# Patient Record
Sex: FEMALE | Race: WHITE | NOT HISPANIC OR LATINO | Employment: OTHER | URBAN - METROPOLITAN AREA
[De-identification: names, ages, dates, MRNs, and addresses within clinical notes are randomized per-mention and may not be internally consistent; named-entity substitution may affect disease eponyms.]

---

## 2017-02-06 ENCOUNTER — TELEPHONE (OUTPATIENT)
Dept: MEDICAL GROUP | Facility: MEDICAL CENTER | Age: 82
End: 2017-02-06

## 2017-02-06 ENCOUNTER — OFFICE VISIT (OUTPATIENT)
Dept: MEDICAL GROUP | Facility: MEDICAL CENTER | Age: 82
End: 2017-02-06
Payer: MEDICARE

## 2017-02-06 VITALS
HEART RATE: 55 BPM | WEIGHT: 120 LBS | HEIGHT: 60 IN | TEMPERATURE: 97.9 F | BODY MASS INDEX: 23.56 KG/M2 | OXYGEN SATURATION: 96 % | SYSTOLIC BLOOD PRESSURE: 136 MMHG | DIASTOLIC BLOOD PRESSURE: 74 MMHG

## 2017-02-06 DIAGNOSIS — R52 PAIN: ICD-10-CM

## 2017-02-06 DIAGNOSIS — I48.0 PAROXYSMAL ATRIAL FIBRILLATION (HCC): Chronic | ICD-10-CM

## 2017-02-06 DIAGNOSIS — M54.5 LOW BACK PAIN, UNSPECIFIED BACK PAIN LATERALITY, UNSPECIFIED CHRONICITY, WITH SCIATICA PRESENCE UNSPECIFIED: ICD-10-CM

## 2017-02-06 DIAGNOSIS — I10 ESSENTIAL HYPERTENSION: Chronic | ICD-10-CM

## 2017-02-06 DIAGNOSIS — E78.5 DYSLIPIDEMIA: Chronic | ICD-10-CM

## 2017-02-06 DIAGNOSIS — Z79.891 CHRONIC USE OF OPIATE DRUGS THERAPEUTIC PURPOSES: ICD-10-CM

## 2017-02-06 DIAGNOSIS — M81.8 OSTEOPOROSIS, IDIOPATHIC: Chronic | ICD-10-CM

## 2017-02-06 DIAGNOSIS — H83.3X9 HEARING LOSS D/T NOISE, UNSPECIFIED LATERALITY: ICD-10-CM

## 2017-02-06 DIAGNOSIS — Z87.81 HISTORY OF COMPRESSION FRACTURE OF SPINE: ICD-10-CM

## 2017-02-06 DIAGNOSIS — F11.20 OPIATE DEPENDENCE, CONTINUOUS (HCC): ICD-10-CM

## 2017-02-06 PROCEDURE — 4040F PNEUMOC VAC/ADMIN/RCVD: CPT | Performed by: INTERNAL MEDICINE

## 2017-02-06 PROCEDURE — G8420 CALC BMI NORM PARAMETERS: HCPCS | Performed by: INTERNAL MEDICINE

## 2017-02-06 PROCEDURE — G8482 FLU IMMUNIZE ORDER/ADMIN: HCPCS | Performed by: INTERNAL MEDICINE

## 2017-02-06 PROCEDURE — 99214 OFFICE O/P EST MOD 30 MIN: CPT | Performed by: INTERNAL MEDICINE

## 2017-02-06 PROCEDURE — 1036F TOBACCO NON-USER: CPT | Performed by: INTERNAL MEDICINE

## 2017-02-06 PROCEDURE — G8432 DEP SCR NOT DOC, RNG: HCPCS | Performed by: INTERNAL MEDICINE

## 2017-02-06 PROCEDURE — 1101F PT FALLS ASSESS-DOCD LE1/YR: CPT | Performed by: INTERNAL MEDICINE

## 2017-02-06 RX ORDER — OXYCODONE HCL 10 MG/1
10 TABLET, FILM COATED, EXTENDED RELEASE ORAL EVERY 12 HOURS
Qty: 60 TAB | Refills: 0 | Status: SHIPPED | OUTPATIENT
Start: 2017-02-06 | End: 2017-05-09 | Stop reason: SDUPTHER

## 2017-02-06 RX ORDER — OXYCODONE HCL 10 MG/1
10 TABLET, FILM COATED, EXTENDED RELEASE ORAL EVERY 12 HOURS
Qty: 60 TAB | Refills: 0 | Status: SHIPPED | OUTPATIENT
Start: 2017-02-06 | End: 2017-02-06 | Stop reason: SDUPTHER

## 2017-02-06 NOTE — MR AVS SNAPSHOT
[facility]   2017 10:00 AM   Office Visit   MRN: [id number]    Department:  [facility]   Dept Phone:  [phone]    Description:  Female : 1931   Provider:  Denis Krueger M.D.           Allergies as of 2017     Allergen Noted Reactions    Codeine 10/20/2007   Vomiting      You were diagnosed with     Pain   [059893]       Chronic use of opiate drugs therapeutic purposes   [4606420]       Opiate dependence, continuous (CMS-[facility])   [561802]       Compression fracture of thoracic vertebra, with delayed healing, subsequent encounter   [449473]       Low back pain, unspecified back pain laterality, unspecified chronicity, with sciatica presence unspecified   [4376703]       Hearing loss d/t noise, unspecified laterality   [9142376]         Vital Signs     Blood Pressure Pulse Temperature Height Weight Body Mass Index    136/74 mmHg 55 36.6 °C (97.9 °F) 1.524 m (5') 54.432 kg (120 lb) 23.44 kg/m2    Oxygen Saturation Smoking Status                96% Former Smoker          Basic Information     Date Of Birth Sex Race Ethnicity Preferred Language    1931 Female White Non- English      Your appointments     2017 10:00 AM   Established Patient with Denis Krueger M.D.   [facility])    [address]  [facility] [id number] [phone]           You will be receiving a confirmation call a few days before your appointment from our automated call confirmation system.            Mar 07, 2017  4:20 PM   Established Patient Pul with FANTASMA Palomo   [facility] (--)    [address]  [address]  [facility] [address] [phone]            May 09, 2017  9:00 AM   Established Patient with Denis Krueger M.D.   [facility])    [address]  [facility] [id number] [phone]           You will be receiving a confirmation call a few days before  your appointment from our automated call confirmation system.              Problem List              ICD-10-CM Priority Class Noted - Resolved    Hypertension (Chronic) I10   5/20/2010 - Present    Dyslipidemia (Chronic) E78.5   5/20/2010 - Present    Preventative health care (Chronic) Z00.00   5/20/2010 - Present    Low back pain (Chronic) M54.5   5/22/2010 - Present    Cervical pain (Chronic) M54.2   5/22/2010 - Present    Osteoporosis, idiopathic (Chronic) M81.8   5/22/2010 - Present    Compression fracture of thoracic vertebra (CMS-HCC) (Chronic) S22.000A   5/26/2010 - Present    Paroxysmal atrial fibrillation (HCC) (Chronic) I48.0   6/10/2010 - Present    Right hip pain M25.551   3/28/2011 - Present    COPD (chronic obstructive pulmonary disease) (CMS-HCC) (Chronic) J44.9   7/20/2011 - Present    Sleep apnea (Chronic) G47.30   8/10/2011 - Present    History of Helicobacter pylori infection Z86.19   11/1/2011 - Present    History of shingles Z86.19   6/7/2012 - Present    Tension headache (Chronic) G44.209   6/11/2013 - Present    Macular degeneration H35.30   7/8/2013 - Present    MEDICAL HOME    Unknown - Present    Opiate dependence, continuous  F11.20   4/23/2015 - Present    Sensorineural hearing loss H90.5   12/16/2015 - Present    Mild cognitive impairment (Chronic) G31.84   1/11/2016 - Present    TB lung, 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 Review all of your home medications and newly ordered  medications with your provider and/or pharmacist. Follow medication instructions as directed by your provider and/or pharmacist. Please keep your medication list with you and share with your provider. Update the information when medications are discontinued, doses are changed, or new medications (including over-the-counter products) are added; and carry medication information at all times in the event of emergency situations     Allergies:  CODEINE - Vomiting               Medications  Valid as of: February 06, 2017 -  9:35 AM    Generic Name Brand Name Tablet Size Instructions for use    Albuterol Sulfate (Nebu Soln) PROVENTIL 2.5mg/3ml 2.5 mg by Nebulization route every four hours as needed for Shortness of Breath.        Albuterol Sulfate (Nebu Soln) PROVENTIL 2.5mg/3ml 3 mL by Nebulization route every four hours as needed for Shortness of Breath.        Butalbital-APAP-Caffeine (Tab) FIORICET -40 MG Take 1 Tab by mouth as needed for Headache (once a day).        Calcium Carbonate-Vitamin D (Tab) OSCAL-250 250-125 MG-UNIT Take 1 Tab by mouth every day.        Cholecalciferol   Take 1 Cap by mouth 2 Times a Day.        Cyanocobalamin   Take  by mouth.        Dabigatran Etexilate Mesylate (Cap) PRADAXA 75 MG Take 1 Cap by mouth 2 Times a Day.        Fluticasone-Salmeterol (Aerosol) ADVAIR -21 MCG/ACT Inhale 2 Puffs by mouth 2 times a day. Use spacer and Rinse mouth after use        Irbesartan-Hydrochlorothiazide (Tab) AVALIDE 150-12.5 MG Take 0.5 Tabs by mouth every day.        Levalbuterol Tartrate (Aerosol) XOPENEX HFA 45 MCG/ACT Inhale 1-2 Puffs by mouth every four hours as needed for Shortness of Breath.        Lidocaine (Patch) LIDODERM 5 % Apply one patch to affected area on during the day for 12 hours, off at night        Metoprolol Tartrate (Tab) LOPRESSOR 25 MG Take 1 Tab by mouth 2 times a day.        Omega-3 Fatty Acids (Cap) Omega-3 300 MG Take  by mouth.        Omeprazole (CAPSULE DELAYED  RELEASE) PRILOSEC 20 MG Take 20 mg by mouth every day.        OxyCODONE HCl (Tablet Extended Release 12 hour Abuse-Deterrent) OXYCONTIN 10 MG Take 1 Tab by mouth every 12 hours. Ok to fill oxycodone 28-30 days after oxycodone written on 2/6/17 is filled        Tiotropium Bromide Monohydrate (Cap) SPIRIVA 18 MCG Inhale 1 Cap by mouth every day.        .                 Medicines prescribed today were sent to:     [facility] [address] [place] [address], [place] - [address]    [address] [place] [address]    Phone: [phone] Fax: [phone]    Open 24 Hours?: No      Medication refill instructions:       If your prescription bottle indicates you have medication refills left, it is not necessary to call your provider’s office. Please contact your pharmacy and they will refill your medication.    If your prescription bottle indicates you do not have any refills left, you may request refills at any time through one of the following ways: The online [employer] system (except Urgent Care), by calling your provider’s office, or by asking your pharmacy to contact your provider’s office with a refill request. Medication refills are processed only during regular business hours and may not be available until the next business day. Your provider may request additional information or to have a follow-up visit with you prior to refilling your medication.   *Please Note: Medication refills are assigned a new Rx number when refilled electronically. Your pharmacy may indicate that no refills were authorized even though a new prescription for the same medication is available at the pharmacy. Please request the medicine by name with the pharmacy before contacting your provider for a refill.        Referral     A referral request has been sent to our patient care coordination department. Please allow 3-5 business days for us to process this request and contact you either by phone or mail. If you do not hear from us by the 5th  business day, please call us at [phone].        Other Notes About Your Plan     Daughter Yashira Arteaga [phone];son Dileep   Dexa,reclast,UDT                     MyChart Access Code: Activation code not generated  Current [employer] Status: Active

## 2017-02-06 NOTE — TELEPHONE ENCOUNTER
Patient would like a call back when labs are put into the computer. Please advise pt's son/Dileep/[phone]. Thanks.

## 2017-02-06 NOTE — PROGRESS NOTES
Subjective:      Venus Church is a 85 y.o. female who presents with pain medication refill        HPI     Here follow up back pain uses walker for ambulation, no falls, on chronic oxycodone therapy 10 mg twice a day, pain remains stable and controlled but chronic and constant, limits her activities at times, no falls.  Has walker and cane at home.  Son is with patient for appointment today.  Son visits her every day, setsout her medications for her, no trouble remembering to take her medications.  Blood pressure has been stable and followed by cardiology on metoprolol plus Avalide, has had atrial fibrillation, on pradaxa no falls, no bruising or bleeding, no melena or hematochezia, no NSAIDs or aspirin.  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  who presents for follow up/evaluation of chronic pain and medication management.    Location of pain  Low back pain on oxycodone 10 mg twice a day, usage and frequency of medications has been stable, no early refills, no lost medications, denies drowsiness, sedation, confusion, depression, falls, respiratory suppression, constipation with medication, does have some short-term memory loss on occasion      Location  1( as above) 2( as above) 3 ( as above)   Onset, When did your symptoms start? years     Quality: constant or intermittent  constant      Descriptors: aching, burning, throbbing,shooting,sharp,stabbing,squeezing,pressure,soreness  dull ache      Changed since last visit?  no change      Present level(1-10), average in last week  up to 7      Best level in the last week  3-4      Worst level in the last week  up to 7      Worsens with:  movement      Improves with:  rest      Associated symptoms      Is the amount of pain relief  you are now obtaining from your current pain relievers enough to make a real difference in your life? Are you satisfied with your current level of pain control?  stable on current dose of OxyContin            Since last assessment level of function(ADL) has    Physical Functioning: stab;e Mood no change     Family Relationships: Stable [place], [place]   Sleep pattern no change    Social Relationships: Stable   Overall functioning: Stable     Potential aberrant behavior Unkempt/impaired:  No  Changes route of administration:  No  Abusing alcohol or illicit drugs:  No     Involved in accident:  No  Use medication in response to situational stress:  No  Purposeful over sedation:  No       Requesting early renewals:  No  Insists on certain med by name:  No  Negative mood change:  No     Appears intoxicated:  No  Multiple prescribers:  No  Reports lost or stolen meds:  No     Hoarding Medication:  No  Arrested by police:  No  Contact with street culture:  No                Comments:           ROS  Any side effects from current medications?          Other therapies tried Date Outcome/Comments/Notes   Nausea: none   Physical Therapy   has tried physical therapy    Vomiting none         Constipation: none         Itching: none         Mental status changes: none Tens Unit   has tried TENS and physical therapy    Sweating: none       has seen pain management here and at [facility]    Fatigue:none        Drowsiness:none Previous Imaging/work up   has had previous imaging    Overall severity of side effects:mild           Aware reviewed yes           Current Outpatient Prescriptions   Medication Sig Dispense Refill   • albuterol (PROVENTIL) 2.5mg/3ml Nebu Soln solution for nebulization 3 mL by Nebulization route every four hours as needed for Shortness of Breath. 150 mL 11   • oxyCODONE CR (OXYCONTIN) 10 MG Tablet Extended Release 12 hour Abuse-Deterrent Take 1 Tab by mouth every 12 hours. Ok to  fill oxycodone 56-60 days after the oxycodone written 10/31/16 60 Tab 0   • Butalbital-Acetaminophen (BUTALBITAL-APAP PO) Take  by mouth.     • Acetaminophen (TYLENOL) 167 MG/5ML Liquid Take  by mouth.     • omeprazole (PRILOSEC) 20 MG delayed-release capsule Take 20 mg by mouth every day.     • predniSONE (DELTASONE) 5 MG Tab Take 7 tabs daily one, then decrease by one tab each day for 6 days and as directed 28 Tab 3   • irbesartan-hydrochlorothiazide (AVALIDE) 150-12.5 MG per tablet Take 0.5 Tabs by mouth every day. 90 Tab 3   • metoprolol (LOPRESSOR) 25 MG Tab Take 1 Tab by mouth 2 times a day. 180 Tab 2   • fluticasone-salmeterol (ADVAIR HFA) 115-21 MCG/ACT inhaler Inhale 2 Puffs by mouth 2 times a day. Use spacer and Rinse mouth after use 1 Inhaler 5   • Omega-3 300 MG Cap Take  by mouth.     • albuterol (PROVENTIL) 2.5mg/3ml Nebu Soln solution for nebulization 2.5 mg by Nebulization route every four hours as needed for Shortness of Breath.     • levalbuterol (XOPENEX HFA) 45 MCG/ACT inhaler Inhale 1-2 Puffs by mouth every four hours as needed for Shortness of Breath.     • Ascorbic Acid (VITAMIN C PO) Take  by mouth.     • Cyanocobalamin (VITAMIN B-12 PO) Take  by mouth.     • lidocaine (LIDODERM) 5 % Patch Apply one patch to affected area on during the day for 12 hours, off at night 30 Patch 6   • tiotropium (SPIRIVA HANDIHALER) 18 MCG Cap Inhale 1 Cap by mouth every day. 30 Cap 11   • dabigatran (PRADAXA) 75 MG Cap capsule Take 1 Cap by mouth 2 Times a Day. 60 Cap 11   • acetaminophen/caffeine/butalbital 325-40-50 mg (FIORICET) -40 MG TABS Take 1 Tab by mouth as needed for Headache (once a day). 30 Tab 0   • calcium-vitamin D (OSCAL-250) 250-125 MG-UNIT TABS Take 1 Tab by mouth every day.     • VITAMIN D, CHOLECALCIFEROL, PO Take 1 Cap by mouth 2 Times a Day.       No current facility-administered medications for this visit.     Patient Active Problem List    Diagnosis Date Noted   • TB lung, latent  05/04/2016   • Mild cognitive impairment 01/11/2016   • Sensorineural hearing loss 12/16/2015   • Opiate dependence, continuous  04/23/2015   • MEDICAL HOME    • Macular degeneration 07/08/2013   • Tension headache 06/11/2013   • History of shingles 06/07/2012   • History of Helicobacter pylori infection 11/01/2011   • Sleep apnea 08/10/2011   • COPD (chronic obstructive pulmonary disease) (CMS-HCC) 07/20/2011   • Right hip pain 03/28/2011   • Paroxysmal atrial fibrillation (HCC) 06/10/2010   • Compression fracture of thoracic vertebra (CMS-HCC) 05/26/2010   • Low back pain 05/22/2010   • Cervical pain 05/22/2010   • Osteoporosis, idiopathic 05/22/2010   • Hypertension 05/20/2010   • Dyslipidemia 05/20/2010   • Preventative health care 05/20/2010         Compression fracture  5/10 MRI thoracic spine subacute T12 compression fracture  5/27/10 kyphoplasty T11  6/10 MRI lumbar spine T12 compression fracture mild to moderate central canal narrowing, interval T11 kyphoplasty, L2-L3 moderate foraminal stenosis, L3-L4 severe left foraminal stenosis, moderate right foraminal stenosis, L4-L5 moderate central canal stenosis  7/10 dexa LS +1.0,hip -1.6  7/28/10 T12 vertebral plasty  8/11 [facility] evaluation  pain management, recommend continue oxycontin 10 bid  10/11 [facility]  T11-L1 injection  2/13  pain note, T10-T11 epidural injection  6/13 dexa LS +1.8, forearm -2.7  9/23/13 reclast injection  9/4/14 reclast IV infusion    COPD  7/11 CT spiral thorax extensive emphysematous change of lung, small left pleural effusion left lung base with atelectasis unchanged from prior CT  5/10 CT spriral thorax emphysematous change and dependent atelectasis left lung base  7/11 PFT FEV/FVC 59%, FEV1 1.1 L (75% predicted), significant post bronchodilator response noted (FEV1 improved 16%). Mixed restrictive and obstructive pattern,COPD with GOLD stage II with sig bronchodilator response  7/11 trial of  symbicort 80/4.5 mcg bid discussed with daughter plus albuterol neb prn, [facility] home education ordered  5/12 off symbicort   5/22/14 cxr negative  6/23/14 on symbicort  7/20/15 change to advair 250/50 mcg from symbicort (not covered on insurance)  9/3/15 cxr negative  10/4/15 add spiriva and change symbicort to advair 250/50 mcg bid  10/19/15 CT high resolution thorax, no evidence of interstitial lung disease, minimal scattered opacification could indicate minimal areas of fibrosis periphery of each lung, similar to prior exam, emphysema most prominent upper lobes bilateral  10/19/15 PFT FEV1 1.1 (61% predicted),FVC 1.9,FEV/FVC 58%, no bronchodilator response,DLCO 34%; on advair 250/50 mcg bid and spiriva  11/2/15 change from advair discus to advair 250 inhaler and continue spiriva   12/11/15 not taking advair, will start advair and cont spiriva  12/11/15 cxr negative  3/14/16 [facility] note complaining doxycycline and taper steroids, continue nocturnal oxygen, continue rotating antibiotics for bronchiectasis, follow-up laboratory testing ordered  3/14/16  (normal), quantiferon gold positive, allergen panel negative, IgE 357 (less than 214), IgA 116 (),, IgM 27 (),IgG 947 ([phone])  4/13/16 cxr mild cardiomegaly  5/4/16 [facility] note continue advair 115/21 bid with spacer given doxycycline and prednisone, continue oxygen 3 L at night, AFB samples ×3, follow-up 3 months  7/25/16 pulmonary note on prednisone taper one week out of the month and doxycycline one per day for one week per month, on advair bid and spiriva and oxygen 3 liters at night  11/8/16 pulmonary note continue advair 115/21 ucg bid,spiriva, xopenex as needed, oxygen 3 L at night       Hypertension  1/05 carotid u/s negative  1/07 echo LV EF 60% ,mild LVH  1/09 renal ultrasound 6 mm right cortical cyst  9/09 MRI brain with and without moderate nonspecific microvascular ischemic change  9/09 MRA next mild plaque right internal carotid  5/24/10  echo normal LV size and function, EF 60%, mild to moderate AR, RVSP 35-40 consistent with mild pulmonary hypertension  5/10 persantine thallium no ischemia, EF 72%  9/10 change avalide 150/12.5 to avapro 150 qday, had sodium 125 in ER  3/11 on avapro 300 mg  7/8/11 echo normal LV function, EF 55%  7/8/11 Persantine thallium possible small area mild ischemia in the lateral wall, no evidence of infarction  7/11   3/12 increase metoprolol to 50 bid, cont avapro 300 mg, start norvasc 2.5 mg  5/1/12 increase norvasc to 5 mg, change avapro to avalide 300/12.5 mg, cont metoprolol 50 bid  10/2/12 on avalide 300/12.5 mg and metoprolol 50 bid and norvasc 5 mg  10/12 Persantine thallium diaphragmatic uptake possible attenuation, fixed inferolateral defect which may be secondary to attenuation, EF 76%, no wall motion abnormalities, no evidence of significant ischemia.  1/13 echo normal LV function, grade 2 diastolic dysfunction, EF 70% moderate aortic insufficiency  1/13   1/23/13 cxr cardiomegaly without pulmonary edema  6/11/13 on avalide 300/12.5 mg,norvasc 5 mg, metoprolol 50 bid  4/7/14 cardiology note atrial fibrillation, start pradaxa 75 mg bid, stop asa, stop norvasc, decreased avalide 300/12.5 mg to 1/2 per day, increase metoprolol to 50 mg 1 1/2 bid  10/20/14 metoprolol 50 mg decreased to 25 mg bid by [facility] doctor due to low heart rate, continues on avalide 300/12.5 mg   12/29/14  cardiology note continue pradaxa, metoprolol 25 mg 1/2 bid, avalide 300/12.5 mg  7/25/16 avalide 150/12.5 mg decrease to 1/2 tab per day and continue metoprolol 25 mg bid  8/24/16 cardiology note sinus rhythm on exam,[id number] score=2 continue anticoagulation, low-dose metoprolol,avalide 150/12.5 mg 1/2 per day, follow-up one year    Low back pain  6/06 epidural L4-L5   12/08 x-ray LS moderate to severe DJD  6/10 MRI lumbar spine T12 compression fracture with mild-to-moderate central spinal canal narrowing,  interval T11 kyphoplasty, L2-L3 moderate frontal stenosis, L3-L4 severe left foraminal stenosis, moderate right foraminal stenosis, L4-L5 moderate central spinal canal  7/10 interventional rad consult epidural T11 to L1 region  7/28/10 T12 vertebroplasty  7/30/10 norco taper, and lyrica 50 mg bid  8/5/10 reaction to lyrica, stop lyrica  8/19/10 lumbar steroid epidural   9/10  note rec decompression if pain persist  10/10 bilateral lumbar facet joint injections L3-S1   12/10 xray LS old T11 and T12 fracture status post kyphoplasty  1/11 [facility]  3/11 trial of spinal stimulator  8/11 [facility] note evaluation  pain management cont oxycontin 10 bid  11/22/11 [facility] note dr. hodge; trial of baclofen, T11 plus T12 left-sided nerve root block pending  7/12  pain note;Left-sided T11-T12 transforaminal epidural   4/13 daughter called [facility] suggest taper pain med, she has sched to taper the oxycontin  4/15/13 resumed oxycontin 10 bid  6/11/13 off oxycontin  7/24/13 increase oxycontin from qday to 10 mg bid   2/28/14 on celebrex 200 mg as needed per  and oxycontin 10 mg bid  4/25/14 trial of cymbalta 30 mg, continue oxycontin 10 mg twice a day, recommend not use tramadol (side effects since on oxycontin already) or celebrex (on pradaxa)  5/29/14  pain management [facility]; recommend T11-T12 left-sided transforaminal nerve block  7/2/14 [facility] left T11-T12 epidural injection  7/21/14  [facility] note; increase oxycontin to 10 mg tid x 3 weeks and then taper down  10/27/14 [facility]; samples zorvolex  2/26/15 xray lumbar spine mild compression fracture of L4 of indeterminate age but new compared to 3/25/13 vertebral augmentation and T11 and T12 with severe compression fracture of T12 and focal kyphosis at this level  4/15/16 outpatient physical therapy phone call indicating patient  unable to make appointments, recommending home health physical therapy  9/20/16  pain note recommended left L3-L5 MBBB    Macular degeneration  7/1/13 dr.mills mcnair note, start PreserVision AREDS II vitamin followup 6 months    Mild cognitive impairment  1/11/16 MMSE 24/30    Opioid dependence  2/26/15 narcotic contract  6/12/15 opiate risk score 0  10/4/15   7/25/16   9/20/16  pain note recommended left L3-L5 MBBB  10/31/16     Osteoporosis  3/08 dexa LS +0.3,hip -1.4  5/10 MRI thoracic spine subacute T12 compression fracture  5/27/10 T12 kyphoplasty  6/10 on actonel  7/10 dexa LS +1.0,hip -1.6  8/10 vit d 56  3/11 reclast referral made  6/12 vit d 42 off actonel  6/21/13 dexa LS +1.8, forearm -2.7, I rec reclast, she would like to think it over  9/23/13 reclast injection  3/4/14 vit d 26  9/4/14 reclast IV infusion  2/26/15 xray rib series left; mild deformity of the lateral 10th rib on the left may be related to a fracture  2/26/15 xray lumbar spine mild compression fracture of L4 of indeterminate age but new compared to /25/13, vertebral augmentation and T11 and T12 with severe compression fracture of T12 and focal kyphosis at this level  12/7/15 reclast IV infusion    Paroxysmal atrial fibrillation  4/10 hospitalization on multaq, also on verapamil and metoprolol for rate control per cardiology  1/11 [facility] note cont multaq  7/11 EKG atrial fibrillation hospitalization  7/8/11 echo normal LV function, EF 55%  7/8/11 Persantine thallium possible small area mild ischemia in the lateral wall, no evidence of infarction  7/11 [facility] during hospitalization changed to amiodarone 200 mg plus pradaxa restarted  7/11   9/11 [facility] note stop amiodarone, cont pradaxa and metoprolol 25 bid  3/12 cont pradaxa and increase metoprolol to 50 bid due to higher bp  5/12 pradaxa decreased from 150 bid to 75 bid per [facility] due to nosebleeds  6/12 EKG NSR  1/13 echo normal LV function, grade 2 diastolic  dysfunction, EF 70% moderate aortic insufficiency  6/11/13 on pradaxa and metoprolol 50 bid for rate control, NSR today  9/13 cardiology note stop pradaxa and start asa 81 mg  4/7/14 cardiology note atrial fibrillation, start pradaxa 75 mg bid, stop asa, stop norvasc, decreased avalide 300/12.5 mg to 1/2 per day, increase metoprolol to 50 mg 1 1/2 bid  4/14/14 cardiology note, NSR on exam, continue pradaxa 75 mg bid, metoprolol 75 mg bid, avalide 300/12.5 mg 1/2 tab per day  10/20/14 metoprolol 50 mg decreased to 25 mg bid by [place] doctor due to low heart rate, continues on pradaxa and avalide 300/12.5 mg   12/29/14  cardiology note continue pradaxa, metoprolol 25 mg 1/2 bid, avalide 300/12.5 mg  6/12/15 NSR on exam  8/24/16 cardiology note sinus rhythm on exam,[id number] score=2 continue anticoagulation, low-dose metoprolol follow-up one year    Preventative health  5/05 colon per [facility] polyp no path report, f/u rec 5 yrs (12/12/12 [facility] note)  4/09 stool occult blood negative  12/09 mammogram  10/1/11 pneumovax  10/2/12 zoster vaccine  6/11/13 declines mammogram, tdap  6/13 dexa LS +1.8, forearm -2.7  3/4/14 vit d 26  10/12/15 prevnar    Right hip pain  3/11 MRI right hip DJD and tear of the anterior/superior acetabular labrum  4/11  ortho note not believe hip is primary problem, referred to  or [facility]    Sensorineural hearing loss  12/7/15  audiology evaluation mild sensorineural hearing loss    Sleep apnea  8/11 [facility] note sleep study apnea-hypopnea index 86, low sat 74%, start CPAP 8-18 cm   2013 off cpap not comfortable    Tension headache  6/11/13 on fioricet bid  2/28/14 taper fioricet to once a day  4/25/14 now on fioricet prn              ROS       Objective:          Physical Exam   Constitutional: No distress.   HENT:   Head: Normocephalic and atraumatic.   Mouth/Throat: Oropharynx is clear and moist.   Eyes: Conjunctivae are normal. Right eye exhibits no discharge. Left  eye exhibits no discharge. No scleral icterus.   Neck: No JVD present. No thyromegaly present.   Cardiovascular: Normal rate and regular rhythm.    Pulmonary/Chest: Effort normal and breath sounds normal. No respiratory distress. She has no wheezes.   Abdominal: She exhibits no distension.   Musculoskeletal: She exhibits no edema.   Neurological: She is alert.   Skin: Skin is warm. She is not diaphoretic. No erythema.   Psychiatric: She has a normal mood and affect. Her behavior is normal.   Nursing note and vitals reviewed.    Decreased auditory acuity          Assessment/Plan:       Assessment  #1 chronic low back pain history of T12 compression fracture 2010 status post vertebroplasty, has seen pain management locally Dr. Fajardo and at [facility], has tried multiple medications, physical therapy, acupuncture, anti-inflammatories, gabapentin, Celebrex, Cymbalta, pregabalin, has had injections facet as well as epidural, trial spinal cord stimulator, hydrocodone, oxycodone without benefit, currently remains controlled on OxyContin 10 mg twice a day per pain management recommendations.  Has been stable dosing for number of years without side effects.  Did see  from pain management last year however he has left the practice with our facility and is on his own with another group, did not follow-up with that referral    #2 hypertension stable and controlled on Avalide and metoprolol without lightheadedness or dizziness    #3 osteoporosis on reclast last infusion December 2015, last bone density over 3 years ago 6/21/13 dexa LS +1.8, forearm -2.7    #4 chronic opioid dependence, has signed a narcotic contract, opiate risk was zero, has seen pain management,  today consistent other than slight memory loss at times, no other manifestations of side effects related to chronic opiate therapy, continues no alcohol, no falls, no depression or mood changes    #5 paroxysmal atrial fibrillation for a by cardiology, on  pradaxa and low-dose metoprolol, rate controlled, sinus rhythm on examination today [id number]     #6 labs ordered    #7 previous compression fracture T12 as discussed above, has had kyphoplasty, has had physical therapy, continuous pain medication      Plan  #1 refill OxyContin 10 mg twice daily quantity 60 per month, 3 total refills provided to get each month    #2  today consistent    #3 long-term risk of narcotics discussed and emphasized today and previously with patient and son    #4 Opioid side effects include: Drowsiness, sedation, confusion, memory loss, depression, impaired balance and coordination, increased risk of falls, respiratory suppression, nausea, vomiting, constipation, itching and rash, difficulty urinating.  Long-term use may contribute to tolerance, dependency, habituation requiring more medication to achieve the same benefits, and long-term use may decrease efficacy of the medication.  If medications are discontinued abruptly, symptoms of withdrawals may include nausea, abdominal pain, irregular heart rhythms, diaphoresis, all narcotics need to be tapered under the supervision of a provider.  Overdose and death may occur if patient does not take the opioid prescriptions as prescribed, other medications may potentiate side effects of respiratory suppression, overdose, death, including benzodiazepines, depression or anxiety medications, alcohol, illicit drugs.  Patient will notify myself or prescribing physician of narcotics, of any changes in medication regimen, any additional medications, any change in dosing or frequency of opiate therapy, and patient will not receive opiate prescriptions from anyone other than myself having signed a narcotic contract previously with myself.  Patient has been warned not to operate heavy machinery, and to not handle firearms, operate motor vehicle under the influence of opiate medications, these may affect his ability or her ability to function and result  in life-threatening accidents, and as such may have legal repercussions.  Discussed management options and reviewed symptomatic care, can consider acupuncture, massage therapy, TENS unit, home exercise program, physical therapy, reviewed pain medication dosing, risks and alternatives, medication adjustments, consider taper, reviewed , imaging options further discussed and therapeutic options    #5 check blood pressure and record, follow-up cardiology    #6 bone density pending result consider reclast    #7 continued anticoagulation understanding risk of bleeding should she fall, fall precautions emphasized, recommend use walker, has had physical therapy    #8 continue no alcohol, no tobacco    #9 pain management referral     #10 follow-up 3 months

## 2017-02-06 NOTE — TELEPHONE ENCOUNTER
Please notify son Dileep, that the bone density test has been ordered, imaging should contact him to set up that test or he can call [phone] to schedule

## 2017-02-06 NOTE — TELEPHONE ENCOUNTER
Please notify son, Dileep, that the fasting labs have been placed in the computer system    I forgot to mention that the patient is also due for bone density test, I would like to order that test to check her bone strength of her back and hips, she has been on once yearly Reclast infusions in the past, but she is due to recheck her bone density

## 2017-02-06 NOTE — TELEPHONE ENCOUNTER
Dileep notified about labs.    Would like order for DEXA put in and he will schedule as soon as they call him.

## 2017-02-10 ENCOUNTER — TELEPHONE (OUTPATIENT)
Dept: MEDICAL GROUP | Facility: MEDICAL CENTER | Age: 82
End: 2017-02-10

## 2017-02-10 NOTE — TELEPHONE ENCOUNTER
1. Caller Name: Venus Church                        Call Back Number: [phone] (home)       2. Message: Pt needs Rx for new nebulizer. Please advise.     3. Patient approves office to leave a detailed voicemail/MyChart message: no

## 2017-03-01 ENCOUNTER — TELEPHONE (OUTPATIENT)
Dept: MEDICAL GROUP | Facility: MEDICAL CENTER | Age: 82
End: 2017-03-01

## 2017-03-01 NOTE — TELEPHONE ENCOUNTER
need par please  (1) oxycodone 10 mg ER one po bid  (2) #60 per 30 days  (3) diagnosis: low back pain  (4) ICD 10 code: M54.5  (5) comments: Chronic low back pain status post T12 compression fracture, status post vertebroplasty, has seen pain management locally and at [facility], anti-inflammatories not effective such as ibuprofen, Aleve, also on oral anticoagulation such that unable to take NSAIDs further, has tried hydrocodone, pregabalin, gabapentin, tramadol, and has had spinal stimulator trial.  Pain management has recommended continuation of OxyContin 10 mg twice a day, has been maintained successfully on this dosing regimen since April 2013 without side effects

## 2017-03-06 ENCOUNTER — TELEPHONE (OUTPATIENT)
Dept: MEDICAL GROUP | Facility: MEDICAL CENTER | Age: 82
End: 2017-03-06

## 2017-03-06 NOTE — TELEPHONE ENCOUNTER
1. Caller Name: Venus Church                        Call Back Number: [phone] (home)       2. Message: Pt called and left message, wants to know if it is safe for her to drive on the medication you gave her. Did not stipulate which med. Called her back, no answer, left message for her to call back.     3. Patient approves office to leave a detailed voicemail/MyChart message: no

## 2017-03-06 NOTE — TELEPHONE ENCOUNTER
Please notify patient that because of the pain medication which can cause drowsiness or decreased reflexes and reaction time, it is better that she not drive on the medication

## 2017-03-20 ENCOUNTER — TELEPHONE (OUTPATIENT)
Dept: CARDIOLOGY | Facility: MEDICAL CENTER | Age: 82
End: 2017-03-20

## 2017-03-20 NOTE — TELEPHONE ENCOUNTER
----- Message from Dannie Tinsley sent at 3/20/2017  4:20 PM PDT -----  Regarding: Pt daughter requesting clearnce for upcoming epidural  JAMIE/Camila,    Pt's daughter Ivana is requesting clearance for mother to hold Pradaxa prior to upcoming Epidural inj 3/22. She can be reached at [phone] for call back.

## 2017-03-20 NOTE — TELEPHONE ENCOUNTER
has told pt. To hold Pradaxa 75mg for 36 hrs. Prior to epidural scheduled 3-22-17.   In 2013, Dr. Smyth authorized pt. To hold Pradaxa 75mg for 72 hrs. priot to spinal epidiral.   To Dr. Smyth to approve holding Pradaxa for 36 hrs.

## 2017-03-22 ENCOUNTER — HOSPITAL ENCOUNTER (OUTPATIENT)
Dept: PAIN MANAGEMENT | Facility: REHABILITATION | Age: 82
End: 2017-03-22
Attending: PHYSICAL MEDICINE & REHABILITATION
Payer: MEDICARE

## 2017-03-22 ENCOUNTER — HOSPITAL ENCOUNTER (OUTPATIENT)
Dept: RADIOLOGY | Facility: REHABILITATION | Age: 82
End: 2017-03-22
Attending: PHYSICAL MEDICINE & REHABILITATION
Payer: MEDICARE

## 2017-03-22 VITALS
HEART RATE: 72 BPM | BODY MASS INDEX: 21.87 KG/M2 | WEIGHT: 118.83 LBS | TEMPERATURE: 98 F | SYSTOLIC BLOOD PRESSURE: 191 MMHG | DIASTOLIC BLOOD PRESSURE: 92 MMHG | OXYGEN SATURATION: 96 % | HEIGHT: 62 IN | RESPIRATION RATE: 18 BRPM

## 2017-03-22 PROCEDURE — 700117 HCHG RX CONTRAST REV CODE 255

## 2017-03-22 PROCEDURE — 64494 INJ PARAVERT F JNT L/S 2 LEV: CPT

## 2017-03-22 PROCEDURE — 64493 INJ PARAVERT F JNT L/S 1 LEV: CPT

## 2017-03-22 PROCEDURE — 700101 HCHG RX REV CODE 250

## 2017-03-22 RX ORDER — ROPIVACAINE HYDROCHLORIDE 2 MG/ML
INJECTION, SOLUTION EPIDURAL; INFILTRATION; PERINEURAL
Status: COMPLETED
Start: 2017-03-22 | End: 2017-03-22

## 2017-03-22 RX ORDER — BUPIVACAINE HYDROCHLORIDE 5 MG/ML
INJECTION, SOLUTION EPIDURAL; INTRACAUDAL
Status: COMPLETED
Start: 2017-03-22 | End: 2017-03-22

## 2017-03-22 RX ADMIN — IOHEXOL 3 ML: 240 INJECTION, SOLUTION INTRATHECAL; INTRAVASCULAR; INTRAVENOUS; ORAL at 16:15

## 2017-03-22 RX ADMIN — BUPIVACAINE HYDROCHLORIDE 5 ML: 5 INJECTION, SOLUTION EPIDURAL; INTRACAUDAL; PERINEURAL at 16:15

## 2017-03-22 ASSESSMENT — PAIN SCALES - GENERAL
PAINLEVEL_OUTOF10: 6
PAINLEVEL_OUTOF10: 7
PAINLEVEL_OUTOF10: 7

## 2017-03-22 NOTE — PROGRESS NOTES
Current meds. See medication reconciliation form. Reviewed with pt. Pt has been off Pradaxa for 2 days.Pt denies taking ASA,other blood thinners or anti-inflammatories.  made aware pre-procedure. Pt has a ride post-procedure (son, Dileep is ). Printed and verbal discharge instructions given to pt who verbalized understanding.

## 2017-04-12 DIAGNOSIS — J44.9 CHRONIC OBSTRUCTIVE PULMONARY DISEASE, UNSPECIFIED COPD TYPE (HCC): ICD-10-CM

## 2017-04-13 RX ORDER — FLUTICASONE PROPIONATE AND SALMETEROL XINAFOATE 115; 21 UG/1; UG/1
AEROSOL, METERED RESPIRATORY (INHALATION)
Qty: 1 INHALER | Refills: 5 | Status: SHIPPED | OUTPATIENT
Start: 2017-04-13 | End: 2017-12-27 | Stop reason: SDUPTHER

## 2017-04-13 NOTE — TELEPHONE ENCOUNTER
Have we ever prescribed this med? {:[id number]}.  If yes, what date? ***    Last OV: ***    Next OV: ***    DX: ***    Medications: ***

## 2017-04-13 NOTE — TELEPHONE ENCOUNTER
Have we ever prescribed this med? Yes.  If yes, what date? 05/04/16    Last OV: 11/08/16-[facility]     Next OV: Due 03/2017, no pending appts    DX: COPD     Medications:   Requested Prescriptions     Pending Prescriptions Disp Refills   • ADVAIR -21 MCG/ACT inhaler [Pharmacy Med Name: ADVAIR /21   AER]  0     Sig: INHALE TWO PUFFS BY MOUTH TWICE DAILY.  USE  WITH  SPACER  AND  RINSE  MOUTH  AFTER  EACH  USE

## 2017-04-14 ENCOUNTER — TELEPHONE (OUTPATIENT)
Dept: MEDICAL GROUP | Facility: MEDICAL CENTER | Age: 82
End: 2017-04-14

## 2017-04-14 NOTE — TELEPHONE ENCOUNTER
We did not receive those records from Dr Urrutia' office, could you please try to obtain those records for us?

## 2017-04-14 NOTE — TELEPHONE ENCOUNTER
1. Caller Name: Pt's daughterYashira                      Call Back Number: [phone]    2. Message: They would like to know if you have records for the pt's procedure that was done on her back a month ago by Dr. Urrutia. She wants to know your opinion on doing the same procedure with a different medication since the first one did not help very well and she is still in pain. She wants to know what you think.     3. Patient approves office to leave a detailed voicemail/MyChart message: yes

## 2017-04-15 NOTE — TELEPHONE ENCOUNTER
Reviewed records from pain management, appears she had a test injection MBB #1 lumbar spine, if it provided some benefit with regards to pain relief initially, then I would consider ablation, she can follow up with pain management, discussed with daughter Yashira over the phone

## 2017-04-26 ENCOUNTER — TELEPHONE (OUTPATIENT)
Dept: MEDICAL GROUP | Facility: MEDICAL CENTER | Age: 82
End: 2017-04-26

## 2017-04-26 NOTE — TELEPHONE ENCOUNTER
Please notify patient or son, Dileep, that she should not take aspirin while on Pradaxa, since Pradaxa is a strong blood thinner and aspirin is also a blood thinner

## 2017-04-26 NOTE — TELEPHONE ENCOUNTER
1. Caller Name: Pt's son, Nelson                      Call Back Number: [phone]    2. Message: Is it okay for pt to take aspirin while on the Pradaxa?     3. Patient approves office to leave a detailed voicemail/MyChart message: yes

## 2017-04-27 ENCOUNTER — TELEPHONE (OUTPATIENT)
Dept: MEDICAL GROUP | Facility: MEDICAL CENTER | Age: 82
End: 2017-04-27

## 2017-04-28 NOTE — TELEPHONE ENCOUNTER
1. Caller Name: Yashira                      Call Back Number: [phone] (home)       2. Message: Pt is on oxycontin. Is becoming frustrated and dtr wonders if the oxycontin is having some effect on her mind and making her forgetful. Would like to have a conversation with you about what she should do.     3. Patient approves office to leave a detailed voicemail/MyChart message: no

## 2017-04-28 NOTE — TELEPHONE ENCOUNTER
Spoke to patient's son and daughter, will decrease OxyContin to 10 mg daily, discussed an appointment scheduled for 2 weeks, they agree

## 2017-05-09 ENCOUNTER — OFFICE VISIT (OUTPATIENT)
Dept: MEDICAL GROUP | Facility: MEDICAL CENTER | Age: 82
End: 2017-05-09
Payer: MEDICARE

## 2017-05-09 VITALS
BODY MASS INDEX: 21.71 KG/M2 | WEIGHT: 118 LBS | SYSTOLIC BLOOD PRESSURE: 132 MMHG | HEART RATE: 56 BPM | OXYGEN SATURATION: 93 % | HEIGHT: 62 IN | TEMPERATURE: 98.6 F | DIASTOLIC BLOOD PRESSURE: 70 MMHG

## 2017-05-09 DIAGNOSIS — Z79.891 CHRONIC USE OF OPIATE DRUGS THERAPEUTIC PURPOSES: ICD-10-CM

## 2017-05-09 DIAGNOSIS — M54.5 LOW BACK PAIN, UNSPECIFIED BACK PAIN LATERALITY, UNSPECIFIED CHRONICITY, WITH SCIATICA PRESENCE UNSPECIFIED: Chronic | ICD-10-CM

## 2017-05-09 DIAGNOSIS — Z79.891 CHRONIC USE OF OPIATE FOR THERAPEUTIC PURPOSE: Chronic | ICD-10-CM

## 2017-05-09 DIAGNOSIS — G89.29 OTHER CHRONIC PAIN: ICD-10-CM

## 2017-05-09 DIAGNOSIS — I10 ESSENTIAL HYPERTENSION: Chronic | ICD-10-CM

## 2017-05-09 DIAGNOSIS — F11.20 OPIATE DEPENDENCE, CONTINUOUS (HCC): ICD-10-CM

## 2017-05-09 DIAGNOSIS — R52 PAIN: ICD-10-CM

## 2017-05-09 PROCEDURE — 99214 OFFICE O/P EST MOD 30 MIN: CPT | Performed by: INTERNAL MEDICINE

## 2017-05-09 PROCEDURE — G8420 CALC BMI NORM PARAMETERS: HCPCS | Performed by: INTERNAL MEDICINE

## 2017-05-09 PROCEDURE — 1036F TOBACCO NON-USER: CPT | Performed by: INTERNAL MEDICINE

## 2017-05-09 PROCEDURE — G8432 DEP SCR NOT DOC, RNG: HCPCS | Performed by: INTERNAL MEDICINE

## 2017-05-09 PROCEDURE — 1101F PT FALLS ASSESS-DOCD LE1/YR: CPT | Performed by: INTERNAL MEDICINE

## 2017-05-09 PROCEDURE — 4040F PNEUMOC VAC/ADMIN/RCVD: CPT | Performed by: INTERNAL MEDICINE

## 2017-05-09 RX ORDER — OXYCODONE HCL 10 MG/1
10 TABLET, FILM COATED, EXTENDED RELEASE ORAL EVERY 12 HOURS
Qty: 60 TAB | Refills: 0 | Status: SHIPPED | OUTPATIENT
Start: 2017-05-09 | End: 2017-05-09 | Stop reason: SDUPTHER

## 2017-05-09 RX ORDER — OXYCODONE HCL 10 MG/1
10 TABLET, FILM COATED, EXTENDED RELEASE ORAL EVERY 12 HOURS
Qty: 60 TAB | Refills: 0 | Status: SHIPPED | OUTPATIENT
Start: 2017-05-09 | End: 2017-08-07 | Stop reason: SDUPTHER

## 2017-05-09 ASSESSMENT — PATIENT HEALTH QUESTIONNAIRE - PHQ9: CLINICAL INTERPRETATION OF PHQ2 SCORE: 0

## 2017-05-09 ASSESSMENT — LIFESTYLE VARIABLES: HISTORY_ALCOHOL_USE: 0

## 2017-05-09 ASSESSMENT — ENCOUNTER SYMPTOMS: DEPRESSION: 0

## 2017-05-09 NOTE — PROGRESS NOTES
Subjective:      Venus Church is a 86 y.o. female who presents with back pain          HPI      Chronic low back pain T12 compression fracture status post kyphoplasty and T12 vertebroplasty, has seen pain management here and at [place], has seen neurosurgery, has had trial spinal cord stimulator, has had injections without benefit.  Remains on chronic pain medication, has tried physical therapy, has tried pregabalin, anti-inflammatories, Celebrex, Cymbalta. Still with low back pain limiting activity. Lives by self and son sees her daily helps set out her medication for her and ensures that she takes the medication daily. No walker in the house for ambulation and falls, uses walker outside the house. Pain is constant and can be severe with movement and activity. Has upcoming appointment with  pain management and patient states that she does not want to decrease medication right now, she would like to try her injection first.  No depression, no falls, respiratory suppression, no constipation  Some short term memory loss, patient does describe being more forgetful, denies depression, more frustrated over chronic pain. Good social support with friends and family, son visits her every day, checks on her medications, patient does have attendant at home once a week to help her bathe.  No difficulty with toileting.  No incontinence.  No difficulty with transfers.  No difficulty with dressing.  No difficulty eating, does have some meals prepared.  Son and daughter assist with finances.  Paroxysmal atrial fibrillation followed by cardiology, remains on Lopressor and pradaxa, no NSAIDs, no bruising or bleeding, no melena or hematochezia  Hypertension stable on Avalide and Lopressor  No tobacco, no alcohol          Current Outpatient Prescriptions   Medication Sig Dispense Refill   • ADVAIR -21 MCG/ACT inhaler INHALE TWO PUFFS BY MOUTH TWICE DAILY.  USE  WITH  SPACER  AND  RINSE  MOUTH  AFTER  EACH  USE 1 Inhaler  5   • metoprolol (LOPRESSOR) 25 MG Tab Take 1 Tab by mouth 2 times a day. 180 Tab 3   • dabigatran (PRADAXA) 75 MG Cap capsule Take 1 Cap by mouth 2 Times a Day. 60 Cap 11   • oxyCODONE CR (OXYCONTIN) 10 MG Tablet Extended Release 12 hour Abuse-Deterrent Take 1 Tab by mouth every 12 hours. Ok to fill oxycodone 28-30 days after oxycodone written on 2/6/17 is filled 60 Tab 0   • albuterol (PROVENTIL) 2.5mg/3ml Nebu Soln solution for nebulization 3 mL by Nebulization route every four hours as needed for Shortness of Breath. 150 mL 11   • irbesartan-hydrochlorothiazide (AVALIDE) 150-12.5 MG per tablet Take 0.5 Tabs by mouth every day. 90 Tab 3   • Omega-3 300 MG Cap Take  by mouth.     • albuterol (PROVENTIL) 2.5mg/3ml Nebu Soln solution for nebulization 2.5 mg by Nebulization route every four hours as needed for Shortness of Breath.     • levalbuterol (XOPENEX HFA) 45 MCG/ACT inhaler Inhale 1-2 Puffs by mouth every four hours as needed for Shortness of Breath.     • Cyanocobalamin (VITAMIN B-12 PO) Take  by mouth.     • lidocaine (LIDODERM) 5 % Patch Apply one patch to affected area on during the day for 12 hours, off at night 30 Patch 6   • tiotropium (SPIRIVA HANDIHALER) 18 MCG Cap Inhale 1 Cap by mouth every day. 30 Cap 11   • acetaminophen/caffeine/butalbital 325-40-50 mg (FIORICET) -40 MG TABS Take 1 Tab by mouth as needed for Headache (once a day). 30 Tab 0   • calcium-vitamin D (OSCAL-250) 250-125 MG-UNIT TABS Take 1 Tab by mouth every day.     • VITAMIN D, CHOLECALCIFEROL, PO Take 1 Cap by mouth 2 Times a Day.       No current facility-administered medications for this visit.     Patient Active Problem List    Diagnosis Date Noted   • TB lung, latent 05/04/2016   • Mild cognitive impairment 01/11/2016   • Sensorineural hearing loss 12/16/2015   • Opiate dependence, continuous  04/23/2015   • MEDICAL HOME    • Macular degeneration 07/08/2013   • Tension headache 06/11/2013   • History of shingles  06/07/2012   • History of Helicobacter pylori infection 11/01/2011   • Sleep apnea 08/10/2011   • COPD (chronic obstructive pulmonary disease) (CMS-[facility]) 07/20/2011   • Right hip pain 03/28/2011   • Paroxysmal atrial fibrillation ([facility]) 06/10/2010   • History of compression fracture of spine 05/26/2010   • Low back pain 05/22/2010   • Cervical pain 05/22/2010   • Osteoporosis, idiopathic 05/22/2010   • Hypertension 05/20/2010   • Dyslipidemia 05/20/2010   • Preventative health care 05/20/2010         Compression fracture  5/10 MRI thoracic spine subacute T12 compression fracture  5/27/10 kyphoplasty T11  6/10 MRI lumbar spine T12 compression fracture mild to moderate central canal narrowing, interval T11 kyphoplasty, L2-L3 moderate foraminal stenosis, L3-L4 severe left foraminal stenosis, moderate right foraminal stenosis, L4-L5 moderate central canal stenosis  7/10 dexa LS +1.0,hip -1.6  7/28/10 T12 vertebral plasty  8/11 [facility] evaluation  pain management, recommend continue oxycontin 10 bid  10/11 [facility]  T11-L1 injection  2/13  pain note, T10-T11 epidural injection  6/13 dexa LS +1.8, forearm -2.7  9/23/13 reclast injection  9/4/14 reclast IV infusion    COPD  7/11 CT spiral thorax extensive emphysematous change of lung, small left pleural effusion left lung base with atelectasis unchanged from prior CT  5/10 CT spriral thorax emphysematous change and dependent atelectasis left lung base  7/11 PFT FEV/FVC 59%, FEV1 1.1 L (75% predicted), significant post bronchodilator response noted (FEV1 improved 16%). Mixed restrictive and obstructive pattern,COPD with GOLD stage II with sig bronchodilator response  7/11 trial of symbicort 80/4.5 mcg bid discussed with daughter plus albuterol neb prn, [facility] home education ordered  5/12 off symbicort   5/22/14 cxr negative  6/23/14 on symbicort  7/20/15 change to advair 250/50 mcg from symbicort (not covered on insurance)  9/3/15 cxr  negative  10/4/15 add spiriva and change symbicort to advair 250/50 mcg bid  10/19/15 CT high resolution thorax, no evidence of interstitial lung disease, minimal scattered opacification could indicate minimal areas of fibrosis periphery of each lung, similar to prior exam, emphysema most prominent upper lobes bilateral  10/19/15 PFT FEV1 1.1 (61% predicted),FVC 1.9,FEV/FVC 58%, no bronchodilator response,DLCO 34%; on advair 250/50 mcg bid and spiriva  11/2/15 change from advair discus to advair 250 inhaler and continue spiriva   12/11/15 not taking advair, will start advair and cont spiriva  12/11/15 cxr negative  3/14/16 [facility] note complaining doxycycline and taper steroids, continue nocturnal oxygen, continue rotating antibiotics for bronchiectasis, follow-up laboratory testing ordered  3/14/16  (normal), quantiferon gold positive, allergen panel negative, IgE 357 (less than 214), IgA 116 (),, IgM 27 (),IgG 947 ([id number])  4/13/16 cxr mild cardiomegaly  5/4/16 [facility] note continue advair 115/21 bid with spacer given doxycycline and prednisone, continue oxygen 3 L at night, AFB samples ×3, follow-up 3 months  7/25/16 pulmonary note on prednisone taper one week out of the month and doxycycline one per day for one week per month, on advair bid and spiriva and oxygen 3 liters at night  11/8/16 pulmonary note continue advair 115/21 ucg bid,spiriva, xopenex as needed, oxygen 3 L at night     Hypertension  1/05 carotid u/s negative  1/07 echo LV EF 60% ,mild LVH  1/09 renal ultrasound 6 mm right cortical cyst  9/09 MRI brain with and without moderate nonspecific microvascular ischemic change  9/09 MRA next mild plaque right internal carotid  5/24/10 echo normal LV size and function, EF 60%, mild to moderate AR, RVSP 35-40 consistent with mild pulmonary hypertension  5/10 persantine thallium no ischemia, EF 72%  9/10 change avalide 150/12.5 to avapro 150 qday, had sodium 125 in ER  3/11 on avapro 300  mg  7/8/11 echo normal LV function, EF 55%  7/8/11 Persantine thallium possible small area mild ischemia in the lateral wall, no evidence of infarction  7/11   3/12 increase metoprolol to 50 bid, cont avapro 300 mg, start norvasc 2.5 mg  5/1/12 increase norvasc to 5 mg, change avapro to avalide 300/12.5 mg, cont metoprolol 50 bid  10/2/12 on avalide 300/12.5 mg and metoprolol 50 bid and norvasc 5 mg  10/12 Persantine thallium diaphragmatic uptake possible attenuation, fixed inferolateral defect which may be secondary to attenuation, EF 76%, no wall motion abnormalities, no evidence of significant ischemia.  1/13 echo normal LV function, grade 2 diastolic dysfunction, EF 70% moderate aortic insufficiency  1/13   1/23/13 cxr cardiomegaly without pulmonary edema  6/11/13 on avalide 300/12.5 mg,norvasc 5 mg, metoprolol 50 bid  4/7/14 cardiology note atrial fibrillation, start pradaxa 75 mg bid, stop asa, stop norvasc, decreased avalide 300/12.5 mg to 1/2 per day, increase metoprolol to 50 mg 1 1/2 bid  10/20/14 metoprolol 50 mg decreased to 25 mg bid by [place] doctor due to low heart rate, continues on avalide 300/12.5 mg   12/29/14  cardiology note continue pradaxa, metoprolol 25 mg 1/2 bid, avalide 300/12.5 mg  7/25/16 avalide 150/12.5 mg decrease to 1/2 tab per day and continue metoprolol 25 mg bid  8/24/16 cardiology note sinus rhythm on exam,[id number] score=2 continue anticoagulation, low-dose metoprolol,avalide 150/12.5 mg 1/2 per day, follow-up one year    Low back pain  6/06 epidural L4-L5   12/08 x-ray LS moderate to severe DJD  6/10 MRI lumbar spine T12 compression fracture with mild-to-moderate central spinal canal narrowing, interval T11 kyphoplasty, L2-L3 moderate frontal stenosis, L3-L4 severe left foraminal stenosis, moderate right foraminal stenosis, L4-L5 moderate central spinal canal  7/10 interventional rad consult epidural T11 to L1 region  7/28/10 T12  vertebroplasty  7/30/10 norco taper, and lyrica 50 mg bid  8/5/10 reaction to lyrica, stop lyrica  8/19/10 lumbar steroid epidural   9/10  note rec decompression if pain persist  10/10 bilateral lumbar facet joint injections L3-S1   12/10 xray LS old T11 and T12 fracture status post kyphoplasty  1/11 [facility] note  3/11 trial of spinal stimulator  8/11 [facility] note evaluation  pain management cont oxycontin 10 bid  11/22/11 [facility] note dr. hodge; trial of baclofen, T11 plus T12 left-sided nerve root block pending  7/12  pain note;Left-sided T11-T12 transforaminal epidural   4/13 daughter called [place] suggest taper pain med, she has sched to taper the oxycontin  4/15/13 resumed oxycontin 10 bid  6/11/13 off oxycontin  7/24/13 increase oxycontin from qday to 10 mg bid   2/28/14 on celebrex 200 mg as needed per  and oxycontin 10 mg bid  4/25/14 trial of cymbalta 30 mg, continue oxycontin 10 mg twice a day, recommend not use tramadol (side effects since on oxycontin already) or celebrex (on pradaxa)  5/29/14  pain management [facility]; recommend T11-T12 left-sided transforaminal nerve block  7/2/14 [facility] left T11-T12 epidural injection  7/21/14  [place] note; increase oxycontin to 10 mg tid x 3 weeks and then taper down  10/27/14 [facility] note; samples zorvolex  2/26/15 xray lumbar spine mild compression fracture of L4 of indeterminate age but new compared to 3/25/13 vertebral augmentation and T11 and T12 with severe compression fracture of T12 and focal kyphosis at this level  4/15/16 outpatient physical therapy phone call indicating patient unable to make appointments, recommending home health physical therapy  9/20/16  pain note recommended left L3-L5 MBBB  3/22/17  pain procedure note left L3-L5 MBBB #1    Macular degeneration  7/1/13  ophchastity note,  start PreserVision AREDS II vitamin followup 6 months    Mild cognitive impairment  1/11/16 MMSE 24/30    Opioid dependence  2/26/15 narcotic contract  6/12/15 opiate risk score 0  10/4/15   7/25/16   9/20/16  pain note recommended left L3-L5 MBBB  10/31/16   2/6/17     Osteoporosis  3/08 dexa LS +0.3,hip -1.4  5/10 MRI thoracic spine subacute T12 compression fracture  5/27/10 T12 kyphoplasty  6/10 on actonel  7/10 dexa LS +1.0,hip -1.6  8/10 vit d 56  3/11 reclast referral made  6/12 vit d 42 off actonel  6/21/13 dexa LS +1.8, forearm -2.7, I rec reclast, she would like to think it over  9/23/13 reclast injection  3/4/14 vit d 26  9/4/14 reclast IV infusion  2/26/15 xray rib series left; mild deformity of the lateral 10th rib on the left may be related to a fracture  2/26/15 xray lumbar spine mild compression fracture of L4 of indeterminate age but new compared to /25/13, vertebral augmentation and T11 and T12 with severe compression fracture of T12 and focal kyphosis at this level  12/7/15 reclast IV infusion    Paroxysmal atrial fibrillation  4/10 hospitalization on multaq, also on verapamil and metoprolol for rate control per cardiology  1/11 [facility] note cont multaq  7/11 EKG atrial fibrillation hospitalization  7/8/11 echo normal LV function, EF 55%  7/8/11 Persantine thallium possible small area mild ischemia in the lateral wall, no evidence of infarction  7/11 [facility] during hospitalization changed to amiodarone 200 mg plus pradaxa restarted  7/11   9/11 [facility] note stop amiodarone, cont pradaxa and metoprolol 25 bid  3/12 cont pradaxa and increase metoprolol to 50 bid due to higher bp  5/12 pradaxa decreased from 150 bid to 75 bid per [facility] due to nosebleeds  6/12 EKG NSR  1/13 echo normal LV function, grade 2 diastolic dysfunction, EF 70% moderate aortic insufficiency  6/11/13 on pradaxa and metoprolol 50 bid for rate control, NSR today  9/13 cardiology note stop pradaxa and start asa 81  mg  4/7/14 cardiology note atrial fibrillation, start pradaxa 75 mg bid, stop asa, stop norvasc, decreased avalide 300/12.5 mg to 1/2 per day, increase metoprolol to 50 mg 1 1/2 bid  4/14/14 cardiology note, NSR on exam, continue pradaxa 75 mg bid, metoprolol 75 mg bid, avalide 300/12.5 mg 1/2 tab per day  10/20/14 metoprolol 50 mg decreased to 25 mg bid by [facility] doctor due to low heart rate, continues on pradaxa and avalide 300/12.5 mg   12/29/14  cardiology note continue pradaxa, metoprolol 25 mg 1/2 bid, avalide 300/12.5 mg  6/12/15 NSR on exam  8/24/16 cardiology note sinus rhythm on exam,[id number] score=2 continue anticoagulation, low-dose metoprolol follow-up one year    Preventative health  5/05 colon per [facility] polyp no path report, f/u rec 5 yrs (12/12/12 [facility] note)  4/09 stool occult blood negative  12/09 mammogram  10/1/11 pneumovax  10/2/12 zoster vaccine  6/11/13 declines mammogram, tdap  6/13 dexa LS +1.8, forearm -2.7  3/4/14 vit d 26  10/12/15 prevnar    Right hip pain  3/11 MRI right hip DJD and tear of the anterior/superior acetabular labrum  4/11  ortho note not believe hip is primary problem, referred to  or [facility]    Sensorineural hearing loss  12/7/15  audiology evaluation mild sensorineural hearing loss    Sleep apnea  8/11 [facility] note sleep study apnea-hypopnea index 86, low sat 74%, start CPAP  8-18 cm   2013 off cpap not comfortable  3/14/16  (normal), quantiferon gold positive, allergen panel negative, IgE 357 (less than 214), IgA 116 (),, IgM 27 (),IgG 947 ([phone])  4/13/16 cxr mild cardiomegaly  5/4/16 [facility] note continue oxygen 3 L at night  11/8/16 pulmonary note continue oxygen 3 L at night    tb latent  3/14/16  (normal), quantiferon gold positive, allergen panel negative, IgE 357 (less than 214), IgA 116 (),, IgM 27 (),IgG 947 ([phone])  4/13/16 cxr mild cardiomegaly  5/4/16 [facility] note continue advair 115/21 bid  with spacer given doxycycline and prednisone, continue oxygen 3 L at night, AFB samples ×3, follow-up 3 months  7/25/16 pulmonary note on prednisone taper one week out of the month and doxycycline one per day for one week per month, on advair bid and spiriva and oxygen 3 liters at night    Tension headache  6/11/13 on fioricet bid  2/28/14 taper fioricet to once a day  4/25/14 now on fioricet prn      Depression Screening    Little interest or pleasure in doing things?  0 - not at all  Feeling down, depressed , or hopeless? 0 - not at all  Patient Health Questionnaire Score: 0   If depressive symptoms identified deferred to follow up visit unless specifically addressed in assessment and plan.    Screening for Cognitive Impairment    Three Minute Recall (banana, sunrise, fence)  2/3    Draw clock face with all 12 numbers set to the hand to show 10 minures past 11 o'clock  0    If cognitive concerns identified deferred to follow up visit unless specifically addressed in assessment and plan.    Fall Risk Assessment    Has the patient had two or more falls in the last year or any fall with injury in the last year?  No  If Fall Risk identified deferred to follow up visit unless specifically addressed in assessment and plan.    Safety Assessment    Throw rugs on floor.  Yes  Handrails on all stairs.  No  Good lighting in all hallways.  Yes  Difficulty hearing.  Yes  Patient counseled about all safety risks that were identified.    Functional Assessment ADLs    Are there any barriers preventing you from cooking for yourself or meeting nutritional needs?  Yes.    Are there any barriers preventing you from driving safely or obtaining transportation?  No.    Are there any barriers preventing you from using a telephone or calling for help?  No.    Are there any barriers preventing you from shopping?  No.    Are there any barriers preventing you from taking care of your own finances?  No.    Are there any barriers preventing  "you from managing your medications?  No.    Are currently engaing any exercise or physical activity?  Yes.       Health Maintenance Summary                IMM DTaP/Tdap/Td Vaccine Overdue 4/22/1950     PFT SCREENING-FEV1 AND FEV/FVC RATIO / SPIROMETRY SHOULD BE PERFORMED ANNUALLY Overdue 10/21/2016      Done 10/21/2015 PFT DICTATED RESULTS     Patient has more history with this topic...    Annual Wellness Visit Overdue 4/26/2017      Done 4/25/2016 Visit Dx: Medicare annual wellness visit, subsequent     Patient has more history with this topic...    BONE DENSITY Next Due 6/21/2018      Done 6/21/2013 DS-BONE DENSITY STUDY (DEXA)     Patient has more history with this topic...          Patient Care Team:  Denis Krueger M.D. as PCP - General  Nathan Fajardo M.D. as Consulting Physician (Anesthesia)  Esdras Thornton M.D. as Consulting Physician (Pulmonary Medicine)  Aashish as Respiratory        ROS       Objective:     /70 mmHg  Pulse 56  Temp(Src) 37 °C (98.6 °F)  Ht 1.575 m (5' 2\")  Wt 53.524 kg (118 lb)  BMI 21.58 kg/m2  SpO2 93%     Physical Exam   Constitutional: She appears well-developed and well-nourished. No distress.   HENT:   Head: Normocephalic and atraumatic.   Eyes: Conjunctivae are normal. Right eye exhibits no discharge. Left eye exhibits no discharge.   Neck: No thyromegaly present.   Cardiovascular: Normal rate and regular rhythm.    Pulmonary/Chest: No respiratory distress. She has no wheezes.   Abdominal: She exhibits no distension.   Musculoskeletal: She exhibits no edema.   Neurological: She is alert.   Skin: Skin is warm. She is not diaphoretic.   Psychiatric: She has a normal mood and affect. Her behavior is normal.   Nursing note and vitals reviewed.    Normal affect, insight, judgment, appropriate responses to questions and commands     Skin no petechia, no purpura, no ecchymosis or bruising     Assessment/Plan:     Assessment  #!  Chronic low back pain, followed by pain " management, has seen Dr. Fajardo, [facility], currently seeing , has had physical therapy, has had injections, epidurals, spinal stimulator no significant benefit, patient has good social support with son and family and friends.  On OxyContin 10 mg twice daily, patient states that she does have short-term difficulty with memory, daughter has indicated that she has noticed some short-term memory deficits as well, does ADLs  without assist, son visits her daily, has home health aide once weekly.  Back pain does limit her activities and causes fatigue.    #2 chronic therapeutic opiate use oxycodone 10 mg twice a day unable to taper because of pain, has seen pain management on her current dosing regimen with no changes, tinnitus drowsiness, falls, depression, respiratory suppression, does have some memory loss which may be attributable to chronic opiate therapy however she is still able to perform ADLs and has good social support from family    #3 gait imbalance, uses walker for ambulation outside the house, no falls    #4 paroxysmal atrial fibrillation, sinus rhythm today remains on anticoagulation with pradaxa followed by cardiology, no falls, no bruising or bleeding    #5 hypertension stable on Avalide      Plan  #!  Long discussion with patient with regards to chronic opiate therapy on oxycodone 10 mg twice a day, we have discussed tapering medication before, we have tried to taper off oxycodone however she has always returned to twice a day dosing secondary to recurrent chronic low back pain which may be debilitating at times, she is seeing pain management currently  for injections, she has seen Dr. Fajardo locally and seen by [facility] pain management, they have all continued her on her oxycodone twice daily dosing.  I offered her the option to decrease to a less potent narcotics such as hydrocodone, explaining this may improve her memory, we discussed this for at least 15-20  minutes, but she declines.  Understanding that the narcotics may affect her memory, she will continue oxycodone 10 mg twice a day dosing    #2 long-term risks of narcotic therapy memory loss, depression, falls, respiratory suppression, constipation again explained and reinforced, she understands risks, she has tried NSAIDs, physical therapy, injections, spinal stimulator with no benefit    #3  reviewed    #4 opioid contract signed and explained    #5 opiate risk score 0    #6 refill oxycodone 10 mg twice a day quantity 60, 3 month total supply, follow-up pain management, habituation, dependence, long-term risks again discussed and emphasized    #7 utilize walker at all times, fall precautions    #8 follow-up cardiology no change in cardiac medications    #9 follow-up myself 3 months refill pain medication, call sooner for side effects

## 2017-05-09 NOTE — MR AVS SNAPSHOT
"        Venus Church   2017 9:00 AM   Office Visit   MRN: [id number]    Department:  [facility]   Dept Phone:  [phone]    Description:  Female : 1931   Provider:  Denis Krueger M.D.           Allergies as of 2017     Allergen Noted Reactions    Codeine 10/20/2007   Vomiting      You were diagnosed with     Other chronic pain   [338.29.ICD-9-CM]       Pain   [877144]       Chronic use of opiate drugs therapeutic purposes   [4718663]       Opiate dependence, continuous (CMS-HCC)   [685111]       Essential hypertension   [4758695]       Low back pain, unspecified back pain laterality, unspecified chronicity, with sciatica presence unspecified   [6023930]       Chronic use of opiate for therapeutic purpose   [2071451]         Vital Signs     Blood Pressure Pulse Temperature Height Weight Body Mass Index    132/70 mmHg 56 37 °C (98.6 °F) 1.575 m (5' 2\") 53.524 kg (118 lb) 21.58 kg/m2    Oxygen Saturation Smoking Status                93% Former Smoker          Basic Information     Date Of Birth Sex Race Ethnicity Preferred Language    1931 Female White Non- English      Your appointments     Aug 07, 2017 10:00 AM   Established Patient with Denis Krueger M.D.   [facility]    [address] [facility] [address]  [facility] [address] [phone]           You will be receiving a confirmation call a few days before your appointment from our automated call confirmation system.              Problem List              ICD-10-CM Priority Class Noted - Resolved    Hypertension (Chronic) I10   2010 - Present    Dyslipidemia (Chronic) E78.5   2010 - Present    Preventative health care (Chronic) Z00.00   2010 - Present    Low back pain (Chronic) M54.5   2010 - Present    Cervical pain (Chronic) M54.2   2010 - Present    Osteoporosis, idiopathic (Chronic) M81.8   2010 - Present    History of compression fracture of spine Z87.81  "  5/26/2010 - Present    Paroxysmal atrial fibrillation (HCC) (Chronic) I48.0   6/10/2010 - Present    Right hip pain M25.551   3/28/2011 - Present    COPD (chronic obstructive pulmonary disease) (CMS-HCC) (Chronic) J44.9   7/20/2011 - Present    Sleep apnea (Chronic) G47.30   8/10/2011 - Present    History of Helicobacter pylori infection Z86.19   11/1/2011 - Present    History of shingles Z86.19   6/7/2012 - Present    Tension headache (Chronic) G44.209   6/11/2013 - Present    Macular degeneration H35.30   7/8/2013 - Present    MEDICAL HOME    Unknown - Present    Chronic use of opiate for therapeutic purpose (Chronic) Z79.891   4/23/2015 - Present    Sensorineural hearing loss H90.5   12/16/2015 - Present    Mild cognitive impairment (Chronic) G31.84   1/11/2016 - Present    TB lung, 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 Review all of your home medications and newly ordered medications with your provider and/or pharmacist. Follow medication instructions as directed by your provider and/or pharmacist. Please keep your medication list with you and share with your provider. Update the information when medications are discontinued, doses are changed, or new medications (including over-the-counter products) are added; and carry medication information at all times in the event of emergency situations     Allergies:  CODEINE - Vomiting               Medications  Valid as of: May 09, 2017 -  9:35 AM    Generic  Name Brand Name Tablet Size Instructions for use    Albuterol Sulfate (Nebu Soln) PROVENTIL 2.5mg/3ml 2.5 mg by Nebulization route every four hours as needed for Shortness of Breath.        Albuterol Sulfate (Nebu Soln) PROVENTIL 2.5mg/3ml 3 mL by Nebulization route every four hours as needed for Shortness of Breath.        Calcium Carbonate-Vitamin D (Tab) OSCAL-250 250-125 MG-UNIT Take 1 Tab by mouth every day.        Cholecalciferol   Take 1 Cap by mouth 2 Times a Day.        Cyanocobalamin   Take  by mouth.        Dabigatran Etexilate Mesylate (Cap) PRADAXA 75 MG Take 1 Cap by mouth 2 Times a Day.        Fluticasone-Salmeterol (Aerosol) ADVAIR -21 MCG/ACT INHALE TWO PUFFS BY MOUTH TWICE DAILY.  USE  WITH  SPACER  AND  RINSE  MOUTH  AFTER  EACH  USE        Irbesartan-Hydrochlorothiazide (Tab) AVALIDE 150-12.5 MG Take 0.5 Tabs by mouth every day.        Levalbuterol Tartrate (Aerosol) XOPENEX HFA 45 MCG/ACT Inhale 1-2 Puffs by mouth every four hours as needed for Shortness of Breath.        Lidocaine (Patch) LIDODERM 5 % Apply one patch to affected area on during the day for 12 hours, off at night        Metoprolol Tartrate (Tab) LOPRESSOR 25 MG Take 1 Tab by mouth 2 times a day.        Omega-3 Fatty Acids (Cap) Omega-3 300 MG Take  by mouth.        OxyCODONE HCl (Tablet Extended Release 12 hour Abuse-Deterrent) OXYCONTIN 10 MG Take 1 Tab by mouth every 12 hours. Ok to fill oxycodone 56-60 days after the oxycodone written on 5/9/17 is filled        Tiotropium Bromide Monohydrate (Cap) SPIRIVA 18 MCG Inhale 1 Cap by mouth every day.        .                 Medicines prescribed today were sent to:     [facility] - [address]    [address] [facility] [place] [address]    Phone: [phone] Fax: [phone]    Open 24 Hours?: No      Medication refill instructions:       If your prescription bottle indicates you have medication refills left, it is not necessary to call your  provider’s office. Please contact your pharmacy and they will refill your medication.    If your prescription bottle indicates you do not have any refills left, you may request refills at any time through one of the following ways: The online [employer] system (except Urgent Care), by calling your provider’s office, or by asking your pharmacy to contact your provider’s office with a refill request. Medication refills are processed only during regular business hours and may not be available until the next business day. Your provider may request additional information or to have a follow-up visit with you prior to refilling your medication.   *Please Note: Medication refills are assigned a new Rx number when refilled electronically. Your pharmacy may indicate that no refills were authorized even though a new prescription for the same medication is available at the pharmacy. Please request the medicine by name with the pharmacy before contacting your provider for a refill.        Other Notes About Your Plan     Daughter Yashira [phone];son Dileep   2/6/17 dexa ordered need reclast?  UDT next visit  2/6/17 labs ordered  4/27/17 decrease oxycontin to 10 mg qday from bid                     MyChart Access Code: Activation code not generated  Current [employer] Status: Active

## 2017-05-18 ENCOUNTER — HOSPITAL ENCOUNTER (OUTPATIENT)
Dept: PAIN MANAGEMENT | Facility: REHABILITATION | Age: 82
End: 2017-05-18
Attending: PHYSICAL MEDICINE & REHABILITATION
Payer: MEDICARE

## 2017-05-26 ENCOUNTER — TELEPHONE (OUTPATIENT)
Dept: MEDICAL GROUP | Facility: MEDICAL CENTER | Age: 82
End: 2017-05-26

## 2017-06-08 ENCOUNTER — OFFICE VISIT (OUTPATIENT)
Dept: PULMONOLOGY | Facility: HOSPICE | Age: 82
End: 2017-06-08
Payer: MEDICARE

## 2017-06-08 VITALS
WEIGHT: 117 LBS | HEIGHT: 62 IN | OXYGEN SATURATION: 92 % | RESPIRATION RATE: 16 BRPM | HEART RATE: 74 BPM | TEMPERATURE: 99.1 F | DIASTOLIC BLOOD PRESSURE: 80 MMHG | SYSTOLIC BLOOD PRESSURE: 148 MMHG | BODY MASS INDEX: 21.53 KG/M2

## 2017-06-08 DIAGNOSIS — G47.33 OBSTRUCTIVE SLEEP APNEA SYNDROME: Chronic | ICD-10-CM

## 2017-06-08 DIAGNOSIS — J42 CHRONIC BRONCHITIS, UNSPECIFIED CHRONIC BRONCHITIS TYPE (HCC): Chronic | ICD-10-CM

## 2017-06-08 DIAGNOSIS — Z22.7 TB LUNG, LATENT: ICD-10-CM

## 2017-06-08 PROCEDURE — G8432 DEP SCR NOT DOC, RNG: HCPCS | Performed by: NURSE PRACTITIONER

## 2017-06-08 PROCEDURE — 1036F TOBACCO NON-USER: CPT | Performed by: NURSE PRACTITIONER

## 2017-06-08 PROCEDURE — 4040F PNEUMOC VAC/ADMIN/RCVD: CPT | Performed by: NURSE PRACTITIONER

## 2017-06-08 PROCEDURE — 1101F PT FALLS ASSESS-DOCD LE1/YR: CPT | Performed by: NURSE PRACTITIONER

## 2017-06-08 PROCEDURE — G8420 CALC BMI NORM PARAMETERS: HCPCS | Performed by: NURSE PRACTITIONER

## 2017-06-08 PROCEDURE — 99213 OFFICE O/P EST LOW 20 MIN: CPT | Performed by: NURSE PRACTITIONER

## 2017-06-08 RX ORDER — METHYLPREDNISOLONE 4 MG/1
TABLET ORAL
Qty: 21 TAB | Refills: 0 | Status: SHIPPED | OUTPATIENT
Start: 2017-06-08 | End: 2017-06-20

## 2017-06-08 RX ORDER — AZITHROMYCIN 250 MG/1
TABLET, FILM COATED ORAL
Qty: 6 TAB | Refills: 0 | Status: SHIPPED | OUTPATIENT
Start: 2017-06-08 | End: 2017-06-20

## 2017-06-08 NOTE — MR AVS SNAPSHOT
"        Venus Church   2017 9:20 AM   Office Visit   MRN: [id number]    Department[facility]  [facility]   Dept Phone:  [phone]    Description:  Female : 1931   Provider:  FANTASMA Palomo           Reason for Visit     Apnea 4Mth Follow UP      Allergies as of 2017     Allergen Noted Reactions    Codeine 10/20/2007   Vomiting      You were diagnosed with     Chronic bronchitis, unspecified chronic bronchitis type (CMS-[facility])   [5002570]       Obstructive sleep apnea syndrome   [059369]       TB lung, latent   [849927]         Vital Signs     Blood Pressure Pulse Temperature Respirations Height Weight    148/80 mmHg 74 37.3 °C (99.1 °F) 16 1.575 m (5' 2.01\") 53.071 kg (117 lb)    Body Mass Index Oxygen Saturation Smoking Status             21.39 kg/m2 92% Former Smoker         Basic Information     Date Of Birth Sex Race Ethnicity Preferred Language    1931 Female White Non- English      Your appointments     Aug 07, 2017 10:00 AM   Established Patient with Denis Krueger M.D.   [facility])    [address]  [place] [id number] [phone]           You will be receiving a confirmation call a few days before your appointment from our automated call confirmation system.            Oct 04, 2017  9:00 AM   Established Patient Pul with FANTASMA Palomo   [facility] (--)    [address]  [address]  [place] [id number] [phone]              Problem List              ICD-10-CM Priority Class Noted - Resolved    Hypertension (Chronic) I10   2010 - Present    Dyslipidemia (Chronic) E78.5   2010 - Present    Preventative health care (Chronic) Z00.00   2010 - Present    Low back pain (Chronic) M54.5   2010 - Present    Cervical pain (Chronic) M54.2   2010 - Present    Osteoporosis, idiopathic (Chronic) M81.8   2010 - Present    History of compression fracture of spine Z87.81  "  5/26/2010 - Present    Paroxysmal atrial fibrillation (HCC) (Chronic) I48.0   6/10/2010 - Present    Right hip pain M25.551   3/28/2011 - Present    COPD (chronic obstructive pulmonary disease) (CMS-HCC) (Chronic) J44.9   7/20/2011 - Present    Sleep apnea (Chronic) G47.30   8/10/2011 - Present    History of Helicobacter pylori infection Z86.19   11/1/2011 - Present    History of shingles Z86.19   6/7/2012 - Present    Tension headache (Chronic) G44.209   6/11/2013 - Present    Macular degeneration H35.30   7/8/2013 - Present    MEDICAL HOME    Unknown - Present    Chronic use of opiate for therapeutic purpose (Chronic) Z79.891   4/23/2015 - Present    Sensorineural hearing loss H90.5   12/16/2015 - Present    Mild cognitive impairment (Chronic) G31.84   1/11/2016 - Present    TB lung, 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 Review all of your home medications and newly ordered medications with your provider and/or pharmacist. Follow medication instructions as directed by your provider and/or pharmacist. Please keep your medication list with you and share with your provider. Update the information when medications are discontinued, doses are changed, or new medications (including over-the-counter products) are added; and carry medication information at all times in the event of emergency situations     Allergies:  CODEINE - Vomiting               Medications  Valid as of: June 08, 2017 -  9:52 AM    Generic  Name Brand Name Tablet Size Instructions for use    Albuterol Sulfate (Nebu Soln) PROVENTIL 2.5mg/3ml 2.5 mg by Nebulization route every four hours as needed for Shortness of Breath.        Albuterol Sulfate (Nebu Soln) PROVENTIL 2.5mg/3ml 3 mL by Nebulization route every four hours as needed for Shortness of Breath.        Calcium Carbonate-Vitamin D (Tab) OSCAL-250 250-125 MG-UNIT Take 1 Tab by mouth every day.        Cholecalciferol   Take 1 Cap by mouth 2 Times a Day.        Cyanocobalamin   Take  by mouth.        Dabigatran Etexilate Mesylate (Cap) PRADAXA 75 MG Take 1 Cap by mouth 2 Times a Day.        Fluticasone-Salmeterol (Aerosol) ADVAIR -21 MCG/ACT INHALE TWO PUFFS BY MOUTH TWICE DAILY.  USE  WITH  SPACER  AND  RINSE  MOUTH  AFTER  EACH  USE        Irbesartan-Hydrochlorothiazide (Tab) AVALIDE 150-12.5 MG Take 0.5 Tabs by mouth every day.        Levalbuterol Tartrate (Aerosol) XOPENEX HFA 45 MCG/ACT Inhale 1-2 Puffs by mouth every four hours as needed for Shortness of Breath.        Lidocaine (Patch) LIDODERM 5 % Apply one patch to affected area on during the day for 12 hours, off at night        Metoprolol Tartrate (Tab) LOPRESSOR 25 MG Take 1 Tab by mouth 2 times a day.        Omega-3 Fatty Acids (Cap) Omega-3 300 MG Take  by mouth.        OxyCODONE HCl (Tablet Extended Release 12 hour Abuse-Deterrent) OXYCONTIN 10 MG Take 1 Tab by mouth every 12 hours. Ok to fill oxycodone 56-60 days after the oxycodone written on 5/9/17 is filled        Tiotropium Bromide Monohydrate (Cap) SPIRIVA 18 MCG Inhale 1 Cap by mouth every day.        .                 Medicines prescribed today were sent to:     [facility] - [address]    [address] [place] [address]    Phone: [phone] Fax: [phone]    Open 24 Hours?: No      Medication refill instructions:       If your prescription bottle indicates you have medication refills left, it is not necessary to call your  provider’s office. Please contact your pharmacy and they will refill your medication.    If your prescription bottle indicates you do not have any refills left, you may request refills at any time through one of the following ways: The online [employer] system (except Urgent Care), by calling your provider’s office, or by asking your pharmacy to contact your provider’s office with a refill request. Medication refills are processed only during regular business hours and may not be available until the next business day. Your provider may request additional information or to have a follow-up visit with you prior to refilling your medication.   *Please Note: Medication refills are assigned a new Rx number when refilled electronically. Your pharmacy may indicate that no refills were authorized even though a new prescription for the same medication is available at the pharmacy. Please request the medicine by name with the pharmacy before contacting your provider for a refill.        Instructions    1. Continue Advair 115/21 µg 2 inhalations twice daily with spacer. Rinse mouth thoroughly after use.  2. Continue Spiriva daily.  3. Continue Xopenex and albuterol nebulized treatments as needed.  4. Z-Damian. Take as directed. Take one patient has discolored phlegm, cough, or fevers.  5. Take Medrol Dosepak as directed. Take if the patient is more short of breath with respiratory infection.         Other Notes About Your Plan     Daughter Yashira [phone];son Dileep   2/6/17 dexa ordered need reclast?  UDT next visit  2/6/17 labs ordered  5/9/17 oxycontin refill                     [employer] Access Code: Activation code not generated  Current [employer] Status: Active

## 2017-06-08 NOTE — PATIENT INSTRUCTIONS
1. Continue Advair 115/21 µg 2 inhalations twice daily with spacer. Rinse mouth thoroughly after use.  2. Continue Spiriva daily.  3. Continue Xopenex and albuterol nebulized treatments as needed.  4. Z-Damian. Take as directed. Take one patient has discolored phlegm, cough, or fevers.  5. Take Medrol Dosepak as directed. Take if the patient is more short of breath with respiratory infection.

## 2017-06-08 NOTE — PROGRESS NOTES
Chief Complaint   Patient presents with   • Apnea     4Mth Follow UP         HPI:  This is a 86 y.o. female with a history of chronic obstructive pulmonary disease and obstructive sleep apnea. Pulmonary function tests indicate FEV1 1.1 L, 61% predicted, FEV1/FVC 58%, FEF 25-75% 31% predicted, and DLCO 34% predicted. The patient is compliant with Advair 115/21 µg 2 inhalations twice daily with spacer and thorough rinsing of the mouth, Spiriva daily, Xopenex and albuterol nebulized treatments as needed. 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 Chest x-ray shows no focal consolidation or pleural effusion. There is mild cardiomegaly. No indication of TB. Patient reports no fevers, chills, sweats, or weight loss.    Polysomnogram indicates AHI 86 and minimum saturation 74%. The patient has been intolerant to CPAP. She is currently using oxygen at 3 L/m. the patient reports using oxygen for part of the night. She states it interferes with her sleep.       Past Medical History   Diagnosis Date   • Hypercholesteremia    • Other accident      C-Spine FX  2006   • Osteoporosis    • DDD (degenerative disc disease)    • Arthritis      general   • Hypertension    • Heart valve insufficiency      minimal, watched by cardio   • Vertebral fracture    • Anesthesia      n/v   • Dental disorder      partials   • Pneumonia 6/4   • CATARACT 2007,08   • Pain      back pain   • Arrhythmia 7/10       A. Fib.   • Aortic regurgitation 4/25/2012   • Atypical chest pain 4/25/2012   • Chronic anticoagulation 4/25/2012   • Constipation 4/25/2012   • Dyspnea 4/25/2012   • Generalized osteoarthritis 4/25/2012   • Headache 4/25/2012   • Hyperlipidemia 4/25/2012   • Osteoarthritis of knee 4/25/2012   • Valvular heart disease 4/25/2012   • Vertigo 4/25/2012   • MEDICAL HOME    • COPD (chronic obstructive pulmonary disease) (CMS-[facility])    • TB lung, latent 5/4/2016       Past Surgical  "History   Procedure Laterality Date   • Cataract phaco with iol  1/5/2009     Performed by ROSE MARIE MANN at SURGERY SAME DAY [facility] ORS   • Other orthopedic surgery  may, 2010       T11 kypho.   • Carpal tunnel release  @30 yrs ago     rt hand   • Recovery  7/28/2010     Performed by SURGERY, IR-RECOVERY at SURGERY SAME DAY [facility] ORS       Social History   Substance Use Topics   • Smoking status: Former Smoker -- 25 years     Quit date: 04/14/1984   • Smokeless tobacco: Never Used      Comment: smoked 25 yrs, quit 1984   • Alcohol Use: None      Comment: none       ROS:   Constitutional: Denies fevers, chills, sweats, fatigue, and weight loss.  Eyes: Denies glasses.  Ears/nose/mouth/throat: Denies injury.  Cardiovascular: Denies chest pain, tightness.  Respiratory: See history of present illness.  GI: Denies heartburn, difficulty swallowing, nausea, and vomiting.  Neurological: Denies frequent headaches, dizziness, weakness.    Vitals:  Filed Vitals:    06/08/17 0913   Height: 1.575 m (5' 2.01\")   Weight: 53.071 kg (117 lb)   Weight % change since last entry.: 0 %   BP: 148/80   Pulse: 74   BMI (Calculated): 21.39   Resp: 16   Temp: 37.3 °C (99.1 °F)   O2 sat % room air: 92 %       Allergies:  Codeine    Medications:  Current Outpatient Prescriptions   Medication Sig Dispense Refill   • azithromycin (ZITHROMAX) 250 MG Tab Take 2 tablets on day 1, then take 1 tablet a day for 4 days. 6 Tab 0   • MethylPREDNISolone (MEDROL DOSEPAK) 4 MG Tablet Therapy Pack Take as directed. 21 Tab 0   • ADVAIR -21 MCG/ACT inhaler INHALE TWO PUFFS BY MOUTH TWICE DAILY.  USE  WITH  SPACER  AND  RINSE  MOUTH  AFTER  EACH  USE 1 Inhaler 5   • albuterol (PROVENTIL) 2.5mg/3ml Nebu Soln solution for nebulization 3 mL by Nebulization route every four hours as needed for Shortness of Breath. 150 mL 11   • albuterol (PROVENTIL) 2.5mg/3ml Nebu Soln solution for nebulization 2.5 mg by Nebulization route every four hours as needed " for Shortness of Breath.     • tiotropium (SPIRIVA HANDIHALER) 18 MCG Cap Inhale 1 Cap by mouth every day. 30 Cap 11   • oxyCODONE CR (OXYCONTIN) 10 MG Tablet Extended Release 12 hour Abuse-Deterrent Take 1 Tab by mouth every 12 hours. Ok to fill oxycodone 56-60 days after the oxycodone written on 5/9/17 is filled 60 Tab 0   • metoprolol (LOPRESSOR) 25 MG Tab Take 1 Tab by mouth 2 times a day. 180 Tab 3   • dabigatran (PRADAXA) 75 MG Cap capsule Take 1 Cap by mouth 2 Times a Day. 60 Cap 11   • irbesartan-hydrochlorothiazide (AVALIDE) 150-12.5 MG per tablet Take 0.5 Tabs by mouth every day. 90 Tab 3   • Omega-3 300 MG Cap Take  by mouth.     • levalbuterol (XOPENEX HFA) 45 MCG/ACT inhaler Inhale 1-2 Puffs by mouth every four hours as needed for Shortness of Breath.     • Cyanocobalamin (VITAMIN B-12 PO) Take  by mouth.     • lidocaine (LIDODERM) 5 % Patch Apply one patch to affected area on during the day for 12 hours, off at night 30 Patch 6   • calcium-vitamin D (OSCAL-250) 250-125 MG-UNIT TABS Take 1 Tab by mouth every day.     • VITAMIN D, CHOLECALCIFEROL, PO Take 1 Cap by mouth 2 Times a Day.       No current facility-administered medications for this visit.       PHYSICAL EXAM:  Appearance: Well-developed, well-nourished, no acute distress.  Eyes. PERRL.  Hearing: Grossly intact.  Oropharynx: Tongue normal, posterior pharynx without erythema or exudate.  Respiratory effort: No intercostal retractions or use of accessory muscles.  Lung auscultation: No crackles, wheezing.  Heart auscultation: No murmur, gallop, or rub. Regular rate and rhythm.  Extremities: No cyanosis or edema.  Gait and Station: Normal  Orientation: Oriented to time, place, and person.    Assessment:  1. Chronic bronchitis, unspecified chronic bronchitis type (CMS-HCC)  azithromycin (ZITHROMAX) 250 MG Tab    MethylPREDNISolone (MEDROL DOSEPAK) 4 MG Tablet Therapy Pack   2. Obstructive sleep apnea syndrome     3. TB lung, latent            Plan:  1. Continue Advair 115/21 µg 2 inhalations twice daily with spacer. Rinse mouth thoroughly after use.  2. Continue Spiriva daily.  3. Continue Xopenex and albuterol nebulized treatments as needed.  4. Z-Damian. Take as directed. Take one patient has discolored phlegm, cough, or fevers.  5. Take Medrol Dosepak as directed. Take if the patient is more short of breath with respiratory infection.    Return in about 4 months (around 10/8/2017) for With MARILYN Russell.

## 2017-06-20 ENCOUNTER — OFFICE VISIT (OUTPATIENT)
Dept: MEDICAL GROUP | Facility: MEDICAL CENTER | Age: 82
End: 2017-06-20
Payer: MEDICARE

## 2017-06-20 VITALS
WEIGHT: 117 LBS | HEART RATE: 65 BPM | HEIGHT: 62 IN | DIASTOLIC BLOOD PRESSURE: 64 MMHG | OXYGEN SATURATION: 92 % | BODY MASS INDEX: 21.53 KG/M2 | TEMPERATURE: 99 F | SYSTOLIC BLOOD PRESSURE: 116 MMHG

## 2017-06-20 DIAGNOSIS — M54.5 LOW BACK PAIN, UNSPECIFIED BACK PAIN LATERALITY, UNSPECIFIED CHRONICITY, WITH SCIATICA PRESENCE UNSPECIFIED: Chronic | ICD-10-CM

## 2017-06-20 DIAGNOSIS — I10 ESSENTIAL HYPERTENSION: Chronic | ICD-10-CM

## 2017-06-20 DIAGNOSIS — J42 CHRONIC BRONCHITIS, UNSPECIFIED CHRONIC BRONCHITIS TYPE (HCC): Chronic | ICD-10-CM

## 2017-06-20 DIAGNOSIS — Z79.891 CHRONIC USE OF OPIATE FOR THERAPEUTIC PURPOSE: Chronic | ICD-10-CM

## 2017-06-20 DIAGNOSIS — G31.84 MILD COGNITIVE IMPAIRMENT: Chronic | ICD-10-CM

## 2017-06-20 DIAGNOSIS — I48.0 PAROXYSMAL ATRIAL FIBRILLATION (HCC): Chronic | ICD-10-CM

## 2017-06-20 DIAGNOSIS — R26.9 GAIT DISORDER: ICD-10-CM

## 2017-06-20 DIAGNOSIS — M54.2 NECK PAIN: ICD-10-CM

## 2017-06-20 PROCEDURE — 99214 OFFICE O/P EST MOD 30 MIN: CPT | Performed by: INTERNAL MEDICINE

## 2017-06-20 NOTE — PROGRESS NOTES
Subjective:      Venus Church is a 86 y.o. female who presents with back pain          HPI   Here for follow up back pain sees  pain management has had injections before and tried physical therapy , here with daughter.  She is visiting from [place].  Patient lives by herself but son visits her daily to ensure that she takes her medication.  Also has help company during the day for a few days as well.  Patient limited because of chronic back pain, has seen multiple pain specialists in town currently sees  , has even been to [place] [facility].  Currently maintained on oxycodone 10 mg twice a day.  Pain is stable but limits her activities and ability to go out.  Back pain lower back, no radiation, difficulty with bending, twisting, any prolonged standing or sitting will aggravate low back pain which can be moderate to severe at times depending on her activity level.  Has been to physical therapy in the past.  No incontinence of bowel or bladder.  No sensory changes lower extremities.  No alcohol.  Does have some memory issues with chronic narcotics, and difficulty with cleaning and bathing at times.  Patient has bars for getting up from toilet, bars and shower, shower chair.  No falls.  Walker for ambulation outside the house.  Does not use a cane in the house.  Followed by cardiology atrial fibrillation paroxysmal in nature, on anticoagulation pradaxa thus unable to take anti-inflammatories, rate controlled on Lopressor, no chest pain, palpitations, lightheadedness, syncope, COPD on Spiriva and no current cough or shortness of breath at rest.  Does ADLs although son does lay out her medications for her, and does need help with cleaning possibly bathing, does not drive.  No tobacco, no alcohol            Current Outpatient Prescriptions   Medication Sig Dispense Refill   • azithromycin (ZITHROMAX) 250 MG Tab Take 2 tablets on day 1, then take 1 tablet a day for 4 days. 6 Tab 0   •  MethylPREDNISolone (MEDROL DOSEPAK) 4 MG Tablet Therapy Pack Take as directed. 21 Tab 0   • oxyCODONE CR (OXYCONTIN) 10 MG Tablet Extended Release 12 hour Abuse-Deterrent Take 1 Tab by mouth every 12 hours. Ok to fill oxycodone 56-60 days after the oxycodone written on 5/9/17 is filled 60 Tab 0   • ADVAIR -21 MCG/ACT inhaler INHALE TWO PUFFS BY MOUTH TWICE DAILY.  USE  WITH  SPACER  AND  RINSE  MOUTH  AFTER  EACH  USE 1 Inhaler 5   • metoprolol (LOPRESSOR) 25 MG Tab Take 1 Tab by mouth 2 times a day. 180 Tab 3   • dabigatran (PRADAXA) 75 MG Cap capsule Take 1 Cap by mouth 2 Times a Day. 60 Cap 11   • albuterol (PROVENTIL) 2.5mg/3ml Nebu Soln solution for nebulization 3 mL by Nebulization route every four hours as needed for Shortness of Breath. 150 mL 11   • irbesartan-hydrochlorothiazide (AVALIDE) 150-12.5 MG per tablet Take 0.5 Tabs by mouth every day. 90 Tab 3   • Omega-3 300 MG Cap Take  by mouth.     • albuterol (PROVENTIL) 2.5mg/3ml Nebu Soln solution for nebulization 2.5 mg by Nebulization route every four hours as needed for Shortness of Breath.     • levalbuterol (XOPENEX HFA) 45 MCG/ACT inhaler Inhale 1-2 Puffs by mouth every four hours as needed for Shortness of Breath.     • Cyanocobalamin (VITAMIN B-12 PO) Take  by mouth.     • lidocaine (LIDODERM) 5 % Patch Apply one patch to affected area on during the day for 12 hours, off at night 30 Patch 6   • tiotropium (SPIRIVA HANDIHALER) 18 MCG Cap Inhale 1 Cap by mouth every day. 30 Cap 11   • calcium-vitamin D (OSCAL-250) 250-125 MG-UNIT TABS Take 1 Tab by mouth every day.     • VITAMIN D, CHOLECALCIFEROL, PO Take 1 Cap by mouth 2 Times a Day.       No current facility-administered medications for this visit.     Patient Active Problem List    Diagnosis Date Noted   • TB lung, latent 05/04/2016   • Mild cognitive impairment 01/11/2016   • Sensorineural hearing loss 12/16/2015   • Chronic use of opiate for therapeutic purpose 04/23/2015   • MEDICAL  HOME    • Macular degeneration 07/08/2013   • Tension headache 06/11/2013   • History of shingles 06/07/2012   • History of Helicobacter pylori infection 11/01/2011   • Sleep apnea 08/10/2011   • COPD (chronic obstructive pulmonary disease) (CMS-[facility]) 07/20/2011   • Right hip pain 03/28/2011   • Paroxysmal atrial fibrillation ([facility]) 06/10/2010   • History of compression fracture of spine 05/26/2010   • Low back pain 05/22/2010   • Cervical pain 05/22/2010   • Osteoporosis, idiopathic 05/22/2010   • Hypertension 05/20/2010   • Dyslipidemia 05/20/2010   • Preventative health care 05/20/2010       Cervical pain  4/08 MRI cervical spine C3-C4 moderate left-sided foraminal stenosis, C4-C5 moderate foraminal stenosis right, C5-C6 moderate right-sided foraminal stenosis, C6-C7 moderate bilateral foraminal stenosis  5/08 CT cervical spine C3-C4 uncovertebral joint arthropathy and significant left-sided neural foraminal narrowing, C4-C5 uncovertebral joint arthropathy right significant neuroforaminal narrowing, C5-C6 uncovertebral joint arthropathy right moderate neural foraminal narrowing, C6-C7 uncovertebral joint arthropathy moderate to severe right neural foraminal narrowing  7/08 C3-C5 cervical facet injections   1/09 medial branch nerve block diagnostic   2/09 C4-C5 cervical epidural under fluoroscopy   4/13 Daughter called [facility] suggest taper pain med, she has sched to taper the oxycontin  4/15/13 resumed oxycontin 10 bid  6/11/13 off oxcontin  7/24/13 increase oxycontin from qday to 10 mg bid     Chronic opiate  2/26/15 narcotic contract  6/12/15 opiate risk score 0  10/4/15   7/25/16   9/20/16  pain note recommended left L3-L5 MBBB  10/31/16   2/6/17   5/9/17 narcotic contract  5/9/17 opiate risk score 0  5/9/17   5/9/17 depression screening score 0    COPD  7/11 CT spiral thorax extensive emphysematous change of lung, small left pleural effusion left lung base  with atelectasis unchanged from prior CT  5/10 CT spriral thorax emphysematous change and dependent atelectasis left lung base  7/11 PFT FEV/FVC 59%, FEV1 1.1 L (75% predicted), significant post bronchodilator response noted (FEV1 improved 16%). Mixed restrictive and obstructive pattern,COPD with GOLD stage II with sig bronchodilator response  7/11 trial of symbicort 80/4.5 mcg bid discussed with daughter plus albuterol neb prn, [facility] home education ordered  5/12 off symbicort   5/22/14 cxr negative  6/23/14 on symbicort  7/20/15 change to advair 250/50 mcg from symbicort (not covered on insurance)  9/3/15 cxr negative  10/4/15 add spiriva and change symbicort to advair 250/50 mcg bid  10/19/15 CT high resolution thorax, no evidence of interstitial lung disease, minimal scattered opacification could indicate minimal areas of fibrosis periphery of each lung, similar to prior exam, emphysema most prominent upper lobes bilateral  10/19/15 PFT FEV1 1.1 (61% predicted),FVC 1.9,FEV/FVC 58%, no bronchodilator response,DLCO 34%; on advair 250/50 mcg bid and spiriva  11/2/15 change from advair discus to advair 250 inhaler and continue spiriva   12/11/15 not taking advair, will start advair and cont spiriva  12/11/15 cxr negative  3/14/16 [facility] note complaining doxycycline and taper steroids, continue nocturnal oxygen, continue rotating antibiotics for bronchiectasis, follow-up laboratory testing ordered  3/14/16  (normal), quantiferon gold positive, allergen panel negative, IgE 357 (less than 214), IgA 116 (),, IgM 27 (),IgG 947 ([phone])  4/13/16 cxr mild cardiomegaly  5/4/16 [facility] note continue advair 115/21 bid with spacer given doxycycline and prednisone, continue oxygen 3 L at night, AFB samples ×3, follow-up 3 months  7/25/16 pulmonary note on prednisone taper one week out of the month and doxycycline one per day for one week per month, on advair bid and spiriva and oxygen 3 liters at night  11/8/16  pulmonary note continue advair 115/21 ucg bid,spiriva, xopenex as needed, oxygen 3 L at night    Dyslipidemia   5/10 chol 163,trig 68,hdl 69,ldl 80  6/10 chol 163,trig 60,hdl 69,ldl 80  7/11 chol 118,trig 45,hdl 65,ldl 44 on crestor 10 mg  10/11 chol 165,trig 47,hdl 78,ldl 74 on crestor 10 mg done at [facility]  2/12 off crestor  6/12 chol 211,trig 104,hdl 64,ldl 126 off crestor  3/4/14 chol 222,trig 124,hdl 52,ldl 145 off meds    History of compression fracture  5/10 MRI thoracic spine subacute T12 compression fracture  5/27/10 kyphoplasty T11  6/10 MRI lumbar spine T12 compression fracture mild to moderate central canal narrowing, interval T11 kyphoplasty, L2-L3 moderate foraminal stenosis, L3-L4 severe left foraminal stenosis, moderate right foraminal stenosis, L4-L5 moderate central canal stenosis  7/10 dexa LS +1.0,hip -1.6  7/28/10 T12 vertebral plasty  8/11 [facility] evaluation  pain management, recommend continue oxycontin 10 bid  10/11 [facility] pain  T11-L1 injection  2/13  pain note, T10-T11 epidural injection  6/13 dexa LS +1.8, forearm -2.7  9/23/13 reclast injection  9/4/14 reclast IV infusion    History H. pylori  9/11 serum IgG positive s/p prevpack  9/19/12 cbc,cmp,lipase neg; u/s abd gallbladder sludge without biliary dilatation or stone  12/12/12 [facility] eval rec EGD/colon pt did not follow up     History shingles    Hypertension  1/05 carotid u/s negative  1/07 echo LV EF 60% ,mild LVH  1/09 renal ultrasound 6 mm right cortical cyst  9/09 MRI brain with and without moderate nonspecific microvascular ischemic change  9/09 MRA next mild plaque right internal carotid  5/24/10 echo normal LV size and function, EF 60%, mild to moderate AR, RVSP 35-40 consistent with mild pulmonary hypertension  5/10 persantine thallium no ischemia, EF 72%  9/10 change avalide 150/12.5 to avapro 150 qday, had sodium 125 in ER  3/11 on avapro 300 mg  7/8/11 echo normal LV function, EF  55%  7/8/11 Persantine thallium possible small area mild ischemia in the lateral wall, no evidence of infarction  7/11   3/12 increase metoprolol to 50 bid, cont avapro 300 mg, start norvasc 2.5 mg  5/1/12 increase norvasc to 5 mg, change avapro to avalide 300/12.5 mg, cont metoprolol 50 bid  10/2/12 on avalide 300/12.5 mg and metoprolol 50 bid and norvasc 5 mg  10/12 Persantine thallium diaphragmatic uptake possible attenuation, fixed inferolateral defect which may be secondary to attenuation, EF 76%, no wall motion abnormalities, no evidence of significant ischemia.  1/13 echo normal LV function, grade 2 diastolic dysfunction, EF 70% moderate aortic insufficiency  1/13   1/23/13 cxr cardiomegaly without pulmonary edema  6/11/13 on avalide 300/12.5 mg,norvasc 5 mg, metoprolol 50 bid  4/7/14 cardiology note atrial fibrillation, start pradaxa 75 mg bid, stop asa, stop norvasc, decreased avalide 300/12.5 mg to 1/2 per day, increase metoprolol to 50 mg 1 1/2 bid  10/20/14 metoprolol 50 mg decreased to 25 mg bid by [place] doctor due to low heart rate, continues on avalide 300/12.5 mg   12/29/14  cardiology note continue pradaxa, metoprolol 25 mg 1/2 bid, avalide 300/12.5 mg  7/25/16 avalide 150/12.5 mg decrease to 1/2 tab per day and continue metoprolol 25 mg bid  8/24/16 cardiology note sinus rhythm on exam,[id number] score=2 continue anticoagulation, low-dose metoprolol,avalide 150/12.5 mg 1/2 per day, follow-up one year    Low back pain  6/06 epidural L4-L5   12/08 x-ray LS moderate to severe DJD  6/10 MRI lumbar spine T12 compression fracture with mild-to-moderate central spinal canal narrowing, interval T11 kyphoplasty, L2-L3 moderate frontal stenosis, L3-L4 severe left foraminal stenosis, moderate right foraminal stenosis, L4-L5 moderate central spinal canal  7/10 interventional rad consult epidural T11 to L1 region  7/28/10 T12 vertebroplasty  7/30/10 norco taper, and lyrica 50 mg  bid  8/5/10 reaction to lyrica, stop lyrica  8/19/10 lumbar steroid epidural   9/10  note rec decompression if pain persist  10/10 bilateral lumbar facet joint injections L3-S1   12/10 xray LS old T11 and T12 fracture status post kyphoplasty  1/11 [facility]  3/11 trial of spinal stimulator  8/11 [facility] note evaluation  pain management cont oxycontin 10 bid  11/22/11 [facility] note dr. hodge; trial of baclofen, T11 plus T12 left-sided nerve root block pending  7/12  pain note;Left-sided T11-T12 transforaminal epidural   4/13 daughter called [facility] suggest taper pain med, she has sched to taper the oxycontin  4/15/13 resumed oxycontin 10 bid  6/11/13 off oxycontin  7/24/13 increase oxycontin from qday to 10 mg bid   2/28/14 on celebrex 200 mg as needed per  and oxycontin 10 mg bid  4/25/14 trial of cymbalta 30 mg, continue oxycontin 10 mg twice a day, recommend not use tramadol (side effects since on oxycontin already) or celebrex (on pradaxa)  5/29/14  pain management [facility]; recommend T11-T12 left-sided transforaminal nerve block  7/2/14 [facility] left T11-T12 epidural injection  7/21/14  [facility] note; increase oxycontin to 10 mg tid x 3 weeks and then taper down  10/27/14 [facility]; samples zorvolex  2/26/15 xray lumbar spine mild compression fracture of L4 of indeterminate age but new compared to 3/25/13 vertebral augmentation and T11 and T12 with severe compression fracture of T12 and focal kyphosis at this level  4/15/16 outpatient physical therapy phone call indicating patient unable to make appointments, recommending home health physical therapy  9/20/16  pain note recommended left L3-L5 MBBB  3/22/17  pain procedure note left L3-L5 MBBB #1    Macular degeneration  7/1/13 dr.mills mcnair note, start PreserVision AREDS II vitamin followup 6  months    Mild cognitive impairment  1/11/16 MMSE 24/30    Osteoporosis  3/08 dexa LS +0.3,hip -1.4  5/10 MRI thoracic spine subacute T12 compression fracture  5/27/10 T12 kyphoplasty  6/10 on actonel  7/10 dexa LS +1.0,hip -1.6  8/10 vit d 56  3/11 reclast referral made  6/12 vit d 42 off actonel  6/21/13 dexa LS +1.8, forearm -2.7, I rec reclast, she would like to think it over  9/23/13 reclast injection  3/4/14 vit d 26  9/4/14 reclast IV infusion  2/26/15 xray rib series left; mild deformity of the lateral 10th rib on the left may be related to a fracture  2/26/15 xray lumbar spine mild compression fracture of L4 of indeterminate age but new compared to /25/13, vertebral augmentation and T11 and T12 with severe compression fracture of T12 and focal kyphosis at this level  12/7/15 reclast IV infusion    Paroxysmal atrial fibrillation  4/10 hospitalization on multaq, also on verapamil and metoprolol for rate control per cardiology  1/11 [facility] note cont multaq  7/11 EKG atrial fibrillation hospitalization  7/8/11 echo normal LV function, EF 55%  7/8/11 Persantine thallium possible small area mild ischemia in the lateral wall, no evidence of infarction  7/11 [facility] during hospitalization changed to amiodarone 200 mg plus pradaxa restarted  7/11   9/11 [facility] note stop amiodarone, cont pradaxa and metoprolol 25 bid  3/12 cont pradaxa and increase metoprolol to 50 bid due to higher bp  5/12 pradaxa decreased from 150 bid to 75 bid per [facility] due to nosebleeds  6/12 EKG NSR  1/13 echo normal LV function, grade 2 diastolic dysfunction, EF 70% moderate aortic insufficiency  6/11/13 on pradaxa and metoprolol 50 bid for rate control, NSR today  9/13 cardiology note stop pradaxa and start asa 81 mg  4/7/14 cardiology note atrial fibrillation, start pradaxa 75 mg bid, stop asa, stop norvasc, decreased avalide 300/12.5 mg to 1/2 per day, increase metoprolol to 50 mg 1 1/2 bid  4/14/14 cardiology note, NSR on exam, continue  pradaxa 75 mg bid, metoprolol 75 mg bid, avalide 300/12.5 mg 1/2 tab per day  10/20/14 metoprolol 50 mg decreased to 25 mg bid by [place] doctor due to low heart rate, continues on pradaxa and avalide 300/12.5 mg   12/29/14  cardiology note continue pradaxa, metoprolol 25 mg 1/2 bid, avalide 300/12.5 mg  6/12/15 NSR on exam  8/24/16 cardiology note sinus rhythm on exam,[id number] score=2 continue anticoagulation, low-dose metoprolol follow-up one year    Preventative health  5/05 colon per [facility] polyp no path report, f/u rec 5 yrs (12/12/12 [facility] note)  4/09 stool occult blood negative  12/09 mammogram  10/1/11 pneumovax  10/2/12 zoster vaccine  6/11/13 declines mammogram, tdap  6/13 dexa LS +1.8, forearm -2.7  3/4/14 vit d 26  10/12/15 prevnar    Right hip pain  3/11 MRI right hip DJD and tear of the anterior/superior acetabular labrum  4/11  ortho note not believe hip is primary problem, referred to  or [facility]    Sensorineural hearing loss  12/7/15  audiology evaluation mild sensorineural hearing loss    Sleep apnea  8/11 [facility] note sleep study apnea-hypopnea index 86, low sat 74%, start CPAP  8-18 cm    2013 off cpap not comfortable  3/14/16  (normal), quantiferon gold positive, allergen panel negative, IgE 357 (less than 214), IgA 116 (),, IgM 27 (),IgG 947 ([phone])  4/13/16 cxr mild cardiomegaly  5/4/16 [facility] note continue oxygen 3 L at night  11/8/16 pulmonary note continue oxygen 3 L at night    tb latent  3/14/16  (normal), quantiferon gold positive, allergen panel negative, IgE 357 (less than 214), IgA 116 (),, IgM 27 (),IgG 947 ([phone])  4/13/16 cxr mild cardiomegaly  5/4/16 [facility] note continue advair 115/21 bid with spacer given doxycycline and prednisone, continue oxygen 3 L at night, AFB samples ×3, follow-up 3 months  7/25/16 pulmonary note on prednisone taper one week out of the month and doxycycline one per day for one week per month,  on advair bid and spiriva and oxygen 3 liters at night    Tension headache  6/11/13 on fioricet bid  2/28/14 taper fioricet to once a day  4/25/14 now on fioricet prn      ROS       Objective:          Physical Exam   Constitutional: No distress.   HENT:   Head: Normocephalic and atraumatic.   Eyes: Conjunctivae are normal. Right eye exhibits no discharge. Left eye exhibits no discharge. No scleral icterus.   Neck: Neck supple. No JVD present. No thyromegaly present.   Cardiovascular: Normal rate and regular rhythm.    No murmur heard.  Pulmonary/Chest: Effort normal and breath sounds normal. No respiratory distress. She has no wheezes.   Abdominal: She exhibits no distension.   Musculoskeletal: She exhibits no edema.   Neurological: She is alert.   Skin: Skin is warm. She is not diaphoretic.   Psychiatric: She has a normal mood and affect.   Nursing note and vitals reviewed.    Limited flexion and extension back, kyphosis      Pleasant, normal affect, insight, judgment     Assessment/Plan:     Assessment  #!  Chronic low back pain  with previous T12 compression fracture, interval vertebroplasty, arthritis, spinal stenosis by previous MRI, has seen pain management , [facility], , has tried physical therapy, has seen neurosurgery, not surgical candidate, has tried spinal stimulator, unable to take anti-inflammatories, has tried gabapentin previously and topical patches with no benefit, remains on oxycodone 10 mg twice a day pain remains reasonably controlled but still has exacerbations to limit her activity.  Also seems to be developing some occasional memory loss related to narcotics but has not fallen.    #2 gait imbalance, walker for ambulation, gait imbalance related to back pain    #3 paroxysmal atrial fibrillation, sinus rhythm on exam today, metoprolol for rate control, anticoagulation with pradaxa followed by cardiology, no NSAIDs or aspirin    #4 chronic opiate therapy,  therapeutic in nature, utilizing oxycodone 10 mg twice a day does not need refill today    #5 COPD stable on inhalers no cough no evidence of bronchitis or pneumonia currently    #6 hypertension stable on metoprolol    #7 mild cognitive impairment possibly contributing factor of narcotics, son and daughter are very attentive and care for her        Plan  #1 follow-up pain management  for second injection, possible candidate for nerve ablation    #2 fall precautions, use walker or cane at all times    #3 physical therapy referral [facility] PT also consider aquatic therapy    #4 acupuncture referral    #5 continue oxycodone, refill not needed today    #6 continue other medications no changes, check blood pressure and record    #7 complex case management referral, daughter like potential names of home health aides that may be of benefit down the line, goal for the time being to keep patient independent for now, but long-term may require further assistance with supervised setting, patient is not ready for that at this time    #8 continue no alcohol, no tobacco    #9 follow-up 2 months for refill pain medication    #10 total time spent with patient and daughter greater than 50% spent on patient counseling, education with daughter and patient with regard to diagnosis of low back pain, chronic back pain medication, treatment plans and alternatives, potential side effects of medication, fall precautions

## 2017-06-20 NOTE — MR AVS SNAPSHOT
"        Venus Church   2017 11:20 AM   Office Visit   MRN: [id number]    Department:  [facility]   Dept Phone:  [phone]    Description:  Female : 1931   Provider:  Denis Krueger M.D.           Allergies as of 2017     Allergen Noted Reactions    Codeine 10/20/2007   Vomiting      You were diagnosed with     Low back pain, unspecified back pain laterality, unspecified chronicity, with sciatica presence unspecified   [9904734]       Gait disorder   [180060]       Neck pain   [335705]         Vital Signs     Blood Pressure Pulse Temperature Height Weight Body Mass Index    116/64 mmHg 65 37.2 °C (99 °F) 1.575 m (5' 2\") 53.071 kg (117 lb) 21.39 kg/m2    Oxygen Saturation Smoking Status                92% Former Smoker          Basic Information     Date Of Birth Sex Race Ethnicity Preferred Language    1931 Female White Non- English      Your appointments     Aug 07, 2017 10:00 AM   Established Patient with Denis Krueger M.D.   [facility])    [address]  [facility] [id number] [phone]           You will be receiving a confirmation call a few days before your appointment from our automated call confirmation system.            Oct 04, 2017  9:00 AM   Established Patient Pul with FANTASMA Palomo   [facility] (--)    [address]  [address]  [place] [address] [phone]              Problem List              ICD-10-CM Priority Class Noted - Resolved    Hypertension (Chronic) I10   2010 - Present    Dyslipidemia (Chronic) E78.5   2010 - Present    Preventative health care (Chronic) Z00.00   2010 - Present    Low back pain (Chronic) M54.5   2010 - Present    Cervical pain (Chronic) M54.2   2010 - Present    Osteoporosis, idiopathic (Chronic) M81.8   2010 - Present    History of compression fracture of spine Z87.81   2010 - Present    Paroxysmal atrial " fibrillation (HCC) (Chronic) I48.0   6/10/2010 - Present    Right hip pain M25.551   3/28/2011 - Present    COPD (chronic obstructive pulmonary disease) (CMS-HCC) (Chronic) J44.9   7/20/2011 - Present    Sleep apnea (Chronic) G47.30   8/10/2011 - Present    History of Helicobacter pylori infection Z86.19   11/1/2011 - Present    History of shingles Z86.19   6/7/2012 - Present    Tension headache (Chronic) G44.209   6/11/2013 - Present    Macular degeneration H35.30   7/8/2013 - Present    MEDICAL HOME    Unknown - Present    Chronic use of opiate for therapeutic purpose (Chronic) Z79.891   4/23/2015 - Present    Sensorineural hearing loss H90.5   12/16/2015 - Present    Mild cognitive impairment (Chronic) G31.84   1/11/2016 - Present    TB lung, 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 Review all of your home medications and newly ordered medications with your provider and/or pharmacist. Follow medication instructions as directed by your provider and/or pharmacist. Please keep your medication list with you and share with your provider. Update the information when medications are discontinued, doses are changed, or new medications (including over-the-counter products) are added; and carry medication information at all times in the event of emergency situations     Allergies:  CODEINE - Vomiting               Medications  Valid as of: June 20, 2017 - 12:06 PM    Generic Name Brand Name Tablet Size Instructions for  use    Albuterol Sulfate (Nebu Soln) PROVENTIL 2.5mg/3ml 2.5 mg by Nebulization route every four hours as needed for Shortness of Breath.        Albuterol Sulfate (Nebu Soln) PROVENTIL 2.5mg/3ml 3 mL by Nebulization route every four hours as needed for Shortness of Breath.        Calcium Carbonate-Vitamin D (Tab) OSCAL-250 250-125 MG-UNIT Take 1 Tab by mouth every day.        Cholecalciferol   Take 1 Cap by mouth 2 Times a Day.        Cyanocobalamin   Take  by mouth.        Dabigatran Etexilate Mesylate (Cap) PRADAXA 75 MG Take 1 Cap by mouth 2 Times a Day.        Fluticasone-Salmeterol (Aerosol) ADVAIR -21 MCG/ACT INHALE TWO PUFFS BY MOUTH TWICE DAILY.  USE  WITH  SPACER  AND  RINSE  MOUTH  AFTER  EACH  USE        Irbesartan-Hydrochlorothiazide (Tab) AVALIDE 150-12.5 MG Take 0.5 Tabs by mouth every day.        Levalbuterol Tartrate (Aerosol) XOPENEX HFA 45 MCG/ACT Inhale 1-2 Puffs by mouth every four hours as needed for Shortness of Breath.        Lidocaine (Patch) LIDODERM 5 % Apply one patch to affected area on during the day for 12 hours, off at night        Metoprolol Tartrate (Tab) LOPRESSOR 25 MG Take 1 Tab by mouth 2 times a day.        Omega-3 Fatty Acids (Cap) Omega-3 300 MG Take  by mouth.        OxyCODONE HCl (Tablet Extended Release 12 hour Abuse-Deterrent) OXYCONTIN 10 MG Take 1 Tab by mouth every 12 hours. Ok to fill oxycodone 56-60 days after the oxycodone written on 5/9/17 is filled        Tiotropium Bromide Monohydrate (Cap) SPIRIVA 18 MCG Inhale 1 Cap by mouth every day.        .                 Medicines prescribed today were sent to:     [facility] - [place] - [address]    [address] [place] [address]    Phone: [phone] Fax: [phone]    Open 24 Hours?: No      Medication refill instructions:       If your prescription bottle indicates you have medication refills left, it is not necessary to call your provider’s office. Please contact your pharmacy  and they will refill your medication.    If your prescription bottle indicates you do not have any refills left, you may request refills at any time through one of the following ways: The online [employer] system (except Urgent Care), by calling your provider’s office, or by asking your pharmacy to contact your provider’s office with a refill request. Medication refills are processed only during regular business hours and may not be available until the next business day. Your provider may request additional information or to have a follow-up visit with you prior to refilling your medication.   *Please Note: Medication refills are assigned a new Rx number when refilled electronically. Your pharmacy may indicate that no refills were authorized even though a new prescription for the same medication is available at the pharmacy. Please request the medicine by name with the pharmacy before contacting your provider for a refill.        Other Notes About Your Plan     Daughter Yashira [phone];son Dileep   2/6/17 dexa ordered need reclast?  UDT next visit  2/6/17 labs ordered  5/9/17 oxycontin refill                               [employer] Access Code: Activation code not generated  Current [employer] Status: Active

## 2017-06-23 ENCOUNTER — TELEPHONE (OUTPATIENT)
Dept: MEDICAL GROUP | Facility: MEDICAL CENTER | Age: 82
End: 2017-06-23

## 2017-06-23 NOTE — TELEPHONE ENCOUNTER
1. Caller Name: Yashira                      Call Back Number: [phone]     2. Message: Yashira called and wants to discuss her mothers status with Dr Krueger. States pt is in a constant state of agitation.     3. Patient approves office to leave a detailed voicemail/MyChart message: no

## 2017-06-24 NOTE — TELEPHONE ENCOUNTER
----- Message from Reina Cardenas sent at 6/23/2017  3:35 PM PDT -----  Regarding: Question  Good Afternoon--    Received your referral on this pt. Just wanting to clarify, are you recommending Home Health Care for PT/OT or personal care services in the home for her?     Thanks,   Reina Cardenas, LSW, MSW

## 2017-06-24 NOTE — TELEPHONE ENCOUNTER
Please call patient's daughter Yashira [phone]  She is interested in:  (1) some potential home aide resources for her mother a few days a week, they are willing to pay out of pocket since this is not a covered benefit  (2) some daytime resources for her mother who has mild memory loss, possibly the name, address, and phone number of the [facility], or other places where she could go during the day for activities  (3) do you know of any geriatric programs at [facility] for patients with mild cognitive deficits, or  programs at [facility]?    Thank you

## 2017-06-26 ENCOUNTER — PATIENT OUTREACH (OUTPATIENT)
Dept: HEALTH INFORMATION MANAGEMENT | Facility: OTHER | Age: 82
End: 2017-06-26

## 2017-06-26 NOTE — PROGRESS NOTES
"New referral from pt's provider: [facility] pt referred to [facility] by PCP for assistance in the home/transportation. Pt has limited mobility. PCP requested daughter Yashira be contacted due to mild cognitive deficits.     Outreach call to pt's daughter Yashira to discuss above referral. Per Yashira, pt lives by herself. Her son lives nearby and comes over to help with medication arrangement and things around the home. Yashira lives in the [place] area, but visits when she is able and is on break and not teaching.     Per Yashira, pt has RTC Access, home-delivered meals, paid cleaning services, and SPAP already set up for pt.     She is mostly concerned with: 1) Having someone in the house to \"check on things\" such as making sure pt takes her medications, bathes, and is able to meal prep on the days her son is not able to come by. 2) Pt receiving daily stimulation such as adult  or day programs. 3) Any programs in the community to help individuals energy or power bills as they are trying to save pt money anywhere they can.     LSW explained to Yashira that there may be some programs that pt can qualify for with above needs, however discussed that most programs are income-based. Per Yashira, pt's estimate of gross monthly income is around $1,440 and her assets are less than $2,000 total. With reported income/assets, explained that pt qualifies for the Home and Community-Based Waiver through [employer]. Yashira would like LSW to make referral to [employer] for HCBW. LSW also informed Yashira that pt meets the income guidelines with reported income for the Energy Assistance program. Yashira requested LSW send copy of application to her to complete. LSW explained that there is a senior activity list for each month as well as a few Adult Daycares available. Should pt qualify for the HCBW-this waiver may be able to pay for adult  services; however LSW offered to send Yashira information on these  services in the mean time while [employer] processes HCBW referral. "     Provided Yashira with LSW's contact information and encouraged her to call with any questions/concerns. Informed Yashira that this LSW would add pt to caseload and follow-up with them in around a month regarding above referrals.     Plan:   · Pt to access resources in the community  · LSW to make referral to HCBW program through [employer]  · LSW to mail Energy Assistance Application to pt's daughter  · Pt's daughter to complete Energy Assistance Application  · LSW to mail Senior Activity list, and Adult  list

## 2017-06-27 ENCOUNTER — PATIENT OUTREACH (OUTPATIENT)
Dept: HEALTH INFORMATION MANAGEMENT | Facility: OTHER | Age: 82
End: 2017-06-27

## 2017-06-27 ENCOUNTER — TELEPHONE (OUTPATIENT)
Dept: HEALTH INFORMATION MANAGEMENT | Facility: OTHER | Age: 82
End: 2017-06-27

## 2017-06-27 NOTE — PROGRESS NOTES
LSW made referral to Lorri at [employer] for the HCBW on this date.     Plan: [employer] to contact pt/pt's daughter to arrange an assessment and begin the process of eligibility for the HCBW.

## 2017-06-28 ENCOUNTER — HOSPITAL ENCOUNTER (OUTPATIENT)
Dept: PAIN MANAGEMENT | Facility: REHABILITATION | Age: 82
End: 2017-06-28
Attending: PHYSICAL MEDICINE & REHABILITATION
Payer: MEDICARE

## 2017-06-28 ENCOUNTER — HOSPITAL ENCOUNTER (OUTPATIENT)
Dept: RADIOLOGY | Facility: REHABILITATION | Age: 82
End: 2017-06-28
Attending: PHYSICAL MEDICINE & REHABILITATION
Payer: MEDICARE

## 2017-06-28 VITALS
HEART RATE: 56 BPM | DIASTOLIC BLOOD PRESSURE: 87 MMHG | BODY MASS INDEX: 21.31 KG/M2 | TEMPERATURE: 96.2 F | OXYGEN SATURATION: 96 % | RESPIRATION RATE: 18 BRPM | SYSTOLIC BLOOD PRESSURE: 153 MMHG | HEIGHT: 61 IN | WEIGHT: 112.88 LBS

## 2017-06-28 DIAGNOSIS — Z79.891 CHRONIC USE OF OPIATE FOR THERAPEUTIC PURPOSE: Chronic | ICD-10-CM

## 2017-06-28 DIAGNOSIS — M54.5 LOW BACK PAIN, UNSPECIFIED BACK PAIN LATERALITY, UNSPECIFIED CHRONICITY, WITH SCIATICA PRESENCE UNSPECIFIED: Chronic | ICD-10-CM

## 2017-06-28 PROCEDURE — 64495 INJ PARAVERT F JNT L/S 3 LEV: CPT

## 2017-06-28 PROCEDURE — 700111 HCHG RX REV CODE 636 W/ 250 OVERRIDE (IP)

## 2017-06-28 PROCEDURE — 64493 INJ PARAVERT F JNT L/S 1 LEV: CPT

## 2017-06-28 PROCEDURE — 64494 INJ PARAVERT F JNT L/S 2 LEV: CPT

## 2017-06-28 PROCEDURE — 700117 HCHG RX CONTRAST REV CODE 255

## 2017-06-28 RX ORDER — LIDOCAINE HYDROCHLORIDE 10 MG/ML
INJECTION, SOLUTION EPIDURAL; INFILTRATION; INTRACAUDAL; PERINEURAL
Status: COMPLETED
Start: 2017-06-28 | End: 2017-06-28

## 2017-06-28 RX ORDER — DULOXETIN HYDROCHLORIDE 30 MG/1
30 CAPSULE, DELAYED RELEASE ORAL DAILY
Qty: 30 CAP | Refills: 5 | Status: SHIPPED | OUTPATIENT
Start: 2017-06-28 | End: 2018-03-07

## 2017-06-28 RX ORDER — ROPIVACAINE HYDROCHLORIDE 2 MG/ML
INJECTION, SOLUTION EPIDURAL; INFILTRATION; PERINEURAL
Status: COMPLETED
Start: 2017-06-28 | End: 2017-06-28

## 2017-06-28 RX ORDER — BUPIVACAINE HYDROCHLORIDE 5 MG/ML
INJECTION, SOLUTION EPIDURAL; INTRACAUDAL
Status: COMPLETED
Start: 2017-06-28 | End: 2017-06-28

## 2017-06-28 RX ADMIN — ROPIVACAINE HYDROCHLORIDE 5 ML/HR: 2 INJECTION, SOLUTION EPIDURAL; INFILTRATION at 15:02

## 2017-06-28 RX ADMIN — IOHEXOL 6 ML: 240 INJECTION, SOLUTION INTRATHECAL; INTRAVASCULAR; INTRAVENOUS; ORAL at 14:45

## 2017-06-28 ASSESSMENT — PAIN SCALES - GENERAL
PAINLEVEL_OUTOF10: 6
PAINLEVEL_OUTOF10: 8

## 2017-06-28 NOTE — PROGRESS NOTES
Taking fluids well. Home care printed  discharged instruction reviewed with patient and verbalized understanding

## 2017-06-28 NOTE — PROGRESS NOTES
Medication reconciliation reviewed with patient denied taking  any anti- inflammatories medication.Patient stated that she's been  off  Pradaxa  for  3 days  Discharge instruction given to patient and verbalized understanding. Patient  has ride home ( Ivana/ daughter /  ). Dr. Urrutia notified.

## 2017-07-14 ENCOUNTER — OFFICE VISIT (OUTPATIENT)
Dept: MEDICAL GROUP | Facility: MEDICAL CENTER | Age: 82
End: 2017-07-14
Payer: MEDICARE

## 2017-07-14 VITALS
WEIGHT: 118 LBS | BODY MASS INDEX: 22.28 KG/M2 | TEMPERATURE: 98.3 F | HEART RATE: 56 BPM | DIASTOLIC BLOOD PRESSURE: 74 MMHG | SYSTOLIC BLOOD PRESSURE: 132 MMHG | OXYGEN SATURATION: 99 % | HEIGHT: 61 IN

## 2017-07-14 DIAGNOSIS — M54.5 LOW BACK PAIN, UNSPECIFIED BACK PAIN LATERALITY, UNSPECIFIED CHRONICITY, WITH SCIATICA PRESENCE UNSPECIFIED: Chronic | ICD-10-CM

## 2017-07-14 DIAGNOSIS — G31.84 MILD COGNITIVE IMPAIRMENT: Chronic | ICD-10-CM

## 2017-07-14 DIAGNOSIS — Z87.81 HISTORY OF COMPRESSION FRACTURE OF SPINE: ICD-10-CM

## 2017-07-14 DIAGNOSIS — Z11.1 PPD SCREENING TEST: ICD-10-CM

## 2017-07-14 DIAGNOSIS — Z79.891 CHRONIC USE OF OPIATE FOR THERAPEUTIC PURPOSE: Chronic | ICD-10-CM

## 2017-07-14 DIAGNOSIS — Z22.7 TB LUNG, LATENT: ICD-10-CM

## 2017-07-14 PROCEDURE — 86580 TB INTRADERMAL TEST: CPT | Performed by: INTERNAL MEDICINE

## 2017-07-14 PROCEDURE — 99214 OFFICE O/P EST MOD 30 MIN: CPT | Performed by: INTERNAL MEDICINE

## 2017-07-14 NOTE — PROGRESS NOTES
Subjective:      Venus Church is a 86 y.o. female who presents with pain           HPI  Patient is with son today at visit   Back pain s/p back injection per  pain management second injection remains on oxycodone 10 mg bid, has had decrease short term memory at times per family.  Patient is followed by pain management here, has seen pain management in [place] as well.  Has maintained on OxyContin 10 mg twice daily which maintains her back pain at a reasonable level however she does have more forgetfulness.  Still functions independently, lives by herself although the son visits her daily to help with medications.  No hallucinations or delusions.  No change in functional behavior  Does have decreased short-term memory according to son, more forgetful, does need reminder for medications  Denies anxiety and depression  Also needs paperwork completed for [facility] and needs PPD two-step  Also needs a letter indicating that she cannot fly, scheduled to fly out of the area today on [facility] airlines but because of flareup of back pain she states she is unable to fly  Patient back pain had improved after recent injection, is scheduled for possible nerve ablation, however today she states she cannot follow because back pain limits her ability to sit for long period of time, or walk long distances.  No shortness of breath, no coug, no fevers, no chills, no night sweats, no weight loss  No tobacco, no alcohol        Current Outpatient Prescriptions   Medication Sig Dispense Refill   • duloxetine (CYMBALTA) 30 MG Cap DR Particles Take 1 Cap by mouth every day. 30 Cap 5   • oxyCODONE CR (OXYCONTIN) 10 MG Tablet Extended Release 12 hour Abuse-Deterrent Take 1 Tab by mouth every 12 hours. Ok to fill oxycodone 56-60 days after the oxycodone written on 5/9/17 is filled 60 Tab 0   • ADVAIR -21 MCG/ACT inhaler INHALE TWO PUFFS BY MOUTH TWICE DAILY.  USE  WITH  SPACER  AND  RINSE  MOUTH  AFTER  EACH  USE 1  Inhaler 5   • metoprolol (LOPRESSOR) 25 MG Tab Take 1 Tab by mouth 2 times a day. 180 Tab 3   • dabigatran (PRADAXA) 75 MG Cap capsule Take 1 Cap by mouth 2 Times a Day. 60 Cap 11   • albuterol (PROVENTIL) 2.5mg/3ml Nebu Soln solution for nebulization 3 mL by Nebulization route every four hours as needed for Shortness of Breath. 150 mL 11   • irbesartan-hydrochlorothiazide (AVALIDE) 150-12.5 MG per tablet Take 0.5 Tabs by mouth every day. 90 Tab 3   • Omega-3 300 MG Cap Take  by mouth.     • albuterol (PROVENTIL) 2.5mg/3ml Nebu Soln solution for nebulization 2.5 mg by Nebulization route every four hours as needed for Shortness of Breath.     • levalbuterol (XOPENEX HFA) 45 MCG/ACT inhaler Inhale 1-2 Puffs by mouth every four hours as needed for Shortness of Breath.     • Cyanocobalamin (VITAMIN B-12 PO) Take  by mouth.     • lidocaine (LIDODERM) 5 % Patch Apply one patch to affected area on during the day for 12 hours, off at night 30 Patch 6   • tiotropium (SPIRIVA HANDIHALER) 18 MCG Cap Inhale 1 Cap by mouth every day. 30 Cap 11   • calcium-vitamin D (OSCAL-250) 250-125 MG-UNIT TABS Take 1 Tab by mouth every day.     • VITAMIN D, CHOLECALCIFEROL, PO Take 1 Cap by mouth 2 Times a Day.       No current facility-administered medications for this visit.     Patient Active Problem List    Diagnosis Date Noted   • TB lung, latent 05/04/2016   • Mild cognitive impairment 01/11/2016   • Sensorineural hearing loss 12/16/2015   • Chronic use of opiate for therapeutic purpose 04/23/2015   • MEDICAL HOME    • Macular degeneration 07/08/2013   • Tension headache 06/11/2013   • History of shingles 06/07/2012   • History of Helicobacter pylori infection 11/01/2011   • Sleep apnea 08/10/2011   • COPD (chronic obstructive pulmonary disease) (CMS-HCC) 07/20/2011   • Right hip pain 03/28/2011   • Paroxysmal atrial fibrillation (HCC) 06/10/2010   • History of compression fracture of spine 05/26/2010   • Low back pain 05/22/2010   •  Cervical pain 05/22/2010   • Osteoporosis, idiopathic 05/22/2010   • Hypertension 05/20/2010   • Dyslipidemia 05/20/2010   • Preventative health care 05/20/2010     Cervical pain  4/08 MRI cervical spine C3-C4 moderate left-sided foraminal stenosis, C4-C5 moderate foraminal stenosis right, C5-C6 moderate right-sided foraminal stenosis, C6-C7 moderate bilateral foraminal stenosis  5/08 CT cervical spine C3-C4 uncovertebral joint arthropathy and significant left-sided neural foraminal narrowing, C4-C5 uncovertebral joint arthropathy right significant neuroforaminal narrowing, C5-C6 uncovertebral joint arthropathy right moderate neural foraminal narrowing, C6-C7 uncovertebral joint arthropathy moderate to severe right neural foraminal narrowing  7/08 C3-C5 cervical facet injections   1/09 medial branch nerve block diagnostic   2/09 C4-C5 cervical epidural under fluoroscopy   4/13 Daughter called [place] suggest taper pain med, she has sched to taper the oxycontin  4/15/13 resumed oxycontin 10 bid  6/11/13 off oxcontin  7/24/13 increase oxycontin from qday to 10 mg bid     Chronic opiate  2/26/15 narcotic contract  6/12/15 opiate risk score 0  10/4/15   7/25/16   9/20/16  pain note recommended left L3-L5 MBBB  10/31/16   2/6/17   5/9/17 narcotic contract  5/9/17 opiate risk score 0  5/9/17   5/9/17 depression screening score 0  6/28/17 trial cymbalta 30 mg    COPD  7/11 CT spiral thorax extensive emphysematous change of lung, small left pleural effusion left lung base with atelectasis unchanged from prior CT  5/10 CT spriral thorax emphysematous change and dependent atelectasis left lung base  7/11 PFT FEV/FVC 59%, FEV1 1.1 L (75% predicted), significant post bronchodilator response noted (FEV1 improved 16%). Mixed restrictive and obstructive pattern,COPD with GOLD stage II with sig bronchodilator response  7/11 trial of symbicort 80/4.5 mcg bid discussed with  daughter plus albuterol neb prn, [facility] home education ordered  5/12 off symbicort   5/22/14 cxr negative  6/23/14 on symbicort  7/20/15 change to advair 250/50 mcg from symbicort (not covered on insurance)  9/3/15 cxr negative  10/4/15 add spiriva and change symbicort to advair 250/50 mcg bid  10/19/15 CT high resolution thorax, no evidence of interstitial lung disease, minimal scattered opacification could indicate minimal areas of fibrosis periphery of each lung, similar to prior exam, emphysema most prominent upper lobes bilateral  10/19/15 PFT FEV1 1.1 (61% predicted),FVC 1.9,FEV/FVC 58%, no bronchodilator response,DLCO 34%; on advair 250/50 mcg bid and spiriva  11/2/15 change from advair discus to advair 250 inhaler and continue spiriva   12/11/15 not taking advair, will start advair and cont spiriva  12/11/15 cxr negative  3/14/16 [facility] note complaining doxycycline and taper steroids, continue nocturnal oxygen, continue rotating antibiotics for bronchiectasis, follow-up laboratory testing ordered  3/14/16  (normal), quantiferon gold positive, allergen panel negative, IgE 357 (less than 214), IgA 116 (),, IgM 27 (),IgG 947 ([id number])  4/13/16 cxr mild cardiomegaly  5/4/16 [facility] note continue advair 115/21 bid with spacer given doxycycline and prednisone, continue oxygen 3 L at night, AFB samples ×3, follow-up 3 months  7/25/16 pulmonary note on prednisone taper one week out of the month and doxycycline one per day for one week per month, on advair bid and spiriva and oxygen 3 liters at night  11/8/16 pulmonary note continue advair 115/21 ucg bid,spiriva, xopenex as needed, oxygen 3 L at night    Dyslipidemia   5/10 chol 163,trig 68,hdl 69,ldl 80  6/10 chol 163,trig 60,hdl 69,ldl 80  7/11 chol 118,trig 45,hdl 65,ldl 44 on crestor 10 mg  10/11 chol 165,trig 47,hdl 78,ldl 74 on crestor 10 mg done at [place]  2/12 off crestor  6/12 chol 211,trig 104,hdl 64,ldl 126 off crestor  3/4/14 chol 222,trig  124,hdl 52,ldl 145 off meds    History of compression fracture  5/10 MRI thoracic spine subacute T12 compression fracture  5/27/10 kyphoplasty T11  6/10 MRI lumbar spine T12 compression fracture mild to moderate central canal narrowing, interval T11 kyphoplasty, L2-L3 moderate foraminal stenosis, L3-L4 severe left foraminal stenosis, moderate right foraminal stenosis, L4-L5 moderate central canal stenosis  7/10 dexa LS +1.0,hip -1.6  7/28/10 T12 vertebral plasty  8/11 [place] pain evaluation  pain management, recommend continue oxycontin 10 bid  10/11 [facility]  T11-L1 injection  2/13  pain note, T10-T11 epidural injection  6/13 dexa LS +1.8, forearm -2.7  9/23/13 reclast injection  9/4/14 reclast IV infusion    History H. pylori  9/11 serum IgG positive s/p prevpack  9/19/12 cbc,cmp,lipase neg; u/s abd gallbladder sludge without biliary dilatation or stone  12/12/12 [facility] eval rec EGD/colon pt did not follow up     History shingles    Hypertension  1/05 carotid u/s negative  1/07 echo LV EF 60% ,mild LVH  1/09 renal ultrasound 6 mm right cortical cyst  9/09 MRI brain with and without moderate nonspecific microvascular ischemic change  9/09 MRA next mild plaque right internal carotid  5/24/10 echo normal LV size and function, EF 60%, mild to moderate AR, RVSP 35-40 consistent with mild pulmonary hypertension  5/10 persantine thallium no ischemia, EF 72%  9/10 change avalide 150/12.5 to avapro 150 qday, had sodium 125 in ER  3/11 on avapro 300 mg  7/8/11 echo normal LV function, EF 55%  7/8/11 Persantine thallium possible small area mild ischemia in the lateral wall, no evidence of infarction  7/11   3/12 increase metoprolol to 50 bid, cont avapro 300 mg, start norvasc 2.5 mg  5/1/12 increase norvasc to 5 mg, change avapro to avalide 300/12.5 mg, cont metoprolol 50 bid  10/2/12 on avalide 300/12.5 mg and metoprolol 50 bid and norvasc 5 mg  10/12 Persantine thallium diaphragmatic  uptake possible attenuation, fixed inferolateral defect which may be secondary to attenuation, EF 76%, no wall motion abnormalities, no evidence of significant ischemia.  1/13 echo normal LV function, grade 2 diastolic dysfunction, EF 70% moderate aortic insufficiency  1/13   1/23/13 cxr cardiomegaly without pulmonary edema  6/11/13 on avalide 300/12.5 mg,norvasc 5 mg, metoprolol 50 bid  4/7/14 cardiology note atrial fibrillation, start pradaxa 75 mg bid, stop asa, stop norvasc, decreased avalide 300/12.5 mg to 1/2 per day, increase metoprolol to 50 mg 1 1/2 bid  10/20/14 metoprolol 50 mg decreased to 25 mg bid by [place] doctor due to low heart rate, continues on avalide 300/12.5 mg   12/29/14  cardiology note continue pradaxa, metoprolol 25 mg 1/2 bid, avalide 300/12.5 mg  7/25/16 avalide 150/12.5 mg decrease to 1/2 tab per day and continue metoprolol 25 mg bid  8/24/16 cardiology note sinus rhythm on exam,[id number] score=2 continue anticoagulation, low-dose metoprolol,avalide 150/12.5 mg 1/2 per day, follow-up one year    Low back pain  6/06 epidural L4-L5   12/08 x-ray LS moderate to severe DJD  6/10 MRI lumbar spine T12 compression fracture with mild-to-moderate central spinal canal narrowing, interval T11 kyphoplasty, L2-L3 moderate frontal stenosis, L3-L4 severe left foraminal stenosis, moderate right foraminal stenosis, L4-L5 moderate central spinal canal  7/10 interventional rad consult epidural T11 to L1 region  7/28/10 T12 vertebroplasty  7/30/10 norco taper, and lyrica 50 mg bid  8/5/10 reaction to lyrica, stop lyrica  8/19/10 lumbar steroid epidural   9/10  note rec decompression if pain persist  10/10 bilateral lumbar facet joint injections L3-S1   12/10 xray LS old T11 and T12 fracture status post kyphoplasty  [facility]  3/11 trial of spinal stimulator  8/11 [facility] pain note evaluation  pain management cont  oxycontin 10 bid  11/22/11 [facility] note dr. hodge; trial of baclofen, T11 plus T12 left-sided nerve root block pending  7/12  pain note;Left-sided T11-T12 transforaminal epidural   4/13 daughter called [facility] pain suggest taper pain med, she has sched to taper the oxycontin  4/15/13 resumed oxycontin 10 bid  6/11/13 off oxycontin  7/24/13 increase oxycontin from qday to 10 mg bid   2/28/14 on celebrex 200 mg as needed per  and oxycontin 10 mg bid  4/25/14 trial of cymbalta 30 mg, continue oxycontin 10 mg twice a day, recommend not use tramadol (side effects since on oxycontin already) or celebrex (on pradaxa)  5/29/14  pain management [facility]; recommend T11-T12 left-sided transforaminal nerve block  7/2/14 [facility] left T11-T12 epidural injection  7/21/14  [place] pain note; increase oxycontin to 10 mg tid x 3 weeks and then taper down  10/27/14 [place] [facility] and spine note; samples zorvolex  2/26/15 xray lumbar spine mild compression fracture of L4 of indeterminate age but new compared to 3/25/13 vertebral augmentation and T11 and T12 with severe compression fracture of T12 and focal kyphosis at this level  4/15/16 outpatient physical therapy phone call indicating patient unable to make appointments, recommending home health physical therapy  9/20/16  pain note recommended left L3-L5 MBBB  3/22/17  pain procedure note left L3-L5 MBBB #1  6/28/17 trial cymbalta 30 mg  6/29/17 [facility] note    Macular degeneration  7/1/13 dr.mills mcnair note, start PreserVision AREDS II vitamin followup 6 months    Mild cognitive impairment  1/11/16 MMSE 24/30    Osteoporosis  3/08 dexa LS +0.3,hip -1.4  5/10 MRI thoracic spine subacute T12 compression fracture  5/27/10 T12 kyphoplasty  6/10 on actonel  7/10 dexa LS +1.0,hip -1.6  8/10 vit d 56  3/11 reclast referral made  6/12 vit d 42 off actonel  6/21/13 dexa LS +1.8, forearm -2.7, I rec  reclast, she would like to think it over  9/23/13 reclast injection  3/4/14 vit d 26  9/4/14 reclast IV infusion  2/26/15 xray rib series left; mild deformity of the lateral 10th rib on the left may be related to a fracture  2/26/15 xray lumbar spine mild compression fracture of L4 of indeterminate age but new compared to /25/13, vertebral augmentation and T11 and T12 with severe compression fracture of T12 and focal kyphosis at this level  12/7/15 reclast IV infusion    Paroxysmal atrial fibrillation  4/10 hospitalization on multaq, also on verapamil and metoprolol for rate control per cardiology  1/11 [facility] note cont multaq  7/11 EKG atrial fibrillation hospitalization  7/8/11 echo normal LV function, EF 55%  7/8/11 Persantine thallium possible small area mild ischemia in the lateral wall, no evidence of infarction  7/11 [facility] during hospitalization changed to amiodarone 200 mg plus pradaxa restarted  7/11   9/11 [facility] note stop amiodarone, cont pradaxa and metoprolol 25 bid  3/12 cont pradaxa and increase metoprolol to 50 bid due to higher bp  5/12 pradaxa decreased from 150 bid to 75 bid per [facility] due to nosebleeds  6/12 EKG NSR  1/13 echo normal LV function, grade 2 diastolic dysfunction, EF 70% moderate aortic insufficiency  6/11/13 on pradaxa and metoprolol 50 bid for rate control, NSR today  9/13 cardiology note stop pradaxa and start asa 81 mg  4/7/14 cardiology note atrial fibrillation, start pradaxa 75 mg bid, stop asa, stop norvasc, decreased avalide 300/12.5 mg to 1/2 per day, increase metoprolol to 50 mg 1 1/2 bid  4/14/14 cardiology note, NSR on exam, continue pradaxa 75 mg bid, metoprolol 75 mg bid, avalide 300/12.5 mg 1/2 tab per day  10/20/14 metoprolol 50 mg decreased to 25 mg bid by [place] doctor due to low heart rate, continues on pradaxa and avalide 300/12.5 mg   12/29/14  cardiology note continue pradaxa, metoprolol 25 mg 1/2 bid, avalide 300/12.5 mg  6/12/15 NSR on exam  8/24/16  cardiology note sinus rhythm on exam,[id number] score=2 continue anticoagulation, low-dose metoprolol follow-up one year    Preventative health  5/05 colon per [facility] polyp no path report, f/u rec 5 yrs (12/12/12 [facility] note)  4/09 stool occult blood negative  12/09 mammogram  10/1/11 pneumovax  10/2/12 zoster vaccine  6/11/13 declines mammogram, tdap  6/13 dexa LS +1.8, forearm -2.7  3/4/14 vit d 26  10/12/15 prevnar    Right hip pain  3/11 MRI right hip DJD and tear of the anterior/superior acetabular labrum  4/11  ortho note not believe hip is primary problem, referred to  or reji    Sensorineural hearing loss  12/7/15  audiology evaluation mild sensorineural hearing loss    Sleep apnea  8/11 [facility] note sleep study apnea-hypopnea index 86, low sat 74%, start CPAP  8-18 cm    2013 off cpap not comfortable  3/14/16  (normal), quantiferon gold positive, allergen panel negative, IgE 357 (less than 214), IgA 116 (),, IgM 27 (),IgG 947 ([id number])  4/13/16 cxr mild cardiomegaly  5/4/16 [facility] note continue oxygen 3 L at night  11/8/16 pulmonary note continue oxygen 3 L at night    tb latent  3/14/16 positive quantiferon gold test  3/14/16  (normal), quantiferon gold positive, allergen panel negative, IgE 357 (less than 214), IgA 116 (),, IgM 27 (),IgG 947 ([phone])  4/13/16 cxr mild cardiomegaly  5/4/16 [facility] note continue advair 115/21 bid with spacer given doxycycline and prednisone, continue oxygen 3 L at night, AFB samples ×3, follow-up 3 months  7/25/16 pulmonary note on prednisone taper one week out of the month and doxycycline one per day for one week per month, on advair bid and spiriva and oxygen 3 liters at night    Tension headache  6/11/13 on fioricet bid  2/28/14 taper fioricet to once a day  4/25/14 now on fioricet prn            ROS       Objective:       Physical Exam   Constitutional: She appears well-developed and well-nourished. No distress.    HENT:   Head: Normocephalic and atraumatic.   Eyes: Right eye exhibits no discharge. Left eye exhibits no discharge. No scleral icterus.   Neck: No JVD present. No thyromegaly present.   Cardiovascular:   No murmur heard.  S1 and S2 bradycardic   Pulmonary/Chest: Effort normal and breath sounds normal. No respiratory distress.   Abdominal: She exhibits no distension.   Neurological: She is alert.   Skin: Skin is warm.   Psychiatric: She has a normal mood and affect. Her behavior is normal.   Nursing note and vitals reviewed.    MMSE 23/30  No hallucinations or delusions          Assessment/Plan:     Assessment  #!  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 Fajardo, [facility]    #3 Chronic opiate oxycodone 10 mg twice daily    #4 Hypertension blood pressure has been stable, followed by cardiology 8/24/16 cardiology note sinus rhythm on exam,[id number] score=2 continue anticoagulation, low-dose metoprolol,avalide 150/12.5 mg 1/2 per day,      #5 Paroxysmal atrial fibrillation followed by cardiology, no palpitations  On pradaxa and lopressor 8/24/16 cardiology note sinus rhythm on exam,[id number] score=2 continue anticoagulation, low-dose metoprolol follow-up one year no history of TIA or stroke    #6 history of positive QuantiFERON gold had been seen by pulmonary, no active symptoms of cough, hemoptysis, fevers, chills       Plan  #1 MMSE 23/30 today slight decrease from last year, given pain medication may be contributing to chronic memory loss and issues    #2 offered Aricept, patient and son will consider, understands medication may decrease rate of memory loss but not improve memory    #3 PPD placed, return on Monday for reading, if  positive the chest x-ray, no current symptoms of cough, hemoptysis, fevers, chills    #4 will need to complete forms for [facility]     #5 patient unable to travel because of back pain, letter to airlines provided today    #6 follow-up pain management    #7 recommend consider taper oxycodone, patient will consider depending on response to injections from pain management    #8 follow-up as scheduled one month medication review

## 2017-07-14 NOTE — MR AVS SNAPSHOT
"        Venus Church   2017 8:20 AM   Office Visit   MRN: [id number]    Department:  [facility]   Dept Phone:  [phone]    Description:  Female : 1931   Provider:  Denis Krueger M.D.           Reason for Visit     Follow-Up           Allergies as of 2017     Allergen Noted Reactions    Codeine 10/20/2007   Vomiting      You were diagnosed with     TB lung, latent   [010789]       Mild cognitive impairment   [787808]       History of compression fracture of spine   [831146]       Chronic use of opiate for therapeutic purpose   [2034106]       Low back pain, unspecified back pain laterality, unspecified chronicity, with sciatica presence unspecified   [6033197]         Vital Signs     Blood Pressure Pulse Temperature Height Weight Body Mass Index    132/74 mmHg 56 36.8 °C (98.3 °F) 1.549 m (5' 1\") 53.524 kg (118 lb) 22.31 kg/m2    Oxygen Saturation Smoking Status                99% Former Smoker          Basic Information     Date Of Birth Sex Race Ethnicity Preferred Language    1931 Female White Non- English      Your appointments     Aug 07, 2017 10:00 AM   Established Patient with Denis Krueger M.D.   [facility]    [address]  [place] [address] [phone]           You will be receiving a confirmation call a few days before your appointment from our automated call confirmation system.            Oct 04, 2017  9:00 AM   Established Patient Pul with FANTASMA Palomo   [facility] (--)    [address]  [address]  [place] [id number] [phone]              Problem List              ICD-10-CM Priority Class Noted - Resolved    Hypertension (Chronic) I10   2010 - Present    Dyslipidemia (Chronic) E78.5   2010 - Present    Preventative health care (Chronic) Z00.00   2010 - Present    Low back pain (Chronic) M54.5   2010 - Present    Cervical pain (Chronic) M54.2 "   5/22/2010 - Present    Osteoporosis, idiopathic (Chronic) M81.8   5/22/2010 - Present    History of compression fracture of spine Z87.81   5/26/2010 - Present    Paroxysmal atrial fibrillation (HCC) (Chronic) I48.0   6/10/2010 - Present    Right hip pain M25.551   3/28/2011 - Present    COPD (chronic obstructive pulmonary disease) (CMS-HCC) (Chronic) J44.9   7/20/2011 - Present    Sleep apnea (Chronic) G47.30   8/10/2011 - Present    History of Helicobacter pylori infection Z86.19   11/1/2011 - Present    History of shingles Z86.19   6/7/2012 - Present    Tension headache (Chronic) G44.209   6/11/2013 - Present    Macular degeneration H35.30   7/8/2013 - Present    MEDICAL HOME    Unknown - Present    Chronic use of opiate for therapeutic purpose (Chronic) Z79.891   4/23/2015 - Present    Sensorineural hearing loss H90.5   12/16/2015 - Present    Mild cognitive impairment (Chronic) G31.84   1/11/2016 - Present    TB lung, 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   5/4/2016 - Present      Health Maintenance        Date Due Completion Dates    IMM DTaP/Tdap/Td Vaccine (1 - Tdap) 4/22/1950 ---    IMM INFLUENZA (1) 9/1/2017 10/31/2016, 10/12/2015, 11/8/2012    BONE DENSITY 6/21/2018 6/21/2013, 7/26/2010, 3/6/2008            Current Immunizations     13-VALENT PCV PREVNAR 10/12/2015  1:41 PM    Influenza TIV (IM) 11/8/2012    Influenza Vaccine Adult HD 10/31/2016 11:12 AM, 10/12/2015  1:40 PM    Pneumococcal polysaccharide vaccine (PPSV-23) 11/1/2011    SHINGLES VACCINE 10/2/2012      Below and/or attached are the medications your provider expects you to take. Review all of your home medications and newly ordered medications with your provider and/or pharmacist. Follow medication instructions as directed by your provider and/or pharmacist. Please keep your medication list with you and share with your provider. Update the information when medications are discontinued, doses are changed, or new medications (including over-the-counter products) are  added; and carry medication information at all times in the event of emergency situations     Allergies:  CODEINE - Vomiting               Medications  Valid as of: July 14, 2017 - 10:29 AM    Generic Name Brand Name Tablet Size Instructions for use    Albuterol Sulfate (Nebu Soln) PROVENTIL 2.5mg/3ml 2.5 mg by Nebulization route every four hours as needed for Shortness of Breath.        Albuterol Sulfate (Nebu Soln) PROVENTIL 2.5mg/3ml 3 mL by Nebulization route every four hours as needed for Shortness of Breath.        Calcium Carbonate-Vitamin D (Tab) OSCAL-250 250-125 MG-UNIT Take 1 Tab by mouth every day.        Cholecalciferol   Take 1 Cap by mouth 2 Times a Day.        Cyanocobalamin   Take  by mouth.        Dabigatran Etexilate Mesylate (Cap) PRADAXA 75 MG Take 1 Cap by mouth 2 Times a Day.        DULoxetine HCl (Cap DR Particles) CYMBALTA 30 MG Take 1 Cap by mouth every day.        Fluticasone-Salmeterol (Aerosol) ADVAIR -21 MCG/ACT INHALE TWO PUFFS BY MOUTH TWICE DAILY.  USE  WITH  SPACER  AND  RINSE  MOUTH  AFTER  EACH  USE        Irbesartan-Hydrochlorothiazide (Tab) AVALIDE 150-12.5 MG Take 0.5 Tabs by mouth every day.        Levalbuterol Tartrate (Aerosol) XOPENEX HFA 45 MCG/ACT Inhale 1-2 Puffs by mouth every four hours as needed for Shortness of Breath.        Lidocaine (Patch) LIDODERM 5 % Apply one patch to affected area on during the day for 12 hours, off at night        Metoprolol Tartrate (Tab) LOPRESSOR 25 MG Take 1 Tab by mouth 2 times a day.        Omega-3 Fatty Acids (Cap) Omega-3 300 MG Take  by mouth.        OxyCODONE HCl (Tablet Extended Release 12 hour Abuse-Deterrent) OXYCONTIN 10 MG Take 1 Tab by mouth every 12 hours. Ok to fill oxycodone 56-60 days after the oxycodone written on 5/9/17 is filled        Tiotropium Bromide Monohydrate (Cap) SPIRIVA 18 MCG Inhale 1 Cap by mouth every day.        .                 Medicines prescribed today were sent to:     [facility] -  [place], [place] - [address]    [address] [place] [address]    Phone: [phone] Fax: [phone]    Open 24 Hours?: No      Medication refill instructions:       If your prescription bottle indicates you have medication refills left, it is not necessary to call your provider’s office. Please contact your pharmacy and they will refill your medication.    If your prescription bottle indicates you do not have any refills left, you may request refills at any time through one of the following ways: The online Y[employer] system (except Urgent Care), by calling your provider’s office, or by asking your pharmacy to contact your provider’s office with a refill request. Medication refills are processed only during regular business hours and may not be available until the next business day. Your provider may request additional information or to have a follow-up visit with you prior to refilling your medication.   *Please Note: Medication refills are assigned a new Rx number when refilled electronically. Your pharmacy may indicate that no refills were authorized even though a new prescription for the same medication is available at the pharmacy. Please request the medicine by name with the pharmacy before contacting your provider for a refill.        Other Notes About Your Plan     Daughter Yashira [phone];son Dileep   2/6/17 dexa ordered need reclast?  UDT next visit  2/6/17 labs ordered  5/9/17 oxycontin refill  6/20/17 referral custom PT, acupuncture, consider aquatic therapy, complex case coordination referral  6/28/17 trial cymbalta 30 mg  7/14/17 MMSE 23/30 offered aricept  7/14/17 ppd placed if positive need cxr                             MyChart Access Code: Activation code not generated  Current Pixstahart Status: Active

## 2017-07-14 NOTE — Clinical Note
July 14, 2017            Dear Sir or Madam at [employer] Customer Service,      This notification is in regards to Ms. Venus Church (4/22/1931). I did examine Ms. Church today on July 14th, 2017 and have told her not to travel today due to her underlying back pain.  She had purchased a ticket reservation and was scheduled to travel today.  Unfortunately because of severe back pain, I have advised her to not travel until her symptoms improve.    We are kindly asking [employer] to credit her account since she is unable to travel based upon her medical condition.  I apologize for any inconvenience this may cause the airline.      Please feel free to contact my office for any questions or concerns you may have regarding this recommendation.          Sincerely,        Denis Krueger M.D.    Electronically Signed

## 2017-07-25 ENCOUNTER — NON-PROVIDER VISIT (OUTPATIENT)
Dept: MEDICAL GROUP | Facility: MEDICAL CENTER | Age: 82
End: 2017-07-25
Payer: MEDICARE

## 2017-07-25 ENCOUNTER — TELEPHONE (OUTPATIENT)
Dept: PULMONOLOGY | Facility: HOSPICE | Age: 82
End: 2017-07-25

## 2017-07-25 DIAGNOSIS — G47.33 OBSTRUCTIVE SLEEP APNEA: ICD-10-CM

## 2017-07-25 DIAGNOSIS — Z11.1 SCREENING-PULMONARY TB: ICD-10-CM

## 2017-07-25 PROCEDURE — 96372 THER/PROPH/DIAG INJ SC/IM: CPT | Performed by: INTERNAL MEDICINE

## 2017-07-25 NOTE — PROGRESS NOTES
Venus Church is a 86 y.o. female here for a non-provider visit for PPD placement -- Step 2 of 2    Reason for PPD:  group home requirement    1. TB evaluation questionnaire completed by patient? Yes      -  If any answers marked yes did you contact a provider prior to placing? No  2.  Patient notified to return to clinic for reading on: 7/27/17  3.  PPD Placement documentation completed on TB evaluation questionnaire? Yes  4.  Location of TB evaluation questionnaire filed:

## 2017-07-25 NOTE — MR AVS SNAPSHOT
Venus Church   2017 9:30 AM   Non-Provider Visit   MRN: [id number]    Department:  [facility]   Dept Phone:  [phone]    Description:  Female : 1931   Provider:  SOUTH FORD MA           Reason for Visit     Injections           Allergies as of 2017     Allergen Noted Reactions    Codeine 10/20/2007   Vomiting      Vital Signs     Smoking Status                   Former Smoker           Basic Information     Date Of Birth Sex Race Ethnicity Preferred Language    1931 Female White Non- English      Your appointments     Aug 07, 2017 10:00 AM   Established Patient with Denis Krueger M.D.   [facility] ([facility])    [address]  [place] [address] [phone]           You will be receiving a confirmation call a few days before your appointment from our automated call confirmation system.            Oct 04, 2017  9:00 AM   Established Patient Pul with FANTASMA Palomo   [facility] (--)    [address]  [address]  [place] [id number] [phone]              Problem List              ICD-10-CM Priority Class Noted - Resolved    Hypertension (Chronic) I10   2010 - Present    Dyslipidemia (Chronic) E78.5   2010 - Present    Preventative health care (Chronic) Z00.00   2010 - Present    Low back pain (Chronic) M54.5   2010 - Present    Cervical pain (Chronic) M54.2   2010 - Present    Osteoporosis, idiopathic (Chronic) M81.8   2010 - Present    History of compression fracture of spine Z87.81   2010 - Present    Paroxysmal atrial fibrillation (HCC) (Chronic) I48.0   6/10/2010 - Present    Right hip pain M25.551   3/28/2011 - Present    COPD (chronic obstructive pulmonary disease) (CMS-HCC) (Chronic) J44.9   2011 - Present    Sleep apnea (Chronic) G47.30   8/10/2011 - Present    History of Helicobacter pylori infection Z86.19   2011 - Present    History of shingles Z86.19   6/7/2012 - Present    Tension headache (Chronic) G44.209   6/11/2013 - Present    Macular degeneration H35.30   7/8/2013 - Present    MEDICAL HOME    Unknown - Present    Chronic use of opiate for therapeutic purpose (Chronic) Z79.891   4/23/2015 - Present    Sensorineural hearing loss H90.5   12/16/2015 - Present    Mild cognitive impairment (Chronic) G31.84   1/11/2016 - Present    TB lung, 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   5/4/2016 - Present      Health Maintenance        Date Due Completion Dates    IMM DTaP/Tdap/Td Vaccine (1 - Tdap) 4/22/1950 ---    IMM INFLUENZA (1) 9/1/2017 10/31/2016, 10/12/2015, 11/8/2012    BONE DENSITY 6/21/2018 6/21/2013, 7/26/2010, 3/6/2008            Current Immunizations     13-VALENT PCV PREVNAR 10/12/2015  1:41 PM    Influenza TIV (IM) 11/8/2012    Influenza Vaccine Adult HD 10/31/2016 11:12 AM, 10/12/2015  1:40 PM    Pneumococcal polysaccharide vaccine (PPSV-23) 11/1/2011    SHINGLES VACCINE 10/2/2012    Tuberculin Skin Test 7/14/2017  9:00 AM      Below and/or attached are the medications your provider expects you to take. Review all of your home medications and newly ordered medications with your provider and/or pharmacist. Follow medication instructions as directed by your provider and/or pharmacist. Please keep your medication list with you and share with your provider. Update the information when medications are discontinued, doses are changed, or new medications (including over-the-counter products) are added; and carry medication information at all times in the event of emergency situations     Allergies:  CODEINE - Vomiting               Medications  Valid as of: July 25, 2017 - 10:22 AM    Generic Name Brand Name Tablet Size Instructions for use    Albuterol Sulfate (Nebu Soln) PROVENTIL 2.5mg/3ml 2.5 mg by Nebulization route every four hours as needed for Shortness of Breath.        Albuterol Sulfate (Nebu Soln) PROVENTIL 2.5mg/3ml 3 mL by Nebulization route  every four hours as needed for Shortness of Breath.        Calcium Carbonate-Vitamin D (Tab) OSCAL-250 250-125 MG-UNIT Take 1 Tab by mouth every day.        Cholecalciferol   Take 1 Cap by mouth 2 Times a Day.        Cyanocobalamin   Take  by mouth.        Dabigatran Etexilate Mesylate (Cap) PRADAXA 75 MG Take 1 Cap by mouth 2 Times a Day.        DULoxetine HCl (Cap DR Particles) CYMBALTA 30 MG Take 1 Cap by mouth every day.        Fluticasone-Salmeterol (Aerosol) ADVAIR -21 MCG/ACT INHALE TWO PUFFS BY MOUTH TWICE DAILY.  USE  WITH  SPACER  AND  RINSE  MOUTH  AFTER  EACH  USE        Irbesartan-Hydrochlorothiazide (Tab) AVALIDE 150-12.5 MG Take 0.5 Tabs by mouth every day.        Levalbuterol Tartrate (Aerosol) XOPENEX HFA 45 MCG/ACT Inhale 1-2 Puffs by mouth every four hours as needed for Shortness of Breath.        Lidocaine (Patch) LIDODERM 5 % Apply one patch to affected area on during the day for 12 hours, off at night        Metoprolol Tartrate (Tab) LOPRESSOR 25 MG Take 1 Tab by mouth 2 times a day.        Omega-3 Fatty Acids (Cap) Omega-3 300 MG Take  by mouth.        OxyCODONE HCl (Tablet Extended Release 12 hour Abuse-Deterrent) OXYCONTIN 10 MG Take 1 Tab by mouth every 12 hours. Ok to fill oxycodone 56-60 days after the oxycodone written on 5/9/17 is filled        Tiotropium Bromide Monohydrate (Cap) SPIRIVA 18 MCG Inhale 1 Cap by mouth every day.        .                 Medicines prescribed today were sent to:     [facility] - [address]    [address] [place] [address]    Phone: [phone] Fax: [phone]    Open 24 Hours?: No      Medication refill instructions:       If your prescription bottle indicates you have medication refills left, it is not necessary to call your provider’s office. Please contact your pharmacy and they will refill your medication.    If your prescription bottle indicates you do not have any refills left, you may request  refills at any time through one of the following ways: The online [employer] system (except Urgent Care), by calling your provider’s office, or by asking your pharmacy to contact your provider’s office with a refill request. Medication refills are processed only during regular business hours and may not be available until the next business day. Your provider may request additional information or to have a follow-up visit with you prior to refilling your medication.   *Please Note: Medication refills are assigned a new Rx number when refilled electronically. Your pharmacy may indicate that no refills were authorized even though a new prescription for the same medication is available at the pharmacy. Please request the medicine by name with the pharmacy before contacting your provider for a refill.        Other Notes About Your Plan     Daughter Yashira [phone];son Dileep   2/6/17 dexa ordered need reclast?  UDT next visit  2/6/17 labs ordered  5/9/17 oxycontin refill  6/20/17 referral custom PT, acupuncture, consider aquatic therapy, complex case coordination referral  6/28/17 trial cymbalta 30 mg  7/14/17 MMSE 23/30 offered aricept  7/14/17 ppd placed if positive need cxr                             Tapruhart Access Code: Activation code not generated  Current [employer]P80 Status: Active

## 2017-07-27 ENCOUNTER — NON-PROVIDER VISIT (OUTPATIENT)
Dept: MEDICAL GROUP | Facility: MEDICAL CENTER | Age: 82
End: 2017-07-27
Payer: MEDICARE

## 2017-07-27 LAB — TB WHEAL 3D P 5 TU DIAM: NORMAL MM

## 2017-07-27 NOTE — MR AVS SNAPSHOT
Venus Church   2017 9:15 AM   Appointment   MRN: [id number]    Department:  [facility]   Dept Phone:  [phone]    Description:  Female : 1931   Provider:  MICHELE FORD MA           Allergies as of 2017     Allergen Noted Reactions    Codeine 10/20/2007   Vomiting      Vital Signs     Smoking Status                   Former Smoker           Basic Information     Date Of Birth Sex Race Ethnicity Preferred Language    1931 Female White Non- English      Your appointments     Aug 07, 2017 10:00 AM   Established Patient with Denis Krueger M.D.   [facility] ([facility])    [address]  [place] [address] [phone]           You will be receiving a confirmation call a few days before your appointment from our automated call confirmation system.            Oct 04, 2017  9:00 AM   Established Patient Pul with FANTASMA Palomo   [facility] (--)    [address]  [address]  [place] [id number] [phone]              Problem List              ICD-10-CM Priority Class Noted - Resolved    Hypertension (Chronic) I10   2010 - Present    Dyslipidemia (Chronic) E78.5   2010 - Present    Preventative health care (Chronic) Z00.00   2010 - Present    Low back pain (Chronic) M54.5   2010 - Present    Cervical pain (Chronic) M54.2   2010 - Present    Osteoporosis, idiopathic (Chronic) M81.8   2010 - Present    History of compression fracture of spine Z87.81   2010 - Present    Paroxysmal atrial fibrillation (HCC) (Chronic) I48.0   6/10/2010 - Present    Right hip pain M25.551   3/28/2011 - Present    COPD (chronic obstructive pulmonary disease) (CMS-HCC) (Chronic) J44.9   2011 - Present    Sleep apnea (Chronic) G47.30   8/10/2011 - Present    History of Helicobacter pylori infection Z86.19   2011 - Present    History of shingles Z86.19   2012 - Present    Tension headache (Chronic) G44.209   6/11/2013 - Present    Macular degeneration H35.30   7/8/2013 - Present    MEDICAL HOME    Unknown - Present    Chronic use of opiate for therapeutic purpose (Chronic) Z79.891   4/23/2015 - Present    Sensorineural hearing loss H90.5   12/16/2015 - Present    Mild cognitive impairment (Chronic) G31.84   1/11/2016 - Present    TB lung, 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   5/4/2016 - Present      Health Maintenance        Date Due Completion Dates    IMM DTaP/Tdap/Td Vaccine (1 - Tdap) 4/22/1950 ---    IMM INFLUENZA (1) 9/1/2017 10/31/2016, 10/12/2015, 11/8/2012    BONE DENSITY 6/21/2018 6/21/2013, 7/26/2010, 3/6/2008            Current Immunizations     13-VALENT PCV PREVNAR 10/12/2015  1:41 PM    Influenza TIV (IM) 11/8/2012    Influenza Vaccine Adult HD 10/31/2016 11:12 AM, 10/12/2015  1:40 PM    Pneumococcal polysaccharide vaccine (PPSV-23) 11/1/2011    SHINGLES VACCINE 10/2/2012    Tuberculin Skin Test 7/14/2017  9:00 AM      Below and/or attached are the medications your provider expects you to take. Review all of your home medications and newly ordered medications with your provider and/or pharmacist. Follow medication instructions as directed by your provider and/or pharmacist. Please keep your medication list with you and share with your provider. Update the information when medications are discontinued, doses are changed, or new medications (including over-the-counter products) are added; and carry medication information at all times in the event of emergency situations     Allergies:  CODEINE - Vomiting               Medications  Valid as of: July 27, 2017 -  9:21 AM    Generic Name Brand Name Tablet Size Instructions for use    Albuterol Sulfate (Nebu Soln) PROVENTIL 2.5mg/3ml 2.5 mg by Nebulization route every four hours as needed for Shortness of Breath.        Albuterol Sulfate (Nebu Soln) PROVENTIL 2.5mg/3ml 3 mL by Nebulization route every four hours as needed for Shortness of Breath.          Calcium Carbonate-Vitamin D (Tab) OSCAL-250 250-125 MG-UNIT Take 1 Tab by mouth every day.        Cholecalciferol   Take 1 Cap by mouth 2 Times a Day.        Cyanocobalamin   Take  by mouth.        Dabigatran Etexilate Mesylate (Cap) PRADAXA 75 MG Take 1 Cap by mouth 2 Times a Day.        DULoxetine HCl (Cap DR Particles) CYMBALTA 30 MG Take 1 Cap by mouth every day.        Fluticasone-Salmeterol (Aerosol) ADVAIR -21 MCG/ACT INHALE TWO PUFFS BY MOUTH TWICE DAILY.  USE  WITH  SPACER  AND  RINSE  MOUTH  AFTER  EACH  USE        Irbesartan-Hydrochlorothiazide (Tab) AVALIDE 150-12.5 MG Take 0.5 Tabs by mouth every day.        Levalbuterol Tartrate (Aerosol) XOPENEX HFA 45 MCG/ACT Inhale 1-2 Puffs by mouth every four hours as needed for Shortness of Breath.        Lidocaine (Patch) LIDODERM 5 % Apply one patch to affected area on during the day for 12 hours, off at night        Metoprolol Tartrate (Tab) LOPRESSOR 25 MG Take 1 Tab by mouth 2 times a day.        Omega-3 Fatty Acids (Cap) Omega-3 300 MG Take  by mouth.        OxyCODONE HCl (Tablet Extended Release 12 hour Abuse-Deterrent) OXYCONTIN 10 MG Take 1 Tab by mouth every 12 hours. Ok to fill oxycodone 56-60 days after the oxycodone written on 5/9/17 is filled        Tiotropium Bromide Monohydrate (Cap) SPIRIVA 18 MCG Inhale 1 Cap by mouth every day.        .                 Medicines prescribed today were sent to:     [facility] [place] - [address]    [address] [place] [address]    Phone: [phone] Fax: [phone]    Open 24 Hours?: No      Medication refill instructions:       If your prescription bottle indicates you have medication refills left, it is not necessary to call your provider’s office. Please contact your pharmacy and they will refill your medication.    If your prescription bottle indicates you do not have any refills left, you may request refills at any time through one of the following ways:  The online [employer] system (except Urgent Care), by calling your provider’s office, or by asking your pharmacy to contact your provider’s office with a refill request. Medication refills are processed only during regular business hours and may not be available until the next business day. Your provider may request additional information or to have a follow-up visit with you prior to refilling your medication.   *Please Note: Medication refills are assigned a new Rx number when refilled electronically. Your pharmacy may indicate that no refills were authorized even though a new prescription for the same medication is available at the pharmacy. Please request the medicine by name with the pharmacy before contacting your provider for a refill.        Other Notes About Your Plan     Daughter Yashira Arteaga [phone];son Dileep   2/6/17 dexa ordered need reclast?  UDT next visit  2/6/17 labs ordered  5/9/17 oxycontin refill  6/20/17 referral custom PT, acupuncture, consider aquatic therapy, complex case coordination referral  6/28/17 trial cymbalta 30 mg  7/14/17 MMSE 23/30 offered aricept  7/14/17 ppd placed if positive need cxr                             [email or web address] Access Code: Activation code not generated  Current G[employer] Status: Active

## 2017-07-27 NOTE — PROGRESS NOTES
Venus Church is a 86 y.o. female here for a non-provider visit for PPD reading -- Step 2 of 2.      1.  Resulted in Epic under enter/edit results? Yes   2.  TB evaluation questionnaire scanned into chart and original given to patient?Yes      3. Was induration greater than 0 mm? No.    If Step 1 of 2, when is patient returning for second step (delete if N/A):     Routed to PCP? Yes

## 2017-07-31 ENCOUNTER — PATIENT OUTREACH (OUTPATIENT)
Dept: HEALTH INFORMATION MANAGEMENT | Facility: OTHER | Age: 82
End: 2017-07-31

## 2017-07-31 NOTE — PROGRESS NOTES
LSW outreach to pt's daughter Ivana to follow-up with referrals and resources provided to Ivana during last conversation. Unable to reach, LVM with contact information requesting TC back.     LSW outreach to [facility] to inquire about progress on the HCBW. Per representative, case has been assigned to Iris; however she has not been able to reach pt's family. Per representative, they have reached out to pt's son with no luck. LSW provided pt's daughters number as well.     Plan:   LSW to follow-up with [facility]/pt's family in a week or two re: referrals to HCBW and resources provided to them.

## 2017-08-07 ENCOUNTER — OFFICE VISIT (OUTPATIENT)
Dept: MEDICAL GROUP | Facility: MEDICAL CENTER | Age: 82
End: 2017-08-07
Payer: MEDICARE

## 2017-08-07 VITALS
RESPIRATION RATE: 16 BRPM | WEIGHT: 118.4 LBS | TEMPERATURE: 98.5 F | BODY MASS INDEX: 22.36 KG/M2 | SYSTOLIC BLOOD PRESSURE: 140 MMHG | HEART RATE: 66 BPM | DIASTOLIC BLOOD PRESSURE: 72 MMHG | HEIGHT: 61 IN | OXYGEN SATURATION: 95 %

## 2017-08-07 DIAGNOSIS — Z79.891 CHRONICALLY ON OPIATE THERAPY: ICD-10-CM

## 2017-08-07 DIAGNOSIS — R52 PAIN: ICD-10-CM

## 2017-08-07 DIAGNOSIS — Z79.891 CHRONIC USE OF OPIATE FOR THERAPEUTIC PURPOSE: Chronic | ICD-10-CM

## 2017-08-07 DIAGNOSIS — F11.20 OPIATE DEPENDENCE, CONTINUOUS (HCC): ICD-10-CM

## 2017-08-07 DIAGNOSIS — Z79.891 CHRONIC USE OF OPIATE DRUGS THERAPEUTIC PURPOSES: ICD-10-CM

## 2017-08-07 PROCEDURE — 99214 OFFICE O/P EST MOD 30 MIN: CPT | Performed by: INTERNAL MEDICINE

## 2017-08-07 RX ORDER — OXYCODONE HCL 10 MG/1
10 TABLET, FILM COATED, EXTENDED RELEASE ORAL EVERY 12 HOURS
Qty: 60 TAB | Refills: 0 | Status: SHIPPED | OUTPATIENT
Start: 2017-08-07 | End: 2017-11-03 | Stop reason: SDUPTHER

## 2017-08-07 RX ORDER — OXYCODONE HCL 10 MG/1
10 TABLET, FILM COATED, EXTENDED RELEASE ORAL EVERY 12 HOURS
Qty: 60 TAB | Refills: 0 | Status: SHIPPED | OUTPATIENT
Start: 2017-08-07 | End: 2017-08-07 | Stop reason: SDUPTHER

## 2017-08-07 NOTE — PROGRESS NOTES
Subjective:      Venus Church is a 86 y.o. female who presents with back pain        HPI   Patient here with son, still lives independently, son sees her daily, ensures that she takes her medication regularly. Has [facility] home 8 coming once a week and will start going to [facility] .  Chronic low back pain , followed by pain management. Sees  pain management and had second injection and going to physical therapy twice per week. Did not start cymbalta.  Remains on oxycodone 10 mg twice a day for pain.  Some memory loss at times.  No falls.  No constipation.  No depression, no daytime drowsiness or sedation, and respiratory suppression.  Does not drink alcohol.  Back pain is stable, chronic low back pain localized, no radiation down her legs, can be moderate at times depending on activity and worse with standing, or walking for long distances.  No falls.  No constipation  Paroxysmal atrial fibrillation on pradaxa no chest pain, palpitations, lightheadedness, no regular NSAIDs  COPD stable on inhalers, no supplemental oxygen occasional chest congestion, shortness of breath  No anxiety, no depression  Good social support with family  No tobacco, no alcohol          Current Outpatient Prescriptions   Medication Sig Dispense Refill   • tiotropium (SPIRIVA HANDIHALER) 18 MCG Cap Inhale 1 Cap by mouth every day. 30 Cap 11   • irbesartan-hydrochlorothiazide (AVALIDE) 150-12.5 MG per tablet Take 1 Tab by mouth every day. 90 Tab 3   • duloxetine (CYMBALTA) 30 MG Cap DR Particles Take 1 Cap by mouth every day. 30 Cap 5   • oxyCODONE CR (OXYCONTIN) 10 MG Tablet Extended Release 12 hour Abuse-Deterrent Take 1 Tab by mouth every 12 hours. Ok to fill oxycodone 56-60 days after the oxycodone written on 5/9/17 is filled 60 Tab 0   • ADVAIR -21 MCG/ACT inhaler INHALE TWO PUFFS BY MOUTH TWICE DAILY.  USE  WITH  SPACER  AND  RINSE  MOUTH  AFTER  EACH  USE 1 Inhaler 5   • metoprolol (LOPRESSOR) 25 MG Tab Take 1 Tab by  mouth 2 times a day. 180 Tab 3   • dabigatran (PRADAXA) 75 MG Cap capsule Take 1 Cap by mouth 2 Times a Day. 60 Cap 11   • albuterol (PROVENTIL) 2.5mg/3ml Nebu Soln solution for nebulization 3 mL by Nebulization route every four hours as needed for Shortness of Breath. 150 mL 11   • irbesartan-hydrochlorothiazide (AVALIDE) 150-12.5 MG per tablet Take 0.5 Tabs by mouth every day. 90 Tab 3   • Omega-3 300 MG Cap Take  by mouth.     • albuterol (PROVENTIL) 2.5mg/3ml Nebu Soln solution for nebulization 2.5 mg by Nebulization route every four hours as needed for Shortness of Breath.     • levalbuterol (XOPENEX HFA) 45 MCG/ACT inhaler Inhale 1-2 Puffs by mouth every four hours as needed for Shortness of Breath.     • Cyanocobalamin (VITAMIN B-12 PO) Take  by mouth.     • lidocaine (LIDODERM) 5 % Patch Apply one patch to affected area on during the day for 12 hours, off at night 30 Patch 6   • calcium-vitamin D (OSCAL-250) 250-125 MG-UNIT TABS Take 1 Tab by mouth every day.     • VITAMIN D, CHOLECALCIFEROL, PO Take 1 Cap by mouth 2 Times a Day.       No current facility-administered medications for this visit.     Patient Active Problem List    Diagnosis Date Noted   • TB lung, latent 05/04/2016   • Mild cognitive impairment 01/11/2016   • Sensorineural hearing loss 12/16/2015   • Chronic use of opiate for therapeutic purpose 04/23/2015   • MEDICAL HOME    • Macular degeneration 07/08/2013   • Tension headache 06/11/2013   • History of shingles 06/07/2012   • History of Helicobacter pylori infection 11/01/2011   • Sleep apnea 08/10/2011   • COPD (chronic obstructive pulmonary disease) (CMS-HCC) 07/20/2011   • Right hip pain 03/28/2011   • Paroxysmal atrial fibrillation (HCC) 06/10/2010   • History of compression fracture of spine 05/26/2010   • Low back pain 05/22/2010   • Cervical pain 05/22/2010   • Osteoporosis, idiopathic 05/22/2010   • Hypertension 05/20/2010   • Dyslipidemia 05/20/2010   • Preventative health care  05/20/2010       Cervical pain  4/08 MRI cervical spine C3-C4 moderate left-sided foraminal stenosis, C4-C5 moderate foraminal stenosis right, C5-C6 moderate right-sided foraminal stenosis, C6-C7 moderate bilateral foraminal stenosis  5/08 CT cervical spine C3-C4 uncovertebral joint arthropathy and significant left-sided neural foraminal narrowing, C4-C5 uncovertebral joint arthropathy right significant neuroforaminal narrowing, C5-C6 uncovertebral joint arthropathy right moderate neural foraminal narrowing, C6-C7 uncovertebral joint arthropathy moderate to severe right neural foraminal narrowing  7/08 C3-C5 cervical facet injections   1/09 medial branch nerve block diagnostic   2/09 C4-C5 cervical epidural under fluoroscopy   4/13 Daughter called [place] pain suggest taper pain med, she has sched to taper the oxycontin  4/15/13 resumed oxycontin 10 bid  6/11/13 off oxcontin  7/24/13 increase oxycontin from qday to 10 mg bid     Chronic opiate  2/26/15 narcotic contract  6/12/15 opiate risk score 0  10/4/15   7/25/16   9/20/16  pain note recommended left L3-L5 MBBB  10/31/16   2/6/17   5/9/17 narcotic contract  5/9/17 opiate risk score 0  5/9/17   5/9/17 depression screening score 0  6/28/17 trial cymbalta 30 mg    COPD  7/11 CT spiral thorax extensive emphysematous change of lung, small left pleural effusion left lung base with atelectasis unchanged from prior CT  5/10 CT spriral thorax emphysematous change and dependent atelectasis left lung base  7/11 PFT FEV/FVC 59%, FEV1 1.1 L (75% predicted), significant post bronchodilator response noted (FEV1 improved 16%). Mixed restrictive and obstructive pattern,COPD with GOLD stage II with sig bronchodilator response  7/11 trial of symbicort 80/4.5 mcg bid discussed with daughter plus albuterol neb prn, [facility] home education ordered  5/12 off symbicort   5/22/14 cxr negative  6/23/14 on symbicort  7/20/15 change to advair  250/50 mcg from symbicort (not covered on insurance)  9/3/15 cxr negative  10/4/15 add spiriva and change symbicort to advair 250/50 mcg bid  10/19/15 CT high resolution thorax, no evidence of interstitial lung disease, minimal scattered opacification could indicate minimal areas of fibrosis periphery of each lung, similar to prior exam, emphysema most prominent upper lobes bilateral  10/19/15 PFT FEV1 1.1 (61% predicted),FVC 1.9,FEV/FVC 58%, no bronchodilator response,DLCO 34%; on advair 250/50 mcg bid and spiriva  11/2/15 change from advair discus to advair 250 inhaler and continue spiriva   12/11/15 not taking advair, will start advair and cont spiriva  12/11/15 cxr negative  3/14/16 [facility] note complaining doxycycline and taper steroids, continue nocturnal oxygen, continue rotating antibiotics for bronchiectasis, follow-up laboratory testing ordered  3/14/16  (normal), quantiferon gold positive, allergen panel negative, IgE 357 (less than 214), IgA 116 (),, IgM 27 (),IgG 947 ([id number])  4/13/16 cxr mild cardiomegaly  5/4/16 [facility] note continue advair 115/21 bid with spacer given doxycycline and prednisone, continue oxygen 3 L at night, AFB samples ×3, follow-up 3 months  7/25/16 pulmonary note on prednisone taper one week out of the month and doxycycline one per day for one week per month, on advair bid and spiriva and oxygen 3 liters at night  11/8/16 pulmonary note continue advair 115/21 ucg bid,spiriva, xopenex as needed, oxygen 3 L at night    Dyslipidemia   5/10 chol 163,trig 68,hdl 69,ldl 80  6/10 chol 163,trig 60,hdl 69,ldl 80  7/11 chol 118,trig 45,hdl 65,ldl 44 on crestor 10 mg  10/11 chol 165,trig 47,hdl 78,ldl 74 on crestor 10 mg done at [place]  2/12 off crestor  6/12 chol 211,trig 104,hdl 64,ldl 126 off crestor  3/4/14 chol 222,trig 124,hdl 52,ldl 145 off meds    History of compression fracture  5/10 MRI thoracic spine subacute T12 compression fracture  5/27/10 kyphoplasty T11  6/10  MRI lumbar spine T12 compression fracture mild to moderate central canal narrowing, interval T11 kyphoplasty, L2-L3 moderate foraminal stenosis, L3-L4 severe left foraminal stenosis, moderate right foraminal stenosis, L4-L5 moderate central canal stenosis  7/10 dexa LS +1.0,hip -1.6  7/28/10 T12 vertebral plasty  8/11 [place] pain evaluation  pain management, recommend continue oxycontin 10 bid  10/11 [facility]  T11-L1 injection  2/13  pain note, T10-T11 epidural injection  6/13 dexa LS +1.8, forearm -2.7  9/23/13 reclast injection  9/4/14 reclast IV infusion    History H. pylori  9/11 serum IgG positive s/p prevpack  9/19/12 cbc,cmp,lipase neg; u/s abd gallbladder sludge without biliary dilatation or stone  12/12/12 [facility] eval rec EGD/colon pt did not follow up     History shingles    Hypertension  1/05 carotid u/s negative  1/07 echo LV EF 60% ,mild LVH  1/09 renal ultrasound 6 mm right cortical cyst  9/09 MRI brain with and without moderate nonspecific microvascular ischemic change  9/09 MRA next mild plaque right internal carotid  5/24/10 echo normal LV size and function, EF 60%, mild to moderate AR, RVSP 35-40 consistent with mild pulmonary hypertension  5/10 persantine thallium no ischemia, EF 72%  9/10 change avalide 150/12.5 to avapro 150 qday, had sodium 125 in ER  3/11 on avapro 300 mg  7/8/11 echo normal LV function, EF 55%  7/8/11 Persantine thallium possible small area mild ischemia in the lateral wall, no evidence of infarction  7/11   3/12 increase metoprolol to 50 bid, cont avapro 300 mg, start norvasc 2.5 mg  5/1/12 increase norvasc to 5 mg, change avapro to avalide 300/12.5 mg, cont metoprolol 50 bid  10/2/12 on avalide 300/12.5 mg and metoprolol 50 bid and norvasc 5 mg  10/12 Persantine thallium diaphragmatic uptake possible attenuation, fixed inferolateral defect which may be secondary to attenuation, EF 76%, no wall motion abnormalities, no evidence of  significant ischemia.  1/13 echo normal LV function, grade 2 diastolic dysfunction, EF 70% moderate aortic insufficiency  1/13   1/23/13 cxr cardiomegaly without pulmonary edema  6/11/13 on avalide 300/12.5 mg,norvasc 5 mg, metoprolol 50 bid  4/7/14 cardiology note atrial fibrillation, start pradaxa 75 mg bid, stop asa, stop norvasc, decreased avalide 300/12.5 mg to 1/2 per day, increase metoprolol to 50 mg 1 1/2 bid  10/20/14 metoprolol 50 mg decreased to 25 mg bid by [place] doctor due to low heart rate, continues on avalide 300/12.5 mg   12/29/14  cardiology note continue pradaxa, metoprolol 25 mg 1/2 bid, avalide 300/12.5 mg  7/25/16 avalide 150/12.5 mg decrease to 1/2 tab per day and continue metoprolol 25 mg bid  8/24/16 cardiology note sinus rhythm on exam,[id number] score=2 continue anticoagulation, low-dose metoprolol,avalide 150/12.5 mg 1/2 per day, follow-up one year    Low back pain  6/06 epidural L4-L5   12/08 x-ray LS moderate to severe DJD  6/10 MRI lumbar spine T12 compression fracture with mild-to-moderate central spinal canal narrowing, interval T11 kyphoplasty, L2-L3 moderate frontal stenosis, L3-L4 severe left foraminal stenosis, moderate right foraminal stenosis, L4-L5 moderate central spinal canal  7/10 interventional rad consult epidural T11 to L1 region  7/28/10 T12 vertebroplasty  7/30/10 norco taper, and lyrica 50 mg bid  8/5/10 reaction to lyrica, stop lyrica  8/19/10 lumbar steroid epidural   9/10  note rec decompression if pain persist  10/10 bilateral lumbar facet joint injections L3-S1   12/10 xray LS old T11 and T12 fracture status post kyphoplasty  1/11 [place] [facility] note  3/11 trial of spinal stimulator  8/11 [facility] note evaluation  pain management cont oxycontin 10 bid  11/22/11 [facility] note dr. hodge; trial of baclofen, T11 plus T12 left-sided nerve root block pending  7/12  pain  note;Left-sided T11-T12 transforaminal epidural   4/13 daughter called [place] [facility] suggest taper pain med, she has sched to taper the oxycontin  4/15/13 resumed oxycontin 10 bid  6/11/13 off oxycontin  7/24/13 increase oxycontin from qday to 10 mg bid   2/28/14 on celebrex 200 mg as needed per  and oxycontin 10 mg bid  4/25/14 trial of cymbalta 30 mg, continue oxycontin 10 mg twice a day, recommend not use tramadol (side effects since on oxycontin already) or celebrex (on pradaxa)  5/29/14  pain management [facility]; recommend T11-T12 left-sided transforaminal nerve block  7/2/14 [place] [facility] left T11-T12 epidural injection  7/21/14  [place] pain note; increase oxycontin to 10 mg tid x 3 weeks and then taper down  10/27/14 [facility] [facility] note; samples zorvolex  2/26/15 xray lumbar spine mild compression fracture of L4 of indeterminate age but new compared to 3/25/13 vertebral augmentation and T11 and T12 with severe compression fracture of T12 and focal kyphosis at this level  4/15/16 outpatient physical therapy phone call indicating patient unable to make appointments, recommending home health physical therapy  9/20/16  pain note recommended left L3-L5 MBBB  3/22/17  pain procedure note left L3-L5 MBBB #1  6/28/17 trial cymbalta 30 mg  6/29/17 [facility] physical therapy note    Macular degeneration  7/1/13 dr.mills mcnair note, start PreserVision AREDS II vitamin followup 6 months    Mild cognitive impairment  1/11/16 MMSE 24/30  7/14/17 MMSE 23/30 offered aricept    Osteoporosis  3/08 dexa LS +0.3,hip -1.4  5/10 MRI thoracic spine subacute T12 compression fracture  5/27/10 T12 kyphoplasty  6/10 on actonel  7/10 dexa LS +1.0,hip -1.6  8/10 vit d 56  3/11 reclast referral made  6/12 vit d 42 off actonel  6/21/13 dexa LS +1.8, forearm -2.7, I rec reclast, she would like to think it over  9/23/13 reclast injection  3/4/14 vit d 26  9/4/14 reclast IV  infusion  2/26/15 xray rib series left; mild deformity of the lateral 10th rib on the left may be related to a fracture  2/26/15 xray lumbar spine mild compression fracture of L4 of indeterminate age but new compared to /25/13, vertebral augmentation and T11 and T12 with severe compression fracture of T12 and focal kyphosis at this level  12/7/15 reclast IV infusion    Paroxysmal atrial fibrillation  4/10 hospitalization on multaq, also on verapamil and metoprolol for rate control per cardiology  1/11 [facility] note cont multaq  7/11 EKG atrial fibrillation hospitalization  7/8/11 echo normal LV function, EF 55%  7/8/11 Persantine thallium possible small area mild ischemia in the lateral wall, no evidence of infarction  7/11 [facility] during hospitalization changed to amiodarone 200 mg plus pradaxa restarted  7/11   9/11 [facility] note stop amiodarone, cont pradaxa and metoprolol 25 bid  3/12 cont pradaxa and increase metoprolol to 50 bid due to higher bp  5/12 pradaxa decreased from 150 bid to 75 bid per [facility] due to nosebleeds  6/12 EKG NSR  1/13 echo normal LV function, grade 2 diastolic dysfunction, EF 70% moderate aortic insufficiency  6/11/13 on pradaxa and metoprolol 50 bid for rate control, NSR today  9/13 cardiology note stop pradaxa and start asa 81 mg  4/7/14 cardiology note atrial fibrillation, start pradaxa 75 mg bid, stop asa, stop norvasc, decreased avalide 300/12.5 mg to 1/2 per day, increase metoprolol to 50 mg 1 1/2 bid  4/14/14 cardiology note, NSR on exam, continue pradaxa 75 mg bid, metoprolol 75 mg bid, avalide 300/12.5 mg 1/2 tab per day  10/20/14 metoprolol 50 mg decreased to 25 mg bid by [place] doctor due to low heart rate, continues on pradaxa and avalide 300/12.5 mg   12/29/14  cardiology note continue pradaxa, metoprolol 25 mg 1/2 bid, avalide 300/12.5 mg  6/12/15 NSR on exam  8/24/16 cardiology note sinus rhythm on exam,[id number] score=2 continue anticoagulation, low-dose metoprolol  follow-up one year    Preventative health  5/05 colon per [facility] polyp no path report, f/u rec 5 yrs (12/12/12 [facility] note)  4/09 stool occult blood negative  12/09 mammogram  10/1/11 pneumovax  10/2/12 zoster vaccine  6/11/13 declines mammogram, tdap  6/13 dexa LS +1.8, forearm -2.7  3/4/14 vit d 26  10/12/15 prevnar    Right hip pain  3/11 MRI right hip DJD and tear of the anterior/superior acetabular labrum  4/11  ortho note not believe hip is primary problem, referred to  or reji    Sensorineural hearing loss  12/7/15  audiology evaluation mild sensorineural hearing loss    Sleep apnea  8/11 [facility] note sleep study apnea-hypopnea index 86, low sat 74%, start CPAP  8-18 cm    2013 off cpap not comfortable  3/14/16  (normal), quantiferon gold positive, allergen panel negative, IgE 357 (less than 214), IgA 116 (),, IgM 27 (),IgG 947 ([id number])  4/13/16 cxr mild cardiomegaly  5/4/16 [facility] note continue oxygen 3 L at night  11/8/16 pulmonary note continue oxygen 3 L at night    tb latent  3/14/16 positive quantiferon gold test  3/14/16  (normal), quantiferon gold positive, allergen panel negative, IgE 357 (less than 214), IgA 116 (),, IgM 27 (),IgG 947 ([phone])  4/13/16 cxr mild cardiomegaly  5/4/16 [facility] note continue advair 115/21 bid with spacer given doxycycline and prednisone, continue oxygen 3 L at night, AFB samples ×3, follow-up 3 months  7/25/16 pulmonary note on prednisone taper one week out of the month and doxycycline one per day for one week per month, on advair bid and spiriva and oxygen 3 liters at night  7/27/17 PPD negative    Tension headache  6/11/13 on fioricet bid  2/28/14 taper fioricet to once a day  4/25/14 now on fioricet prn            ROS       Objective:          Physical Exam   Constitutional: She appears well-developed and well-nourished. No distress.   HENT:   Head: Normocephalic and atraumatic.   Eyes: Conjunctivae are normal.  Right eye exhibits no discharge. Left eye exhibits no discharge.   Neck: No JVD present. No thyromegaly present.   Cardiovascular: Normal rate and regular rhythm.    Murmur heard.  Pulmonary/Chest: Effort normal and breath sounds normal. No respiratory distress. She has no wheezes.   Abdominal: Soft. She exhibits no distension.   Umbilical hernia reducible, nontender   Musculoskeletal: She exhibits no edema.   Neurological: She is alert.   Skin: Skin is warm. She is not diaphoretic.   Psychiatric: She has a normal mood and affect. Her behavior is normal.   Nursing note and vitals reviewed.              Assessment/Plan:       Assessment  #1 chronic low back pain, on oxycodone 10 mg twice daily, followed by pain management , has had injections, going to physical therapy, no falls, pain has been stable , did not start Cymbalta    #2 chronic opiate therapy, therapeutic in nature oxycodone 10 mg twice daily some memory loss, denies depression, does ADLs without assist, no falls, no drowsiness, no confusion, no respiratory suppression, no alcohol    #3 mild cognitive impairment has declined Aricept    #4 paroxysmal atrial fibrillation sinus rhythm followed by cardiology no chest pain, palpitations    #5 hearing loss pending hearing aids          Plan  #!  reviewed and consistent    #2 UDT today    #3 continue follow-up pain management    #4 continue physical therapy for back pain, recommend stretching exercises at home, fall precautions discussed    #5 start Cymbalta may help with back pain may cause nausea, lightheadedness    #6 long-term risk of narcotics discussed habituation, dependence, memory loss, drowsiness, sedation, confusion, depression, falls, continue no alcohol    #7 follow-up 3 months    #8 refill oxycodone 10 mg twice a day 3 months total    #9 notify us if worsening symptoms back pain, memory loss, declines Aricept, recommend senior     #9 follow-up cardiology

## 2017-08-07 NOTE — MR AVS SNAPSHOT
"        Venus Church   2017 10:00 AM   Office Visit   MRN: [id number]    Department:  [facility]   Dept Phone:  [phone]    Description:  Female : 1931   Provider:  Denis Krueger M.D.           Reason for Visit     Follow-Up           Allergies as of 2017     Allergen Noted Reactions    Codeine 10/20/2007   Vomiting      You were diagnosed with     Chronically on opiate therapy   [8576436]       Pain   [454857]       Chronic use of opiate drugs therapeutic purposes   [4372283]       Opiate dependence, continuous (CMS-[facility])   [881492]         Vital Signs     Blood Pressure Pulse Temperature Respirations Height Weight    140/72 mmHg 66 36.9 °C (98.5 °F) 16 1.549 m (5' 1\") 53.706 kg (118 lb 6.4 oz)    Body Mass Index Oxygen Saturation Smoking Status             22.38 kg/m2 95% Former Smoker         Basic Information     Date Of Birth Sex Race Ethnicity Preferred Language    1931 Female White Non- English      Your appointments     Oct 04, 2017  9:00 AM   Established Patient Pul with FANTASMA Palomo   [facility]monary Medicine (--)    [address] [facility] [address]  [facility] [id number] [phone]              Problem List              ICD-10-CM Priority Class Noted - Resolved    Hypertension (Chronic) I10   2010 - Present    Dyslipidemia (Chronic) E78.5   2010 - Present    Preventative health care (Chronic) Z00.00   2010 - Present    Low back pain (Chronic) M54.5   2010 - Present    Cervical pain (Chronic) M54.2   2010 - Present    Osteoporosis, idiopathic (Chronic) M81.8   2010 - Present    History of compression fracture of spine Z87.81   2010 - Present    Paroxysmal atrial fibrillation (HCC) (Chronic) I48.0   6/10/2010 - Present    Right hip pain M25.551   3/28/2011 - Present    COPD (chronic obstructive pulmonary disease) (CMS-HCC) (Chronic) J44.9   2011 - Present    Sleep apnea (Chronic) G47.30   8/10/2011 - Present   " History of Helicobacter pylori infection Z86.19   11/1/2011 - Present    History of shingles Z86.19   6/7/2012 - Present    Tension headache (Chronic) G44.209   6/11/2013 - Present    Macular degeneration H35.30   7/8/2013 - Present    MEDICAL HOME    Unknown - Present    Chronic use of opiate for therapeutic purpose (Chronic) Z79.891   4/23/2015 - Present    Sensorineural hearing loss H90.5   12/16/2015 - Present    Mild cognitive impairment (Chronic) G31.84   1/11/2016 - Present    TB lung, 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   5/4/2016 - Present      Health Maintenance        Date Due Completion Dates    IMM DTaP/Tdap/Td Vaccine (1 - Tdap) 4/22/1950 ---    IMM INFLUENZA (1) 9/1/2017 10/31/2016, 10/12/2015, 11/8/2012    BONE DENSITY 6/21/2018 6/21/2013, 7/26/2010, 3/6/2008            Current Immunizations     13-VALENT PCV PREVNAR 10/12/2015  1:41 PM    Influenza TIV (IM) 11/8/2012    Influenza Vaccine Adult HD 10/31/2016 11:12 AM, 10/12/2015  1:40 PM    Pneumococcal polysaccharide vaccine (PPSV-23) 11/1/2011    SHINGLES VACCINE 10/2/2012    Tuberculin Skin Test 7/14/2017  9:00 AM      Below and/or attached are the medications your provider expects you to take. Review all of your home medications and newly ordered medications with your provider and/or pharmacist. Follow medication instructions as directed by your provider and/or pharmacist. Please keep your medication list with you and share with your provider. Update the information when medications are discontinued, doses are changed, or new medications (including over-the-counter products) are added; and carry medication information at all times in the event of emergency situations     Allergies:  CODEINE - Vomiting               Medications  Valid as of: August 07, 2017 - 10:37 AM    Generic Name Brand Name Tablet Size Instructions for use    Albuterol Sulfate (Nebu Soln) PROVENTIL 2.5mg/3ml 2.5 mg by Nebulization route every four hours as needed for Shortness of Breath.         Albuterol Sulfate (Nebu Soln) PROVENTIL 2.5mg/3ml 3 mL by Nebulization route every four hours as needed for Shortness of Breath.        Calcium Carbonate-Vitamin D (Tab) OSCAL-250 250-125 MG-UNIT Take 1 Tab by mouth every day.        Cholecalciferol   Take 1 Cap by mouth 2 Times a Day.        Cyanocobalamin   Take  by mouth.        Dabigatran Etexilate Mesylate (Cap) PRADAXA 75 MG Take 1 Cap by mouth 2 Times a Day.        Diclofenac Sodium (Gel) Diclofenac Sodium 1 % Apply 1 Application to skin as directed 2 times a day as needed (hand pain). Apply to affected area 2 gm bid hand joint        DULoxetine HCl (Cap DR Particles) CYMBALTA 30 MG Take 1 Cap by mouth every day.        Fluticasone-Salmeterol (Aerosol) ADVAIR -21 MCG/ACT INHALE TWO PUFFS BY MOUTH TWICE DAILY.  USE  WITH  SPACER  AND  RINSE  MOUTH  AFTER  EACH  USE        Irbesartan-Hydrochlorothiazide (Tab) AVALIDE 150-12.5 MG Take 0.5 Tabs by mouth every day.        Levalbuterol Tartrate (Aerosol) XOPENEX HFA 45 MCG/ACT Inhale 1-2 Puffs by mouth every four hours as needed for Shortness of Breath.        Lidocaine (Patch) LIDODERM 5 % Apply one patch to affected area on during the day for 12 hours, off at night        Metoprolol Tartrate (Tab) LOPRESSOR 25 MG Take 1 Tab by mouth 2 times a day.        Omega-3 Fatty Acids (Cap) Omega-3 300 MG Take  by mouth.        OxyCODONE HCl (Tablet Extended Release 12 hour Abuse-Deterrent) OXYCONTIN 10 MG Take 1 Tab by mouth every 12 hours. Ok to fill oxycodone 56-60 days after the oxycodone written on 8/7/17 is filled        Tiotropium Bromide Monohydrate (Cap) SPIRIVA 18 MCG Inhale 1 Cap by mouth every day.        .                 Medicines prescribed today were sent to:     [facility]  [place] [facility] [address]    [address] [facility] [place] [address]    Phone: [phone] Fax: [phone]    Open 24 Hours?: No      Medication refill instructions:       If your prescription bottle indicates  you have medication refills left, it is not necessary to call your provider’s office. Please contact your pharmacy and they will refill your medication.    If your prescription bottle indicates you do not have any refills left, you may request refills at any time through one of the following ways: The online [employer] system (except Urgent Care), by calling your provider’s office, or by asking your pharmacy to contact your provider’s office with a refill request. Medication refills are processed only during regular business hours and may not be available until the next business day. Your provider may request additional information or to have a follow-up visit with you prior to refilling your medication.   *Please Note: Medication refills are assigned a new Rx number when refilled electronically. Your pharmacy may indicate that no refills were authorized even though a new prescription for the same medication is available at the pharmacy. Please request the medicine by name with the pharmacy before contacting your provider for a refill.        Your To Do List     Future Labs/Procedures Complete By [facility] SCREEN  As directed 8/7/2018    Comments:    Current Meds (name, sig, last dose):   Current outpatient prescriptions:   •  tiotropium, 18 mcg, Inhalation, DAILY  •  irbesartan-hydrochlorothiazide, 1 Tab, Oral, DAILY  •  duloxetine, 30 mg, Oral, DAILY, not started yet  •  oxyCODONE CR, 10 mg, Oral, Q12HRS, 7/14/2017  •  ADVAIR HFA, INHALE TWO PUFFS BY MOUTH TWICE DAILY.  USE  WITH  SPACER  AND  RINSE  MOUTH  AFTER  EACH  USE, 7/14/2017  •  metoprolol, 25 mg, Oral, BID, 7/14/2017  •  dabigatran, 75 mg, Oral, BID, 7/14/2017  •  albuterol, 2.5 mg, Nebulization, Q4HRS PRN, 7/14/2017  •  irbesartan-hydrochlorothiazide, 0.5 Tab, Oral, DAILY, 7/14/2017  •  Omega-3, Take  by mouth., 7/14/2017  •  albuterol, 2.5 mg, Nebulization, Q4HRS PRN, 7/14/2017  •  levalbuterol, 1-2 Puff, Inhalation, Q4HRS  PRN, 7/14/2017  •  Cyanocobalamin (VITAMIN B-12 PO), Take  by mouth., 7/14/2017  •  lidocaine, Apply one patch to affected area on during the day for 12 hours, off at night, 7/14/2017  •  calcium-vitamin D, 1 Tab, Oral, DAILY, 7/14/2017  •  VITAMIN D, CHOLECALCIFEROL, PO, 1 Cap, Oral, BID, 7/14/2017            Other Notes About Your Plan     Daughter Yashira [phone];son Dileep   2/6/17 dexa ordered need reclast?  5/9/17 oxycontin refill  6/20/17 referral custom PT, acupuncture, consider aquatic therapy, complex case coordination referral  6/28/17 trial cymbalta 30 mg  7/14/17 MMSE 23/30 offered aricept                                    MyChart Access Code: Activation code not generated  Current MyChart Status: Active

## 2017-08-21 ENCOUNTER — PATIENT OUTREACH (OUTPATIENT)
Dept: HEALTH INFORMATION MANAGEMENT | Facility: OTHER | Age: 82
End: 2017-08-21

## 2017-08-21 ENCOUNTER — TELEPHONE (OUTPATIENT)
Dept: MEDICAL GROUP | Facility: MEDICAL CENTER | Age: 82
End: 2017-08-21

## 2017-08-21 DIAGNOSIS — Z79.891 CHRONIC USE OF OPIATE FOR THERAPEUTIC PURPOSE: Chronic | ICD-10-CM

## 2017-08-21 NOTE — PROGRESS NOTES
"LSW outreach to pt's daughter to follow-up with HCBW and status of home assessment.     Per pt's daughter, she had a scheduled appointment to meet with Iris from [facility] last week, but requested to reschedule appointment. Ivana reports Iris did not receive her message and showed up to the house. Pt declined services and told Iris she was fully Independent. Ivana is in the process of rescheduling with Iris to re-open the referral as pt is interested, she was just confused as Ivana was not there to assist in explaining what she is in need of through their agency.     LSW discussed them privately paying for services in the mean time. Ivana reports that she currently has a Senior  to come over for social interaction. She reports that she does not think that pt will be comfortable having someone in the home to assist with things as she is pretty resistant to new people. LSW discussed that if pt gets the HCBW, they will need to choose an agency that [facility] contracts with, but that the pt will need to feel comfortable with someone coming to the house. LSW discussed possibly easing pt into having someone come to the house, so that she can become more comfortable. Ivana agreeable to suggestion and reports that she thinks pt will be comfortable with someone coming to the house who is experienced and \"knows how to work with someone who is elderly.\"     She also reports having some paperwork almost completed for an adult  for pt to go to. LSW encouraged her to call this LSW with any questions/concerns with completing this paperwork. Ivana agreeable.    Inquired if she was able to complete the Energy Assistance Application LSW sent to her. Ivana reports having almost all of it compelted and she plans to complete it on Friday 8/25/17 and then mail. LSW encouraged her to call with any questions/concerns with application.     LSW discussed following up with them in around a month regarding status of the HCBW. Ivana agreeable and " states she will call this LSW with any questions/concerns.     Plan:  Ivana to follow-up with [facility] worker Iris re: HCBW assessment  Ivana to complete Energy Assistance Application and mail   Ivana to reach out to LSW with any questions/concerns

## 2017-08-21 NOTE — TELEPHONE ENCOUNTER
1. Caller Name: Yashira                      Call Back Number: [phone]    2. Message: Yashira (dtr)  is wondering if she can change the dosing on Venus's Cymbalta to EVERY OTHER DAY.  Says Venus is calmer and sleeping later. Thinks may be its too much. Please advise.     3. Patient approves office to leave a detailed voicemail/MyChart message: no

## 2017-08-21 NOTE — PROGRESS NOTES
LSW outreach to pt's daughter Ivana to follow-up with HCBW and other resources provided to family by this LSW.    Ivana requested LSW call her back around 3:00pm today 8/21/2017 to discuss this further. LSW agreeable. ]    Plan:  Will attempt to reach pt's daughter around 3:00pm today 8/21/2017

## 2017-09-18 ENCOUNTER — TELEPHONE (OUTPATIENT)
Dept: MEDICAL GROUP | Facility: MEDICAL CENTER | Age: 82
End: 2017-09-18

## 2017-09-18 DIAGNOSIS — M54.5 LOW BACK PAIN, UNSPECIFIED BACK PAIN LATERALITY, UNSPECIFIED CHRONICITY, WITH SCIATICA PRESENCE UNSPECIFIED: ICD-10-CM

## 2017-09-18 NOTE — TELEPHONE ENCOUNTER
Please notify patient's daughter or son, continue off Cymbalta, over the repeat MRI of her low back, she should follow-up with her pain specialist Dr. Urrutia

## 2017-09-18 NOTE — TELEPHONE ENCOUNTER
1. Caller Name: Venus Church                        Call Back Number: [phone] (home)       2. Message: Ivana called to advise that Dileep (son) has D/C'd Aylin Lawson as he feels that she is groggy and more confused when she takes it.    Also requestion an order for an MRI of her spine as they are still trying to figure out why she is so uncomfortable and they still dont know what the problem is.     3. Patient approves office to leave a detailed voicemail/MyChart message: yes

## 2017-09-20 ENCOUNTER — TELEPHONE (OUTPATIENT)
Dept: MEDICAL GROUP | Facility: MEDICAL CENTER | Age: 82
End: 2017-09-20

## 2017-09-20 DIAGNOSIS — M54.9 BACK PAIN, UNSPECIFIED BACK LOCATION, UNSPECIFIED BACK PAIN LATERALITY, UNSPECIFIED CHRONICITY: ICD-10-CM

## 2017-09-21 NOTE — TELEPHONE ENCOUNTER
Patient's daughter dominga called and left a message, [phone],  requesting referral back to [facility].    Please notify her that the referral has been placed

## 2017-09-21 NOTE — TELEPHONE ENCOUNTER
Spoke with Yashira, notified of this information. Yashira was also asking about the MRI she was requesting, notified Dileep was notified pt needs to follow up with Pain specialist in regards to this. Yashira understanding and will talk to Dileep regarding this.

## 2017-10-04 ENCOUNTER — OFFICE VISIT (OUTPATIENT)
Dept: PULMONOLOGY | Facility: HOSPICE | Age: 82
End: 2017-10-04
Payer: MEDICARE

## 2017-10-04 VITALS
OXYGEN SATURATION: 97 % | SYSTOLIC BLOOD PRESSURE: 118 MMHG | TEMPERATURE: 97.5 F | HEIGHT: 61 IN | HEART RATE: 59 BPM | BODY MASS INDEX: 22.16 KG/M2 | WEIGHT: 117.4 LBS | DIASTOLIC BLOOD PRESSURE: 72 MMHG | RESPIRATION RATE: 16 BRPM

## 2017-10-04 DIAGNOSIS — J42 CHRONIC BRONCHITIS, UNSPECIFIED CHRONIC BRONCHITIS TYPE (HCC): Chronic | ICD-10-CM

## 2017-10-04 PROCEDURE — 90662 IIV NO PRSV INCREASED AG IM: CPT | Performed by: NURSE PRACTITIONER

## 2017-10-04 PROCEDURE — G0008 ADMIN INFLUENZA VIRUS VAC: HCPCS | Performed by: NURSE PRACTITIONER

## 2017-10-04 PROCEDURE — 99213 OFFICE O/P EST LOW 20 MIN: CPT | Mod: 25 | Performed by: NURSE PRACTITIONER

## 2017-10-04 RX ORDER — LEVALBUTEROL TARTRATE 45 UG/1
1-2 AEROSOL, METERED ORAL EVERY 4 HOURS PRN
Qty: 1 INHALER | Refills: 5 | Status: SHIPPED | OUTPATIENT
Start: 2017-10-04 | End: 2017-12-09 | Stop reason: SDUPTHER

## 2017-10-04 NOTE — PATIENT INSTRUCTIONS
1. Continue Advair 115/21 µg 2 inhalations twice daily, Spiriva 18 µg daily, Xopenex HFA as needed, and albuterol nebulized treatments as needed.  2. Continue oxygen at 3 L/m nocturnally.  3. Influenza vaccine today.  4. Up-to-date on pneumococcal 23 and Prevnar 13.  5. 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 Will order sputum sample for further evaluation.

## 2017-10-06 ENCOUNTER — TELEPHONE (OUTPATIENT)
Dept: PULMONOLOGY | Facility: HOSPICE | Age: 82
End: 2017-10-06

## 2017-10-06 NOTE — TELEPHONE ENCOUNTER
MEDICATION PRIOR AUTHORIZATION NEEDED:    1. Name of Medication: Xopenex HFA 45 mcg    2. Requested By (Name of Pharmacy): [facility]     3. Is insurance on file current? yes    4. What is the name & phone number of the 3rd party payor? [facility] [phone]

## 2017-10-09 ENCOUNTER — PATIENT OUTREACH (OUTPATIENT)
Dept: HEALTH INFORMATION MANAGEMENT | Facility: OTHER | Age: 82
End: 2017-10-09

## 2017-10-09 ENCOUNTER — TELEPHONE (OUTPATIENT)
Dept: PULMONOLOGY | Facility: HOSPICE | Age: 82
End: 2017-10-09

## 2017-10-09 NOTE — TELEPHONE ENCOUNTER
Called [facility] and left a  mssg with the information that they were requesting.  They wanted to know if she could use the generic for Xopenex HFA, Levalbuterol HFA.  I stated that she can use the generic.

## 2017-10-09 NOTE — PROGRESS NOTES
One month follow-up: Outbound call to pt's daughter Yashira to follow-up with resources provided to her. Yashira answered, but requested TC back later in the afternoon as she was currently working. LSW agreeable to plan.       Plan:   LSW to attempt to reach pt's daughter at a later time/date and in the afternoon.

## 2017-10-10 NOTE — TELEPHONE ENCOUNTER
DOCUMENTATION OF PRIOR AUTH STATUS    1. Medication name and dose: Xopenex HFA 45 MCG    2. Name and Phone # of Prescription coverage company: [facility] [phone]    3. Date Prior Auth was submitted: 10/6/2017    4. What information was given to obtain insurance decision: Clinical notes    5. Prior Auth letter Approved or Denied: Approved through 10/6/2018    6. Pharmacy notified: Yes    7. Patient notified: Yes

## 2017-10-13 ENCOUNTER — TELEPHONE (OUTPATIENT)
Dept: MEDICAL GROUP | Facility: MEDICAL CENTER | Age: 82
End: 2017-10-13

## 2017-10-14 NOTE — TELEPHONE ENCOUNTER
Spoke to daughter, do not believe patient needs MRI thoracic spine, she possibly could benefit from MRI cervical spine, advised her to check with pain management about that.  She agrees

## 2017-10-14 NOTE — TELEPHONE ENCOUNTER
----- Message from Dawna Payton sent at 10/13/2017  4:15 PM PDT -----  Regarding: Additional exams requested by family  Laith, Venus's daughter has reached out to schedule the ordered MRI Lumbar Spine. However, she did advise they are needing a full spine MRI. I am reaching out on her behalf and if this is fitting with her treatment plan can you please place these additional orders? If not please have someone from your office reach out to the patient and explain why these orders are not needed. Thank you    Imaging Scheduling

## 2017-10-16 ENCOUNTER — TELEPHONE (OUTPATIENT)
Dept: MEDICAL GROUP | Facility: MEDICAL CENTER | Age: 82
End: 2017-10-16

## 2017-10-16 NOTE — TELEPHONE ENCOUNTER
1. Caller Name: Ivana (dtr)                       Call Back Number: [phone] (home) [phone] (work)      2. Message: Ivana called to say that they have a broken piece on her nebulizer. [facility] will send another one but you need to send Rx for Nebulizer Supply Kit to fax: [phone] Attn: [facility]    3. Patient approves office to leave a detailed voicemail/MyChart message: N\A

## 2017-11-03 ENCOUNTER — OFFICE VISIT (OUTPATIENT)
Dept: MEDICAL GROUP | Facility: MEDICAL CENTER | Age: 82
End: 2017-11-03
Payer: MEDICARE

## 2017-11-03 VITALS
HEIGHT: 61 IN | WEIGHT: 119 LBS | OXYGEN SATURATION: 92 % | DIASTOLIC BLOOD PRESSURE: 68 MMHG | BODY MASS INDEX: 22.47 KG/M2 | HEART RATE: 70 BPM | TEMPERATURE: 97.6 F | SYSTOLIC BLOOD PRESSURE: 130 MMHG

## 2017-11-03 DIAGNOSIS — M54.5 LOW BACK PAIN, UNSPECIFIED BACK PAIN LATERALITY, UNSPECIFIED CHRONICITY, WITH SCIATICA PRESENCE UNSPECIFIED: Chronic | ICD-10-CM

## 2017-11-03 DIAGNOSIS — F11.20 OPIATE DEPENDENCE, CONTINUOUS (HCC): ICD-10-CM

## 2017-11-03 DIAGNOSIS — Z79.891 CHRONIC USE OF OPIATE DRUGS THERAPEUTIC PURPOSES: ICD-10-CM

## 2017-11-03 DIAGNOSIS — R52 PAIN: ICD-10-CM

## 2017-11-03 DIAGNOSIS — I48.0 PAROXYSMAL ATRIAL FIBRILLATION (HCC): Chronic | ICD-10-CM

## 2017-11-03 DIAGNOSIS — Z79.891 CHRONIC USE OF OPIATE FOR THERAPEUTIC PURPOSE: Chronic | ICD-10-CM

## 2017-11-03 PROCEDURE — 99214 OFFICE O/P EST MOD 30 MIN: CPT | Performed by: INTERNAL MEDICINE

## 2017-11-03 RX ORDER — OXYCODONE HCL 10 MG/1
10 TABLET, FILM COATED, EXTENDED RELEASE ORAL EVERY 12 HOURS
Qty: 60 TAB | Refills: 0 | Status: SHIPPED | OUTPATIENT
Start: 2017-11-03 | End: 2017-11-03 | Stop reason: SDUPTHER

## 2017-11-03 RX ORDER — OXYCODONE HCL 10 MG/1
10 TABLET, FILM COATED, EXTENDED RELEASE ORAL EVERY 12 HOURS
Qty: 60 TAB | Refills: 0 | Status: SHIPPED | OUTPATIENT
Start: 2017-11-03 | End: 2018-02-06 | Stop reason: SDUPTHER

## 2017-11-03 NOTE — PROGRESS NOTES
Subjective:      Venus Church is a 86 y.o. female who presents with back pain         HPI   Arrived 908  Here for follow up back pain [facility] and sees  pain management most recent procedure in March L3-L5 left MBBB, chronic low back pain, multiple epidurals, status post T12 vertebroplasty 7 years ago, has previously seen , [facility], neurosurgery  nonsurgical candidate, has had nerve block, uses walker for ambulation when outdoors, on oxycodone 10 mg bid with some forgetfulness at times, no worsening depression or mood changes, no recent falls, no respiratory suppression, no constipation, no alcohol, lives independently but son sees her daily, does all ADLs without assist, remains on OxyContin 10 mg twice daily.  Pain is stable although still has moderate pain at times worse with position change, bending, twisting, lifting.  No radiation down her leg.  No incontinence of bowel or bladder.  No sensory changes.  COPD stable on Advair and spiriva PULMONARY  Paroxysmal atrial fibrillation followed by cardiology, on Lopressor and pradaxa, no chest pain, palpitations or lightheadedness  No tobacco, no alcohol            Current Outpatient Prescriptions   Medication Sig Dispense Refill   • levalbuterol (XOPENEX HFA) 45 MCG/ACT inhaler Inhale 1-2 Puffs by mouth every four hours as needed for Shortness of Breath. 1 Inhaler 5   • oxyCODONE CR (OXYCONTIN) 10 MG Tablet Extended Release 12 hour Abuse-Deterrent Take 1 Tab by mouth every 12 hours. Ok to fill oxycodone 56-60 days after the oxycodone written on 8/7/17 is filled 60 Tab 0   • Diclofenac Sodium 1 % Gel Apply 1 Application to skin as directed 2 times a day as needed (hand pain). Apply to affected area 2 gm bid hand joint 100 g 3   • tiotropium (SPIRIVA HANDIHALER) 18 MCG Cap Inhale 1 Cap by mouth every day. 30 Cap 11   • duloxetine (CYMBALTA) 30 MG Cap DR Particles Take 1 Cap by mouth every  day. 30 Cap 5   • ADVAIR -21 MCG/ACT inhaler INHALE TWO PUFFS BY MOUTH TWICE DAILY.  USE  WITH  SPACER  AND  RINSE  MOUTH  AFTER  EACH  USE 1 Inhaler 5   • metoprolol (LOPRESSOR) 25 MG Tab Take 1 Tab by mouth 2 times a day. 180 Tab 3   • dabigatran (PRADAXA) 75 MG Cap capsule Take 1 Cap by mouth 2 Times a Day. 60 Cap 11   • albuterol (PROVENTIL) 2.5mg/3ml Nebu Soln solution for nebulization 3 mL by Nebulization route every four hours as needed for Shortness of Breath. 150 mL 11   • irbesartan-hydrochlorothiazide (AVALIDE) 150-12.5 MG per tablet Take 0.5 Tabs by mouth every day. 90 Tab 3   • Omega-3 300 MG Cap Take  by mouth.     • albuterol (PROVENTIL) 2.5mg/3ml Nebu Soln solution for nebulization 2.5 mg by Nebulization route every four hours as needed for Shortness of Breath.     • Cyanocobalamin (VITAMIN B-12 PO) Take  by mouth.     • lidocaine (LIDODERM) 5 % Patch Apply one patch to affected area on during the day for 12 hours, off at night 30 Patch 6   • calcium-vitamin D (OSCAL-250) 250-125 MG-UNIT TABS Take 1 Tab by mouth every day.     • VITAMIN D, CHOLECALCIFEROL, PO Take 1 Cap by mouth 2 Times a Day.       No current facility-administered medications for this visit.      Patient Active Problem List    Diagnosis Date Noted   • TB lung, latent 05/04/2016   • Mild cognitive impairment 01/11/2016   • Sensorineural hearing loss 12/16/2015   • Chronic use of opiate for therapeutic purpose 04/23/2015   • MEDICAL HOME    • Macular degeneration 07/08/2013   • Tension headache 06/11/2013   • History of shingles 06/07/2012   • History of Helicobacter pylori infection 11/01/2011   • Sleep apnea 08/10/2011   • COPD (chronic obstructive pulmonary disease) (CMS-HCC) 07/20/2011   • Right hip pain 03/28/2011   • Paroxysmal atrial fibrillation (HCC) 06/10/2010   • History of compression fracture of spine 05/26/2010   • Low back pain 05/22/2010   • Cervical pain 05/22/2010   • Osteoporosis, idiopathic 05/22/2010   •  Hypertension 05/20/2010   • Dyslipidemia 05/20/2010   • Preventative health care 05/20/2010       Cervical pain  4/08 MRI cervical spine C3-C4 moderate left-sided foraminal stenosis, C4-C5 moderate foraminal stenosis right, C5-C6 moderate right-sided foraminal stenosis, C6-C7 moderate bilateral foraminal stenosis  5/08 CT cervical spine C3-C4 uncovertebral joint arthropathy and significant left-sided neural foraminal narrowing, C4-C5 uncovertebral joint arthropathy right significant neuroforaminal narrowing, C5-C6 uncovertebral joint arthropathy right moderate neural foraminal narrowing, C6-C7 uncovertebral joint arthropathy moderate to severe right neural foraminal narrowing  7/08 C3-C5 cervical facet injections   1/09 medial branch nerve block diagnostic   2/09 C4-C5 cervical epidural under fluoroscopy   4/13 Daughter called [facility] suggest taper pain med, she has sched to taper the oxycontin  4/15/13 resumed oxycontin 10 bid  6/11/13 off oxcontin  7/24/13 increase oxycontin from qday to 10 mg bid   9/14/17 [facility] discharge summary patient did not improve walking tolerance or standing tolerance, medicare g-code status 60-79% impairment, thoracolumbar flexion decreased from 50-25%, extension 10%, sidebending 10-25%     Chronic opiate  2/26/15 narcotic contract  6/12/15 opiate risk score 0  10/4/15   7/25/16   9/20/16  pain note recommended left L3-L5 MBBB  10/31/16   2/6/17   5/9/17 narcotic contract  5/9/17 opiate risk score 0  5/9/17   5/9/17 depression screening score 0  6/28/17 trial cymbalta 30 mg  8/7/17 did not start cymbalta, will start  8/7/17   8/7/17 UDT  8/21/17 change cymbalta to qod  9/18/17 off cymbalta  9/14/17 [facility] discharge summary patient did not improve walking tolerance or standing tolerance, medicare g-code status 60-79% impairment, thoracolumbar flexion decreased from 50-25%, extension 10%, sidebending 10-25%     COPD  7/11 CT  spiral thorax extensive emphysematous change of lung, small left pleural effusion left lung base with atelectasis unchanged from prior CT  5/10 CT spriral thorax emphysematous change and dependent atelectasis left lung base  7/11 PFT FEV/FVC 59%, FEV1 1.1 L (75% predicted), significant post bronchodilator response noted (FEV1 improved 16%). Mixed restrictive and obstructive pattern,COPD with GOLD stage II with sig bronchodilator response  7/11 trial of symbicort 80/4.5 mcg bid discussed with daughter plus albuterol neb prn, [facility] home education ordered  5/12 off symbicort   5/22/14 cxr negative  6/23/14 on symbicort  7/20/15 change to advair 250/50 mcg from symbicort (not covered on insurance)  9/3/15 cxr negative  10/4/15 add spiriva and change symbicort to advair 250/50 mcg bid  10/19/15 CT high resolution thorax, no evidence of interstitial lung disease, minimal scattered opacification could indicate minimal areas of fibrosis periphery of each lung, similar to prior exam, emphysema most prominent upper lobes bilateral  10/19/15 PFT FEV1 1.1 (61% predicted),FVC 1.9,FEV/FVC 58%, no bronchodilator response,DLCO 34%; on advair 250/50 mcg bid and spiriva  11/2/15 change from advair discus to advair 250 inhaler and continue spiriva   12/11/15 not taking advair, will start advair and cont spiriva  12/11/15 cxr negative  3/14/16 [facility] note complaining doxycycline and taper steroids, continue nocturnal oxygen, continue rotating antibiotics for bronchiectasis, follow-up laboratory testing ordered  3/14/16  (normal), quantiferon gold positive, allergen panel negative, IgE 357 (less than 214), IgA 116 (),, IgM 27 (),IgG 947 ([phone])  4/13/16 cxr mild cardiomegaly  5/4/16 [facility] note continue advair 115/21 bid with spacer given doxycycline and prednisone, continue oxygen 3 L at night, AFB samples ×3, follow-up 3 months  7/25/16 pulmonary note on prednisone taper one week out of the month and doxycycline one  per day for one week per month, on advair bid and spiriva and oxygen 3 liters at night  11/8/16 pulmonary note continue advair 115/21 ucg bid,spiriva, xopenex as needed, oxygen 3 L at night     Dyslipidemia   5/10 chol 163,trig 68,hdl 69,ldl 80  6/10 chol 163,trig 60,hdl 69,ldl 80  7/11 chol 118,trig 45,hdl 65,ldl 44 on crestor 10 mg  10/11 chol 165,trig 47,hdl 78,ldl 74 on crestor 10 mg done at [facility]  2/12 off crestor  6/12 chol 211,trig 104,hdl 64,ldl 126 off crestor  3/4/14 chol 222,trig 124,hdl 52,ldl 145 off meds     History of compression fracture  5/10 MRI thoracic spine subacute T12 compression fracture  5/27/10 kyphoplasty T11  6/10 MRI lumbar spine T12 compression fracture mild to moderate central canal narrowing, interval T11 kyphoplasty, L2-L3 moderate foraminal stenosis, L3-L4 severe left foraminal stenosis, moderate right foraminal stenosis, L4-L5 moderate central canal stenosis  7/10 dexa LS +1.0,hip -1.6  7/28/10 T12 vertebral plasty  8/11 [facility] evaluation  pain management, recommend continue oxycontin 10 bid  10/11 [facility]  T11-L1 injection  2/13  pain note, T10-T11 epidural injection  6/13 dexa LS +1.8, forearm -2.7  9/23/13 reclast injection  9/4/14 reclast IV infusion  9/14/17 [facility] discharge summary patient did not improve walking tolerance or standing tolerance, medicare g-code status 60-79% impairment, thoracolumbar flexion decreased from 50-25%, extension 10%, sidebending 10-25%     History H. pylori  9/11 serum IgG positive s/p prevpack  9/19/12 cbc,cmp,lipase neg; u/s abd gallbladder sludge without biliary dilatation or stone  12/12/12 [facility] eval rec EGD/colon pt did not follow up      History shingles     Hypertension  1/05 carotid u/s negative  1/07 echo LV EF 60% ,mild LVH  1/09 renal ultrasound 6 mm right cortical cyst  9/09 MRI brain with and without moderate nonspecific microvascular ischemic change  9/09 MRA next mild plaque right  internal carotid  5/24/10 echo normal LV size and function, EF 60%, mild to moderate AR, RVSP 35-40 consistent with mild pulmonary hypertension  5/10 persantine thallium no ischemia, EF 72%  9/10 change avalide 150/12.5 to avapro 150 qday, had sodium 125 in ER  3/11 on avapro 300 mg  7/8/11 echo normal LV function, EF 55%  7/8/11 Persantine thallium possible small area mild ischemia in the lateral wall, no evidence of infarction  7/11   3/12 increase metoprolol to 50 bid, cont avapro 300 mg, start norvasc 2.5 mg  5/1/12 increase norvasc to 5 mg, change avapro to avalide 300/12.5 mg, cont metoprolol 50 bid  10/2/12 on avalide 300/12.5 mg and metoprolol 50 bid and norvasc 5 mg  10/12 Persantine thallium diaphragmatic uptake possible attenuation, fixed inferolateral defect which may be secondary to attenuation, EF 76%, no wall motion abnormalities, no evidence of significant ischemia.  1/13 echo normal LV function, grade 2 diastolic dysfunction, EF 70% moderate aortic insufficiency  1/13   1/23/13 cxr cardiomegaly without pulmonary edema  6/11/13 on avalide 300/12.5 mg,norvasc 5 mg, metoprolol 50 bid  4/7/14 cardiology note atrial fibrillation, start pradaxa 75 mg bid, stop asa, stop norvasc, decreased avalide 300/12.5 mg to 1/2 per day, increase metoprolol to 50 mg 1 1/2 bid  10/20/14 metoprolol 50 mg decreased to 25 mg bid by [facility] doctor due to low heart rate, continues on avalide 300/12.5 mg   12/29/14  cardiology note continue pradaxa, metoprolol 25 mg 1/2 bid, avalide 300/12.5 mg  7/25/16 avalide 150/12.5 mg decrease to 1/2 tab per day and continue metoprolol 25 mg bid  8/24/16 cardiology note sinus rhythm on exam,[id number] score=2 continue anticoagulation, low-dose metoprolol,avalide 150/12.5 mg 1/2 per day, follow-up one year     Low back pain  6/06 epidural L4-L5   12/08 x-ray LS moderate to severe DJD  6/10 MRI lumbar spine T12 compression fracture with mild-to-moderate  central spinal canal narrowing, interval T11 kyphoplasty, L2-L3 moderate frontal stenosis, L3-L4 severe left foraminal stenosis, moderate right foraminal stenosis, L4-L5 moderate central spinal canal  7/10 interventional rad consult epidural T11 to L1 region  7/28/10 T12 vertebroplasty  7/30/10 norco taper, and lyrica 50 mg bid  8/5/10 reaction to lyrica, stop lyrica  8/19/10 lumbar steroid epidural   9/10  note rec decompression if pain persist  10/10 bilateral lumbar facet joint injections L3-S1   12/10 xray LS old T11 and T12 fracture status post kyphoplasty  1/11 [facility]  3/11 trial of spinal stimulator  8/11 [facility] note evaluation  pain management cont oxycontin 10 bid  11/22/11 [facility] note dr. hodge; trial of baclofen, T11 plus T12 left-sided nerve root block pending  7/12  pain note;Left-sided T11-T12 transforaminal epidural   4/13 daughter called [facility] suggest taper pain med, she has sched to taper the oxycontin  4/15/13 resumed oxycontin 10 bid  6/11/13 off oxycontin  7/24/13 increase oxycontin from qday to 10 mg bid   2/28/14 on celebrex 200 mg as needed per  and oxycontin 10 mg bid  4/25/14 trial of cymbalta 30 mg, continue oxycontin 10 mg twice a day, recommend not use tramadol (side effects since on oxycontin already) or celebrex (on pradaxa)  5/29/14  pain management [facility]; recommend T11-T12 left-sided transforaminal nerve block  7/2/14 [facility] left T11-T12 epidural injection  7/21/14  [facility] note; increase oxycontin to 10 mg tid x 3 weeks and then taper down  10/27/14 [facility]; samples zorvolex  2/26/15 xray lumbar spine mild compression fracture of L4 of indeterminate age but new compared to 3/25/13 vertebral augmentation and T11 and T12 with severe compression fracture of T12 and focal kyphosis at this level  4/15/16 outpatient physical therapy  phone call indicating patient unable to make appointments, recommending home health physical therapy  9/20/16  pain note recommended left L3-L5 MBBB  3/22/17  pain procedure note left L3-L5 MBBB #1  6/28/17 trial cymbalta 30 mg  6/29/17 [facility] note  8/3/17 [facility] note  9/18/17 off cymbalta  9/14/17 [facility] discharge summary patient did not improve walking tolerance or standing tolerance, medicare g-code status 60-79% impairment, thoracolumbar flexion decreased from 50-25%, extension 10%, sidebending 10-25%     Macular degeneration  7/1/13 dr.mills [facility]cnair note, start PreserVision AREDS II vitamin followup 6 months     Mild cognitive impairment  1/11/16 MMSE 24/30  7/14/17 MMSE 23/30 offered aricept     Osteoporosis  3/08 dexa LS +0.3,hip -1.4  5/10 MRI thoracic spine subacute T12 compression fracture  5/27/10 T12 kyphoplasty  6/10 on actonel  7/10 dexa LS +1.0,hip -1.6  8/10 vit d 56  3/11 reclast referral made  6/12 vit d 42 off actonel  6/21/13 dexa LS +1.8, forearm -2.7, I rec reclast, she would like to think it over  9/23/13 reclast injection  3/4/14 vit d 26  9/4/14 reclast IV infusion  2/26/15 xray rib series left; mild deformity of the lateral 10th rib on the left may be related to a fracture  2/26/15 xray lumbar spine mild compression fracture of L4 of indeterminate age but new compared to /25/13, vertebral augmentation and T11 and T12 with severe compression fracture of T12 and focal kyphosis at this level  12/7/15 reclast IV infusion     Paroxysmal atrial fibrillation  4/10 hospitalization on multaq, also on verapamil and metoprolol for rate control per cardiology  1/11 [facility] note cont multaq  7/11 EKG atrial fibrillation hospitalization  7/8/11 echo normal LV function, EF 55%  7/8/11 Persantine thallium possible small area mild ischemia in the lateral wall, no evidence of infarction  7/11 [facility] during hospitalization changed to amiodarone 200 mg plus pradaxa  restarted  7/11   9/11 [facility] note stop amiodarone, cont pradaxa and metoprolol 25 bid  3/12 cont pradaxa and increase metoprolol to 50 bid due to higher bp  5/12 pradaxa decreased from 150 bid to 75 bid per [facility] due to nosebleeds  6/12 EKG NSR  1/13 echo normal LV function, grade 2 diastolic dysfunction, EF 70% moderate aortic insufficiency  6/11/13 on pradaxa and metoprolol 50 bid for rate control, NSR today  9/13 cardiology note stop pradaxa and start asa 81 mg  4/7/14 cardiology note atrial fibrillation, start pradaxa 75 mg bid, stop asa, stop norvasc, decreased avalide 300/12.5 mg to 1/2 per day, increase metoprolol to 50 mg 1 1/2 bid  4/14/14 cardiology note, NSR on exam, continue pradaxa 75 mg bid, metoprolol 75 mg bid, avalide 300/12.5 mg 1/2 tab per day  10/20/14 metoprolol 50 mg decreased to 25 mg bid by [place] doctor due to low heart rate, continues on pradaxa and avalide 300/12.5 mg   12/29/14  cardiology note continue pradaxa, metoprolol 25 mg 1/2 bid, avalide 300/12.5 mg  6/12/15 NSR on exam  8/24/16 cardiology note sinus rhythm on exam,[id number] score=2 continue anticoagulation, low-dose metoprolol follow-up one year     Preventative health  5/05 colon per [facility] polyp no path report, f/u rec 5 yrs (12/12/12 [facility] note)  4/09 stool occult blood negative  12/09 mammogram  10/1/11 pneumovax  10/2/12 zoster vaccine  6/11/13 declines mammogram, tdap  6/13 dexa LS +1.8, forearm -2.7  3/4/14 vit d 26  10/12/15 prevnar     Right hip pain  3/11 MRI right hip DJD and tear of the anterior/superior acetabular labrum  4/11  ortho note not believe hip is primary problem, referred to  or [facility]     Sensorineural hearing loss  12/7/15  audiology evaluation mild sensorineural hearing loss     Sleep apnea  8/11 [facility] note sleep study apnea-hypopnea index 86, low sat 74%, start CPAP  8-18 cm    2013 off cpap not comfortable  3/14/16  (normal), quantiferon gold positive, allergen  panel negative, IgE 357 (less than 214), IgA 116 (),, IgM 27 (),IgG 947 ([phone])  4/13/16 cxr mild cardiomegaly  5/4/16 [facility] note continue oxygen 3 L at night  11/8/16 pulmonary note continue oxygen 3 L at night  10/4/17 pulmonary note continue oxygen 3 L at night     tb latent  3/14/16 positive quantiferon gold test  3/14/16  (normal), quantiferon gold positive, allergen panel negative, IgE 357 (less than 214), IgA 116 (),, IgM 27 (),IgG 947 ([phone])  4/13/16 cxr mild cardiomegaly  5/4/16 [facility] note continue advair 115/21 bid with spacer given doxycycline and prednisone, continue oxygen 3 L at night, AFB samples ×3, follow-up 3 months  7/25/16 pulmonary note on prednisone taper one week out of the month and doxycycline one per day for one week per month, on advair bid and spiriva and oxygen 3 liters at night  7/27/17 PPD negative  10/4/17 pulmonary note continue advair 115/21 two inhalations bid, spiriva daily, xoponex as needed, oxygen 3 L at night, history of positive quantiferon gold, will order sputum sample follow-up 4 months     Tension headache  6/11/13 on fioricet bid  2/28/14 taper fioricet to once a day  4/25/14 now on fioricet prn            ROS       Objective:         Physical Exam   HENT:   Head: Normocephalic and atraumatic.   Eyes: Conjunctivae are normal. Right eye exhibits no discharge. Left eye exhibits no discharge.   Neck: No thyromegaly present.   Cardiovascular: Normal rate and regular rhythm.    Pulmonary/Chest: Effort normal and breath sounds normal. No respiratory distress. She has no wheezes.   Abdominal: She exhibits no distension.   Neurological: She is alert.   Skin: Skin is warm.   Psychiatric: She has a normal mood and affect. Her behavior is normal.   Nursing note and vitals reviewed.    Tenderness to palpation paraspinal lumbar muscles thoracolumbar region, no spinal tenderness, pulses, limited by flexion-extension, no lower extremity edema, limited  hip flexion extension bilateral          Assessment/Plan:     assessment  #1 History of compression fracture 5/10 MRI thoracic spine subacute T12 compression fractureWith subsequent kyphoplasty performed on 5/27/10 kyphoplasty T11, since then chronic low back pain has seen pain management, neurosurgery, has had multiple injections, facet blocks, ablations, currently trying physical therapy again, walker for ambulation at times, no recent falls.  Currently going to physical therapy weekly    #2 chronic opiate therapy on oxycodone 10 mg twice a day that dose has not changed for quite some time, having been seen by pain management locally and at [place], occasional memory loss, no significant depression, no recent falls, no drowsiness or sedation, no constipation      #3 paroxysmal atrial fibrillation followed by cardiology, remains on metoprolol rate controlled, sinus on exam today, also on anticoagulation pradaxa    #4 COPD stable followed by pulmonary on Spiriva and Advair      Plan  #1  reviewed and consistent    #2 refill oxycodone 10 mg twice a day understands long-term risk of chronic opiate therapy, followed by pain management who agrees with plan of care, opiate usage can contribute to habituation, dependence, memory loss, confusion, falls, depression, respiratory suppression, constipation, son checks in on her daily and monitor his usage of medication, continues to go to physical therapy, refill provided 30 days at a time, ×3 months total    #3 continue no alcohol    #4 continue physical therapy    #5 continue walker for ambulation    #6 fall precautions    #7 repeat MRI lumbar spine ordered in September, son will take her    #8 follow-up pain management     #9 follow-up cardiology    #10 follow-up with myself 3 months     #11 continue to NSAIDs with anticoagulation

## 2017-11-17 ENCOUNTER — TELEPHONE (OUTPATIENT)
Dept: PULMONOLOGY | Facility: HOSPICE | Age: 82
End: 2017-11-17

## 2017-11-17 NOTE — TELEPHONE ENCOUNTER
PATIENTS DAUGHTER IS REQUESTING A COPY OF RX FOR O2 TO TAKE TO [facility] IN [place] FOR RENTAL WHILE SHE IS OUT OF TOWN    EMAILED TO DAUGHTER

## 2017-11-20 ENCOUNTER — OFFICE VISIT (OUTPATIENT)
Dept: MEDICAL GROUP | Facility: MEDICAL CENTER | Age: 82
End: 2017-11-20
Payer: MEDICARE

## 2017-11-20 ENCOUNTER — TELEPHONE (OUTPATIENT)
Dept: MEDICAL GROUP | Facility: MEDICAL CENTER | Age: 82
End: 2017-11-20

## 2017-11-20 VITALS
DIASTOLIC BLOOD PRESSURE: 70 MMHG | OXYGEN SATURATION: 94 % | SYSTOLIC BLOOD PRESSURE: 130 MMHG | WEIGHT: 116 LBS | TEMPERATURE: 96.8 F | HEIGHT: 61 IN | BODY MASS INDEX: 21.9 KG/M2 | HEART RATE: 66 BPM

## 2017-11-20 DIAGNOSIS — L60.0 INGROWING TOENAIL WITH INFECTION: ICD-10-CM

## 2017-11-20 DIAGNOSIS — B35.1 FUNGAL INFECTION OF TOENAIL: ICD-10-CM

## 2017-11-20 PROCEDURE — 99214 OFFICE O/P EST MOD 30 MIN: CPT | Performed by: NURSE PRACTITIONER

## 2017-11-20 RX ORDER — SULFAMETHOXAZOLE AND TRIMETHOPRIM 800; 160 MG/1; MG/1
1 TABLET ORAL 2 TIMES DAILY
Qty: 14 TAB | Refills: 0 | Status: SHIPPED | OUTPATIENT
Start: 2017-11-20 | End: 2018-01-12

## 2017-11-20 NOTE — PROGRESS NOTES
Subjective:     Venus Church is a 86 y.o. female who presents with   Chief Complaint   Patient presents with   • Nail Problem   .    HPI:     Seen in f/u for left great toenail infection.  Someone cut her toenails.  She got a pedi about 3 weeks ago.  They cut the toenail too short and accidently cut her toe.  Now it is red and painful.  Using neosporin.  Is changing to triple antibx oint.    She has fungal infection on all toenails.  All nails are thick and yellow  Left great toenail tender to touch, red and very mildly swollen.  Nail is thick and darkened.  No fever, chills and sweating.  Severe varicose veins sylvie feet and legs      Patient Active Problem List    Diagnosis Date Noted   • TB lung, latent 05/04/2016   • Mild cognitive impairment 01/11/2016   • Sensorineural hearing loss 12/16/2015   • Chronic use of opiate for therapeutic purpose 04/23/2015   • MEDICAL HOME    • Macular degeneration 07/08/2013   • Tension headache 06/11/2013   • History of shingles 06/07/2012   • History of Helicobacter pylori infection 11/01/2011   • Sleep apnea 08/10/2011   • COPD (chronic obstructive pulmonary disease) (CMS-HCC) 07/20/2011   • Right hip pain 03/28/2011   • Paroxysmal atrial fibrillation (HCC) 06/10/2010   • History of compression fracture of spine 05/26/2010   • Low back pain 05/22/2010   • Cervical pain 05/22/2010   • Osteoporosis, idiopathic 05/22/2010   • Hypertension 05/20/2010   • Dyslipidemia 05/20/2010   • Preventative health care 05/20/2010       Current medicines (including changes today)  Current Outpatient Prescriptions   Medication Sig Dispense Refill   • sulfamethoxazole-trimethoprim (BACTRIM DS) 800-160 MG tablet Take 1 Tab by mouth 2 times a day. 14 Tab 0   • oxyCODONE CR (OXYCONTIN) 10 MG Tablet Extended Release 12 hour Abuse-Deterrent Take 1 Tab by mouth every 12 hours. Ok to fill oxycodone 56-60 days after the oxycodone written on 11/3/17 is filled 60 Tab 0   • levalbuterol (XOPENEX HFA) 45  MCG/ACT inhaler Inhale 1-2 Puffs by mouth every four hours as needed for Shortness of Breath. 1 Inhaler 5   • Diclofenac Sodium 1 % Gel Apply 1 Application to skin as directed 2 times a day as needed (hand pain). Apply to affected area 2 gm bid hand joint 100 g 3   • tiotropium (SPIRIVA HANDIHALER) 18 MCG Cap Inhale 1 Cap by mouth every day. 30 Cap 11   • duloxetine (CYMBALTA) 30 MG Cap DR Particles Take 1 Cap by mouth every day. 30 Cap 5   • ADVAIR -21 MCG/ACT inhaler INHALE TWO PUFFS BY MOUTH TWICE DAILY.  USE  WITH  SPACER  AND  RINSE  MOUTH  AFTER  EACH  USE 1 Inhaler 5   • metoprolol (LOPRESSOR) 25 MG Tab Take 1 Tab by mouth 2 times a day. 180 Tab 3   • dabigatran (PRADAXA) 75 MG Cap capsule Take 1 Cap by mouth 2 Times a Day. 60 Cap 11   • albuterol (PROVENTIL) 2.5mg/3ml Nebu Soln solution for nebulization 3 mL by Nebulization route every four hours as needed for Shortness of Breath. 150 mL 11   • irbesartan-hydrochlorothiazide (AVALIDE) 150-12.5 MG per tablet Take 0.5 Tabs by mouth every day. 90 Tab 3   • Omega-3 300 MG Cap Take  by mouth.     • albuterol (PROVENTIL) 2.5mg/3ml Nebu Soln solution for nebulization 2.5 mg by Nebulization route every four hours as needed for Shortness of Breath.     • Cyanocobalamin (VITAMIN B-12 PO) Take  by mouth.     • lidocaine (LIDODERM) 5 % Patch Apply one patch to affected area on during the day for 12 hours, off at night 30 Patch 6   • calcium-vitamin D (OSCAL-250) 250-125 MG-UNIT TABS Take 1 Tab by mouth every day.     • VITAMIN D, CHOLECALCIFEROL, PO Take 1 Cap by mouth 2 Times a Day.       No current facility-administered medications for this visit.        Allergies   Allergen Reactions   • Codeine Vomiting       ROS  Constitutional: Negative. Negative for fever, chills, weight loss, malaise/fatigue and diaphoresis.   HENT: Negative. Negative for hearing loss, ear pain, nosebleeds, congestion, sore throat, neck pain, tinnitus and ear discharge.   Respiratory:  "Negative. Negative for cough, hemoptysis, sputum production, shortness of breath, wheezing and stridor.   Cardiovascular: Negative. Negative for chest pain, palpitations, orthopnea, claudication, leg swelling and PND.   Gastrointestinal: Denies nausea, vomiting, diarrhea, constipation, heartburn, melena or hematochezia.  Genitourinary: Denies dysuria, hematuria, urinary incontinence, frequency or urgency.        Objective:     Blood pressure 130/70, pulse 66, temperature 36 °C (96.8 °F), height 1.549 m (5' 1\"), weight 52.6 kg (116 lb), SpO2 94 %, not currently breastfeeding. Body mass index is 21.92 kg/m².    Physical Exam:  Vitals reviewed.  Constitutional: Oriented to person, place, and time. appears well-developed and well-nourished. No distress.   Cardiovascular: Normal rate, regular rhythm, normal heart sounds and intact distal pulses. Exam reveals no gallop and no friction rub. No murmur heard. No carotid bruits.   Pulmonary/Chest: Effort normal and breath sounds normal. No stridor. No respiratory distress. no wheezes or rales. exhibits no tenderness.   Musculoskeletal: Normal range of motion. exhibits no edema. sylvie pedal pulses 2+.  Lymphadenopathy: No cervical or supraclavicular adenopathy.   Neurological: Alert and oriented to person, place, and time. exhibits normal muscle tone.  Skin: Skin is warm and dry. No diaphoresis. Left great toenail thick and darkened in color.  Tender to palp with mild erythema.  No dg noted.  No increased warmth to touch.  sylvie feet purple.  Multiple left foot and ankle varicose veins with mod edema.  Rt lower leg with multiple varicosities.    Psychiatric: Normal mood and affect. Behavior is normal.      Assessment and Plan:     The following treatment plan was discussed:    1. Fungal infection of toenail  REFERRAL TO PODIATRY    refer podiatry for routine care   2. Ingrowing toenail with infection  sulfamethoxazole-trimethoprim (BACTRIM DS) 800-160 MG tablet    bactrim x7 days "         Followup: Return if symptoms worsen or fail to improve.

## 2017-11-21 NOTE — TELEPHONE ENCOUNTER
Pt daughter called requesting a letter stating pt had to cancel her flight due to a medical concern please(todays apt)

## 2017-11-29 ENCOUNTER — TELEPHONE (OUTPATIENT)
Dept: MEDICAL GROUP | Facility: MEDICAL CENTER | Age: 82
End: 2017-11-29

## 2017-11-29 ENCOUNTER — APPOINTMENT (OUTPATIENT)
Dept: RADIOLOGY | Facility: MEDICAL CENTER | Age: 82
End: 2017-11-29
Attending: INTERNAL MEDICINE
Payer: MEDICARE

## 2017-11-29 DIAGNOSIS — M54.5 LOW BACK PAIN, UNSPECIFIED BACK PAIN LATERALITY, UNSPECIFIED CHRONICITY, WITH SCIATICA PRESENCE UNSPECIFIED: ICD-10-CM

## 2017-11-29 PROCEDURE — 72148 MRI LUMBAR SPINE W/O DYE: CPT

## 2017-11-29 NOTE — TELEPHONE ENCOUNTER
Notified patient son with MRI results, he will discuss with patient  Also left a message for patient's daughter regarding MRI results

## 2017-12-04 ENCOUNTER — PATIENT OUTREACH (OUTPATIENT)
Dept: HEALTH INFORMATION MANAGEMENT | Facility: OTHER | Age: 82
End: 2017-12-04

## 2017-12-04 NOTE — LETTER
December 4, 2017        Ivana Asp  [address] [place] [address]        Dear Ivana:    As per our conversation, attached is list of homemaker/in-home care services that hopefully Venus will be open to utilizing for additional assistance in the home.    If you have any questions or concerns, please don't hesitate to call.        Sincerely,        FELIZ Duvall, MSW  Care Coordinator [facility]  [phone]  [email or web address]    Electronically Signed

## 2017-12-05 NOTE — PROGRESS NOTES
Outbound call to pt's daughter Yashira to follow-up with HCBW through [employer] and [employer].    Yashira reports that she was working with Iris from [employer] for the HCBW. Yashira reports that [employer] has determined that pt does not meet the level of care (LOC) for the HCBW. She also reports that at this time pt is refusing services from anyone. Yashira reports that she is concerned pt is neglecting her needs such as: eating and bathing. Inquired if she is agreeable to having any agencies come out to the home to assist with her ADL/IADL's. She reports that her mother is very apprehensive. Her brother checks on her daily, but when he is not there she is concerned that she is not taking care of herself.     LSW encouraged Yashira and her brother to have a conversation with patient regarding her benefiting from more assistance in the home through one of these agencies. LSW also provided Yashira with information on [employer] ([employer]) and their phone number. Encouraged her to call [employer] to make a report of self-neglect if she believes that pt is self-neglecting herself. Yashira verbalized understanding and thankful for information.     Inquired if she needed assistance with the [employer] application. She reports that she does not, but that she has been so busy that she has not turned in everything yet. Encouraged Yashira to call this LSW should she have any questions/concerns.     Plan:  LSW to follow-up with pt/pt's daughter Yashira in approx one month to follow-up with pt being receptive to services in the home and any further identified goals.

## 2017-12-11 RX ORDER — LEVALBUTEROL TARTRATE 45 UG/1
2 AEROSOL, METERED ORAL EVERY 6 HOURS PRN
Qty: 3 INHALER | Refills: 0 | Status: SHIPPED | OUTPATIENT
Start: 2017-12-11 | End: 2018-08-31 | Stop reason: SDUPTHER

## 2017-12-11 NOTE — TELEPHONE ENCOUNTER
Have we ever prescribed this med? Yes.  If yes, what date? 10/04/2017    Last OV: 10/04/2017 - Shama Tyson     Next OV: 02/08/2018 - Shama Tyson     DX: Chronic Bronchitis    Medications: Xopenex

## 2017-12-14 ENCOUNTER — OFFICE VISIT (OUTPATIENT)
Dept: CARDIOLOGY | Facility: MEDICAL CENTER | Age: 82
End: 2017-12-14
Payer: MEDICARE

## 2017-12-14 VITALS
WEIGHT: 118 LBS | SYSTOLIC BLOOD PRESSURE: 128 MMHG | HEIGHT: 61 IN | HEART RATE: 87 BPM | DIASTOLIC BLOOD PRESSURE: 72 MMHG | BODY MASS INDEX: 22.28 KG/M2 | OXYGEN SATURATION: 94 %

## 2017-12-14 DIAGNOSIS — Z79.01 CHRONIC ANTICOAGULATION: Chronic | ICD-10-CM

## 2017-12-14 DIAGNOSIS — M51.36 DDD (DEGENERATIVE DISC DISEASE), LUMBAR: ICD-10-CM

## 2017-12-14 DIAGNOSIS — I10 ESSENTIAL HYPERTENSION: Chronic | ICD-10-CM

## 2017-12-14 PROCEDURE — 99214 OFFICE O/P EST MOD 30 MIN: CPT | Performed by: INTERNAL MEDICINE

## 2017-12-14 ASSESSMENT — ENCOUNTER SYMPTOMS
BLOOD IN STOOL: 0
BRUISES/BLEEDS EASILY: 0
MEMORY LOSS: 1
PALPITATIONS: 0
WEAKNESS: 1
SHORTNESS OF BREATH: 0
BACK PAIN: 1
DEPRESSION: 1
LOSS OF CONSCIOUSNESS: 0
VOMITING: 0
NAUSEA: 0

## 2017-12-14 NOTE — PROGRESS NOTES
Subjective:   Venus Church is a 86 y.o. female who presents today for evaluation of PAF,Chronic anticoagulation, and hypertension. She's had no chest pain, bleeding problems or symptoms of palpitations. She is most concerned about her lungs and sputum production. She also denies chest pain or edema. Her son is not with her today. The patient complains of hearing loss and being bored. He states her memory is not too bad. The patient speaks 5 languages.  Past Medical History:   Diagnosis Date   • Anesthesia     n/v   • Aortic regurgitation 4/25/2012   • Arrhythmia 7/10      A. Fib.   • Arthritis     general   • Atypical chest pain 4/25/2012   • CATARACT 2007,08   • Chronic anticoagulation 4/25/2012   • Constipation 4/25/2012   • COPD (chronic obstructive pulmonary disease) (CMS-[facility])    • DDD (degenerative disc disease)    • Dental disorder     partials   • Dyspnea 4/25/2012   • Generalized osteoarthritis 4/25/2012   • Headache(784.0) 4/25/2012   • Heart valve insufficiency     minimal, watched by cardio   • Hypercholesteremia    • Hyperlipidemia 4/25/2012   • Hypertension    • MEDICAL HOME    • Osteoarthritis of knee 4/25/2012   • Osteoporosis    • Other accident     C-Spine FX  2006   • Pain     back pain   • Pneumonia 6/4   • TB lung, latent 5/4/2016   • Valvular heart disease 4/25/2012   • Vertebral fracture    • Vertigo 4/25/2012     Past Surgical History:   Procedure Laterality Date   • RECOVERY  7/28/2010    Performed by SURGERY, IR-RECOVERY at SURGERY SAME DAY [facility] ORS   • OTHER ORTHOPEDIC SURGERY  may, 2010      T11 kypho.   • CATARACT PHACO WITH IOL  1/5/2009    Performed by ROSE MARIE MANN at SURGERY SAME DAY [facility] ORS   • CARPAL TUNNEL RELEASE  @30 yrs ago    rt hand     Family History   Problem Relation Age of Onset   • Heart Disease Father    • Diabetes Brother      History   Smoking Status   • Former Smoker   • Years: 25.00   • Quit date: 4/14/1984   Smokeless Tobacco   • Never Used     Comment:  smoked 25 yrs, quit 1984     Allergies   Allergen Reactions   • Codeine Vomiting     Outpatient Encounter Prescriptions as of 12/14/2017   Medication Sig Dispense Refill   • levalbuterol (XOPENEX HFA) 45 MCG/ACT inhaler Inhale 2 Puffs by mouth every 6 hours as needed for Shortness of Breath. 3 Inhaler 0   • omeprazole (PRILOSEC) 20 MG delayed-release capsule Take 1 Cap by mouth every day. 90 Cap 2   • sulfamethoxazole-trimethoprim (BACTRIM DS) 800-160 MG tablet Take 1 Tab by mouth 2 times a day. 14 Tab 0   • Diclofenac Sodium 1 % Gel Apply 1 Application to skin as directed 2 times a day as needed (hand pain). Apply to affected area 2 gm bid hand joint 100 g 3   • tiotropium (SPIRIVA HANDIHALER) 18 MCG Cap Inhale 1 Cap by mouth every day. 30 Cap 11   • duloxetine (CYMBALTA) 30 MG Cap DR Particles Take 1 Cap by mouth every day. 30 Cap 5   • ADVAIR -21 MCG/ACT inhaler INHALE TWO PUFFS BY MOUTH TWICE DAILY.  USE  WITH  SPACER  AND  RINSE  MOUTH  AFTER  EACH  USE 1 Inhaler 5   • metoprolol (LOPRESSOR) 25 MG Tab Take 1 Tab by mouth 2 times a day. 180 Tab 3   • dabigatran (PRADAXA) 75 MG Cap capsule Take 1 Cap by mouth 2 Times a Day. 60 Cap 11   • albuterol (PROVENTIL) 2.5mg/3ml Nebu Soln solution for nebulization 3 mL by Nebulization route every four hours as needed for Shortness of Breath. 150 mL 11   • irbesartan-hydrochlorothiazide (AVALIDE) 150-12.5 MG per tablet Take 0.5 Tabs by mouth every day. 90 Tab 3   • Omega-3 300 MG Cap Take  by mouth.     • albuterol (PROVENTIL) 2.5mg/3ml Nebu Soln solution for nebulization 2.5 mg by Nebulization route every four hours as needed for Shortness of Breath.     • Cyanocobalamin (VITAMIN B-12 PO) Take  by mouth.     • lidocaine (LIDODERM) 5 % Patch Apply one patch to affected area on during the day for 12 hours, off at night 30 Patch 6   • calcium-vitamin D (OSCAL-250) 250-125 MG-UNIT TABS Take 1 Tab by mouth every day.     • VITAMIN D, CHOLECALCIFEROL, PO Take 1 Cap by  "mouth 2 Times a Day.     • benzonatate (TESSALON) 100 MG Cap Take 1 Cap by mouth 3 times a day as needed for Cough. 15 Cap 0   • oxyCODONE CR (OXYCONTIN) 10 MG Tablet Extended Release 12 hour Abuse-Deterrent Take 1 Tab by mouth every 12 hours. Ok to fill oxycodone 56-60 days after the oxycodone written on 11/3/17 is filled 60 Tab 0     No facility-administered encounter medications on file as of 12/14/2017.      Review of Systems   Constitutional: Positive for malaise/fatigue.   HENT: Negative for nosebleeds.    Respiratory: Negative for shortness of breath.    Cardiovascular: Negative for chest pain, palpitations and leg swelling.   Gastrointestinal: Negative for blood in stool, melena, nausea and vomiting.   Genitourinary: Negative for hematuria.   Musculoskeletal: Positive for back pain.   Neurological: Positive for weakness. Negative for loss of consciousness.   Endo/Heme/Allergies: Does not bruise/bleed easily.   Psychiatric/Behavioral: Positive for depression and memory loss.        Objective:   /72   Pulse 87   Ht 1.549 m (5' 0.98\")   Wt 53.5 kg (118 lb)   SpO2 94%   BMI 22.31 kg/m²     Physical Exam   Constitutional: She is oriented to person, place, and time. She appears well-developed and well-nourished. No distress.   HENT:   Head: Atraumatic.   Eyes: Conjunctivae and EOM are normal. Pupils are equal, round, and reactive to light.   Neck: Neck supple. No JVD present.   Cardiovascular: Normal rate and regular rhythm.    Murmur heard.   Systolic murmur is present with a grade of 1/6   Pulmonary/Chest: Effort normal and breath sounds normal. No respiratory distress. She has no wheezes. She has no rales.   Abdominal: Soft. There is no tenderness.   Musculoskeletal: She exhibits no edema.   Neurological: She is alert and oriented to person, place, and time.   Skin: Skin is warm and dry. She is not diaphoretic.       Assessment:     1. Chronic anticoagulation  COMP METABOLIC PANEL    LIPID PROFILE    " CBC WITH DIFFERENTIAL   2. DDD (degenerative disc disease), lumbar  COMP METABOLIC PANEL    LIPID PROFILE    CBC WITH DIFFERENTIAL   3. Essential hypertension  COMP METABOLIC PANEL    LIPID PROFILE    CBC WITH DIFFERENTIAL       Medical Decision Making:  Today's Assessment / Status / Plan:   She is in sinus rhythm today. The patient has a history of documented PAF and is on low-dose anticoagulation. Blood pressure is adequately controlled. Medications reviewed. She is due for laboratory testing. Otherwise return in a year.

## 2017-12-27 DIAGNOSIS — J44.9 CHRONIC OBSTRUCTIVE PULMONARY DISEASE, UNSPECIFIED COPD TYPE (HCC): ICD-10-CM

## 2017-12-28 RX ORDER — FLUTICASONE PROPIONATE AND SALMETEROL XINAFOATE 115; 21 UG/1; UG/1
AEROSOL, METERED RESPIRATORY (INHALATION)
Qty: 3 INHALER | Refills: 3 | Status: SHIPPED | OUTPATIENT
Start: 2017-12-28 | End: 2018-03-07

## 2017-12-28 NOTE — TELEPHONE ENCOUNTER
Have we ever prescribed this med? Yes.  If yes, what date? 04/1317    Last OV: 10/04/17-Bak     Next OV: 02/08/17-Bak    DX: COPD     Medications:   Requested Prescriptions     Pending Prescriptions Disp Refills   • ADVAIR -21 MCG/ACT inhaler [Pharmacy Med Name: ADVAIR /21   AER] 3 Inhaler 3     Sig: INHALE TWO PUFFS BY MOUTH TWICE DAILY. USE  WITH  SPACER  AND  RINSE  MOUTH  AFTER  EACH  USE

## 2018-01-12 ENCOUNTER — TELEPHONE (OUTPATIENT)
Dept: MEDICAL GROUP | Facility: MEDICAL CENTER | Age: 83
End: 2018-01-12

## 2018-01-13 NOTE — TELEPHONE ENCOUNTER
1. Caller Name: nelson                       Call Back Number: [phone] (home) [phone] (work)      2. Message: patient son called states christa is having neck pain above shoulders, they would like to know if they could get an other rx that would be stronger to help with pain they state they have an appointment with pain specialist on the 19th      3. Patient approves office to leave a detailed voicemail/MyChart message: yes

## 2018-01-13 NOTE — TELEPHONE ENCOUNTER
Spoke with patient's son and daughter  Patient has chronic back pain, on oxycodone 10 mg twice daily chronically.  Appointment with pain management next week  Discuss potential options, I would prefer to not increase her narcotic dose if possible, as this may contribute to more forgetfulness, difficulty with balance, constipation, confusion, increasing fall risk  She has had a prescription for Lidoderm and Voltaren gel in the past, her son sees her twice a day to arrange her medications, he does not believe she is currently on voltaren gel or Lidoderm patch although we have given prescriptions for both previously.  Both son and daughter asked about oral anti-inflammatories which would be an option however given her age and the fact that she is on pradaxa, I would like to avoid the combination of oral NSAIDs with anticoagulation as this may increase her risk for bleeding complications.  Prescription sent for topical Voltaren 1% to apply 4 g 3 times a day to affected area maximum 32 g per day, son will apply this at least twice a day to the neck region, pending follow-up with pain management next week, advised to schedule appointment with myself as well as if symptoms do not improve

## 2018-01-24 ENCOUNTER — PATIENT OUTREACH (OUTPATIENT)
Dept: HEALTH INFORMATION MANAGEMENT | Facility: OTHER | Age: 83
End: 2018-01-24

## 2018-01-24 NOTE — PROGRESS NOTES
One month follow-up scheduled on this date to discuss any further resources pt/pt's daughter Ivana needs for pt. Ivana reports that she has not spoken with pt about being receptive to services in the home such as homemaker services. She reports that she is going to visit pt this weekend and will try to have a conversation with her. Otherwise, she reports not needing any other resources. She reports that pt's main concern right now is uncontrolled pain. She reports working with a pain specialist right now and pt's PCP on this matter.     LSW discussed calling pt's daughter once more and if no other needs are identified, graduating pt from  . Ivana agreeable to plan.    Plan:  One month follow-up call scheduled with this writer for 02/23/2018 at 2:00pm.

## 2018-01-31 ENCOUNTER — HOSPITAL ENCOUNTER (OUTPATIENT)
Dept: RADIOLOGY | Facility: MEDICAL CENTER | Age: 83
End: 2018-01-31
Attending: PHYSICAL MEDICINE & REHABILITATION
Payer: MEDICARE

## 2018-01-31 DIAGNOSIS — M54.2 CERVICALGIA: ICD-10-CM

## 2018-01-31 DIAGNOSIS — M41.80 OTHER FORM OF SCOLIOSIS, UNSPECIFIED SPINAL REGION: ICD-10-CM

## 2018-01-31 PROCEDURE — 72081 X-RAY EXAM ENTIRE SPI 1 VW: CPT

## 2018-01-31 PROCEDURE — 72050 X-RAY EXAM NECK SPINE 4/5VWS: CPT

## 2018-02-05 ENCOUNTER — TELEPHONE (OUTPATIENT)
Dept: CARDIOLOGY | Facility: MEDICAL CENTER | Age: 83
End: 2018-02-05

## 2018-02-05 ENCOUNTER — PATIENT OUTREACH (OUTPATIENT)
Dept: HEALTH INFORMATION MANAGEMENT | Facility: OTHER | Age: 83
End: 2018-02-05

## 2018-02-05 NOTE — PROGRESS NOTES
1. Attempt #: 1 - DID NOT SPEAK DIRECTLY TO THE PATIENT    2. [facility] Verified: yes    3. Verify PCP: yes    4. Care Team Updated:       •   DME Company (gait device, O2, CPAP, etc.): N\A       •   Other Specialists (eye doctor, derm, GYN, cardiology, endo, etc): N\A    5.  Reviewed/Updated the following with patient:       •   Communication Preference Obtained? YES       •   Preferred Pharmacy? NO       •   Preferred Lab? NO       •   Family History (document living status of immediate family members and if + hx of cancer, diabetes, hypertension, hyperlipidemia, heart attack, stroke) NO    6. [employer]: already active    7. [employer]: no    8. Annual Wellness Visit Scheduling  Scheduling Status:Scheduled      9. Care Gap Scheduling (Attempt to Schedule EACH Overdue Care Gap!)     Health Maintenance Due   Topic Date Due   • IMM DTaP/Tdap/Td Vaccine (1 - Tdap) 04/22/1950   • PFT SCREENING-FEV1 AND FEV/FVC RATIO / SPIROMETRY SHOULD BE PERFORMED ANNUALLY  10/21/2016   • Annual Wellness Visit  04/26/2017        Scheduled patient for Annual Wellness Visit    10. Patient was advised: “This is a free wellness visit. The provider will screen for medical conditions to help you stay healthy. If you have other concerns to address you may be asked to discuss these at a separate visit or there may be an additional fee.”     11. Patient was informed to arrive 15 min prior to their scheduled appointment and bring in their medication bottles.

## 2018-02-06 ENCOUNTER — OFFICE VISIT (OUTPATIENT)
Dept: MEDICAL GROUP | Facility: MEDICAL CENTER | Age: 83
End: 2018-02-06
Payer: MEDICARE

## 2018-02-06 ENCOUNTER — TELEPHONE (OUTPATIENT)
Dept: MEDICAL GROUP | Facility: MEDICAL CENTER | Age: 83
End: 2018-02-06

## 2018-02-06 VITALS
HEART RATE: 74 BPM | HEIGHT: 61 IN | WEIGHT: 118 LBS | OXYGEN SATURATION: 96 % | SYSTOLIC BLOOD PRESSURE: 130 MMHG | BODY MASS INDEX: 22.28 KG/M2 | TEMPERATURE: 99.1 F | DIASTOLIC BLOOD PRESSURE: 74 MMHG

## 2018-02-06 DIAGNOSIS — R52 PAIN: ICD-10-CM

## 2018-02-06 DIAGNOSIS — Z79.891 CHRONIC USE OF OPIATE FOR THERAPEUTIC PURPOSE: Chronic | ICD-10-CM

## 2018-02-06 DIAGNOSIS — Z79.891 CHRONIC USE OF OPIATE DRUGS THERAPEUTIC PURPOSES: ICD-10-CM

## 2018-02-06 DIAGNOSIS — M54.5 LOW BACK PAIN, UNSPECIFIED BACK PAIN LATERALITY, UNSPECIFIED CHRONICITY, WITH SCIATICA PRESENCE UNSPECIFIED: Chronic | ICD-10-CM

## 2018-02-06 DIAGNOSIS — I48.0 PAROXYSMAL ATRIAL FIBRILLATION (HCC): ICD-10-CM

## 2018-02-06 DIAGNOSIS — F11.20 OPIATE DEPENDENCE, CONTINUOUS (HCC): ICD-10-CM

## 2018-02-06 DIAGNOSIS — M81.8 OSTEOPOROSIS, IDIOPATHIC: Chronic | ICD-10-CM

## 2018-02-06 PROCEDURE — 99214 OFFICE O/P EST MOD 30 MIN: CPT | Performed by: INTERNAL MEDICINE

## 2018-02-06 RX ORDER — OXYCODONE HCL 10 MG/1
10 TABLET, FILM COATED, EXTENDED RELEASE ORAL EVERY 12 HOURS
Qty: 60 TAB | Refills: 0 | Status: SHIPPED | OUTPATIENT
Start: 2018-02-06 | End: 2018-02-06 | Stop reason: SDUPTHER

## 2018-02-06 RX ORDER — OXYCODONE HCL 10 MG/1
10 TABLET, FILM COATED, EXTENDED RELEASE ORAL EVERY 12 HOURS
Qty: 60 TAB | Refills: 0 | Status: SHIPPED | OUTPATIENT
Start: 2018-03-08 | End: 2018-02-06 | Stop reason: SDUPTHER

## 2018-02-06 RX ORDER — OXYCODONE HCL 10 MG/1
10 TABLET, FILM COATED, EXTENDED RELEASE ORAL EVERY 12 HOURS
Qty: 60 TAB | Refills: 0 | Status: SHIPPED | OUTPATIENT
Start: 2018-04-07 | End: 2018-04-10 | Stop reason: SDUPTHER

## 2018-02-06 NOTE — PROGRESS NOTES
Subjective:      Venus Church is a 86 y.o. female who presents with Medication Refill (Controlled Substance)            HPI       Patient is here with her son, on chronic pain medication for low back and neck pain, followed by pain management  who saw her recently, has had injections previously with no benefit, pain management apparently did recommend spinal pump stimulator which patient is considering.  Patient is reluctant because there is an operative procedure involved.  She has chronic low back pain, chronic neck pain.  Currently living by herself, her son checks in on her every day, sets out and arranges her medications and patient takes medications as prescribed, does ADLs without assist, no difficulty with cooking, cleaning, bathing, dressing, toileting, transfers, no falls, does use cane for walking on occasion, especially when walking outdoors.  No stairs.  Some forgetfulness that time according to patient and son.  No depression, no delusions or hallucinations.  No incontinence of bowel or bladder.  Remains on oxycodone 10 mg twice daily, has been under stable dosing regimen for years, has seen pain management locally, also pain management and [place], no further recommendations as patient has tried physical therapy, has tried anti-inflammatories previously, gabapentin, massage therapy, Lidoderm, Cymbalta for chronic pain, with no benefit.  Has had trigger point injections, facet injections, nerve ablations without benefit.  No falls.  Narcotics tend to make her forgetful at times, but no delusions or hallucinations, no depression, good social support and family locally, also has friends and people to help look in on her during the week.  No tobacco, no alcohol  Paroxysmal atrial fibrillation followed by cardiology note chest pain, palpitations, lightheadedness or dizziness on pradaxa  Back pain neck and low back and be moderate in intensity, no radiation worse with prolonged standing or sitting,  no radiation down the legs or arms, no sensory changes extremities.  Denies depression or mood changes, no constipation currently  Osteoporosis with vertebral compression fracture previously on reclast last dose 12/15    Current Outpatient Prescriptions   Medication Sig Dispense Refill   • Diclofenac Sodium 1 % Gel apply 4 gm to affected neck area tid for pain, max total per day 32 gm 300 g 5   • ADVAIR -21 MCG/ACT inhaler INHALE TWO PUFFS BY MOUTH TWICE DAILY. USE  WITH  SPACER  AND  RINSE  MOUTH  AFTER  EACH  USE 3 Inhaler 3   • levalbuterol (XOPENEX HFA) 45 MCG/ACT inhaler Inhale 2 Puffs by mouth every 6 hours as needed for Shortness of Breath. 3 Inhaler 0   • omeprazole (PRILOSEC) 20 MG delayed-release capsule Take 1 Cap by mouth every day. 90 Cap 2   • oxyCODONE CR (OXYCONTIN) 10 MG Tablet Extended Release 12 hour Abuse-Deterrent Take 1 Tab by mouth every 12 hours. Ok to fill oxycodone 56-60 days after the oxycodone written on 11/3/17 is filled 60 Tab 0   • tiotropium (SPIRIVA HANDIHALER) 18 MCG Cap Inhale 1 Cap by mouth every day. 30 Cap 11   • duloxetine (CYMBALTA) 30 MG Cap DR Particles Take 1 Cap by mouth every day. 30 Cap 5   • metoprolol (LOPRESSOR) 25 MG Tab Take 1 Tab by mouth 2 times a day. 180 Tab 3   • dabigatran (PRADAXA) 75 MG Cap capsule Take 1 Cap by mouth 2 Times a Day. 60 Cap 11   • albuterol (PROVENTIL) 2.5mg/3ml Nebu Soln solution for nebulization 3 mL by Nebulization route every four hours as needed for Shortness of Breath. 150 mL 11   • irbesartan-hydrochlorothiazide (AVALIDE) 150-12.5 MG per tablet Take 0.5 Tabs by mouth every day. 90 Tab 3   • albuterol (PROVENTIL) 2.5mg/3ml Nebu Soln solution for nebulization 2.5 mg by Nebulization route every four hours as needed for Shortness of Breath.     • Cyanocobalamin (VITAMIN B-12 PO) Take  by mouth.     • lidocaine (LIDODERM) 5 % Patch Apply one patch to affected area on during the day for 12 hours, off at night 30 Patch 6   •  calcium-vitamin D (OSCAL-250) 250-125 MG-UNIT TABS Take 1 Tab by mouth every day.     • VITAMIN D, CHOLECALCIFEROL, PO Take 1 Cap by mouth 2 Times a Day.       No current facility-administered medications for this visit.      Patient Active Problem List   Diagnosis   • Hypertension   • Dyslipidemia   • Preventative health care   • Low back pain   • Cervical pain   • Osteoporosis, idiopathic   • History of compression fracture of spine   • PAF (paroxysmal atrial fibrillation) (CMS-HCC)   • Right hip pain   • COPD (chronic obstructive pulmonary disease) (CMS-HCC)   • Sleep apnea   • History of Helicobacter pylori infection   • Chronic anticoagulation   • History of shingles   • Tension headache   • Macular degeneration   • MEDICAL HOME   • Chronic use of opiate for therapeutic purpose   • Sensorineural hearing loss   • Mild cognitive impairment   • TB lung, latent          Cervical pain  4/08 MRI cervical spine C3-C4 moderate left-sided foraminal stenosis, C4-C5 moderate foraminal stenosis right, C5-C6 moderate right-sided foraminal stenosis, C6-C7 moderate bilateral foraminal stenosis  5/08 CT cervical spine C3-C4 uncovertebral joint arthropathy and significant left-sided neural foraminal narrowing, C4-C5 uncovertebral joint arthropathy right significant neuroforaminal narrowing, C5-C6 uncovertebral joint arthropathy right moderate neural foraminal narrowing, C6-C7 uncovertebral joint arthropathy moderate to severe right neural foraminal narrowing  7/08 C3-C5 cervical facet injections   1/09 medial branch nerve block diagnostic   2/09 C4-C5 cervical epidural under fluoroscopy   4/13 Daughter called [place] [facility] suggest taper pain med, she has sched to taper the oxycontin  4/15/13 resumed oxycontin 10 bid  6/11/13 off oxcontin  7/24/13 increase oxycontin from qday to 10 mg bid   9/14/17 custom PT discharge summary patient did not improve walking tolerance or standing tolerance, medicare  g-code status 60-79% impairment, thoracolumbar flexion decreased from 50-25%, extension 10%, sidebending 10-25%     Chronic opiate  2/26/15 narcotic contract  6/12/15 opiate risk score 0  10/4/15   7/25/16   9/20/16  pain note recommended left L3-L5 MBBB  10/31/16   2/6/17   5/9/17 narcotic contract  5/9/17 opiate risk score 0  5/9/17   5/9/17 depression screening score 0  6/28/17 trial cymbalta 30 mg  8/7/17 did not start cymbalta, will start  8/7/17   8/7/17 UDT  8/21/17 change cymbalta to qod  9/18/17 off cymbalta  9/14/17 custom PT discharge summary patient did not improve walking tolerance or standing tolerance, medicare g-code status 60-79% impairment, thoracolumbar flexion decreased from 50-25%, extension 10%, sidebending 10-25%  11/3/17      COPD  7/11 CT spiral thorax extensive emphysematous change of lung, small left pleural effusion left lung base with atelectasis unchanged from prior CT  5/10 CT spriral thorax emphysematous change and dependent atelectasis left lung base  7/11 PFT FEV/FVC 59%, FEV1 1.1 L (75% predicted), significant post bronchodilator response noted (FEV1 improved 16%). Mixed restrictive and obstructive pattern,COPD with GOLD stage II with sig bronchodilator response  7/11 trial of symbicort 80/4.5 mcg bid discussed with daughter plus albuterol neb prn, [facility] home education ordered  5/12 off symbicort   5/22/14 cxr negative  6/23/14 on symbicort  7/20/15 change to advair 250/50 mcg from symbicort (not covered on insurance)  9/3/15 cxr negative  10/4/15 add spiriva and change symbicort to advair 250/50 mcg bid  10/19/15 CT high resolution thorax, no evidence of interstitial lung disease, minimal scattered opacification could indicate minimal areas of fibrosis periphery of each lung, similar to prior exam, emphysema most prominent upper lobes bilateral  10/19/15 PFT FEV1 1.1 (61% predicted),FVC 1.9,FEV/FVC 58%, no bronchodilator response,DLCO 34%; on advair  250/50 mcg bid and spiriva  11/2/15 change from advair discus to advair 250 inhaler and continue spiriva   12/11/15 not taking advair, will start advair and cont spiriva  12/11/15 cxr negative  3/14/16 [facility] note complaining doxycycline and taper steroids, continue nocturnal oxygen, continue rotating antibiotics for bronchiectasis, follow-up laboratory testing ordered  3/14/16  (normal), quantiferon gold positive, allergen panel negative, IgE 357 (less than 214), IgA 116 (),, IgM 27 (),IgG 947 ([phone])  4/13/16 cxr mild cardiomegaly  5/4/16 [facility] note continue advair 115/21 bid with spacer given doxycycline and prednisone, continue oxygen 3 L at night, AFB samples ×3, follow-up 3 months  7/25/16 pulmonary note on prednisone taper one week out of the month and doxycycline one per day for one week per month, on advair bid and spiriva and oxygen 3 liters at night  11/8/16 pulmonary note continue advair 115/21 ucg bid,spiriva, xopenex as needed, oxygen 3 L at night  10/4/17 pulmonary note continue advair 115/21 two inhalations bid, spiriva daily, xoponex as needed, oxygen 3 L at night, history of positive quantiferon gold, will order sputum sample follow-up 4 months     Dyslipidemia   5/10 chol 163,trig 68,hdl 69,ldl 80  6/10 chol 163,trig 60,hdl 69,ldl 80  7/11 chol 118,trig 45,hdl 65,ldl 44 on crestor 10 mg  10/11 chol 165,trig 47,hdl 78,ldl 74 on crestor 10 mg done at [place]  2/12 off crestor  6/12 chol 211,trig 104,hdl 64,ldl 126 off crestor  3/4/14 chol 222,trig 124,hdl 52,ldl 145 off meds     History of compression fracture  5/10 MRI thoracic spine subacute T12 compression fracture  5/27/10 kyphoplasty T11  6/10 MRI lumbar spine T12 compression fracture mild to moderate central canal narrowing, interval T11 kyphoplasty, L2-L3 moderate foraminal stenosis, L3-L4 severe left foraminal stenosis, moderate right foraminal stenosis, L4-L5 moderate central canal stenosis  7/10 dexa LS +1.0,hip  -1.6  7/28/10 T12 vertebral plasty  8/11 [place] pain evaluation  pain management, recommend continue oxycontin 10 bid  10/11 [facility]  T11-L1 injection  2/13  pain note, T10-T11 epidural injection  6/13 dexa LS +1.8, forearm -2.7  9/23/13 reclast injection  9/4/14 reclast IV infusion  9/14/17 custom PT discharge summary patient did not improve walking tolerance or standing tolerance, medicare g-code status 60-79% impairment, thoracolumbar flexion decreased from 50-25%, extension 10%, sidebending 10-25%     History H. pylori  9/11 serum IgG positive s/p prevpack  9/19/12 cbc,cmp,lipase neg; u/s abd gallbladder sludge without biliary dilatation or stone  12/12/12 [facility] eval rec EGD/colon pt did not follow up      History shingles     Hypertension  1/05 carotid u/s negative  1/07 echo LV EF 60% ,mild LVH  1/09 renal ultrasound 6 mm right cortical cyst  9/09 MRI brain with and without moderate nonspecific microvascular ischemic change  9/09 MRA next mild plaque right internal carotid  5/24/10 echo normal LV size and function, EF 60%, mild to moderate AR, RVSP 35-40 consistent with mild pulmonary hypertension  5/10 persantine thallium no ischemia, EF 72%  9/10 change avalide 150/12.5 to avapro 150 qday, had sodium 125 in ER  3/11 on avapro 300 mg  7/8/11 echo normal LV function, EF 55%  7/8/11 Persantine thallium possible small area mild ischemia in the lateral wall, no evidence of infarction  7/11   3/12 increase metoprolol to 50 bid, cont avapro 300 mg, start norvasc 2.5 mg  5/1/12 increase norvasc to 5 mg, change avapro to avalide 300/12.5 mg, cont metoprolol 50 bid  10/2/12 on avalide 300/12.5 mg and metoprolol 50 bid and norvasc 5 mg  10/12 Persantine thallium diaphragmatic uptake possible attenuation, fixed inferolateral defect which may be secondary to attenuation, EF 76%, no wall motion abnormalities, no evidence of significant ischemia.  1/13 echo normal LV function,  grade 2 diastolic dysfunction, EF 70% moderate aortic insufficiency  1/13   1/23/13 cxr cardiomegaly without pulmonary edema  6/11/13 on avalide 300/12.5 mg,norvasc 5 mg, metoprolol 50 bid  4/7/14 cardiology note atrial fibrillation, start pradaxa 75 mg bid, stop asa, stop norvasc, decreased avalide 300/12.5 mg to 1/2 per day, increase metoprolol to 50 mg 1 1/2 bid  10/20/14 metoprolol 50 mg decreased to 25 mg bid by [place] doctor due to low heart rate, continues on avalide 300/12.5 mg   12/29/14  cardiology note continue pradaxa, metoprolol 25 mg 1/2 bid, avalide 300/12.5 mg  7/25/16 avalide 150/12.5 mg decrease to 1/2 tab per day and continue metoprolol 25 mg bid  8/24/16 cardiology note sinus rhythm on exam,[id number] score=2 continue anticoagulation, low-dose metoprolol,avalide 150/12.5 mg 1/2 per day, follow-up one year  12/14/17 cardiology note remains in sinus rhythm, follow-up one year     Low back pain  6/06 epidural L4-L5   12/08 x-ray LS moderate to severe DJD  6/10 MRI lumbar spine T12 compression fracture with mild-to-moderate central spinal canal narrowing, interval T11 kyphoplasty, L2-L3 moderate frontal stenosis, L3-L4 severe left foraminal stenosis, moderate right foraminal stenosis, L4-L5 moderate central spinal canal  7/10 interventional rad consult epidural T11 to L1 region  7/28/10 T12 vertebroplasty  7/30/10 norco taper, and lyrica 50 mg bid  8/5/10 reaction to lyrica, stop lyrica  8/19/10 lumbar steroid epidural   9/10  note rec decompression if pain persist  10/10 bilateral lumbar facet joint injections L3-S1   12/10 xray LS old T11 and T12 fracture status post kyphoplasty  1/11 [place] [facility] note  3/11 trial of spinal stimulator  8/11 [place] pain note evaluation  pain management cont oxycontin 10 bid  11/22/11 [place] pain note dr. hodge; trial of baclofen, T11 plus T12 left-sided nerve root block pending  7/12   pain note;Left-sided T11-T12 transforaminal epidural   4/13 daughter called [place] pain suggest taper pain med, she has sched to taper the oxycontin  4/15/13 resumed oxycontin 10 bid  6/11/13 off oxycontin  7/24/13 increase oxycontin from qday to 10 mg bid   2/28/14 on celebrex 200 mg as needed per  and oxycontin 10 mg bid  4/25/14 trial of cymbalta 30 mg, continue oxycontin 10 mg twice a day, recommend not use tramadol (side effects since on oxycontin already) or celebrex (on pradaxa)  5/29/14  pain management [facility]; recommend T11-T12 left-sided transforaminal nerve block  7/2/14 [place] pain left T11-T12 epidural injection  7/21/14  [place] pain note; increase oxycontin to 10 mg tid x 3 weeks and then taper down  10/27/14 [place] [facility] note; samples zorvolex  2/26/15 xray lumbar spine mild compression fracture of L4 of indeterminate age but new compared to 3/25/13 vertebral augmentation and T11 and T12 with severe compression fracture of T12 and focal kyphosis at this level  4/15/16 outpatient physical therapy phone call indicating patient unable to make appointments, recommending home health physical therapy  9/20/16  pain note recommended left L3-L5 MBBB  3/22/17  pain procedure note left L3-L5 MBBB #1  6/28/17 trial cymbalta 30 mg  6/29/17 [facility] physical therapy note  8/3/17 custom PT note  9/18/17 off cymbalta  9/14/17 custom PT discharge summary patient did not improve walking tolerance or standing tolerance, medicare g-code status 60-79% impairment, thoracolumbar flexion decreased from 50-25%, extension 10%, sidebending 10-25%  11/14/17 custom PT discharge summary no improvement  11/29/17 MRI lumbar spine no acute fracture, lumbar levoscoliosis T12-L1, kyphotic deformity he talked L1, previous compression fractures T11, T12, L1, previous vertebral augmentation procedures T11 and L1, moderate central canal stenosis T12-L3,  severe canal stenosis L3-L4, no significant change     Macular degeneration  7/1/13 dr.mills mcnair note, start PreserVision AREDS II vitamin followup 6 months     Mild cognitive impairment  1/11/16 MMSE 24/30  7/14/17 MMSE 23/30 offered aricept     Osteoporosis  3/08 dexa LS +0.3,hip -1.4  5/10 MRI thoracic spine subacute T12 compression fracture  5/27/10 T12 kyphoplasty  6/10 on actonel  7/10 dexa LS +1.0,hip -1.6  8/10 vit d 56  3/11 reclast referral made  6/12 vit d 42 off actonel  6/21/13 dexa LS +1.8, forearm -2.7, I rec reclast, she would like to think it over  9/23/13 reclast injection  3/4/14 vit d 26  9/4/14 reclast IV infusion  2/26/15 xray rib series left; mild deformity of the lateral 10th rib on the left may be related to a fracture  2/26/15 xray lumbar spine mild compression fracture of L4 of indeterminate age but new compared to /25/13, vertebral augmentation and T11 and T12 with severe compression fracture of T12 and focal kyphosis at this level  12/7/15 reclast IV infusion     Paroxysmal atrial fibrillation  4/10 hospitalization on multaq, also on verapamil and metoprolol for rate control per cardiology  1/11 [facility] note cont multaq  7/11 EKG atrial fibrillation hospitalization  7/8/11 echo normal LV function, EF 55%  7/8/11 Persantine thallium possible small area mild ischemia in the lateral wall, no evidence of infarction  7/11 [facility] during hospitalization changed to amiodarone 200 mg plus pradaxa restarted  7/11   9/11 [facility] note stop amiodarone, cont pradaxa and metoprolol 25 bid  3/12 cont pradaxa and increase metoprolol to 50 bid due to higher bp  5/12 pradaxa decreased from 150 bid to 75 bid per [facility] due to nosebleeds  6/12 EKG NSR  1/13 echo normal LV function, grade 2 diastolic dysfunction, EF 70% moderate aortic insufficiency  6/11/13 on pradaxa and metoprolol 50 bid for rate control, NSR today  9/13 cardiology note stop pradaxa and start asa 81 mg  4/7/14 cardiology note atrial  fibrillation, start pradaxa 75 mg bid, stop asa, stop norvasc, decreased avalide 300/12.5 mg to 1/2 per day, increase metoprolol to 50 mg 1 1/2 bid  4/14/14 cardiology note, NSR on exam, continue pradaxa 75 mg bid, metoprolol 75 mg bid, avalide 300/12.5 mg 1/2 tab per day  10/20/14 metoprolol 50 mg decreased to 25 mg bid by [place] doctor due to low heart rate, continues on pradaxa and avalide 300/12.5 mg   12/29/14  cardiology note continue pradaxa, metoprolol 25 mg 1/2 bid, avalide 300/12.5 mg  6/12/15 NSR on exam  8/24/16 cardiology note sinus rhythm on exam,[id number] score=2 continue anticoagulation, low-dose metoprolol follow-up one year  12/14/17 cardiology note remains in sinus rhythm, follow-up one year     Preventative health  5/05 colon per [facility] polyp no path report, f/u rec 5 yrs (12/12/12 [facility] note)  4/09 stool occult blood negative  12/09 mammogram  10/1/11 pneumovax  10/2/12 zoster vaccine  6/11/13 declines mammogram, tdap  6/13 dexa LS +1.8, forearm -2.7  3/4/14 vit d 26  10/12/15 prevnar     Right hip pain  3/11 MRI right hip DJD and tear of the anterior/superior acetabular labrum  4/11  ortho note not believe hip is primary problem, referred to  or [facility]ji     Sensorineural hearing loss  12/7/15  audiology evaluation mild sensorineural hearing loss     Sleep apnea  8/11 [facility] note sleep study apnea-hypopnea index 86, low sat 74%, start CPAP  8-18 cm    2013 off cpap not comfortable  3/14/16  (normal), quantiferon gold positive, allergen panel negative, IgE 357 (less than 214), IgA 116 (),, IgM 27 (),IgG 947 ([phone])  4/13/16 cxr mild cardiomegaly  5/4/16 [facility] note continue oxygen 3 L at night  11/8/16 pulmonary note continue oxygen 3 L at night  10/4/17 pulmonary note continue oxygen 3 L at night     tb latent  3/14/16 positive quantiferon gold test  3/14/16  (normal), quantiferon gold positive, allergen panel negative, IgE 357 (less than  "214), IgA 116 (),, IgM 27 (),IgG 947 ([phone])  4/13/16 cxr mild cardiomegaly  5/4/16 [facility] note continue advair 115/21 bid with spacer given doxycycline and prednisone, continue oxygen 3 L at night, AFB samples ×3, follow-up 3 months  7/25/16 pulmonary note on prednisone taper one week out of the month and doxycycline one per day for one week per month, on advair bid and spiriva and oxygen 3 liters at night  7/27/17 PPD negative  10/4/17 pulmonary note continue advair 115/21 two inhalations bid, spiriva daily, xoponex as needed, oxygen 3 L at night, history of positive quantiferon gold, will order sputum sample follow-up 4 months     Tension headache  6/11/13 on fioricet bid  2/28/14 taper fioricet to once a day  4/25/14 now on fioricet prn                  Health Maintenance Summary                IMM DTaP/Tdap/Td Vaccine Overdue 4/22/1950     PFT SCREENING-FEV1 AND FEV/FVC RATIO / SPIROMETRY SHOULD BE PERFORMED ANNUALLY Overdue 10/21/2016      Done 10/21/2015 PFT DICTATED RESULTS     Patient has more history with this topic...    Annual Wellness Visit Overdue 4/26/2017      Done 4/25/2016 Visit Dx: Medicare annual wellness visit, subsequent     Patient has more history with this topic...    BONE DENSITY Next Due 6/21/2018      Done 6/21/2013 DS-BONE DENSITY STUDY (DEXA)     Patient has more history with this topic...    URINE DRUG SCREEN Next Due 8/2/2018      Done 8/7/2017 [facility] PAIN MANAGEMENT SCREEN          Patient Care Team:  Denis Krueger M.D. as PCP - General  Nathan Fajardo M.D. as Consulting Physician (Anesthesia)  Esdras Thornton M.D. as Consulting Physician (Pulmonary Medicine)  Aashish as Respiratory  FELIZ Duvall, MSW as Care Coordinator (Social Work)      ROS       Objective:     /74   Pulse 74   Temp 37.3 °C (99.1 °F)   Ht 1.549 m (5' 1\")   Wt 53.5 kg (118 lb)   SpO2 96%   BMI 22.30 kg/m²      Physical Exam   Constitutional: She appears well-developed and " well-nourished. No distress.   HENT:   Head: Normocephalic and atraumatic.   Mouth/Throat: Oropharynx is clear and moist.   Eyes: Conjunctivae are normal. Right eye exhibits no discharge. Left eye exhibits no discharge.   Neck: No JVD present. No thyromegaly present.   Cardiovascular: Normal rate and regular rhythm.    Pulmonary/Chest: Effort normal and breath sounds normal. No respiratory distress. She has no wheezes.   Neurological: She is alert.   Skin: Skin is warm. She is not diaphoretic.   Psychiatric: She has a normal mood and affect. Her behavior is normal.   Nursing note and vitals reviewed.    Normal affect, insight, judgment     Limited back flexion and extension  No hallucinations, delusions, no suicidal ideation  Patient is pleasant       Assessment/Plan:        Assessment  #1 chronic low back pain followed by pain management  was recommended spell similar, patient has seen pain management locally ,  and at [place] [facility] , has tried physical therapy, anti-inflammatories, Celebrex, pregabalin, voltaren, lidoderm, gabapentin, cymbalta and has had trigger points, epidural injections, nerve ablation with no benefit, status post kyphoplasty T12 no improvement, pain has been stable, can be moderate in intensity at times, but she still can perform ADLs.  Currently remains on Cymbalta as well for some relief of pain    #2 chronic opiate therapy oxycodone 10 mg twice a day, dose has been stable and controlled with relief of mild to moderate pain, although she has chronic underlying pain, it has been stable, son checks on her daily to make sure she takes her medications as prescribed, he lays out the medications for her, denies drowsiness, sedation, or confusion, but does have some memory loss, but no depression, no constipation, no falls, no respiratory suppression, no alcohol or illicit drugs has tried multiple other options therapeutically for pain control by pain  management, still sees pain management on a regular basis  who monitors her response to the narcotics and has not recommended any change to her dosing regimen, we provide the narcotics.  Urine drug test August 7, 2017, narcotic contract and open rescored May 9, 2017    #3 paroxysmal atrial fibrillation, sinus rhythm, rate controlled, on pradaxa per cardiology    #4 osteoporosis has been on reclast previously        Plan  #1  reviewed    #2 old records from pain management    #3 oxycodone 10 mg twice daily quantity 60 per 30 days, 3 separate refills to last 3 months    #4 long-term risk of narcotics again discussed, memory loss, confusion, falls, respiratory suppression, depression, mood changes, constipation, daytime drowsiness, son checks on her daily to make sure she takes her medications as prescribed, pain management is monitoring her prescriptions for side effects, they have concurred on her current treatment plan in the past and has not changed recommended changes to her medication program    #5 I spent greater than 20 minutes with the patient and son discussing potential benefits of spinal cord stimulator offered by pain management, ideally this would decrease her pain level, and decrease her reliance on oral narcotics, providing less side effects and potentially an improvement in focus and memory off all narcotics or on a lower dose of narcotics after the spinal cord stimulator placement, she will consider, discussed lifestyle modification, nutrition, diet, exercise, social activity interaction    #6 falling precautions, declines physical therapy referral    #7 continue no tobacco, no alcohol    #8 no NSAIDs on anticoagulation, followed cardiology    #9 follow-up 3 months    #!0 dexa based on result consider reclast or prolia

## 2018-02-06 NOTE — TELEPHONE ENCOUNTER
Spoke with Yashira. Pt. Is in constant pain with her back. She wants to try to decrease Oxycodone because it causes her to be depressed, disoriented. Dr. Krueger would like prescribe combination drug muscle relaxant/anti-inflammatory instead, but this  Is contraindicated with her Pradaxa 75mg BID.     Pt. Was in [facility] at [facility]. Would Dr. Smyth order event monitor to determine pt's. A Fib burden? If burden is low, pt. May consider stopping anticoagulant so she could take NSAID.

## 2018-02-06 NOTE — TELEPHONE ENCOUNTER
----- Message from Camila Oleary L.P.N. sent at 2/5/2018  4:05 PM PST -----  Regarding: FW: Patient's daughter has question about medication      ----- Message -----  From: Larissa Clarke  Sent: 2/5/2018   3:53 PM  To: Camila Oleary L.P.N.  Subject: Patient's daughter has question about medica#    RS/Camila    Patient's daughter, Yashira, has a question about her mother's medication and wants a call back at [phone].

## 2018-02-15 ENCOUNTER — APPOINTMENT (OUTPATIENT)
Dept: RADIOLOGY | Facility: MEDICAL CENTER | Age: 83
End: 2018-02-15
Attending: INTERNAL MEDICINE
Payer: MEDICARE

## 2018-02-20 ENCOUNTER — PATIENT OUTREACH (OUTPATIENT)
Dept: HEALTH INFORMATION MANAGEMENT | Facility: OTHER | Age: 83
End: 2018-02-20

## 2018-02-21 NOTE — PROGRESS NOTES
LSW outreach to pt's daughter Ivana to discuss discharging pt from  , due to Yashira reporting that pt is no longer in need of any further resources during previous outreach call. No answer, LVM with contact information as well as LSW Bo's contact information as this LSW is transitioning to a different position.     Plan:  LSW to discharge pt from  , due to pt having all needed resources at home. Pt's daughter Ivana to reach out to CC LSW Bo with any future questions/concerns.

## 2018-02-23 ENCOUNTER — APPOINTMENT (OUTPATIENT)
Dept: RADIOLOGY | Facility: MEDICAL CENTER | Age: 83
End: 2018-02-23
Attending: INTERNAL MEDICINE
Payer: MEDICARE

## 2018-03-06 ENCOUNTER — TELEPHONE (OUTPATIENT)
Dept: PULMONOLOGY | Facility: HOSPICE | Age: 83
End: 2018-03-06

## 2018-03-06 NOTE — TELEPHONE ENCOUNTER
Patient currently has OV with KENNETH Ashraf, MARILYN 3/7/18 per marilyn Palomo last OV 10/4/17 Sputum labs were ordered and no results are in the system. I called and spoke to son Dileep he informed he's not sure but patient does go to [facility] for labs. I stated we don't have results or that she tried completing inform we will see them tomorrow for OV

## 2018-03-07 ENCOUNTER — OFFICE VISIT (OUTPATIENT)
Dept: PULMONOLOGY | Facility: HOSPICE | Age: 83
End: 2018-03-07
Payer: MEDICARE

## 2018-03-07 VITALS
WEIGHT: 118 LBS | HEART RATE: 75 BPM | OXYGEN SATURATION: 96 % | HEIGHT: 61 IN | TEMPERATURE: 97.7 F | RESPIRATION RATE: 16 BRPM | SYSTOLIC BLOOD PRESSURE: 118 MMHG | DIASTOLIC BLOOD PRESSURE: 72 MMHG | BODY MASS INDEX: 22.28 KG/M2

## 2018-03-07 DIAGNOSIS — Z22.7 TB LUNG, LATENT: ICD-10-CM

## 2018-03-07 DIAGNOSIS — J41.1 MUCOPURULENT CHRONIC BRONCHITIS (HCC): Chronic | ICD-10-CM

## 2018-03-07 DIAGNOSIS — G47.33 OSA (OBSTRUCTIVE SLEEP APNEA): ICD-10-CM

## 2018-03-07 PROCEDURE — 99214 OFFICE O/P EST MOD 30 MIN: CPT | Performed by: NURSE PRACTITIONER

## 2018-03-07 RX ORDER — AZITHROMYCIN 250 MG/1
TABLET, FILM COATED ORAL
Qty: 6 TAB | Refills: 0 | Status: SHIPPED | OUTPATIENT
Start: 2018-03-07 | End: 2018-04-08

## 2018-03-07 RX ORDER — METHYLPREDNISOLONE 4 MG/1
TABLET ORAL
Qty: 21 TAB | Refills: 0 | Status: SHIPPED | OUTPATIENT
Start: 2018-03-07 | End: 2018-04-08

## 2018-03-07 NOTE — PATIENT INSTRUCTIONS
1) Continue nocturnal oxygen at 2L  2) Start Trelegy 1 puff, once a day with mouth rinse. Stop Advair and Spiriva  3) Continue xopenex and nebulizer as needed  4) Add Mucinex to daily regimen  5) Vaccines: Up to date with Prevnar 13, Pneumovax 23, flu  6) Return in about 3 months (around 6/7/2018) for review of symptoms, if not sooner, follow up with MARILYN Garcia.   7) Medrol and Zpack on hand

## 2018-03-07 NOTE — PROGRESS NOTES
"CC:  Here for f/u sleep and pulmonary issues as listed below    HPI:   Venus presents today for follow up for COPD and MAMI.  PFTs from 2015 indicate a Fev1 of 1.10L or 61% predicted with a mild bronchodilator response, Fev1/FVC ratio of 58, DLCO 34%. She is a former smoker for 37.5 pack years, quitting in 1984. HRCT completed 10/2015 noted No CT evidence of diffuse interstitial lung disease at this time. Some minimal scattered areas of linear opacification could indicate minimal areas of fibrosis in the periphery of each lung including the lung bases. Appearance is similar to the prior   exam. 2.  Mild degree of bronchiectasis noted, most prominent in the lower lobes. 3.  Atherosclerosis of the aorta again noted. 4.  emphysema is again noted that is most prominent in the upper lobes bilaterally.  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 Last Chest x-ray from 2016 shows no focal consolidation or pleural effusion. There is mild cardiomegaly. No indication of TB. SHe is originally from [place] and son reports \"[place]one from [place] has [place]\". She has no known contacts of TB.     She is using Advair 115-21 mostly in the morning and typically forgets in the evening, Spiriva typically remembers in the morning, where use of Xopenex HFA, nebulizer 5-6 times a week. She finds shortness of breath is stable since last office visit. She has a chronic cough producing yellow sputum that is difficult to expectorate. She denies wheeze, hemoptysis, night sweats, fevers or chills, unintentional weight loss, chest pain or chest tightness, nasal congestion. She required antibiotics for respiratory infection December 2017.    PSG from 2011 indicated an AHI of 86.3 and low oxygenation of 74%.  Currently she is being treated with oxygen at 2L, but admits she does not use it. Reinforced use and reviewed pathophysiology of hypoxemia including cardiac and neurologic " breast factors including death.    Patient Active Problem List    Diagnosis Date Noted   • Chronic anticoagulation 04/25/2012     Priority: High   • TB lung, latent 05/04/2016   • Mild cognitive impairment 01/11/2016   • Sensorineural hearing loss 12/16/2015   • Chronic use of opiate for therapeutic purpose 04/23/2015   • MEDICAL HOME    • Macular degeneration 07/08/2013   • Tension headache 06/11/2013   • History of shingles 06/07/2012   • History of Helicobacter pylori infection 11/01/2011   • MAMI (obstructive sleep apnea) 08/10/2011   • COPD (chronic obstructive pulmonary disease) (CMS-HCC) 07/20/2011   • Right hip pain 03/28/2011   • PAF (paroxysmal atrial fibrillation) (CMS-HCC) 06/10/2010   • History of compression fracture of spine 05/26/2010   • Low back pain 05/22/2010   • Cervical pain 05/22/2010   • Osteoporosis, idiopathic 05/22/2010   • Hypertension 05/20/2010   • Dyslipidemia 05/20/2010   • Preventative health care 05/20/2010       Past Medical History:   Diagnosis Date   • Anesthesia     n/v   • Aortic regurgitation 4/25/2012   • Arrhythmia 7/10      A. Fib.   • Arthritis     general   • Atypical chest pain 4/25/2012   • CATARACT 2007,08   • Chronic anticoagulation 4/25/2012   • Constipation 4/25/2012   • COPD (chronic obstructive pulmonary disease) (CMS-HCC)    • DDD (degenerative disc disease)    • Dental disorder     partials   • Dyspnea 4/25/2012   • Generalized osteoarthritis 4/25/2012   • Headache(784.0) 4/25/2012   • Heart valve insufficiency     minimal, watched by cardio   • Hypercholesteremia    • Hyperlipidemia 4/25/2012   • Hypertension    • MEDICAL HOME    • Osteoarthritis of knee 4/25/2012   • Osteoporosis    • Other accident     C-Spine FX  2006   • Pain     back pain   • Pneumonia 6/4   • TB lung, latent 5/4/2016   • Valvular heart disease 4/25/2012   • Vertebral fracture    • Vertigo 4/25/2012       Past Surgical History:   Procedure Laterality Date   • RECOVERY  7/28/2010     Performed by SURGERY, IR-RECOVERY at SURGERY SAME DAY [facility] ORS   • OTHER ORTHOPEDIC SURGERY  may, 2010      T11 kypho.   • CATARACT PHACO WITH IOL  1/5/2009    Performed by ROSE MARIE MANN at SURGERY SAME DAY [facility] ORS   • CARPAL TUNNEL RELEASE  @30 yrs ago    rt hand       Family History   Problem Relation Age of Onset   • Heart Disease Father    • Diabetes Brother        Social History     Social History   • Marital status:      Spouse name: N/A   • Number of children: N/A   • Years of education: N/A     Occupational History   • Not on file.     Social History Main Topics   • Smoking status: Former Smoker     Packs/day: 1.50     Years: 25.00     Types: Cigarettes     Quit date: 4/14/1984   • Smokeless tobacco: Never Used      Comment: smoked 25 yrs, quit 1984   • Alcohol use No      Comment: none   • Drug use: No   • Sexual activity: Not on file     Other Topics Concern   • Not on file     Social History Narrative   • No narrative on file       Current Outpatient Prescriptions   Medication Sig Dispense Refill   • azithromycin (ZITHROMAX) 250 MG Tab Take 2 tablets on day 1, then take 1 tablet a day for 4 days. 6 Tab 0   • MethylPREDNISolone (MEDROL DOSEPAK) 4 MG Tablet Therapy Pack Take as directed. 21 Tab 0   • [START ON 4/7/2018] oxyCODONE CR (OXYCONTIN) 10 MG Tablet Extended Release 12 hour Abuse-Deterrent Take 1 Tab by mouth every 12 hours for 30 days. 60 Tab 0   • ADVAIR -21 MCG/ACT inhaler INHALE TWO PUFFS BY MOUTH TWICE DAILY. USE  WITH  SPACER  AND  RINSE  MOUTH  AFTER  EACH  USE 3 Inhaler 3   • tiotropium (SPIRIVA HANDIHALER) 18 MCG Cap Inhale 1 Cap by mouth every day. 30 Cap 11   • metoprolol (LOPRESSOR) 25 MG Tab Take 1 Tab by mouth 2 times a day. 180 Tab 3   • dabigatran (PRADAXA) 75 MG Cap capsule Take 1 Cap by mouth 2 Times a Day. 60 Cap 11   • albuterol (PROVENTIL) 2.5mg/3ml Nebu Soln solution for nebulization 3 mL by Nebulization route every four hours as needed for Shortness  "of Breath. 150 mL 11   • irbesartan-hydrochlorothiazide (AVALIDE) 150-12.5 MG per tablet Take 0.5 Tabs by mouth every day. 90 Tab 3   • Cyanocobalamin (VITAMIN B-12 PO) Take  by mouth.     • calcium-vitamin D (OSCAL-250) 250-125 MG-UNIT TABS Take 1 Tab by mouth every day.     • VITAMIN D, CHOLECALCIFEROL, PO Take 1 Cap by mouth 2 Times a Day.     • levalbuterol (XOPENEX HFA) 45 MCG/ACT inhaler Inhale 2 Puffs by mouth every 6 hours as needed for Shortness of Breath. 3 Inhaler 0   • omeprazole (PRILOSEC) 20 MG delayed-release capsule Take 1 Cap by mouth every day. 90 Cap 2   • albuterol (PROVENTIL) 2.5mg/3ml Nebu Soln solution for nebulization 2.5 mg by Nebulization route every four hours as needed for Shortness of Breath.     • lidocaine (LIDODERM) 5 % Patch Apply one patch to affected area on during the day for 12 hours, off at night (Patient taking differently: Apply 1 Patch to skin as directed. Apply one patch to affected area on during the day for 12 hours, off at night) 30 Patch 6     No current facility-administered medications for this visit.           Allergies: Codeine      ROS   Gen: Denies fever, chills, unintentional weight loss, fatigue, night sweats  E/N/T: Denies ear pain, nasal congestion  Resp:Denies wheezing,  hemoptysis  CV: Denies chest pain, chest tightness, palpitations, BLE edema  Sleep:Denies morning headache, insomnia, daytime somnolence, snoring, gasping for air, apnea  Neuro: Denies frequent headaches, weakness, dizziness  GI: Denies N/V, acid reflux/heartburn  See HPI.  All other systems reviewed and negative      Vital signs for this encounter:  Vitals:    03/07/18 0828   Height: 1.549 m (5' 1\")   Weight: 53.5 kg (118 lb)   Weight % change since last entry.: 0 %   BP: 118/72   Pulse: 75   BMI (Calculated): 22.3   Resp: 16   Temp: 36.5 °C (97.7 °F)   O2 sat % room air: 96 %                 Physical Exam:   Appearance: well developed, well nourished, no acute distress.  Eyes: PERRL, EOM " intact, sclere white, conjunctiva moist.  Ears: no lesions or deformities.  Hearing: grossly intact.  Nose: no lesions or deformities.  Dentition: good dentition.  Oropharynx: tongue normal, posterior pharynx without erythema or exudate.  Neck: supple, trachea midline, no masses.  Respiratory effort: no intercostal retractions or use of accessory muscles.  Lung auscultation: Bilateral diminished   Heart auscultation: no murmur, rub, or gallop.   Extremities: no cyanosis or edema.  Abdomen: soft, non-tender, no masses.  Gait and station: grossly normal   Digits and Nails: no clubbing, cyanosis, petechiae, or nodes.  Cranial nerves: grossly normal.  Motor: no focal deficits observed.  Skin: no rashes, lesions, or ulcers noted.  Orientation: oriented to time, place, and person.  Mood and affect: mood and affect appropriate, normal interaction with examiner.    Assessment   1. Mucopurulent chronic bronchitis (CMS-HCC)     2. MAMI (obstructive sleep apnea)     3. TB lung, latent         Patient was seen for 30 minutes, more than 50% of time spent in face to face review, counseling, and arranging future evaluation and follow up of medical conditions and care.     PLAN:   Patient Instructions   1) Continue nocturnal oxygen at 2L  2) Start Trelegy 1 puff, once a day with mouth rinse  3) Continue xopenex and nebulizer as needed  4) Add Mucinex to daily regimen  5) Vaccines: Up to date with Prevnar 13, Pneumovax 23, flu  6) Return in about 3 months (around 6/7/2018) for review of symptoms, if not sooner, follow up with MARILYN Garcia.   7) Medrol and Zpack on hand

## 2018-03-20 PROBLEM — J96.11 CHRONIC RESPIRATORY FAILURE WITH HYPOXIA (HCC): Status: ACTIVE | Noted: 2018-03-20

## 2018-04-04 ENCOUNTER — TELEPHONE (OUTPATIENT)
Dept: MEDICAL GROUP | Facility: MEDICAL CENTER | Age: 83
End: 2018-04-04

## 2018-04-04 NOTE — TELEPHONE ENCOUNTER
1. Caller Name: Hortencia Ac                                        Call Back Number: [phone]      Patient approves a detailed voicemail message: yes    Calling to reports Pt's Son Dileep will be bringing her to her upcoming apt. Requesting to have dementia testing done on Pt while in office as they feel she is worsening quite quickly while observing in home. Would like to have you aware of this and avoid discussing at time of apt as to not upset their Mom.

## 2018-04-05 NOTE — TELEPHONE ENCOUNTER
Noted, this is for her wellness assessment, if we have time to perform a full Mini-Mental status exam we can do so.  The patient will need to be aware of the testing however because she has to answer questions.

## 2018-04-08 DIAGNOSIS — G47.33 OSA (OBSTRUCTIVE SLEEP APNEA): Chronic | ICD-10-CM

## 2018-04-08 DIAGNOSIS — M54.5 LOW BACK PAIN, UNSPECIFIED BACK PAIN LATERALITY, UNSPECIFIED CHRONICITY, WITH SCIATICA PRESENCE UNSPECIFIED: Chronic | ICD-10-CM

## 2018-04-08 DIAGNOSIS — J96.11 CHRONIC RESPIRATORY FAILURE WITH HYPOXIA (HCC): ICD-10-CM

## 2018-04-08 DIAGNOSIS — M54.2 CERVICAL PAIN: Chronic | ICD-10-CM

## 2018-04-08 DIAGNOSIS — J41.1 MUCOPURULENT CHRONIC BRONCHITIS (HCC): Chronic | ICD-10-CM

## 2018-04-10 ENCOUNTER — OFFICE VISIT (OUTPATIENT)
Dept: MEDICAL GROUP | Facility: MEDICAL CENTER | Age: 83
End: 2018-04-10
Payer: MEDICARE

## 2018-04-10 VITALS
DIASTOLIC BLOOD PRESSURE: 60 MMHG | HEART RATE: 81 BPM | WEIGHT: 113 LBS | SYSTOLIC BLOOD PRESSURE: 110 MMHG | BODY MASS INDEX: 21.34 KG/M2 | HEIGHT: 61 IN | OXYGEN SATURATION: 94 % | TEMPERATURE: 98 F

## 2018-04-10 DIAGNOSIS — R52 PAIN: ICD-10-CM

## 2018-04-10 DIAGNOSIS — F11.20 OPIATE DEPENDENCE, CONTINUOUS (HCC): ICD-10-CM

## 2018-04-10 DIAGNOSIS — Z00.00 MEDICARE ANNUAL WELLNESS VISIT, SUBSEQUENT: Primary | ICD-10-CM

## 2018-04-10 DIAGNOSIS — I48.0 PAF (PAROXYSMAL ATRIAL FIBRILLATION) (HCC): Chronic | ICD-10-CM

## 2018-04-10 DIAGNOSIS — G31.84 MILD COGNITIVE IMPAIRMENT: Chronic | ICD-10-CM

## 2018-04-10 DIAGNOSIS — Z79.891 CHRONIC USE OF OPIATE DRUGS THERAPEUTIC PURPOSES: ICD-10-CM

## 2018-04-10 DIAGNOSIS — J41.1 MUCOPURULENT CHRONIC BRONCHITIS (HCC): Chronic | ICD-10-CM

## 2018-04-10 DIAGNOSIS — Z79.891 CHRONIC USE OF OPIATE FOR THERAPEUTIC PURPOSE: Chronic | ICD-10-CM

## 2018-04-10 DIAGNOSIS — J96.11 CHRONIC RESPIRATORY FAILURE WITH HYPOXIA (HCC): ICD-10-CM

## 2018-04-10 PROCEDURE — G0439 PPPS, SUBSEQ VISIT: HCPCS | Performed by: INTERNAL MEDICINE

## 2018-04-10 RX ORDER — OXYCODONE HCL 10 MG/1
10 TABLET, FILM COATED, EXTENDED RELEASE ORAL EVERY 12 HOURS
Qty: 60 TAB | Refills: 0 | Status: SHIPPED | OUTPATIENT
Start: 2018-05-07 | End: 2018-05-17 | Stop reason: SDUPTHER

## 2018-04-10 ASSESSMENT — PATIENT HEALTH QUESTIONNAIRE - PHQ9: CLINICAL INTERPRETATION OF PHQ2 SCORE: 0

## 2018-04-10 ASSESSMENT — PAIN SCALES - GENERAL: PAINLEVEL: 7=MODERATE-SEVERE PAIN

## 2018-04-10 ASSESSMENT — ACTIVITIES OF DAILY LIVING (ADL): BATHING_REQUIRES_ASSISTANCE: 0

## 2018-04-10 NOTE — PROGRESS NOTES
Chief Complaint   Patient presents with   • Annual Wellness Visit         HPI:  Venus is a 86 y.o. here for Medicare Annual Wellness Visit  Medications, allergies, medical history, surgical history, family history reviewed and updated  Lives at home son sees her daily and arranges medication   Taking OxyContin 10 mg twice daily, take as directed, no alcohol, denies depression, no falls, has had some memory loss, denies drowsiness, sedation, confusion, constipation      Patient Active Problem List    Diagnosis Date Noted   • Chronic anticoagulation 04/25/2012     Priority: High   • Chronic respiratory failure with hypoxia (CMS-HCC) 03/20/2018   • TB lung, latent 05/04/2016   • Mild cognitive impairment 01/11/2016   • Sensorineural hearing loss 12/16/2015   • Chronic use of opiate for therapeutic purpose 04/23/2015   • Macular degeneration 07/08/2013   • Tension headache 06/11/2013   • History of shingles 06/07/2012   • History of Helicobacter pylori infection 11/01/2011   • MAMI (obstructive sleep apnea) 08/10/2011   • COPD (chronic obstructive pulmonary disease) (CMS-HCC) 07/20/2011   • Right hip pain 03/28/2011   • PAF (paroxysmal atrial fibrillation) (CMS-HCC) 06/10/2010   • History of compression fracture of spine 05/26/2010   • Low back pain 05/22/2010   • Cervical pain 05/22/2010   • Osteoporosis, idiopathic 05/22/2010   • Hypertension 05/20/2010   • Dyslipidemia 05/20/2010   • Preventative health care 05/20/2010     Cervical pain  4/08 MRI cervical spine C3-C4 moderate left-sided foraminal stenosis, C4-C5 moderate foraminal stenosis right, C5-C6 moderate right-sided foraminal stenosis, C6-C7 moderate bilateral foraminal stenosis  5/08 CT cervical spine C3-C4 uncovertebral joint arthropathy and significant left-sided neural foraminal narrowing, C4-C5 uncovertebral joint arthropathy right significant neuroforaminal narrowing, C5-C6 uncovertebral joint arthropathy right moderate neural foraminal narrowing, C6-C7  uncovertebral joint arthropathy moderate to severe right neural foraminal narrowing  7/08 C3-C5 cervical facet injections   1/09 medial branch nerve block diagnostic   2/09 C4-C5 cervical epidural under fluoroscopy   4/13 Daughter called [place] pain suggest taper pain med, she has sched to taper the oxycontin  4/15/13 resumed oxycontin 10 bid  6/11/13 off oxcontin  7/24/13 increase oxycontin from qday to 10 mg bid   9/14/17 custom PT discharge summary patient did not improve walking tolerance or standing tolerance, medicare g-code status 60-79% impairment, thoracolumbar flexion decreased from 50-25%, extension 10%, sidebending 10-25%  1/17/18  pain note left cervical paraspinal muscle trigger point injections  1/31/18  pain note  2/2/18  pain note, reviewed cervical spine x-ray multilevel DJD, recommend consider intrathecal pain pump trial     Chronic opiate  2/26/15 narcotic contract  6/12/15 opiate risk score 0  10/4/15   7/25/16   9/20/16  pain note recommended left L3-L5 MBBB  10/31/16   2/6/17   5/9/17 narcotic contract  5/9/17 opiate risk score 0  5/9/17   5/9/17 depression screening score 0  6/28/17 trial cymbalta 30 mg  8/7/17 did not start cymbalta, will start  8/7/17   8/7/17 UDT  8/21/17 change cymbalta to qod  9/18/17 off cymbalta  9/14/17 custom PT discharge summary patient did not improve walking tolerance or standing tolerance, medicare g-code status 60-79% impairment, thoracolumbar flexion decreased from 50-25%, extension 10%, sidebending 10-25%  11/3/17   2/6/18   Has seen neurosurgery, pain management locally and at [place], has tried NSAIDs, celebrex, cymbalta, pregabalin, gabapentin, lidoderm, voltaren gel     COPD  7/11 CT spiral thorax extensive emphysematous change of lung, small left pleural effusion left lung base with atelectasis unchanged from prior CT  5/10 CT spriral thorax emphysematous change  and dependent atelectasis left lung base  7/11 PFT FEV/FVC 59%, FEV1 1.1 L (75% predicted), significant post bronchodilator response noted (FEV1 improved 16%). Mixed restrictive and obstructive pattern,COPD with GOLD stage II with sig bronchodilator response  7/11 trial of symbicort 80/4.5 mcg bid discussed with daughter plus albuterol neb prn, [facility] home education ordered  5/12 off symbicort   5/22/14 cxr negative  6/23/14 on symbicort  7/20/15 change to advair 250/50 mcg from symbicort (not covered on insurance)  9/3/15 cxr negative  10/4/15 add spiriva and change symbicort to advair 250/50 mcg bid  10/19/15 CT high resolution thorax, no evidence of interstitial lung disease, minimal scattered opacification could indicate minimal areas of fibrosis periphery of each lung, similar to prior exam, emphysema most prominent upper lobes bilateral  10/19/15 PFT FEV1 1.1 (61% predicted),FVC 1.9,FEV/FVC 58%, no bronchodilator response,DLCO 34%; on advair 250/50 mcg bid and spiriva  11/2/15 change from advair discus to advair 250 inhaler and continue spiriva   12/11/15 not taking advair, will start advair and cont spiriva  12/11/15 cxr negative  3/14/16 [facility] note complaining doxycycline and taper steroids, continue nocturnal oxygen, continue rotating antibiotics for bronchiectasis, follow-up laboratory testing ordered  3/14/16  (normal), quantiferon gold positive, allergen panel negative, IgE 357 (less than 214), IgA 116 (),, IgM 27 (),IgG 947 ([phone])  4/13/16 cxr mild cardiomegaly  5/4/16 [facility] note continue advair 115/21 bid with spacer given doxycycline and prednisone, continue oxygen 3 L at night, AFB samples ×3, follow-up 3 months  7/25/16 pulmonary note on prednisone taper one week out of the month and doxycycline one per day for one week per month, on advair bid and spiriva and oxygen 3 liters at night  11/8/16 pulmonary note continue advair 115/21 ucg bid,spiriva, xopenex as needed, oxygen 3 L at  night  10/4/17 pulmonary note continue advair 115/21 two inhalations bid, spiriva daily, xoponex as needed, oxygen 3 L at night, history of positive quantiferon gold, will order sputum sample follow-up 4 months  3/17/18 pulmonary note, continue nocturnal oxygen 2 L, start trelegy one puff daily     Dyslipidemia   5/10 chol 163,trig 68,hdl 69,ldl 80  6/10 chol 163,trig 60,hdl 69,ldl 80  7/11 chol 118,trig 45,hdl 65,ldl 44 on crestor 10 mg  10/11 chol 165,trig 47,hdl 78,ldl 74 on crestor 10 mg done at [place]  2/12 off crestor  6/12 chol 211,trig 104,hdl 64,ldl 126 off crestor  3/4/14 chol 222,trig 124,hdl 52,ldl 145 off meds     History of compression fracture  5/10 MRI thoracic spine subacute T12 compression fracture  5/27/10 kyphoplasty T11  6/10 MRI lumbar spine T12 compression fracture mild to moderate central canal narrowing, interval T11 kyphoplasty, L2-L3 moderate foraminal stenosis, L3-L4 severe left foraminal stenosis, moderate right foraminal stenosis, L4-L5 moderate central canal stenosis  7/10 dexa LS +1.0,hip -1.6  7/28/10 T12 vertebral plasty  8/11 [place] pain evaluation  pain management, recommend continue oxycontin 10 bid  10/11 [place] pain  T11-L1 injection  2/13  pain note, T10-T11 epidural injection  6/13 dexa LS +1.8, forearm -2.7  9/23/13 reclast injection  9/4/14 reclast IV infusion  9/14/17 custom PT discharge summary patient did not improve walking tolerance or standing tolerance, medicare g-code status 60-79% impairment, thoracolumbar flexion decreased from 50-25%, extension 10%, sidebending 10-25%     History H. pylori  9/11 serum IgG positive s/p prevpack  9/19/12 cbc,cmp,lipase neg; u/s abd gallbladder sludge without biliary dilatation or stone  12/12/12 [facility] eval rec EGD/colon pt did not follow up      History shingles     Hypertension  1/05 carotid u/s negative  1/07 echo LV EF 60% ,mild LVH  1/09 renal ultrasound 6 mm right cortical cyst  9/09 MRI brain  with and without moderate nonspecific microvascular ischemic change  9/09 MRA next mild plaque right internal carotid  5/24/10 echo normal LV size and function, EF 60%, mild to moderate AR, RVSP 35-40 consistent with mild pulmonary hypertension  5/10 persantine thallium no ischemia, EF 72%  9/10 change avalide 150/12.5 to avapro 150 qday, had sodium 125 in ER  3/11 on avapro 300 mg  7/8/11 echo normal LV function, EF 55%  7/8/11 Persantine thallium possible small area mild ischemia in the lateral wall, no evidence of infarction  7/11   3/12 increase metoprolol to 50 bid, cont avapro 300 mg, start norvasc 2.5 mg  5/1/12 increase norvasc to 5 mg, change avapro to avalide 300/12.5 mg, cont metoprolol 50 bid  10/2/12 on avalide 300/12.5 mg and metoprolol 50 bid and norvasc 5 mg  10/12 Persantine thallium diaphragmatic uptake possible attenuation, fixed inferolateral defect which may be secondary to attenuation, EF 76%, no wall motion abnormalities, no evidence of significant ischemia.  1/13 echo normal LV function, grade 2 diastolic dysfunction, EF 70% moderate aortic insufficiency  1/13   1/23/13 cxr cardiomegaly without pulmonary edema  6/11/13 on avalide 300/12.5 mg,norvasc 5 mg, metoprolol 50 bid  4/7/14 cardiology note atrial fibrillation, start pradaxa 75 mg bid, stop asa, stop norvasc, decreased avalide 300/12.5 mg to 1/2 per day, increase metoprolol to 50 mg 1 1/2 bid  10/20/14 metoprolol 50 mg decreased to 25 mg bid by [place] doctor due to low heart rate, continues on avalide 300/12.5 mg   12/29/14  cardiology note continue pradaxa, metoprolol 25 mg 1/2 bid, avalide 300/12.5 mg  7/25/16 avalide 150/12.5 mg decrease to 1/2 tab per day and continue metoprolol 25 mg bid  8/24/16 cardiology note sinus rhythm on exam,[id number] score=2 continue anticoagulation, low-dose metoprolol,avalide 150/12.5 mg 1/2 per day, follow-up one year  12/14/17 cardiology note remains in sinus rhythm, follow-up  one year     Low back pain  6/06 epidural L4-L5   12/08 x-ray LS moderate to severe DJD  6/10 MRI lumbar spine T12 compression fracture with mild-to-moderate central spinal canal narrowing, interval T11 kyphoplasty, L2-L3 moderate frontal stenosis, L3-L4 severe left foraminal stenosis, moderate right foraminal stenosis, L4-L5 moderate central spinal canal  7/10 interventional rad consult epidural T11 to L1 region  7/28/10 T12 vertebroplasty  7/30/10 norco taper, and lyrica 50 mg bid  8/5/10 reaction to lyrica, stop lyrica  8/19/10 lumbar steroid epidural   9/10  note rec decompression if pain persist  10/10 bilateral lumbar facet joint injections L3-S1   12/10 xray LS old T11 and T12 fracture status post kyphoplasty  1/11 [place] [facility] note  3/11 trial of spinal stimulator  8/11 [place] pain note evaluation  pain management cont oxycontin 10 bid  11/22/11 [place] pain note dr. hodge; trial of baclofen, T11 plus T12 left-sided nerve root block pending  7/12  pain note;Left-sided T11-T12 transforaminal epidural   4/13 daughter called [place] pain suggest taper pain med, she has sched to taper the oxycontin  4/15/13 resumed oxycontin 10 bid  6/11/13 off oxycontin  7/24/13 increase oxycontin from qday to 10 mg bid   2/28/14 on celebrex 200 mg as needed per  and oxycontin 10 mg bid  4/25/14 trial of cymbalta 30 mg, continue oxycontin 10 mg twice a day, recommend not use tramadol (side effects since on oxycontin already) or celebrex (on pradaxa)  5/29/14  pain management [facility]; recommend T11-T12 left-sided transforaminal nerve block  7/2/14 [facility] left T11-T12 epidural injection  7/21/14  [place] pain note; increase oxycontin to 10 mg tid x 3 weeks and then taper down  10/27/14 [facility] [facility] note; samples zorvolex  2/26/15 xray lumbar spine mild compression fracture of L4 of indeterminate age but new  compared to 3/25/13 vertebral augmentation and T11 and T12 with severe compression fracture of T12 and focal kyphosis at this level  4/15/16 outpatient physical therapy phone call indicating patient unable to make appointments, recommending home health physical therapy  9/20/16  pain note recommended left L3-L5 MBBB  3/22/17  pain procedure note left L3-L5 MBBB #1  6/28/17 trial cymbalta 30 mg  6/29/17 custom physical therapy note  8/3/17 custom PT note  9/18/17 off cymbalta  9/14/17 custom PT discharge summary patient did not improve walking tolerance or standing tolerance, medicare g-code status 60-79% impairment, thoracolumbar flexion decreased from 50-25%, extension 10%, sidebending 10-25%  11/14/17 custom PT discharge summary no improvement  11/29/17 MRI lumbar spine no acute fracture, lumbar levoscoliosis T12-L1, kyphotic deformity he talked L1, previous compression fractures T11, T12, L1, previous vertebral augmentation procedures T11 and L1, moderate central canal stenosis T12-L3, severe canal stenosis L3-L4, no significant change  1/31/18  pain note  2/2/18  pain note, reviewed cervical spine x-ray multilevel DJD, recommend consider intrathecal pain pump trial  Tried NSAIDs, celebrex, pregabalin, gabapentin, cymbalta, physical therapy,tens     Macular degeneration  7/1/13 dr.mills mcnair note, start PreserVision AREDS II vitamin followup 6 months     Mild cognitive impairment  1/11/16 MMSE 24/30  7/14/17 MMSE 23/30 offered aricept     Osteoporosis  3/08 dexa LS +0.3,hip -1.4  5/10 MRI thoracic spine subacute T12 compression fracture  5/27/10 T12 kyphoplasty  6/10 on actonel  7/10 dexa LS +1.0,hip -1.6  8/10 vit d 56  3/11 reclast referral made  6/12 vit d 42 off actonel  6/21/13 dexa LS +1.8, forearm -2.7, I rec reclast, she would like to think it over  9/23/13 reclast injection  3/4/14 vit d 26  9/4/14 reclast IV infusion  2/26/15 xray rib series left; mild deformity  of the lateral 10th rib on the left may be related to a fracture  2/26/15 xray lumbar spine mild compression fracture of L4 of indeterminate age but new compared to /25/13, vertebral augmentation and T11 and T12 with severe compression fracture of T12 and focal kyphosis at this level  12/7/15 reclast IV infusion     Paroxysmal atrial fibrillation  4/10 hospitalization on multaq, also on verapamil and metoprolol for rate control per cardiology  1/11 [facility] note cont multaq  7/11 EKG atrial fibrillation hospitalization  7/8/11 echo normal LV function, EF 55%  7/8/11 Persantine thallium possible small area mild ischemia in the lateral wall, no evidence of infarction  7/11 [facility] during hospitalization changed to amiodarone 200 mg plus pradaxa restarted  7/11   9/11 [facility] note stop amiodarone, cont pradaxa and metoprolol 25 bid  3/12 cont pradaxa and increase metoprolol to 50 bid due to higher bp  5/12 pradaxa decreased from 150 bid to 75 bid per [facility] due to nosebleeds  6/12 EKG NSR  1/13 echo normal LV function, grade 2 diastolic dysfunction, EF 70% moderate aortic insufficiency  6/11/13 on pradaxa and metoprolol 50 bid for rate control, NSR today  9/13 cardiology note stop pradaxa and start asa 81 mg  4/7/14 cardiology note atrial fibrillation, start pradaxa 75 mg bid, stop asa, stop norvasc, decreased avalide 300/12.5 mg to 1/2 per day, increase metoprolol to 50 mg 1 1/2 bid  4/14/14 cardiology note, NSR on exam, continue pradaxa 75 mg bid, metoprolol 75 mg bid, avalide 300/12.5 mg 1/2 tab per day  10/20/14 metoprolol 50 mg decreased to 25 mg bid by [place] doctor due to low heart rate, continues on pradaxa and avalide 300/12.5 mg   12/29/14  cardiology note continue pradaxa, metoprolol 25 mg 1/2 bid, avalide 300/12.5 mg  6/12/15 NSR on exam  8/24/16 cardiology note sinus rhythm on exam,[id number] score=2 continue anticoagulation, low-dose metoprolol follow-up one year  12/14/17 cardiology note remains in  sinus rhythm, follow-up one year     Preventative health  5/05 colon per [facility] polyp no path report, f/u rec 5 yrs (12/12/12 [facility] note)  4/09 stool occult blood negative  12/09 mammogram  10/1/11 pneumovax  10/2/12 zoster vaccine  6/11/13 declines mammogram, tdap  6/13 dexa LS +1.8, forearm -2.7  3/4/14 vit d 26  10/12/15 prevnar     Right hip pain  3/11 MRI right hip DJD and tear of the anterior/superior acetabular labrum  4/11  ortho note not believe hip is primary problem, referred to  or [facility]     Sensorineural hearing loss  12/7/15  audiology evaluation mild sensorineural hearing loss     Sleep apnea  8/11 [facility] note sleep study apnea-hypopnea index 86, low sat 74%, start CPAP  8-18 cm    2013 off cpap not comfortable  3/14/16  (normal), quantiferon gold positive, allergen panel negative, IgE 357 (less than 214), IgA 116 (),, IgM 27 (),IgG 947 ([phone])  4/13/16 cxr mild cardiomegaly  5/4/16 [facility] note continue oxygen 3 L at night  11/8/16 pulmonary note continue oxygen 3 L at night  10/4/17 pulmonary note continue oxygen 3 L at night  3/17/18 pulmonary note, continue nocturnal oxygen 2 L, start trelegy one puff daily     tb latent  3/14/16 positive quantiferon gold test  3/14/16  (normal), quantiferon gold positive, allergen panel negative, IgE 357 (less than 214), IgA 116 (),, IgM 27 (),IgG 947 ([phone])  4/13/16 cxr mild cardiomegaly  5/4/16 [facility] note continue advair 115/21 bid with spacer given doxycycline and prednisone, continue oxygen 3 L at night, AFB samples ×3, follow-up 3 months  7/25/16 pulmonary note on prednisone taper one week out of the month and doxycycline one per day for one week per month, on advair bid and spiriva and oxygen 3 liters at night  7/27/17 PPD negative  10/4/17 pulmonary note continue advair 115/21 two inhalations bid, spiriva daily, xoponex as needed, oxygen 3 L at night, history of positive quantiferon gold, will order  sputum sample follow-up 4 months     Tension headache  6/11/13 on fioricet bid  2/28/14 taper fioricet to once a day  4/25/14 now on fioricet prn      chronic respiratory failure                 Current Outpatient Prescriptions   Medication Sig Dispense Refill   • albuterol (PROVENTIL) 2.5mg/3ml Nebu Soln solution for nebulization 3 mL by Nebulization route every four hours as needed for Shortness of Breath. 150 Bullet 11   • Fluticasone-Umeclidin-Vilant (TRELEGY ELLIPTA) 100-62.5-25 MCG/INH AEROSOL POWDER, BREATH ACTIVATED Inhale 1 Puff by mouth every day. 1 Each 11   • oxyCODONE CR (OXYCONTIN) 10 MG Tablet Extended Release 12 hour Abuse-Deterrent Take 1 Tab by mouth every 12 hours for 30 days. 60 Tab 0   • levalbuterol (XOPENEX HFA) 45 MCG/ACT inhaler Inhale 2 Puffs by mouth every 6 hours as needed for Shortness of Breath. 3 Inhaler 0   • omeprazole (PRILOSEC) 20 MG delayed-release capsule Take 1 Cap by mouth every day. 90 Cap 2   • tiotropium (SPIRIVA HANDIHALER) 18 MCG Cap Inhale 1 Cap by mouth every day. 30 Cap 11   • metoprolol (LOPRESSOR) 25 MG Tab Take 1 Tab by mouth 2 times a day. 180 Tab 3   • dabigatran (PRADAXA) 75 MG Cap capsule Take 1 Cap by mouth 2 Times a Day. 60 Cap 11   • irbesartan-hydrochlorothiazide (AVALIDE) 150-12.5 MG per tablet Take 0.5 Tabs by mouth every day. 90 Tab 3   • albuterol (PROVENTIL) 2.5mg/3ml Nebu Soln solution for nebulization 2.5 mg by Nebulization route every four hours as needed for Shortness of Breath.     • Cyanocobalamin (VITAMIN B-12 PO) Take  by mouth.     • lidocaine (LIDODERM) 5 % Patch Apply one patch to affected area on during the day for 12 hours, off at night (Patient taking differently: Apply 1 Patch to skin as directed. Apply one patch to affected area on during the day for 12 hours, off at night) 30 Patch 6   • calcium-vitamin D (OSCAL-250) 250-125 MG-UNIT TABS Take 1 Tab by mouth every day.     • VITAMIN D, CHOLECALCIFEROL, PO Take 1 Cap by mouth 2 Times a  Day.       No current facility-administered medications for this visit.         Patient is taking medications as noted in medication list.  Current supplements as per medication list.     Allergies: Codeine    Current social contact/activities: Pt plays solitaire and spends time with family  Patient's perception of their health: accepting     Is patient current with immunizations? No, due for TDAP. Patient is interested in receiving NONE today.    She  reports that she quit smoking about 34 years ago. Her smoking use included Cigarettes. She has a 37.50 pack-year smoking history. She has never used smokeless tobacco. She reports that she does not drink alcohol or use drugs.  Counseling given: Not Answered        DPA/Advanced directive: Patient has Advanced Directive, but it is not on file. Instructed to bring in a copy to scan into their chart.    ROS:    Gait: Uses a walker for walks  Ostomy: no    Other tubes: no   Amputations: no   Chronic oxygen use no    Last eye exam 2 years ago    Wears hearing aids: yes    : Denies any urinary leakage during the last 6 months       Screening:  COPD    1.  Is patient under the care of a pulmonologist?Yes, CareTeam was updated.  2.  Has patient ever completed a PFT or spirometry? Yes, on 10/21/15.  3.  Is patient on oxygen or CPAP? No.  4.  Has patient ever had instruction on inhaler technique by health care professional? No        Depression Screening    Little interest or pleasure in doing things?  0 - not at all  Feeling down, depressed, or hopeless? 0 - not at all  Patient Health Questionnaire Score: 0    If depressive symptoms identified deferred to follow up visit unless specifically addressed in assessment and plan.    Interpretation of PHQ-9 Total Score   Score Severity   1-4 No Depression   5-9 Mild Depression   10-14 Moderate Depression   15-19 Moderately Severe Depression   20-27 Severe Depression    Screening for Cognitive Impairment    Three Minute Recall  (apple, watch, kia)  0/3 Kia, horse and apple   Draw clock face with all 12 numbers set to the hand to show 10 minutes past 11 o'clock  0 1/5  If cognitive concerns identified, deferred for follow up unless specifically addressed in assessment and plan.    Fall Risk Assessment    Has the patient had two or more falls in the last year or any fall with injury in the last year?  No  If fall risk identified, deferred for follow up unless specifically addressed in assessment and plan.    Safety Assessment    Throw rugs on floor.  Yes  Handrails on all stairs.  Yes  Good lighting in all hallways.  Yes  Difficulty hearing.  No  Patient counseled about all safety risks that were identified.    Functional Assessment ADLs    Are there any barriers preventing you from cooking for yourself or meeting nutritional needs?  No. pts family takes care of all   Are there any barriers preventing you from driving safely or obtaining transportation?  No.    Are there any barriers preventing you from using a telephone or calling for help?  No.    Are there any barriers preventing you from shopping?  No.    Are there any barriers preventing you from taking care of your own finances?  No.    Are there any barriers preventing you from managing your medications?  No.    Are there any barriers preventing you from showering, bathing or dressing yourself?  No.    Are you currently engaging any exercise or physical activity?  Yes.  Pt walks 2-4 times a week.     Health Maintenance Summary                IMM DTaP/Tdap/Td Vaccine Overdue 4/22/1950     PFT SCREENING-FEV1 AND FEV/FVC RATIO / SPIROMETRY SHOULD BE PERFORMED ANNUALLY Overdue 10/21/2016      Done 10/21/2015 PFT DICTATED RESULTS     Patient has more history with this topic...    Annual Wellness Visit Overdue 4/26/2017      Done 4/25/2016 Visit Dx: Medicare annual wellness visit, subsequent     Patient has more history with this topic...    BONE DENSITY Next Due 6/21/2018      Done  6/21/2013 DS-BONE DENSITY STUDY (DEXA)     Patient has more history with this topic...    URINE DRUG SCREEN Next Due 8/2/2018      Done 8/7/2017 [facility] PAIN MANAGEMENT SCREEN          Patient Care Team:  Denis Krueger M.D. as PCP - General  Nathan Fajardo M.D. as Consulting Physician (Anesthesia)  Esdras Thornton M.D. as Consulting Physician (Pulmonary Medicine)  Aashish as Respiratory    Social History   Substance Use Topics   • Smoking status: Former Smoker     Packs/day: 1.50     Years: 25.00     Types: Cigarettes     Quit date: 4/14/1984   • Smokeless tobacco: Never Used      Comment: smoked 25 yrs, quit 1984   • Alcohol use No      Comment: none     Family History   Problem Relation Age of Onset   • Heart Disease Father    • Diabetes Brother      She  has a past medical history of Anesthesia; Aortic regurgitation (4/25/2012); Arrhythmia (7/10); Arthritis; Atypical chest pain (4/25/2012); CATARACT (2007,08); Chronic anticoagulation (4/25/2012); Constipation (4/25/2012); COPD (chronic obstructive pulmonary disease) ([facility]); DDD (degenerative disc disease); Dental disorder; Dyspnea (4/25/2012); Generalized osteoarthritis (4/25/2012); Headache(784.0) (4/25/2012); Heart valve insufficiency; Hypercholesteremia; Hyperlipidemia (4/25/2012); Hypertension; MEDICAL HOME; Osteoarthritis of knee (4/25/2012); Osteoporosis; Other accident; Pain; Pneumonia (6/4); TB lung, 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   Past Surgical History:   Procedure Laterality Date   • RECOVERY  7/28/2010    Performed by SURGERY, IR-RECOVERY at [facility]   • OTHER ORTHOPEDIC SURGERY  may, 2010      T11 kypho.   • CATARACT PHACO WITH IOL  1/5/2009    Performed by ROSE MARIE MANN at [facility]   • CARPAL TUNNEL RELEASE  @30 yrs ago    rt hand           Exam:     Hearing diminished, dentition fair  Alert, oriented in no acute  distress.  Eye contact is good, speech goal directed, affect calm  Lungs clear  Cardiovascular S1-S2 regular  MMSE 24/30    Assessment and Plan. The following treatment and monitoring plan is recommended:    Assessment   #1 wellness assessment    #2 Cervical pain 2/2/18  pain note, reviewed cervical spine x-ray multilevel DJD, recommend consider intrathecal pain pump trial     #3 Chronic opiate therapy OxyContin 10 mg twice daily  5/9/17 narcotic contract  5/9/17 opiate risk score 0  8/7/17   8/7/17 UDT  2/6/18   Has seen neurosurgery, physical therapy, pain management locally and at [place], has tried NSAIDs, celebrex, cymbalta, pregabalin, gabapentin, lidoderm, voltaren gel     #4 COPD 3/17/18 pulmonary note, continue nocturnal oxygen 2 L, start trelegy one puff daily, stable followed by pulmonary     #5 Dyslipidemia 3/4/14 chol 222,trig 124,hdl 52,ldl 145 off meds stable     #6 Hypertension followed by cardiology, stable     #7 Low back pain followed by pain management 2/2/18  pain note, reviewed cervical spine x-ray multilevel DJD, recommend consider intrathecal pain pump trial , has Tried NSAIDs, celebrex, pregabalin, gabapentin, cymbalta, physical therapy,tens     #8 Mild cognitive impairment no depression, MMSE 24/30 done today     #9 Osteoporosis 6/21/13 dexa LS +1.8, forearm -2.7, last cauenqt93/7/15 reclast IV infusion     #10 Paroxysmal atrial fibrillation followed by cardiology sinus rhythm on exam     #11 Preventative health  5/05 colon per [facility] polyp no path report, f/u rec 5 yrs (12/12/12 [facility] note)  4/09 stool occult blood negative  12/09 mammogram  10/1/11 pneumovax  10/2/12 zoster vaccine  6/11/13 declines mammogram, tdap  6/13 dexa LS +1.8, forearm -2.7  3/4/14 vit d 26  10/12/15 prevnar      #12 Sensorineural hearing loss12/7/15  audiology evaluation mild sensorineural hearing loss     #13 Sleep apnea on 2 L nocturnal oxygen per pulmonary, stable     #14 chronic  respiratory failure on nocturnal oxygen 2 L                Services suggested:    Health Care Screening: Age-appropriate preventive services recommended by USPTF and ACIP covered by Medicare were discussed today. Services ordered if indicated and agreed upon by the patient.  Referrals offered: PT/OT/Nutrition counseling/Behavioral Health/Smoking cessation as per orders if indicated.    Discussion today about general wellness and lifestyle habits:    · Prevent falls and reduce trip hazards; Cautioned about securing or removing rugs.  · Have a working fire alarm and carbon monoxide detector;   · Engage in regular physical activity and social activities       Follow-up:     Plan  #1 health maintenance reviewed and updated    #2 has had pneumococcal 13, pneumococcal 23, shingles vaccine    #3 due for bone density which has been ordered, information provided to son    #4 fall precautions, has seen physical therapy, declines further    #5 has had hearing test declines further    #6     #7 narcotic contract signed by son with patient advising    #8 decrease OxyContin to 5 mg every afternoon, continue 10 mg a.m.    #9  refill OxyContin 10 mg twice daily to last until next month for follow-up visit    #10 risks and benefits of opiate medications discussed, handout provided discussing Opioid side effects include: Drowsiness, sedation, confusion, memory loss, depression, impaired balance and coordination, increased risk of falls, respiratory suppression, nausea, vomiting, constipation, itching and rash, difficulty urinating.  Long-term use may contribute to tolerance, dependency, habituation requiring more medication to achieve the same benefits, and long-term use may decrease efficacy of the medication.  If medications are discontinued abruptly, symptoms of withdrawals may include nausea, abdominal pain, irregular heart rhythms, diaphoresis, all narcotics need to be tapered under the supervision of a provider.  Overdose  and death may occur if patient does not take the opioid prescriptions as prescribed, other medications may potentiate side effects of respiratory suppression, overdose, death, including benzodiazepines, depression or anxiety medications, alcohol, illicit drugs.  Patient will notify myself or prescribing physician of narcotics, of any changes in medication regimen, any additional medications, any change in dosing or frequency of opiate therapy, and patient will not receive opiate prescriptions from anyone other than myself having signed a narcotic contract previously with myself.  Patient has been warned not to operate heavy machinery, and to not handle firearms, operate motor vehicle under the influence of opiate medications, these may affect his ability or her ability to function and result in life-threatening accidents, and as such may have legal repercussions.  Discussed management plan with narcotics risk, symptomatic care, reviewed activity level, exercise program, activity modification.  Reviewed imaging studies performed, further diagnostic and imaging considerations, further evaluation by specialists including pain management, neurosurgeon orthopedics if indicated  Discussed management options and reviewed symptomatic care, can consider acupuncture, massage therapy, TENS unit, home exercise program, physical therapy, reviewed pain medication dosing, risks and alternatives, medication adjustments, consider taper, reviewed , imaging options further discussed and therapeutic options  Given the above-mentioned considerations, at this time benefits of chronic opiate and pain medication therapy outweighs the risks    #11 no further mammogram, Pap smear, colonoscopy    #12 follow-up one month as scheduled    #13 fall precautions    #14 no memory medications necessary at this time we will monitor

## 2018-05-02 ENCOUNTER — TELEPHONE (OUTPATIENT)
Dept: MEDICAL GROUP | Facility: MEDICAL CENTER | Age: 83
End: 2018-05-02

## 2018-05-02 DIAGNOSIS — G31.84 MILD COGNITIVE IMPAIRMENT: Chronic | ICD-10-CM

## 2018-05-02 RX ORDER — DONEPEZIL HYDROCHLORIDE 5 MG/1
5 TABLET, FILM COATED ORAL DAILY
Qty: 30 TAB | Refills: 3 | Status: SHIPPED | OUTPATIENT
Start: 2018-05-02 | End: 2018-09-10

## 2018-05-03 NOTE — TELEPHONE ENCOUNTER
Regarding: RE: Non-Urgent Medical Question  Contact: [phone]  ----- Message from Kevon Fernandez Ass't sent at 5/2/2018  8:03 AM PDT -----       ----- Message from Venus Church to Denis Krueger M.D. sent at 5/2/2018  7:31 AM -----   Yes, please doctor! Please go ahead with the prescription.  I hope it helps.  We appreciate  all that you do, and we’ll be in touch regarding  any observations.  Thank you,  Ivana  ----- Message -----  From: Denis Krueger M.D.  Sent: 5/2/2018 12:06 AM PDT  To: Venus Church  Subject: RE: Non-Urgent Medical Question  I believe your mother has memory impairment related to Alzheimer's Disease.  Once started, the medication can be discontinued at any time, and has no irreversible side effects.  There would be no interaction with her current medications.    If you would like, I can send a prescription to her pharmacy, just let me know if your mother would like to start the medication.    Denis        ----- Message -----     From: Venus Church     Sent: 5/1/2018  6:24 AM PDT       To: Denis Krueger M.D.  Subject: RE: Non-Urgent Medical Question    Okay, thank you, doctor.      A couple of Questions:  What is her diagnosis?  Can she stop the medication at any time?  Any irreversible side  effects?  Interactions with current medication    I will be with her when she starts taking it in order to  monitor any side effects.  Thank you for your support  and care for mom.  Have a nice day! :)  Ivana      ----- Message -----  From: Denis Krueger M.D.  Sent: 4/30/2018 10:48 PM PDT  To: Venus Church  Subject: RE: Non-Urgent Medical Question  We can try a medication called Aricept which is a medication that has been shown to slow the progression of memory loss in some patients, it will not reverse memory loss, but again may decrease the rate of memory decline.  I have offered this to your mother previously.  One pill a day that may cause insomnia, nausea, mood swings as potential side  effects.      Denis          ----- Message -----     From: Venus Ranjit     Sent: 4/28/2018 10:59 AM PDT       To: Denis Krueger M.D.  Subject: RE: Non-Urgent Medical Question    Thank you, Doctor!    We are interested in slowing down the memory loss progression.  Dileep started reducing the OxyContin to 1 pill In the morning  and half a pill in the evening.  We are interested in hearing your recommendations for  memory loss treatment being a pill or other.  We are actively looking for support, however we also want to explore controlling the progression with your guidance.  Thank you,  Ivana  [phone]      ----- Message -----  From: Denis Krueger M.D.  Sent: 4/27/2018  6:13 PM PDT  To: Venus Bauerd  Subject: RE: Non-Urgent Medical Question  The tests that she had done in the office is sufficient to tell us that your mother has memory loss.  More specific tests will not be of any benefit since the diagnosis is unchanged and the treatment recommendations are unchanged.  The only consideration as we had recommended previously would be to start a medication which may slow the decline of her memory loss, but unfortunately it is a progressive disease.      However your mother is reluctant to start any medications for her memory.  The other option is to try to taper her pain medications which again she is reluctant to do.  At some point she likely would benefit from moving to an assisted living facility which has 24-hour supervision, or someone could stay with her on a permanent or full-time basis.  There are no easy options to treat memory loss with aging, especially in the late 80s or early 90s of the aging process.     Denis        ----- Message -----     From: Venus Church     Sent: 4/27/2018 11:28 AM PDT       To: Denis Krueger M.D.  Subject: Non-Urgent Medical Question    Laith Krueger,  I am concerned about mom's ability to remember and how much she forgets and how quickly it is occurring.  I am wondering if  the preliminary exam done at a doctor's office is  the only first step to  screen significant memory loss, and if there are other exams that may pinpoint more specifically why mom forgets so much and does not  retain.    My concern is that it is happening fast and she  is now forgetting that she lived in [place] and becoming very disoriented.     Thank you, Doctor.  Ivana  [phone]

## 2018-05-17 ENCOUNTER — TELEPHONE (OUTPATIENT)
Dept: MEDICAL GROUP | Facility: MEDICAL CENTER | Age: 83
End: 2018-05-17

## 2018-05-17 ENCOUNTER — OFFICE VISIT (OUTPATIENT)
Dept: MEDICAL GROUP | Facility: MEDICAL CENTER | Age: 83
End: 2018-05-17
Payer: MEDICARE

## 2018-05-17 VITALS
HEIGHT: 61 IN | SYSTOLIC BLOOD PRESSURE: 128 MMHG | DIASTOLIC BLOOD PRESSURE: 84 MMHG | HEART RATE: 62 BPM | OXYGEN SATURATION: 94 % | TEMPERATURE: 98.2 F

## 2018-05-17 DIAGNOSIS — M54.5 LOW BACK PAIN, UNSPECIFIED BACK PAIN LATERALITY, UNSPECIFIED CHRONICITY, WITH SCIATICA PRESENCE UNSPECIFIED: Chronic | ICD-10-CM

## 2018-05-17 DIAGNOSIS — Z79.891 CHRONIC USE OF OPIATE DRUGS THERAPEUTIC PURPOSES: ICD-10-CM

## 2018-05-17 DIAGNOSIS — Z79.891 CHRONIC USE OF OPIATE FOR THERAPEUTIC PURPOSE: Chronic | ICD-10-CM

## 2018-05-17 DIAGNOSIS — F11.20 OPIATE DEPENDENCE, CONTINUOUS (HCC): ICD-10-CM

## 2018-05-17 DIAGNOSIS — R52 PAIN: ICD-10-CM

## 2018-05-17 PROCEDURE — 99213 OFFICE O/P EST LOW 20 MIN: CPT | Performed by: INTERNAL MEDICINE

## 2018-05-17 RX ORDER — OXYCODONE HCL 10 MG/1
10 TABLET, FILM COATED, EXTENDED RELEASE ORAL EVERY 12 HOURS
Qty: 60 TAB | Refills: 0 | Status: SHIPPED | OUTPATIENT
Start: 2018-06-06 | End: 2018-05-17 | Stop reason: SDUPTHER

## 2018-05-17 RX ORDER — OXYCODONE HCL 10 MG/1
10 TABLET, FILM COATED, EXTENDED RELEASE ORAL EVERY 12 HOURS
Qty: 60 TAB | Refills: 0 | Status: SHIPPED | OUTPATIENT
Start: 2018-07-06 | End: 2018-07-10 | Stop reason: SDUPTHER

## 2018-05-17 NOTE — PROGRESS NOTES
Subjective:      Venus Church is a 87 y.o. female who presents with back pain           HPI    Here with son  Here follow up back pain on oxycodone CR 10 mg bid  although son states working on taper down to OxyContin 10 mg am and 5 mg in pm , patient lives by herself but son sees her daily and arrange patient is compliant with medication as son make sure that she takes as directed.  No falls, walker for ambulation outside of home.  Still independent and does ADLs without assist.  Memory loss at times, short-term, on Ariceptside effects with medications such as nausea, lightheadedness, dizziness.  Chronic low back pain related to previous compression fracture, has seen multiple pain specialists, has had injections, physical therapy, TENS unit, has seen [place] pain management, has tried NSAIDs, Celebrex, gabapentin, pregabalin, Cymbalta no benefit, pain is stable and controlled on OxyContin 10 mg twice daily now tapered down to 10 mg a.m. and 5 mg p.m., denies drowsiness or sedation, does have short-term memory loss, no falls, no depression, no constipation, respiratory suppression, no alcohol or illicit drugs.  On oxygen 2 L at night, COPD followed by pulmonary stable no cough, sputum, or shortness of breath  Paroxysmal atrial fibrillation on pradaxa and lopressor followed by cardiology, no NSAIDs, no bruising or bleeding      Current Outpatient Prescriptions   Medication Sig Dispense Refill   • donepezil (ARICEPT) 5 MG Tab Take 1 Tab by mouth every day. 30 Tab 3   • dabigatran (PRADAXA) 75 MG Cap capsule Take 1 Cap by mouth 2 Times a Day. 60 Cap 6   • oxyCODONE CR (OXYCONTIN) 10 MG Tablet Extended Release 12 hour Abuse-Deterrent Take 1 Tab by mouth every 12 hours for 30 days. 60 Tab 0   • albuterol (PROVENTIL) 2.5mg/3ml Nebu Soln solution for nebulization 3 mL by Nebulization route every four hours as needed for Shortness of Breath. 150 Bullet 11   • Fluticasone-Umeclidin-Vilant (TRELEGY ELLIPTA) 100-62.5-25 MCG/INH  AEROSOL POWDER, BREATH ACTIVATED Inhale 1 Puff by mouth every day. 1 Each 11   • levalbuterol (XOPENEX HFA) 45 MCG/ACT inhaler Inhale 2 Puffs by mouth every 6 hours as needed for Shortness of Breath. 3 Inhaler 0   • omeprazole (PRILOSEC) 20 MG delayed-release capsule Take 1 Cap by mouth every day. 90 Cap 2   • tiotropium (SPIRIVA HANDIHALER) 18 MCG Cap Inhale 1 Cap by mouth every day. 30 Cap 11   • metoprolol (LOPRESSOR) 25 MG Tab Take 1 Tab by mouth 2 times a day. 180 Tab 3   • irbesartan-hydrochlorothiazide (AVALIDE) 150-12.5 MG per tablet Take 0.5 Tabs by mouth every day. 90 Tab 3   • albuterol (PROVENTIL) 2.5mg/3ml Nebu Soln solution for nebulization 2.5 mg by Nebulization route every four hours as needed for Shortness of Breath.     • Cyanocobalamin (VITAMIN B-12 PO) Take  by mouth.     • lidocaine (LIDODERM) 5 % Patch Apply one patch to affected area on during the day for 12 hours, off at night (Patient taking differently: Apply 1 Patch to skin as directed. Apply one patch to affected area on during the day for 12 hours, off at night) 30 Patch 6   • calcium-vitamin D (OSCAL-250) 250-125 MG-UNIT TABS Take 1 Tab by mouth every day.     • VITAMIN D, CHOLECALCIFEROL, PO Take 1 Cap by mouth 2 Times a Day.       No current facility-administered medications for this visit.        Cervical pain  4/08 MRI cervical spine C3-C4 moderate left-sided foraminal stenosis, C4-C5 moderate foraminal stenosis right, C5-C6 moderate right-sided foraminal stenosis, C6-C7 moderate bilateral foraminal stenosis  5/08 CT cervical spine C3-C4 uncovertebral joint arthropathy and significant left-sided neural foraminal narrowing, C4-C5 uncovertebral joint arthropathy right significant neuroforaminal narrowing, C5-C6 uncovertebral joint arthropathy right moderate neural foraminal narrowing, C6-C7 uncovertebral joint arthropathy moderate to severe right neural foraminal narrowing  7/08 C3-C5 cervical facet injections   1/09 medial  branch nerve block diagnostic   2/09 C4-C5 cervical epidural under fluoroscopy   4/13 Daughter called [facility] suggest taper pain med, she has sched to taper the oxycontin  4/15/13 resumed oxycontin 10 bid  6/11/13 off oxcontin  7/24/13 increase oxycontin from qday to 10 mg bid   9/14/17 custom PT discharge summary patient did not improve walking tolerance or standing tolerance, medicare g-code status 60-79% impairment, thoracolumbar flexion decreased from 50-25%, extension 10%, sidebending 10-25%  1/17/18  pain note left cervical paraspinal muscle trigger point injections  1/31/18  pain note  2/2/18  pain note, reviewed cervical spine x-ray multilevel DJD, recommend consider intrathecal pain pump trial     Chronic opiate  2/26/15 narcotic contract  6/12/15 opiate risk score 0  10/4/15   7/25/16   9/20/16  pain note recommended left L3-L5 MBBB  10/31/16   2/6/17   5/9/17 narcotic contract  5/9/17 opiate risk score 0  5/9/17   5/9/17 depression screening score 0  6/28/17 trial cymbalta 30 mg  8/7/17 did not start cymbalta, will start  8/7/17   8/7/17 UDT  8/21/17 change cymbalta to qod  9/18/17 off cymbalta  9/14/17 custom PT discharge summary patient did not improve walking tolerance or standing tolerance, medicare g-code status 60-79% impairment, thoracolumbar flexion decreased from 50-25%, extension 10%, sidebending 10-25%  11/3/17   2/6/18   4/10/18   4/10/18 controlled substances contract, decrease oxycontin to 10 mg am and 5 mg pm  Has seen neurosurgery, pain management locally and at [facility], has tried NSAIDs, celebrex, cymbalta, pregabalin, gabapentin, lidoderm, voltaren gel     COPD  7/11 CT spiral thorax extensive emphysematous change of lung, small left pleural effusion left lung base with atelectasis unchanged from prior CT  5/10 CT spriral thorax emphysematous change and dependent atelectasis left lung base  7/11 PFT  FEV/FVC 59%, FEV1 1.1 L (75% predicted), significant post bronchodilator response noted (FEV1 improved 16%). Mixed restrictive and obstructive pattern,COPD with GOLD stage II with sig bronchodilator response  7/11 trial of symbicort 80/4.5 mcg bid discussed with daughter plus albuterol neb prn, [facility] home education ordered  5/12 off symbicort   5/22/14 cxr negative  6/23/14 on symbicort  7/20/15 change to advair 250/50 mcg from symbicort (not covered on insurance)  9/3/15 cxr negative  10/4/15 add spiriva and change symbicort to advair 250/50 mcg bid  10/19/15 CT high resolution thorax, no evidence of interstitial lung disease, minimal scattered opacification could indicate minimal areas of fibrosis periphery of each lung, similar to prior exam, emphysema most prominent upper lobes bilateral  10/19/15 PFT FEV1 1.1 (61% predicted),FVC 1.9,FEV/FVC 58%, no bronchodilator response,DLCO 34%; on advair 250/50 mcg bid and spiriva  11/2/15 change from advair discus to advair 250 inhaler and continue spiriva   12/11/15 not taking advair, will start advair and cont spiriva  12/11/15 cxr negative  3/14/16 [facility] note complaining doxycycline and taper steroids, continue nocturnal oxygen, continue rotating antibiotics for bronchiectasis, follow-up laboratory testing ordered  3/14/16  (normal), quantiferon gold positive, allergen panel negative, IgE 357 (less than 214), IgA 116 (),, IgM 27 (),IgG 947 ([phone])  4/13/16 cxr mild cardiomegaly  5/4/16 [facility] note continue advair 115/21 bid with spacer given doxycycline and prednisone, continue oxygen 3 L at night, AFB samples ×3, follow-up 3 months  7/25/16 pulmonary note on prednisone taper one week out of the month and doxycycline one per day for one week per month, on advair bid and spiriva and oxygen 3 liters at night  11/8/16 pulmonary note continue advair 115/21 ucg bid,spiriva, xopenex as needed, oxygen 3 L at night  10/4/17 pulmonary note continue advair 115/21  two inhalations bid, spiriva daily, xoponex as needed, oxygen 3 L at night, history of positive quantiferon gold, will order sputum sample follow-up 4 months  3/17/18 pulmonary note, continue nocturnal oxygen 2 L, start trelegy one puff daily     Dyslipidemia   5/10 chol 163,trig 68,hdl 69,ldl 80  6/10 chol 163,trig 60,hdl 69,ldl 80  7/11 chol 118,trig 45,hdl 65,ldl 44 on crestor 10 mg  10/11 chol 165,trig 47,hdl 78,ldl 74 on crestor 10 mg done at [facility]  2/12 off crestor  6/12 chol 211,trig 104,hdl 64,ldl 126 off crestor  3/4/14 chol 222,trig 124,hdl 52,ldl 145 off meds     History of compression fracture  5/10 MRI thoracic spine subacute T12 compression fracture  5/27/10 kyphoplasty T11  6/10 MRI lumbar spine T12 compression fracture mild to moderate central canal narrowing, interval T11 kyphoplasty, L2-L3 moderate foraminal stenosis, L3-L4 severe left foraminal stenosis, moderate right foraminal stenosis, L4-L5 moderate central canal stenosis  7/10 dexa LS +1.0,hip -1.6  7/28/10 T12 vertebral plasty  8/11 [facility] evaluation  pain management, recommend continue oxycontin 10 bid  10/11 [facility]  T11-L1 injection  2/13  pain note, T10-T11 epidural injection  6/13 dexa LS +1.8, forearm -2.7  9/23/13 reclast injection  9/4/14 reclast IV infusion  9/14/17 custom PT discharge summary patient did not improve walking tolerance or standing tolerance, medicare g-code status 60-79% impairment, thoracolumbar flexion decreased from 50-25%, extension 10%, sidebending 10-25%     History H. pylori  9/11 serum IgG positive s/p prevpack  9/19/12 cbc,cmp,lipase neg; u/s abd gallbladder sludge without biliary dilatation or stone  12/12/12 [facility] eval rec EGD/colon pt did not follow up      History shingles     Hypertension  1/05 carotid u/s negative  1/07 echo LV EF 60% ,mild LVH  1/09 renal ultrasound 6 mm right cortical cyst  9/09 MRI brain with and without moderate nonspecific microvascular  ischemic change  9/09 MRA next mild plaque right internal carotid  5/24/10 echo normal LV size and function, EF 60%, mild to moderate AR, RVSP 35-40 consistent with mild pulmonary hypertension  5/10 persantine thallium no ischemia, EF 72%  9/10 change avalide 150/12.5 to avapro 150 qday, had sodium 125 in ER  3/11 on avapro 300 mg  7/8/11 echo normal LV function, EF 55%  7/8/11 Persantine thallium possible small area mild ischemia in the lateral wall, no evidence of infarction  7/11   3/12 increase metoprolol to 50 bid, cont avapro 300 mg, start norvasc 2.5 mg  5/1/12 increase norvasc to 5 mg, change avapro to avalide 300/12.5 mg, cont metoprolol 50 bid  10/2/12 on avalide 300/12.5 mg and metoprolol 50 bid and norvasc 5 mg  10/12 Persantine thallium diaphragmatic uptake possible attenuation, fixed inferolateral defect which may be secondary to attenuation, EF 76%, no wall motion abnormalities, no evidence of significant ischemia.  1/13 echo normal LV function, grade 2 diastolic dysfunction, EF 70% moderate aortic insufficiency  1/13   1/23/13 cxr cardiomegaly without pulmonary edema  6/11/13 on avalide 300/12.5 mg,norvasc 5 mg, metoprolol 50 bid  4/7/14 cardiology note atrial fibrillation, start pradaxa 75 mg bid, stop asa, stop norvasc, decreased avalide 300/12.5 mg to 1/2 per day, increase metoprolol to 50 mg 1 1/2 bid  10/20/14 metoprolol 50 mg decreased to 25 mg bid by [place] doctor due to low heart rate, continues on avalide 300/12.5 mg   12/29/14  cardiology note continue pradaxa, metoprolol 25 mg 1/2 bid, avalide 300/12.5 mg  7/25/16 avalide 150/12.5 mg decrease to 1/2 tab per day and continue metoprolol 25 mg bid  8/24/16 cardiology note sinus rhythm on exam,[id number] score=2 continue anticoagulation, low-dose metoprolol,avalide 150/12.5 mg 1/2 per day, follow-up one year  12/14/17 cardiology note remains in sinus rhythm, follow-up one year     Low back pain  6/06 epidural L4-L5    12/08 x-ray LS moderate to severe DJD  6/10 MRI lumbar spine T12 compression fracture with mild-to-moderate central spinal canal narrowing, interval T11 kyphoplasty, L2-L3 moderate frontal stenosis, L3-L4 severe left foraminal stenosis, moderate right foraminal stenosis, L4-L5 moderate central spinal canal  7/10 interventional rad consult epidural T11 to L1 region  7/28/10 T12 vertebroplasty  7/30/10 norco taper, and lyrica 50 mg bid  8/5/10 reaction to lyrica, stop lyrica  8/19/10 lumbar steroid epidural   9/10  note rec decompression if pain persist  10/10 bilateral lumbar facet joint injections L3-S1   12/10 xray LS old T11 and T12 fracture status post kyphoplasty  1/11 [facility] note  3/11 trial of spinal stimulator  8/11 [facility] note evaluation  pain management cont oxycontin 10 bid  11/22/11 [facility] note dr. hodge; trial of baclofen, T11 plus T12 left-sided nerve root block pending  7/12  pain note;Left-sided T11-T12 transforaminal epidural   4/13 daughter called [facility] suggest taper pain med, she has sched to taper the oxycontin  4/15/13 resumed oxycontin 10 bid  6/11/13 off oxycontin  7/24/13 increase oxycontin from qday to 10 mg bid   2/28/14 on celebrex 200 mg as needed per  and oxycontin 10 mg bid  4/25/14 trial of cymbalta 30 mg, continue oxycontin 10 mg twice a day, recommend not use tramadol (side effects since on oxycontin already) or celebrex (on pradaxa)  5/29/14  pain management [facility]; recommend T11-T12 left-sided transforaminal nerve block  7/2/14 [facility] left T11-T12 epidural injection  7/21/14  [facility] note; increase oxycontin to 10 mg tid x 3 weeks and then taper down  10/27/14 [facility]; samples zorvolex  2/26/15 xray lumbar spine mild compression fracture of L4 of indeterminate age but new compared to 3/25/13 vertebral augmentation and T11  and T12 with severe compression fracture of T12 and focal kyphosis at this level  4/15/16 outpatient physical therapy phone call indicating patient unable to make appointments, recommending home health physical therapy  9/20/16  pain note recommended left L3-L5 MBBB  3/22/17  pain procedure note left L3-L5 MBBB #1  6/28/17 trial cymbalta 30 mg  6/29/17 [facility] physical therapy note  8/3/17 custom PT note  9/18/17 off cymbalta  9/14/17 custom PT discharge summary patient did not improve walking tolerance or standing tolerance, medicare g-code status 60-79% impairment, thoracolumbar flexion decreased from 50-25%, extension 10%, sidebending 10-25%  11/14/17 custom PT discharge summary no improvement  11/29/17 MRI lumbar spine no acute fracture, lumbar levoscoliosis T12-L1, kyphotic deformity he talked L1, previous compression fractures T11, T12, L1, previous vertebral augmentation procedures T11 and L1, moderate central canal stenosis T12-L3, severe canal stenosis L3-L4, no significant change  1/31/18  pain note  2/2/18  pain note, reviewed cervical spine x-ray multilevel DJD, recommend consider intrathecal pain pump trial    Tried NSAIDs, celebrex, pregabalin, gabapentin, cymbalta, physical therapy,tens     Macular degeneration  7/1/13 dr.mills mcnair note, start PreserVision AREDS II vitamin followup 6 months     Mild cognitive impairment  1/11/16 MMSE 24/30  7/14/17 MMSE 23/30 offered aricept  4/10/18 MMSE 24/30  5/2/18 start aricept 5 mg daily     Osteoporosis  3/08 dexa LS +0.3,hip -1.4  5/10 MRI thoracic spine subacute T12 compression fracture  5/27/10 T12 kyphoplasty  6/10 on actonel  7/10 dexa LS +1.0,hip -1.6  8/10 vit d 56  3/11 reclast referral made  6/12 vit d 42 off actonel  6/21/13 dexa LS +1.8, forearm -2.7, I rec reclast, she would like to think it over  9/23/13 reclast injection  3/4/14 vit d 26  9/4/14 reclast IV infusion  2/26/15 xray rib series left; mild  deformity of the lateral 10th rib on the left may be related to a fracture  2/26/15 xray lumbar spine mild compression fracture of L4 of indeterminate age but new compared to /25/13, vertebral augmentation and T11 and T12 with severe compression fracture of T12 and focal kyphosis at this level  12/7/15 reclast IV infusion     Paroxysmal atrial fibrillation  4/10 hospitalization on multaq, also on verapamil and metoprolol for rate control per cardiology  1/11 [facility] note cont multaq  7/11 EKG atrial fibrillation hospitalization  7/8/11 echo normal LV function, EF 55%  7/8/11 Persantine thallium possible small area mild ischemia in the lateral wall, no evidence of infarction  7/11 [facility] during hospitalization changed to amiodarone 200 mg plus pradaxa restarted  7/11   9/11 [facility] note stop amiodarone, cont pradaxa and metoprolol 25 bid  3/12 cont pradaxa and increase metoprolol to 50 bid due to higher bp  5/12 pradaxa decreased from 150 bid to 75 bid per [facility] due to nosebleeds  6/12 EKG NSR  1/13 echo normal LV function, grade 2 diastolic dysfunction, EF 70% moderate aortic insufficiency  6/11/13 on pradaxa and metoprolol 50 bid for rate control, NSR today  9/13 cardiology note stop pradaxa and start asa 81 mg  4/7/14 cardiology note atrial fibrillation, start pradaxa 75 mg bid, stop asa, stop norvasc, decreased avalide 300/12.5 mg to 1/2 per day, increase metoprolol to 50 mg 1 1/2 bid  4/14/14 cardiology note, NSR on exam, continue pradaxa 75 mg bid, metoprolol 75 mg bid, avalide 300/12.5 mg 1/2 tab per day  10/20/14 metoprolol 50 mg decreased to 25 mg bid by [place] doctor due to low heart rate, continues on pradaxa and avalide 300/12.5 mg   12/29/14  cardiology note continue pradaxa, metoprolol 25 mg 1/2 bid, avalide 300/12.5 mg  6/12/15 NSR on exam  8/24/16 cardiology note sinus rhythm on exam,[id number] score=2 continue anticoagulation, low-dose metoprolol follow-up one year  12/14/17 cardiology note  remains in sinus rhythm, follow-up one year     Preventative health  5/05 colon per [facility] polyp no path report, f/u rec 5 yrs (12/12/12 [facility] note)  4/09 stool occult blood negative  12/09 mammogram  10/1/11 pneumovax  10/2/12 zoster vaccine  6/11/13 declines mammogram, tdap  6/13 dexa LS +1.8, forearm -2.7  3/4/14 vit d 26  10/12/15 prevnar     Right hip pain  3/11 MRI right hip DJD and tear of the anterior/superior acetabular labrum  4/11  ortho note not believe hip is primary problem, referred to  or [facility]     Sensorineural hearing loss  12/7/15  audiology evaluation mild sensorineural hearing loss     Sleep apnea  8/11 [facility] note sleep study apnea-hypopnea index 86, low sat 74%, start CPAP  8-18 cm    2013 off cpap not comfortable  3/14/16  (normal), quantiferon gold positive, allergen panel negative, IgE 357 (less than 214), IgA 116 (),, IgM 27 (),IgG 947 ([phone])  4/13/16 cxr mild cardiomegaly  5/4/16 [facility] note continue oxygen 3 L at night  11/8/16 pulmonary note continue oxygen 3 L at night  10/4/17 pulmonary note continue oxygen 3 L at night  3/17/18 pulmonary note, continue nocturnal oxygen 2 L, start trelegy one puff daily     tb latent  3/14/16 positive quantiferon gold test  3/14/16  (normal), quantiferon gold positive, allergen panel negative, IgE 357 (less than 214), IgA 116 (),, IgM 27 (),IgG 947 ([phone])  4/13/16 cxr mild cardiomegaly  5/4/16 [facility] note continue advair 115/21 bid with spacer given doxycycline and prednisone, continue oxygen 3 L at night, AFB samples ×3, follow-up 3 months  7/25/16 pulmonary note on prednisone taper one week out of the month and doxycycline one per day for one week per month, on advair bid and spiriva and oxygen 3 liters at night  7/27/17 PPD negative  10/4/17 pulmonary note continue advair 115/21 two inhalations bid, spiriva daily, xoponex as needed, oxygen 3 L at night, history of positive quantiferon gold,  will order sputum sample follow-up 4 months     Tension headache  6/11/13 on fioricet bid  2/28/14 taper fioricet to once a day  4/25/14 now on fioricet prn           Health Maintenance Summary                IMM DTaP/Tdap/Td Vaccine Overdue 4/22/1950     PFT SCREENING-FEV1 AND FEV/FVC RATIO / SPIROMETRY SHOULD BE PERFORMED ANNUALLY Overdue 10/21/2016      Done 10/21/2015 PFT DICTATED RESULTS     Patient has more history with this topic...    URINE DRUG SCREEN Next Due 8/2/2018      [facility]    BONE DENSITY Next Due 6/21/2018      Done 6/21/2013 DS-BONE DENSITY STUDY (DEXA)     Patient has more history with this topic...    Annual Wellness Visit Next Due 4/11/2019      Done 4/10/2018 Visit Dx: Medicare annual wellness visit, subsequent     Patient has more history with this topic...          Patient Care Team:  Denis Krueger M.D. as PCP - General  Nathan Fajardo M.D. as Consulting Physician (Anesthesia)  Esdras Thornton M.D. as Consulting Physician (Pulmonary Medicine)  Aashish as Respiratory  SARAH De La Garza as Consulting Physician (Pulmonary Medicine)           Patient Active Problem List   Diagnosis   • Hypertension   • Dyslipidemia   • Preventative health care   • Low back pain   • Cervical pain   • Osteoporosis, idiopathic   • History of compression fracture of spine   • PAF (paroxysmal atrial fibrillation) ([facility])   • Right hip pain   • COPD (chronic obstructive pulmonary disease) ([facility])   • Sleep apnea   • History of Helicobacter pylori infection   • Chronic anticoagulation   • History of shingles   • Tension headache   • Macular degeneration   • Chronic use of opiate for therapeutic purpose   • Sensorineural hearing loss   • Mild cognitive impairment   • TB lung, latent   • Chronic respiratory failure with hypoxia ([facility])       ROS       Objective:        Physical Exam   Constitutional: She appears well-developed and well-nourished.   HENT:   Head:  Normocephalic and atraumatic.   Eyes: Conjunctivae are normal. Right eye exhibits no discharge.   Neck: Neck supple. No JVD present. No thyromegaly present.   Cardiovascular: Normal rate and regular rhythm.    Pulmonary/Chest: Effort normal and breath sounds normal. No respiratory distress. She has no wheezes.   Abdominal: She exhibits no distension.   Neurological: She is alert.   Skin: Skin is warm.   Psychiatric: She has a normal mood and affect. Her behavior is normal.   Nursing note and vitals reviewed.              Assessment/Plan:     Assessment  #1 chronic low back pain status post compression fracture, has seen pain management locally and at [place], has seen physical therapy, has seen surgery, has had injections, TENS unit, possible spinal stimulator discussed but she has declined.  Good social support with son.  Still lives independently and does all ADLs     #2 chronic pain medication OxyContin 10 mg a.m., 5 mg p.m., occasional memory loss, no drowsiness, sedation, confusion, falls, respiratory suppression, depression, no alcohol or illicit drugs    #3 mild memory loss, on Aricept 5 mg    #4 paroxysmal atrial fibrillation stable rate controlled on anticoagulation per cardiology    #5 chronic respiratory failure with hypoxemia followed by pulmonary 2 L oxygen at night stable      Plan  #1 continue OxyContin 10 mg a.m., 5 mg p.m. work on taper son will try to decrease to 10 mg once daily, monitoring for increasing pain with decreasing dose, also monitor for improvement of memory with lower dose of OxyContin, continue Aricept    #2 fall precautions    #3 follow-up 3 months    #4      #5 has one more month of prescription narcotics at pharmacy, subsequent 2 months provided    #6 follow-up 3 months    #7 call for any changes in symptoms or questions with dosing regimen, understanding long-term narcotics increases risk of habituation, dependence, falls, memory loss, confusion, depression we will continue  to work on taper, patient and son understand and agree

## 2018-05-18 NOTE — TELEPHONE ENCOUNTER
need PAR oxycontin please  (1) oxycontin 10 mg bid   (2) #60 per 30 days  (3) diagnosis: low back pain   (4) ICD 10: M54.5  (5) comments: Has tried anti-inflammatories, Celebrex, Percocet, gabapentin, pregabalin, Cymbalta, physical therapy, TENS, epidural sterile injection, trigger point steroid injection, nerve ablation therapy, spinal cord stimulator, remains stable and controlled on OxyContin 10 mg twice daily

## 2018-05-22 ENCOUNTER — OFFICE VISIT (OUTPATIENT)
Dept: MEDICAL GROUP | Facility: MEDICAL CENTER | Age: 83
End: 2018-05-22
Payer: MEDICARE

## 2018-05-22 VITALS
RESPIRATION RATE: 14 BRPM | SYSTOLIC BLOOD PRESSURE: 106 MMHG | WEIGHT: 115 LBS | DIASTOLIC BLOOD PRESSURE: 68 MMHG | TEMPERATURE: 98.2 F | OXYGEN SATURATION: 90 % | HEIGHT: 61 IN | HEART RATE: 63 BPM | BODY MASS INDEX: 21.71 KG/M2

## 2018-05-22 DIAGNOSIS — B07.0 PLANTAR WART OF RIGHT FOOT: ICD-10-CM

## 2018-05-22 DIAGNOSIS — R10.32 ABDOMINAL PAIN, LEFT LOWER QUADRANT: ICD-10-CM

## 2018-05-22 PROBLEM — M79.671 RIGHT FOOT PAIN: Status: ACTIVE | Noted: 2018-05-22

## 2018-05-22 PROCEDURE — 99213 OFFICE O/P EST LOW 20 MIN: CPT | Mod: 25 | Performed by: FAMILY MEDICINE

## 2018-05-22 PROCEDURE — 17110 DESTRUCTION B9 LES UP TO 14: CPT | Performed by: FAMILY MEDICINE

## 2018-05-22 NOTE — PROGRESS NOTES
Subjective:   Venus Church is a 87 y.o. female here today for pain    Abdominal pain, left lower quadrant  Patient had significant pain yesterday in her left lower quadrant.  Patient has history of constipation.  Pain is resolved today.  Patient has known umbilical hernia.  No history of diverticulitis.    Plantar wart of right foot  Patient has painful callus on the bottom of her right foot which she avoids to step on when walking.  She states she has to walk slowly to avoid this area.  She has not had it treated in the past.         Current medicines (including changes today)  Current Outpatient Prescriptions   Medication Sig Dispense Refill   • metoprolol (LOPRESSOR) 25 MG Tab Take 1 Tab by mouth 2 times a day. 180 Tab 3   • [START ON 7/6/2018] oxyCODONE CR (OXYCONTIN) 10 MG Tablet Extended Release 12 hour Abuse-Deterrent Take 1 Tab by mouth every 12 hours for 30 days. 60 Tab 0   • donepezil (ARICEPT) 5 MG Tab Take 1 Tab by mouth every day. 30 Tab 3   • dabigatran (PRADAXA) 75 MG Cap capsule Take 1 Cap by mouth 2 Times a Day. 60 Cap 6   • albuterol (PROVENTIL) 2.5mg/3ml Nebu Soln solution for nebulization 3 mL by Nebulization route every four hours as needed for Shortness of Breath. 150 Bullet 11   • Fluticasone-Umeclidin-Vilant (TRELEGY ELLIPTA) 100-62.5-25 MCG/INH AEROSOL POWDER, BREATH ACTIVATED Inhale 1 Puff by mouth every day. 1 Each 11   • levalbuterol (XOPENEX HFA) 45 MCG/ACT inhaler Inhale 2 Puffs by mouth every 6 hours as needed for Shortness of Breath. 3 Inhaler 0   • omeprazole (PRILOSEC) 20 MG delayed-release capsule Take 1 Cap by mouth every day. (Patient not taking: Reported on 5/17/2018) 90 Cap 2   • tiotropium (SPIRIVA HANDIHALER) 18 MCG Cap Inhale 1 Cap by mouth every day. (Patient not taking: Reported on 5/17/2018) 30 Cap 11   • irbesartan-hydrochlorothiazide (AVALIDE) 150-12.5 MG per tablet Take 0.5 Tabs by mouth every day. 90 Tab 3   • albuterol (PROVENTIL) 2.5mg/3ml Nebu Soln solution for  "nebulization 2.5 mg by Nebulization route every four hours as needed for Shortness of Breath.     • Cyanocobalamin (VITAMIN B-12 PO) Take  by mouth.     • lidocaine (LIDODERM) 5 % Patch Apply one patch to affected area on during the day for 12 hours, off at night (Patient taking differently: Apply 1 Patch to skin as directed. Apply one patch to affected area on during the day for 12 hours, off at night) 30 Patch 6   • calcium-vitamin D (OSCAL-250) 250-125 MG-UNIT TABS Take 1 Tab by mouth every day.     • VITAMIN D, CHOLECALCIFEROL, PO Take 1 Cap by mouth 2 Times a Day.       No current facility-administered medications for this visit.      She  has a past medical history of Anesthesia; Aortic regurgitation (4/25/2012); Arrhythmia (7/10); Arthritis; Atypical chest pain (4/25/2012); CATARACT (2007,08); Chronic anticoagulation (4/25/2012); Constipation (4/25/2012); COPD (chronic obstructive pulmonary disease) ([facility]); DDD (degenerative disc disease); Dental disorder; Dyspnea (4/25/2012); Generalized osteoarthritis (4/25/2012); Headache(784.0) (4/25/2012); Heart valve insufficiency; Hypercholesteremia; Hyperlipidemia (4/25/2012); Hypertension; MEDICAL HOME; Osteoarthritis of knee (4/25/2012); Osteoporosis; Other accident; Pain; Pneumonia (6/4); TB lung, 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    ROS   No fever, no diarrhea, no bloody stools       Objective:     Blood pressure 106/68, pulse 63, temperature 36.8 °C (98.2 °F), resp. rate 14, height 1.549 m (5' 1\"), weight 52.2 kg (115 lb), SpO2 90 %. Body mass index is 21.73 kg/m².   Physical Exam:  Constitutional: Alert, no distress.  Skin: Warm, dry, good turgor, no rashes in visible areas.  Eye: Equal, round and reactive, conjunctiva clear, lids normal.  Abdomen: Soft, non-tender, positive soft umbilical hernia, no hepatosplenomegaly.  Psych: Alert and oriented x3, normal affect and mood.  Right foot with plantar " circular lesion with overlying callus.    CRYOTHERAPY:  Discussed risks and benefits of cryotherapy. Patient verbally agreed. 3 applications of cryotherapy were applied to 1 lesion on right foot. Patient tolerated procedure well. Aftercare instructions given.        Assessment and Plan:   The following treatment plan was discussed    1. Abdominal pain, left lower quadrant  most likely related to constipation.  Pain is now resolved.  Advised fiber, hydration, stool softeners if needed.    2. Plantar wart of right foot  Treated with cryotherapy today.  Advised patient to follow-up in 2 weeks if needed for retreatment.      Followup: Return if symptoms worsen or fail to improve.

## 2018-05-22 NOTE — ASSESSMENT & PLAN NOTE
Patient had significant pain yesterday in her left lower quadrant.  Patient has history of constipation.  Pain is resolved today.  Patient has known umbilical hernia.  No history of diverticulitis.

## 2018-05-22 NOTE — ASSESSMENT & PLAN NOTE
Patient has painful callus on the bottom of her right foot which she avoids to step on when walking.  She states she has to walk slowly to avoid this area.  She has not had it treated in the past.

## 2018-06-12 ENCOUNTER — TELEPHONE (OUTPATIENT)
Dept: CARDIOLOGY | Facility: MEDICAL CENTER | Age: 83
End: 2018-06-12

## 2018-06-12 NOTE — TELEPHONE ENCOUNTER
Called pt to remind them of Blood work ordered by Dr. Smyth, pt's son answered, Confirmed that the blood work would be done and also wanted to confirm future appointment.

## 2018-06-13 ENCOUNTER — APPOINTMENT (OUTPATIENT)
Dept: PULMONOLOGY | Facility: HOSPICE | Age: 83
End: 2018-06-13
Payer: MEDICARE

## 2018-06-20 ENCOUNTER — OFFICE VISIT (OUTPATIENT)
Dept: MEDICAL GROUP | Facility: MEDICAL CENTER | Age: 83
End: 2018-06-20
Payer: MEDICARE

## 2018-06-20 ENCOUNTER — TELEPHONE (OUTPATIENT)
Dept: CARDIOLOGY | Facility: MEDICAL CENTER | Age: 83
End: 2018-06-20

## 2018-06-20 ENCOUNTER — HOSPITAL ENCOUNTER (OUTPATIENT)
Dept: LAB | Facility: MEDICAL CENTER | Age: 83
End: 2018-06-20
Attending: INTERNAL MEDICINE
Payer: MEDICARE

## 2018-06-20 VITALS
SYSTOLIC BLOOD PRESSURE: 110 MMHG | HEART RATE: 67 BPM | DIASTOLIC BLOOD PRESSURE: 70 MMHG | TEMPERATURE: 98.4 F | HEIGHT: 61 IN | BODY MASS INDEX: 21.9 KG/M2 | OXYGEN SATURATION: 97 % | WEIGHT: 116 LBS

## 2018-06-20 DIAGNOSIS — E78.5 DYSLIPIDEMIA: Chronic | ICD-10-CM

## 2018-06-20 DIAGNOSIS — Z79.01 CHRONIC ANTICOAGULATION: Chronic | ICD-10-CM

## 2018-06-20 DIAGNOSIS — B07.0 PLANTAR WART: ICD-10-CM

## 2018-06-20 LAB
ALBUMIN SERPL BCP-MCNC: 4.2 G/DL (ref 3.2–4.9)
ALBUMIN/GLOB SERPL: 1.4 G/DL
ALP SERPL-CCNC: 58 U/L (ref 30–99)
ALT SERPL-CCNC: 19 U/L (ref 2–50)
ANION GAP SERPL CALC-SCNC: 7 MMOL/L (ref 0–11.9)
AST SERPL-CCNC: 19 U/L (ref 12–45)
BASOPHILS # BLD AUTO: 0.7 % (ref 0–1.8)
BASOPHILS # BLD: 0.06 K/UL (ref 0–0.12)
BILIRUB SERPL-MCNC: 0.8 MG/DL (ref 0.1–1.5)
BUN SERPL-MCNC: 22 MG/DL (ref 8–22)
CALCIUM SERPL-MCNC: 9.5 MG/DL (ref 8.5–10.5)
CHLORIDE SERPL-SCNC: 100 MMOL/L (ref 96–112)
CHOLEST SERPL-MCNC: 178 MG/DL (ref 100–199)
CO2 SERPL-SCNC: 29 MMOL/L (ref 20–33)
CREAT SERPL-MCNC: 0.92 MG/DL (ref 0.5–1.4)
EOSINOPHIL # BLD AUTO: 0.32 K/UL (ref 0–0.51)
EOSINOPHIL NFR BLD: 3.9 % (ref 0–6.9)
ERYTHROCYTE [DISTWIDTH] IN BLOOD BY AUTOMATED COUNT: 50.8 FL (ref 35.9–50)
GLOBULIN SER CALC-MCNC: 3 G/DL (ref 1.9–3.5)
GLUCOSE SERPL-MCNC: 96 MG/DL (ref 65–99)
HCT VFR BLD AUTO: 44.2 % (ref 37–47)
HDLC SERPL-MCNC: 62 MG/DL
HGB BLD-MCNC: 14.3 G/DL (ref 12–16)
IMM GRANULOCYTES # BLD AUTO: 0.02 K/UL (ref 0–0.11)
IMM GRANULOCYTES NFR BLD AUTO: 0.2 % (ref 0–0.9)
LDLC SERPL CALC-MCNC: 96 MG/DL
LYMPHOCYTES # BLD AUTO: 1.86 K/UL (ref 1–4.8)
LYMPHOCYTES NFR BLD: 22.9 % (ref 22–41)
MCH RBC QN AUTO: 31.2 PG (ref 27–33)
MCHC RBC AUTO-ENTMCNC: 32.4 G/DL (ref 33.6–35)
MCV RBC AUTO: 96.5 FL (ref 81.4–97.8)
MONOCYTES # BLD AUTO: 0.51 K/UL (ref 0–0.85)
MONOCYTES NFR BLD AUTO: 6.3 % (ref 0–13.4)
NEUTROPHILS # BLD AUTO: 5.34 K/UL (ref 2–7.15)
NEUTROPHILS NFR BLD: 66 % (ref 44–72)
NRBC # BLD AUTO: 0 K/UL
NRBC BLD-RTO: 0 /100 WBC
PLATELET # BLD AUTO: 223 K/UL (ref 164–446)
PMV BLD AUTO: 11.2 FL (ref 9–12.9)
POTASSIUM SERPL-SCNC: 4 MMOL/L (ref 3.6–5.5)
PROT SERPL-MCNC: 7.2 G/DL (ref 6–8.2)
RBC # BLD AUTO: 4.58 M/UL (ref 4.2–5.4)
SODIUM SERPL-SCNC: 136 MMOL/L (ref 135–145)
TRIGL SERPL-MCNC: 102 MG/DL (ref 0–149)
WBC # BLD AUTO: 8.1 K/UL (ref 4.8–10.8)

## 2018-06-20 PROCEDURE — 17110 DESTRUCTION B9 LES UP TO 14: CPT | Performed by: NURSE PRACTITIONER

## 2018-06-20 PROCEDURE — 80053 COMPREHEN METABOLIC PANEL: CPT

## 2018-06-20 PROCEDURE — 85025 COMPLETE CBC W/AUTO DIFF WBC: CPT

## 2018-06-20 PROCEDURE — 80061 LIPID PANEL: CPT

## 2018-06-20 PROCEDURE — 99214 OFFICE O/P EST MOD 30 MIN: CPT | Mod: 25 | Performed by: NURSE PRACTITIONER

## 2018-06-20 PROCEDURE — 36415 COLL VENOUS BLD VENIPUNCTURE: CPT

## 2018-06-20 NOTE — PROGRESS NOTES
Subjective:     Venus Church is a 87 y.o. female who presents with rt foot wart vs callous.    HPI:   Seen in 2 week f/u after cryotherapy for rt foot callous vs wart.  She had cryo then yesterday saw podiatry.  He scrapped her foot.  He thought it was a callous.  Still a small amt of hard tissue left.  No sx of infection.  Otherwise feeling well.     Patient Active Problem List    Diagnosis Date Noted   • Chronic anticoagulation 04/25/2012     Priority: High   • Abdominal pain, left lower quadrant 05/22/2018   • Plantar wart of right foot 05/22/2018   • Chronic respiratory failure with hypoxia ([facility]) 03/20/2018   • TB lung, latent 05/04/2016   • Mild cognitive impairment 01/11/2016   • Sensorineural hearing loss 12/16/2015   • Chronic use of opiate for therapeutic purpose 04/23/2015   • Macular degeneration 07/08/2013   • Tension headache 06/11/2013   • History of shingles 06/07/2012   • History of Helicobacter pylori infection 11/01/2011   • Sleep apnea 08/10/2011   • COPD (chronic obstructive pulmonary disease) ([facility]) 07/20/2011   • Right hip pain 03/28/2011   • PAF (paroxysmal atrial fibrillation) ([facility]) 06/10/2010   • History of compression fracture of spine 05/26/2010   • Low back pain 05/22/2010   • Cervical pain 05/22/2010   • Osteoporosis, idiopathic 05/22/2010   • Hypertension 05/20/2010   • Dyslipidemia 05/20/2010   • Preventative health care 05/20/2010       Current medicines (including changes today)  Current Outpatient Prescriptions   Medication Sig Dispense Refill   • metoprolol (LOPRESSOR) 25 MG Tab Take 1 Tab by mouth 2 times a day. 180 Tab 3   • [START ON 7/6/2018] oxyCODONE CR (OXYCONTIN) 10 MG Tablet Extended Release 12 hour Abuse-Deterrent Take 1 Tab by mouth every 12 hours for 30 days. 60 Tab 0   • donepezil (ARICEPT) 5 MG Tab Take 1 Tab by mouth every day. 30 Tab 3   • dabigatran (PRADAXA) 75 MG Cap capsule Take 1 Cap by mouth 2 Times a Day. 60 Cap 6   • albuterol (PROVENTIL) 2.5mg/3ml Nebu Soln  solution for nebulization 3 mL by Nebulization route every four hours as needed for Shortness of Breath. 150 Bullet 11   • Fluticasone-Umeclidin-Vilant (TRELEGY ELLIPTA) 100-62.5-25 MCG/INH AEROSOL POWDER, BREATH ACTIVATED Inhale 1 Puff by mouth every day. 1 Each 11   • levalbuterol (XOPENEX HFA) 45 MCG/ACT inhaler Inhale 2 Puffs by mouth every 6 hours as needed for Shortness of Breath. 3 Inhaler 0   • omeprazole (PRILOSEC) 20 MG delayed-release capsule Take 1 Cap by mouth every day. (Patient not taking: Reported on 5/17/2018) 90 Cap 2   • tiotropium (SPIRIVA HANDIHALER) 18 MCG Cap Inhale 1 Cap by mouth every day. (Patient not taking: Reported on 5/17/2018) 30 Cap 11   • irbesartan-hydrochlorothiazide (AVALIDE) 150-12.5 MG per tablet Take 0.5 Tabs by mouth every day. 90 Tab 3   • albuterol (PROVENTIL) 2.5mg/3ml Nebu Soln solution for nebulization 2.5 mg by Nebulization route every four hours as needed for Shortness of Breath.     • Cyanocobalamin (VITAMIN B-12 PO) Take  by mouth.     • lidocaine (LIDODERM) 5 % Patch Apply one patch to affected area on during the day for 12 hours, off at night (Patient taking differently: Apply 1 Patch to skin as directed. Apply one patch to affected area on during the day for 12 hours, off at night) 30 Patch 6   • calcium-vitamin D (OSCAL-250) 250-125 MG-UNIT TABS Take 1 Tab by mouth every day.     • VITAMIN D, CHOLECALCIFEROL, PO Take 1 Cap by mouth 2 Times a Day.       No current facility-administered medications for this visit.        Allergies   Allergen Reactions   • Codeine Vomiting       ROS  Constitutional: Negative. Negative for fever, chills, weight loss, malaise/fatigue and diaphoresis.   HENT: Negative. Negative for hearing loss, ear pain, nosebleeds, congestion, sore throat, neck pain, tinnitus and ear discharge.   Respiratory: Negative. Negative for cough, hemoptysis, sputum production, shortness of breath, wheezing and stridor.   Cardiovascular: Negative. Negative for  "chest pain, palpitations, orthopnea, claudication, leg swelling and PND.          Objective:     Blood pressure 110/70, pulse 67, temperature 36.9 °C (98.4 °F), height 1.549 m (5' 1\"), weight 52.6 kg (116 lb), SpO2 97 %, not currently breastfeeding. Body mass index is 21.92 kg/m².    Physical Exam:  Vitals reviewed.  Constitutional: Oriented to person, place, and time. appears well-developed and well-nourished. No distress.   Cardiovascular: Normal rate, regular rhythm, normal heart sounds and intact distal pulses. Exam reveals no gallop and no friction rub. No murmur heard. No carotid bruits.   Pulmonary/Chest: Effort normal and breath sounds normal. No stridor. No respiratory distress. no wheezes or rales. exhibits no tenderness.   Musculoskeletal: Normal range of motion. exhibits no edema. sylvie pedal pulses 2+.  Lymphadenopathy: No cervical or supraclavicular adenopathy.   Neurological: Alert and oriented to person, place, and time. exhibits normal muscle tone.  Skin: Skin is warm and dry. No diaphoresis.  Small firm callous vs warty lesion on medial ball of foot.  Cleansed with ns and alcohol.  Cryotherapy applied.  Pt and daughter will monitor for sx of infection:  Warmth, swelling, reddness, fever, dg.    Psychiatric: Normal mood and affect. Behavior is normal.      Assessment and Plan:     The following treatment plan was discussed:    1. Plantar wart  NM DESTRUC PREMALIGNANT, FIRST LESION    cryotherapy applied         Followup: Return if symptoms worsen or fail to improve.  "

## 2018-06-20 NOTE — TELEPHONE ENCOUNTER
----- Message from Larissa Clarke sent at 2018  7:35 AM PDT -----  Regarding: Patient at [facility] now and needs lab order put into [facility]  JAMIE/Daisha Villarreal at [facility] at [phone] called. Patient is there now for her lab draw and the lab order has . She needs a new order put into [facility].

## 2018-06-21 ENCOUNTER — TELEPHONE (OUTPATIENT)
Dept: CARDIOLOGY | Facility: MEDICAL CENTER | Age: 83
End: 2018-06-21

## 2018-06-21 ENCOUNTER — OFFICE VISIT (OUTPATIENT)
Dept: CARDIOLOGY | Facility: MEDICAL CENTER | Age: 83
End: 2018-06-21
Payer: MEDICARE

## 2018-06-21 VITALS
DIASTOLIC BLOOD PRESSURE: 70 MMHG | HEART RATE: 60 BPM | SYSTOLIC BLOOD PRESSURE: 140 MMHG | BODY MASS INDEX: 22.13 KG/M2 | OXYGEN SATURATION: 94 % | HEIGHT: 61 IN | WEIGHT: 117.2 LBS

## 2018-06-21 DIAGNOSIS — I48.0 PAF (PAROXYSMAL ATRIAL FIBRILLATION) (HCC): Chronic | ICD-10-CM

## 2018-06-21 DIAGNOSIS — I10 ESSENTIAL HYPERTENSION: Chronic | ICD-10-CM

## 2018-06-21 DIAGNOSIS — Z79.01 ANTICOAGULATED: ICD-10-CM

## 2018-06-21 PROCEDURE — 99214 OFFICE O/P EST MOD 30 MIN: CPT | Performed by: INTERNAL MEDICINE

## 2018-06-21 ASSESSMENT — ENCOUNTER SYMPTOMS
WEAKNESS: 1
SHORTNESS OF BREATH: 0
BACK PAIN: 1
LOSS OF CONSCIOUSNESS: 0
BLOOD IN STOOL: 0
DEPRESSION: 1
PALPITATIONS: 0
VOMITING: 0
BRUISES/BLEEDS EASILY: 0
NAUSEA: 0
MEMORY LOSS: 1

## 2018-06-21 NOTE — PROGRESS NOTES
Subjective:   Chief complaint: PAF, anticoagulation and hypertension.  Venus Church is a 87 y.o. female who presents today for for evaluation of the above problems.  She is accompanied by her son who takes very good care of her.  She has a daughter who lives in the [place] area, who visits at times to help as well.  No chest pain, palpitations, or bleeding problems.  No edema.  Son notes a steady decline in her memory.  Past Medical History:   Diagnosis Date   • Anesthesia     n/v   • Aortic regurgitation 4/25/2012   • Arrhythmia 7/10      A. Fib.   • Arthritis     general   • Atypical chest pain 4/25/2012   • CATARACT 2007,08   • Chronic anticoagulation 4/25/2012   • Constipation 4/25/2012   • COPD (chronic obstructive pulmonary disease) ([facility])    • DDD (degenerative disc disease)    • Dental disorder     partials   • Dyspnea 4/25/2012   • Generalized osteoarthritis 4/25/2012   • Headache(784.0) 4/25/2012   • Heart valve insufficiency     minimal, watched by cardio   • Hypercholesteremia    • Hyperlipidemia 4/25/2012   • Hypertension    • MEDICAL HOME    • Osteoarthritis of knee 4/25/2012   • Osteoporosis    • Other accident     C-Spine FX  2006   • Pain     back pain   • Pneumonia 6/4   • TB lung, latent 5/4/2016   • Valvular heart disease 4/25/2012   • Vertebral fracture    • Vertigo 4/25/2012     Past Surgical History:   Procedure Laterality Date   • RECOVERY  7/28/2010    Performed by SHELLEY, IR-RECOVERY at SURGERY SAME DAY [facility] ORS   • OTHER ORTHOPEDIC SURGERY  may, 2010      T11 kypho.   • CATARACT PHACO WITH IOL  1/5/2009    [facility] ROSE MARIE MANN [facility]   • CARPAL TUNNEL RELEASE  @30 yrs ago    rt hand     Family History   Problem Relation Age of Onset   • Heart Disease Father    • Diabetes Brother      History   Smoking Status   • Former Smoker   • Packs/day: 1.50   • Years: 25.00   • Types: Cigarettes   • Quit date: 4/14/1984   Smokeless Tobacco   • Never Used     Comment:  smoked 25 yrs, quit 1984     Allergies   Allergen Reactions   • Codeine Vomiting     Outpatient Encounter Prescriptions as of 6/21/2018   Medication Sig Dispense Refill   • metoprolol (LOPRESSOR) 25 MG Tab Take 1 Tab by mouth 2 times a day. 180 Tab 3   • [START ON 7/6/2018] oxyCODONE CR (OXYCONTIN) 10 MG Tablet Extended Release 12 hour Abuse-Deterrent Take 1 Tab by mouth every 12 hours for 30 days. 60 Tab 0   • donepezil (ARICEPT) 5 MG Tab Take 1 Tab by mouth every day. 30 Tab 3   • dabigatran (PRADAXA) 75 MG Cap capsule Take 1 Cap by mouth 2 Times a Day. 60 Cap 6   • albuterol (PROVENTIL) 2.5mg/3ml Nebu Soln solution for nebulization 3 mL by Nebulization route every four hours as needed for Shortness of Breath. 150 Bullet 11   • Fluticasone-Umeclidin-Vilant (TRELEGY ELLIPTA) 100-62.5-25 MCG/INH AEROSOL POWDER, BREATH ACTIVATED Inhale 1 Puff by mouth every day. 1 Each 11   • levalbuterol (XOPENEX HFA) 45 MCG/ACT inhaler Inhale 2 Puffs by mouth every 6 hours as needed for Shortness of Breath. 3 Inhaler 0   • irbesartan-hydrochlorothiazide (AVALIDE) 150-12.5 MG per tablet Take 0.5 Tabs by mouth every day. 90 Tab 3   • lidocaine (LIDODERM) 5 % Patch Apply one patch to affected area on during the day for 12 hours, off at night (Patient taking differently: Apply 1 Patch to skin as directed. Apply one patch to affected area on during the day for 12 hours, off at night) 30 Patch 6   • omeprazole (PRILOSEC) 20 MG delayed-release capsule Take 1 Cap by mouth every day. (Patient not taking: Reported on 5/17/2018) 90 Cap 2   • tiotropium (SPIRIVA HANDIHALER) 18 MCG Cap Inhale 1 Cap by mouth every day. (Patient not taking: Reported on 5/17/2018) 30 Cap 11   • albuterol (PROVENTIL) 2.5mg/3ml Nebu Soln solution for nebulization 2.5 mg by Nebulization route every four hours as needed for Shortness of Breath.     • Cyanocobalamin (VITAMIN B-12 PO) Take  by mouth.     • calcium-vitamin D (OSCAL-250) 250-125 MG-UNIT TABS Take 1 Tab by  "mouth every day.     • VITAMIN D, CHOLECALCIFEROL, PO Take 1 Cap by mouth 2 Times a Day.       No facility-administered encounter medications on file as of 6/21/2018.      Review of Systems   Constitutional: Positive for malaise/fatigue.   HENT: Negative for nosebleeds.    Respiratory: Negative for shortness of breath.    Cardiovascular: Negative for chest pain, palpitations and leg swelling.   Gastrointestinal: Negative for blood in stool, melena, nausea and vomiting.   Genitourinary: Negative for hematuria.   Musculoskeletal: Positive for back pain.   Neurological: Positive for weakness. Negative for loss of consciousness.   Endo/Heme/Allergies: Does not bruise/bleed easily.   Psychiatric/Behavioral: Positive for depression and memory loss.        Objective:   /70   Pulse 60   Ht 1.549 m (5' 1\")   Wt 53.2 kg (117 lb 3.2 oz)   SpO2 94%   BMI 22.14 kg/m²     Physical Exam   Constitutional: She is oriented to person, place, and time. She appears well-developed and well-nourished. No distress.   HENT:   Head: Atraumatic.   Eyes: Conjunctivae and EOM are normal. Pupils are equal, round, and reactive to light.   Neck: Neck supple. No JVD present.   Cardiovascular: Normal rate and regular rhythm.    Murmur heard.   Systolic murmur is present with a grade of 1/6   Pulmonary/Chest: Effort normal and breath sounds normal. No respiratory distress. She has no wheezes. She has no rales.   Abdominal: Soft. There is no tenderness.   Musculoskeletal: She exhibits no edema.   Neurological: She is alert and oriented to person, place, and time.   Skin: Skin is warm and dry. She is not diaphoretic.       Assessment:     1. Anticoagulated     2. Essential hypertension     3. PAF (paroxysmal atrial fibrillation) ([facility])         Medical Decision Making:  Today's Assessment / Status / Plan:    PAF: Is in sinus rhythm today.    Chronic anticoagulation: No bleeding problems or medications.    Hypertension: Under good " "control.    End of life issues discussed.  Son has the \"paperwork at home\" but I do not think is entirely clear on CODE STATUS.  I have urged him to discuss that with his sister.  Return one year.      Physical Exam   Constitutional: She is oriented to person, place, and time. She appears well-developed and well-nourished. No distress.   HENT:   Head: Atraumatic.   Eyes: Conjunctivae and EOM are normal. Pupils are equal, round, and reactive to light.   Neck: Neck supple. No JVD present.   Cardiovascular: Normal rate and regular rhythm.    Murmur heard.   Systolic murmur is present with a grade of 1/6   Pulmonary/Chest: Effort normal and breath sounds normal. No respiratory distress. She has no wheezes. She has no rales.   Abdominal: Soft. There is no tenderness.   Musculoskeletal: She exhibits no edema.   Neurological: She is alert and oriented to person, place, and time.   Skin: Skin is warm and dry. She is not diaphoretic.       "

## 2018-07-01 ENCOUNTER — HOSPITAL ENCOUNTER (EMERGENCY)
Facility: MEDICAL CENTER | Age: 83
End: 2018-07-01
Attending: EMERGENCY MEDICINE
Payer: MEDICARE

## 2018-07-01 ENCOUNTER — APPOINTMENT (OUTPATIENT)
Dept: RADIOLOGY | Facility: MEDICAL CENTER | Age: 83
End: 2018-07-01
Attending: EMERGENCY MEDICINE
Payer: MEDICARE

## 2018-07-01 VITALS
WEIGHT: 115 LBS | OXYGEN SATURATION: 97 % | SYSTOLIC BLOOD PRESSURE: 173 MMHG | BODY MASS INDEX: 21.71 KG/M2 | HEIGHT: 61 IN | TEMPERATURE: 97.4 F | RESPIRATION RATE: 18 BRPM | HEART RATE: 69 BPM | DIASTOLIC BLOOD PRESSURE: 95 MMHG

## 2018-07-01 DIAGNOSIS — M54.50 MIDLINE LOW BACK PAIN WITHOUT SCIATICA, UNSPECIFIED CHRONICITY: ICD-10-CM

## 2018-07-01 PROCEDURE — 99284 EMERGENCY DEPT VISIT MOD MDM: CPT | Mod: 25

## 2018-07-01 PROCEDURE — 70450 CT HEAD/BRAIN W/O DYE: CPT

## 2018-07-01 PROCEDURE — 72100 X-RAY EXAM L-S SPINE 2/3 VWS: CPT

## 2018-07-01 PROCEDURE — 72080 X-RAY EXAM THORACOLMB 2/> VW: CPT

## 2018-07-01 RX ORDER — IBUPROFEN 400 MG/1
400 TABLET ORAL EVERY 6 HOURS PRN
Qty: 15 TAB | Refills: 0 | Status: SHIPPED | OUTPATIENT
Start: 2018-07-01 | End: 2018-07-10

## 2018-07-01 RX ORDER — BUTALBITAL, ACETAMINOPHEN AND CAFFEINE 50; 325; 40 MG/1; MG/1; MG/1
1 TABLET ORAL EVERY 4 HOURS PRN
COMMUNITY
End: 2018-07-12

## 2018-07-01 ASSESSMENT — PAIN SCALES - GENERAL: PAINLEVEL_OUTOF10: 4

## 2018-07-02 ENCOUNTER — PATIENT OUTREACH (OUTPATIENT)
Dept: HEALTH INFORMATION MANAGEMENT | Facility: OTHER | Age: 83
End: 2018-07-02

## 2018-07-02 NOTE — ED PROVIDER NOTES
ED Provider Note    CHIEF COMPLAINT  Chief Complaint   Patient presents with   • GLF   • Back Pain       HPI  Venus Church is a 87 y.o. female here for evaluation of back pain.  The patient states that she was bending forward to  something off the ground, began to stand up, and fell backwards.  She states that she landed on her low back, but did not strike her head, and has no headache or neck pain.  She was able to walk afterwards but with pain.  She has no abdominal pain, no chest pain, no shortness of breath.  She has no weakness.  She is here with her niece.    PAST MEDICAL HISTORY   has a past medical history of Anesthesia; Aortic regurgitation (4/25/2012); Arrhythmia (7/10); Arthritis; Atypical chest pain (4/25/2012); CATARACT (2007,08); Chronic anticoagulation (4/25/2012); Constipation (4/25/2012); COPD (chronic obstructive pulmonary disease) ([facility]); DDD (degenerative disc disease); Dental disorder; Dyspnea (4/25/2012); Generalized osteoarthritis (4/25/2012); Headache(784.0) (4/25/2012); Heart valve insufficiency; Hypercholesteremia; Hyperlipidemia (4/25/2012); Hypertension; MEDICAL HOME; Osteoarthritis of knee (4/25/2012); Osteoporosis; Other accident; Pain; Pneumonia (6/4); TB lung, 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    SOCIAL HISTORY  Social History     Social History Main Topics   • Smoking status: Former Smoker     Packs/day: 1.50     Years: 25.00     Types: Cigarettes     Quit date: 4/14/1984   • Smokeless tobacco: Never Used      Comment: smoked 25 yrs, quit 1984   • Alcohol use No      Comment: none   • Drug use: No   • Sexual activity: Not on file       SURGICAL HISTORY   has a past surgical history that includes cataract phaco with iol (1/5/2009); other orthopedic surgery (may, 2010); carpal tunnel release (@30 yrs ago); and recovery (7/28/2010).    CURRENT MEDICATIONS  Home Medications     Reviewed by Joby Stein R.N.  "(Registered Nurse) on 07/01/18 at 1931  Med List Status: Complete   Medication Last Dose Status   acetaminophen/caffeine/butalbital 325-40-50 mg (FIORICET) -40 MG Tab  Active   albuterol (PROVENTIL) 2.5mg/3ml Nebu Soln solution for nebulization  Active   albuterol (PROVENTIL) 2.5mg/3ml Nebu Soln solution for nebulization 6/21/2018 Active   calcium-vitamin D (OSCAL-250) 250-125 MG-UNIT TABS 7/1/2018 Active   Cyanocobalamin (VITAMIN B-12 PO) 7/1/2018 Active   dabigatran (PRADAXA) 75 MG Cap capsule 7/1/2018 Active   donepezil (ARICEPT) 5 MG Tab 7/1/2018 Active   Fluticasone-Umeclidin-Vilant (TRELEGY ELLIPTA) 100-62.5-25 MCG/INH AEROSOL POWDER, BREATH ACTIVATED 7/1/2018 Active   irbesartan-hydrochlorothiazide (AVALIDE) 150-12.5 MG per tablet 7/1/2018 Active   levalbuterol (XOPENEX HFA) 45 MCG/ACT inhaler 6/21/2018 Active   lidocaine (LIDODERM) 5 % Patch 6/21/2018 Active   metoprolol (LOPRESSOR) 25 MG Tab 7/1/2018 Active   omeprazole (PRILOSEC) 20 MG delayed-release capsule PRN Active   oxyCODONE CR (OXYCONTIN) 10 MG Tablet Extended Release 12 hour Abuse-Deterrent 6/21/2018 Active   tiotropium (SPIRIVA HANDIHALER) 18 MCG Cap Not Taking Active   VITAMIN D, CHOLECALCIFEROL, PO 3/7/2018 Active                ALLERGIES  Allergies   Allergen Reactions   • Codeine Vomiting       REVIEW OF SYSTEMS  See HPI for further details. Review of systems as above, otherwise all other systems are negative.     PHYSICAL EXAM  VITAL SIGNS: BP (!) 169/88   Pulse 70   Temp 36.2 °C (97.2 °F)   Resp 18   Ht 1.549 m (5' 1\")   Wt 52.2 kg (115 lb)   SpO2 93%   BMI 21.73 kg/m²     Constitutional: Well developed, well nourished. No acute distress.  HEENT: Normocephalic, atraumatic. MMM  Neck: Supple, Full range of motion   Chest/Pulmonary:  No respiratory distress.  Equal expansion   Back; tenderness to L2, L3, L4 midline.  Mild T5, T6 midline tenderness.  No obvious step-off or deformity.  Musculoskeletal: No deformity, no edema, " neurovascular intact.   Neuro: Clear speech, appropriate, cooperative, cranial nerves II-XII grossly intact.  Psych: Anxious    CT-HEAD W/O   Final Result      1.  No evidence of acute territorial infarct, intracranial hemorrhage or mass lesion.   2.  Moderate diffuse cerebral substance loss.   3.  Moderate microangiopathic ischemic change versus demyelination or gliosis.   4.  Findings of possible sinusitis.      DX-THORACOLUMBAR SPINE-2 VIEWS   Final Result      1.  No definite acute fracture or malalignment.   2.  Diffuse demineralization of the imaged osseous structures.   3.  Evidence of prior percutaneous vertebral augmentation at T12 and L1.   4.  Chronic compression deformity of the L1 vertebral body with near complete loss of height anteriorly.   5.  Scattered age-related degenerative changes of the imaged osseous structures.      DX-LUMBAR SPINE-2 OR 3 VIEWS   Final Result      1.  Apparent complete or near complete collapse of the L1 vertebral body anteriorly which is likely chronic.   2.  Extensive degenerative changes of the lumbar spine as described above.   3.  Evidence of prior percutaneous vertebral augmentation at T12 and L1.   4.  Atherosclerosis.              PROCEDURES     MEDICAL RECORD  I have reviewed patient's medical record and pertinent results are listed above.    COURSE & MEDICAL DECISION MAKING  I have reviewed any medical record information, laboratory studies and radiographic results as noted above.    I you have had any blood pressure issues while here in the emergency department, please see your doctor for a further evaluation or work up.    Differential diagnoses include but not limited to: strain, contusion, fracture    This patient presents with low back pain.  At this time, I have counseled the patient/family regarding their medications, pain control, and follow up.  They will continue their medications, if any, as prescribed.  They will return immediately for any worsening  symptoms and/or any other medical concerns.  They will see their doctor, or contact the doctor provided, in 1-2 days for follow up.       FINAL IMPRESSION  1. Midline low back pain without sciatica, unspecified chronicity            Electronically signed by: Narayan Rubio, 7/1/2018 9:30 PM

## 2018-07-02 NOTE — PROGRESS NOTES
Placed discharge outreach phone call to patient's son Dileep s/p pt's ER discharge 7/1/18.  Left voicemail providing my contact information and instructions to call with any questions or concerns.

## 2018-07-02 NOTE — ED NOTES
Discharge instructions provided.  Pt verbalized the understanding of discharge instructions to follow up with PCP and to return to ER if condition worsens.  Patient out of ED via wheelchair with family.

## 2018-07-02 NOTE — ED NOTES
Pt bib family following a GLF earlier today. Pt was bending over to pick something up when she fell backwards landing on her back. Pt reporting lower back and flank pain.

## 2018-07-05 ENCOUNTER — TELEPHONE (OUTPATIENT)
Dept: MEDICAL GROUP | Facility: MEDICAL CENTER | Age: 83
End: 2018-07-05

## 2018-07-06 NOTE — TELEPHONE ENCOUNTER
VOICEMAIL  1. Caller Name:  Yashira Griffith Pt's daughter                       Call Back Number:  [phone]    2. Message:  Clarification on Alzheimer  medication. Should she be given the medication in the AM or PM? With food or Without food?    3. Patient approves office to leave a detailed voicemail/MyChart message: yes

## 2018-07-06 NOTE — TELEPHONE ENCOUNTER
Please notify patient's daughter Yashira, patient can take the Alzheimer's medication once a day morning or evening, it does not matter, with or without food, it does not matter

## 2018-07-06 NOTE — TELEPHONE ENCOUNTER
VOICEMAIL  1. Caller Name:  Pt's Son Nelson     Call Back Number:  [phone] (home) [phone] (work)    2. Message: Pt fell 07/01/2018, Son is requesting an appointment. Please advise if appointment is required    3. Patient approves office to leave a detailed voicemail/MyChart message: yes and no

## 2018-07-06 NOTE — TELEPHONE ENCOUNTER
Please call patient's son Dileep, we can see her on Tuesday July 10th, at 1140  I will tentatively schedule that appointment, if they cannot make that appointment on Tuesday, July 10 at 1140 please let us know.

## 2018-07-10 ENCOUNTER — OFFICE VISIT (OUTPATIENT)
Dept: MEDICAL GROUP | Facility: MEDICAL CENTER | Age: 83
End: 2018-07-10
Payer: MEDICARE

## 2018-07-10 VITALS
TEMPERATURE: 99.5 F | OXYGEN SATURATION: 92 % | SYSTOLIC BLOOD PRESSURE: 120 MMHG | RESPIRATION RATE: 16 BRPM | DIASTOLIC BLOOD PRESSURE: 60 MMHG | WEIGHT: 115 LBS | BODY MASS INDEX: 21.71 KG/M2 | HEART RATE: 60 BPM | HEIGHT: 61 IN
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
## 2018-07-10 DIAGNOSIS — J41.1 MUCOPURULENT CHRONIC BRONCHITIS (HCC): Chronic | ICD-10-CM

## 2018-07-10 DIAGNOSIS — Z79.891 CHRONIC USE OF OPIATE DRUGS THERAPEUTIC PURPOSES: ICD-10-CM

## 2018-07-10 DIAGNOSIS — F11.20 OPIATE DEPENDENCE, CONTINUOUS (HCC): ICD-10-CM

## 2018-07-10 DIAGNOSIS — I48.0 PAF (PAROXYSMAL ATRIAL FIBRILLATION) (HCC): Chronic | ICD-10-CM

## 2018-07-10 DIAGNOSIS — M54.9 BACK PAIN, UNSPECIFIED BACK LOCATION, UNSPECIFIED BACK PAIN LATERALITY, UNSPECIFIED CHRONICITY: ICD-10-CM

## 2018-07-10 DIAGNOSIS — R52 PAIN: ICD-10-CM

## 2018-07-10 DIAGNOSIS — M54.2 CERVICAL PAIN: Chronic | ICD-10-CM

## 2018-07-10 DIAGNOSIS — Z79.891 CHRONIC USE OF OPIATE FOR THERAPEUTIC PURPOSE: Chronic | ICD-10-CM

## 2018-07-10 DIAGNOSIS — I10 ESSENTIAL HYPERTENSION: Chronic | ICD-10-CM

## 2018-07-10 DIAGNOSIS — E78.5 DYSLIPIDEMIA: Chronic | ICD-10-CM

## 2018-07-10 DIAGNOSIS — M54.5 LOW BACK PAIN, UNSPECIFIED BACK PAIN LATERALITY, UNSPECIFIED CHRONICITY, WITH SCIATICA PRESENCE UNSPECIFIED: Chronic | ICD-10-CM

## 2018-07-10 PROBLEM — B07.0 PLANTAR WART OF RIGHT FOOT: Status: RESOLVED | Noted: 2018-05-22 | Resolved: 2018-07-10

## 2018-07-10 PROBLEM — R10.32 ABDOMINAL PAIN, LEFT LOWER QUADRANT: Status: RESOLVED | Noted: 2018-05-22 | Resolved: 2018-07-10

## 2018-07-10 PROCEDURE — 99214 OFFICE O/P EST MOD 30 MIN: CPT | Performed by: INTERNAL MEDICINE

## 2018-07-10 RX ORDER — OXYCODONE HCL 10 MG/1
10 TABLET, FILM COATED, EXTENDED RELEASE ORAL EVERY 12 HOURS
Qty: 60 TAB | Refills: 0 | Status: SHIPPED | OUTPATIENT
Start: 2018-09-05 | End: 2018-09-18 | Stop reason: SDUPTHER

## 2018-07-10 RX ORDER — OXYCODONE HCL 10 MG/1
10 TABLET, FILM COATED, EXTENDED RELEASE ORAL EVERY 12 HOURS
Qty: 60 TAB | Refills: 0 | Status: SHIPPED | OUTPATIENT
Start: 2018-08-05 | End: 2018-07-10 | Stop reason: SDUPTHER

## 2018-07-10 NOTE — PROGRESS NOTES
Subjective:      Venus Church is a 87 y.o. female who presents with back pain           HPI          Patient here with daughter for follow-up emergency room, patient lives by herself but son sees her daily, to monitor and manage medications.  Patient does ADLs by herself prior to fall.  Had been outside picking up karime without walker.  Accidentally tripped and fell on her buttock, seen in emergency room, x-rays lumbar and thoracic spine no acute fracture, CT head no acute bleed.  This was on July 1.  Since then patient has had difficulty sitting because of tailbone pain, chronic low back pain has seen physical therapy, orthopedics, pain management, neurosurgery, has had physical therapy, injections, nerve ablation, no benefit.  Status post kyphoplasty many years ago.  Has been trying to taper OxyContin, initially had been tapering from 10 mg twice daily, not to 10 mg once a day for getting breakthrough pain.  No NSAIDs because of Pradaxa anticoagulation.  Previous to this had been doing all ADLs, some difficulty with transfers currently.  Especially getting in and out of bed.  Need assist with bathing.  Pain can be moderate to severe depending on  situation, worse with sitting or lying on the buttock area.  No radiation down the legs, no sensory changes feet, no incontinence of bowel or bladder.  Not using walker for ambulation.  On chronic hydrocodone for low back pain, does not cause drowsiness, sedation, occasional memory loss short-term, on Aricept tolerating with minimal nausea, no alcohol, no illicit drugs, no respiratory suppression, no constipation, no anxiety or depression.  Good social support with daughter and son.  has previously tried anti-inflammatories, Celebrex, Cymbalta, pregabalin, gabapentin, Lidoderm, Voltaren topical in addition to injections, nerve ablation, physical therapy.              Current Outpatient Prescriptions   Medication Sig Dispense Refill   • acetaminophen/caffeine/butalbital  325-40-50 mg (FIORICET) -40 MG Tab Take 1 Tab by mouth every four hours as needed for Headache.     • ibuprofen (MOTRIN) 400 MG Tab Take 1 Tab by mouth every 6 hours as needed. 15 Tab 0   • metoprolol (LOPRESSOR) 25 MG Tab Take 1 Tab by mouth 2 times a day. 180 Tab 3   • oxyCODONE CR (OXYCONTIN) 10 MG Tablet Extended Release 12 hour Abuse-Deterrent Take 1 Tab by mouth every 12 hours for 30 days. 60 Tab 0   • donepezil (ARICEPT) 5 MG Tab Take 1 Tab by mouth every day. 30 Tab 3   • dabigatran (PRADAXA) 75 MG Cap capsule Take 1 Cap by mouth 2 Times a Day. 60 Cap 6   • albuterol (PROVENTIL) 2.5mg/3ml Nebu Soln solution for nebulization 3 mL by Nebulization route every four hours as needed for Shortness of Breath. 150 Bullet 11   • Fluticasone-Umeclidin-Vilant (TRELEGY ELLIPTA) 100-62.5-25 MCG/INH AEROSOL POWDER, BREATH ACTIVATED Inhale 1 Puff by mouth every day. 1 Each 11   • levalbuterol (XOPENEX HFA) 45 MCG/ACT inhaler Inhale 2 Puffs by mouth every 6 hours as needed for Shortness of Breath. 3 Inhaler 0   • omeprazole (PRILOSEC) 20 MG delayed-release capsule Take 1 Cap by mouth every day. (Patient not taking: Reported on 5/17/2018) 90 Cap 2   • tiotropium (SPIRIVA HANDIHALER) 18 MCG Cap Inhale 1 Cap by mouth every day. (Patient not taking: Reported on 5/17/2018) 30 Cap 11   • irbesartan-hydrochlorothiazide (AVALIDE) 150-12.5 MG per tablet Take 0.5 Tabs by mouth every day. 90 Tab 3   • albuterol (PROVENTIL) 2.5mg/3ml Nebu Soln solution for nebulization 2.5 mg by Nebulization route every four hours as needed for Shortness of Breath.     • Cyanocobalamin (VITAMIN B-12 PO) Take  by mouth.     • lidocaine (LIDODERM) 5 % Patch Apply one patch to affected area on during the day for 12 hours, off at night (Patient taking differently: Apply 1 Patch to skin as directed. Apply one patch to affected area on during the day for 12 hours, off at night) 30 Patch 6   • calcium-vitamin D (OSCAL-250) 250-125 MG-UNIT TABS Take 1 Tab  by mouth every day.     • VITAMIN D, CHOLECALCIFEROL, PO Take 1 Cap by mouth 2 Times a Day.       No current facility-administered medications for this visit.      Patient Active Problem List   Diagnosis   • Hypertension   • Dyslipidemia   • Preventative health care   • Low back pain   • Cervical pain   • Osteoporosis, idiopathic   • History of compression fracture of spine   • PAF (paroxysmal atrial fibrillation) ([facility])   • Right hip pain   • COPD (chronic obstructive pulmonary disease) ([facility])   • Sleep apnea   • History of Helicobacter pylori infection   • Chronic anticoagulation   • History of shingles   • Tension headache   • Macular degeneration   • Anticoagulated   • Chronic use of opiate for therapeutic purpose   • Sensorineural hearing loss   • Mild cognitive impairment   • TB lung, latent   • Chronic respiratory failure with hypoxia ([facility])   • Abdominal pain, left lower quadrant   • Plantar wart of right foot         ROS       Objective:      Physical Exam   Constitutional: She appears well-developed and well-nourished.   HENT:   Head: Normocephalic and atraumatic.   Eyes: Conjunctivae are normal. Right eye exhibits no discharge. Left eye exhibits no discharge.   Neck: No JVD present. No thyromegaly present.   Cardiovascular: Normal rate and regular rhythm.    Pulmonary/Chest: Effort normal and breath sounds normal. No respiratory distress.   Abdominal: Soft.   Musculoskeletal: She exhibits tenderness.   Skin: Skin is warm.   Psychiatric: She has a normal mood and affect. Her behavior is normal.   Nursing note and vitals reviewed.    Tenderness to palpation over coccyx region, no tenderness over pelvic, or hip area.  Appropriate response to questions and commands, no ecchymosis or bruising noted  .      Health Maintenance Summary                IMM DTaP/Tdap/Td Vaccine Overdue 4/22/1950     PFT SCREENING-FEV1 AND FEV/FVC RATIO / SPIROMETRY SHOULD BE PERFORMED ANNUALLY Overdue 10/21/2016      Done 10/21/2015  PFT DICTATED RESULTS     Patient has more history with this topic...    BONE DENSITY Overdue 6/21/2018      Done 6/21/2013 DS-BONE DENSITY STUDY ([facility])     Patient has more history with this topic...    URINE DRUG SCREEN Next Due 8/2/2018      Done 8/7/2017 [facility] SCREEN    IMM INFLUENZA Next Due 9/1/2018      Done 10/4/2017 Imm Admin: Influenza Vaccine Adult HD     Patient has more history with this topic...    Annual Wellness Visit Next Due 4/11/2019      Done 4/10/2018 Visit Dx: Medicare annual wellness visit, subsequent     Patient has more history with this topic...          Patient Care Team:  Denis Krueger M.D. as PCP - General  Nathan Fajardo M.D. as Consulting Physician (Anesthesia)  Esdras Thornton M.D. as Consulting Physician (Pulmonary Medicine)  Aashish as Respiratory  SARAH De La Garza as Consulting Physician (Pulmonary Medicine)            Assessment/Plan:     Assessment  #1   recent fall July 1, fell on her tailbone area, seen in the emergency room, no acute changes on x-ray, has had chronic low back pain status post kyphoplasty, exacerbation of underlying chronic low back pain with regards to tailbone area pain    #2 chronic opiate therapy oxycodone 10 mg once a day having tapered down from twice daily, although with recent fall and acute exacerbation of chronic underlying pain, oxycodone 10 mg once a day has not been adequately treating her pain especially when sitting or lying down on the tailbone region    #3  Social situation, patient lives at home, but after recent fall, is not able to leave the house, does not drive, has difficulty with transportation but son lives in [facility]-check in on her daily, and daughter is visiting from out of state in [place].  Patient had been able to perform ADLs prior to fall.  But does have limited mobility now and daughter is assisting.        Plan  #! Home health referral PT, occupational therapy, nursing call daughter to  schedule, daughter requesting this    #2 [facility] Call daughter to schedule     #3  Follow-up pain management    #4 falling precautions recommend walker for ambulation    #5 resume Lidoderm patch    #6 refill oxycodone, has one refill left at the pharmacy, will provide another 2 separate refills, she is on schedule.  We will hold off on taper for now, she will resume twice a day oxycodone 10 mg dosing which was her previous dose, understanding that medication may cause drowsiness, sedation, memory loss, confusion, falls, especially at her age she would be at high risk for complications but since she has already tried and failed multiple medications, anti-inflammatories, physical therapy injections by pain management, nerve ablation, we were able to balance pain relief with potential side effects from the medication     #7  reviewed     #8  Risks and benefits of narcotics discussed with daughter, she understands risks as well as potential benefits given her previous medications tried  A good prakash effort was employed to review current and previous medical records, an evaluation, examination, and assessment has been performed including mental health risk of abuse, addiction, dependency.  A current treatment plan is available, and alternative options have been discussed with the patient.  An updated  has been reviewed prior to prescription of medications.  The potential risks and benefits of therapy have been discussed, risks to include dependency, addiction, overdose, and risk to fetus in childbearing women.  Opioid side effects include: Drowsiness, sedation, confusion, memory loss, depression, impaired balance and coordination, increased risk of falls, respiratory suppression, nausea, vomiting, constipation, itching and rash, difficulty urinating.  Long-term use may contribute to tolerance, dependency, habituation requiring more medication to achieve the same benefits, and long-term use may  decrease efficacy of the medication.  If medications are discontinued abruptly, symptoms of withdrawals may include nausea, abdominal pain, irregular heart rhythms, sweating and rapid heartbeat, all narcotics need to be tapered under the supervision of a provider.  Patient has been evaluated and treated for side effects or complications related to use of controlled substance.  Patient has been advised to avoid use of benzodiazepines or other CNS depressants on pain medications because of potential interaction leading to respiratory suppression and death.  Overdose and death may occur if patient does not take opioid prescriptions as prescribed. Patient understands there is the availability of an opioid antagonist without a prescription at the pharmacy to reverse side effects emergently and to activate EMS system should that happen.  It would also be useful to instruct family members or friends about the potential side effects of opiates, and instruct individuals on the application of an opioid antagonist.  Signed controlled substance treatment plan has been reviewed with patient previously and signed covering:  -Taking the medications as prescribed by provider  -No early refills  -Refills are to be processed only at in-person appointments  -Obtaining controlled substance medications from one designated pharmacy  -Not to obtain controlled substances from other providers for this condition and inform the provider of any new controlled substance being prescribed or taken by the patient  -Inform the provider of all controlled medications the patient is taking  -Lost or stolen prescriptions will not be replaced  -Patient will not share, sell, or trade medications  -Patient agrees to participate in the pain treatment program  -Patient will not use alcohol, marijuana without a medical marijuana license, illicit drugs, or take another person's prescriptions  -Submit to random urine drug testing to ensure compliance with  medications  -Proper use, storage, disposal of medications, in addition to keeping medications in a safe secure location out of the reach of children  Patient has been warned not to operate heavy machinery, and to not handle firearms, operate motor vehicle under the influence of opiate medications, these may affect an individual's ability to function and may result in life-threatening accidents, and as such may have legal repercussions.  Discussed the management plan with the risks of narcotic usage, reviewed activity level and lifestyle modification, as well as potential exercise programs as tolerated.  Reviewed previous imaging studies which have been performed, assessed further diagnostic and imaging considerations, as well as further evaluation by specialists including pain management, neurosurgery, orthopedics if indicated in the future.  Discussed management options and reviewed symptomatic care, can consider acupuncture, massage therapy, TENS unit, home exercise programs, physical therapy, reviewed pain medication dosing, risks and alternatives, medication adjustments, considerations of tapering medication, reviewed , and other therapeutic modalities.  Given the above-mentioned considerations, at this time, I feel the benefits of chronic opiate therapy or chronic pain medication controlled substance therapy outweighs the risks of the medication.    #9 long-term consideration of independent or assisted living, discussed with patient and daughter    #10  Follow-up next month as scheduled    #11 total time spent with patient, daughter discussing pain medication, physical therapy, falling precautions, risks and benefits of narcotics, patient counseling and family counseling education, discussing potential assisted living facilities, greater than 50% or >20 minutes of the visit

## 2018-07-11 ENCOUNTER — APPOINTMENT (OUTPATIENT)
Dept: PULMONOLOGY | Facility: HOSPICE | Age: 83
End: 2018-07-11
Payer: MEDICARE

## 2018-07-11 ENCOUNTER — HOME HEALTH ADMISSION (OUTPATIENT)
Dept: HOME HEALTH SERVICES | Facility: HOME HEALTHCARE | Age: 83
End: 2018-07-11
Payer: MEDICARE

## 2018-07-12 ENCOUNTER — TELEPHONE (OUTPATIENT)
Dept: HEALTH INFORMATION MANAGEMENT | Facility: OTHER | Age: 83
End: 2018-07-12

## 2018-07-12 ENCOUNTER — HOME CARE VISIT (OUTPATIENT)
Dept: HOME HEALTH SERVICES | Facility: HOME HEALTHCARE | Age: 83
End: 2018-07-12
Payer: MEDICARE

## 2018-07-12 PROCEDURE — 665001 SOC-HOME HEALTH

## 2018-07-12 PROCEDURE — G0493 RN CARE EA 15 MIN HH/HOSPICE: HCPCS

## 2018-07-12 ASSESSMENT — PATIENT HEALTH QUESTIONNAIRE - PHQ9
2. FEELING DOWN, DEPRESSED, IRRITABLE, OR HOPELESS: 00
1. LITTLE INTEREST OR PLEASURE IN DOING THINGS: 01

## 2018-07-12 ASSESSMENT — ACTIVITIES OF DAILY LIVING (ADL): OASIS_M1830: 03

## 2018-07-12 ASSESSMENT — ENCOUNTER SYMPTOMS
VOMITING: DENIES
NAUSEA: MEDICATION

## 2018-07-12 NOTE — TELEPHONE ENCOUNTER
Received referral from [facility]. Medications reviewed. No clinically significant interactions noted. [facility] RN notes that patient is not taking xopenex HFA due to running out of medication and is not taking omeprazole. Outbound call to patient's son Dileep to discuss. Left VM with my contact information and request for a return call.     Amparo Mills, PharmD

## 2018-07-13 ENCOUNTER — HOME CARE VISIT (OUTPATIENT)
Dept: HOME HEALTH SERVICES | Facility: HOME HEALTHCARE | Age: 83
End: 2018-07-13
Payer: MEDICARE

## 2018-07-13 VITALS
WEIGHT: 119.8 LBS | TEMPERATURE: 98.9 F | BODY MASS INDEX: 22.62 KG/M2 | OXYGEN SATURATION: 96 % | HEIGHT: 61 IN | RESPIRATION RATE: 18 BRPM | SYSTOLIC BLOOD PRESSURE: 122 MMHG | HEART RATE: 65 BPM | DIASTOLIC BLOOD PRESSURE: 60 MMHG

## 2018-07-13 SDOH — ECONOMIC STABILITY: HOUSING INSECURITY
HOME SAFETY: E PLAN DEVELOPED. PATIENT DOES NOT HAVE FLAMMABLE MATERIALS PRESENT IN THE HOME PRESENTING A FIRE HAZARD. NO EVIDENCE FOUND OF SMOKING MATERIALS PRESENT IN THE HOME.

## 2018-07-13 SDOH — ECONOMIC STABILITY: HOUSING INSECURITY
HOME SAFETY: PT HAS SEVERAL RUGS THAT CREATES A POTENTIAL RISK AS A TRIP/SLIP HAZARD.  OXYGEN SAFETY RISK ASSESSMENT PERFORMED. PATIENT DOES NOT HAVE A NO SMOKING SIGN POSTED IN THE HOME., PT WAS GIVEN A NO SMOKING SIGN AND PROVIDED EDUCATION ABOUT WHY IT IS IMPO

## 2018-07-13 SDOH — ECONOMIC STABILITY: HOUSING INSECURITY: UNSAFE COOKING RANGE AREA: 0

## 2018-07-13 SDOH — ECONOMIC STABILITY: HOUSING INSECURITY: UNSAFE APPLIANCES: 0

## 2018-07-13 SDOH — ECONOMIC STABILITY: HOUSING INSECURITY
HOME SAFETY: RTANT TO PLACE ONE. PATIENT DOES NOT HAVE A WORKING FIRE EXTINGUISHER PRESENT IN THE HOME., INSTRUCTED PATIENT/CAREGIVER TO GET ONE AS SOON AS POSSIBLE. SMOKE ALARMS ARE PRESENT AND FUNCTIONAL ON EACH LEVEL OF THE HOME. PATIENT DOES HAVE A FIRE ESCAP

## 2018-07-13 ASSESSMENT — ENCOUNTER SYMPTOMS
SHORTNESS OF BREATH: T
DIFFICULTY THINKING: 1
POOR JUDGMENT: 1

## 2018-07-13 ASSESSMENT — ACTIVITIES OF DAILY LIVING (ADL): HOME_HEALTH_OASIS: 01

## 2018-07-16 ENCOUNTER — HOME CARE VISIT (OUTPATIENT)
Dept: HOME HEALTH SERVICES | Facility: HOME HEALTHCARE | Age: 83
End: 2018-07-16
Payer: MEDICARE

## 2018-07-16 VITALS
RESPIRATION RATE: 17 BRPM | SYSTOLIC BLOOD PRESSURE: 116 MMHG | TEMPERATURE: 99.3 F | HEART RATE: 83 BPM | DIASTOLIC BLOOD PRESSURE: 65 MMHG

## 2018-07-16 PROCEDURE — G0153 HHCP-SVS OF S/L PATH,EA 15MN: HCPCS

## 2018-07-17 ENCOUNTER — HOME CARE VISIT (OUTPATIENT)
Dept: HOME HEALTH SERVICES | Facility: HOME HEALTHCARE | Age: 83
End: 2018-07-17
Payer: MEDICARE

## 2018-07-17 VITALS
HEART RATE: 68 BPM | DIASTOLIC BLOOD PRESSURE: 60 MMHG | OXYGEN SATURATION: 93 % | TEMPERATURE: 98.6 F | RESPIRATION RATE: 18 BRPM | SYSTOLIC BLOOD PRESSURE: 110 MMHG

## 2018-07-17 PROCEDURE — G0151 HHCP-SERV OF PT,EA 15 MIN: HCPCS

## 2018-07-17 SDOH — ECONOMIC STABILITY: HOUSING INSECURITY: UNSAFE COOKING RANGE AREA: 0

## 2018-07-17 SDOH — ECONOMIC STABILITY: HOUSING INSECURITY: UNSAFE APPLIANCES: 0

## 2018-07-17 ASSESSMENT — ENCOUNTER SYMPTOMS
POOR JUDGMENT: 1
DEBILITATING PAIN: 1

## 2018-07-17 ASSESSMENT — ACTIVITIES OF DAILY LIVING (ADL)
ADLS_COMMENTS: <!--EPICS-->PLEASE REFER TO OT EVALUATION.<!--EPICE-->
IADLS_COMMENTS: <!--EPICS-->PLEASE REFER TO OT EVALUATION.<BR><!--EPICE-->

## 2018-07-18 ENCOUNTER — HOME CARE VISIT (OUTPATIENT)
Dept: HOME HEALTH SERVICES | Facility: HOME HEALTHCARE | Age: 83
End: 2018-07-18
Payer: MEDICARE

## 2018-07-18 VITALS
TEMPERATURE: 99.2 F | SYSTOLIC BLOOD PRESSURE: 124 MMHG | RESPIRATION RATE: 18 BRPM | DIASTOLIC BLOOD PRESSURE: 64 MMHG | HEART RATE: 63 BPM

## 2018-07-18 PROCEDURE — G0153 HHCP-SVS OF S/L PATH,EA 15MN: HCPCS

## 2018-07-18 PROCEDURE — G0300 HHS/HOSPICE OF LPN EA 15 MIN: HCPCS

## 2018-07-18 ASSESSMENT — ENCOUNTER SYMPTOMS: DEBILITATING PAIN: 1

## 2018-07-23 ENCOUNTER — HOME CARE VISIT (OUTPATIENT)
Dept: HOME HEALTH SERVICES | Facility: HOME HEALTHCARE | Age: 83
End: 2018-07-23
Payer: MEDICARE

## 2018-07-23 VITALS
OXYGEN SATURATION: 94 % | HEART RATE: 63 BPM | DIASTOLIC BLOOD PRESSURE: 64 MMHG | TEMPERATURE: 99.2 F | RESPIRATION RATE: 18 BRPM | SYSTOLIC BLOOD PRESSURE: 122 MMHG

## 2018-07-23 VITALS
SYSTOLIC BLOOD PRESSURE: 125 MMHG | RESPIRATION RATE: 18 BRPM | HEART RATE: 85 BPM | TEMPERATURE: 98.7 F | DIASTOLIC BLOOD PRESSURE: 65 MMHG

## 2018-07-23 PROCEDURE — G0180 MD CERTIFICATION HHA PATIENT: HCPCS | Performed by: INTERNAL MEDICINE

## 2018-07-23 PROCEDURE — G0153 HHCP-SVS OF S/L PATH,EA 15MN: HCPCS

## 2018-07-23 ASSESSMENT — ENCOUNTER SYMPTOMS
DIFFICULTY THINKING: 1
VOMITING: DENIES
NAUSEA: DENIES
DEBILITATING PAIN: 1

## 2018-07-24 ENCOUNTER — HOME CARE VISIT (OUTPATIENT)
Dept: HOME HEALTH SERVICES | Facility: HOME HEALTHCARE | Age: 83
End: 2018-07-24
Payer: MEDICARE

## 2018-07-24 VITALS
RESPIRATION RATE: 18 BRPM | SYSTOLIC BLOOD PRESSURE: 122 MMHG | DIASTOLIC BLOOD PRESSURE: 58 MMHG | TEMPERATURE: 98.7 F | OXYGEN SATURATION: 97 % | HEART RATE: 82 BPM

## 2018-07-24 PROCEDURE — G0155 HHCP-SVS OF CSW,EA 15 MIN: HCPCS

## 2018-07-24 PROCEDURE — G0152 HHCP-SERV OF OT,EA 15 MIN: HCPCS

## 2018-07-24 ASSESSMENT — ACTIVITIES OF DAILY LIVING (ADL)
EATING_ASSISTANCE: 0
BATHING_ASSISTANCE: 0
MEAL_PREP_ASSISTANCE: 0
BATHING_ASSISTIVE_EQUIPMENT_USED: SHOWER BENCH
TRANSPORTATION_ASSISTANCE: 6
GROOMING_ASSISTANCE: 0
LAUNDRY_ASSISTANCE: 0
HOUSEKEEPING_ASSISTANCE: 2
TELEPHONE_ASSISTANCE: 0
ORAL_CARE_ASSISTANCE: 0
DRESSING_UB_ASSISTANCE: 0
SHOPPING_ASSISTANCE: 6
DRESSING_LB_ASSISTANCE: 0
TOILETING_ASSISTANCE: 0

## 2018-07-24 ASSESSMENT — ENCOUNTER SYMPTOMS: DIFFICULTY THINKING: 1

## 2018-07-25 ENCOUNTER — HOME CARE VISIT (OUTPATIENT)
Dept: HOME HEALTH SERVICES | Facility: HOME HEALTHCARE | Age: 83
End: 2018-07-25
Payer: MEDICARE

## 2018-07-26 ENCOUNTER — HOME CARE VISIT (OUTPATIENT)
Dept: HOME HEALTH SERVICES | Facility: HOME HEALTHCARE | Age: 83
End: 2018-07-26
Payer: MEDICARE

## 2018-07-26 VITALS
SYSTOLIC BLOOD PRESSURE: 119 MMHG | RESPIRATION RATE: 16 BRPM | TEMPERATURE: 97.6 F | OXYGEN SATURATION: 93 % | HEART RATE: 63 BPM | DIASTOLIC BLOOD PRESSURE: 50 MMHG

## 2018-07-26 PROCEDURE — G0151 HHCP-SERV OF PT,EA 15 MIN: HCPCS

## 2018-07-26 PROCEDURE — G0493 RN CARE EA 15 MIN HH/HOSPICE: HCPCS

## 2018-07-26 ASSESSMENT — ENCOUNTER SYMPTOMS: DIFFICULTY THINKING: 1

## 2018-07-30 VITALS
RESPIRATION RATE: 16 BRPM | TEMPERATURE: 98.2 F | HEART RATE: 64 BPM | SYSTOLIC BLOOD PRESSURE: 115 MMHG | OXYGEN SATURATION: 96 % | DIASTOLIC BLOOD PRESSURE: 70 MMHG

## 2018-07-30 ASSESSMENT — ENCOUNTER SYMPTOMS: POOR JUDGMENT: 1

## 2018-07-31 ENCOUNTER — HOME CARE VISIT (OUTPATIENT)
Dept: HOME HEALTH SERVICES | Facility: HOME HEALTHCARE | Age: 83
End: 2018-07-31
Payer: MEDICARE

## 2018-07-31 PROCEDURE — G0151 HHCP-SERV OF PT,EA 15 MIN: HCPCS

## 2018-08-01 VITALS
TEMPERATURE: 97.6 F | RESPIRATION RATE: 16 BRPM | DIASTOLIC BLOOD PRESSURE: 60 MMHG | SYSTOLIC BLOOD PRESSURE: 100 MMHG | OXYGEN SATURATION: 95 % | HEART RATE: 64 BPM

## 2018-08-01 SDOH — ECONOMIC STABILITY: HOUSING INSECURITY: UNSAFE APPLIANCES: 0

## 2018-08-01 SDOH — ECONOMIC STABILITY: HOUSING INSECURITY: UNSAFE COOKING RANGE AREA: 0

## 2018-08-01 ASSESSMENT — ACTIVITIES OF DAILY LIVING (ADL)
IADLS_COMMENTS: <!--EPICS-->PLEASE REFER TO OT EVALUATION.<BR><!--EPICE-->
ADLS_COMMENTS: <!--EPICS-->PLEASE REFER TO OT EVALUATION.<!--EPICE-->

## 2018-08-01 ASSESSMENT — ENCOUNTER SYMPTOMS: DEBILITATING PAIN: 1

## 2018-08-07 ENCOUNTER — HOME CARE VISIT (OUTPATIENT)
Dept: HOME HEALTH SERVICES | Facility: HOME HEALTHCARE | Age: 83
End: 2018-08-07
Payer: MEDICARE

## 2018-08-07 VITALS
RESPIRATION RATE: 18 BRPM | DIASTOLIC BLOOD PRESSURE: 70 MMHG | OXYGEN SATURATION: 94 % | HEART RATE: 72 BPM | SYSTOLIC BLOOD PRESSURE: 128 MMHG | TEMPERATURE: 97.8 F

## 2018-08-07 PROCEDURE — G0151 HHCP-SERV OF PT,EA 15 MIN: HCPCS

## 2018-08-07 ASSESSMENT — ENCOUNTER SYMPTOMS
DEBILITATING PAIN: 1
POOR JUDGMENT: 1

## 2018-08-09 ENCOUNTER — HOME CARE VISIT (OUTPATIENT)
Dept: HOME HEALTH SERVICES | Facility: HOME HEALTHCARE | Age: 83
End: 2018-08-09
Payer: MEDICARE

## 2018-08-09 VITALS
DIASTOLIC BLOOD PRESSURE: 70 MMHG | SYSTOLIC BLOOD PRESSURE: 128 MMHG | RESPIRATION RATE: 18 BRPM | HEART RATE: 69 BPM | TEMPERATURE: 97.7 F | OXYGEN SATURATION: 96 %

## 2018-08-09 PROCEDURE — G0151 HHCP-SERV OF PT,EA 15 MIN: HCPCS

## 2018-08-09 ASSESSMENT — ENCOUNTER SYMPTOMS: POOR JUDGMENT: 1

## 2018-08-14 ENCOUNTER — HOME CARE VISIT (OUTPATIENT)
Dept: HOME HEALTH SERVICES | Facility: HOME HEALTHCARE | Age: 83
End: 2018-08-14
Payer: MEDICARE

## 2018-08-14 PROCEDURE — G0151 HHCP-SERV OF PT,EA 15 MIN: HCPCS

## 2018-08-15 VITALS
OXYGEN SATURATION: 95 % | DIASTOLIC BLOOD PRESSURE: 60 MMHG | TEMPERATURE: 98.6 F | RESPIRATION RATE: 16 BRPM | SYSTOLIC BLOOD PRESSURE: 115 MMHG | HEART RATE: 61 BPM

## 2018-08-16 ENCOUNTER — HOME CARE VISIT (OUTPATIENT)
Dept: HOME HEALTH SERVICES | Facility: HOME HEALTHCARE | Age: 83
End: 2018-08-16
Payer: MEDICARE

## 2018-08-16 ENCOUNTER — TELEPHONE (OUTPATIENT)
Dept: MEDICAL GROUP | Facility: MEDICAL CENTER | Age: 83
End: 2018-08-16

## 2018-08-16 VITALS
RESPIRATION RATE: 18 BRPM | SYSTOLIC BLOOD PRESSURE: 130 MMHG | OXYGEN SATURATION: 95 % | DIASTOLIC BLOOD PRESSURE: 65 MMHG | HEART RATE: 73 BPM | TEMPERATURE: 98.1 F

## 2018-08-16 PROCEDURE — G0151 HHCP-SERV OF PT,EA 15 MIN: HCPCS

## 2018-08-16 NOTE — TELEPHONE ENCOUNTER
1. Caller Name: Ivana                                         Call Back Number: [phone] (home) [phone] (work)       2. Patient‘s symptoms (location & severity)? PT examined Venus and heard gurgles in the chest. PULM has previously set them up with Prednisone for her to take should this happen. They want to know if they can start it, remaining vitals are all fine. Please advise.     3. Is this a new symptom? yes    4. When did it start? yesterday    5. Action taken per active symptom guide: NO (Same day scheduled; UC recommended; ED recommended)    6. Patient agrees to recommended action per active symptom guide: no  If no, patient was advised again of recommendation and patient verbalized understanding.

## 2018-08-16 NOTE — TELEPHONE ENCOUNTER
Please notify patient her daughter that she can start the prednisone as given by the pulmonologist

## 2018-08-20 ENCOUNTER — APPOINTMENT (OUTPATIENT)
Dept: PULMONOLOGY | Facility: HOSPICE | Age: 83
End: 2018-08-20
Payer: MEDICARE

## 2018-08-21 ENCOUNTER — HOME CARE VISIT (OUTPATIENT)
Dept: HOME HEALTH SERVICES | Facility: HOME HEALTHCARE | Age: 83
End: 2018-08-21
Payer: MEDICARE

## 2018-08-21 PROBLEM — G47.34 NOCTURNAL HYPOXEMIA: Status: ACTIVE | Noted: 2018-03-20

## 2018-08-24 ENCOUNTER — HOME CARE VISIT (OUTPATIENT)
Dept: HOME HEALTH SERVICES | Facility: HOME HEALTHCARE | Age: 83
End: 2018-08-24
Payer: MEDICARE

## 2018-08-25 ENCOUNTER — HOME CARE VISIT (OUTPATIENT)
Dept: HOME HEALTH SERVICES | Facility: HOME HEALTHCARE | Age: 83
End: 2018-08-25
Payer: MEDICARE

## 2018-08-26 SDOH — ECONOMIC STABILITY: HOUSING INSECURITY: UNSAFE APPLIANCES: 0

## 2018-08-26 SDOH — ECONOMIC STABILITY: HOUSING INSECURITY: UNSAFE COOKING RANGE AREA: 0

## 2018-08-26 ASSESSMENT — ACTIVITIES OF DAILY LIVING (ADL)
HOME_HEALTH_OASIS: 01
OASIS_M1830: 02

## 2018-08-26 ASSESSMENT — ENCOUNTER SYMPTOMS: DEBILITATING PAIN: 1

## 2018-08-27 ENCOUNTER — TELEPHONE (OUTPATIENT)
Dept: MEDICAL GROUP | Facility: MEDICAL CENTER | Age: 83
End: 2018-08-27

## 2018-08-27 NOTE — TELEPHONE ENCOUNTER
1. Caller Name: Ivana                      Call Back Number: [phone] (home) [phone] (work)      2. Message: Venus is having 4 extractions and the family would like to know when her last Reclast administration was and also if you have any concerns about her having a dental procedure. Please advise.     3. Patient approves office to leave a detailed voicemail/MyChart message: no

## 2018-08-30 ENCOUNTER — TELEPHONE (OUTPATIENT)
Dept: CARDIOLOGY | Facility: MEDICAL CENTER | Age: 83
End: 2018-08-30

## 2018-08-30 ENCOUNTER — HOME CARE VISIT (OUTPATIENT)
Dept: HOME HEALTH SERVICES | Facility: HOME HEALTHCARE | Age: 83
End: 2018-08-30
Payer: MEDICARE

## 2018-08-30 NOTE — TELEPHONE ENCOUNTER
Dr. Wyman, [facility], in [place]. wants to schedule pt. For dental extractions (5 teeth) and faxed a request for cardiac clearance and advice on holding Pradaxa 75mg BID.  To Dr. Smyth.    Dr. Telly Wyman  [phone] phone, [phone] fax.  Clearance form at nurse's desk.

## 2018-08-31 RX ORDER — LEVALBUTEROL TARTRATE 45 UG/1
2 AEROSOL, METERED ORAL EVERY 6 HOURS PRN
Qty: 3 INHALER | Refills: 5 | Status: SHIPPED | OUTPATIENT
Start: 2018-08-31 | End: 2018-09-01

## 2018-08-31 NOTE — TELEPHONE ENCOUNTER
Xopenex    Or whatever inhaler you think is best, this is the one they found in her purse that was . Just want a recue inhale , in case they need one.       Was the patient seen in the last year in this department? Yes    Does patient have an active prescription for medications requested? No     Received Request Via: Patient

## 2018-09-01 RX ORDER — ALBUTEROL SULFATE 90 UG/1
2 AEROSOL, METERED RESPIRATORY (INHALATION) EVERY 6 HOURS PRN
Qty: 8.5 G | Refills: 6 | Status: SHIPPED | OUTPATIENT
Start: 2018-09-01 | End: 2019-02-11

## 2018-09-04 ENCOUNTER — TELEPHONE (OUTPATIENT)
Dept: PULMONOLOGY | Facility: HOSPICE | Age: 83
End: 2018-09-04

## 2018-09-04 NOTE — TELEPHONE ENCOUNTER
Ivana on the pt's file called requesting an abx and prednisone for the pt to have on hand, states that the pt ran out a couple of weeks ago.    Last seen: 3/7/18- Mima Ashraf  Next ov: 3 month return  Mucopurulent chronic bronchitis    Message sent to the pool for Mima Ashraf isn't in today.

## 2018-09-04 NOTE — TELEPHONE ENCOUNTER
Faxed clearance form to Dr. Tanner Wyman.      Message   Received: Today   Message Contents   TAMI Carter L.PHIRAM   Caller: Unspecified (5 days ago, 12:07 PM)             Low risk.   Hold Pradaxa for 48 hours prior to yanking teeth

## 2018-09-05 NOTE — TELEPHONE ENCOUNTER
I spoke w/ Dileep that pt's son and gave him Janessa Soni's last message.  He states that he will cb [phone] to make the pt's next ov.

## 2018-09-07 ENCOUNTER — TELEPHONE (OUTPATIENT)
Dept: MEDICAL GROUP | Facility: MEDICAL CENTER | Age: 83
End: 2018-09-07

## 2018-09-07 DIAGNOSIS — F11.20 OPIATE DEPENDENCE, CONTINUOUS (HCC): ICD-10-CM

## 2018-09-07 DIAGNOSIS — Z79.891 CHRONIC USE OF OPIATE DRUGS THERAPEUTIC PURPOSES: ICD-10-CM

## 2018-09-07 DIAGNOSIS — R52 PAIN: ICD-10-CM

## 2018-09-07 RX ORDER — OXYCODONE HCL 10 MG/1
10 TABLET, FILM COATED, EXTENDED RELEASE ORAL EVERY 12 HOURS
Qty: 60 TAB | Refills: 0 | OUTPATIENT
Start: 2018-09-07 | End: 2018-10-07

## 2018-09-07 NOTE — TELEPHONE ENCOUNTER
Please notify daughter that the OxyContin prescriptions were provided at her last visit for 3 months at a time and that should have been dropped off at the pharmacy.      She should have 1 refill of the OxyContin available on record at the pharmacy and she can pick that up anytime since that could have been refilled after September 5.

## 2018-09-07 NOTE — TELEPHONE ENCOUNTER
Rey Heath called and said that Venus had an appt on the 18th and that she would appreciate it if you would fill and notify her when she can .     Was the patient seen in the last year in this department? Yes Last 7/10/18    Does patient have an active prescription for medications requested? No     Received Request Via: Patient

## 2018-09-08 NOTE — TELEPHONE ENCOUNTER
Called daughter as she left a message earlier in the week on Wednesday that she wanted to discuss a few things, I left a message.

## 2018-09-10 ENCOUNTER — OFFICE VISIT (OUTPATIENT)
Dept: PULMONOLOGY | Facility: HOSPICE | Age: 83
End: 2018-09-10
Payer: MEDICARE

## 2018-09-10 VITALS
SYSTOLIC BLOOD PRESSURE: 120 MMHG | BODY MASS INDEX: 21.14 KG/M2 | DIASTOLIC BLOOD PRESSURE: 60 MMHG | HEART RATE: 80 BPM | RESPIRATION RATE: 16 BRPM | OXYGEN SATURATION: 98 % | HEIGHT: 61 IN | WEIGHT: 112 LBS | TEMPERATURE: 98.1 F

## 2018-09-10 DIAGNOSIS — Z72.0 TOBACCO USE: ICD-10-CM

## 2018-09-10 DIAGNOSIS — M79.2 NEUROPATHIC PAIN: ICD-10-CM

## 2018-09-10 DIAGNOSIS — J41.1 MUCOPURULENT CHRONIC BRONCHITIS (HCC): Chronic | ICD-10-CM

## 2018-09-10 DIAGNOSIS — G47.33 OBSTRUCTIVE SLEEP APNEA SYNDROME: ICD-10-CM

## 2018-09-10 DIAGNOSIS — Z22.7 TB LUNG, LATENT: ICD-10-CM

## 2018-09-10 PROCEDURE — 99214 OFFICE O/P EST MOD 30 MIN: CPT | Performed by: NURSE PRACTITIONER

## 2018-09-10 RX ORDER — CEFDINIR 300 MG/1
300 CAPSULE ORAL 2 TIMES DAILY
Qty: 20 CAP | Refills: 0 | Status: SHIPPED | OUTPATIENT
Start: 2018-09-10 | End: 2018-09-18

## 2018-09-10 RX ORDER — PREDNISONE 10 MG/1
TABLET ORAL
Qty: 18 TAB | Refills: 0 | Status: SHIPPED | OUTPATIENT
Start: 2018-09-10 | End: 2018-09-18

## 2018-09-10 ASSESSMENT — ENCOUNTER SYMPTOMS
HALLUCINATIONS: 0
FALLS: 0
BACK PAIN: 1
GASTROINTESTINAL NEGATIVE: 1
CHILLS: 0
NECK PAIN: 0
SPUTUM PRODUCTION: 0
NEUROLOGICAL NEGATIVE: 1
WHEEZING: 0
DIAPHORESIS: 0
BRUISES/BLEEDS EASILY: 0
HEMOPTYSIS: 0
WEAKNESS: 0
MYALGIAS: 0
CARDIOVASCULAR NEGATIVE: 1
WEIGHT LOSS: 0
MEMORY LOSS: 1
DEPRESSION: 0
EYE PAIN: 0
FEVER: 0
NERVOUS/ANXIOUS: 0
EYE DISCHARGE: 0
INSOMNIA: 0
COUGH: 0
SHORTNESS OF BREATH: 1

## 2018-09-10 ASSESSMENT — LIFESTYLE VARIABLES: SUBSTANCE_ABUSE: 0

## 2018-09-10 NOTE — PROGRESS NOTES
Chief Complaint   Patient presents with   • COPD   • Apnea         HPI: This patient is a 87 y.o. female, who presents for six-month follow-up COPD and MAMI.  Her son accompanies her today    The patient is a former smoker, almost 40 pk year hx, quit 1984.     PFTs from 2015 indicate an FEV1 of 1.10 L 61% predicted, FEV1 FVC ratio of 58, DLCO 34% predicted.  She is a history of positive QuantiFERON gold test in 2016, but a negative chest x-ray.  Patient is compliant with Trelegy Ellipta daily, rare use of Xopenex HFA.  She had a bout of bronchitis since her last visit treated with antibiotic and prednisone.  She denies any type of cough.  She denies wheezing.  Her dyspnea is baseline.  She denies fever, chills, night sweats.  Her son confirms her symptoms.  She does have memory loss.    PSG from 2011 indicates severe sleep apnea with an AHI of 86.3 and a vanessa of 74%.  She should be using 2 L of oxygen nocturnally but uses this intermittently.  I reviewed with her and her son her diagnosis of sleep apnea and the importance of treatment.  She tried CPAP but was intolerant.  She understands the importance of nocturnal oxygen.    Her biggest complaint today is chronic back pain.  She is visibly uncomfortable sitting today.  She has seen a pain specialist in the past.  She follows with Dr. Krueger routinely.    Past Medical History:   Diagnosis Date   • Anesthesia     n/v   • Aortic regurgitation 4/25/2012   • Arrhythmia 7/10      A. Fib.   • Arthritis     general   • Atypical chest pain 4/25/2012   • CATARACT 2007,08   • Chronic anticoagulation 4/25/2012   • Constipation 4/25/2012   • COPD (chronic obstructive pulmonary disease) (HCC)    • DDD (degenerative disc disease)    • Dental disorder     partials   • Dyspnea 4/25/2012   • Generalized osteoarthritis 4/25/2012   • Headache(784.0) 4/25/2012   • Heart valve insufficiency     minimal, watched by cardio   • Hypercholesteremia    • Hyperlipidemia 4/25/2012   •  Hypertension    • MEDICAL HOME    • Osteoarthritis of knee 4/25/2012   • Osteoporosis    • Other accident     C-Spine FX  2006   • Pain     back pain   • Pneumonia 6/4   • TB lung, latent 5/4/2016   • Valvular heart disease 4/25/2012   • Vertebral fracture    • Vertigo 4/25/2012       Social History   Substance Use Topics   • Smoking status: Former Smoker     Packs/day: 1.50     Years: 25.00     Types: Cigarettes     Quit date: 4/14/1984   • Smokeless tobacco: Never Used      Comment: smoked 25 yrs, quit 1984   • Alcohol use No      Comment: none       Family History   Problem Relation Age of Onset   • Heart Disease Father    • Diabetes Brother        Immunization History   Administered Date(s) Administered   • Influenza TIV (IM) 11/08/2012   • Influenza Vaccine Adult HD 10/24/2014, 10/12/2015, 10/31/2016, 10/04/2017   • Influenza Vaccine Pediatric Split 09/27/2011, 11/08/2012, 11/01/2013   • Pneumococcal Conjugate Vaccine (Prevnar/PCV-13) 10/12/2015   • Pneumococcal polysaccharide vaccine (PPSV-23) 11/01/2011, 11/08/2012   • Tuberculin Skin Test 07/14/2017   • Zoster Vaccine Live (ZVL) (Zostavax) 10/02/2012       Current medications as of today   Current Outpatient Prescriptions   Medication Sig Dispense Refill   • predniSONE (DELTASONE) 10 MG Tab Take 30mg x 3 days, then take 20mg x 3 days, then take 10mg x 3 days, with food, then discontinue. 18 Tab 0   • cefdinir (OMNICEF) 300 MG Cap Take 1 Cap by mouth 2 times a day for 10 days. 20 Cap 0   • albuterol 108 (90 Base) MCG/ACT Aero Soln inhalation aerosol Inhale 2 Puffs by mouth every 6 hours as needed for Shortness of Breath. 8.5 g 6   • non-formulary med Spray 2 L/min in nose every evening.     • acetaminophen (TYLENOL) 500 MG Tab Take 500-1,000 mg by mouth every 6 hours as needed for Mild Pain or Moderate Pain.     • oxyCODONE CR (OXYCONTIN) 10 MG Tablet Extended Release 12 hour Abuse-Deterrent Take 1 Tab by mouth every 12 hours for 30 days. 60 Tab 0   •  "metoprolol (LOPRESSOR) 25 MG Tab Take 1 Tab by mouth 2 times a day. 180 Tab 3   • dabigatran (PRADAXA) 75 MG Cap capsule Take 1 Cap by mouth 2 Times a Day. 60 Cap 6   • albuterol (PROVENTIL) 2.5mg/3ml Nebu Soln solution for nebulization 3 mL by Nebulization route every four hours as needed for Shortness of Breath. 150 Bullet 11   • Fluticasone-Umeclidin-Vilant (TRELEGY ELLIPTA) 100-62.5-25 MCG/INH AEROSOL POWDER, BREATH ACTIVATED Inhale 1 Puff by mouth every day. 1 Each 11   • irbesartan-hydrochlorothiazide (AVALIDE) 150-12.5 MG per tablet Take 0.5 Tabs by mouth every day. 90 Tab 3     No current facility-administered medications for this visit.        Allergies: Codeine and Codeine    Blood pressure 120/60, pulse 80, temperature 36.7 °C (98.1 °F), resp. rate 16, height 1.549 m (5' 1\"), weight 50.8 kg (112 lb), SpO2 98 %.      Review of Systems   Constitutional: Positive for malaise/fatigue. Negative for chills, diaphoresis, fever and weight loss.   HENT: Negative.    Eyes: Negative for pain and discharge.   Respiratory: Positive for shortness of breath. Negative for cough, hemoptysis, sputum production and wheezing.    Cardiovascular: Negative.    Gastrointestinal: Negative.    Musculoskeletal: Positive for back pain. Negative for falls, joint pain, myalgias and neck pain.   Skin: Negative.    Neurological: Negative.  Negative for weakness.   Endo/Heme/Allergies: Negative for environmental allergies. Does not bruise/bleed easily.   Psychiatric/Behavioral: Positive for memory loss. Negative for depression, hallucinations, substance abuse and suicidal ideas. The patient is not nervous/anxious and does not have insomnia.        Physical Exam   Constitutional: She is oriented to person, place, and time and well-developed, well-nourished, and in no distress.   HENT:   Head: Normocephalic and atraumatic.   Mouth/Throat: Oropharynx is clear and moist.   Poor dentition   Eyes: Pupils are equal, round, and reactive to " light.   Neck: Normal range of motion. Neck supple. No tracheal deviation present.   Cardiovascular: Normal rate, regular rhythm and normal heart sounds.    Pulmonary/Chest: Effort normal and breath sounds normal. She has no wheezes. She has no rales.   Musculoskeletal: Normal range of motion.   Neurological: She is alert and oriented to person, place, and time.   Unsteady gait   Skin: Skin is warm and dry.   Psychiatric: Mood, memory, affect and judgment normal.       Diagnoses/Plan:    1. Tobacco use-hx of  Patient quit smoking in 1984    2. Mucopurulent chronic bronchitis (HCC)  Stable, continue current bronchodilators.  She requests backup prescription for prednisone and antibiotic.  This will be sent to pharmacy    3. Obstructive sleep apnea syndrome  Reviewed with patient her diagnosis of sleep apnea.  She has been intolerant to CPAP.  She will restart oxygen nocturnally.    4. TB lung, latent  Patient has no cough.  Denies fever, chills, night sweats.  X-ray in 2016 was unremarkable.  Will obtain updated x-ray prior to next visit.  - DX-CHEST-2 VIEWS    Follow-up 3-6 months, sooner if needed    Influenza vaccine recommended in the fall      This dictation was created using voice recognition software. The accuracy of the dictation is limited to the abilities of the software. I expect there may be some errors of grammar and possibly content.

## 2018-09-18 ENCOUNTER — HOME HEALTH ADMISSION (OUTPATIENT)
Dept: HOME HEALTH SERVICES | Facility: HOME HEALTHCARE | Age: 83
End: 2018-09-18
Payer: MEDICARE

## 2018-09-18 ENCOUNTER — OFFICE VISIT (OUTPATIENT)
Dept: MEDICAL GROUP | Facility: MEDICAL CENTER | Age: 83
End: 2018-09-18
Payer: MEDICARE

## 2018-09-18 VITALS
SYSTOLIC BLOOD PRESSURE: 134 MMHG | WEIGHT: 111 LBS | BODY MASS INDEX: 20.96 KG/M2 | HEART RATE: 88 BPM | OXYGEN SATURATION: 93 % | DIASTOLIC BLOOD PRESSURE: 74 MMHG | HEIGHT: 61 IN | RESPIRATION RATE: 16 BRPM | TEMPERATURE: 98.1 F

## 2018-09-18 DIAGNOSIS — R52 PAIN: ICD-10-CM

## 2018-09-18 DIAGNOSIS — G31.84 MILD COGNITIVE IMPAIRMENT: Chronic | ICD-10-CM

## 2018-09-18 DIAGNOSIS — Z23 NEEDS FLU SHOT: ICD-10-CM

## 2018-09-18 DIAGNOSIS — Z79.891 CHRONIC USE OF OPIATE FOR THERAPEUTIC PURPOSE: Chronic | ICD-10-CM

## 2018-09-18 DIAGNOSIS — Z79.891 CHRONIC USE OF OPIATE DRUGS THERAPEUTIC PURPOSES: ICD-10-CM

## 2018-09-18 DIAGNOSIS — F11.20 OPIATE DEPENDENCE, CONTINUOUS (HCC): ICD-10-CM

## 2018-09-18 DIAGNOSIS — M54.5 LOW BACK PAIN, UNSPECIFIED BACK PAIN LATERALITY, UNSPECIFIED CHRONICITY, WITH SCIATICA PRESENCE UNSPECIFIED: Chronic | ICD-10-CM

## 2018-09-18 PROCEDURE — G0008 ADMIN INFLUENZA VIRUS VAC: HCPCS | Performed by: INTERNAL MEDICINE

## 2018-09-18 PROCEDURE — 90662 IIV NO PRSV INCREASED AG IM: CPT | Performed by: INTERNAL MEDICINE

## 2018-09-18 PROCEDURE — 99214 OFFICE O/P EST MOD 30 MIN: CPT | Mod: 25 | Performed by: INTERNAL MEDICINE

## 2018-09-18 RX ORDER — LIDOCAINE 50 MG/G
3 PATCH TOPICAL EVERY 12 HOURS
Qty: 90 PATCH | Refills: 11 | Status: SHIPPED | OUTPATIENT
Start: 2018-09-18 | End: 2019-02-11

## 2018-09-18 RX ORDER — OXYCODONE HCL 10 MG/1
10 TABLET, FILM COATED, EXTENDED RELEASE ORAL EVERY 12 HOURS
Qty: 60 TAB | Refills: 0 | Status: SHIPPED | OUTPATIENT
Start: 2018-10-05 | End: 2018-09-18 | Stop reason: SDUPTHER

## 2018-09-18 RX ORDER — OXYCODONE HCL 10 MG/1
10 TABLET, FILM COATED, EXTENDED RELEASE ORAL EVERY 12 HOURS
Qty: 60 TAB | Refills: 0 | Status: SHIPPED | OUTPATIENT
Start: 2018-12-04 | End: 2018-12-11 | Stop reason: SDUPTHER

## 2018-09-18 RX ORDER — OXYCODONE HCL 10 MG/1
10 TABLET, FILM COATED, EXTENDED RELEASE ORAL EVERY 12 HOURS
Qty: 60 TAB | Refills: 0 | Status: SHIPPED | OUTPATIENT
Start: 2018-11-04 | End: 2018-09-18 | Stop reason: SDUPTHER

## 2018-09-18 NOTE — PROGRESS NOTES
Subjective:      Venus Church is a 87 y.o. female who presents follow up back pain           HPI     Patient is here for with her son, medication, allergies, medical history, surgical history, social history reviewed.  Off Aricept, some GI upset of medication.  Memory loss, stable according to son, patient lives by herself and is independent.  Son visits her once a day to make sure she is eating right and taking her medications.  Denies anxiety or depression.  Patient has not fallen,  Is not using cane or walker for ambulation.  Patient has memory loss, could not tolerate Aricept, short-term memory decreased, long-term memory intact, no hallucinations, delusions, personality changes.  Hard of hearing.  Has cleaning help once every few weeks.  Otherwise takes care of ADLs on her own, son does provide assistance with food preparation and making sure that she takes her medications daily.  Chronic low back pain compression fracture, status post kyphoplasty 2010, has seen pain management locally, has seen pain management at [place], has tried hydrocodone, pregabalin, anti-inflammatories, has had epidural injections, nerve ablation, facet injection, has tried Zanaflex, has tried Lidoderm patch, Voltaren gel, Cymbalta, has had spinal stimulator, with persistent low back pain, although pain is stable, he can be moderate to severe worse with prolonged sitting, standing, walking, bending, twisting.  No falls.  Pain is improved with OxyContin 10 mg twice a day, pain is also improved with rest.  Heat or cold application no benefit.  Pain is localized, no radiation, can be 7-8 out of 10 with flareup.  Can be limiting as far as ADLs.  No associated sensory changes, no sensory loss.  No incontinence.  OxyContin 10 mg twice daily no drowsiness, sedation, confusion, falls, some chronic memory loss unchanged, no respiratory suppression.  COPD followed by pulmonary on inhalers.  Hypertension and paroxysmal atrial fibrillation followed  by cardiology, on Lopressor for rate control and Pradaxa no bruising or bleeding complications.  No NSAIDs.  No alcohol, no tobacco.  Patient does not drive       Current Outpatient Prescriptions   Medication Sig Dispense Refill   • predniSONE (DELTASONE) 10 MG Tab Take 30mg x 3 days, then take 20mg x 3 days, then take 10mg x 3 days, with food, then discontinue. 18 Tab 0   • cefdinir (OMNICEF) 300 MG Cap Take 1 Cap by mouth 2 times a day for 10 days. 20 Cap 0   • albuterol 108 (90 Base) MCG/ACT Aero Soln inhalation aerosol Inhale 2 Puffs by mouth every 6 hours as needed for Shortness of Breath. 8.5 g 6   • non-formulary med Spray 2 L/min in nose every evening.     • acetaminophen (TYLENOL) 500 MG Tab Take 500-1,000 mg by mouth every 6 hours as needed for Mild Pain or Moderate Pain.     • oxyCODONE CR (OXYCONTIN) 10 MG Tablet Extended Release 12 hour Abuse-Deterrent Take 1 Tab by mouth every 12 hours for 30 days. 60 Tab 0   • metoprolol (LOPRESSOR) 25 MG Tab Take 1 Tab by mouth 2 times a day. 180 Tab 3   • dabigatran (PRADAXA) 75 MG Cap capsule Take 1 Cap by mouth 2 Times a Day. 60 Cap 6   • albuterol (PROVENTIL) 2.5mg/3ml Nebu Soln solution for nebulization 3 mL by Nebulization route every four hours as needed for Shortness of Breath. 150 Bullet 11   • Fluticasone-Umeclidin-Vilant (TRELEGY ELLIPTA) 100-62.5-25 MCG/INH AEROSOL POWDER, BREATH ACTIVATED Inhale 1 Puff by mouth every day. 1 Each 11   • irbesartan-hydrochlorothiazide (AVALIDE) 150-12.5 MG per tablet Take 0.5 Tabs by mouth every day. 90 Tab 3     No current facility-administered medications for this visit.         Cervical pain  4/08 MRI cervical spine C3-C4 moderate left-sided foraminal stenosis, C4-C5 moderate foraminal stenosis right, C5-C6 moderate right-sided foraminal stenosis, C6-C7 moderate bilateral foraminal stenosis  5/08 CT cervical spine C3-C4 uncovertebral joint arthropathy and significant left-sided neural foraminal narrowing, C4-C5  uncovertebral joint arthropathy right significant neuroforaminal narrowing, C5-C6 uncovertebral joint arthropathy right moderate neural foraminal narrowing, C6-C7 uncovertebral joint arthropathy moderate to severe right neural foraminal narrowing  7/08 C3-C5 cervical facet injections   1/09 medial branch nerve block diagnostic   2/09 C4-C5 cervical epidural under fluoroscopy   4/13 Daughter called [facility] suggest taper pain med, she has sched to taper the oxycontin  4/15/13 resumed oxycontin 10 bid  6/11/13 off oxcontin  7/24/13 increase oxycontin from qday to 10 mg bid   9/14/17 custom PT discharge summary patient did not improve walking tolerance or standing tolerance, medicare g-code status 60-79% impairment, thoracolumbar flexion decreased from 50-25%, extension 10%, sidebending 10-25%  1/17/18  pain note left cervical paraspinal muscle trigger point injections  1/31/18  pain note  2/2/18  pain note, reviewed cervical spine x-ray multilevel DJD, recommend consider intrathecal pain pump trial     Chronic opiate  2/26/15 narcotic contract  6/12/15 opiate risk score 0  10/4/15   7/25/16   9/20/16  pain note recommended left L3-L5 MBBB  10/31/16   2/6/17   5/9/17 narcotic contract  5/9/17 opiate risk score 0  5/9/17   5/9/17 depression screening score 0  6/28/17 trial cymbalta 30 mg  8/7/17 did not start cymbalta, will start  8/7/17   8/7/17 UDT  8/21/17 change cymbalta to qod  9/18/17 off cymbalta  9/14/17 custom PT discharge summary patient did not improve walking tolerance or standing tolerance, medicare g-code status 60-79% impairment, thoracolumbar flexion decreased from 50-25%, extension 10%, sidebending 10-25%  11/3/17   2/6/18   4/10/18   4/10/18 controlled substances contract, decrease oxycontin to 10 mg am and 5 mg pm  5/17/18 decrease to oxycontin 10 mg am and 5 mg pm  5/17/18   7/10/18 increase oxycontin  back to 10 mg bid after fall  7/10/18    Has seen neurosurgery, pain management locally and at [place], has tried NSAIDs, celebrex, cymbalta, pregabalin, gabapentin, lidoderm, voltaren gel     COPD  7/11 CT spiral thorax extensive emphysematous change of lung, small left pleural effusion left lung base with atelectasis unchanged from prior CT  5/10 CT spriral thorax emphysematous change and dependent atelectasis left lung base  7/11 PFT FEV/FVC 59%, FEV1 1.1 L (75% predicted), significant post bronchodilator response noted (FEV1 improved 16%). Mixed restrictive and obstructive pattern,COPD with GOLD stage II with sig bronchodilator response  7/11 trial of symbicort 80/4.5 mcg bid discussed with daughter plus albuterol neb prn, [facility] home education ordered  5/12 off symbicort   5/22/14 cxr negative  6/23/14 on symbicort  7/20/15 change to advair 250/50 mcg from symbicort (not covered on insurance)  9/3/15 cxr negative  10/4/15 add spiriva and change symbicort to advair 250/50 mcg bid  10/19/15 CT high resolution thorax, no evidence of interstitial lung disease, minimal scattered opacification could indicate minimal areas of fibrosis periphery of each lung, similar to prior exam, emphysema most prominent upper lobes bilateral  10/19/15 PFT FEV1 1.1 (61% predicted),FVC 1.9,FEV/FVC 58%, no bronchodilator response,DLCO 34%; on advair 250/50 mcg bid and spiriva  11/2/15 change from advair discus to advair 250 inhaler and continue spiriva   12/11/15 not taking advair, will start advair and cont spiriva  12/11/15 cxr negative  3/14/16 [facility] note complaining doxycycline and taper steroids, continue nocturnal oxygen, continue rotating antibiotics for bronchiectasis, follow-up laboratory testing ordered  3/14/16  (normal), quantiferon gold positive, allergen panel negative, IgE 357 (less than 214), IgA 116 (),, IgM 27 (),IgG 947 ([phone])  4/13/16 cxr mild cardiomegaly  5/4/16 [facility] note continue advair  115/21 bid with spacer given doxycycline and prednisone, continue oxygen 3 L at night, AFB samples ×3, follow-up 3 months  7/25/16 pulmonary note on prednisone taper one week out of the month and doxycycline one per day for one week per month, on advair bid and spiriva and oxygen 3 liters at night  11/8/16 pulmonary note continue advair 115/21 ucg bid,spiriva, xopenex as needed, oxygen 3 L at night  10/4/17 pulmonary note continue advair 115/21 two inhalations bid, spiriva daily, xoponex as needed, oxygen 3 L at night, history of positive quantiferon gold, will order sputum sample follow-up 4 months  3/17/18 pulmonary note, continue nocturnal oxygen 2 L, start trelegy one puff daily  9/10/18 pulmonary note intolerant to CPAP, resume nocturnal oxygen, latent TB, repeat chest x-ray, stable on bronchodilators, as needed prednisone and antibiotic prescription to pharmacy follow-up 3-6 months     Dyslipidemia   5/10 chol 163,trig 68,hdl 69,ldl 80  6/10 chol 163,trig 60,hdl 69,ldl 80  7/11 chol 118,trig 45,hdl 65,ldl 44 on crestor 10 mg  10/11 chol 165,trig 47,hdl 78,ldl 74 on crestor 10 mg done at [place]  2/12 off crestor  6/12 chol 211,trig 104,hdl 64,ldl 126 off crestor  3/4/14 chol 222,trig 124,hdl 52,ldl 145 off meds     History of compression fracture  5/10 MRI thoracic spine subacute T12 compression fracture  5/27/10 kyphoplasty T11  6/10 MRI lumbar spine T12 compression fracture mild to moderate central canal narrowing, interval T11 kyphoplasty, L2-L3 moderate foraminal stenosis, L3-L4 severe left foraminal stenosis, moderate right foraminal stenosis, L4-L5 moderate central canal stenosis  7/10 dexa LS +1.0,hip -1.6  7/28/10 T12 vertebral plasty  8/11 [place] pain evaluation  pain management, recommend continue oxycontin 10 bid  10/11 [facility]  T11-L1 injection  2/13  pain note, T10-T11 epidural injection  6/13 dexa LS +1.8, forearm -2.7  9/23/13 reclast injection  9/4/14  reclast IV infusion  9/14/17 custom PT discharge summary patient did not improve walking tolerance or standing tolerance, medicare g-code status 60-79% impairment, thoracolumbar flexion decreased from 50-25%, extension 10%, sidebending 10-25%  7/1/18 x-ray lumbar spine complete or near collapse L1 vertebral body anteriorly which is likely chronic, extensive degenerative changes, prior percutaneous vertebral augmentation T12-L1     History H. pylori  9/11 serum IgG positive s/p prevpack  9/19/12 cbc,cmp,lipase neg; u/s abd gallbladder sludge without biliary dilatation or stone  12/12/12 [facility] eval rec EGD/colon pt did not follow up      History shingles     Hypertension  1/05 carotid u/s negative  1/07 echo LV EF 60% ,mild LVH  1/09 renal ultrasound 6 mm right cortical cyst  9/09 MRI brain with and without moderate nonspecific microvascular ischemic change  9/09 MRA next mild plaque right internal carotid  5/24/10 echo normal LV size and function, EF 60%, mild to moderate AR, RVSP 35-40 consistent with mild pulmonary hypertension  5/10 persantine thallium no ischemia, EF 72%  9/10 change avalide 150/12.5 to avapro 150 qday, had sodium 125 in ER  3/11 on avapro 300 mg  7/8/11 echo normal LV function, EF 55%  7/8/11 Persantine thallium possible small area mild ischemia in the lateral wall, no evidence of infarction  7/11   3/12 increase metoprolol to 50 bid, cont avapro 300 mg, start norvasc 2.5 mg  5/1/12 increase norvasc to 5 mg, change avapro to avalide 300/12.5 mg, cont metoprolol 50 bid  10/2/12 on avalide 300/12.5 mg and metoprolol 50 bid and norvasc 5 mg  10/12 Persantine thallium diaphragmatic uptake possible attenuation, fixed inferolateral defect which may be secondary to attenuation, EF 76%, no wall motion abnormalities, no evidence of significant ischemia.  1/13 echo normal LV function, grade 2 diastolic dysfunction, EF 70% moderate aortic insufficiency  1/13   1/23/13 cxr cardiomegaly without  pulmonary edema  6/11/13 on avalide 300/12.5 mg,norvasc 5 mg, metoprolol 50 bid  4/7/14 cardiology note atrial fibrillation, start pradaxa 75 mg bid, stop asa, stop norvasc, decreased avalide 300/12.5 mg to 1/2 per day, increase metoprolol to 50 mg 1 1/2 bid  10/20/14 metoprolol 50 mg decreased to 25 mg bid by [place] doctor due to low heart rate, continues on avalide 300/12.5 mg   12/29/14  cardiology note continue pradaxa, metoprolol 25 mg 1/2 bid, avalide 300/12.5 mg  7/25/16 avalide 150/12.5 mg decrease to 1/2 tab per day and continue metoprolol 25 mg bid  8/24/16 cardiology note sinus rhythm on exam,[id number] score=2 continue anticoagulation, low-dose metoprolol,avalide 150/12.5 mg 1/2 per day, follow-up one year  12/14/17 cardiology note remains in sinus rhythm, follow-up one year  6/21/18  cardiology note paroxysmal atrial fibrillation sinus rhythm on exam stable continue anticoagulation, follow-up 1 year     Low back pain  6/06 epidural L4-L5   12/08 x-ray LS moderate to severe DJD  6/10 MRI lumbar spine T12 compression fracture with mild-to-moderate central spinal canal narrowing, interval T11 kyphoplasty, L2-L3 moderate frontal stenosis, L3-L4 severe left foraminal stenosis, moderate right foraminal stenosis, L4-L5 moderate central spinal canal  7/10 interventional rad consult epidural T11 to L1 region  7/28/10 T12 vertebroplasty  7/30/10 norco taper, and lyrica 50 mg bid  8/5/10 reaction to lyrica, stop lyrica  8/19/10 lumbar steroid epidural   9/10  note rec decompression if pain persist  10/10 bilateral lumbar facet joint injections L3-S1   12/10 xray LS old T11 and T12 fracture status post kyphoplasty  1/11 [place] [facility] note  3/11 trial of spinal stimulator  8/11 [place] pain note evaluation  pain management cont oxycontin 10 bid  11/22/11 [place] pain note dr. hodge; trial of baclofen, T11 plus T12 left-sided nerve root block  pending  7/12  pain note;Left-sided T11-T12 transforaminal epidural   4/13 daughter called [place] pain suggest taper pain med, she has sched to taper the oxycontin  4/15/13 resumed oxycontin 10 bid  6/11/13 off oxycontin  7/24/13 increase oxycontin from qday to 10 mg bid   2/28/14 on celebrex 200 mg as needed per  and oxycontin 10 mg bid  4/25/14 trial of cymbalta 30 mg, continue oxycontin 10 mg twice a day, recommend not use tramadol (side effects since on oxycontin already) or celebrex (on pradaxa)  5/29/14  pain management [facility]; recommend T11-T12 left-sided transforaminal nerve block  7/2/14 [place] pain left T11-T12 epidural injection  7/21/14  [place] pain note; increase oxycontin to 10 mg tid x 3 weeks and then taper down  10/27/14 [place] [facility] note; samples zorvolex  2/26/15 xray lumbar spine mild compression fracture of L4 of indeterminate age but new compared to 3/25/13 vertebral augmentation and T11 and T12 with severe compression fracture of T12 and focal kyphosis at this level  4/15/16 outpatient physical therapy phone call indicating patient unable to make appointments, recommending home health physical therapy  9/20/16  pain note recommended left L3-L5 MBBB  3/22/17  pain procedure note left L3-L5 MBBB #1  6/28/17 trial cymbalta 30 mg  6/29/17 custom physical therapy note  8/3/17 custom PT note  9/18/17 off cymbalta  9/14/17 custom PT discharge summary patient did not improve walking tolerance or standing tolerance, medicare g-code status 60-79% impairment, thoracolumbar flexion decreased from 50-25%, extension 10%, sidebending 10-25%  11/14/17 custom PT discharge summary no improvement  11/29/17 MRI lumbar spine no acute fracture, lumbar levoscoliosis T12-L1, kyphotic deformity he talked L1, previous compression fractures T11, T12, L1, previous vertebral augmentation procedures T11 and L1, moderate central canal stenosis  T12-L3, severe canal stenosis L3-L4, no significant change  1/31/18  pain note  2/2/18  pain note, reviewed cervical spine x-ray multilevel DJD, recommend consider intrathecal pain pump trial  5/17/18 decrease to oxycontin 10 mg am and 5 mg pm  7/1/18 x-ray lumbar spine complete or near collapse L1 vertebral body anteriorly which is likely chronic, extensive degenerative changes, prior percutaneous vertebral augmentation T12-L1  7/10/18 increase oxycontin back to 10 mg bid after fall  Tried NSAIDs, celebrex, pregabalin, gabapentin, cymbalta, physical therapy,tens    Macular degeneration  7/1/13 dr.mills mcnair note, start PreserVision AREDS II vitamin followup 6 months     Mild cognitive impairment  1/11/16 MMSE 24/30  7/14/17 MMSE 23/30 offered aricept  4/10/18 MMSE 24/30  5/2/18 start aricept 5 mg daily    Nocturnal hypoxemia  3/17/18 pulmonary note, continue nocturnal oxygen 2 L, start trelegy one puff daily  9/10/18 pulmonary note intolerant to CPAP, resume nocturnal oxygen, latent TB, repeat chest x-ray, stable on bronchodilators, as needed prednisone and antibiotic prescription to pharmacy follow-up 3-6 months     Osteoporosis  3/08 dexa LS +0.3,hip -1.4  5/10 MRI thoracic spine subacute T12 compression fracture  5/27/10 T12 kyphoplasty  6/10 on actonel  7/10 dexa LS +1.0,hip -1.6  8/10 vit d 56  3/11 reclast referral made  6/12 vit d 42 off actonel  6/21/13 dexa LS +1.8, forearm -2.7, I rec reclast, she would like to think it over  9/23/13 reclast injection  3/4/14 vit d 26  9/4/14 reclast IV infusion  2/26/15 xray rib series left; mild deformity of the lateral 10th rib on the left may be related to a fracture  2/26/15 xray lumbar spine mild compression fracture of L4 of indeterminate age but new compared to /25/13, vertebral augmentation and T11 and T12 with severe compression fracture of T12 and focal kyphosis at this level  12/7/15 reclast IV infusion     Paroxysmal atrial  fibrillation  4/10 hospitalization on multaq, also on verapamil and metoprolol for rate control per cardiology  1/11 [facility] note cont multaq  7/11 EKG atrial fibrillation hospitalization  7/8/11 echo normal LV function, EF 55%  7/8/11 Persantine thallium possible small area mild ischemia in the lateral wall, no evidence of infarction  7/11 [facility] during hospitalization changed to amiodarone 200 mg plus pradaxa restarted  7/11   9/11 [facility] note stop amiodarone, cont pradaxa and metoprolol 25 bid  3/12 cont pradaxa and increase metoprolol to 50 bid due to higher bp  5/12 pradaxa decreased from 150 bid to 75 bid per [facility] due to nosebleeds  6/12 EKG NSR  1/13 echo normal LV function, grade 2 diastolic dysfunction, EF 70% moderate aortic insufficiency  6/11/13 on pradaxa and metoprolol 50 bid for rate control, NSR today  9/13 cardiology note stop pradaxa and start asa 81 mg  4/7/14 cardiology note atrial fibrillation, start pradaxa 75 mg bid, stop asa, stop norvasc, decreased avalide 300/12.5 mg to 1/2 per day, increase metoprolol to 50 mg 1 1/2 bid  4/14/14 cardiology note, NSR on exam, continue pradaxa 75 mg bid, metoprolol 75 mg bid, avalide 300/12.5 mg 1/2 tab per day  10/20/14 metoprolol 50 mg decreased to 25 mg bid by [place] doctor due to low heart rate, continues on pradaxa and avalide 300/12.5 mg   12/29/14  cardiology note continue pradaxa, metoprolol 25 mg 1/2 bid, avalide 300/12.5 mg  6/12/15 NSR on exam  8/24/16 cardiology note sinus rhythm on exam,[id number] score=2 continue anticoagulation, low-dose metoprolol follow-up one year  12/14/17 cardiology note remains in sinus rhythm, follow-up one year  6/21/18  cardiology note paroxysmal atrial fibrillation sinus rhythm on exam stable continue anticoagulation, follow-up 1 year     Preventative health  5/05 colon per [facility] polyp no path report, f/u rec 5 yrs (12/12/12 [facility] note)  4/09 stool occult blood negative  12/09 mammogram  10/1/11  pneumovax  10/2/12 zoster vaccine  6/11/13 declines mammogram, tdap  6/13 dexa LS +1.8, forearm -2.7  3/4/14 vit d 26  10/12/15 prevnar     Right hip pain  3/11 MRI right hip DJD and tear of the anterior/superior acetabular labrum  4/11  ortho note not believe hip is primary problem, referred to  or re[facility]     Sensorineural hearing loss  12/7/15  audiology evaluation mild sensorineural hearing loss     Sleep apnea  8/11 [facility] note sleep study apnea-hypopnea index 86, low sat 74%, start CPAP  8-18 cm    2013 off cpap not comfortable  3/14/16  (normal), quantiferon gold positive, allergen panel negative, IgE 357 (less than 214), IgA 116 (),, IgM 27 (),IgG 947 ([phone])  4/13/16 cxr mild cardiomegaly  5/4/16 [facility] note continue oxygen 3 L at night  11/8/16 pulmonary note continue oxygen 3 L at night  10/4/17 pulmonary note continue oxygen 3 L at night  3/17/18 pulmonary note, continue nocturnal oxygen 2 L, start trelegy one puff daily  9/10/18 pulmonary note intolerant to CPAP, resume nocturnal oxygen, latent TB, repeat chest x-ray, stable on bronchodilators, as needed prednisone and antibiotic prescription to pharmacy follow-up 3-6 months     tb latent  3/14/16 positive quantiferon gold test  3/14/16  (normal), quantiferon gold positive, allergen panel negative, IgE 357 (less than 214), IgA 116 (),, IgM 27 (),IgG 947 ([phone])  4/13/16 cxr mild cardiomegaly  5/4/16 [facility] note continue advair 115/21 bid with spacer given doxycycline and prednisone, continue oxygen 3 L at night, AFB samples ×3, follow-up 3 months  7/25/16 pulmonary note on prednisone taper one week out of the month and doxycycline one per day for one week per month, on advair bid and spiriva and oxygen 3 liters at night  7/27/17 PPD negative  10/4/17 pulmonary note continue advair 115/21 two inhalations bid, spiriva daily, xoponex as needed, oxygen 3 L at night, history of positive quantiferon  gold, will order sputum sample follow-up 4 months  9/10/18 pulmonary note intolerant to CPAP, resume nocturnal oxygen, latent TB, repeat chest x-ray, stable on bronchodilators, as needed prednisone and antibiotic prescription to pharmacy follow-up 3-6 months     Tension headache  6/11/13 on fioricet bid  2/28/14 taper fioricet to once a day  4/25/14 now on fioricet prn       Patient Active Problem List   Diagnosis   • Hypertension   • Dyslipidemia   • Preventative health care   • Low back pain   • Cervical pain   • Osteoporosis, idiopathic   • History of compression fracture of spine   • PAF (paroxysmal atrial fibrillation) ([facility])   • Right hip pain   • COPD (chronic obstructive pulmonary disease) ([facility])   • Sleep apnea   • History of Helicobacter pylori infection   • Chronic anticoagulation   • History of shingles   • Tension headache   • Macular degeneration   • Chronic use of opiate for therapeutic purpose   • Sensorineural hearing loss   • Mild cognitive impairment   • TB lung, latent   • Nocturnal hypoxemia        Health Maintenance Summary                IMM HEP B VACCINE Overdue 4/22/1950     IMM DTaP/Tdap/Td Vaccine Overdue 4/22/1950     IMM ZOSTER VACCINES Overdue 11/27/2012      Done 10/2/2012 Imm Admin: Zoster Vaccine Live (ZVL) (Zostavax)    PFT SCREENING-FEV1 AND FEV/FVC RATIO / SPIROMETRY SHOULD BE PERFORMED ANNUALLY Overdue 10/21/2016      Done 10/21/2015 PFT DICTATED RESULTS     Patient has more history with this topic...    BONE DENSITY Overdue 6/21/2018      Done 6/21/2013 DS-BONE DENSITY STUDY (DEXA)     Patient has more history with this topic...    URINE DRUG SCREEN Overdue 8/2/2018      Done 8/7/2017 [facility] PAIN MANAGEMENT SCREEN    IMM INFLUENZA Overdue 9/1/2018      Done 10/4/2017 Imm Admin: Influenza Vaccine Adult HD     Patient has more history with this topic...    Annual Wellness Visit Next Due 4/11/2019      Done 4/10/2018 Visit Dx: Medicare annual wellness visit, subsequent      Patient has more history with this topic...          Patient Care Team:  Denis Krueger M.D. as PCP - General  Natahn Fajardo M.D. as Consulting Physician (Anesthesia)  Esdras Thornton M.D. as Consulting Physician (Pulmonary Medicine)  Aashish as Respiratory  SARAH De La Garza as Consulting Physician (Pulmonary Medicine)  [facility] as Home Health Provider      ROS       Objective:          Physical Exam   Constitutional: No distress.   HENT:   Head: Normocephalic and atraumatic.   Eyes: Conjunctivae are normal. Right eye exhibits no discharge. Left eye exhibits no discharge.   Neck: Neck supple. No JVD present. No thyromegaly present.   Cardiovascular: Normal rate and regular rhythm.    Pulmonary/Chest: Effort normal and breath sounds normal. No respiratory distress. She has no wheezes.   Abdominal: Soft.   Musculoskeletal: She exhibits no edema.   Neurological: She is alert.   Skin: Skin is warm. She is not diaphoretic.   Psychiatric: She has a normal mood and affect. Her behavior is normal.   Nursing note and vitals reviewed.    Normal affect and insight, somewhat difficulty hearing          Assessment/Plan:     Assessment  #1 chronic low back pain status post T12 compression fracture, foraminal stenosis, has had kyphoplasty, has seen physical therapy, pain management, has had injections, has had spinal stimulator, has tried topical Voltaren, Lidoderm topical patch, standard NSAIDs, tens, Celebrex, Cymbalta, pregabalin, gabapentin, hydrocodone, with no benefit remains stable and controlled on OxyContin 10 mg twice daily.  Able to perform ADLs on medication     #2 chronic opiate therapy OxyContin 10 mg twice a day has been stable and controlled on the current dosing with regards to pain control, allows patient to perform ADLs independently, son checks on patient daily to monitor usage, pain is stable and controlled on current dosing regimen without drowsiness, sedation, worsening memory loss  (stable), confusion, falls, depression, respiratory suppression    #3 paroxysmal atrial fibrillation sinus rhythm stable on metoprolol and Pradaxa     #4 COPD stable on ellipta per pulmonary, no shortness of breath, cough, wheezing    #5 homebound because of chronic low back pain, because of pain medications unable to drive       Plan  #1 flu vaccine high dose today    #2 lidoderm patches refill    #3 PT at home     #4     #5 refill oxycodone 10 mg twice daily quantity 60 per 30 days, 3 months total, reviewed risks and benefits with patient and son, medication allows patient to perform ADLs without significant side effects.  Previous pain management notes, imaging studies and pertinent records reviewed.  I believe the benefit of the medication outweighs the potential risks.    #6 risk of chronic opiate use include drowsiness, sedation, memory loss, falls, habituation, dependence, constipation, respiratory suppression, depression    #7 recommend home physical therapy, chronic low back pain limited mobility, unable to drive    #8 nutrition, diet, exercise discussed    #9 follow-up with cardiology    #10 follow up 3 months

## 2018-09-19 ENCOUNTER — HOME CARE VISIT (OUTPATIENT)
Dept: HOME HEALTH SERVICES | Facility: HOME HEALTHCARE | Age: 83
End: 2018-09-19

## 2018-10-31 DIAGNOSIS — F03.90 DEMENTIA WITHOUT BEHAVIORAL DISTURBANCE, UNSPECIFIED DEMENTIA TYPE: ICD-10-CM

## 2018-10-31 DIAGNOSIS — G31.84 MILD COGNITIVE IMPAIRMENT: Chronic | ICD-10-CM

## 2018-11-02 ENCOUNTER — APPOINTMENT (OUTPATIENT)
Dept: URGENT CARE | Facility: CLINIC | Age: 83
End: 2018-11-02

## 2018-11-02 ENCOUNTER — OFFICE VISIT (OUTPATIENT)
Dept: URGENT CARE | Facility: CLINIC | Age: 83
End: 2018-11-02
Payer: MEDICARE

## 2018-11-02 VITALS
DIASTOLIC BLOOD PRESSURE: 70 MMHG | RESPIRATION RATE: 16 BRPM | OXYGEN SATURATION: 96 % | SYSTOLIC BLOOD PRESSURE: 118 MMHG | HEIGHT: 61 IN | BODY MASS INDEX: 20.96 KG/M2 | HEART RATE: 85 BPM | TEMPERATURE: 97.6 F | WEIGHT: 111 LBS

## 2018-11-02 DIAGNOSIS — M62.838 MUSCLE SPASMS OF NECK: ICD-10-CM

## 2018-11-02 PROCEDURE — 99214 OFFICE O/P EST MOD 30 MIN: CPT | Performed by: EMERGENCY MEDICINE

## 2018-11-02 RX ORDER — DIAZEPAM 2 MG/1
2 TABLET ORAL EVERY 12 HOURS PRN
Qty: 6 TAB | Refills: 0 | Status: SHIPPED | OUTPATIENT
Start: 2018-11-02 | End: 2018-11-05

## 2018-11-02 RX ORDER — DIAZEPAM 2 MG/1
2 TABLET ORAL EVERY 12 HOURS PRN
Qty: 6 TAB | Refills: 0 | Status: SHIPPED | OUTPATIENT
Start: 2018-11-02 | End: 2018-11-02 | Stop reason: SDUPTHER

## 2018-11-02 RX ORDER — DIAZEPAM 2 MG/1
2 TABLET ORAL EVERY 12 HOURS PRN
Qty: 6 TAB | Refills: 0 | Status: SHIPPED | OUTPATIENT
Start: 2018-11-02 | End: 2018-11-02

## 2018-11-02 ASSESSMENT — ENCOUNTER SYMPTOMS
NUMBNESS: 0
SYNCOPE: 0
PARESIS: 0
FOCAL WEAKNESS: 0
VOMITING: 0
VISUAL CHANGE: 0
SENSORY CHANGE: 0
NECK PAIN: 1
FEVER: 0
NAUSEA: 0
HEADACHES: 0

## 2018-11-02 ASSESSMENT — PAIN SCALES - GENERAL: PAINLEVEL: 10=SEVERE PAIN

## 2018-11-02 NOTE — PATIENT INSTRUCTIONS
Use over-the-counter acetaminophen (Tylenol) as needed for pain relief; follow the package instructions for dosing.  Muscle Cramps and Spasms  Muscle cramps and spasms are when muscles tighten by themselves. They usually get better within minutes. Muscle cramps are painful. They are usually stronger and last longer than muscle spasms. Muscle spasms may or may not be painful. They can last a few seconds or much longer.  HOME CARE  · Drink enough fluid to keep your pee (urine) clear or pale yellow.  · Massage, stretch, and relax the muscle.  · Use a warm towel, heating pad, or warm shower water on tight muscles.  · Place ice on the muscle if it is tender or in pain.  ¨ Put ice in a plastic bag.  ¨ Place a towel between your skin and the bag.  ¨ Leave the ice on for 15-20 minutes, 3-4 times a day.  · Only take medicine as told by your doctor.  GET HELP RIGHT AWAY IF:   Your cramps or spasms get worse, happen more often, or do not get better with time.  MAKE SURE YOU:  · Understand these instructions.  · Will watch your condition.  · Will get help right away if you are not doing well or get worse.  This information is not intended to replace advice given to you by your health care provider. Make sure you discuss any questions you have with your health care provider.  Document Released: 11/30/2009 Document Revised: 04/14/2014 Document Reviewed: 09/20/2016  [employer] Patient Education © 2017 [employer]

## 2018-11-02 NOTE — PROGRESS NOTES
Subjective:      Venus Church is a 87 y.o. female who presents with Neck Pain (neck pain, stiffness hx: of DDD)             Neck Pain     This is a new problem. Episode onset: 2 days.  The problem occurs daily. The problem has been unchanged. The pain is associated with nothing. Pain location: posterior.  The symptoms are aggravated by bending and twisting. The pain is same all the time. Pertinent negatives include no chest pain, fever, headaches, numbness, pain with swallowing, paresis, syncope or visual change. She has tried nothing for the symptoms.   Known chronic neck and back problems.  Awakened with increased pain 2 days ago.  No new trauma.    Review of Systems   Constitutional: Negative for fever.   Cardiovascular: Negative for chest pain and syncope.   Gastrointestinal: Negative for nausea and vomiting.   Genitourinary: Negative for dysuria and urgency.   Musculoskeletal: Positive for neck pain.   Skin: Negative for rash.   Neurological: Negative for sensory change, focal weakness, numbness and headaches.     PMH:  has a past medical history of Anesthesia; Aortic regurgitation (4/25/2012); Arrhythmia (7/10); Arthritis; Atypical chest pain (4/25/2012); CATARACT (2007,08); Chronic anticoagulation (4/25/2012); Constipation (4/25/2012); COPD (chronic obstructive pulmonary disease) ([facility]); DDD (degenerative disc disease); Dental disorder; Dyspnea (4/25/2012); Generalized osteoarthritis (4/25/2012); Headache(784.0) (4/25/2012); Heart valve insufficiency; Hypercholesteremia; Hyperlipidemia (4/25/2012); Hypertension; MEDICAL HOME; Osteoarthritis of knee (4/25/2012); Osteoporosis; Other accident; Pain; Pneumonia (6/4); TB lung, 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  MEDS:   Current Outpatient Prescriptions:   •  diazePAM (VALIUM) 2 MG Tab, Take 1 Tab by mouth every 12 hours as needed (muscle spasms) for up to 3 days., Disp: 6 Tab, Rfl: 0  •  Memantine HCl ER  (NAMENDA XR TITRATION PACK) 7 & 14 & 21 &28 MG CAPSULE SR 24 HR, Take 1 Cap by mouth every day., Disp: 28 Cap, Rfl: 0  •  [START ON 12/4/2018] oxyCODONE CR (OXYCONTIN) 10 MG Tablet Extended Release 12 hour Abuse-Deterrent, Take 1 Tab by mouth every 12 hours for 30 days., Disp: 60 Tab, Rfl: 0  •  lidocaine (LIDODERM) 5 % Patch, Apply 3 Patches to skin as directed every 12 hours. Apply 3 patches to affected area 12 hours on, 12 hours off, pain, Disp: 90 Patch, Rfl: 11  •  albuterol 108 (90 Base) MCG/ACT Aero Soln inhalation aerosol, Inhale 2 Puffs by mouth every 6 hours as needed for Shortness of Breath., Disp: 8.5 g, Rfl: 6  •  acetaminophen (TYLENOL) 500 MG Tab, Take 500-1,000 mg by mouth every 6 hours as needed for Mild Pain or Moderate Pain., Disp: , Rfl:   •  metoprolol (LOPRESSOR) 25 MG Tab, Take 1 Tab by mouth 2 times a day., Disp: 180 Tab, Rfl: 3  •  dabigatran (PRADAXA) 75 MG Cap capsule, Take 1 Cap by mouth 2 Times a Day., Disp: 60 Cap, Rfl: 6  •  albuterol (PROVENTIL) 2.5mg/3ml Nebu Soln solution for nebulization, 3 mL by Nebulization route every four hours as needed for Shortness of Breath., Disp: 150 Bullet, Rfl: 11  •  Fluticasone-Umeclidin-Vilant (TRELEGY ELLIPTA) 100-62.5-25 MCG/INH AEROSOL POWDER, BREATH ACTIVATED, Inhale 1 Puff by mouth every day., Disp: 1 Each, Rfl: 11  •  irbesartan-hydrochlorothiazide (AVALIDE) 150-12.5 MG per tablet, Take 0.5 Tabs by mouth every day., Disp: 90 Tab, Rfl: 3  ALLERGIES:   Allergies   Allergen Reactions   • Codeine Vomiting   • Codeine Vomiting     SURGHX:   Past Surgical History:   Procedure Laterality Date   • RECOVERY  7/28/2010    Performed by SURGERY, IR-RECOVERY at [facility]ERY [facility] DAY [facility]   • OTHER ORTHOPEDIC SURGERY  may, 2010      T11 kypho.   • CATARACT PHACO WITH IOL  1/5/2009    Performed by ROSE MARIE MANN at SUR[facility]ERY [facility] DAY [facility]   • CARPAL TUNNEL RELEASE  @30 yrs ago    rt hand     SOCHX:  reports that she quit smoking about 34 years ago.  "Her smoking use included Cigarettes. She has a 37.50 pack-year smoking history. She has never used smokeless tobacco. She reports that she does not drink alcohol or use drugs.  FH: family history includes Diabetes in her brother; Heart Disease in her father.       Objective:     /70 (BP Location: Right arm, Patient Position: Sitting, BP Cuff Size: Adult)   Pulse 85   Temp 36.4 °C (97.6 °F) (Temporal)   Resp 16   Ht 1.549 m (5' 1\")   Wt 50.3 kg (111 lb)   SpO2 96%   BMI 20.97 kg/m²       Physical Exam   Constitutional: Vital signs are normal. She appears well-developed and well-nourished. She is cooperative. She does not have a sickly appearance. She does not appear ill. No distress.   HENT:   Head: Normocephalic and atraumatic.   Neck: Phonation normal. Spinous process tenderness and muscular tenderness present. Decreased range of motion present. No edema and no erythema present.   Cardiovascular: Normal rate, regular rhythm and normal heart sounds.    Pulses:       Radial pulses are 2+ on the right side, and 2+ on the left side.   Pulmonary/Chest: Effort normal. She has no wheezes. She has no rhonchi. She has no rales.   Abdominal: There is no CVA tenderness.   Neurological: She is alert. She displays no tremor and normal reflexes. No sensory deficit. She exhibits normal muscle tone. Coordination normal.   Skin: Skin is warm, dry and intact.   Psychiatric: She has a normal mood and affect.               Assessment/Plan:     1. Muscle spasms of neck  Advised initial use of alternating ice and heat.  Advised adding Tylenol 1000 mg twice daily.  If not helpful in 1-2 days, may add:.  - diazePAM (VALIUM) 2 MG Tab; Take 1 Tab by mouth every 12 hours as needed (muscle spasms) for up to 3 days.  Dispense: 6 Tab; Refill: 0  Advised PCP follow-up as soon as available.    "

## 2018-11-05 ENCOUNTER — TELEPHONE (OUTPATIENT)
Dept: MEDICAL GROUP | Facility: MEDICAL CENTER | Age: 83
End: 2018-11-05

## 2018-11-05 DIAGNOSIS — M54.2 NECK PAIN: ICD-10-CM

## 2018-11-09 ENCOUNTER — TELEPHONE (OUTPATIENT)
Dept: MEDICAL GROUP | Facility: MEDICAL CENTER | Age: 83
End: 2018-11-09

## 2018-11-09 DIAGNOSIS — G31.84 MILD COGNITIVE IMPAIRMENT: Chronic | ICD-10-CM

## 2018-11-09 RX ORDER — IRBESARTAN 150 MG/1
150 TABLET ORAL DAILY
Qty: 90 TAB | Refills: 1 | Status: SHIPPED | OUTPATIENT
Start: 2018-11-09 | End: 2019-02-11

## 2018-11-09 RX ORDER — MEMANTINE HYDROCHLORIDE 7 MG/1
7 CAPSULE, EXTENDED RELEASE ORAL DAILY
Qty: 30 CAP | Refills: 2 | Status: SHIPPED | OUTPATIENT
Start: 2018-11-09 | End: 2019-02-11

## 2018-11-09 RX ORDER — HYDROCHLOROTHIAZIDE 12.5 MG/1
12.5 CAPSULE, GELATIN COATED ORAL DAILY
Qty: 90 CAP | Refills: 1 | Status: SHIPPED | OUTPATIENT
Start: 2018-11-09 | End: 2019-02-11

## 2018-11-09 NOTE — TELEPHONE ENCOUNTER
----- Message from Kevon Huff Ass't sent at 11/8/2018 12:40 PM PST -----  Regarding: FW: Prescription Question  Contact: [phone]      ----- Message -----  From: Venus Church  Sent: 11/8/2018  11:53 AM  To: Emily WhitleyFordfer Carias  Subject: RE: Prescription Question                        Thank you, Doctor!  We have been trying to purchase through [employer] and [id number] what they refer to as Tritasion pack?  (sp?)  But, neither can get it at this time.  I wonder if you know of a pharmacy that may have the packs as prescribed.  Thank you,  Ivana  [phone]  ----- Message -----  From: Denis Krueger M.D.  Sent: 10/31/2018 11:07 PM PDT  To: Venus Church  Subject: RE: Prescription Question  Yes, I would like to try medication called Namenda.  It is a medication that helps memory, to be taken once a day, the medication can cause nausea, lightheadedness, dizziness, mood changes.  It is a starter pack meaning that there is a different dose that increases every week, if she has any side effect, stop the medication and let me know.  If she reaches the end of the month with no side effects, I will send a new prescription for 90 days to her pharmacy.    Denis        ----- Message -----     From: Venus Church     Sent: 10/31/2018 12:23 PM PDT       To: Denis Krueger M.D.  Subject: Prescription Question    Doctor Evans Ozuna    I hope you are well.  On mom's last visit I believe Dileep mentioned that we took mom off of Aricept because it made her very groggy, without energy and overall sad or a little depressed.  Dileep  shared that you mentioned trying a different prescription.  I would like to go ahead and try something different, and I can start it while I am with her next week so that we can observe any side effects.    Thank you for everything you do Dr. Krueger!  Best regards,  Ivana  [phone]

## 2018-11-09 NOTE — TELEPHONE ENCOUNTER
Fax from pharmacy, irbesartan HCTZ 150/12.5 combination not available, will send electronic order for separate prescriptions.    Please notify patient's son Dileep at  that the irbesartan/HCTZ combination pill is not available and on back order, the pharmacy notified us, thus we are keeping his mother on the same medications it is just now in 2 separate tablets.  The prescription has been sent to the pharmacy.

## 2018-11-26 RX ORDER — CEFDINIR 300 MG/1
CAPSULE ORAL
Qty: 20 CAP | Refills: 0 | Status: SHIPPED | OUTPATIENT
Start: 2018-11-26 | End: 2018-12-11

## 2018-11-26 NOTE — TELEPHONE ENCOUNTER
Ivana the pt's daughter is requesting another round of the pt's ABX and Steroid, states that the last round has  and the pt is in need of this.    Last OV: 9/10/18- Maida    Next OV: 3/6/19    DX: COPD    Order for abx pending, please order prednisone as well if appropriate.

## 2018-11-27 ENCOUNTER — TELEPHONE (OUTPATIENT)
Dept: PULMONOLOGY | Facility: HOSPICE | Age: 83
End: 2018-11-27

## 2018-11-27 DIAGNOSIS — J20.9 ACUTE BRONCHITIS, UNSPECIFIED ORGANISM: ICD-10-CM

## 2018-11-27 RX ORDER — PREDNISONE 10 MG/1
TABLET ORAL
Qty: 18 TAB | Refills: 0 | Status: SHIPPED | OUTPATIENT
Start: 2018-11-27 | End: 2018-12-11

## 2018-11-27 NOTE — TELEPHONE ENCOUNTER
Pharmacy is requesting an rx for Prednisone 10 MG taper which was requested at the same time as the abx.  Please place order!    Last seen: 9/10/18- Maida GUILLEN

## 2018-12-11 ENCOUNTER — OFFICE VISIT (OUTPATIENT)
Dept: MEDICAL GROUP | Facility: MEDICAL CENTER | Age: 83
End: 2018-12-11
Payer: MEDICARE

## 2018-12-11 VITALS
TEMPERATURE: 98 F | SYSTOLIC BLOOD PRESSURE: 120 MMHG | HEIGHT: 61 IN | DIASTOLIC BLOOD PRESSURE: 68 MMHG | BODY MASS INDEX: 21.14 KG/M2 | OXYGEN SATURATION: 95 % | HEART RATE: 61 BPM | WEIGHT: 112 LBS

## 2018-12-11 DIAGNOSIS — I48.0 PAF (PAROXYSMAL ATRIAL FIBRILLATION) (HCC): Chronic | ICD-10-CM

## 2018-12-11 DIAGNOSIS — Z23 NEED FOR ZOSTER VACCINE: ICD-10-CM

## 2018-12-11 DIAGNOSIS — Z23 NEED FOR TDAP VACCINATION: ICD-10-CM

## 2018-12-11 DIAGNOSIS — F11.20 OPIATE DEPENDENCE, CONTINUOUS (HCC): ICD-10-CM

## 2018-12-11 DIAGNOSIS — Z79.891 CHRONIC USE OF OPIATE DRUGS THERAPEUTIC PURPOSES: ICD-10-CM

## 2018-12-11 DIAGNOSIS — R52 PAIN: ICD-10-CM

## 2018-12-11 PROCEDURE — 99214 OFFICE O/P EST MOD 30 MIN: CPT | Mod: 25 | Performed by: INTERNAL MEDICINE

## 2018-12-11 PROCEDURE — 90471 IMMUNIZATION ADMIN: CPT | Performed by: INTERNAL MEDICINE

## 2018-12-11 PROCEDURE — 90715 TDAP VACCINE 7 YRS/> IM: CPT | Performed by: INTERNAL MEDICINE

## 2018-12-11 RX ORDER — OXYCODONE HCL 10 MG/1
10 TABLET, FILM COATED, EXTENDED RELEASE ORAL EVERY 12 HOURS
Qty: 60 TAB | Refills: 0 | Status: SHIPPED | OUTPATIENT
Start: 2019-01-10 | End: 2018-12-11 | Stop reason: SDUPTHER

## 2018-12-11 RX ORDER — OXYCODONE HCL 10 MG/1
10 TABLET, FILM COATED, EXTENDED RELEASE ORAL EVERY 12 HOURS
Qty: 60 TAB | Refills: 0 | Status: ON HOLD | OUTPATIENT
Start: 2019-02-09 | End: 2019-02-21

## 2018-12-11 RX ORDER — OXYCODONE HCL 10 MG/1
10 TABLET, FILM COATED, EXTENDED RELEASE ORAL EVERY 12 HOURS
Qty: 60 TAB | Refills: 0 | Status: SHIPPED | OUTPATIENT
Start: 2018-12-11 | End: 2018-12-11 | Stop reason: SDUPTHER

## 2018-12-11 NOTE — PROGRESS NOTES
Subjective:      Venus Church is a 87 y.o. female who presents with back pain           HPI     Patient here with son, chronic low back pain status post T12 compression fracture and T12 vertebroplasty, has seen pain management locally as well as at [facility].  Has tried injections, physical therapy, vertebroplasty, spinal stimulator, remains on OxyContin 10 mg twice daily, unable to taper.  Has tried NSAIDs, Celebrex, gabapentin, Lyrica, Cymbalta, TENS, Lidoderm topical, Voltaren gel, physical therapy without benefit.  Has tried tapering OxyContin previously with recurrence of back pain.  Does have memory loss, but able to perform ADLs and take medications, son visits her every morning.  Has someone now coming five days per week 5 hours each day according to son patient's mood has improved with having someone with her daily.  Uses walker for ambulation.  No falls.  Lives by self and is independent  Has not started namenda yet for memory loss.  Does have some memory loss, no drowsiness, sedation, no significant confusion, no depression, no constipation, no respiratory suppression with chronic opiate therapy.  Mood has improved according to son.  Trying to be more active.  Paroxysmal atrial fibrillation followed by cardiology no chest pain, palpitations, lightheadedness, continues on pradaxa and lopressor, hypertension also on Avapro 150 mg and hydrochlorothiazide blood pressure has been stable without lightheadedness or dizziness.  COPD followed by pulmonary intolerant to CPAP on nocturnal oxygen, no shortness of breath, completing recent course of Omnicef and prednisone per pulmonary.  Dry cough no productive sputum, no hemoptysis, no fevers or chills.       Current Outpatient Prescriptions   Medication Sig Dispense Refill   • predniSONE (DELTASONE) 10 MG Tab Take 30mg x 3 days, then take 20mg x 3 days, then take 10mg x 3 days, with food, then discontinue. 18 Tab 0   • cefdinir (OMNICEF) 300 MG Cap TAKE 1 CAPSULE BY  MOUTH TWICE DAILY FOR 10 DAYS 20 Cap 0   • memantine HCl ER (NAMENDA XR) 7 MG CAPSULE SR 24 HR Take 1 Cap by mouth every day. 30 Cap 2   • irbesartan (AVAPRO) 150 MG Tab Take 1 Tab by mouth every day. 90 Tab 1   • hydrochlorothiazide (MICROZIDE) 12.5 MG capsule Take 1 Cap by mouth every day. 90 Cap 1   • dabigatran (PRADAXA) 75 MG Cap capsule Take 1 Cap by mouth 2 Times a Day. 180 Cap 1   • oxyCODONE CR (OXYCONTIN) 10 MG Tablet Extended Release 12 hour Abuse-Deterrent Take 1 Tab by mouth every 12 hours for 30 days. 60 Tab 0   • lidocaine (LIDODERM) 5 % Patch Apply 3 Patches to skin as directed every 12 hours. Apply 3 patches to affected area 12 hours on, 12 hours off, pain 90 Patch 11   • albuterol 108 (90 Base) MCG/ACT Aero Soln inhalation aerosol Inhale 2 Puffs by mouth every 6 hours as needed for Shortness of Breath. 8.5 g 6   • acetaminophen (TYLENOL) 500 MG Tab Take 500-1,000 mg by mouth every 6 hours as needed for Mild Pain or Moderate Pain.     • metoprolol (LOPRESSOR) 25 MG Tab Take 1 Tab by mouth 2 times a day. 180 Tab 3   • albuterol (PROVENTIL) 2.5mg/3ml Nebu Soln solution for nebulization 3 mL by Nebulization route every four hours as needed for Shortness of Breath. 150 Bullet 11   • Fluticasone-Umeclidin-Vilant (TRELEGY ELLIPTA) 100-62.5-25 MCG/INH AEROSOL POWDER, BREATH ACTIVATED Inhale 1 Puff by mouth every day. 1 Each 11   • irbesartan-hydrochlorothiazide (AVALIDE) 150-12.5 MG per tablet Take 0.5 Tabs by mouth every day. 90 Tab 3     No current facility-administered medications for this visit.              Cervical pain  4/08 MRI cervical spine C3-C4 moderate left-sided foraminal stenosis, C4-C5 moderate foraminal stenosis right, C5-C6 moderate right-sided foraminal stenosis, C6-C7 moderate bilateral foraminal stenosis  5/08 CT cervical spine C3-C4 uncovertebral joint arthropathy and significant left-sided neural foraminal narrowing, C4-C5 uncovertebral joint arthropathy right significant  neuroforaminal narrowing, C5-C6 uncovertebral joint arthropathy right moderate neural foraminal narrowing, C6-C7 uncovertebral joint arthropathy moderate to severe right neural foraminal narrowing  7/08 C3-C5 cervical facet injections   1/09 medial branch nerve block diagnostic   2/09 C4-C5 cervical epidural under fluoroscopy   4/13 Daughter called [facility] suggest taper pain med, she has sched to taper the oxycontin  4/15/13 resumed oxycontin 10 bid  6/11/13 off oxcontin  7/24/13 increase oxycontin from qday to 10 mg bid   9/14/17 custom PT discharge summary patient did not improve walking tolerance or standing tolerance, medicare g-code status 60-79% impairment, thoracolumbar flexion decreased from 50-25%, extension 10%, sidebending 10-25%  1/17/18  pain note left cervical paraspinal muscle trigger point injections  1/31/18  pain note  2/2/18  pain note, reviewed cervical spine x-ray multilevel DJD, recommend consider intrathecal pain pump trial     Chronic opiate  2/26/15 narcotic contract  6/12/15 opiate risk score 0  10/4/15   7/25/16   9/20/16  pain note recommended left L3-L5 MBBB  10/31/16   2/6/17   5/9/17 narcotic contract  5/9/17 opiate risk score 0  5/9/17   5/9/17 depression screening score 0  6/28/17 trial cymbalta 30 mg  8/7/17 did not start cymbalta, will start  8/7/17   8/7/17 UDT  8/21/17 change cymbalta to qod  9/18/17 off cymbalta  9/14/17 custom PT discharge summary patient did not improve walking tolerance or standing tolerance, medicare g-code status 60-79% impairment, thoracolumbar flexion decreased from 50-25%, extension 10%, sidebending 10-25%  11/3/17   2/6/18   4/10/18   4/10/18 controlled substances contract, decrease oxycontin to 10 mg am and 5 mg pm  5/17/18 decrease to oxycontin 10 mg am and 5 mg pm  5/17/18   7/10/18 increase oxycontin back to 10 mg bid after fall  9/18/18   Has  seen neurosurgery, pain management locally and at [place], has tried NSAIDs, celebrex, cymbalta, pregabalin, gabapentin, lidoderm, voltaren gel     COPD  7/11 CT spiral thorax extensive emphysematous change of lung, small left pleural effusion left lung base with atelectasis unchanged from prior CT  5/10 CT spriral thorax emphysematous change and dependent atelectasis left lung base  7/11 PFT FEV/FVC 59%, FEV1 1.1 L (75% predicted), significant post bronchodilator response noted (FEV1 improved 16%). Mixed restrictive and obstructive pattern,COPD with GOLD stage II with sig bronchodilator response  7/11 trial of symbicort 80/4.5 mcg bid discussed with daughter plus albuterol neb prn, [facility] home education ordered  5/12 off symbicort   5/22/14 cxr negative  6/23/14 on symbicort  7/20/15 change to advair 250/50 mcg from symbicort (not covered on insurance)  9/3/15 cxr negative  10/4/15 add spiriva and change symbicort to advair 250/50 mcg bid  10/19/15 CT high resolution thorax, no evidence of interstitial lung disease, minimal scattered opacification could indicate minimal areas of fibrosis periphery of each lung, similar to prior exam, emphysema most prominent upper lobes bilateral  10/19/15 PFT FEV1 1.1 (61% predicted),FVC 1.9,FEV/FVC 58%, no bronchodilator response,DLCO 34%; on advair 250/50 mcg bid and spiriva  11/2/15 change from advair discus to advair 250 inhaler and continue spiriva   12/11/15 not taking advair, will start advair and cont spiriva  12/11/15 cxr negative  3/14/16 [facility] note complaining doxycycline and taper steroids, continue nocturnal oxygen, continue rotating antibiotics for bronchiectasis, follow-up laboratory testing ordered  3/14/16  (normal), quantiferon gold positive, allergen panel negative, IgE 357 (less than 214), IgA 116 (),, IgM 27 (),IgG 947 ([phone])  4/13/16 cxr mild cardiomegaly  5/4/16 [facility] note continue advair 115/21 bid with spacer given doxycycline and  prednisone, continue oxygen 3 L at night, AFB samples ×3, follow-up 3 months  7/25/16 pulmonary note on prednisone taper one week out of the month and doxycycline one per day for one week per month, on advair bid and spiriva and oxygen 3 liters at night  11/8/16 pulmonary note continue advair 115/21 ucg bid,spiriva, xopenex as needed, oxygen 3 L at night  10/4/17 pulmonary note continue advair 115/21 two inhalations bid, spiriva daily, xoponex as needed, oxygen 3 L at night, history of positive quantiferon gold, will order sputum sample follow-up 4 months  3/17/18 pulmonary note, continue nocturnal oxygen 2 L, start trelegy one puff daily  9/10/18 pulmonary note intolerant to CPAP, resume nocturnal oxygen, latent TB, repeat chest x-ray, stable on bronchodilators, as needed prednisone and antibiotic prescription to pharmacy follow-up 3-6 months     Dyslipidemia   5/10 chol 163,trig 68,hdl 69,ldl 80  6/10 chol 163,trig 60,hdl 69,ldl 80  7/11 chol 118,trig 45,hdl 65,ldl 44 on crestor 10 mg  10/11 chol 165,trig 47,hdl 78,ldl 74 on crestor 10 mg done at [facility]  2/12 off crestor  6/12 chol 211,trig 104,hdl 64,ldl 126 off crestor  3/4/14 chol 222,trig 124,hdl 52,ldl 145 off meds     History of compression fracture  5/10 MRI thoracic spine subacute T12 compression fracture  5/27/10 kyphoplasty T11  6/10 MRI lumbar spine T12 compression fracture mild to moderate central canal narrowing, interval T11 kyphoplasty, L2-L3 moderate foraminal stenosis, L3-L4 severe left foraminal stenosis, moderate right foraminal stenosis, L4-L5 moderate central canal stenosis  7/10 dexa LS +1.0,hip -1.6  7/28/10 T12 vertebral plasty  8/11 [facility] pain evaluation  pain management, recommend continue oxycontin 10 bid  10/11 [facility]  T11-L1 injection  2/13  pain note, T10-T11 epidural injection  6/13 dexa LS +1.8, forearm -2.7  9/23/13 reclast injection  9/4/14 reclast IV infusion  9/14/17 custom PT discharge  summary patient did not improve walking tolerance or standing tolerance, medicare g-code status 60-79% impairment, thoracolumbar flexion decreased from 50-25%, extension 10%, sidebending 10-25%  7/1/18 x-ray lumbar spine complete or near collapse L1 vertebral body anteriorly which is likely chronic, extensive degenerative changes, prior percutaneous vertebral augmentation T12-L1     History H. pylori  9/11 serum IgG positive s/p prevpack  9/19/12 cbc,cmp,lipase neg; u/s abd gallbladder sludge without biliary dilatation or stone  12/12/12 [facility] eval rec EGD/colon pt did not follow up      History shingles     Hypertension  1/05 carotid u/s negative  1/07 echo LV EF 60% ,mild LVH  1/09 renal ultrasound 6 mm right cortical cyst  9/09 MRI brain with and without moderate nonspecific microvascular ischemic change  9/09 MRA next mild plaque right internal carotid  5/24/10 echo normal LV size and function, EF 60%, mild to moderate AR, RVSP 35-40 consistent with mild pulmonary hypertension  5/10 persantine thallium no ischemia, EF 72%  9/10 change avalide 150/12.5 to avapro 150 qday, had sodium 125 in ER  3/11 on avapro 300 mg  7/8/11 echo normal LV function, EF 55%  7/8/11 Persantine thallium possible small area mild ischemia in the lateral wall, no evidence of infarction  7/11   3/12 increase metoprolol to 50 bid, cont avapro 300 mg, start norvasc 2.5 mg  5/1/12 increase norvasc to 5 mg, change avapro to avalide 300/12.5 mg, cont metoprolol 50 bid  10/2/12 on avalide 300/12.5 mg and metoprolol 50 bid and norvasc 5 mg  10/12 Persantine thallium diaphragmatic uptake possible attenuation, fixed inferolateral defect which may be secondary to attenuation, EF 76%, no wall motion abnormalities, no evidence of significant ischemia.  1/13 echo normal LV function, grade 2 diastolic dysfunction, EF 70% moderate aortic insufficiency  1/13   1/23/13 cxr cardiomegaly without pulmonary edema  6/11/13 on avalide 300/12.5  mg,norvasc 5 mg, metoprolol 50 bid  4/7/14 cardiology note atrial fibrillation, start pradaxa 75 mg bid, stop asa, stop norvasc, decreased avalide 300/12.5 mg to 1/2 per day, increase metoprolol to 50 mg 1 1/2 bid  10/20/14 metoprolol 50 mg decreased to 25 mg bid by [place] doctor due to low heart rate, continues on avalide 300/12.5 mg   12/29/14  cardiology note continue pradaxa, metoprolol 25 mg 1/2 bid, avalide 300/12.5 mg  7/25/16 avalide 150/12.5 mg decrease to 1/2 tab per day and continue metoprolol 25 mg bid  8/24/16 cardiology note sinus rhythm on exam,[id number] score=2 continue anticoagulation, low-dose metoprolol,avalide 150/12.5 mg 1/2 per day, follow-up one year  12/14/17 cardiology note remains in sinus rhythm, follow-up one year  6/21/18  cardiology note paroxysmal atrial fibrillation sinus rhythm on exam stable continue anticoagulation, follow-up 1 year     Low back pain  6/06 epidural L4-L5   12/08 x-ray LS moderate to severe DJD  6/10 MRI lumbar spine T12 compression fracture with mild-to-moderate central spinal canal narrowing, interval T11 kyphoplasty, L2-L3 moderate frontal stenosis, L3-L4 severe left foraminal stenosis, moderate right foraminal stenosis, L4-L5 moderate central spinal canal  7/10 interventional rad consult epidural T11 to L1 region  7/28/10 T12 vertebroplasty  7/30/10 norco taper, and lyrica 50 mg bid  8/5/10 reaction to lyrica, stop lyrica  8/19/10 lumbar steroid epidural   9/10  note rec decompression if pain persist  10/10 bilateral lumbar facet joint injections L3-S1   12/10 xray LS old T11 and T12 fracture status post kyphoplasty  1/11 [facility] note  3/11 trial of spinal stimulator  8/11 [facility] note evaluation  pain management cont oxycontin 10 bid  11/22/11 [facility] note dr. hodge; trial of baclofen, T11 plus T12 left-sided nerve root block pending  7/12  pain note;Left-sided  T11-T12 transforaminal epidural   4/13 daughter called [facility] suggest taper pain med, she has sched to taper the oxycontin  4/15/13 resumed oxycontin 10 bid  6/11/13 off oxycontin  7/24/13 increase oxycontin from qday to 10 mg bid   2/28/14 on celebrex 200 mg as needed per  and oxycontin 10 mg bid  4/25/14 trial of cymbalta 30 mg, continue oxycontin 10 mg twice a day, recommend not use tramadol (side effects since on oxycontin already) or celebrex (on pradaxa)  5/29/14  pain management [facility]; recommend T11-T12 left-sided transforaminal nerve block  7/2/14 [facility] left T11-T12 epidural injection  7/21/14  [facility] note; increase oxycontin to 10 mg tid x 3 weeks and then taper down  10/27/14 [facility] note; samples zorvolex  2/26/15 xray lumbar spine mild compression fracture of L4 of indeterminate age but new compared to 3/25/13 vertebral augmentation and T11 and T12 with severe compression fracture of T12 and focal kyphosis at this level  4/15/16 outpatient physical therapy phone call indicating patient unable to make appointments, recommending home health physical therapy  9/20/16  pain note recommended left L3-L5 MBBB  3/22/17  pain procedure note left L3-L5 MBBB #1  6/28/17 trial cymbalta 30 mg  6/29/17 [facility] physical therapy note  8/3/17 custom PT note  9/18/17 off cymbalta  9/14/17 custom PT discharge summary patient did not improve walking tolerance or standing tolerance, medicare g-code status 60-79% impairment, thoracolumbar flexion decreased from 50-25%, extension 10%, sidebending 10-25%  11/14/17 custom PT discharge summary no improvement  11/29/17 MRI lumbar spine no acute fracture, lumbar levoscoliosis T12-L1, kyphotic deformity he talked L1, previous compression fractures T11, T12, L1, previous vertebral augmentation procedures T11 and L1, moderate central canal stenosis T12-L3, severe canal stenosis L3-L4, no  significant change  1/31/18  pain note  2/2/18  pain note, reviewed cervical spine x-ray multilevel DJD, recommend consider intrathecal pain pump trial  5/17/18 decrease to oxycontin 10 mg am and 5 mg pm  7/1/18 x-ray lumbar spine complete or near collapse L1 vertebral body anteriorly which is likely chronic, extensive degenerative changes, prior percutaneous vertebral augmentation T12-L1  7/10/18 increase oxycontin back to 10 mg bid after fall  Tried NSAIDs, celebrex, pregabalin, gabapentin, cymbalta, physical therapy,tens     Macular degeneration  7/1/13 dr.mills mcnair note, start PreserVision AREDS II vitamin followup 6 months     Mild cognitive impairment  1/11/16 MMSE 24/30  7/14/17 MMSE 23/30 offered aricept  4/10/18 MMSE 24/30 5/2/18 start aricept 5 mg daily  9/18/18 off aricept not tolerated  10/31/18 trial namenda XR started pack  11/8/18 namenda titration pack not available, will start namenda XR 7 mg daily     Nocturnal hypoxemia  3/17/18 pulmonary note, continue nocturnal oxygen 2 L, start trelegy one puff daily  9/10/18 pulmonary note intolerant to CPAP, resume nocturnal oxygen, latent TB, repeat chest x-ray, stable on bronchodilators, as needed prednisone and antibiotic prescription to pharmacy follow-up 3-6 months     Osteoporosis  3/08 dexa LS +0.3,hip -1.4  5/10 MRI thoracic spine subacute T12 compression fracture  5/27/10 T12 kyphoplasty  6/10 on actonel  7/10 dexa LS +1.0,hip -1.6  8/10 vit d 56  3/11 reclast referral made  6/12 vit d 42 off actonel  6/21/13 dexa LS +1.8, forearm -2.7, I rec reclast, she would like to think it over  9/23/13 reclast injection  3/4/14 vit d 26  9/4/14 reclast IV infusion  2/26/15 xray rib series left; mild deformity of the lateral 10th rib on the left may be related to a fracture  2/26/15 xray lumbar spine mild compression fracture of L4 of indeterminate age but new compared to /25/13, vertebral augmentation and T11 and T12 with severe  compression fracture of T12 and focal kyphosis at this level  12/7/15 reclast IV infusion     Paroxysmal atrial fibrillation  4/10 hospitalization on multaq, also on verapamil and metoprolol for rate control per cardiology  1/11 [facility] note cont multaq  7/11 EKG atrial fibrillation hospitalization  7/8/11 echo normal LV function, EF 55%  7/8/11 Persantine thallium possible small area mild ischemia in the lateral wall, no evidence of infarction  7/11 [facility] during hospitalization changed to amiodarone 200 mg plus pradaxa restarted  7/11   9/11 [facility] note stop amiodarone, cont pradaxa and metoprolol 25 bid  3/12 cont pradaxa and increase metoprolol to 50 bid due to higher bp  5/12 pradaxa decreased from 150 bid to 75 bid per [facility] due to nosebleeds  6/12 EKG NSR  1/13 echo normal LV function, grade 2 diastolic dysfunction, EF 70% moderate aortic insufficiency  6/11/13 on pradaxa and metoprolol 50 bid for rate control, NSR today  9/13 cardiology note stop pradaxa and start asa 81 mg  4/7/14 cardiology note atrial fibrillation, start pradaxa 75 mg bid, stop asa, stop norvasc, decreased avalide 300/12.5 mg to 1/2 per day, increase metoprolol to 50 mg 1 1/2 bid  4/14/14 cardiology note, NSR on exam, continue pradaxa 75 mg bid, metoprolol 75 mg bid, avalide 300/12.5 mg 1/2 tab per day  10/20/14 metoprolol 50 mg decreased to 25 mg bid by [place] doctor due to low heart rate, continues on pradaxa and avalide 300/12.5 mg   12/29/14  cardiology note continue pradaxa, metoprolol 25 mg 1/2 bid, avalide 300/12.5 mg  6/12/15 NSR on exam  8/24/16 cardiology note sinus rhythm on exam,[id number] score=2 continue anticoagulation, low-dose metoprolol follow-up one year  12/14/17 cardiology note remains in sinus rhythm, follow-up one year  6/21/18  cardiology note paroxysmal atrial fibrillation sinus rhythm on exam stable continue anticoagulation, follow-up 1 year     Preventative health  5/05 colon per [facility] polyp no path  report, f/u rec 5 yrs (12/12/12 [facility] note)  4/09 stool occult blood negative  12/09 mammogram  10/1/11 pneumovax  10/2/12 zoster vaccine  6/11/13 declines mammogram, tdap  6/13 dexa LS +1.8, forearm -2.7  3/4/14 vit d 26  10/12/15 prevnar     Right hip pain  3/11 MRI right hip DJD and tear of the anterior/superior acetabular labrum  4/11  ortho note not believe hip is primary problem, referred to  or [facility]     Sensorineural hearing loss  12/7/15  audiology evaluation mild sensorineural hearing loss     Sleep apnea  8/11 [facility] note sleep study apnea-hypopnea index 86, low sat 74%, start CPAP  8-18 cm    2013 off cpap not comfortable  3/14/16  (normal), quantiferon gold positive, allergen panel negative, IgE 357 (less than 214), IgA 116 (),, IgM 27 (),IgG 947 ([phone])  4/13/16 cxr mild cardiomegaly  5/4/16 [facility] note continue oxygen 3 L at night  11/8/16 pulmonary note continue oxygen 3 L at night  10/4/17 pulmonary note continue oxygen 3 L at night  3/17/18 pulmonary note, continue nocturnal oxygen 2 L, start trelegy one puff daily  9/10/18 pulmonary note intolerant to CPAP, resume nocturnal oxygen, latent TB, repeat chest x-ray, stable on bronchodilators, as needed prednisone and antibiotic prescription to pharmacy follow-up 3-6 months     tb latent  3/14/16 positive quantiferon gold test  3/14/16  (normal), quantiferon gold positive, allergen panel negative, IgE 357 (less than 214), IgA 116 (),, IgM 27 (),IgG 947 ([phone])  4/13/16 cxr mild cardiomegaly  5/4/16 [facility] note continue advair 115/21 bid with spacer given doxycycline and prednisone, continue oxygen 3 L at night, AFB samples ×3, follow-up 3 months  7/25/16 pulmonary note on prednisone taper one week out of the month and doxycycline one per day for one week per month, on advair bid and spiriva and oxygen 3 liters at night  7/27/17 PPD negative  10/4/17 pulmonary note continue advair 115/21 two  inhalations bid, spiriva daily, xoponex as needed, oxygen 3 L at night, history of positive quantiferon gold, will order sputum sample follow-up 4 months  9/10/18 pulmonary note intolerant to CPAP, resume nocturnal oxygen, latent TB, repeat chest x-ray, stable on bronchodilators, as needed prednisone and antibiotic prescription to pharmacy follow-up 3-6 months     Tension headache  6/11/13 on fioricet bid  2/28/14 taper fioricet to once a day  4/25/14 now on fioricet prn                     Patient Active Problem List   Diagnosis   • Hypertension   • Dyslipidemia   • Preventative health care   • Low back pain   • Cervical pain   • Osteoporosis, idiopathic   • History of compression fracture of spine   • PAF (paroxysmal atrial fibrillation) ([facility])   • Right hip pain   • COPD (chronic obstructive pulmonary disease) ([facility])   • Sleep apnea   • History of Helicobacter pylori infection   • Chronic anticoagulation   • History of shingles   • Tension headache   • Macular degeneration   • Chronic use of opiate for therapeutic purpose   • Sensorineural hearing loss   • Mild cognitive impairment   • TB lung, latent   • Nocturnal hypoxemia         ROS       Objective:          Physical Exam   Constitutional: She appears well-developed and well-nourished. No distress.   HENT:   Head: Normocephalic and atraumatic.   Eyes: Conjunctivae are normal. Right eye exhibits no discharge. Left eye exhibits no discharge.   Neck: Neck supple. No JVD present. No thyromegaly present.   Cardiovascular: Normal rate and regular rhythm.    Pulmonary/Chest: Effort normal and breath sounds normal. No respiratory distress. She has no wheezes.   Abdominal: She exhibits no distension.   Musculoskeletal: She exhibits no edema.   Neurological: She is alert.   Skin: Skin is warm. She is not diaphoretic.   Psychiatric: She has a normal mood and affect. Her behavior is normal.   Nursing note and vitals reviewed.      Appropriate response to questions and  commands, hard of hearing          Assessment/Plan:     Assessment  #!  Chronic low back pain status post T12 compression fracture, has been seen by neurosurgery, pain management locally Dr. Fajardo and Dr. Urrutia as well as pain management at [place], has had vertebroplasty, facet injections, nerve ablation, epidural injections, trial of spinal stimulator, ibuprofen, Motrin, Celebrex, Cymbalta, gabapentin, pregabalin, topical Lidoderm and Voltaren, TENS, physical therapy, with no benefit, has tried oxycodone and hydrocodone, remains on OxyContin 10 mg twice daily.    #2 chronic opiate therapy stable and controlled back pain on OxyContin 10 mg twice daily, some memory loss which may be age-related, no change in mood, no daytime drowsiness, sedation, confusion, hallucinations, constipation, respiratory suppression    #3 paroxysmal atrial fibrillation rate controlled followed by cardiology on anticoagulation and beta-blocker metoprolol    #4 hypertension stable on Avapro and hydrochlorothiazide and metoprolol    #5 mild cognitive impairment did not start Namenda yet    #6 COPD recent bronchitis completed Omnicef and steroid taper, on nocturnal oxygen, lungs are clear, normal saturation    #7 nocturnal hypoxemia  Stable followed by pulmonary recommended to use oxygen 2 L at night      Plan  #1     #2 long-term benefits and risks of chronic opiate therapy discussed with patient and son understanding risks of falls, drowsiness, sedation, worsening memory loss, depression, constipation, respiratory suppression    #3 after conversation, reviewing old records, imaging studies, having tried multiple medications, physical therapy, TENS, injections without benefit including ablations, and kyphoplasty, risks of medication and benefits discussed and weighed with patient and son, will continue medication understanding risks and benefits, I believe benefits outweigh the risks    #4 falling precautions    #5 start Namenda    #6  continue check blood pressure periodically and record    #7 falling precautions    #8 no alcohol    #9 follow-up 3 months

## 2018-12-17 ENCOUNTER — TELEPHONE (OUTPATIENT)
Dept: MEDICAL GROUP | Facility: MEDICAL CENTER | Age: 83
End: 2018-12-17

## 2018-12-17 DIAGNOSIS — M25.569 KNEE PAIN, UNSPECIFIED CHRONICITY, UNSPECIFIED LATERALITY: ICD-10-CM

## 2018-12-26 DIAGNOSIS — J20.9 ACUTE BRONCHITIS, UNSPECIFIED ORGANISM: ICD-10-CM

## 2018-12-27 RX ORDER — CEFDINIR 300 MG/1
CAPSULE ORAL
Qty: 20 CAP | Refills: 0 | Status: SHIPPED | OUTPATIENT
Start: 2018-12-27 | End: 2019-02-11

## 2018-12-27 RX ORDER — PREDNISONE 10 MG/1
TABLET ORAL
Qty: 18 TAB | Refills: 0 | Status: SHIPPED | OUTPATIENT
Start: 2018-12-27 | End: 2019-02-11

## 2018-12-27 NOTE — TELEPHONE ENCOUNTER
Have we ever prescribed this med? Yes.  If yes, what date?11/26/18    Last OV: 9/10/18    Next OV: 3/6/19    DX: COPD    Medications: cefdinir (OMNICEF) 300 MG Cap , predniSONE (DELTASONE) 10 MG Tab        Mucopurulent chronic bronchitis (HCC)  Stable, continue current bronchodilators.  She requests backup prescription for prednisone and antibiotic.  This will be sent to pharmacy

## 2019-01-03 ENCOUNTER — TELEPHONE (OUTPATIENT)
Dept: CARDIOLOGY | Facility: MEDICAL CENTER | Age: 84
End: 2019-01-03

## 2019-01-04 NOTE — TELEPHONE ENCOUNTER
----- Message from Camila Oleary L.P.N. sent at 1/3/2019  9:35 AM PST -----  Regarding: RE: daughter calling for dental clearance  Spoke with Yashira. Dentist plans to schedule pt. For dental extractions (6 teeth) but is not requesting pt. To hold Pradaxa 75mg BID.   Yashira was given fax number for dentist to fax any clearance request.  ----- Message -----  From: Guillermina Tabor  Sent: 1/3/2019   9:14 AM  To: Camila Oleary L.P.N.  Subject: daughter calling for dental clearance            RS/Camila      Patient's daughter Yashira is calling for a dental clearance for her mother to have several extractions. She can be reached at [phone].

## 2019-02-11 ENCOUNTER — APPOINTMENT (OUTPATIENT)
Dept: RADIOLOGY | Facility: MEDICAL CENTER | Age: 84
End: 2019-02-11
Attending: EMERGENCY MEDICINE
Payer: MEDICARE

## 2019-02-11 ENCOUNTER — HOSPITAL ENCOUNTER (EMERGENCY)
Facility: MEDICAL CENTER | Age: 84
End: 2019-02-11
Attending: EMERGENCY MEDICINE
Payer: MEDICARE

## 2019-02-11 VITALS
DIASTOLIC BLOOD PRESSURE: 76 MMHG | OXYGEN SATURATION: 97 % | WEIGHT: 119.49 LBS | HEART RATE: 74 BPM | SYSTOLIC BLOOD PRESSURE: 172 MMHG | HEIGHT: 62 IN | BODY MASS INDEX: 21.99 KG/M2 | TEMPERATURE: 98.8 F | RESPIRATION RATE: 16 BRPM

## 2019-02-11 DIAGNOSIS — R53.1 GENERALIZED WEAKNESS: ICD-10-CM

## 2019-02-11 DIAGNOSIS — E87.1 HYPONATREMIA: ICD-10-CM

## 2019-02-11 PROBLEM — E86.0 DEHYDRATION: Status: RESOLVED | Noted: 2019-02-11 | Resolved: 2019-02-11

## 2019-02-11 PROBLEM — E86.0 DEHYDRATION: Status: ACTIVE | Noted: 2019-02-11

## 2019-02-11 PROBLEM — B37.0 ORAL THRUSH: Status: RESOLVED | Noted: 2019-02-11 | Resolved: 2019-02-11

## 2019-02-11 PROBLEM — G93.41 ACUTE METABOLIC ENCEPHALOPATHY: Status: ACTIVE | Noted: 2019-02-11

## 2019-02-11 PROBLEM — B37.0 ORAL THRUSH: Status: ACTIVE | Noted: 2019-02-11

## 2019-02-11 LAB
ALBUMIN SERPL BCP-MCNC: 3.4 G/DL (ref 3.2–4.9)
ALBUMIN/GLOB SERPL: 0.9 G/DL
ALP SERPL-CCNC: 68 U/L (ref 30–99)
ALT SERPL-CCNC: 16 U/L (ref 2–50)
ANION GAP SERPL CALC-SCNC: 8 MMOL/L (ref 0–11.9)
APPEARANCE UR: CLEAR
AST SERPL-CCNC: 24 U/L (ref 12–45)
BACTERIA #/AREA URNS HPF: ABNORMAL /HPF
BASOPHILS # BLD AUTO: 0.3 % (ref 0–1.8)
BASOPHILS # BLD: 0.03 K/UL (ref 0–0.12)
BILIRUB SERPL-MCNC: 0.8 MG/DL (ref 0.1–1.5)
BILIRUB UR QL STRIP.AUTO: NEGATIVE
BUN SERPL-MCNC: 17 MG/DL (ref 8–22)
CALCIUM SERPL-MCNC: 8.6 MG/DL (ref 8.4–10.2)
CHLORIDE SERPL-SCNC: 90 MMOL/L (ref 96–112)
CO2 SERPL-SCNC: 29 MMOL/L (ref 20–33)
COLOR UR: YELLOW
CREAT SERPL-MCNC: 0.86 MG/DL (ref 0.5–1.4)
EOSINOPHIL # BLD AUTO: 0.67 K/UL (ref 0–0.51)
EOSINOPHIL NFR BLD: 6.7 % (ref 0–6.9)
EPI CELLS #/AREA URNS HPF: ABNORMAL /HPF
ERYTHROCYTE [DISTWIDTH] IN BLOOD BY AUTOMATED COUNT: 48.2 FL (ref 35.9–50)
GLOBULIN SER CALC-MCNC: 3.6 G/DL (ref 1.9–3.5)
GLUCOSE SERPL-MCNC: 103 MG/DL (ref 65–99)
GLUCOSE UR STRIP.AUTO-MCNC: NEGATIVE MG/DL
HCT VFR BLD AUTO: 39 % (ref 37–47)
HGB BLD-MCNC: 12.9 G/DL (ref 12–16)
IMM GRANULOCYTES # BLD AUTO: 0.08 K/UL (ref 0–0.11)
IMM GRANULOCYTES NFR BLD AUTO: 0.8 % (ref 0–0.9)
KETONES UR STRIP.AUTO-MCNC: NEGATIVE MG/DL
LEUKOCYTE ESTERASE UR QL STRIP.AUTO: NEGATIVE
LYMPHOCYTES # BLD AUTO: 1.36 K/UL (ref 1–4.8)
LYMPHOCYTES NFR BLD: 13.7 % (ref 22–41)
MCH RBC QN AUTO: 32.3 PG (ref 27–33)
MCHC RBC AUTO-ENTMCNC: 33.1 G/DL (ref 33.6–35)
MCV RBC AUTO: 97.5 FL (ref 81.4–97.8)
MICRO URNS: ABNORMAL
MONOCYTES # BLD AUTO: 1.09 K/UL (ref 0–0.85)
MONOCYTES NFR BLD AUTO: 11 % (ref 0–13.4)
NEUTROPHILS # BLD AUTO: 6.71 K/UL (ref 2–7.15)
NEUTROPHILS NFR BLD: 67.5 % (ref 44–72)
NITRITE UR QL STRIP.AUTO: NEGATIVE
NRBC # BLD AUTO: 0 K/UL
NRBC BLD-RTO: 0 /100 WBC
PH UR STRIP.AUTO: 6.5 [PH]
PLATELET # BLD AUTO: 234 K/UL (ref 164–446)
PMV BLD AUTO: 10.4 FL (ref 9–12.9)
POTASSIUM SERPL-SCNC: 4.9 MMOL/L (ref 3.6–5.5)
PROT SERPL-MCNC: 7 G/DL (ref 6–8.2)
PROT UR QL STRIP: NEGATIVE MG/DL
RBC # BLD AUTO: 4 M/UL (ref 4.2–5.4)
RBC # URNS HPF: ABNORMAL /HPF
RBC UR QL AUTO: ABNORMAL
SODIUM SERPL-SCNC: 127 MMOL/L (ref 135–145)
SP GR UR STRIP.AUTO: 1.01
TROPONIN I SERPL-MCNC: 0.03 NG/ML (ref 0–0.04)
WBC # BLD AUTO: 9.9 K/UL (ref 4.8–10.8)
WBC #/AREA URNS HPF: ABNORMAL /HPF

## 2019-02-11 PROCEDURE — 84484 ASSAY OF TROPONIN QUANT: CPT

## 2019-02-11 PROCEDURE — 36415 COLL VENOUS BLD VENIPUNCTURE: CPT

## 2019-02-11 PROCEDURE — 304561 HCHG STAT O2

## 2019-02-11 PROCEDURE — 700111 HCHG RX REV CODE 636 W/ 250 OVERRIDE (IP): Performed by: EMERGENCY MEDICINE

## 2019-02-11 PROCEDURE — 80053 COMPREHEN METABOLIC PANEL: CPT

## 2019-02-11 PROCEDURE — 700105 HCHG RX REV CODE 258: Performed by: INTERNAL MEDICINE

## 2019-02-11 PROCEDURE — 96374 THER/PROPH/DIAG INJ IV PUSH: CPT

## 2019-02-11 PROCEDURE — 71045 X-RAY EXAM CHEST 1 VIEW: CPT

## 2019-02-11 PROCEDURE — A9270 NON-COVERED ITEM OR SERVICE: HCPCS | Performed by: EMERGENCY MEDICINE

## 2019-02-11 PROCEDURE — 700102 HCHG RX REV CODE 250 W/ 637 OVERRIDE(OP): Performed by: EMERGENCY MEDICINE

## 2019-02-11 PROCEDURE — 85025 COMPLETE CBC W/AUTO DIFF WBC: CPT

## 2019-02-11 PROCEDURE — 99285 EMERGENCY DEPT VISIT HI MDM: CPT

## 2019-02-11 PROCEDURE — 81001 URINALYSIS AUTO W/SCOPE: CPT

## 2019-02-11 PROCEDURE — 99285 EMERGENCY DEPT VISIT HI MDM: CPT | Performed by: INTERNAL MEDICINE

## 2019-02-11 RX ORDER — SODIUM CHLORIDE 1 G/1
1 TABLET ORAL ONCE
Status: COMPLETED | OUTPATIENT
Start: 2019-02-11 | End: 2019-02-11

## 2019-02-11 RX ORDER — LIDOCAINE 50 MG/G
1 PATCH TOPICAL PRN
Status: SHIPPED | COMMUNITY
End: 2019-02-13 | Stop reason: CLARIF

## 2019-02-11 RX ORDER — SODIUM CHLORIDE 1 G/1
1 TABLET ORAL 3 TIMES DAILY
Qty: 30 TAB | Refills: 0 | Status: ON HOLD | OUTPATIENT
Start: 2019-02-11 | End: 2019-02-18

## 2019-02-11 RX ORDER — SODIUM CHLORIDE 9 MG/ML
1000 INJECTION, SOLUTION INTRAVENOUS ONCE
Status: COMPLETED | OUTPATIENT
Start: 2019-02-11 | End: 2019-02-11

## 2019-02-11 RX ORDER — SODIUM CHLORIDE 1 G/1
1 TABLET ORAL
Status: DISCONTINUED | OUTPATIENT
Start: 2019-02-11 | End: 2019-02-11

## 2019-02-11 RX ORDER — CEFDINIR 300 MG/1
300 CAPSULE ORAL 2 TIMES DAILY
Status: SHIPPED | COMMUNITY
Start: 2019-01-25 | End: 2019-02-11

## 2019-02-11 RX ORDER — PREDNISONE 10 MG/1
10-30 TABLET ORAL DAILY
Status: SHIPPED | COMMUNITY
Start: 2019-01-26 | End: 2019-02-11

## 2019-02-11 RX ORDER — IRBESARTAN AND HYDROCHLOROTHIAZIDE 150; 12.5 MG/1; MG/1
1 TABLET, FILM COATED ORAL DAILY
Status: ON HOLD | COMMUNITY
End: 2019-02-21

## 2019-02-11 RX ORDER — ONDANSETRON 2 MG/ML
4 INJECTION INTRAMUSCULAR; INTRAVENOUS ONCE
Status: COMPLETED | OUTPATIENT
Start: 2019-02-11 | End: 2019-02-11

## 2019-02-11 RX ADMIN — SODIUM CHLORIDE 1000 ML: 9 INJECTION, SOLUTION INTRAVENOUS at 17:50

## 2019-02-11 RX ADMIN — SODIUM CHLORIDE TAB 1 GM 1 G: 1 TAB at 18:02

## 2019-02-11 RX ADMIN — ONDANSETRON 4 MG: 2 INJECTION INTRAMUSCULAR; INTRAVENOUS at 18:58

## 2019-02-11 NOTE — ED NOTES
Med rec updated and complete  Allergies reviewed, per daughter  Pts daughter had a list of medications, went over list of medications and returned list of medications back to pts daughter.  Pts daughter reports that she stopped her MEMANTINE 7MG on 1/5/2019, this medications did not work for her, per daughter.

## 2019-02-11 NOTE — ED PROVIDER NOTES
ED Provider Note    CHIEF COMPLAINT  Chief Complaint   Patient presents with   • Weakness     c/o increased weakness x 1.5 weeks per family   • Loss of Appetite       HPI  Venus Church is a 87 y.o. female who presents with increased lethargy for last week and a half.  She typically lives alone and is quite high functioning.  She had recent dental work a few weeks ago her daughter has been with her for the last few months constantly.  She notes over the last week and a half she has been much more confused she is wondering around her house day and night.  She is acting very strange very less energetic she has been having a cough.  She uses oxygen at night but typically does not need it.  She recently had some dental work done she also had a course of steroids and antibiotics 2 weeks ago for a cough.  She is still having this cough.  Patient denies any pain no headache no chest pain no abdominal pain.  The daughter denies any history of trauma.      REVIEW OF SYSTEMS  positive for generalized weakness confusion, negative for fevers vomiting abdominal pain. All other systems are negative.     PAST MEDICAL HISTORY   has a past medical history of Anesthesia; Aortic regurgitation (4/25/2012); Arrhythmia (7/10); Arthritis; Atypical chest pain (4/25/2012); CATARACT (2007,08); Chronic anticoagulation (4/25/2012); Constipation (4/25/2012); COPD (chronic obstructive pulmonary disease) (HCC); DDD (degenerative disc disease); Dental disorder; Dyspnea (4/25/2012); Generalized osteoarthritis (4/25/2012); Headache(784.0) (4/25/2012); Heart valve insufficiency; Hypercholesteremia; Hyperlipidemia (4/25/2012); Hypertension; MEDICAL HOME; On home oxygen therapy; Osteoarthritis of knee (4/25/2012); Osteoporosis; Other accident; Pain; Pneumonia (6/4); TB lung, 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    SOCIAL HISTORY  Social History     Social History Main Topics   • Smoking status:  "Former Smoker     Packs/day: 1.50     Years: 25.00     Types: Cigarettes     Quit date: 4/14/1984   • Smokeless tobacco: Never Used      Comment: smoked 25 yrs, quit 1984   • Alcohol use No   • Drug use: No   • Sexual activity: Not on file       SURGICAL HISTORY   has a past surgical history that includes cataract phaco with iol (1/5/2009); other orthopedic surgery (may, 2010); carpal tunnel release (@30 yrs ago); and recovery (7/28/2010).    CURRENT MEDICATIONS  Home Medications     Reviewed by Sarina Edmond (Pharmacy Tech) on 02/11/19 at 1534  Med List Status: Complete   Medication Last Dose Status   acetaminophen (TYLENOL) 500 MG Tab > 1 week Active   albuterol (PROVENTIL) 2.5mg/3ml Nebu Soln solution for nebulization 2/11/2019 Active   cefdinir (OMNICEF) 300 MG Cap 2/3/2019 Active   dabigatran (PRADAXA) 75 MG Cap capsule 2/11/2019 Active   Fluticasone-Umeclidin-Vilant (TRELEGY ELLIPTA) 100-62.5-25 MCG/INH AEROSOL POWDER, BREATH ACTIVATED 2/11/2019 Active   irbesartan-hydrochlorothiazide (AVALIDE) 150-12.5 MG per tablet 2/11/2019 Active   lidocaine (LIDODERM) 5 % Patch 2/1/2019 Active   metoprolol (LOPRESSOR) 25 MG Tab 2/11/2019 Active   oxyCODONE CR (OXYCONTIN) 10 MG Tablet Extended Release 12 hour Abuse-Deterrent 2/11/2019 Active   predniSONE (DELTASONE) 10 MG Tab 2/3/2019 Active                ALLERGIES  Allergies   Allergen Reactions   • Codeine Vomiting       PHYSICAL EXAM  VITAL SIGNS: BP (!) 164/78   Pulse 77   Temp 37.1 °C (98.8 °F) (Temporal)   Resp 19   Ht 1.575 m (5' 2\")   Wt 54.2 kg (119 lb 7.8 oz)   SpO2 99%   BMI 21.85 kg/m² .  Constitutional: Alert in no apparent distress.  HENT: No signs of trauma, Bilateral external ears normal, Nose normal.  Slightly dry mucous membranes  Eyes: Pupils are equal and reactive, Conjunctiva normal, Non-icteric.   Neck: Normal range of motion, No tenderness, Supple, No stridor.   Cardiovascular: Regular rate and rhythm, no murmurs.   Thorax & Lungs: " Normal breath sounds, No respiratory distress, No wheezing, No chest tenderness.   Abdomen: Bowel sounds normal, Soft, No tenderness, No masses, No peritoneal signs.  Skin: Warm, Dry, No erythema, No rash.   Musculoskeletal:  no major deformities noted.   Neurologic: Alert,  No focal deficits noted.   Psychiatric: Affect normal, Judgment normal, Mood normal.       DIAGNOSTIC STUDIES / PROCEDURES        LABS  Labs Reviewed   CBC WITH DIFFERENTIAL - Abnormal; Notable for the following:        Result Value    RBC 4.00 (*)     MCHC 33.1 (*)     Lymphocytes 13.70 (*)     Monos (Absolute) 1.09 (*)     Eos (Absolute) 0.67 (*)     All other components within normal limits   COMP METABOLIC PANEL - Abnormal; Notable for the following:     Sodium 127 (*)     Chloride 90 (*)     Glucose 103 (*)     Globulin 3.6 (*)     All other components within normal limits   URINALYSIS,CULTURE IF INDICATED - Abnormal; Notable for the following:     Occult Blood Small (*)     All other components within normal limits   URINE MICROSCOPIC (W/UA) - Abnormal; Notable for the following:     RBC 2-5 (*)     Bacteria Rare (*)     All other components within normal limits   TROPONIN   ESTIMATED GFR       RADIOLOGY  DX-CHEST-PORTABLE (1 VIEW)   Final Result      No acute cardiopulmonary findings.              COURSE & MEDICAL DECISION MAKING  Pertinent Labs & Imaging studies reviewed. (See chart for details)    This is an 87-year-old female presents with generalized weakness.  Differential includes but is not limited to electrolyte abnormality, UTI, dehydration.    Labs show a slightly low sodium of 127 her troponin and urine is negative.  She was not complaining of any trauma therefore head CT was not indicated she is not complaining of any chest pain therefore EKG was not performed.  Chest x-ray is unremarkable.  My initial plan was to admit the patient for hyponatremia.  Initially family was agreeable to this plan.    I spoke with Dr. Dickens who  is agreeable to admit.  When Dr. Dickens went and talked to the family the family would prefer to not be admitted and take her home.  He will write for salt tablets and give her a fluid bolus and then the family will be discharged and follow-up with her outpatient primary care doctor.     The patient will return for new or worsening symptoms and is stable at the time of discharge. Patient was given return precautions. Patient and/or family member verbalizes understanding and will comply.    DISPOSITION:  Patient will be discharged home in stable condition.    FOLLOW UP:  Denis Krueger M.D.  [address]  [address]  [place] [address]  [phone]          [facility], Emergency Dept  [address]  [place] [address]  [phone]    Return for worsening symptoms fevers abdominal pain vomiting or other concerns.      OUTPATIENT MEDICATIONS:  New Prescriptions    No medications on file         FINAL IMPRESSION  1. Hyponatremia    2. Generalized weakness        2.   3.    This dictation has been creating using voice recognition software. The accuracy of the dictation is limited the abilities of the software.  I expect there may be some errors of grammar and possibly content. I made every attempt to manually correct the errors within my dictation. However errors related to this voice recognition software may still exist and should be interpreted within the appropriate context.      The note accurately reflects work and decisions made by me.  Ita Monge  2/11/2019  5:45 PM

## 2019-02-11 NOTE — ED TRIAGE NOTES
"Pt assisted to lobby, currently using 3l oxygen via [place].  Daughter states pt has started shaking more over the last week and appears more \"lethargic\"  Pt currently denies c/o CP, abd or back pain.  Pt c/o Right knee pain, per family is chronic    "

## 2019-02-11 NOTE — ED TRIAGE NOTES
"Chief Complaint   Patient presents with   • Weakness     c/o increased weakness x 1.5 weeks per family   • Loss of Appetite     BP (!) 164/78   Pulse 71   Temp 37.1 °C (98.8 °F) (Temporal)   Resp 20   Ht 1.575 m (5' 2\")   Wt 54.2 kg (119 lb 7.8 oz)   SpO2 97%   BMI 21.85 kg/m²     Family reports pt has been more weak and increased loss of appetite over the last 1.5 weeks.  Pt has been instructed to use 3l oxygen at [facility], per report pt does not consistently wear oxygen.  Pt initially 82% on RA, placed on 3L oxygen via [place] to obtain SPO2 97%.  Pt now smiling and talking w/ family member.    "

## 2019-02-12 PROBLEM — E87.1 HYPONATREMIA: Status: RESOLVED | Noted: 2019-02-11 | Resolved: 2019-02-12

## 2019-02-12 PROBLEM — G93.41 ACUTE METABOLIC ENCEPHALOPATHY: Status: RESOLVED | Noted: 2019-02-11 | Resolved: 2019-02-12

## 2019-02-12 NOTE — CONSULTS
Hospital Medicine Consultation    Date of Service  2/11/2019    Referring Physician  Ita Monge M.D.    Consulting Physician  Lex Dickens D.O.    Reason for Consultation  Confusion    History of Presenting Illness  87 y.o. female who presented 2/11/2019 with confusion and lethargy.  Patient began becoming more lethargic almost 2 weeks ago, since then it is slowly worsened, she is having some balance issue as well as some confusion.  Patient does have the beginnings of dementia however she was deemed to be worse than her baseline.  She was also wandering around at night.  Upon arrival, patient was noted to have a sodium of 127.  I did discuss options with the family, initially she was going to be admitted but daughter was hesitant.  Daughter felt like patient will become more confused if she spent the night here, she is likely correct.  After exam, I felt like patient was dehydrated causing her hyponatremia.  I told them the hyponatremia was not severe but between that and the dehydration she was confused.  Patient was given a bolus of IV fluids in the emergency department and will be sent home with salt tabs.  Currently family is refusing admission.  I do not think they need to leave AMA, will be discharged.  I did have a long discussion with the daughter who stays with her mother.  Patient has been getting up and wandering at night but has not fallen.  Daughter understands that it any point in the patient's life she could fall at any time but does not feel that she is at significantly more risk now than at baseline.    Review of Systems  Review of Systems   Unable to perform ROS: Dementia       Past Medical History   has a past medical history of Anesthesia; Aortic regurgitation (4/25/2012); Arrhythmia (7/10); Arthritis; Atypical chest pain (4/25/2012); CATARACT (2007,08); Chronic anticoagulation (4/25/2012); Constipation (4/25/2012); COPD (chronic obstructive pulmonary disease) (HCC); DDD (degenerative  disc disease); Dental disorder; Dyspnea (4/25/2012); Generalized osteoarthritis (4/25/2012); Headache(784.0) (4/25/2012); Heart valve insufficiency; Hypercholesteremia; Hyperlipidemia (4/25/2012); Hypertension; MEDICAL HOME; On home oxygen therapy; Osteoarthritis of knee (4/25/2012); Osteoporosis; Other accident; Pain; Pneumonia (6/4); TB lung, 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    Surgical History   has a past surgical history that includes cataract phaco with iol (1/5/2009); other orthopedic surgery (may, 2010); carpal tunnel release (@30 yrs ago); and recovery (7/28/2010).    Family History  family history includes Diabetes in her brother; Heart Disease in her father.    Social History   reports that she quit smoking about 34 years ago. Her smoking use included Cigarettes. She has a 37.50 pack-year smoking history. She has never used smokeless tobacco. She reports that she does not drink alcohol or use drugs.    Medications  Prior to Admission Medications   Prescriptions Last Dose Informant Patient Reported? Taking?   Fluticasone-Umeclidin-Vilant (TRELEGY ELLIPTA) 100-62.5-25 MCG/INH AEROSOL POWDER, BREATH ACTIVATED 2/11/2019 at 0900 Patient No No   Sig: Inhale 1 Puff by mouth every day.   acetaminophen (TYLENOL) 500 MG Tab > 1 week at Unknown Patient Yes No   Sig: Take 1,000 mg by mouth every 6 hours as needed for Mild Pain or Moderate Pain.   albuterol (PROVENTIL) 2.5mg/3ml Nebu Soln solution for nebulization 2/11/2019 at 0900 Patient No No   Sig: 3 mL by Nebulization route every four hours as needed for Shortness of Breath.   cefdinir (OMNICEF) 300 MG Cap 2/3/2019 at Finished Patient Yes Yes   Sig: Take 300 mg by mouth 2 times a day. Pt started on 1/25/2019 for 10 day course.   dabigatran (PRADAXA) 75 MG Cap capsule 2/11/2019 at 0900 Patient No No   Sig: Take 1 Cap by mouth 2 Times a Day.   irbesartan-hydrochlorothiazide (AVALIDE) 150-12.5 MG per  tablet 2/11/2019 at 0900 Patient Yes Yes   Sig: Take 1 Tab by mouth every day.   lidocaine (LIDODERM) 5 % Patch 2/1/2019 at Unknown Patient Yes Yes   Sig: Apply 1 Patch to skin as directed as needed (Apply to lower back). Apply to lower back   metoprolol (LOPRESSOR) 25 MG Tab 2/11/2019 at 0900 Patient No No   Sig: Take 1 Tab by mouth 2 times a day.   oxyCODONE CR (OXYCONTIN) 10 MG Tablet Extended Release 12 hour Abuse-Deterrent 2/11/2019 at 0900 Patient No No   Sig: Take 1 Tab by mouth every 12 hours for 30 days.   predniSONE (DELTASONE) 10 MG Tab 2/3/2019 at Finished Patient Yes Yes   Sig: Take 10-30 mg by mouth every day. TAKE 3 TABLETS BY MOUTH DAILY FOR 3 DAYS, THEN 2 TABLETS DAILY FOR 3 DAYS, THEN 1 TABLET DAILY FOR 3 DAYS WITH FOOD THEN DISCONTINUE  Pt started on 1/26/2019 for 9 day course.      Facility-Administered Medications: None       Allergies  Allergies   Allergen Reactions   • Codeine Vomiting       Physical Exam  Temp:  [37.1 °C (98.8 °F)] 37.1 °C (98.8 °F)  Pulse:  [70-77] 77  Resp:  [18-21] 19  BP: (164)/(78) 164/78  SpO2:  [82 %-99 %] 99 %    Physical Exam   Constitutional: She is cooperative. No distress.   Thin and frail appearing    HENT:   Head: Normocephalic and atraumatic. Not macrocephalic and not microcephalic. Head is without raccoon's eyes and without Sanchez's sign.   Right Ear: External ear normal.   Left Ear: External ear normal.   Mouth/Throat: Mucous membranes are dry. No oropharyngeal exudate.   Oral thrush    Eyes: Conjunctivae are normal. Right eye exhibits no discharge. Left eye exhibits no discharge. No scleral icterus.   Neck: Neck supple. No tracheal deviation present.   Cardiovascular: Normal rate, regular rhythm and intact distal pulses.  Exam reveals no gallop, no distant heart sounds and no friction rub.    No murmur heard.  Pulmonary/Chest: Effort normal and breath sounds normal. No accessory muscle usage or stridor. No tachypnea and no bradypnea. No respiratory distress.  She has no decreased breath sounds. She has no wheezes. She has no rhonchi. She has no rales. She exhibits no tenderness.   Abdominal: Soft. Bowel sounds are normal. She exhibits no distension. There is no hepatosplenomegaly, splenomegaly or hepatomegaly. There is no tenderness. There is no rebound and no guarding.   Musculoskeletal: Normal range of motion. She exhibits no edema or tenderness.   Lymphadenopathy:     She has no cervical adenopathy.   Neurological: She displays no seizure activity.   Awake, confused   Skin: Skin is warm, dry and intact. No rash noted. She is not diaphoretic. No erythema. No pallor.   Psychiatric: Her speech is delayed. She is slowed. Cognition and memory are impaired.   Nursing note and vitals reviewed.      Fluids       Laboratory  Recent Labs      02/11/19   1627   WBC  9.9   RBC  4.00*   HEMOGLOBIN  12.9   HEMATOCRIT  39.0   MCV  97.5   MCH  32.3   MCHC  33.1*   RDW  48.2   PLATELETCT  234   MPV  10.4     Recent Labs      02/11/19   1627   SODIUM  127*   POTASSIUM  4.9   CHLORIDE  90*   CO2  29   GLUCOSE  103*   BUN  17   CREATININE  0.86   CALCIUM  8.6                     Imaging  DX-CHEST-PORTABLE (1 VIEW)   Final Result      No acute cardiopulmonary findings.          Assessment/Plan  Dehydration   Assessment & Plan    -Causing confusion  -Give 1 L bolus while in the ER  -Discussed with family about encouraging her to drink more, they agreed and understood     Acute metabolic encephalopathy   Assessment & Plan    -Causing lethargy and decreased balance  -Due to dehydration and hyponatremia  -I did discuss with them home health for physical therapy, they stated she just refuses whenever people come to see her  -I did ask the  to go talk to them to see if she can be convinced  -I did have a long discussion with the daughter at bedside, she does feel patient would be safe going home, does understand she could fall at any time but does not feel she is at higher risk now  than at her baseline  -Patient during the exam was not noted to be impulsive     Oral thrush   Assessment & Plan    -She just had this, I think maybe they stopped treatment too soon, they were told to restart the nystatin that they have at home, continue 1-2 days after resolution     Hyponatremia   Assessment & Plan    -Due to dehydration  -Give 1 L bolus, also will be sent home on salt tabs     Addendum: I just discussed the case again with , she has visited with the patient multiple times and the patient is now agreeable to home health which has been ordered

## 2019-02-12 NOTE — DISCHARGE PLANNING
Received Choice form at 6080  Agency/Facility Name: [facility]  Referral sent per Choice form @ [id number]

## 2019-02-12 NOTE — ASSESSMENT & PLAN NOTE
-She just had this, I think maybe they stopped treatment too soon, they were told to restart the nystatin that they have at home, continue 1-2 days after resolution

## 2019-02-12 NOTE — FACE TO FACE
Face to Face Supporting Documentation - Home Health    The encounter with this patient was in whole or in part the primary reason for home health admission.    Date of encounter:   Patient:                    MRN:                       YOB: 2019  Venus Church  [id number]  4/22/1931     Home health to see patient for:  Skilled Nursing care for assessment, interventions & education, Physical Therapy evaluation and treatment and Occupational therapy evaluation and treatment    Skilled need for:  Exacerbation of Chronic Disease State worsening dementia and strength due to dehydration/hyponatremia    Skilled nursing interventions to include:  Comment: General medical care    Homebound status evidenced by:  Need the aid of supportive devices such as crutches, canes, wheelchairs or walkers. Leaving home requires a considerable and taxing effort. There is a normal inability to leave the home.    Community Physician to provide follow up care: Denis Krueger M.D.     Optional Interventions? No      I certify the face to face encounter for this home health care referral meets the CMS requirements and the encounter/clinical assessment with the patient was, in whole, or in part, for the medical condition(s) listed above, which is the primary reason for home health care. Based on my clinical findings: the service(s) are medically necessary, support the need for home health care, and the homebound criteria are met.  I certify that this patient has had a face to face encounter by myself.  Lex Dickens D.O. - ALISONI: [phone]

## 2019-02-12 NOTE — ASSESSMENT & PLAN NOTE
-Causing confusion  -Give 1 L bolus while in the ER  -Discussed with family about encouraging her to drink more, they agreed and understood

## 2019-02-12 NOTE — ED NOTES
Discharge information reviewed in detail. Patient's Daughter verbalized understanding of discharge instructions to follow up with PCP, pulmonologist, and to return to ER if condition worsens.  Patient educated on one new prescription(s). Daughter to drive home.   Patient wheeled out of ER in a wheelchair.

## 2019-02-12 NOTE — DISCHARGE PLANNING
SW met with this patient and her Dtr, Ivana cardenas to discuss HH.  Patient's DTR informed this SW that they have had HH prior and the patient wouldn't work with them.  Her DTR shared that there will always be someone home with this patient now so she feels like they will be able to encourage her to participate.  [facility] was chosen.  Completed choice form was faxed to [facility] for referral follow up.

## 2019-02-12 NOTE — ASSESSMENT & PLAN NOTE
-Causing lethargy and decreased balance  -Due to dehydration and hyponatremia  -I did discuss with them home health for physical therapy, they stated she just refuses whenever people come to see her  -I did ask the  to go talk to them to see if she can be convinced  -I did have a long discussion with the daughter at bedside, she does feel patient would be safe going home, does understand she could fall at any time but does not feel she is at higher risk now than at her baseline  -Patient during the exam was not noted to be impulsive

## 2019-02-13 ENCOUNTER — APPOINTMENT (OUTPATIENT)
Dept: RADIOLOGY | Facility: MEDICAL CENTER | Age: 84
DRG: 280 | End: 2019-02-13
Attending: EMERGENCY MEDICINE
Payer: MEDICARE

## 2019-02-13 ENCOUNTER — TELEPHONE (OUTPATIENT)
Dept: MEDICAL GROUP | Facility: MEDICAL CENTER | Age: 84
End: 2019-02-13

## 2019-02-13 ENCOUNTER — HOSPITAL ENCOUNTER (INPATIENT)
Facility: MEDICAL CENTER | Age: 84
LOS: 8 days | DRG: 280 | End: 2019-02-21
Attending: EMERGENCY MEDICINE | Admitting: HOSPITALIST
Payer: MEDICARE

## 2019-02-13 DIAGNOSIS — J41.1 MUCOPURULENT CHRONIC BRONCHITIS (HCC): Chronic | ICD-10-CM

## 2019-02-13 DIAGNOSIS — I50.43 ACUTE ON CHRONIC COMBINED SYSTOLIC AND DIASTOLIC HEART FAILURE (HCC): ICD-10-CM

## 2019-02-13 DIAGNOSIS — M25.551 RIGHT HIP PAIN: ICD-10-CM

## 2019-02-13 DIAGNOSIS — I48.0 PAF (PAROXYSMAL ATRIAL FIBRILLATION) (HCC): Chronic | ICD-10-CM

## 2019-02-13 DIAGNOSIS — Z87.81 HISTORY OF COMPRESSION FRACTURE OF SPINE: ICD-10-CM

## 2019-02-13 DIAGNOSIS — G47.34 NOCTURNAL HYPOXEMIA: ICD-10-CM

## 2019-02-13 DIAGNOSIS — M81.8 OSTEOPOROSIS, IDIOPATHIC: Chronic | ICD-10-CM

## 2019-02-13 DIAGNOSIS — J20.9 ACUTE BRONCHITIS, UNSPECIFIED ORGANISM: ICD-10-CM

## 2019-02-13 DIAGNOSIS — M54.5 LOW BACK PAIN, UNSPECIFIED BACK PAIN LATERALITY, UNSPECIFIED CHRONICITY, WITH SCIATICA PRESENCE UNSPECIFIED: Chronic | ICD-10-CM

## 2019-02-13 DIAGNOSIS — J44.1 ACUTE EXACERBATION OF CHRONIC OBSTRUCTIVE PULMONARY DISEASE (COPD) (HCC): ICD-10-CM

## 2019-02-13 DIAGNOSIS — D68.318 ACQUIRED CIRCULATING ANTICOAGULANTS (HCC): ICD-10-CM

## 2019-02-13 DIAGNOSIS — G93.41 ACUTE METABOLIC ENCEPHALOPATHY: ICD-10-CM

## 2019-02-13 DIAGNOSIS — Z00.00 PREVENTATIVE HEALTH CARE: Chronic | ICD-10-CM

## 2019-02-13 PROBLEM — D64.9 ANEMIA: Status: ACTIVE | Noted: 2019-02-13

## 2019-02-13 PROBLEM — I21.4 NSTEMI (NON-ST ELEVATED MYOCARDIAL INFARCTION) (HCC): Status: ACTIVE | Noted: 2019-02-13

## 2019-02-13 PROBLEM — E87.1 HYPONATREMIA: Status: ACTIVE | Noted: 2019-02-13

## 2019-02-13 PROBLEM — J96.01 ACUTE HYPOXEMIC RESPIRATORY FAILURE (HCC): Status: ACTIVE | Noted: 2019-02-13

## 2019-02-13 LAB
ALBUMIN SERPL BCP-MCNC: 3.3 G/DL (ref 3.2–4.9)
ALBUMIN/GLOB SERPL: 1.3 G/DL
ALP SERPL-CCNC: 57 U/L (ref 30–99)
ALT SERPL-CCNC: 12 U/L (ref 2–50)
ANION GAP SERPL CALC-SCNC: 5 MMOL/L (ref 0–11.9)
ANION GAP SERPL CALC-SCNC: 6 MMOL/L (ref 0–11.9)
ANION GAP SERPL CALC-SCNC: 6 MMOL/L (ref 0–11.9)
ANION GAP SERPL CALC-SCNC: 7 MMOL/L (ref 0–11.9)
AST SERPL-CCNC: 18 U/L (ref 12–45)
BASOPHILS # BLD AUTO: 0.2 % (ref 0–1.8)
BASOPHILS # BLD: 0.02 K/UL (ref 0–0.12)
BILIRUB SERPL-MCNC: 0.6 MG/DL (ref 0.1–1.5)
BNP SERPL-MCNC: 363 PG/ML (ref 0–100)
BUN SERPL-MCNC: 12 MG/DL (ref 8–22)
BUN SERPL-MCNC: 13 MG/DL (ref 8–22)
BUN SERPL-MCNC: 15 MG/DL (ref 8–22)
BUN SERPL-MCNC: 15 MG/DL (ref 8–22)
CALCIUM SERPL-MCNC: 7.8 MG/DL (ref 8.5–10.5)
CALCIUM SERPL-MCNC: 8.1 MG/DL (ref 8.5–10.5)
CALCIUM SERPL-MCNC: 8.2 MG/DL (ref 8.5–10.5)
CALCIUM SERPL-MCNC: 8.9 MG/DL (ref 8.5–10.5)
CHLORIDE SERPL-SCNC: 93 MMOL/L (ref 96–112)
CHLORIDE SERPL-SCNC: 95 MMOL/L (ref 96–112)
CHLORIDE SERPL-SCNC: 96 MMOL/L (ref 96–112)
CHLORIDE SERPL-SCNC: 97 MMOL/L (ref 96–112)
CO2 SERPL-SCNC: 26 MMOL/L (ref 20–33)
CREAT SERPL-MCNC: 0.7 MG/DL (ref 0.5–1.4)
CREAT SERPL-MCNC: 0.73 MG/DL (ref 0.5–1.4)
CREAT SERPL-MCNC: 0.81 MG/DL (ref 0.5–1.4)
CREAT SERPL-MCNC: 0.85 MG/DL (ref 0.5–1.4)
EOSINOPHIL # BLD AUTO: 0.34 K/UL (ref 0–0.51)
EOSINOPHIL NFR BLD: 3.8 % (ref 0–6.9)
ERYTHROCYTE [DISTWIDTH] IN BLOOD BY AUTOMATED COUNT: 48.7 FL (ref 35.9–50)
EST. AVERAGE GLUCOSE BLD GHB EST-MCNC: 123 MG/DL
FLUAV RNA SPEC QL NAA+PROBE: NEGATIVE
FLUBV RNA SPEC QL NAA+PROBE: NEGATIVE
GLOBULIN SER CALC-MCNC: 2.6 G/DL (ref 1.9–3.5)
GLUCOSE SERPL-MCNC: 119 MG/DL (ref 65–99)
GLUCOSE SERPL-MCNC: 122 MG/DL (ref 65–99)
GLUCOSE SERPL-MCNC: 166 MG/DL (ref 65–99)
GLUCOSE SERPL-MCNC: 188 MG/DL (ref 65–99)
HBA1C MFR BLD: 5.9 % (ref 0–5.6)
HCT VFR BLD AUTO: 34.3 % (ref 37–47)
HGB BLD-MCNC: 11.2 G/DL (ref 12–16)
IMM GRANULOCYTES # BLD AUTO: 0.06 K/UL (ref 0–0.11)
IMM GRANULOCYTES NFR BLD AUTO: 0.7 % (ref 0–0.9)
LACTATE BLD-SCNC: 0.9 MMOL/L (ref 0.5–2)
LACTATE BLD-SCNC: 0.9 MMOL/L (ref 0.5–2)
LYMPHOCYTES # BLD AUTO: 1.5 K/UL (ref 1–4.8)
LYMPHOCYTES NFR BLD: 16.8 % (ref 22–41)
MCH RBC QN AUTO: 32.3 PG (ref 27–33)
MCHC RBC AUTO-ENTMCNC: 32.7 G/DL (ref 33.6–35)
MCV RBC AUTO: 98.8 FL (ref 81.4–97.8)
MONOCYTES # BLD AUTO: 1.01 K/UL (ref 0–0.85)
MONOCYTES NFR BLD AUTO: 11.3 % (ref 0–13.4)
NEUTROPHILS # BLD AUTO: 5.98 K/UL (ref 2–7.15)
NEUTROPHILS NFR BLD: 67.2 % (ref 44–72)
NRBC # BLD AUTO: 0 K/UL
NRBC BLD-RTO: 0 /100 WBC
OSMOLALITY SERPL: 266 MOSM/KG H2O (ref 278–298)
PLATELET # BLD AUTO: 205 K/UL (ref 164–446)
PMV BLD AUTO: 10.3 FL (ref 9–12.9)
POTASSIUM SERPL-SCNC: 3.7 MMOL/L (ref 3.6–5.5)
POTASSIUM SERPL-SCNC: 4.3 MMOL/L (ref 3.6–5.5)
POTASSIUM SERPL-SCNC: 4.4 MMOL/L (ref 3.6–5.5)
POTASSIUM SERPL-SCNC: 4.5 MMOL/L (ref 3.6–5.5)
PROCALCITONIN SERPL-MCNC: 0.06 NG/ML
PROCALCITONIN SERPL-MCNC: <0.05 NG/ML
PROT SERPL-MCNC: 5.9 G/DL (ref 6–8.2)
RBC # BLD AUTO: 3.47 M/UL (ref 4.2–5.4)
SODIUM SERPL-SCNC: 126 MMOL/L (ref 135–145)
SODIUM SERPL-SCNC: 126 MMOL/L (ref 135–145)
SODIUM SERPL-SCNC: 128 MMOL/L (ref 135–145)
SODIUM SERPL-SCNC: 129 MMOL/L (ref 135–145)
TROPONIN I SERPL-MCNC: 0.17 NG/ML (ref 0–0.04)
TROPONIN I SERPL-MCNC: 0.27 NG/ML (ref 0–0.04)
TROPONIN I SERPL-MCNC: 0.34 NG/ML (ref 0–0.04)
TROPONIN I SERPL-MCNC: 0.41 NG/ML (ref 0–0.04)
TSH SERPL DL<=0.005 MIU/L-ACNC: 1.56 UIU/ML (ref 0.38–5.33)
WBC # BLD AUTO: 8.9 K/UL (ref 4.8–10.8)

## 2019-02-13 PROCEDURE — 36415 COLL VENOUS BLD VENIPUNCTURE: CPT

## 2019-02-13 PROCEDURE — 84443 ASSAY THYROID STIM HORMONE: CPT

## 2019-02-13 PROCEDURE — 94760 N-INVAS EAR/PLS OXIMETRY 1: CPT

## 2019-02-13 PROCEDURE — 83036 HEMOGLOBIN GLYCOSYLATED A1C: CPT

## 2019-02-13 PROCEDURE — 71275 CT ANGIOGRAPHY CHEST: CPT

## 2019-02-13 PROCEDURE — 83930 ASSAY OF BLOOD OSMOLALITY: CPT

## 2019-02-13 PROCEDURE — 770020 HCHG ROOM/CARE - TELE (206)

## 2019-02-13 PROCEDURE — 85025 COMPLETE CBC W/AUTO DIFF WBC: CPT

## 2019-02-13 PROCEDURE — 84484 ASSAY OF TROPONIN QUANT: CPT

## 2019-02-13 PROCEDURE — 87502 INFLUENZA DNA AMP PROBE: CPT

## 2019-02-13 PROCEDURE — 80048 BASIC METABOLIC PNL TOTAL CA: CPT

## 2019-02-13 PROCEDURE — 94640 AIRWAY INHALATION TREATMENT: CPT

## 2019-02-13 PROCEDURE — 96374 THER/PROPH/DIAG INJ IV PUSH: CPT

## 2019-02-13 PROCEDURE — 84145 PROCALCITONIN (PCT): CPT | Mod: 91

## 2019-02-13 PROCEDURE — 71045 X-RAY EXAM CHEST 1 VIEW: CPT

## 2019-02-13 PROCEDURE — 99285 EMERGENCY DEPT VISIT HI MDM: CPT

## 2019-02-13 PROCEDURE — A9270 NON-COVERED ITEM OR SERVICE: HCPCS | Performed by: EMERGENCY MEDICINE

## 2019-02-13 PROCEDURE — 96376 TX/PRO/DX INJ SAME DRUG ADON: CPT

## 2019-02-13 PROCEDURE — 83880 ASSAY OF NATRIURETIC PEPTIDE: CPT

## 2019-02-13 PROCEDURE — 700117 HCHG RX CONTRAST REV CODE 255: Performed by: EMERGENCY MEDICINE

## 2019-02-13 PROCEDURE — 700101 HCHG RX REV CODE 250: Performed by: HOSPITALIST

## 2019-02-13 PROCEDURE — 99223 1ST HOSP IP/OBS HIGH 75: CPT | Performed by: HOSPITALIST

## 2019-02-13 PROCEDURE — A9270 NON-COVERED ITEM OR SERVICE: HCPCS | Performed by: HOSPITALIST

## 2019-02-13 PROCEDURE — 700105 HCHG RX REV CODE 258: Performed by: HOSPITALIST

## 2019-02-13 PROCEDURE — 700102 HCHG RX REV CODE 250 W/ 637 OVERRIDE(OP): Performed by: HOSPITALIST

## 2019-02-13 PROCEDURE — 83605 ASSAY OF LACTIC ACID: CPT

## 2019-02-13 PROCEDURE — 93005 ELECTROCARDIOGRAM TRACING: CPT | Performed by: EMERGENCY MEDICINE

## 2019-02-13 PROCEDURE — 80053 COMPREHEN METABOLIC PANEL: CPT

## 2019-02-13 PROCEDURE — 87040 BLOOD CULTURE FOR BACTERIA: CPT

## 2019-02-13 PROCEDURE — 700105 HCHG RX REV CODE 258: Performed by: EMERGENCY MEDICINE

## 2019-02-13 PROCEDURE — 700102 HCHG RX REV CODE 250 W/ 637 OVERRIDE(OP): Performed by: EMERGENCY MEDICINE

## 2019-02-13 PROCEDURE — 700111 HCHG RX REV CODE 636 W/ 250 OVERRIDE (IP): Performed by: HOSPITALIST

## 2019-02-13 RX ORDER — SODIUM CHLORIDE 9 MG/ML
250 INJECTION, SOLUTION INTRAVENOUS ONCE
Status: COMPLETED | OUTPATIENT
Start: 2019-02-13 | End: 2019-02-13

## 2019-02-13 RX ORDER — DABIGATRAN ETEXILATE 75 MG/1
75 CAPSULE ORAL 2 TIMES DAILY
Status: DISCONTINUED | OUTPATIENT
Start: 2019-02-13 | End: 2019-02-14

## 2019-02-13 RX ORDER — HYDROCHLOROTHIAZIDE 25 MG/1
12.5 TABLET ORAL
Status: DISCONTINUED | OUTPATIENT
Start: 2019-02-13 | End: 2019-02-19

## 2019-02-13 RX ORDER — SODIUM CHLORIDE 9 MG/ML
INJECTION, SOLUTION INTRAVENOUS CONTINUOUS
Status: DISCONTINUED | OUTPATIENT
Start: 2019-02-13 | End: 2019-02-17

## 2019-02-13 RX ORDER — POLYETHYLENE GLYCOL 3350 17 G/17G
1 POWDER, FOR SOLUTION ORAL
Status: DISCONTINUED | OUTPATIENT
Start: 2019-02-13 | End: 2019-02-21 | Stop reason: HOSPADM

## 2019-02-13 RX ORDER — FLUTICASONE PROPIONATE 110 UG/1
1 AEROSOL, METERED RESPIRATORY (INHALATION)
Status: DISCONTINUED | OUTPATIENT
Start: 2019-02-13 | End: 2019-02-14

## 2019-02-13 RX ORDER — ONDANSETRON 4 MG/1
4 TABLET, ORALLY DISINTEGRATING ORAL EVERY 4 HOURS PRN
Status: DISCONTINUED | OUTPATIENT
Start: 2019-02-13 | End: 2019-02-21 | Stop reason: HOSPADM

## 2019-02-13 RX ORDER — HEPARIN SODIUM 5000 [USP'U]/ML
5000 INJECTION, SOLUTION INTRAVENOUS; SUBCUTANEOUS EVERY 8 HOURS
Status: DISCONTINUED | OUTPATIENT
Start: 2019-02-13 | End: 2019-02-13

## 2019-02-13 RX ORDER — DOXYCYCLINE 100 MG/1
100 TABLET ORAL EVERY 12 HOURS
Status: DISPENSED | OUTPATIENT
Start: 2019-02-13 | End: 2019-02-18

## 2019-02-13 RX ORDER — ASPIRIN 81 MG/1
324 TABLET, CHEWABLE ORAL ONCE
Status: COMPLETED | OUTPATIENT
Start: 2019-02-13 | End: 2019-02-13

## 2019-02-13 RX ORDER — CEFDINIR 300 MG/1
300 CAPSULE ORAL 2 TIMES DAILY
Status: ON HOLD | COMMUNITY
Start: 2019-01-20 | End: 2019-02-18

## 2019-02-13 RX ORDER — ONDANSETRON 2 MG/ML
4 INJECTION INTRAMUSCULAR; INTRAVENOUS EVERY 4 HOURS PRN
Status: DISCONTINUED | OUTPATIENT
Start: 2019-02-13 | End: 2019-02-21 | Stop reason: HOSPADM

## 2019-02-13 RX ORDER — IPRATROPIUM BROMIDE AND ALBUTEROL SULFATE 2.5; .5 MG/3ML; MG/3ML
3 SOLUTION RESPIRATORY (INHALATION)
Status: DISCONTINUED | OUTPATIENT
Start: 2019-02-13 | End: 2019-02-14

## 2019-02-13 RX ORDER — SODIUM CHLORIDE 1 G/1
1 TABLET ORAL 3 TIMES DAILY
Status: DISCONTINUED | OUTPATIENT
Start: 2019-02-13 | End: 2019-02-13

## 2019-02-13 RX ORDER — AMOXICILLIN 250 MG
2 CAPSULE ORAL 2 TIMES DAILY
Status: DISCONTINUED | OUTPATIENT
Start: 2019-02-13 | End: 2019-02-21 | Stop reason: HOSPADM

## 2019-02-13 RX ORDER — ATORVASTATIN CALCIUM 80 MG/1
80 TABLET, FILM COATED ORAL EVERY EVENING
Status: DISCONTINUED | OUTPATIENT
Start: 2019-02-13 | End: 2019-02-20

## 2019-02-13 RX ORDER — METHYLPREDNISOLONE SODIUM SUCCINATE 40 MG/ML
40 INJECTION, POWDER, LYOPHILIZED, FOR SOLUTION INTRAMUSCULAR; INTRAVENOUS EVERY 6 HOURS
Status: DISCONTINUED | OUTPATIENT
Start: 2019-02-13 | End: 2019-02-15

## 2019-02-13 RX ORDER — BISACODYL 10 MG
10 SUPPOSITORY, RECTAL RECTAL
Status: DISCONTINUED | OUTPATIENT
Start: 2019-02-13 | End: 2019-02-21 | Stop reason: HOSPADM

## 2019-02-13 RX ORDER — IRBESARTAN 150 MG/1
150 TABLET ORAL
Status: DISCONTINUED | OUTPATIENT
Start: 2019-02-13 | End: 2019-02-19

## 2019-02-13 RX ORDER — IRBESARTAN AND HYDROCHLOROTHIAZIDE 150; 12.5 MG/1; MG/1
1 TABLET, FILM COATED ORAL DAILY
Status: DISCONTINUED | OUTPATIENT
Start: 2019-02-13 | End: 2019-02-13

## 2019-02-13 RX ADMIN — IPRATROPIUM BROMIDE AND ALBUTEROL SULFATE 3 ML: .5; 3 SOLUTION RESPIRATORY (INHALATION) at 10:27

## 2019-02-13 RX ADMIN — IPRATROPIUM BROMIDE AND ALBUTEROL SULFATE 3 ML: .5; 3 SOLUTION RESPIRATORY (INHALATION) at 06:31

## 2019-02-13 RX ADMIN — DABIGATRAN ETEXILATE MESYLATE 75 MG: 75 CAPSULE ORAL at 09:16

## 2019-02-13 RX ADMIN — METHYLPREDNISOLONE SODIUM SUCCINATE 40 MG: 40 INJECTION, POWDER, FOR SOLUTION INTRAMUSCULAR; INTRAVENOUS at 09:16

## 2019-02-13 RX ADMIN — IOHEXOL 80 ML: 350 INJECTION, SOLUTION INTRAVENOUS at 04:33

## 2019-02-13 RX ADMIN — HYDROCHLOROTHIAZIDE 12.5 MG: 25 TABLET ORAL at 09:16

## 2019-02-13 RX ADMIN — METHYLPREDNISOLONE SODIUM SUCCINATE 40 MG: 40 INJECTION, POWDER, FOR SOLUTION INTRAMUSCULAR; INTRAVENOUS at 13:13

## 2019-02-13 RX ADMIN — UMECLIDINIUM BROMIDE AND VILANTEROL TRIFENATATE 1 PUFF: 62.5; 25 POWDER RESPIRATORY (INHALATION) at 10:35

## 2019-02-13 RX ADMIN — IPRATROPIUM BROMIDE AND ALBUTEROL SULFATE 3 ML: .5; 3 SOLUTION RESPIRATORY (INHALATION) at 19:59

## 2019-02-13 RX ADMIN — SODIUM CHLORIDE 250 ML: 9 INJECTION, SOLUTION INTRAVENOUS at 04:51

## 2019-02-13 RX ADMIN — METOPROLOL TARTRATE 25 MG: 25 TABLET, FILM COATED ORAL at 09:17

## 2019-02-13 RX ADMIN — IPRATROPIUM BROMIDE AND ALBUTEROL SULFATE 3 ML: .5; 3 SOLUTION RESPIRATORY (INHALATION) at 14:44

## 2019-02-13 RX ADMIN — FLUTICASONE PROPIONATE 110 MCG: 110 AEROSOL, METERED RESPIRATORY (INHALATION) at 10:35

## 2019-02-13 RX ADMIN — ASPIRIN 81 MG 324 MG: 81 TABLET ORAL at 04:14

## 2019-02-13 RX ADMIN — SODIUM CHLORIDE: 9 INJECTION, SOLUTION INTRAVENOUS at 09:17

## 2019-02-13 RX ADMIN — DOXYCYCLINE 100 MG: 100 TABLET, FILM COATED ORAL at 09:17

## 2019-02-13 RX ADMIN — METHYLPREDNISOLONE SODIUM SUCCINATE 40 MG: 40 INJECTION, POWDER, FOR SOLUTION INTRAMUSCULAR; INTRAVENOUS at 18:11

## 2019-02-13 RX ADMIN — IRBESARTAN 150 MG: 150 TABLET ORAL at 11:46

## 2019-02-13 ASSESSMENT — COGNITIVE AND FUNCTIONAL STATUS - GENERAL
DAILY ACTIVITIY SCORE: 19
SUGGESTED CMS G CODE MODIFIER MOBILITY: CK
MOVING TO AND FROM BED TO CHAIR: A LITTLE
STANDING UP FROM CHAIR USING ARMS: A LITTLE
HELP NEEDED FOR BATHING: A LITTLE
TOILETING: A LITTLE
MOBILITY SCORE: 18
WALKING IN HOSPITAL ROOM: A LITTLE
CLIMB 3 TO 5 STEPS WITH RAILING: A LITTLE
PERSONAL GROOMING: A LITTLE
DRESSING REGULAR LOWER BODY CLOTHING: A LITTLE
TURNING FROM BACK TO SIDE WHILE IN FLAT BAD: A LITTLE
DRESSING REGULAR UPPER BODY CLOTHING: A LITTLE
MOVING FROM LYING ON BACK TO SITTING ON SIDE OF FLAT BED: A LITTLE
SUGGESTED CMS G CODE MODIFIER DAILY ACTIVITY: CK

## 2019-02-13 ASSESSMENT — CHA2DS2 SCORE
PRIOR STROKE OR TIA OR THROMBOEMBOLISM: NO
VASCULAR DISEASE: YES
AGE 75 OR GREATER: YES
DIABETES: NO
AGE 65 TO 74: NO
CHF OR LEFT VENTRICULAR DYSFUNCTION: NO
HYPERTENSION: YES
CHA2DS2 VASC SCORE: 5
SEX: FEMALE

## 2019-02-13 ASSESSMENT — LIFESTYLE VARIABLES
EVER_SMOKED: YES
ALCOHOL_USE: NO
EVER_SMOKED: YES

## 2019-02-13 ASSESSMENT — PATIENT HEALTH QUESTIONNAIRE - PHQ9
1. LITTLE INTEREST OR PLEASURE IN DOING THINGS: NOT AT ALL
SUM OF ALL RESPONSES TO PHQ9 QUESTIONS 1 AND 2: 0
2. FEELING DOWN, DEPRESSED, IRRITABLE, OR HOPELESS: NOT AT ALL

## 2019-02-13 ASSESSMENT — COPD QUESTIONNAIRES
COPD SCREENING SCORE: 6
HAVE YOU SMOKED AT LEAST 100 CIGARETTES IN YOUR ENTIRE LIFE: YES
DO YOU EVER COUGH UP ANY MUCUS OR PHLEGM?: YES, A FEW DAYS A WEEK OR MONTH
DURING THE PAST 4 WEEKS HOW MUCH DID YOU FEEL SHORT OF BREATH: SOME OF THE TIME

## 2019-02-13 NOTE — DISCHARGE PLANNING
Care Transition Team Assessment    Information Source  Orientation : Oriented x 4  Information Given By: Patient, Relative (Daughter  Ivana Galo)  Informant's Name: Daughter ivana Galo    Readmission Evaluation  Is this a readmission?: No    Interdisciplinary Discharge Planning  Does Admitting Nurse Feel This Could be a Complex Discharge?: No  Primary Care Physician: Denis Krueger  Lives with - Patient's Self Care Capacity: Alone and Unable to Care For Self  Support Systems: Children (Adult children, son and daughter Dileep Church, Ivana Galo)  Do You Take your Prescribed Medications Regularly: Yes (With assistance)  Able to Return to Previous ADL's: Yes  Mobility Issues: Yes  Prior Services: Skilled Home Health Services  Patient Expects to be Discharged to:: Home  Assistance Needed: Yes  Durable Medical Equipment: Walker    Discharge Preparedness  What is your plan after discharge?: Home with help  What are your discharge supports?: Child  Prior Functional Level: Ambulatory, Needs Assist with Activities of Daily Living, Needs Assist with Medication Management, Uses Walker  Difficulity with ADLs: Walking, Bathing, Dressing (Requires assistance from family)  Difficulity with IADLs: Cooking, Keeping track of finances, Laundry, Managing medication, Shopping (Requires assistance from family)    Functional Assesment  Prior Functional Level: Ambulatory, Needs Assist with Activities of Daily Living, Needs Assist with Medication Management, Uses Walker    Finances  Prescription Coverage: Yes    Discharge Risks or Barriers  Discharge risks or barriers?: No (Has very supportive family)    Anticipated Discharge Information  Anticipated discharge disposition: Home  Discharge Address: [address] [place], [place] [address] Vela  Discharge Contact Phone Number: [phone] (Son) Dileep Church

## 2019-02-13 NOTE — ED NOTES
Med Rec complete per Pt at bedside  Allergies Reviewed    Pt completed a 10 day course of CEFDINIR on 01/29

## 2019-02-13 NOTE — ASSESSMENT & PLAN NOTE
Likely demand ischemia from hypoxia and dehydration   No chest pain  Cardiac monitoring on tele  Serial troponin ; downtrending now  Already on pradaxa will continue   Continue metoprolol.  Lipitor 40 mg daily (statin naïve).

## 2019-02-13 NOTE — TELEPHONE ENCOUNTER
ESTABLISHED PATIENT PRE-VISIT PLANNING     Patient was NOT contacted to complete PVP.     Note: Patient will not be contacted if there is no indication to call.     1.  Reviewed notes from the last few office visits within the medical group: Yes    2.  If any orders were placed at last visit or intended to be done for this visit (i.e. 6 mos follow-up), do we have Results/Consult Notes?        •  Labs - Labs were not ordered at last appointment but the patient is in the hospital and has blood orders in her chart   Note: If patient appointment is for lab review and patient did not complete labs, check with provider if OK to reschedule patient until labs completed.       •  Imaging - Imaging ordered, completed and results are in chart.       •  Referrals - Referral ordered, patient has NOT been seen.    3. Is this appointment scheduled as a Hospital Follow-Up? No, patient is currently in the hospital    4.  Immunizations were updated in [employer] using Web[employer]?: Yes       •  Web [employer] Recommendations: HEPATITIS B and SHINGRIX (Shingles)    5.  Patient is due for the following Health Maintenance Topics:   Health Maintenance Due   Topic Date Due   • IMM HEP B VACCINE (1 of 3 - Risk 3-dose series) 04/22/1950   • IMM ZOSTER VACCINES (2 of 3) 11/27/2012   • PFT SCREENING-FEV1 AND FEV/FVC RATIO / SPIROMETRY SHOULD BE PERFORMED ANNUALLY  10/21/2016   • BONE DENSITY  06/21/2018   • URINE DRUG SCREEN  08/02/2018       6. Orders for overdue Health Maintenance topics pended in Pre-Charting? NO    7.  AHA (MDX) form printed for Provider? YES    8.  Patient was NOT informed to arrive 15 min prior to their scheduled appointment and bring in their medication bottles.

## 2019-02-13 NOTE — ASSESSMENT & PLAN NOTE
Due to COPD exacerbation and bronchitis vs pulm edema  CXR showing some interstitial edema,   Echo noted: Left ventricular ejection fraction is visually estimated to be 55%.  Grade II diastolic dysfunction.  Monitor renal function  Cont to wean 02 as tolerated  Keep sats 88-92%    Breathing back to baseline now

## 2019-02-13 NOTE — TELEPHONE ENCOUNTER
Left message for patient to call back regarding pre-visit planning. Please transfer call to [phone]. Patient is currently in the hospital. I left a voicemail for the son to call me back.

## 2019-02-13 NOTE — ED NOTES
"Daughter and family updated on POC - room assigned, cleaning in process. Daughter upset over length of stay in ED. Apologized for inconvenience, attempted to diffuse situation. Daughter verbalizes understanding of process, just \"annoyed\". Again apologized.  "

## 2019-02-13 NOTE — DISCHARGE PLANNING
Admitted for weakness and hypoxia.     Started on antibiotics for bronchitis per notes.     Daughter, who is also caregiver, states pt uses O2 at night but not much during the day due to not having a portable oxygen tank. Pt may need 24 hour O2. Will need ambulatory pulse ox prior to DC.     Pt on 2 LNC at home baseline.     Per daughter, when they were at [facility] they were offered HH and applied for it. Daughter states they are supposed to call her for an appointment time. Told daughter that we will also get a PT/OT eval.     Pending trop draw again.     Unknown needs for DC at this time pending PT/OT and O2 eval.

## 2019-02-13 NOTE — ED NOTES
Blood drawn and sent to lab.     Pt's daughter concerned about pt's increased difficulty speaking/slurred speech.     Neuro assessment done on pt, no weakness or facial droop assessed. Pt is at baseline at A&O x1 to self.     ERP notified.

## 2019-02-13 NOTE — TELEPHONE ENCOUNTER
I spoke with her daughter and she asked to cancel the appointment due to the patient still being in the hospital

## 2019-02-13 NOTE — PROGRESS NOTES
-Patient seen and admitted by my partner Dr. Prakash this morning.  -87-year-old female with dementia, oxygen dependent (2 L) COPD, hypertension, hyperlipidemia, admitted on 2/13/19 for hypoxia, and altered level of consciousness.  She was initially found with hyponatremia at [facility], but family opted to bring her home, but then had shortness of breath with cough and wheezing, with increased oxygen requirement.  She also had fevers.  She was noted to be wheezing in the ED.  Labs showed no leukocytosis, with sodium of 126.  BNP was 363.  Troponin was 0.34 peaking at 0.41.  Urinalysis did not show any pyuria.  Chest x-ray showed no focal consolidation or pleural effusions, with COPD changes.  CT angiogram of the chest showed no PE, with emphysema, with acute versus chronic bronchitis, cardiomegaly with right heart dysfunction, 4.1 cm ascending aortic aneurysm, with mildly enlarged lower paratracheal and hilar nodes mostly reactive.  Patient was felt to be hypovolemic, and was started on IV fluids.  She was also felt to have COPD exacerbation, and was started on IV steroids, antibiotics for bronchitis, and bronchodilators.  -Orders reviewed.  Agree with Dr. Prakash's plan per his H&P.  Check procalcitonin, and trend.  Continue doxycycline for bronchitis. Monitor closely for improvement, taper steroids as indicated.  Continue IV fluids with NS, trend sodium levels.

## 2019-02-13 NOTE — H&P
Hospital Medicine History & Physical Note    Date of Service  2/13/2019    Primary Care Physician  Denis Krueger M.D.    Consultants  none    Code Status  full    Chief Complaint  weakness    History of Presenting Illness  87 y.o. female who presented 2/13/2019 with history of dementia COPD who presents with hypoxia and altered level of consciousness.  This patient has weakness and fatigue starting yesterday seen at [facility] where she was found to have hyponatremia patient and family decided that they would go home.  She went home has had shortness of breath with cough and wheezing throughout the day typically requires 2 L of oxygen however has had increasing oxygen requirement throughout the day.  She had no improvement of symptoms with nebulizer treatments.  She has had subjective fevers 101 °F earlier today.  She has had changes in mental status with weakness and lethargy.  She will be admitted to the hospital for further management.    Review of Systems  Review of Systems   Unable to perform ROS: Mental status change       Past Medical History   has a past medical history of Anesthesia; Aortic regurgitation (4/25/2012); Arrhythmia (7/10); Arthritis; Atypical chest pain (4/25/2012); CATARACT (2007,08); Chronic anticoagulation (4/25/2012); Constipation (4/25/2012); COPD (chronic obstructive pulmonary disease) (HCC); DDD (degenerative disc disease); Dental disorder; Dyspnea (4/25/2012); Generalized osteoarthritis (4/25/2012); Headache(784.0) (4/25/2012); Heart valve insufficiency; Hypercholesteremia; Hyperlipidemia (4/25/2012); Hypertension; MEDICAL HOME; On home oxygen therapy; Osteoarthritis of knee (4/25/2012); Osteoporosis; Other accident; Pain; Pneumonia (6/4); TB lung, 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    Surgical History   has a past surgical history that includes cataract phaco with iol (1/5/2009); other orthopedic surgery  (may, 2010); carpal tunnel release (@30 yrs ago); and recovery (7/28/2010).     Family History  family history includes Diabetes in her brother; Heart Disease in her father.     Social History   reports that she quit smoking about 34 years ago. Her smoking use included Cigarettes. She has a 37.50 pack-year smoking history. She has never used smokeless tobacco. She reports that she does not drink alcohol or use drugs.    Allergies  Allergies   Allergen Reactions   • Codeine Vomiting       Medications  Prior to Admission Medications   Prescriptions Last Dose Informant Patient Reported? Taking?   Fluticasone-Umeclidin-Vilant (TRELEGY ELLIPTA) 100-62.5-25 MCG/INH AEROSOL POWDER, BREATH ACTIVATED  Patient No No   Sig: Inhale 1 Puff by mouth every day.   acetaminophen (TYLENOL) 500 MG Tab  Patient Yes No   Sig: Take 1,000 mg by mouth every 6 hours as needed for Mild Pain or Moderate Pain.   albuterol (PROVENTIL) 2.5mg/3ml Nebu Soln solution for nebulization  Patient No No   Sig: 3 mL by Nebulization route every four hours as needed for Shortness of Breath.   dabigatran (PRADAXA) 75 MG Cap capsule  Patient No No   Sig: Take 1 Cap by mouth 2 Times a Day.   irbesartan-hydrochlorothiazide (AVALIDE) 150-12.5 MG per tablet  Patient Yes No   Sig: Take 1 Tab by mouth every day.   lidocaine (LIDODERM) 5 % Patch  Patient Yes No   Sig: Apply 1 Patch to skin as directed as needed (Apply to lower back). Apply to lower back   metoprolol (LOPRESSOR) 25 MG Tab  Patient No No   Sig: Take 1 Tab by mouth 2 times a day.   oxyCODONE CR (OXYCONTIN) 10 MG Tablet Extended Release 12 hour Abuse-Deterrent  Patient No No   Sig: Take 1 Tab by mouth every 12 hours for 30 days.   sodium chloride (SALT) 1 GM Tab   No No   Sig: Take 1 Tab by mouth 3 times a day.      Facility-Administered Medications: None       Physical Exam  Temp:  [36.9 °C (98.5 °F)] 36.9 °C (98.5 °F)  Pulse:  [74-82] 74  Resp:  [12-20] 12  BP: (125)/(52) 125/52  SpO2:  [92 %-96 %]  95 %    Physical Exam   Constitutional: No distress.   malnurished   HENT:   Head: Normocephalic and atraumatic.   Dry oral mucosa   Eyes: Pupils are equal, round, and reactive to light. Conjunctivae and EOM are normal.   Neck: Normal range of motion. Neck supple. No JVD present.   Cardiovascular: Normal rate, regular rhythm, normal heart sounds and intact distal pulses.    No murmur heard.  Pulmonary/Chest: Effort normal. No respiratory distress. She has wheezes.   Abdominal: Soft. Bowel sounds are normal. She exhibits no distension. There is no tenderness.   Musculoskeletal: Normal range of motion. She exhibits no edema.   Neurological: She is alert. She exhibits normal muscle tone.   Lethargic arousable   Skin: Skin is warm and dry.   Psychiatric: She has a normal mood and affect. Her behavior is normal. Judgment and thought content normal.   Nursing note and vitals reviewed.      Laboratory:  Recent Labs      02/11/19 1627 02/13/19 0222   WBC  9.9  8.9   RBC  4.00*  3.47*   HEMOGLOBIN  12.9  11.2*   HEMATOCRIT  39.0  34.3*   MCV  97.5  98.8*   MCH  32.3  32.3   MCHC  33.1*  32.7*   RDW  48.2  48.7   PLATELETCT  234  205   MPV  10.4  10.3     Recent Labs      02/11/19 1627 02/13/19 0222   SODIUM  127*  126*   POTASSIUM  4.9  4.5   CHLORIDE  90*  93*   CO2  29  26   GLUCOSE  103*  119*   BUN  17  15   CREATININE  0.86  0.85   CALCIUM  8.6  8.2*     Recent Labs      02/11/19   1627 02/13/19 0222   ALTSGPT  16  12   ASTSGOT  24  18   ALKPHOSPHAT  68  57   TBILIRUBIN  0.8  0.6   GLUCOSE  103*  119*         Recent Labs      02/13/19   0222   BNPBTYPENAT  363*         Recent Labs      02/11/19   1627 02/13/19 0222 02/13/19   0449   TROPONINI  0.03  0.41*  0.34*       Urinalysis:    Recent Labs      02/11/19   1652   SPECGRAVITY  1.015   GLUCOSEUR  Negative   KETONES  Negative   NITRITE  Negative   LEUKESTERAS  Negative   WBCURINE  0-2   RBCURINE  2-5*   BACTERIA  Rare*   EPITHELCELL  Rare         Imaging:  DX-CHEST-PORTABLE (1 VIEW)   Final Result      1.  COPD. No focal consolidation or pleural effusions.   2.  Cardiomegaly.      CT-CTA CHEST PULMONARY ARTERY W/ RECONS   Final Result         1. No pulmonary embolus.   2. Emphysema and acute versus chronic bronchitis.   3. Cardiomegaly with right heart dysfunction.   4. A 4.1 cm ascending aortic aneurysm.   5. Mildly enlarged lower paratracheal and hilar nodes, most likely reactive.                  Assessment/Plan:  I anticipate this patient will require at least two midnights for appropriate medical management, necessitating inpatient admission.    * Hyponatremia   Assessment & Plan    Symptomatic with weakness and lethargy   Hypovolemic by exam  Check urine and serum osmo  Cont with IVF serial BMP   Avoid overcorrection     NSTEMI (non-ST elevated myocardial infarction) ([facility])   Assessment & Plan    Likely demand ischemia from hypoxia and acute dhydration   No chest pain  Cardiac monitoring   Serial troponin   Already on pradaxa will continue   BB, ace   High dose statin added  Consider cards consult if uptrending     Anemia   Assessment & Plan    Mild no evidence of bleeding   Cont to monitor      Acute metabolic encephalopathy   Assessment & Plan    Due to hyponatremia   Cont with IVF and monitor mental status   Dementia at baseline     Acute exacerbation of chronic obstructive pulmonary disease (COPD) ([facility])   Assessment & Plan    Treat bronchitis with doxy   Scheduled duo nebs  IV steroids for now        Acute hypoxemic respiratory failure ([facility])   Assessment & Plan    Due to COPD exacerbation and bronchitis  Cont to wean 02 as tolerated  Keep sats 88-92%     PAF (paroxysmal atrial fibrillation) ([facility])- (present on admission)   Assessment & Plan    Resume home BB and pradaxa     Dyslipidemia- (present on admission)   Assessment & Plan    Statin added     Hypertension- (present on admission)   Assessment & Plan    Resume home BP medications         VTE  prophylaxis: pradaxa

## 2019-02-13 NOTE — RESPIRATORY CARE
COPD EDUCATION by COPD CLINICAL EDUCATOR  2/13/2019 at 2:30 PM by Velvet Jones     Patient reviewed by COPD education team. Patient has dementia per the chart and therefore does not qualify for COPD program.

## 2019-02-13 NOTE — ASSESSMENT & PLAN NOTE
Improving, 2/2 acute COPD exacerbation vs hyponatremia   On 2L at home only at nighttime: At baseline currently; however she will require home oxygen at all times.  O2 eval per nursing; I discussed this in great detail with her daughter and the patient that she will require oxygen at all times.  Completed doxycycline for 5 days  Scheduled duo nebs  Oral steroids 4/5 days  Supplement O2 as needed

## 2019-02-13 NOTE — ED TRIAGE NOTES
"Chief Complaint   Patient presents with   • Weakness   • Shortness of Breath   • Fever     /52   Pulse 82   Temp 36.9 °C (98.5 °F) (Temporal)   Resp 17   Ht 1.575 m (5' 2\")   Wt 54.1 kg (119 lb 4.3 oz)   SpO2 92%   BMI 21.81 kg/m²     BIB EMS, PT has had a productive cough and increased weakness for 5 days. Pt wears 2L O2 at night but recently she has been needing it all day. When she is taken off O2 family states that she dropped to 70's for O2 saturation.     Pt has hx of dementia, copd and HTN.    Family gave pt 1000 mg of acetaminophen prior to arrival.     Room air trial pt dropped to 86%.   "

## 2019-02-13 NOTE — PROGRESS NOTES
Patient transferred to floor, placed on tele box, and monitor room notified. Patient has no complaints of pain and no indications of distress. Bed alarm is on, bed in the lowest position, and call light and personal belongings within reach

## 2019-02-13 NOTE — ED NOTES
Pt sleeping at this time, awakens to verbal stimuli, family in room bed in lowest position call bell wthin reach

## 2019-02-14 PROBLEM — J96.21 ACUTE ON CHRONIC RESPIRATORY FAILURE WITH HYPOXIA (HCC): Status: ACTIVE | Noted: 2019-02-13

## 2019-02-14 PROBLEM — E87.6 HYPOKALEMIA: Status: ACTIVE | Noted: 2019-02-14

## 2019-02-14 LAB
ALBUMIN SERPL BCP-MCNC: 3.4 G/DL (ref 3.2–4.9)
ALBUMIN/GLOB SERPL: 1.2 G/DL
ALP SERPL-CCNC: 60 U/L (ref 30–99)
ALT SERPL-CCNC: 13 U/L (ref 2–50)
ANION GAP SERPL CALC-SCNC: 11 MMOL/L (ref 0–11.9)
ANION GAP SERPL CALC-SCNC: 6 MMOL/L (ref 0–11.9)
ANION GAP SERPL CALC-SCNC: 7 MMOL/L (ref 0–11.9)
ANION GAP SERPL CALC-SCNC: 8 MMOL/L (ref 0–11.9)
AST SERPL-CCNC: 19 U/L (ref 12–45)
BASOPHILS # BLD AUTO: 0.2 % (ref 0–1.8)
BASOPHILS # BLD: 0.01 K/UL (ref 0–0.12)
BILIRUB SERPL-MCNC: 0.5 MG/DL (ref 0.1–1.5)
BUN SERPL-MCNC: 12 MG/DL (ref 8–22)
BUN SERPL-MCNC: 12 MG/DL (ref 8–22)
BUN SERPL-MCNC: 14 MG/DL (ref 8–22)
BUN SERPL-MCNC: 14 MG/DL (ref 8–22)
BUN SERPL-MCNC: 18 MG/DL (ref 8–22)
BUN SERPL-MCNC: 19 MG/DL (ref 8–22)
CALCIUM SERPL-MCNC: 8.1 MG/DL (ref 8.5–10.5)
CALCIUM SERPL-MCNC: 8.3 MG/DL (ref 8.5–10.5)
CALCIUM SERPL-MCNC: 8.6 MG/DL (ref 8.5–10.5)
CALCIUM SERPL-MCNC: 9.1 MG/DL (ref 8.5–10.5)
CALCIUM SERPL-MCNC: 9.6 MG/DL (ref 8.5–10.5)
CALCIUM SERPL-MCNC: 9.7 MG/DL (ref 8.5–10.5)
CHLORIDE SERPL-SCNC: 100 MMOL/L (ref 96–112)
CHLORIDE SERPL-SCNC: 97 MMOL/L (ref 96–112)
CHLORIDE SERPL-SCNC: 98 MMOL/L (ref 96–112)
CHLORIDE SERPL-SCNC: 98 MMOL/L (ref 96–112)
CHLORIDE SERPL-SCNC: 99 MMOL/L (ref 96–112)
CHLORIDE SERPL-SCNC: 99 MMOL/L (ref 96–112)
CHOLEST SERPL-MCNC: 155 MG/DL (ref 100–199)
CO2 SERPL-SCNC: 25 MMOL/L (ref 20–33)
CO2 SERPL-SCNC: 26 MMOL/L (ref 20–33)
CO2 SERPL-SCNC: 27 MMOL/L (ref 20–33)
CO2 SERPL-SCNC: 28 MMOL/L (ref 20–33)
CREAT SERPL-MCNC: 0.65 MG/DL (ref 0.5–1.4)
CREAT SERPL-MCNC: 0.67 MG/DL (ref 0.5–1.4)
CREAT SERPL-MCNC: 0.71 MG/DL (ref 0.5–1.4)
CREAT SERPL-MCNC: 0.8 MG/DL (ref 0.5–1.4)
CREAT SERPL-MCNC: 0.81 MG/DL (ref 0.5–1.4)
CREAT SERPL-MCNC: 0.86 MG/DL (ref 0.5–1.4)
EOSINOPHIL # BLD AUTO: 0 K/UL (ref 0–0.51)
EOSINOPHIL NFR BLD: 0 % (ref 0–6.9)
ERYTHROCYTE [DISTWIDTH] IN BLOOD BY AUTOMATED COUNT: 46.3 FL (ref 35.9–50)
GLOBULIN SER CALC-MCNC: 2.8 G/DL (ref 1.9–3.5)
GLUCOSE SERPL-MCNC: 115 MG/DL (ref 65–99)
GLUCOSE SERPL-MCNC: 132 MG/DL (ref 65–99)
GLUCOSE SERPL-MCNC: 140 MG/DL (ref 65–99)
GLUCOSE SERPL-MCNC: 153 MG/DL (ref 65–99)
GLUCOSE SERPL-MCNC: 163 MG/DL (ref 65–99)
GLUCOSE SERPL-MCNC: 174 MG/DL (ref 65–99)
HCT VFR BLD AUTO: 37.1 % (ref 37–47)
HDLC SERPL-MCNC: 51 MG/DL
HGB BLD-MCNC: 12.2 G/DL (ref 12–16)
IMM GRANULOCYTES # BLD AUTO: 0.05 K/UL (ref 0–0.11)
IMM GRANULOCYTES NFR BLD AUTO: 0.9 % (ref 0–0.9)
LDLC SERPL CALC-MCNC: 95 MG/DL
LYMPHOCYTES # BLD AUTO: 0.59 K/UL (ref 1–4.8)
LYMPHOCYTES NFR BLD: 10.1 % (ref 22–41)
MCH RBC QN AUTO: 31.9 PG (ref 27–33)
MCHC RBC AUTO-ENTMCNC: 32.9 G/DL (ref 33.6–35)
MCV RBC AUTO: 96.9 FL (ref 81.4–97.8)
MONOCYTES # BLD AUTO: 0.12 K/UL (ref 0–0.85)
MONOCYTES NFR BLD AUTO: 2.1 % (ref 0–13.4)
NEUTROPHILS # BLD AUTO: 5.05 K/UL (ref 2–7.15)
NEUTROPHILS NFR BLD: 86.7 % (ref 44–72)
NRBC # BLD AUTO: 0 K/UL
NRBC BLD-RTO: 0 /100 WBC
PLATELET # BLD AUTO: 209 K/UL (ref 164–446)
PMV BLD AUTO: 10.2 FL (ref 9–12.9)
POTASSIUM SERPL-SCNC: 3.4 MMOL/L (ref 3.6–5.5)
POTASSIUM SERPL-SCNC: 3.5 MMOL/L (ref 3.6–5.5)
POTASSIUM SERPL-SCNC: 3.5 MMOL/L (ref 3.6–5.5)
POTASSIUM SERPL-SCNC: 4.4 MMOL/L (ref 3.6–5.5)
POTASSIUM SERPL-SCNC: 4.5 MMOL/L (ref 3.6–5.5)
POTASSIUM SERPL-SCNC: 4.6 MMOL/L (ref 3.6–5.5)
PROCALCITONIN SERPL-MCNC: <0.05 NG/ML
PROT SERPL-MCNC: 6.2 G/DL (ref 6–8.2)
RBC # BLD AUTO: 3.83 M/UL (ref 4.2–5.4)
SODIUM SERPL-SCNC: 132 MMOL/L (ref 135–145)
SODIUM SERPL-SCNC: 133 MMOL/L (ref 135–145)
SODIUM SERPL-SCNC: 135 MMOL/L (ref 135–145)
TRIGL SERPL-MCNC: 44 MG/DL (ref 0–149)
WBC # BLD AUTO: 5.8 K/UL (ref 4.8–10.8)

## 2019-02-14 PROCEDURE — 770020 HCHG ROOM/CARE - TELE (206)

## 2019-02-14 PROCEDURE — 700102 HCHG RX REV CODE 250 W/ 637 OVERRIDE(OP): Performed by: HOSPITALIST

## 2019-02-14 PROCEDURE — 94640 AIRWAY INHALATION TREATMENT: CPT

## 2019-02-14 PROCEDURE — 97165 OT EVAL LOW COMPLEX 30 MIN: CPT

## 2019-02-14 PROCEDURE — 85025 COMPLETE CBC W/AUTO DIFF WBC: CPT

## 2019-02-14 PROCEDURE — 36415 COLL VENOUS BLD VENIPUNCTURE: CPT

## 2019-02-14 PROCEDURE — A9270 NON-COVERED ITEM OR SERVICE: HCPCS | Performed by: HOSPITALIST

## 2019-02-14 PROCEDURE — 97162 PT EVAL MOD COMPLEX 30 MIN: CPT

## 2019-02-14 PROCEDURE — 700105 HCHG RX REV CODE 258: Performed by: HOSPITALIST

## 2019-02-14 PROCEDURE — 99232 SBSQ HOSP IP/OBS MODERATE 35: CPT | Performed by: HOSPITALIST

## 2019-02-14 PROCEDURE — 80061 LIPID PANEL: CPT

## 2019-02-14 PROCEDURE — 700111 HCHG RX REV CODE 636 W/ 250 OVERRIDE (IP): Performed by: HOSPITALIST

## 2019-02-14 PROCEDURE — 84145 PROCALCITONIN (PCT): CPT

## 2019-02-14 PROCEDURE — 94760 N-INVAS EAR/PLS OXIMETRY 1: CPT

## 2019-02-14 PROCEDURE — 92610 EVALUATE SWALLOWING FUNCTION: CPT

## 2019-02-14 PROCEDURE — 80048 BASIC METABOLIC PNL TOTAL CA: CPT

## 2019-02-14 PROCEDURE — 700101 HCHG RX REV CODE 250: Performed by: HOSPITALIST

## 2019-02-14 PROCEDURE — 80053 COMPREHEN METABOLIC PANEL: CPT

## 2019-02-14 RX ORDER — FLUTICASONE PROPIONATE 110 UG/1
1 AEROSOL, METERED RESPIRATORY (INHALATION) 2 TIMES DAILY
Status: DISCONTINUED | OUTPATIENT
Start: 2019-02-14 | End: 2019-02-21 | Stop reason: HOSPADM

## 2019-02-14 RX ORDER — IPRATROPIUM BROMIDE AND ALBUTEROL SULFATE 2.5; .5 MG/3ML; MG/3ML
3 SOLUTION RESPIRATORY (INHALATION)
Status: DISCONTINUED | OUTPATIENT
Start: 2019-02-14 | End: 2019-02-21 | Stop reason: HOSPADM

## 2019-02-14 RX ORDER — HEPARIN SODIUM 5000 [USP'U]/ML
5000 INJECTION, SOLUTION INTRAVENOUS; SUBCUTANEOUS EVERY 8 HOURS
Status: DISCONTINUED | OUTPATIENT
Start: 2019-02-14 | End: 2019-02-17

## 2019-02-14 RX ORDER — HYDRALAZINE HYDROCHLORIDE 20 MG/ML
10 INJECTION INTRAMUSCULAR; INTRAVENOUS EVERY 6 HOURS PRN
Status: DISCONTINUED | OUTPATIENT
Start: 2019-02-14 | End: 2019-02-19

## 2019-02-14 RX ORDER — POTASSIUM CHLORIDE 20 MEQ/1
40 TABLET, EXTENDED RELEASE ORAL DAILY
Status: DISCONTINUED | OUTPATIENT
Start: 2019-02-14 | End: 2019-02-21 | Stop reason: HOSPADM

## 2019-02-14 RX ADMIN — SODIUM CHLORIDE: 9 INJECTION, SOLUTION INTRAVENOUS at 00:29

## 2019-02-14 RX ADMIN — METHYLPREDNISOLONE SODIUM SUCCINATE 40 MG: 40 INJECTION, POWDER, FOR SOLUTION INTRAMUSCULAR; INTRAVENOUS at 23:59

## 2019-02-14 RX ADMIN — IPRATROPIUM BROMIDE AND ALBUTEROL SULFATE 3 ML: .5; 3 SOLUTION RESPIRATORY (INHALATION) at 07:18

## 2019-02-14 RX ADMIN — METHYLPREDNISOLONE SODIUM SUCCINATE 40 MG: 40 INJECTION, POWDER, FOR SOLUTION INTRAMUSCULAR; INTRAVENOUS at 18:20

## 2019-02-14 RX ADMIN — POTASSIUM CHLORIDE 40 MEQ: 1500 TABLET, EXTENDED RELEASE ORAL at 10:30

## 2019-02-14 RX ADMIN — HEPARIN SODIUM 5000 UNITS: 5000 INJECTION, SOLUTION INTRAVENOUS; SUBCUTANEOUS at 21:47

## 2019-02-14 RX ADMIN — METHYLPREDNISOLONE SODIUM SUCCINATE 40 MG: 40 INJECTION, POWDER, FOR SOLUTION INTRAMUSCULAR; INTRAVENOUS at 00:30

## 2019-02-14 RX ADMIN — ATORVASTATIN CALCIUM 80 MG: 80 TABLET, FILM COATED ORAL at 18:20

## 2019-02-14 RX ADMIN — METOPROLOL TARTRATE 25 MG: 25 TABLET, FILM COATED ORAL at 18:20

## 2019-02-14 RX ADMIN — METHYLPREDNISOLONE SODIUM SUCCINATE 40 MG: 40 INJECTION, POWDER, FOR SOLUTION INTRAMUSCULAR; INTRAVENOUS at 05:31

## 2019-02-14 RX ADMIN — METHYLPREDNISOLONE SODIUM SUCCINATE 40 MG: 40 INJECTION, POWDER, FOR SOLUTION INTRAMUSCULAR; INTRAVENOUS at 13:39

## 2019-02-14 RX ADMIN — IPRATROPIUM BROMIDE AND ALBUTEROL SULFATE 3 ML: .5; 3 SOLUTION RESPIRATORY (INHALATION) at 00:54

## 2019-02-14 RX ADMIN — HEPARIN SODIUM 5000 UNITS: 5000 INJECTION, SOLUTION INTRAVENOUS; SUBCUTANEOUS at 13:39

## 2019-02-14 RX ADMIN — IPRATROPIUM BROMIDE AND ALBUTEROL SULFATE 3 ML: .5; 3 SOLUTION RESPIRATORY (INHALATION) at 04:05

## 2019-02-14 RX ADMIN — DOXYCYCLINE 100 MG: 100 TABLET, FILM COATED ORAL at 18:00

## 2019-02-14 RX ADMIN — SODIUM CHLORIDE: 9 INJECTION, SOLUTION INTRAVENOUS at 22:10

## 2019-02-14 RX ADMIN — FLUTICASONE PROPIONATE 110 MCG: 110 AEROSOL, METERED RESPIRATORY (INHALATION) at 18:21

## 2019-02-14 ASSESSMENT — COGNITIVE AND FUNCTIONAL STATUS - GENERAL
DRESSING REGULAR UPPER BODY CLOTHING: A LITTLE
SUGGESTED CMS G CODE MODIFIER MOBILITY: CK
TOILETING: A LITTLE
CLIMB 3 TO 5 STEPS WITH RAILING: A LITTLE
MOVING TO AND FROM BED TO CHAIR: A LITTLE
EATING MEALS: A LITTLE
HELP NEEDED FOR BATHING: A LITTLE
MOVING FROM LYING ON BACK TO SITTING ON SIDE OF FLAT BED: A LITTLE
SUGGESTED CMS G CODE MODIFIER DAILY ACTIVITY: CK
WALKING IN HOSPITAL ROOM: A LITTLE
TURNING FROM BACK TO SIDE WHILE IN FLAT BAD: A LITTLE
DAILY ACTIVITIY SCORE: 18
STANDING UP FROM CHAIR USING ARMS: A LITTLE
PERSONAL GROOMING: A LITTLE
DRESSING REGULAR LOWER BODY CLOTHING: A LITTLE
MOBILITY SCORE: 18

## 2019-02-14 ASSESSMENT — LIFESTYLE VARIABLES: SUBSTANCE_ABUSE: 0

## 2019-02-14 ASSESSMENT — ENCOUNTER SYMPTOMS
SPEECH CHANGE: 0
TREMORS: 0
SPUTUM PRODUCTION: 1
MYALGIAS: 0
CLAUDICATION: 0
SHORTNESS OF BREATH: 1
DOUBLE VISION: 0
EYE PAIN: 0
NECK PAIN: 0
ABDOMINAL PAIN: 0
BLURRED VISION: 0
HEARTBURN: 0
DEPRESSION: 0
CONSTIPATION: 0
ORTHOPNEA: 0
VOMITING: 0
FEVER: 0
HALLUCINATIONS: 0
BACK PAIN: 1
COUGH: 1
HEADACHES: 0
SENSORY CHANGE: 0
PALPITATIONS: 0
TINGLING: 0
WEAKNESS: 1
CHILLS: 0
DIARRHEA: 0
WEIGHT LOSS: 0
EYE DISCHARGE: 0
PHOTOPHOBIA: 0
DIZZINESS: 0

## 2019-02-14 ASSESSMENT — ACTIVITIES OF DAILY LIVING (ADL): TOILETING: INDEPENDENT

## 2019-02-14 ASSESSMENT — GAIT ASSESSMENTS
ASSISTIVE DEVICE: FRONT WHEEL WALKER
GAIT LEVEL OF ASSIST: MINIMAL ASSIST
DISTANCE (FEET): 45
DEVIATION: DECREASED BASE OF SUPPORT;DECREASED HEEL STRIKE;DECREASED TOE OFF;OTHER (COMMENT)

## 2019-02-14 NOTE — CARE PLAN
Problem: Infection  Goal: Will remain free from infection  Outcome: PROGRESSING AS EXPECTED  Patient does not have any signs or symptoms of an infection.    Problem: Venous Thromboembolism (VTW)/Deep Vein Thrombosis (DVT) Prevention:  Goal: Patient will participate in Venous Thrombosis (VTE)/Deep Vein Thrombosis (DVT)Prevention Measures  Outcome: PROGRESSING AS EXPECTED  Patient is able to ambulate and receives pradaxa

## 2019-02-14 NOTE — THERAPY
"Physical Therapy Evaluation completed.   Bed Mobility:  Supine to Sit:  (found in BR on toilet with CNA present)  Transfers: Sit to Stand: Contact Guard Assist  Gait: Level Of Assist: Minimal Assist (mostly CGA; min A on occasion with LOB) with Front-Wheel Walker       Plan of Care: Will benefit from Physical Therapy 3 times per week  Discharge Recommendations: Equipment: Will Continue to Assess for Equipment Needs. See below    Pt presents to PT with impaired balance, motor control, gait, and general locomotion associated with deconditioning and medical co-morbidities. Her current cognitive deficits, which may be baseline, are exacerbating her functional impairements and risk for falls and LOB. She appears to have decreased insight into deficits as well and generally poor safety awareness. She is able to demonstrate short distance ambulation with FWW with min A (on occasion) 2' to several + LOB. In current condition, would recommend 24/7 Spv/assist with OOB activity given high risk for falls and current cognitive deficits/poor safety awareness. If she is unable to obtain 24/7 Spv/assist would recommend continued skilled PT/placement for optimal functional recovery and to reduce caregiver burden though note that cognitive deficits may remain and she may require long term placement or additional caregiver assist upon eventual return to home dependent on cognition and functional status. (of concern pt reports she lives alone, drives?, and appears to have poor safety awareness/high fall risk)     See \"Rehab Therapy-Acute\" Patient Summary Report for complete documentation.     "

## 2019-02-14 NOTE — PROGRESS NOTES
Assumed care of patient, received bedside report from day shift RN, Pt is A&Ox4, Pt is on 2L of O2 via NC no SOB noted has no complains of pain at this time. Pt continues to ask for water this RN explained pt is NPO until speech assess her tomorrow, will continue to educate. Call light within reach, personal belongings within reach. Bed is locked and in lowest position, bed strip alarm is on. Tele monitor on. Hourly rounding in place.

## 2019-02-14 NOTE — THERAPY
"Occupational Therapy Evaluation completed.   Functional Status: Pt is an 86 y/o female admitted with weakness, SOB, and fever. She has a hx of dementia and COPD. She was found to have an acute COPD exacerbation, with elevated trops likely demand ischemia per chart. She was pleasantly confused throughout the session, poor historian. She was very easily distracted needing cueing to stay on task. CGA for functional mobility with FWW, poor walker safety. CGA toileting. CGA LB dressing. CGA grooming in stance at the sink. Pt very SOB with minimal activity needing frequent rest breaks. She is limited by weakness, fatigue, impaired balance,and impaired safety awareness which impacts independence in self care.   Plan of Care: Will benefit from Occupational Therapy 3 times per week  Discharge Recommendations:  Equipment: Will Continue to Assess for Equipment Needs. Depends on progress made. At this point, patient would need 24/7 supervision during functional mobility and ADL tasks due to increased fall risk and poor safety awareness. Should she not be able to receive 24/7 assistance, she may benefit from placement prior to d/c home.      See \"Rehab Therapy-Acute\" Patient Summary Report for complete documentation.    "

## 2019-02-14 NOTE — PROGRESS NOTES
Received report on patient. She is resting in bed, has no complaints of pain and no indications of distress. Bed alarm is on, bed in the lowest position, and call light and personal belongings within reach.

## 2019-02-14 NOTE — PROGRESS NOTES
2 RN skin check with Alberta: Patient's generalized skin is intact, sacrum is red but blanching, and bilateral heels are red/blanching/boggy.

## 2019-02-14 NOTE — CARE PLAN
Problem: Communication  Goal: The ability to communicate needs accurately and effectively will improve  Outcome: PROGRESSING AS EXPECTED  Pt able to communicated needs accurately and effectively at this time.     Problem: Safety  Goal: Will remain free from falls  Outcome: PROGRESSING AS EXPECTED  Fall risk assessed, fall precautions in place, bed alarm is on, pt uses сергей light appropriately and verbalizes understanding.

## 2019-02-14 NOTE — THERAPY
"Speech Language Therapy Clinical Swallow Evaluation completed.  Functional Status: Clinical swallow evaluation completed on this date.  Patient pleasant and agreeable but confused, upon entering the room. Patient holding all of her oral swabs in her hand and perseverated throughout the session on \"the sticks all going the same way\" and \"I have to keep them clean.\"  She followed directives to the oral [facility] exam with no gross deficits appreciated.  Presentation of PO included ice chips, nectars, purees, and thin liquids.  She declined soft solids as her dentures were not present.  The patient swished thin liquids around her oral cavity despite cues to swallow.  She eventually swallowed after ~10 seconds.  When she began to talk a few seconds later, thin liquids remained in the oral cavity which resulted in rapid, discoordinated swallow and wet vocal quality.  She fed herself purees and nectars independently with no overt s/sx of aspiration but consistently asked, \"What do you want me to do with this?\"  At this time, recommend initiation of a Dysphagia I/nectars diet with DIRECT supervision during meals, given confusion.  SLP following   Recommendations - Diet: Diet / Liquid Recommendation: Nectar Thick Liquid, Dysphagia I                          Strategies: Direct supervision during meals, No Straws and Head of Bed at 90 Degrees                          Medication Administration: Medication Administration : Crush all Medications in Puree  Plan of Care: Will benefit from Speech Therapy 3 times per week  Post-Acute Therapy: Recommend inpatient transitional care services for continued speech therapy services.        See \"Rehab Therapy-Acute\" Patient Summary Report for complete documentation.   "

## 2019-02-14 NOTE — CARE PLAN
Problem: Safety  Goal: Will remain free from injury  Outcome: PROGRESSING AS EXPECTED  Patient calls appropriately, wears hospital socks, and is steady on her feet.    Problem: Respiratory:  Goal: Respiratory status will improve  Outcome: PROGRESSING AS EXPECTED  Patient is at her baseline of 2L and has no complaints of SOB.

## 2019-02-14 NOTE — PROGRESS NOTES
[facility] Medicine Daily Progress Note    Date of Service  2/14/2019    Chief Complaint  87 y.o. female admitted 2/13/2019 with hyponatremia with weakness and lethargy    Hospital Course    patient admitted with symptomatic hyponatremia with weakness and lethargy. Also found to have Trop elevation, NSTEMI likely from demand ischemia. As well as acute COPD exacerbation      Interval Problem Update  Patient doing well today, she is still feeling weak, however a little better. Sob persists, not at baseline yet    Consultants/Specialty  none    Code Status  full    Disposition  Tbd; PT/OT    Review of Systems  Review of Systems   Constitutional: Positive for malaise/fatigue. Negative for chills, fever and weight loss.   HENT: Positive for hearing loss. Negative for ear discharge, ear pain and nosebleeds.    Eyes: Negative for blurred vision, double vision, photophobia, pain and discharge.   Respiratory: Positive for cough, sputum production and shortness of breath.    Cardiovascular: Negative for chest pain, palpitations, orthopnea and claudication.   Gastrointestinal: Negative for abdominal pain, constipation, diarrhea, heartburn and vomiting.   Genitourinary: Negative for dysuria, frequency, hematuria and urgency.   Musculoskeletal: Positive for back pain and joint pain. Negative for myalgias and neck pain.   Skin: Negative for itching and rash.   Neurological: Positive for weakness. Negative for dizziness, tingling, tremors, sensory change, speech change and headaches.   Psychiatric/Behavioral: Negative for depression, hallucinations, substance abuse and suicidal ideas.        Physical Exam  Temp:  [36 °C (96.8 °F)-36.9 °C (98.4 °F)] 36.5 °C (97.7 °F)  Pulse:  [] 107  Resp:  [12-18] 18  BP: (141-166)/(70-84) 141/74  SpO2:  [90 %-98 %] 91 %    Physical Exam   Constitutional: She is oriented to person, place, and time. She appears well-developed and well-nourished.   HENT:   Head: Normocephalic and atraumatic.    Mouth/Throat: Oropharynx is clear and moist.   dry   Eyes: Pupils are equal, round, and reactive to light. Conjunctivae and EOM are normal.   Neck: Normal range of motion. Neck supple. No JVD present.   Cardiovascular: Normal rate, regular rhythm and intact distal pulses.    No murmur heard.  Pulmonary/Chest: Effort normal. No respiratory distress. She has wheezes. She has no rales. She exhibits no tenderness.   Abdominal: Soft. Bowel sounds are normal. She exhibits no distension and no mass. There is no tenderness. There is no rebound and no guarding.   Musculoskeletal: Normal range of motion. She exhibits no tenderness.   Lymphadenopathy:     She has no cervical adenopathy.   Neurological: She is alert and oriented to person, place, and time. She exhibits normal muscle tone.   Skin: Skin is warm and dry. No rash noted. She is not diaphoretic. No erythema. No pallor.   Psychiatric: She has a normal mood and affect. Her behavior is normal.       Fluids    Intake/Output Summary (Last 24 hours) at 02/14/19 1117  Last data filed at 02/14/19 0600   Gross per 24 hour   Intake             1200 ml   Output              650 ml   Net              550 ml       Laboratory  Recent Labs      02/11/19   1627  02/13/19 0222  02/14/19 0146   WBC  9.9  8.9  5.8   RBC  4.00*  3.47*  3.83*   HEMOGLOBIN  12.9  11.2*  12.2   HEMATOCRIT  39.0  34.3*  37.1   MCV  97.5  98.8*  96.9   MCH  32.3  32.3  31.9   MCHC  33.1*  32.7*  32.9*   RDW  48.2  48.7  46.3   PLATELETCT  234  205  209   MPV  10.4  10.3  10.2     Recent Labs      02/14/19   0146  02/14/19   0520  02/14/19   0942   SODIUM  133*  132*  135   POTASSIUM  3.5*  3.5*  3.4*   CHLORIDE  98  99  97   CO2  27  27  27   GLUCOSE  163*  174*  140*   BUN  12  12  14   CREATININE  0.65  0.67  0.71   CALCIUM  8.1*  8.6  8.3*         Recent Labs      02/13/19 0222   BNPBTYPENAT  363*     Recent Labs      02/14/19 0146   TRIGLYCERIDE  44   HDL  51   LDL  95        Imaging  DX-CHEST-PORTABLE (1 VIEW)   Final Result      1.  COPD. No focal consolidation or pleural effusions.   2.  Cardiomegaly.      CT-CTA CHEST PULMONARY ARTERY W/ RECONS   Final Result         1. No pulmonary embolus.   2. Emphysema and acute versus chronic bronchitis.   3. Cardiomegaly with right heart dysfunction.   4. A 4.1 cm ascending aortic aneurysm.   5. Mildly enlarged lower paratracheal and hilar nodes, most likely reactive.                 Assessment/Plan  * Acute exacerbation of chronic obstructive pulmonary disease (COPD) ([facility])- (present on admission)   Assessment & Plan    2/2 acute COPD exacerbation vs hyponatremia   On 2L home O2  Treat bronchitis with doxy   Scheduled duo nebs  IV steroids continued for  Now, still not at baseline  Supplement O2 as needed     Type II NSTEMI (non-ST elevated myocardial infarction) due to demand ischemia from hypoxia ([facility])- (present on admission)   Assessment & Plan    Likely demand ischemia from hypoxia and acute dhydration   No chest pain  Cardiac monitoring on tele  Serial troponin ; downtrending now  Already on pradaxa will continue   BB, ace   High dose statin added on admission       Hypokalemia   Assessment & Plan    K 3.5, replace and monitor     Anemia- (present on admission)   Assessment & Plan    Mild no evidence of bleeding   Cont to monitor      Acute metabolic encephalopathy- (present on admission)   Assessment & Plan    2/2 dehydration  Cont with IVF and monitor mental status   Dementia at baseline  We will try and get intouch with her family     Acute on chronic respiratory failure with hypoxia ([facility])- (present on admission)   Assessment & Plan    Due to COPD exacerbation and bronchitis  Cont to wean 02 as tolerated  Keep sats 88-92%     Hyponatremia- (present on admission)   Assessment & Plan    Symptomatic with weakness and lethargy on admission, improving  Hypovolemic by exam; continue IVF   urine and serum osmo  Cont with IVF serial BMP    Avoid overcorrection     PAF (paroxysmal atrial fibrillation) (HCC)- (present on admission)   Assessment & Plan    Resume home BB and pradaxa     Dyslipidemia- (present on admission)   Assessment & Plan    Statin      Hypertension- (present on admission)   Assessment & Plan    Controlled, continue with  home BP medications          VTE prophylaxis: pradaxa

## 2019-02-15 ENCOUNTER — APPOINTMENT (OUTPATIENT)
Dept: PULMONOLOGY | Facility: HOSPICE | Age: 84
End: 2019-02-15
Payer: MEDICARE

## 2019-02-15 LAB
ANION GAP SERPL CALC-SCNC: 5 MMOL/L (ref 0–11.9)
ANION GAP SERPL CALC-SCNC: 7 MMOL/L (ref 0–11.9)
BUN SERPL-MCNC: 19 MG/DL (ref 8–22)
BUN SERPL-MCNC: 20 MG/DL (ref 8–22)
CALCIUM SERPL-MCNC: 8.4 MG/DL (ref 8.5–10.5)
CALCIUM SERPL-MCNC: 9.1 MG/DL (ref 8.5–10.5)
CHLORIDE SERPL-SCNC: 101 MMOL/L (ref 96–112)
CHLORIDE SERPL-SCNC: 102 MMOL/L (ref 96–112)
CO2 SERPL-SCNC: 26 MMOL/L (ref 20–33)
CO2 SERPL-SCNC: 27 MMOL/L (ref 20–33)
CREAT SERPL-MCNC: 0.67 MG/DL (ref 0.5–1.4)
CREAT SERPL-MCNC: 0.72 MG/DL (ref 0.5–1.4)
GLUCOSE SERPL-MCNC: 145 MG/DL (ref 65–99)
GLUCOSE SERPL-MCNC: 161 MG/DL (ref 65–99)
POTASSIUM SERPL-SCNC: 4.1 MMOL/L (ref 3.6–5.5)
POTASSIUM SERPL-SCNC: 4.3 MMOL/L (ref 3.6–5.5)
SODIUM SERPL-SCNC: 133 MMOL/L (ref 135–145)
SODIUM SERPL-SCNC: 135 MMOL/L (ref 135–145)

## 2019-02-15 PROCEDURE — A9270 NON-COVERED ITEM OR SERVICE: HCPCS | Performed by: HOSPITALIST

## 2019-02-15 PROCEDURE — 700111 HCHG RX REV CODE 636 W/ 250 OVERRIDE (IP): Performed by: HOSPITALIST

## 2019-02-15 PROCEDURE — 700102 HCHG RX REV CODE 250 W/ 637 OVERRIDE(OP): Performed by: HOSPITALIST

## 2019-02-15 PROCEDURE — 770020 HCHG ROOM/CARE - TELE (206)

## 2019-02-15 PROCEDURE — 87070 CULTURE OTHR SPECIMN AEROBIC: CPT

## 2019-02-15 PROCEDURE — 99232 SBSQ HOSP IP/OBS MODERATE 35: CPT | Performed by: HOSPITALIST

## 2019-02-15 PROCEDURE — 700105 HCHG RX REV CODE 258: Performed by: HOSPITALIST

## 2019-02-15 PROCEDURE — 80048 BASIC METABOLIC PNL TOTAL CA: CPT

## 2019-02-15 PROCEDURE — 36415 COLL VENOUS BLD VENIPUNCTURE: CPT

## 2019-02-15 RX ORDER — CLOTRIMAZOLE 10 MG/1
10 LOZENGE ORAL; TOPICAL
Status: DISCONTINUED | OUTPATIENT
Start: 2019-02-15 | End: 2019-02-21 | Stop reason: HOSPADM

## 2019-02-15 RX ORDER — PREDNISONE 20 MG/1
40 TABLET ORAL DAILY
Status: DISCONTINUED | OUTPATIENT
Start: 2019-02-15 | End: 2019-02-19

## 2019-02-15 RX ADMIN — HEPARIN SODIUM 5000 UNITS: 5000 INJECTION, SOLUTION INTRAVENOUS; SUBCUTANEOUS at 05:32

## 2019-02-15 RX ADMIN — SODIUM CHLORIDE: 9 INJECTION, SOLUTION INTRAVENOUS at 15:36

## 2019-02-15 RX ADMIN — CLOTRIMAZOLE 10 MG: 10 LOZENGE ORAL; TOPICAL at 15:00

## 2019-02-15 RX ADMIN — HYDROCHLOROTHIAZIDE 12.5 MG: 25 TABLET ORAL at 05:36

## 2019-02-15 RX ADMIN — IRBESARTAN 150 MG: 150 TABLET ORAL at 05:36

## 2019-02-15 RX ADMIN — HEPARIN SODIUM 5000 UNITS: 5000 INJECTION, SOLUTION INTRAVENOUS; SUBCUTANEOUS at 13:42

## 2019-02-15 RX ADMIN — CLOTRIMAZOLE 10 MG: 10 LOZENGE ORAL; TOPICAL at 22:51

## 2019-02-15 RX ADMIN — FLUTICASONE PROPIONATE 110 MCG: 110 AEROSOL, METERED RESPIRATORY (INHALATION) at 05:30

## 2019-02-15 RX ADMIN — HEPARIN SODIUM 5000 UNITS: 5000 INJECTION, SOLUTION INTRAVENOUS; SUBCUTANEOUS at 22:51

## 2019-02-15 RX ADMIN — DOXYCYCLINE 100 MG: 100 TABLET, FILM COATED ORAL at 05:36

## 2019-02-15 RX ADMIN — CLOTRIMAZOLE 10 MG: 10 LOZENGE ORAL; TOPICAL at 12:15

## 2019-02-15 RX ADMIN — DOXYCYCLINE 100 MG: 100 TABLET, FILM COATED ORAL at 18:16

## 2019-02-15 RX ADMIN — METOPROLOL TARTRATE 25 MG: 25 TABLET, FILM COATED ORAL at 18:16

## 2019-02-15 RX ADMIN — FLUTICASONE PROPIONATE 110 MCG: 110 AEROSOL, METERED RESPIRATORY (INHALATION) at 18:00

## 2019-02-15 RX ADMIN — POTASSIUM CHLORIDE 40 MEQ: 1500 TABLET, EXTENDED RELEASE ORAL at 05:36

## 2019-02-15 RX ADMIN — METHYLPREDNISOLONE SODIUM SUCCINATE 40 MG: 40 INJECTION, POWDER, FOR SOLUTION INTRAMUSCULAR; INTRAVENOUS at 05:37

## 2019-02-15 RX ADMIN — PREDNISONE 40 MG: 20 TABLET ORAL at 06:52

## 2019-02-15 RX ADMIN — HYDRALAZINE HYDROCHLORIDE 10 MG: 20 INJECTION INTRAMUSCULAR; INTRAVENOUS at 04:20

## 2019-02-15 RX ADMIN — METOPROLOL TARTRATE 25 MG: 25 TABLET, FILM COATED ORAL at 05:36

## 2019-02-15 RX ADMIN — UMECLIDINIUM BROMIDE AND VILANTEROL TRIFENATATE 1 PUFF: 62.5; 25 POWDER RESPIRATORY (INHALATION) at 05:41

## 2019-02-15 RX ADMIN — CLOTRIMAZOLE 10 MG: 10 LOZENGE ORAL; TOPICAL at 19:00

## 2019-02-15 ASSESSMENT — ENCOUNTER SYMPTOMS
SPEECH CHANGE: 0
FEVER: 0
PHOTOPHOBIA: 0
SHORTNESS OF BREATH: 1
BACK PAIN: 0
SPUTUM PRODUCTION: 1
EYE DISCHARGE: 0
BLURRED VISION: 0
VOMITING: 0
COUGH: 1
NECK PAIN: 0
TREMORS: 0
ORTHOPNEA: 0
ABDOMINAL PAIN: 0
PALPITATIONS: 0
CONSTIPATION: 0
WEAKNESS: 1
EYE PAIN: 0
MYALGIAS: 0
HEARTBURN: 0
SENSORY CHANGE: 0
TINGLING: 0
WEIGHT LOSS: 0
DIARRHEA: 0
DOUBLE VISION: 0
HALLUCINATIONS: 0
DEPRESSION: 0
HEADACHES: 0
CLAUDICATION: 0
DIZZINESS: 0
CHILLS: 0

## 2019-02-15 ASSESSMENT — LIFESTYLE VARIABLES: SUBSTANCE_ABUSE: 0

## 2019-02-15 NOTE — PROGRESS NOTES
I discussed patient's plan of care hospital Medicine Daily Progress Note    Date of Service  2/15/2019    Chief Complaint  87 y.o. female admitted 2/13/2019 with hyponatremia with weakness and lethargy    Hospital Course    patient admitted with symptomatic hyponatremia with weakness and lethargy. Also found to have Trop elevation, NSTEMI likely from demand ischemia. As well as acute COPD exacerbation      Interval Problem Update  Patient doing well today, she is still feeling weak, however a little better.   PT recommended skilled nursing facility, I will place order, social work to assist    Patient also seen by speech therapy who recommended the following  Diet: Diet / Liquid Recommendation: Nectar Thick Liquid, Dysphagia I    Consultants/Specialty  none    Code Status  full    Disposition  Tbd; PT/OT    Review of Systems  Review of Systems   Constitutional: Positive for malaise/fatigue. Negative for chills, fever and weight loss.   HENT: Positive for hearing loss. Negative for ear discharge, ear pain and nosebleeds.    Eyes: Negative for blurred vision, double vision, photophobia, pain and discharge.   Respiratory: Positive for cough, sputum production and shortness of breath.    Cardiovascular: Negative for chest pain, palpitations, orthopnea and claudication.   Gastrointestinal: Negative for abdominal pain, constipation, diarrhea, heartburn and vomiting.   Genitourinary: Negative for dysuria, frequency, hematuria and urgency.   Musculoskeletal: Negative for back pain, joint pain, myalgias and neck pain.   Skin: Negative for itching and rash.   Neurological: Positive for weakness. Negative for dizziness, tingling, tremors, sensory change, speech change and headaches.   Psychiatric/Behavioral: Negative for depression, hallucinations, substance abuse and suicidal ideas.        Physical Exam  Temp:  [36.2 °C (97.2 °F)-37.2 °C (98.9 °F)] 37 °C (98.6 °F)  Pulse:  [75-91] 75  Resp:  [17-18] 17  BP: (139-183)/(82-92)  139/84  SpO2:  [94 %-96 %] 96 %    Physical Exam   Constitutional: She is oriented to person, place, and time. She appears well-developed and well-nourished.   HENT:   Head: Normocephalic and atraumatic.   Mouth/Throat: Oropharynx is clear and moist.   dry   Eyes: Pupils are equal, round, and reactive to light. Conjunctivae and EOM are normal.   Neck: Normal range of motion. Neck supple. No JVD present. No thyromegaly present.   Cardiovascular: Normal rate, regular rhythm and intact distal pulses.    No murmur heard.  Pulmonary/Chest: Effort normal. No respiratory distress. She has wheezes. She has no rales. She exhibits no tenderness.   Abdominal: Soft. Bowel sounds are normal. She exhibits no distension and no mass. There is no tenderness. There is no rebound and no guarding.   Musculoskeletal: Normal range of motion. She exhibits no tenderness.   Lymphadenopathy:     She has no cervical adenopathy.   Neurological: She is alert and oriented to person, place, and time. No cranial nerve deficit. She exhibits normal muscle tone.   Skin: Skin is warm and dry. No rash noted. She is not diaphoretic. No erythema. No pallor.   Psychiatric: She has a normal mood and affect. Her behavior is normal.       Fluids    Intake/Output Summary (Last 24 hours) at 02/15/19 1145  Last data filed at 02/15/19 1000   Gross per 24 hour   Intake              770 ml   Output                0 ml   Net              770 ml       Laboratory  Recent Labs      02/13/19   0222  02/14/19   0146   WBC  8.9  5.8   RBC  3.47*  3.83*   HEMOGLOBIN  11.2*  12.2   HEMATOCRIT  34.3*  37.1   MCV  98.8*  96.9   MCH  32.3  31.9   MCHC  32.7*  32.9*   RDW  48.7  46.3   PLATELETCT  205  209   MPV  10.3  10.2     Recent Labs      02/14/19   2200  02/15/19   0607  02/15/19   0936   SODIUM  133*  133*  135   POTASSIUM  4.5  4.3  4.1   CHLORIDE  100  101  102   CO2  25  27  26   GLUCOSE  153*  161*  145*   BUN  19  20  19   CREATININE  0.81  0.72  0.67   CALCIUM   9.1  9.1  8.4*         Recent Labs      02/13/19   0222   BNPBTYPENAT  363*     Recent Labs      02/14/19   0146   TRIGLYCERIDE  44   HDL  51   LDL  95       Imaging  DX-CHEST-PORTABLE (1 VIEW)   Final Result      1.  COPD. No focal consolidation or pleural effusions.   2.  Cardiomegaly.      CT-CTA CHEST PULMONARY ARTERY W/ RECONS   Final Result         1. No pulmonary embolus.   2. Emphysema and acute versus chronic bronchitis.   3. Cardiomegaly with right heart dysfunction.   4. A 4.1 cm ascending aortic aneurysm.   5. Mildly enlarged lower paratracheal and hilar nodes, most likely reactive.                 Assessment/Plan  * Acute exacerbation of chronic obstructive pulmonary disease (COPD) ([facility])- (present on admission)   Assessment & Plan    2/2 acute COPD exacerbation vs hyponatremia   On 2L home O2: At baseline currently  Continue doxy   Scheduled duo nebs  I will switch her to oral steroids today continue to monitor  Supplement O2 as needed     Type II NSTEMI (non-ST elevated myocardial infarction) due to demand ischemia from hypoxia ([facility])- (present on admission)   Assessment & Plan    Likely demand ischemia from hypoxia and acute dhydration   No chest pain  Cardiac monitoring on tele  Serial troponin ; downtrending now  Already on pradaxa will continue   BB, ace   High dose statin added on admission       Hypokalemia   Assessment & Plan    K 3.5, replace and monitor     Anemia- (present on admission)   Assessment & Plan    Mild no evidence of bleeding   Cont to monitor      Acute metabolic encephalopathy- (present on admission)   Assessment & Plan    2/2 dehydration  Cont with IVF and monitor mental status; appears to be at baseline  Dementia at baseline      SNF recommended     Acute on chronic respiratory failure with hypoxia ([facility])- (present on admission)   Assessment & Plan    Due to COPD exacerbation and bronchitis; resolving  Cont to wean 02 as tolerated  Keep sats 88-92%     Hyponatremia- (present on  admission)   Assessment & Plan    Symptomatic with weakness and lethargy on admission, now resolved   hypovolemic by exam; continue IVF   urine and serum osmo    Avoid overcorrection     PAF (paroxysmal atrial fibrillation) (H[facility])- (present on admission)   Assessment & Plan    Resume home BB and pradaxa     Dyslipidemia- (present on admission)   Assessment & Plan    Statin      Hypertension- (present on admission)   Assessment & Plan    Controlled, continue with  home BP medications          VTE prophylaxis: pradaxa    Plan of care discussed with patient, nursing, social work.  SNF pending

## 2019-02-15 NOTE — DISCHARGE PLANNING
Anticipated Discharge Disposition: TBD.    Action: Dr. Marx states pt's daughter is refusing snf placement due to a bad experience in the past.  States daughter will have 24/7 caregivers at home if needed. RN HAVEN went to disuss with daughter but she is not in the room at the moment.    Barriers to Discharge: Medical clearance.    Plan: Speak with daughter to see what assistance can be offered for dc planning.

## 2019-02-15 NOTE — PROGRESS NOTES
Assumed care of patient, received bedside report from day shift RN, Pt A&Ox3 disoriented to event. On 2L of O2 via NC no SOB noted, has no complains of pain at this time. Family is at bedside. Call light within reach, personal belongings within reach. Bed is locked and in lowest position, bed Strip alarm is on. Tele monitor on. Hourly rounding in place.

## 2019-02-15 NOTE — CARE PLAN
Problem: Bowel/Gastric:  Goal: Normal bowel function is maintained or improved  Outcome: PROGRESSING AS EXPECTED  Patient has regular bowel movements and has no complaints of any complications.    Problem: Fluid Volume:  Goal: Will maintain balanced intake and output  Outcome: PROGRESSING AS EXPECTED  Patient has adequate intake and output.

## 2019-02-15 NOTE — PROGRESS NOTES
Received report on patient. She is sleeping in bed, has no indications of pain or distress. Bed alarm is on, bed in the lowest position, and call light and personal belongings within reach.

## 2019-02-15 NOTE — CARE PLAN
Problem: Venous Thromboembolism (VTW)/Deep Vein Thrombosis (DVT) Prevention:  Goal: Patient will participate in Venous Thrombosis (VTE)/Deep Vein Thrombosis (DVT)Prevention Measures  Outcome: PROGRESSING AS EXPECTED  VTE prophylaxis in place, pt educated on use and importance, pt verbalizes understanding and agrees to comply.      Problem: Pain Management  Goal: Pain level will decrease to patient's comfort goal  Outcome: PROGRESSING AS EXPECTED  Pt has no complains of pain at this time, will continue to re assess and medicate per MAR.

## 2019-02-15 NOTE — PROGRESS NOTES
Patient is resting in bed, has no complaints of pain and no indications of distress. Will continue to monitor.

## 2019-02-16 ENCOUNTER — APPOINTMENT (OUTPATIENT)
Dept: RADIOLOGY | Facility: MEDICAL CENTER | Age: 84
DRG: 280 | End: 2019-02-16
Attending: HOSPITALIST
Payer: MEDICARE

## 2019-02-16 ENCOUNTER — APPOINTMENT (OUTPATIENT)
Dept: CARDIOLOGY | Facility: MEDICAL CENTER | Age: 84
DRG: 280 | End: 2019-02-16
Attending: HOSPITALIST
Payer: MEDICARE

## 2019-02-16 ENCOUNTER — APPOINTMENT (OUTPATIENT)
Dept: RADIOLOGY | Facility: MEDICAL CENTER | Age: 84
DRG: 280 | End: 2019-02-16
Attending: INTERNAL MEDICINE
Payer: MEDICARE

## 2019-02-16 PROBLEM — I10 ESSENTIAL HYPERTENSION: Status: ACTIVE | Noted: 2019-02-16

## 2019-02-16 PROBLEM — R11.10 VOMITING: Status: ACTIVE | Noted: 2019-02-16

## 2019-02-16 LAB
ANION GAP SERPL CALC-SCNC: 7 MMOL/L (ref 0–11.9)
BNP SERPL-MCNC: 870 PG/ML (ref 0–100)
BUN SERPL-MCNC: 18 MG/DL (ref 8–22)
CALCIUM SERPL-MCNC: 8.2 MG/DL (ref 8.5–10.5)
CHLORIDE SERPL-SCNC: 102 MMOL/L (ref 96–112)
CO2 SERPL-SCNC: 26 MMOL/L (ref 20–33)
CREAT SERPL-MCNC: 0.71 MG/DL (ref 0.5–1.4)
EKG IMPRESSION: NORMAL
GLUCOSE SERPL-MCNC: 113 MG/DL (ref 65–99)
LV EJECT FRACT  99904: 55
LV EJECT FRACT MOD 2C 99903: 57.5
LV EJECT FRACT MOD 4C 99902: 59.01
LV EJECT FRACT MOD BP 99901: 62.37
POTASSIUM SERPL-SCNC: 3.5 MMOL/L (ref 3.6–5.5)
SODIUM SERPL-SCNC: 135 MMOL/L (ref 135–145)
TROPONIN I SERPL-MCNC: 0.11 NG/ML (ref 0–0.04)

## 2019-02-16 PROCEDURE — 770020 HCHG ROOM/CARE - TELE (206)

## 2019-02-16 PROCEDURE — 70450 CT HEAD/BRAIN W/O DYE: CPT

## 2019-02-16 PROCEDURE — 71045 X-RAY EXAM CHEST 1 VIEW: CPT

## 2019-02-16 PROCEDURE — 84484 ASSAY OF TROPONIN QUANT: CPT

## 2019-02-16 PROCEDURE — 93005 ELECTROCARDIOGRAM TRACING: CPT | Performed by: HOSPITALIST

## 2019-02-16 PROCEDURE — 93010 ELECTROCARDIOGRAM REPORT: CPT | Performed by: INTERNAL MEDICINE

## 2019-02-16 PROCEDURE — 700102 HCHG RX REV CODE 250 W/ 637 OVERRIDE(OP): Performed by: HOSPITALIST

## 2019-02-16 PROCEDURE — 700105 HCHG RX REV CODE 258: Performed by: HOSPITALIST

## 2019-02-16 PROCEDURE — 700111 HCHG RX REV CODE 636 W/ 250 OVERRIDE (IP): Performed by: HOSPITALIST

## 2019-02-16 PROCEDURE — 93306 TTE W/DOPPLER COMPLETE: CPT | Mod: 26 | Performed by: INTERNAL MEDICINE

## 2019-02-16 PROCEDURE — 93306 TTE W/DOPPLER COMPLETE: CPT

## 2019-02-16 PROCEDURE — 700101 HCHG RX REV CODE 250: Performed by: HOSPITALIST

## 2019-02-16 PROCEDURE — 36415 COLL VENOUS BLD VENIPUNCTURE: CPT

## 2019-02-16 PROCEDURE — 80048 BASIC METABOLIC PNL TOTAL CA: CPT

## 2019-02-16 PROCEDURE — 94640 AIRWAY INHALATION TREATMENT: CPT

## 2019-02-16 PROCEDURE — 83880 ASSAY OF NATRIURETIC PEPTIDE: CPT

## 2019-02-16 PROCEDURE — A9270 NON-COVERED ITEM OR SERVICE: HCPCS | Performed by: HOSPITALIST

## 2019-02-16 PROCEDURE — 99232 SBSQ HOSP IP/OBS MODERATE 35: CPT | Performed by: HOSPITALIST

## 2019-02-16 RX ORDER — AMLODIPINE BESYLATE 10 MG/1
10 TABLET ORAL
Status: DISCONTINUED | OUTPATIENT
Start: 2019-02-16 | End: 2019-02-19

## 2019-02-16 RX ORDER — HALOPERIDOL 5 MG/ML
2 INJECTION INTRAMUSCULAR ONCE
Status: COMPLETED | OUTPATIENT
Start: 2019-02-16 | End: 2019-02-16

## 2019-02-16 RX ORDER — METOPROLOL TARTRATE 1 MG/ML
5 INJECTION, SOLUTION INTRAVENOUS
Status: COMPLETED | OUTPATIENT
Start: 2019-02-16 | End: 2019-02-17

## 2019-02-16 RX ORDER — FUROSEMIDE 10 MG/ML
20 INJECTION INTRAMUSCULAR; INTRAVENOUS ONCE
Status: COMPLETED | OUTPATIENT
Start: 2019-02-16 | End: 2019-02-16

## 2019-02-16 RX ORDER — FUROSEMIDE 20 MG/1
20 TABLET ORAL
Status: DISCONTINUED | OUTPATIENT
Start: 2019-02-16 | End: 2019-02-16

## 2019-02-16 RX ORDER — ENALAPRIL MALEATE 2.5 MG/1
2.5 TABLET ORAL ONCE
Status: DISCONTINUED | OUTPATIENT
Start: 2019-02-16 | End: 2019-02-16

## 2019-02-16 RX ORDER — FUROSEMIDE 20 MG/1
20 TABLET ORAL ONCE
Status: DISCONTINUED | OUTPATIENT
Start: 2019-02-16 | End: 2019-02-16

## 2019-02-16 RX ORDER — ENALAPRILAT 1.25 MG/ML
2.5 INJECTION INTRAVENOUS EVERY 6 HOURS PRN
Status: DISCONTINUED | OUTPATIENT
Start: 2019-02-16 | End: 2019-02-16

## 2019-02-16 RX ORDER — FUROSEMIDE 10 MG/ML
10 INJECTION INTRAMUSCULAR; INTRAVENOUS ONCE
Status: COMPLETED | OUTPATIENT
Start: 2019-02-16 | End: 2019-02-16

## 2019-02-16 RX ADMIN — METOPROLOL TARTRATE 25 MG: 25 TABLET, FILM COATED ORAL at 06:10

## 2019-02-16 RX ADMIN — FLUTICASONE PROPIONATE 110 MCG: 110 AEROSOL, METERED RESPIRATORY (INHALATION) at 17:39

## 2019-02-16 RX ADMIN — HEPARIN SODIUM 5000 UNITS: 5000 INJECTION, SOLUTION INTRAVENOUS; SUBCUTANEOUS at 22:32

## 2019-02-16 RX ADMIN — METOPROLOL TARTRATE 5 MG: 5 INJECTION INTRAVENOUS at 14:30

## 2019-02-16 RX ADMIN — FUROSEMIDE 10 MG: 10 INJECTION, SOLUTION INTRAMUSCULAR; INTRAVENOUS at 10:13

## 2019-02-16 RX ADMIN — HEPARIN SODIUM 5000 UNITS: 5000 INJECTION, SOLUTION INTRAVENOUS; SUBCUTANEOUS at 06:09

## 2019-02-16 RX ADMIN — SODIUM CHLORIDE: 9 INJECTION, SOLUTION INTRAVENOUS at 17:48

## 2019-02-16 RX ADMIN — FUROSEMIDE 20 MG: 10 INJECTION, SOLUTION INTRAMUSCULAR; INTRAVENOUS at 13:21

## 2019-02-16 RX ADMIN — METOPROLOL TARTRATE 5 MG: 5 INJECTION INTRAVENOUS at 17:42

## 2019-02-16 RX ADMIN — SODIUM CHLORIDE: 9 INJECTION, SOLUTION INTRAVENOUS at 02:46

## 2019-02-16 RX ADMIN — DOXYCYCLINE 100 MG: 100 TABLET, FILM COATED ORAL at 06:10

## 2019-02-16 RX ADMIN — CLOTRIMAZOLE 10 MG: 10 LOZENGE ORAL; TOPICAL at 06:11

## 2019-02-16 RX ADMIN — IPRATROPIUM BROMIDE AND ALBUTEROL SULFATE 3 ML: .5; 3 SOLUTION RESPIRATORY (INHALATION) at 03:43

## 2019-02-16 RX ADMIN — POTASSIUM CHLORIDE 40 MEQ: 1500 TABLET, EXTENDED RELEASE ORAL at 06:10

## 2019-02-16 RX ADMIN — FLUTICASONE PROPIONATE 110 MCG: 110 AEROSOL, METERED RESPIRATORY (INHALATION) at 06:00

## 2019-02-16 RX ADMIN — HALOPERIDOL LACTATE 2 MG: 5 INJECTION, SOLUTION INTRAMUSCULAR at 04:07

## 2019-02-16 RX ADMIN — UMECLIDINIUM BROMIDE AND VILANTEROL TRIFENATATE 1 PUFF: 62.5; 25 POWDER RESPIRATORY (INHALATION) at 06:12

## 2019-02-16 RX ADMIN — PREDNISONE 40 MG: 20 TABLET ORAL at 06:09

## 2019-02-16 RX ADMIN — IRBESARTAN 150 MG: 150 TABLET ORAL at 06:10

## 2019-02-16 RX ADMIN — ONDANSETRON 4 MG: 2 INJECTION INTRAMUSCULAR; INTRAVENOUS at 08:32

## 2019-02-16 RX ADMIN — HYDRALAZINE HYDROCHLORIDE 10 MG: 20 INJECTION INTRAMUSCULAR; INTRAVENOUS at 02:40

## 2019-02-16 RX ADMIN — HYDROCHLOROTHIAZIDE 12.5 MG: 25 TABLET ORAL at 06:09

## 2019-02-16 ASSESSMENT — ENCOUNTER SYMPTOMS
HALLUCINATIONS: 0
VOMITING: 0
CONSTIPATION: 0
TINGLING: 0
HEMOPTYSIS: 0
WEIGHT LOSS: 0
EYE DISCHARGE: 0
HEADACHES: 0
WEAKNESS: 1
SPEECH CHANGE: 0
MYALGIAS: 0
PHOTOPHOBIA: 0
ABDOMINAL PAIN: 0
BACK PAIN: 0
COUGH: 0
DIZZINESS: 0
CHILLS: 0
DIARRHEA: 0
CLAUDICATION: 0
SENSORY CHANGE: 0
PALPITATIONS: 0
DOUBLE VISION: 0
SPUTUM PRODUCTION: 1
HEARTBURN: 0
BLURRED VISION: 0
SHORTNESS OF BREATH: 1
FEVER: 0
NECK PAIN: 0
TREMORS: 0
EYE PAIN: 0
DEPRESSION: 0
ORTHOPNEA: 0

## 2019-02-16 ASSESSMENT — LIFESTYLE VARIABLES: SUBSTANCE_ABUSE: 0

## 2019-02-16 NOTE — PROGRESS NOTES
Pt still complains of nausea. x1 vomit at 1000. Brownish, liquid. Nurse observed. Pt now sleeping, sitting up in bed. Physician made aware. Po meds held until resolved. Will continue to monitor.

## 2019-02-16 NOTE — PROGRESS NOTES
I discussed patient's plan of care [facility] Medicine Daily Progress Note    Date of Service  2/16/2019    Chief Complaint  87 y.o. female admitted 2/13/2019 with hyponatremia with weakness and lethargy    Hospital Course    patient admitted with symptomatic hyponatremia with weakness and lethargy. Also found to have Trop elevation, NSTEMI likely from demand ischemia. As well as acute COPD exacerbation      Interval Problem Update  Seen and examined this morning, patient doing ok, still with O2 requirement,spoke to daughter and she does not want her to go to SNF since they had a bad experience 10 yrs ago. Daughter and son are willing to assist her 24/7 at home.     Patient with COPD exacerbation  HTN uncontrolled, she is on triple therapy. cxr showing some edema, will trail lasix and obtain echo.    Consultants/Specialty  none    Code Status  full    Disposition  Tbd; PT/OT    Review of Systems  Review of Systems   Constitutional: Positive for malaise/fatigue. Negative for chills, fever and weight loss.   HENT: Positive for hearing loss. Negative for ear discharge, ear pain and nosebleeds.    Eyes: Negative for blurred vision, double vision, photophobia, pain and discharge.   Respiratory: Positive for sputum production and shortness of breath. Negative for cough and hemoptysis.    Cardiovascular: Negative for chest pain, palpitations, orthopnea and claudication.   Gastrointestinal: Negative for abdominal pain, constipation, diarrhea, heartburn and vomiting.   Genitourinary: Negative for dysuria, frequency, hematuria and urgency.   Musculoskeletal: Negative for back pain, joint pain, myalgias and neck pain.   Skin: Negative for itching and rash.   Neurological: Positive for weakness. Negative for dizziness, tingling, tremors, sensory change, speech change and headaches.   Psychiatric/Behavioral: Negative for depression, hallucinations, substance abuse and suicidal ideas.        Physical Exam  Temp:  [36.7 °C (98  °F)-37.1 °C (98.7 °F)] 36.7 °C (98 °F)  Pulse:  [73-94] 94  Resp:  [15-24] 18  BP: (139-196)/() 179/99  SpO2:  [93 %-98 %] 95 %    Physical Exam   Constitutional: She is oriented to person, place, and time. She appears well-developed and well-nourished.   HENT:   Head: Normocephalic and atraumatic.   Mouth/Throat: Oropharynx is clear and moist.   dry   Eyes: Pupils are equal, round, and reactive to light. Conjunctivae and EOM are normal.   Neck: Normal range of motion. Neck supple. No JVD present. No thyromegaly present.   Cardiovascular: Normal rate, regular rhythm and intact distal pulses.    No murmur heard.  Pulmonary/Chest: Effort normal. No respiratory distress. She has no wheezes. She has no rales. She exhibits no tenderness.   Abdominal: Soft. Bowel sounds are normal. She exhibits no distension and no mass. There is no tenderness. There is no rebound and no guarding.   Musculoskeletal: Normal range of motion. She exhibits no tenderness.   Lymphadenopathy:     She has no cervical adenopathy.   Neurological: She is alert and oriented to person, place, and time. No cranial nerve deficit. She exhibits normal muscle tone.   Skin: Skin is warm and dry. No rash noted. She is not diaphoretic. No erythema. No pallor.   Psychiatric: She has a normal mood and affect. Her behavior is normal.       Fluids    Intake/Output Summary (Last 24 hours) at 02/16/19 0700  Last data filed at 02/15/19 1800   Gross per 24 hour   Intake             1200 ml   Output                0 ml   Net             1200 ml       Laboratory  Recent Labs      02/14/19   0146   WBC  5.8   RBC  3.83*   HEMOGLOBIN  12.2   HEMATOCRIT  37.1   MCV  96.9   MCH  31.9   MCHC  32.9*   RDW  46.3   PLATELETCT  209   MPV  10.2     Recent Labs      02/14/19   2200  02/15/19   0607  02/15/19   0936   SODIUM  133*  133*  135   POTASSIUM  4.5  4.3  4.1   CHLORIDE  100  101  102   CO2  25  27  26   GLUCOSE  153*  161*  145*   BUN  19 20 19   CREATININE  0.81   0.72  0.67   CALCIUM  9.1  9.1  8.4*             Recent Labs      02/14/19   0146   TRIGLYCERIDE  44   HDL  51   LDL  95       Imaging  DX-CHEST-PORTABLE (1 VIEW)   Final Result      Cardiomegaly with mild interstitial edema.      DX-CHEST-PORTABLE (1 VIEW)   Final Result      1.  COPD. No focal consolidation or pleural effusions.   2.  Cardiomegaly.      CT-CTA CHEST PULMONARY ARTERY W/ RECONS   Final Result         1. No pulmonary embolus.   2. Emphysema and acute versus chronic bronchitis.   3. Cardiomegaly with right heart dysfunction.   4. A 4.1 cm ascending aortic aneurysm.   5. Mildly enlarged lower paratracheal and hilar nodes, most likely reactive.            EC-ECHOCARDIOGRAM COMPLETE W/O CONT    (Results Pending)        Assessment/Plan  * Acute exacerbation of chronic obstructive pulmonary disease (COPD) ([facility])- (present on admission)   Assessment & Plan    2/2 acute COPD exacerbation vs hyponatremia   On 2L home O2: At baseline currently  Continue doxy   Scheduled duo nebs  Oral steroids  Supplement O2 as needed     Type II NSTEMI (non-ST elevated myocardial infarction) due to demand ischemia from hypoxia ([facility])- (present on admission)   Assessment & Plan    Likely demand ischemia from hypoxia and acute dhydration   No chest pain  Cardiac monitoring on tele  Serial troponin ; downtrending now  Already on pradaxa will continue   BB, ace   High dose statin added on admission  Echo ordered       Vomiting   Assessment & Plan    Nausea and vomiting, unsure etiology  With acute respiratory failure  I will check EKG, Trop, BNP and CXR  zofran PRN       Essential hypertension   Assessment & Plan    Uncontrolled, elevated  She is on ARB, hctz and BB already  I will obtain an echo   Add lasix vs amlodipine     Hypokalemia   Assessment & Plan     replace and monitor     Anemia- (present on admission)   Assessment & Plan    Mild no evidence of bleeding   Cont to monitor      Acute metabolic encephalopathy- (present  on admission)   Assessment & Plan    2/2 dehydration  Cont with IVF and monitor mental status; appears to be at baseline  Dementia at baseline      SNF recommended     Acute on chronic respiratory failure with hypoxia (HCC)- (present on admission)   Assessment & Plan    Due to COPD exacerbation and bronchitis vs pulm edema  CXR showing some interstitial edema, will obtain Echo and trail lasix  Monitor renal function  Cont to wean 02 as tolerated  Keep sats 88-92%     Hyponatremia- (present on admission)   Assessment & Plan    Symptomatic with weakness and lethargy on admission, now resolved   hypovolemic by exam; continue IVF    Avoid overcorrection     PAF (paroxysmal atrial fibrillation) (HCC)- (present on admission)   Assessment & Plan    Resume home BB and pradaxa     Dyslipidemia- (present on admission)   Assessment & Plan    Statin      Hypertension- (present on admission)   Assessment & Plan    Controlled, continue with  home BP medications          VTE prophylaxis: pradaxa    Plan of care discussed with patient, nursing, social work.    Echo and lasix for elevated BP  Monitor BMPs    ADDENDUM: patient vomiting and nauseous  No chest pain or sob    Zofran PRN,   CXR, EKG, Trop and BNP stat

## 2019-02-16 NOTE — PROGRESS NOTES
Patient frequently climbs out of bed. Bed alarm and on. Lap belt applied. Patient demonstrates being able to unbuckle lap belt.

## 2019-02-16 NOTE — PROGRESS NOTES
Pt still vomiting, no po meds at this time. Physician made aware. Stat bnp, trop, cxry, ekg ordered.

## 2019-02-16 NOTE — PROGRESS NOTES
Patient continues to climb out of bed. Patient is agitated. BP high even after hydralazine push. BP cannot accurately be taken as patient does not sit still. Patient calls out and speaks multiple languages and cannot be understood. Attempted to use language line for Slovak but patient switches to speaking Wolof and English. Patient becomes tachypnic after attempting to climb out of bed. RR of 30+ and expiratory wheezes. Called RT to bedside. RT states he thinks patient is fluid overloaded and gives patient treatment. Called night hospitalist. Orders received for 2mg Haldol and CXR .

## 2019-02-16 NOTE — PROGRESS NOTES
Bedside shift report received. Assumed care of patient at this time. Patient sitting up in bed eating dinner. Family at bedside. Call light and personal items within reach. Bed alarm activated. No further requests per patient at this time.

## 2019-02-16 NOTE — ASSESSMENT & PLAN NOTE
Now hypotensive.  Hold blood pressure medications for now except for metoprolol for rate control.  Orthostatic was positive.  Gentle hydration.  Monitor blood pressure.

## 2019-02-16 NOTE — PROGRESS NOTES
Patient continuously climbing out of bed. Attempted to reorient patient and educate on call light. Patient agitated at this time. Frequent monitoring.

## 2019-02-16 NOTE — PROGRESS NOTES
Report received at bedside, pt aaox4, bed low and locked, no complaint of pain, call light in reach, daughter at bedside. Pt this am is experiencing some n/v this am. zofran given, am morning pills held for vomiting. Doctor made aware and at bedside. Will continue to monitor.

## 2019-02-17 ENCOUNTER — APPOINTMENT (OUTPATIENT)
Dept: RADIOLOGY | Facility: MEDICAL CENTER | Age: 84
DRG: 280 | End: 2019-02-17
Attending: HOSPITALIST
Payer: MEDICARE

## 2019-02-17 PROBLEM — I50.43 ACUTE ON CHRONIC COMBINED SYSTOLIC AND DIASTOLIC HEART FAILURE (HCC): Status: ACTIVE | Noted: 2019-02-17

## 2019-02-17 LAB
ANION GAP SERPL CALC-SCNC: 8 MMOL/L (ref 0–11.9)
BACTERIA SPEC RESP CULT: NORMAL
BUN SERPL-MCNC: 17 MG/DL (ref 8–22)
CALCIUM SERPL-MCNC: 7.7 MG/DL (ref 8.5–10.5)
CHLORIDE SERPL-SCNC: 98 MMOL/L (ref 96–112)
CO2 SERPL-SCNC: 27 MMOL/L (ref 20–33)
CREAT SERPL-MCNC: 0.65 MG/DL (ref 0.5–1.4)
GLUCOSE SERPL-MCNC: 122 MG/DL (ref 65–99)
POTASSIUM SERPL-SCNC: 3.7 MMOL/L (ref 3.6–5.5)
SIGNIFICANT IND 70042: NORMAL
SITE SITE: NORMAL
SODIUM SERPL-SCNC: 133 MMOL/L (ref 135–145)
SOURCE SOURCE: NORMAL

## 2019-02-17 PROCEDURE — 71045 X-RAY EXAM CHEST 1 VIEW: CPT

## 2019-02-17 PROCEDURE — 302131 K PAD MOTOR: Performed by: HOSPITALIST

## 2019-02-17 PROCEDURE — A9270 NON-COVERED ITEM OR SERVICE: HCPCS | Performed by: HOSPITALIST

## 2019-02-17 PROCEDURE — 700111 HCHG RX REV CODE 636 W/ 250 OVERRIDE (IP): Performed by: HOSPITALIST

## 2019-02-17 PROCEDURE — 700102 HCHG RX REV CODE 250 W/ 637 OVERRIDE(OP): Performed by: HOSPITALIST

## 2019-02-17 PROCEDURE — 99233 SBSQ HOSP IP/OBS HIGH 50: CPT | Performed by: HOSPITALIST

## 2019-02-17 PROCEDURE — 770020 HCHG ROOM/CARE - TELE (206)

## 2019-02-17 PROCEDURE — 36415 COLL VENOUS BLD VENIPUNCTURE: CPT

## 2019-02-17 PROCEDURE — 80048 BASIC METABOLIC PNL TOTAL CA: CPT

## 2019-02-17 PROCEDURE — 700105 HCHG RX REV CODE 258: Performed by: HOSPITALIST

## 2019-02-17 PROCEDURE — 700101 HCHG RX REV CODE 250: Performed by: HOSPITALIST

## 2019-02-17 PROCEDURE — 302151 K-PAD 3X20: Performed by: HOSPITALIST

## 2019-02-17 RX ORDER — FUROSEMIDE 20 MG/1
20 TABLET ORAL
Status: DISCONTINUED | OUTPATIENT
Start: 2019-02-17 | End: 2019-02-19

## 2019-02-17 RX ORDER — DABIGATRAN ETEXILATE 75 MG/1
75 CAPSULE ORAL 2 TIMES DAILY
Status: DISCONTINUED | OUTPATIENT
Start: 2019-02-17 | End: 2019-02-21 | Stop reason: HOSPADM

## 2019-02-17 RX ADMIN — HYDROCHLOROTHIAZIDE 12.5 MG: 25 TABLET ORAL at 06:38

## 2019-02-17 RX ADMIN — SODIUM CHLORIDE: 9 INJECTION, SOLUTION INTRAVENOUS at 06:52

## 2019-02-17 RX ADMIN — DOXYCYCLINE 100 MG: 100 TABLET, FILM COATED ORAL at 17:52

## 2019-02-17 RX ADMIN — ONDANSETRON 4 MG: 2 INJECTION INTRAMUSCULAR; INTRAVENOUS at 06:15

## 2019-02-17 RX ADMIN — METOPROLOL TARTRATE 5 MG: 5 INJECTION INTRAVENOUS at 19:48

## 2019-02-17 RX ADMIN — DABIGATRAN ETEXILATE MESYLATE 75 MG: 75 CAPSULE ORAL at 17:51

## 2019-02-17 RX ADMIN — UMECLIDINIUM BROMIDE AND VILANTEROL TRIFENATATE 1 PUFF: 62.5; 25 POWDER RESPIRATORY (INHALATION) at 06:46

## 2019-02-17 RX ADMIN — FLUTICASONE PROPIONATE 110 MCG: 110 AEROSOL, METERED RESPIRATORY (INHALATION) at 06:45

## 2019-02-17 RX ADMIN — FLUTICASONE PROPIONATE 110 MCG: 110 AEROSOL, METERED RESPIRATORY (INHALATION) at 17:51

## 2019-02-17 RX ADMIN — IRBESARTAN 150 MG: 150 TABLET ORAL at 06:44

## 2019-02-17 RX ADMIN — METOPROLOL TARTRATE 25 MG: 25 TABLET, FILM COATED ORAL at 06:38

## 2019-02-17 RX ADMIN — ONDANSETRON 4 MG: 2 INJECTION INTRAMUSCULAR; INTRAVENOUS at 19:58

## 2019-02-17 RX ADMIN — AMLODIPINE BESYLATE 10 MG: 10 TABLET ORAL at 06:39

## 2019-02-17 RX ADMIN — CLOTRIMAZOLE 10 MG: 10 LOZENGE ORAL; TOPICAL at 06:47

## 2019-02-17 RX ADMIN — PREDNISONE 40 MG: 20 TABLET ORAL at 06:35

## 2019-02-17 RX ADMIN — METOPROLOL TARTRATE 25 MG: 25 TABLET, FILM COATED ORAL at 17:52

## 2019-02-17 RX ADMIN — DOXYCYCLINE 100 MG: 100 TABLET, FILM COATED ORAL at 06:37

## 2019-02-17 RX ADMIN — FUROSEMIDE 20 MG: 20 TABLET ORAL at 11:02

## 2019-02-17 RX ADMIN — HEPARIN SODIUM 5000 UNITS: 5000 INJECTION, SOLUTION INTRAVENOUS; SUBCUTANEOUS at 06:47

## 2019-02-17 RX ADMIN — CLOTRIMAZOLE 10 MG: 10 LOZENGE ORAL; TOPICAL at 11:00

## 2019-02-17 RX ADMIN — POTASSIUM CHLORIDE 40 MEQ: 1500 TABLET, EXTENDED RELEASE ORAL at 06:34

## 2019-02-17 ASSESSMENT — ENCOUNTER SYMPTOMS
SPUTUM PRODUCTION: 0
EYE DISCHARGE: 0
ORTHOPNEA: 0
HEMOPTYSIS: 0
HEADACHES: 0
HEARTBURN: 0
NECK PAIN: 0
DIARRHEA: 0
SENSORY CHANGE: 0
CONSTIPATION: 0
PALPITATIONS: 0
MYALGIAS: 0
HALLUCINATIONS: 0
ABDOMINAL PAIN: 0
COUGH: 0
WEIGHT LOSS: 0
PHOTOPHOBIA: 0
BACK PAIN: 0
DOUBLE VISION: 0
CLAUDICATION: 0
FEVER: 0
DEPRESSION: 0
TREMORS: 0
BLURRED VISION: 0
SHORTNESS OF BREATH: 0
SPEECH CHANGE: 0
VOMITING: 0
WEAKNESS: 1
CHILLS: 0
DIZZINESS: 0
TINGLING: 0
EYE PAIN: 0

## 2019-02-17 ASSESSMENT — LIFESTYLE VARIABLES: SUBSTANCE_ABUSE: 0

## 2019-02-17 NOTE — ASSESSMENT & PLAN NOTE
Patient's respiratory symptoms are likely secondary to acute on chronic combined systolic and diastolic heart failure.  Initial chest x-ray showing concerns for pulmonary edema.  Echoshowed EF of 55% with grade 2 diastolic dysfunction.  Continue Lasix daily 20 mg  Discontinue fluids    Monitor BMP closely

## 2019-02-17 NOTE — PROGRESS NOTES
Received bedside report from JOSE León, pt care assumed, VSS, pt assessment complete. Pt AAOx4, no c/o pain at this time. No signs of acute distress noted at this time. POC discussed with pt and verbalizes no questions. Pt denies any additional needs at this time. Bed in lowest position, bed alarm on, pt educated on fall risk and verbalized understanding, call light within reach, hourly rounding initiated.

## 2019-02-17 NOTE — PROGRESS NOTES
I discussed patient's plan of care hospital Medicine Daily Progress Note    Date of Service  2/17/2019    Chief Complaint  87 y.o. female admitted 2/13/2019 with hyponatremia with weakness and lethargy    Hospital Course    patient admitted with symptomatic hyponatremia with weakness and lethargy. Also found to have Trop elevation, NSTEMI likely from demand ischemia. As well as acute COPD exacerbation      Interval Problem Update  Seen and examined this morning, patient doing ok, still with O2 requirement,spoke to daughter and she does not want her to go to SNF since they had a bad experience 10 yrs ago. Daughter and son are willing to assist her 24/7 at home.     Patient with COPD exacerbation  HTN uncontrolled, she is on triple therapy. cxr showing some edema, will trail lasix and obtain echo.    2/17-patient seen and examined this morning , she is doing much better this morning patient had a good night of sleep.  Her daughter spent the night.  Patient continues to be on 2-3 L of nasal cannula oxygen.  Per her daughter she wears oxygen at night only.  Patient did have some diarrhea last night and this morning.  Continue to monitor  Vomiting has resolved completely.  Chest x-ray done yesterday showed hyperinflation.  No focal consolidation or pleural effusions were seen.      Consultants/Specialty  none    Code Status  full    Disposition  Tbd; PT/OT-SNF  Daughter wants to talk to her brother to decide whether to take her home with home health or look at facilities    Review of Systems  Review of Systems   Constitutional: Positive for malaise/fatigue. Negative for chills, fever and weight loss.   HENT: Positive for hearing loss. Negative for ear discharge, ear pain and nosebleeds.    Eyes: Negative for blurred vision, double vision, photophobia, pain and discharge.   Respiratory: Negative for cough, hemoptysis, sputum production and shortness of breath.    Cardiovascular: Negative for chest pain, palpitations,  orthopnea and claudication.   Gastrointestinal: Negative for abdominal pain, constipation, diarrhea, heartburn and vomiting.   Genitourinary: Negative for dysuria, frequency, hematuria and urgency.   Musculoskeletal: Negative for back pain, joint pain, myalgias and neck pain.   Skin: Negative for itching and rash.   Neurological: Positive for weakness. Negative for dizziness, tingling, tremors, sensory change, speech change and headaches.   Psychiatric/Behavioral: Negative for depression, hallucinations, substance abuse and suicidal ideas.        Physical Exam  Temp:  [36.5 °C (97.7 °F)-37.3 °C (99.1 °F)] 36.8 °C (98.3 °F)  Pulse:  [75-96] 87  Resp:  [16-18] 18  BP: (102-172)/(77-89) 136/89  SpO2:  [95 %-98 %] 95 %    Physical Exam   Constitutional: She is oriented to person, place, and time. She appears well-developed and well-nourished.   HENT:   Head: Normocephalic and atraumatic.   Mouth/Throat: Oropharynx is clear and moist.   dry   Eyes: Pupils are equal, round, and reactive to light. Conjunctivae and EOM are normal.   Neck: Normal range of motion. Neck supple. No JVD present. No thyromegaly present.   Cardiovascular: Normal rate, regular rhythm and intact distal pulses.    No murmur heard.  Pulmonary/Chest: Effort normal. No respiratory distress. She has no wheezes. She has no rales. She exhibits no tenderness.   Abdominal: Soft. Bowel sounds are normal. She exhibits no distension and no mass. There is no tenderness. There is no rebound and no guarding.   Musculoskeletal: Normal range of motion. She exhibits no tenderness.   Lymphadenopathy:     She has no cervical adenopathy.   Neurological: She is alert and oriented to person, place, and time. No cranial nerve deficit. She exhibits normal muscle tone.   Skin: Skin is warm and dry. No rash noted. She is not diaphoretic. No erythema. No pallor.   Psychiatric: She has a normal mood and affect. Her behavior is normal.       Fluids    Intake/Output Summary (Last  24 hours) at 02/17/19 0733  Last data filed at 02/17/19 0600   Gross per 24 hour   Intake             1236 ml   Output               25 ml   Net             1211 ml       Laboratory      Recent Labs      02/15/19   0607  02/15/19   0936  02/16/19   0854   SODIUM  133*  135  135   POTASSIUM  4.3  4.1  3.5*   CHLORIDE  101  102  102   CO2  27  26  26   GLUCOSE  161*  145*  113*   BUN  20  19  18   CREATININE  0.72  0.67  0.71   CALCIUM  9.1  8.4*  8.2*         Recent Labs      02/16/19   1142   BNPBTYPENAT  870*           Imaging  EC-ECHOCARDIOGRAM COMPLETE W/O CONT   Final Result      CT-HEAD W/O   Final Result      1.  No acute intracranial findings.      2.  Diffuse atrophy and periventricular white matter changes, consistent with chronic small vessel disease.      3.  Pansinusitis, possibly acute         DX-CHEST-PORTABLE (1 VIEW)   Final Result      No significant interval change.      DX-CHEST-PORTABLE (1 VIEW)   Final Result      Cardiomegaly with mild interstitial edema.      DX-CHEST-PORTABLE (1 VIEW)   Final Result      1.  COPD. No focal consolidation or pleural effusions.   2.  Cardiomegaly.      CT-CTA CHEST PULMONARY ARTERY W/ RECONS   Final Result         1. No pulmonary embolus.   2. Emphysema and acute versus chronic bronchitis.   3. Cardiomegaly with right heart dysfunction.   4. A 4.1 cm ascending aortic aneurysm.   5. Mildly enlarged lower paratracheal and hilar nodes, most likely reactive.                 Assessment/Plan  * Acute on chronic combined systolic and diastolic heart failure (HCC)   Assessment & Plan    Patient's respiratory symptoms are likely secondary to acute on chronic combined systolic and diastolic heart failure.  Initial chest x-ray showing concerns for pulmonary edema.  Echo done yesterday showed EF of 55% with grade 2 diastolic dysfunction.  I will add on Lasix daily 20 mg     Type II NSTEMI (non-ST elevated myocardial infarction) due to demand ischemia from hypoxia ([facility])-  (present on admission)   Assessment & Plan    Likely demand ischemia from hypoxia and acute dhydration   No chest pain  Cardiac monitoring on tele  Serial troponin ; downtrending now  Already on pradaxa will continue   BB, ace   High dose statin added on admission  Echo as above       Vomiting   Assessment & Plan    Resolved  Continue Zofran as needed       Essential hypertension   Assessment & Plan    Now controlled  She is on ARB, hctz and BB already; blood pressure much better controlled with addition of Lasix.  Negative troponin  BNP over 800   Echo noted       Hypokalemia   Assessment & Plan     replace and monitor     Anemia- (present on admission)   Assessment & Plan    Mild no evidence of bleeding   Cont to monitor      Acute metabolic encephalopathy- (present on admission)   Assessment & Plan    2/2 dehydration  Cont with IVF and monitor mental status; appears to be at baseline  Dementia at baseline      SNF recommended     Acute exacerbation of chronic obstructive pulmonary disease (COPD) ([facility])- (present on admission)   Assessment & Plan    Improving, 2/2 acute COPD exacerbation vs hyponatremia   On 2L home O2: At baseline currently; however she will require home oxygen at all times.  O2 eval per nursing today  Continue doxy   Scheduled duo nebs  Oral steroids  Supplement O2 as needed     Acute on chronic respiratory failure with hypoxia (HCC)- (present on admission)   Assessment & Plan    Due to COPD exacerbation and bronchitis vs pulm edema  CXR showing some interstitial edema,   Echo noted: Left ventricular ejection fraction is visually estimated to be 55%.  Grade II diastolic dysfunction.  Monitor renal function  Cont to wean 02 as tolerated  Keep sats 88-92%     Hyponatremia- (present on admission)   Assessment & Plan    Symptomatic with weakness and lethargy on admission, now resolved   hypovolemic by exam; continue IVF    Avoid overcorrection     PAF (paroxysmal atrial fibrillation) ([facility])- (present  on admission)   Assessment & Plan    Resume home BB and pradaxa     Dyslipidemia- (present on admission)   Assessment & Plan    Statin      's    VTE prophylaxis: pradaxa    Plan of care discussed with patient, nursing, social work.    Acute COPD exacerbation  Acute on chronic combined heart failure  Needs home O2 evaluation per nursing  Social work to assist with discharge planning      I spent a total of 45 minutes during this clinical encounter of which > 50% was devoted to counseling and coordinating care including review of records, pertinent lab data and studies, as well as discussing diagnostic evaluation and work up, planned therapeutic interventions and future disposition of care. Where indicated, the assessment and plan reflect discussion of patient with consultants, other healthcare providers, family members, and additional research needed to obtain further information in formulating the plan of care of this patient.

## 2019-02-17 NOTE — CARE PLAN
Problem: Safety  Goal: Will remain free from falls  Outcome: PROGRESSING AS EXPECTED  Patient moved from semi-private room to a private room so family could remain with client throughout the night. Attempted to escalate and obtain a 1-1 sitter, but none were available. Providing hourly rounding. Bed alarm on x 2. Bed locked and in lowest position. Call light within reach.

## 2019-02-17 NOTE — PROGRESS NOTES
Assumed care of patient. Assessment complete. Patient has no complaints at this time. Daughter Yashira At bedside. Educated to use call light for assistance. Patient is a HIGH FALL RISK according to [facility]atal[facility] Horowitz Fall Risk Assessment. Fall precautions in place. Tele box in place. Will continue to monitor with hourly rounding.

## 2019-02-18 PROBLEM — G93.41 ACUTE METABOLIC ENCEPHALOPATHY: Status: RESOLVED | Noted: 2019-02-13 | Resolved: 2019-02-18

## 2019-02-18 PROBLEM — J20.9 ACUTE BRONCHITIS: Status: RESOLVED | Noted: 2019-02-13 | Resolved: 2019-02-18

## 2019-02-18 PROBLEM — E87.6 HYPOKALEMIA: Status: RESOLVED | Noted: 2019-02-14 | Resolved: 2019-02-18

## 2019-02-18 PROBLEM — R00.0 TACHYCARDIA: Status: ACTIVE | Noted: 2019-02-18

## 2019-02-18 PROBLEM — J96.21 ACUTE ON CHRONIC RESPIRATORY FAILURE WITH HYPOXIA (HCC): Status: RESOLVED | Noted: 2019-02-13 | Resolved: 2019-02-18

## 2019-02-18 PROBLEM — R00.0 TACHYCARDIA: Status: RESOLVED | Noted: 2019-02-18 | Resolved: 2019-02-18

## 2019-02-18 PROBLEM — E87.1 HYPONATREMIA: Status: RESOLVED | Noted: 2019-02-13 | Resolved: 2019-02-18

## 2019-02-18 PROBLEM — I50.43 ACUTE ON CHRONIC COMBINED SYSTOLIC AND DIASTOLIC HEART FAILURE (HCC): Status: RESOLVED | Noted: 2019-02-17 | Resolved: 2019-02-18

## 2019-02-18 PROBLEM — J44.1 ACUTE EXACERBATION OF CHRONIC OBSTRUCTIVE PULMONARY DISEASE (COPD) (HCC): Status: RESOLVED | Noted: 2019-02-13 | Resolved: 2019-02-18

## 2019-02-18 PROBLEM — R11.10 VOMITING: Status: RESOLVED | Noted: 2019-02-16 | Resolved: 2019-02-18

## 2019-02-18 LAB
ANION GAP SERPL CALC-SCNC: 6 MMOL/L (ref 0–11.9)
BACTERIA BLD CULT: NORMAL
BACTERIA BLD CULT: NORMAL
BUN SERPL-MCNC: 17 MG/DL (ref 8–22)
CALCIUM SERPL-MCNC: 8.4 MG/DL (ref 8.5–10.5)
CHLORIDE SERPL-SCNC: 97 MMOL/L (ref 96–112)
CO2 SERPL-SCNC: 30 MMOL/L (ref 20–33)
CREAT SERPL-MCNC: 0.8 MG/DL (ref 0.5–1.4)
EKG IMPRESSION: NORMAL
GLUCOSE SERPL-MCNC: 114 MG/DL (ref 65–99)
PHOSPHATE SERPL-MCNC: 2.4 MG/DL (ref 2.5–4.5)
POTASSIUM SERPL-SCNC: 3.4 MMOL/L (ref 3.6–5.5)
SIGNIFICANT IND 70042: NORMAL
SIGNIFICANT IND 70042: NORMAL
SITE SITE: NORMAL
SITE SITE: NORMAL
SODIUM SERPL-SCNC: 133 MMOL/L (ref 135–145)
SOURCE SOURCE: NORMAL
SOURCE SOURCE: NORMAL
TROPONIN I SERPL-MCNC: 0.06 NG/ML (ref 0–0.04)

## 2019-02-18 PROCEDURE — 700102 HCHG RX REV CODE 250 W/ 637 OVERRIDE(OP): Performed by: HOSPITALIST

## 2019-02-18 PROCEDURE — 700111 HCHG RX REV CODE 636 W/ 250 OVERRIDE (IP): Performed by: HOSPITALIST

## 2019-02-18 PROCEDURE — A9270 NON-COVERED ITEM OR SERVICE: HCPCS | Performed by: HOSPITALIST

## 2019-02-18 PROCEDURE — 84484 ASSAY OF TROPONIN QUANT: CPT

## 2019-02-18 PROCEDURE — 36415 COLL VENOUS BLD VENIPUNCTURE: CPT

## 2019-02-18 PROCEDURE — 93005 ELECTROCARDIOGRAM TRACING: CPT | Performed by: HOSPITALIST

## 2019-02-18 PROCEDURE — 84100 ASSAY OF PHOSPHORUS: CPT

## 2019-02-18 PROCEDURE — 80048 BASIC METABOLIC PNL TOTAL CA: CPT

## 2019-02-18 PROCEDURE — 93010 ELECTROCARDIOGRAM REPORT: CPT | Performed by: INTERNAL MEDICINE

## 2019-02-18 PROCEDURE — 92526 ORAL FUNCTION THERAPY: CPT

## 2019-02-18 PROCEDURE — 770020 HCHG ROOM/CARE - TELE (206)

## 2019-02-18 PROCEDURE — 99233 SBSQ HOSP IP/OBS HIGH 50: CPT | Performed by: HOSPITALIST

## 2019-02-18 PROCEDURE — 97530 THERAPEUTIC ACTIVITIES: CPT

## 2019-02-18 RX ORDER — ATORVASTATIN CALCIUM 80 MG/1
80 TABLET, FILM COATED ORAL EVERY EVENING
Qty: 30 TAB | Refills: 0 | Status: SHIPPED | OUTPATIENT
Start: 2019-02-18 | End: 2019-02-21

## 2019-02-18 RX ORDER — METOPROLOL TARTRATE 1 MG/ML
5 INJECTION, SOLUTION INTRAVENOUS ONCE
Status: DISPENSED | OUTPATIENT
Start: 2019-02-18 | End: 2019-02-19

## 2019-02-18 RX ORDER — FUROSEMIDE 20 MG/1
20 TABLET ORAL DAILY
Qty: 10 TAB | Refills: 0 | Status: SHIPPED | OUTPATIENT
Start: 2019-02-19 | End: 2019-02-21

## 2019-02-18 RX ORDER — POTASSIUM CHLORIDE 20 MEQ/1
20 TABLET, EXTENDED RELEASE ORAL DAILY
Qty: 15 TAB | Refills: 0 | Status: SHIPPED | OUTPATIENT
Start: 2019-02-19 | End: 2019-02-21

## 2019-02-18 RX ORDER — AMLODIPINE BESYLATE 10 MG/1
5 TABLET ORAL DAILY
Qty: 30 TAB | Refills: 0 | Status: SHIPPED | OUTPATIENT
Start: 2019-02-19 | End: 2019-02-21

## 2019-02-18 RX ORDER — ACETAMINOPHEN 325 MG/1
650 TABLET ORAL EVERY 6 HOURS PRN
Status: DISCONTINUED | OUTPATIENT
Start: 2019-02-18 | End: 2019-02-21 | Stop reason: HOSPADM

## 2019-02-18 RX ORDER — PREDNISONE 20 MG/1
40 TABLET ORAL DAILY
Qty: 2 TAB | Refills: 0 | Status: SHIPPED | OUTPATIENT
Start: 2019-02-18 | End: 2019-02-21

## 2019-02-18 RX ADMIN — IRBESARTAN 150 MG: 150 TABLET ORAL at 08:40

## 2019-02-18 RX ADMIN — HYDROCHLOROTHIAZIDE 12.5 MG: 25 TABLET ORAL at 08:40

## 2019-02-18 RX ADMIN — CLOTRIMAZOLE 10 MG: 10 LOZENGE ORAL; TOPICAL at 16:31

## 2019-02-18 RX ADMIN — FLUTICASONE PROPIONATE 110 MCG: 110 AEROSOL, METERED RESPIRATORY (INHALATION) at 08:39

## 2019-02-18 RX ADMIN — PREDNISONE 40 MG: 20 TABLET ORAL at 08:41

## 2019-02-18 RX ADMIN — METOPROLOL TARTRATE 25 MG: 25 TABLET, FILM COATED ORAL at 08:40

## 2019-02-18 RX ADMIN — DABIGATRAN ETEXILATE MESYLATE 75 MG: 75 CAPSULE ORAL at 08:40

## 2019-02-18 RX ADMIN — DABIGATRAN ETEXILATE MESYLATE 75 MG: 75 CAPSULE ORAL at 17:39

## 2019-02-18 RX ADMIN — ONDANSETRON 4 MG: 2 INJECTION INTRAMUSCULAR; INTRAVENOUS at 06:07

## 2019-02-18 RX ADMIN — FLUTICASONE PROPIONATE 110 MCG: 110 AEROSOL, METERED RESPIRATORY (INHALATION) at 17:39

## 2019-02-18 RX ADMIN — UMECLIDINIUM BROMIDE AND VILANTEROL TRIFENATATE 1 PUFF: 62.5; 25 POWDER RESPIRATORY (INHALATION) at 08:41

## 2019-02-18 RX ADMIN — ACETAMINOPHEN 650 MG: 325 TABLET, FILM COATED ORAL at 08:39

## 2019-02-18 RX ADMIN — CLOTRIMAZOLE 10 MG: 10 LOZENGE ORAL; TOPICAL at 12:13

## 2019-02-18 RX ADMIN — FUROSEMIDE 20 MG: 20 TABLET ORAL at 08:40

## 2019-02-18 RX ADMIN — Medication 400 MG: at 12:13

## 2019-02-18 RX ADMIN — AMLODIPINE BESYLATE 10 MG: 10 TABLET ORAL at 08:39

## 2019-02-18 ASSESSMENT — ENCOUNTER SYMPTOMS
DEPRESSION: 0
EYE PAIN: 0
HEMOPTYSIS: 0
COUGH: 0
TREMORS: 0
BACK PAIN: 0
SPEECH CHANGE: 0
CHILLS: 0
CLAUDICATION: 0
VOMITING: 0
EYE DISCHARGE: 0
SPUTUM PRODUCTION: 0
DOUBLE VISION: 0
BLURRED VISION: 0
MYALGIAS: 0
ORTHOPNEA: 0
WEAKNESS: 1
ABDOMINAL PAIN: 0
FEVER: 0
HALLUCINATIONS: 0
HEADACHES: 0
PHOTOPHOBIA: 0
PALPITATIONS: 0
TINGLING: 0
SHORTNESS OF BREATH: 0
HEARTBURN: 0
NECK PAIN: 0
CONSTIPATION: 0
WEIGHT LOSS: 0
DIZZINESS: 0
SENSORY CHANGE: 0
DIARRHEA: 0

## 2019-02-18 ASSESSMENT — COGNITIVE AND FUNCTIONAL STATUS - GENERAL
MOBILITY SCORE: 18
CLIMB 3 TO 5 STEPS WITH RAILING: A LITTLE
MOVING TO AND FROM BED TO CHAIR: A LITTLE
MOVING FROM LYING ON BACK TO SITTING ON SIDE OF FLAT BED: A LITTLE
WALKING IN HOSPITAL ROOM: A LITTLE
STANDING UP FROM CHAIR USING ARMS: A LITTLE
TURNING FROM BACK TO SIDE WHILE IN FLAT BAD: A LITTLE
SUGGESTED CMS G CODE MODIFIER MOBILITY: CK

## 2019-02-18 ASSESSMENT — GAIT ASSESSMENTS: GAIT LEVEL OF ASSIST: UNABLE TO PARTICIPATE

## 2019-02-18 ASSESSMENT — LIFESTYLE VARIABLES: SUBSTANCE_ABUSE: 0

## 2019-02-18 ASSESSMENT — PATIENT HEALTH QUESTIONNAIRE - PHQ9
SUM OF ALL RESPONSES TO PHQ9 QUESTIONS 1 AND 2: 0
1. LITTLE INTEREST OR PLEASURE IN DOING THINGS: NOT AT ALL
2. FEELING DOWN, DEPRESSED, IRRITABLE, OR HOPELESS: NOT AT ALL

## 2019-02-18 NOTE — DIETARY
"Nutrition services: Day 5 of admit.  Venus Church is a 87 y.o. female with admitting DX of NSTEMI and hyponatremia.   Consult received for poor PO intake r/t low appetite.  RD visited pt at bedside to discuss appetite and PO intake.  Pt reports she does not have an appetite and that \"there is nothing you can do for me.\"  Pt denies vomiting, diarrhea, or constipation however endorses some nausea and abdominal pain - pt notes this has improved.  RD reviewed snacks and supplements at this time with pt - pt was not very interactive or receptive.  Pt continued playing cards and did not look up or answer any more questions.  Per supplements comment, \"Family stated she drinks Ensure at home and also likes ice cream.\"  RD to add these as snacks.  RD continues to monitor.     Assessment:  Height: 157.5 cm (5' 2\")  Weight: 49.8 kg (109 lb 12.6 oz)  Body mass index is 20.08 kg/m². - WNL.   Diet/Intake: Regular, dysphagia 1, thin liquids with 1:1 supervision.  Per chart, pt with variable PO intake ranging from <25%-100% however the last 3 meals documented are 0% consumed.     Evaluation:   1. Pt noted with acute bronchitis, acute on chronic combined systolic and diastolic heart failure, vomiting, COPD, hypokalemia, and essential HTN.  2. SLP following.   3. Wt from 2/13 (admit) was 54.4 kg via bed scale and wt from today (2/18) is 49.8 kg via bed scale.  This is a 8.5% wt loss over the past 5 days - likely severe however pt also carries a hx of chronic combined systolic and diastolic heart failure and noted to be on Lasix per MAR.  Wt loss may be partially r/t to fluid status.  RD will continue to monitor wt trends.   4. Labs: Sodium: 133, Potassium: 3.4, Glucose: 114, Phosphorus: 2.4.  5. Meds: Lasix, Mag-ox, Prednisone, Senokot.  6. Last BM: 2/17.    Malnutrition Risk: Not enough criteria to diagnose at this time.  Cont to monitor.     Recommendations/Plan:  1. Boost Plus BID.   2. Encourage intake of meals and " supplements.  3. Document intake of all meals and supplements as % taken in ADL's to provide interdisciplinary communication across all shifts.   4. Pt may benefit from an appetite stimulant.   5. Monitor weight.  6. Nutrition rep will continue to see patient for ongoing meal and snack preferences.     RD following.

## 2019-02-18 NOTE — FACE TO FACE
Face to Face Note  -  Durable Medical Equipment    Jennifer Marx M.D. - NPI: [id number]  I certify that this patient is under my care and that they had a durable medical equipment(DME)face to face encounter by myself that meets the physician DME face-to-face encounter requirements with this patient on:    Date of encounter:   Patient:                    MRN:                       YOB: 2019  Venus Church  [id number]  4/22/1931     The encounter with the patient was in whole, or in part, for the following medical condition, which is the primary reason for durable medical equipment:  CHF    I certify that, based on my findings, the following durable medical equipment is medically necessary:  Oxygen.    HOME O2 Saturation Measurements:(Values must be present for Home Oxygen orders)  Room air sat at rest: 93  Room air sat with amb: 87  With liters of O2: 2, O2 sat at rest with O2: 94  With Liters of O2: 4, O2 sat with amb with O2 : 95  Is the patient mobile?: Yes    My Clinical findings support the need for the above equipment due to:  Hypoxia    Supporting Symptoms:hypoxia despite diuresis

## 2019-02-18 NOTE — PROGRESS NOTES
Family is open to patient going to a SNF. but would prefer to have client discharged home initially while the family researches and tours available skilled nursing / rehab facilities.

## 2019-02-18 NOTE — THERAPY
"Speech Language Therapy dysphagia treatment completed.   Functional Status:  Patient seen for dysphagia therapy on this date with son and daughter at bedside.  Family stated the patient usually consumes a soft solid diet with thin liquids. She presented with a poor appetite and required encouragement to consume PO trials of soft solids, oatmeal, and thin liquids.  The patient demonstrated prolonged mastication of soft solids and repeatedly stated, \"I can't chew!\"  The patient eventually swallowed the bolus but declined all other soft solids.  She demonstrated adequate mastication with oatmeal and the patient's daughter stated the patient consumed a \"mashed\" banana yesterday without difficulty.  The patient took sips of thin liquids and had no overt s/sx of aspiration with any consistency consumed, however, she was only agreeable to minimal PO trials.    Recommendations: At this time, recommend Dysphagia I/thin liquids with DIRECT supervision during meals.  There are concerns that the patient is not meeting her nutritional needs due to poor appetite and c/o nausea.  Discussed case with MD and received orders for supplements.  SLP following    Plan of Care: Will benefit from Speech Therapy 3 times per week  Post-Acute Therapy: Recommend inpatient transitional care services for continued speech therapy services.        See \"Rehab Therapy-Acute\" Patient Summary Report for complete documentation.     "

## 2019-02-18 NOTE — THERAPY
"Pt demonstrating physical imporvements in mobility, though is primarily limied by inappropriate cardiac response to exertion, limited to EOB only. Pt vitals were:  EOB 1003/59 HR ; supine 105/69 HR 78; EOB 83/48 , whioch is indicative of an inappropriate response, therefore further mobility deferred 2' pt experiencing dizziness. Pt demonstrtaed L laterallean upon sitting on EOB, suspect 2' becoming dizzy, requiring redirection to lay down. Pt would benefit from further acute PT tx sto progress towards goals and independence. Recommend post acute placement at an inpatient transitional care facility if unable to aquire 24/7 caregiving/support at home.    Physical Therapy Treatment completed.   Bed Mobility:  Supine to Sit: Minimal Assist  Transfers: Sit to Stand: Unable to Participate  Gait: Level Of Assist: Unable to Participate        Plan of Care: Will benefit from Physical Therapy 3 times per week    See \"Rehab Therapy-Acute\" Patient Summary Report for complete documentation.       "

## 2019-02-18 NOTE — PROGRESS NOTES
I discussed patient's plan of care hospital Medicine Daily Progress Note    Date of Service  2/18/2019    Chief Complaint  87 y.o. female admitted 2/13/2019 with hyponatremia with weakness and lethargy    Hospital Course    patient admitted with symptomatic hyponatremia with weakness and lethargy. Also found to have Trop elevation, NSTEMI likely from demand ischemia. As well as acute COPD exacerbation      Interval Problem Update  Seen and examined this morning, patient doing ok, still with O2 requirement,spoke to daughter and she does not want her to go to SNF since they had a bad experience 10 yrs ago. Daughter and son are willing to assist her 24/7 at home.     Patient with COPD exacerbation  HTN uncontrolled, she is on triple therapy. cxr showing some edema, will trail lasix and obtain echo.    2/17-patient seen and examined this morning , she is doing much better this morning patient had a good night of sleep.  Her daughter spent the night.  Patient continues to be on 2-3 L of nasal cannula oxygen.  Per her daughter she wears oxygen at night only.  Patient did have some diarrhea last night and this morning.  Continue to monitor  Vomiting has resolved completely.  Chest x-ray done yesterday showed hyperinflation.  No focal consolidation or pleural effusions were seen.    2/18- seen and examined, daughter and son at bedside. Patient still feeling weak. Daughter and son wondering if its safer to take her home with their assistance or snf. We discussed this in great detail today  Otherwise patient denies any chest pains, palpitations or shortness of breath.  She is down to 2 L of oxygen at this point      Consultants/Specialty  none    Code Status  full    Disposition  Tbd; PT/OT-SNF  Daughter wants to talk to her brother to decide whether to take her home with home health or look at facilities    Review of Systems  Review of Systems   Constitutional: Positive for malaise/fatigue. Negative for chills, fever and  weight loss.   HENT: Positive for hearing loss. Negative for ear discharge, ear pain and nosebleeds.    Eyes: Negative for blurred vision, double vision, photophobia, pain and discharge.   Respiratory: Negative for cough, hemoptysis, sputum production and shortness of breath.    Cardiovascular: Negative for chest pain, palpitations, orthopnea and claudication.   Gastrointestinal: Negative for abdominal pain, constipation, diarrhea, heartburn and vomiting.   Genitourinary: Negative for dysuria, frequency, hematuria and urgency.   Musculoskeletal: Negative for back pain, joint pain, myalgias and neck pain.   Skin: Negative for itching and rash.   Neurological: Positive for weakness. Negative for dizziness, tingling, tremors, sensory change, speech change and headaches.   Psychiatric/Behavioral: Negative for depression, hallucinations, substance abuse and suicidal ideas.        Physical Exam  Temp:  [36.2 °C (97.1 °F)-37.1 °C (98.8 °F)] 37.1 °C (98.8 °F)  Pulse:  [] 89  Resp:  [16-18] 16  BP: (140-162)/(67-88) 140/67  SpO2:  [90 %-98 %] 90 %    Physical Exam   Constitutional: She is oriented to person, place, and time. She appears well-developed and well-nourished.   HENT:   Head: Normocephalic and atraumatic.   Mouth/Throat: Oropharynx is clear and moist.   dry   Eyes: Pupils are equal, round, and reactive to light. Conjunctivae and EOM are normal.   Neck: Normal range of motion. Neck supple. No JVD present. No thyromegaly present.   Cardiovascular: Normal rate, regular rhythm and intact distal pulses.    No murmur heard.  Pulmonary/Chest: Effort normal. No respiratory distress. She has no wheezes. She has no rales. She exhibits no tenderness.   Abdominal: Soft. Bowel sounds are normal. She exhibits no distension and no mass. There is no tenderness. There is no rebound and no guarding.   Musculoskeletal: Normal range of motion. She exhibits no tenderness.   Lymphadenopathy:     She has no cervical adenopathy.    Neurological: She is alert and oriented to person, place, and time. No cranial nerve deficit. She exhibits normal muscle tone.   Skin: Skin is warm and dry. No rash noted. She is not diaphoretic. No erythema. No pallor.   Psychiatric: She has a normal mood and affect. Her behavior is normal.       Fluids    Intake/Output Summary (Last 24 hours) at 02/18/19 1134  Last data filed at 02/18/19 1000   Gross per 24 hour   Intake                0 ml   Output              150 ml   Net             -150 ml       Laboratory      Recent Labs      02/16/19   0854  02/17/19   0836  02/18/19   0857   SODIUM  135  133*  133*   POTASSIUM  3.5*  3.7  3.4*   CHLORIDE  102  98  97   CO2  26  27  30   GLUCOSE  113*  122*  114*   BUN  18  17  17   CREATININE  0.71  0.65  0.80   CALCIUM  8.2*  7.7*  8.4*         Recent Labs      02/16/19   1142   BNPBTYPENAT  870*           Imaging  DX-CHEST-PORTABLE (1 VIEW)   Final Result      Hyperinflation. No focal consolidation or pleural effusions.      EC-ECHOCARDIOGRAM COMPLETE W/O CONT   Final Result      CT-HEAD W/O   Final Result      1.  No acute intracranial findings.      2.  Diffuse atrophy and periventricular white matter changes, consistent with chronic small vessel disease.      3.  Pansinusitis, possibly acute         DX-CHEST-PORTABLE (1 VIEW)   Final Result      No significant interval change.      DX-CHEST-PORTABLE (1 VIEW)   Final Result      Cardiomegaly with mild interstitial edema.      DX-CHEST-PORTABLE (1 VIEW)   Final Result      1.  COPD. No focal consolidation or pleural effusions.   2.  Cardiomegaly.      CT-CTA CHEST PULMONARY ARTERY W/ RECONS   Final Result         1. No pulmonary embolus.   2. Emphysema and acute versus chronic bronchitis.   3. Cardiomegaly with right heart dysfunction.   4. A 4.1 cm ascending aortic aneurysm.   5. Mildly enlarged lower paratracheal and hilar nodes, most likely reactive.                 Assessment/Plan  * Acute on chronic combined  systolic and diastolic heart failure (HCC)   Assessment & Plan    Patient's respiratory symptoms are likely secondary to acute on chronic combined systolic and diastolic heart failure.  Initial chest x-ray showing concerns for pulmonary edema.  Echoshowed EF of 55% with grade 2 diastolic dysfunction.  Continue Lasix daily 20 mg  Discontinue fluids    Monitor BMP closely     Type II NSTEMI (non-ST elevated myocardial infarction) due to demand ischemia from hypoxia (HCC)- (present on admission)   Assessment & Plan    Likely demand ischemia from hypoxia and acute dhydration   No chest pain  Cardiac monitoring on tele  Serial troponin ; downtrending now  Already on pradaxa will continue   BB, ace   High dose statin added on admission  Echo as above       Vomiting   Assessment & Plan    Resolved  Continue Zofran as needed       Essential hypertension   Assessment & Plan    Continue ARB, hctz and BB and Lasix   negative troponin  BNP over 800; spot dose with IV Lasix, however continue p.o. Lasix daily  Echo noted       Hypokalemia   Assessment & Plan     replace and monitor     Anemia- (present on admission)   Assessment & Plan    Mild no evidence of bleeding   Cont to monitor      Acute metabolic encephalopathy- (present on admission)   Assessment & Plan    She is back to her baseline mentation: History of dementia          SNF recommended     Acute exacerbation of chronic obstructive pulmonary disease (COPD) (HCC)- (present on admission)   Assessment & Plan    Improving, 2/2 acute COPD exacerbation vs hyponatremia   On 2L at home only at nighttime: At baseline currently; however she will require home oxygen at all times.  O2 eval per nursing; I discussed this in great detail with her daughter and the patient that she will require oxygen at all times.  Completed doxycycline for 5 days  Scheduled duo nebs  Oral steroids 4/5 days  Supplement O2 as needed     Acute on chronic respiratory failure with hypoxia (HCC)- (present  on admission)   Assessment & Plan    Due to COPD exacerbation and bronchitis vs pulm edema  CXR showing some interstitial edema,   Echo noted: Left ventricular ejection fraction is visually estimated to be 55%.  Grade II diastolic dysfunction.  Monitor renal function  Cont to wean 02 as tolerated  Keep sats 88-92%    Breathing back to baseline now     Hyponatremia- (present on admission)   Assessment & Plan    Symptomatic with weakness and lethargy on admission, now resolved       Avoid overcorrection     PAF (paroxysmal atrial fibrillation) (HCC)- (present on admission)   Assessment & Plan    Resume home BB and pradaxa     Dyslipidemia- (present on admission)   Assessment & Plan    Statin          VTE prophylaxis: pradaxa    Plan of care discussed with patient, nursing, social work.    Acute COPD exacerbation  Acute on chronic combined heart failure  Needs home O2 evaluation per nursing  Social work to assist with discharge planning HH vs SNF

## 2019-02-18 NOTE — PROGRESS NOTES
Received bedside report from RN Kayla, pt care assumed, VS showed /88; medicated client per MAR PRN orders, pt assessment complete. Pt AAOx3, no c/o pain at this time, complains of nausea; medicated with Zofran. No signs of acute distress noted at this time. POC discussed with pt and verbalizes no questions. Pt denies any additional needs at this time. Bed in lowest position, bed alarm on x 2, pt educated on fall risk and verbalized understanding, call light within reach, hourly rounding initiated.

## 2019-02-18 NOTE — DISCHARGE PLANNING
Orders received for HH and hm 02. Met with pt and dtr Yashira at bedside and they would like to choose [facility] for hm [facility]. They are also requesting to have a FWW and a bedside commode; will discuss with Dr. Marx.

## 2019-02-18 NOTE — FACE TO FACE
Face to Face Supporting Documentation - Home Health    The encounter with this patient was in whole or in part the primary reason for home health admission.    Date of encounter:   Patient:                    MRN:                       YOB: 2019  Venus Church  [id number]  4/22/1931     Home health to see patient for:  Skilled Nursing care for assessment, interventions & education, Physical Therapy evaluation and treatment, Occupational therapy evaluation and treatment and Speech Language Pathology evaluation and treatment    Skilled need for:  Exacerbation of Chronic Disease State chf    Skilled nursing interventions to include:  Comment: pt/ot    Homebound status evidenced by:  Need the aid of supportive devices such as crutches, canes, wheelchairs or walkers, Require the use of special transportation, Needs the assistance of another person in order to leave the home or Have a condition such that leaving his or her home is medically contraindicated. Leaving home requires a considerable and taxing effort. There is a normal inability to leave the home.    Community Physician to provide follow up care: Denis Krueger M.D.     Optional Interventions? No      I certify the face to face encounter for this home health care referral meets the CMS requirements and the encounter/clinical assessment with the patient was, in whole, or in part, for the medical condition(s) listed above, which is the primary reason for home health care. Based on my clinical findings: the service(s) are medically necessary, support the need for home health care, and the homebound criteria are met.  I certify that this patient has had a face to face encounter by myself.  Jennifer Marx M.D. - NPI: [id number]

## 2019-02-18 NOTE — PROGRESS NOTES
Patient resting in bed for majority of morning. MD was able to round with patient early on. Plan of care discussed. Patient declines any new onsets of pain. Patient declines any additional needs at this time. Call light within reach. Fall precautions in place. Will continue with hourly rounding.

## 2019-02-18 NOTE — CARE PLAN
Problem: Nutritional:  Goal: Achieve adequate nutritional intake  Patient will consume 50% or greater of meals  Outcome: PROGRESSING SLOWER THAN EXPECTED

## 2019-02-18 NOTE — ASSESSMENT & PLAN NOTE
Telemetry picked up junctional tachycardia  I will obtain a 12-lead EKG  I will also check a mag and phosphorus  Metoprolol 5 mg x1

## 2019-02-18 NOTE — DISCHARGE PLANNING
Updated Dr. Marx regarding acceptance to  and that oxygen is on its way. Orders for FWW and bedside commode were requested in rounds and she states that she will place these.

## 2019-02-18 NOTE — CARE PLAN
Problem: Safety  Goal: Will remain free from falls  Outcome: PROGRESSING AS EXPECTED  Patient educated to use call light for assistance. Fall precautions in place. Staff will assist with mobilization. Hourly rounding in place.    Problem: Respiratory:  Goal: Respiratory status will improve  Outcome: PROGRESSING AS EXPECTED  Provided patient and family with a verbal discussion related to Incentive Spirometer. Patient provided return demonstration and required re-education. Expect client to require encouragement.

## 2019-02-18 NOTE — PROGRESS NOTES
Bedside report received. Patient sleeping in bed. RN assessed pain; nonverbal pain indicator indicates no assumed pain present at this time. Call light within reach. Need for bed alarm assessed; patient is at high risk for falls. Precautions in place as appropriate.

## 2019-02-18 NOTE — PROGRESS NOTES
Patient refused all Morning medications due to nausea. Medicated client with Zofran 4 mg IV. Patient open to receiving medications later this AM.      12 Hour CC

## 2019-02-18 NOTE — DISCHARGE PLANNING
Received Choice form at 1120  Agency/Facility Name: [facility]  Referral sent per Choice form @ 1120    Received Choice form at 1120  Agency/Facility Name: [facility]  Referral sent per Choice form @ 112

## 2019-02-19 PROBLEM — I50.30 (HFPEF) HEART FAILURE WITH PRESERVED EJECTION FRACTION (HCC): Status: ACTIVE | Noted: 2019-02-19

## 2019-02-19 LAB
ANION GAP SERPL CALC-SCNC: 7 MMOL/L (ref 0–11.9)
ANION GAP SERPL CALC-SCNC: 9 MMOL/L (ref 0–11.9)
BUN SERPL-MCNC: 25 MG/DL (ref 8–22)
BUN SERPL-MCNC: 25 MG/DL (ref 8–22)
CALCIUM SERPL-MCNC: 9.3 MG/DL (ref 8.5–10.5)
CALCIUM SERPL-MCNC: 9.4 MG/DL (ref 8.5–10.5)
CHLORIDE SERPL-SCNC: 91 MMOL/L (ref 96–112)
CHLORIDE SERPL-SCNC: 94 MMOL/L (ref 96–112)
CO2 SERPL-SCNC: 31 MMOL/L (ref 20–33)
CO2 SERPL-SCNC: 31 MMOL/L (ref 20–33)
CREAT SERPL-MCNC: 0.93 MG/DL (ref 0.5–1.4)
CREAT SERPL-MCNC: 0.95 MG/DL (ref 0.5–1.4)
GLUCOSE SERPL-MCNC: 124 MG/DL (ref 65–99)
GLUCOSE SERPL-MCNC: 127 MG/DL (ref 65–99)
MAGNESIUM SERPL-MCNC: 2.1 MG/DL (ref 1.5–2.5)
POTASSIUM SERPL-SCNC: 3.3 MMOL/L (ref 3.6–5.5)
POTASSIUM SERPL-SCNC: 3.8 MMOL/L (ref 3.6–5.5)
SODIUM SERPL-SCNC: 131 MMOL/L (ref 135–145)
SODIUM SERPL-SCNC: 132 MMOL/L (ref 135–145)

## 2019-02-19 PROCEDURE — A9270 NON-COVERED ITEM OR SERVICE: HCPCS | Performed by: HOSPITALIST

## 2019-02-19 PROCEDURE — 36415 COLL VENOUS BLD VENIPUNCTURE: CPT

## 2019-02-19 PROCEDURE — 700102 HCHG RX REV CODE 250 W/ 637 OVERRIDE(OP): Performed by: HOSPITALIST

## 2019-02-19 PROCEDURE — 83735 ASSAY OF MAGNESIUM: CPT

## 2019-02-19 PROCEDURE — 700105 HCHG RX REV CODE 258: Performed by: FAMILY MEDICINE

## 2019-02-19 PROCEDURE — 80048 BASIC METABOLIC PNL TOTAL CA: CPT | Mod: 91

## 2019-02-19 PROCEDURE — 770020 HCHG ROOM/CARE - TELE (206)

## 2019-02-19 PROCEDURE — 99233 SBSQ HOSP IP/OBS HIGH 50: CPT | Performed by: FAMILY MEDICINE

## 2019-02-19 PROCEDURE — 700111 HCHG RX REV CODE 636 W/ 250 OVERRIDE (IP): Performed by: HOSPITALIST

## 2019-02-19 RX ORDER — HALOPERIDOL 2 MG/ML
1 SOLUTION ORAL ONCE
Status: DISCONTINUED | OUTPATIENT
Start: 2019-02-19 | End: 2019-02-20

## 2019-02-19 RX ORDER — SODIUM CHLORIDE 9 MG/ML
INJECTION, SOLUTION INTRAVENOUS CONTINUOUS
Status: DISPENSED | OUTPATIENT
Start: 2019-02-19 | End: 2019-02-20

## 2019-02-19 RX ADMIN — IRBESARTAN 150 MG: 150 TABLET ORAL at 06:07

## 2019-02-19 RX ADMIN — FLUTICASONE PROPIONATE 110 MCG: 110 AEROSOL, METERED RESPIRATORY (INHALATION) at 17:32

## 2019-02-19 RX ADMIN — CLOTRIMAZOLE 10 MG: 10 LOZENGE ORAL; TOPICAL at 22:39

## 2019-02-19 RX ADMIN — PREDNISONE 40 MG: 20 TABLET ORAL at 06:12

## 2019-02-19 RX ADMIN — CLOTRIMAZOLE 10 MG: 10 LOZENGE ORAL; TOPICAL at 12:20

## 2019-02-19 RX ADMIN — HYDROCHLOROTHIAZIDE 12.5 MG: 25 TABLET ORAL at 06:06

## 2019-02-19 RX ADMIN — Medication 400 MG: at 06:05

## 2019-02-19 RX ADMIN — FUROSEMIDE 20 MG: 20 TABLET ORAL at 06:07

## 2019-02-19 RX ADMIN — CLOTRIMAZOLE 10 MG: 10 LOZENGE ORAL; TOPICAL at 16:30

## 2019-02-19 RX ADMIN — DABIGATRAN ETEXILATE MESYLATE 75 MG: 75 CAPSULE ORAL at 17:32

## 2019-02-19 RX ADMIN — POTASSIUM CHLORIDE 40 MEQ: 1500 TABLET, EXTENDED RELEASE ORAL at 06:14

## 2019-02-19 RX ADMIN — SODIUM CHLORIDE: 9 INJECTION, SOLUTION INTRAVENOUS at 15:40

## 2019-02-19 RX ADMIN — UMECLIDINIUM BROMIDE AND VILANTEROL TRIFENATATE 1 PUFF: 62.5; 25 POWDER RESPIRATORY (INHALATION) at 06:17

## 2019-02-19 RX ADMIN — METOPROLOL TARTRATE 25 MG: 25 TABLET, FILM COATED ORAL at 06:10

## 2019-02-19 RX ADMIN — CLOTRIMAZOLE 10 MG: 10 LOZENGE ORAL; TOPICAL at 08:40

## 2019-02-19 RX ADMIN — DABIGATRAN ETEXILATE MESYLATE 75 MG: 75 CAPSULE ORAL at 06:09

## 2019-02-19 RX ADMIN — AMLODIPINE BESYLATE 10 MG: 10 TABLET ORAL at 06:04

## 2019-02-19 RX ADMIN — CLOTRIMAZOLE 10 MG: 10 LOZENGE ORAL; TOPICAL at 01:05

## 2019-02-19 RX ADMIN — FLUTICASONE PROPIONATE 110 MCG: 110 AEROSOL, METERED RESPIRATORY (INHALATION) at 06:16

## 2019-02-19 ASSESSMENT — ENCOUNTER SYMPTOMS
HEARTBURN: 0
VOMITING: 0
DIARRHEA: 0
MYALGIAS: 0
PALPITATIONS: 0
ORTHOPNEA: 0
NAUSEA: 0
DOUBLE VISION: 0
DIZZINESS: 0
PHOTOPHOBIA: 0
SENSORY CHANGE: 0
CHILLS: 0
CLAUDICATION: 0
HEMOPTYSIS: 0
BLURRED VISION: 0
TREMORS: 0
TINGLING: 0
DEPRESSION: 0
NECK PAIN: 0
FEVER: 0
COUGH: 0
WEAKNESS: 1
ABDOMINAL PAIN: 0
EYE PAIN: 0
HEADACHES: 0

## 2019-02-19 NOTE — CONSULTS
"Reason for PC Consult: Advance Care Planning    Consulted by:   Dr. Marx     Assessment:  General:   87 year old female admitted for NSTEMI on 2/13/19. Pt has a history of dementia, COPD, HTN, aortic regurgitation, valvular disease, DDD, dyspnea, osteoarthritis, hypercholesteremia, hyperlipidemia, NOC home O2, osteoporosis, Latent TB, lumbar vertebral fracture.     Dyspnea: No, 98% on 2L NC  Last BM: 02/17/19    Pain: Yes, Chronic lower back pain from prior injury  Depression: No    Dementia: Yes,  2    Spiritual:  Is Yazidism or spirituality important for coping with this illness? Yes, Family will make arrangements if they choose  Has a  or spiritual provider visit been requested? No    Palliative Performance Scale: 60%    Advance Directive: None on File   DPOA: None on File, NAA Falk Ranjit ([phone])  POLST: None on File    Code Status: Full      Outcome:  PC RN visited pt and daughter Ivana at bedside, introduced self and role of PC. Ivana stated that she would discuss GOC on behalf of pt, due to cultural requirements, Pt verbalized agreement. Discussed Ivana's understanding of clinical picture, Ivana verbalized understanding of dementia diagnosis, COPD diagnosis, and the reason for admission. PC RN attempted to discuss the progressive and terminal nature of both dementia and COPD, Ivana expressed discussing it in private away from pt. \"There is no need for her [the pt] to be burdened with such information. When my brother gets back today we can discuss it with him\".    PC RN discussed pt's functional baseline at home, per Ivana pt was entirely independent. She currently lives alone, her son Dileep comes to visit through out the day to ensure pt has eaten and doing well. Between Ivana and Dileep pt is cared for, and are willing to increase the additional support if needed. Ivana stated the family isn't sure if pt needs SNF or home with home health. PC RN provided general education about what both provide in regards " "to PT & OT. Ivana states they want to bring pt back home, because she knows pt's with Alzheimer's do better at home.    Ivana stated that pt was diagnosed 6 months ago with \"early onset Alzheimer's\". And that since this is a new diagnosis they feel pt's clinical progression is just beginning. PC RN explained the importance in discussing GOC, based on the known trajectory of the disease processes it is important to discuss pt's wishes as the disease progresses. Ivana verbalized understanding, however would like to discuss in private with her brother present.     Ivana requested to have a meeting with the doctor, PC RN, herself and her brother. They have been given different information on pt's clinical picture, with every doctor they are given different information. Scheduled a care conference for 1330 today.    PC RN provided contact card, encouraged Ivana to call with any questions or concerns.    Active listening, education, validation and emotional support provided.     Updated:   Dr. Miles, [facility]    Plan:   Meet with family to discuss GOC and current clinical picture.     Recommendations: I do not recommend an ethics or hospice consult at this time because GOC have not been established.    Thank you for allowing Palliative Care to participate in this patient's care. Please feel free to call [phone] with any questions or concerns.  "

## 2019-02-19 NOTE — CARE PLAN
Problem: Safety  Goal: Will remain free from falls    Intervention: Implement fall precautions   02/19/19 1251   OTHER   Environmental Precautions Treaded Slipper Socks on Patient;Personal Belongings, Wastebasket, Call Bell etc. in Easy Reach;Transferred to Stronger Side;Communication Sign for Patients & Families;Bed in Low Position;Report Given to Other Health Care Providers Regarding Fall Risk;Mobility Assessed & Appropriate Sign Placed   Bedrails Bedrails Closest to Bathroom Down   Chair/Bed Strip Alarm Yes - Alarm On   Bed Alarm Yes - Alarm On         Problem: Discharge Barriers/Planning  Goal: Patient's continuum of care needs will be met    Intervention: Assess potential discharge barriers on admission and throughout hospital stay  RN will assess potential discharge barriers throughout patient's hospital stay.

## 2019-02-19 NOTE — THERAPY
Occupational Therapy Contact Note    Attempted OT treatment, RN requesting to hold today as patient is not feeling well with low BP, about to receive a bolus. Will attempt tomorrow as appropriate.    Swathi Dixon, OTR/L

## 2019-02-19 NOTE — FACE TO FACE
"Face to Face Note  -  Durable Medical Equipment    Jennifer Marx M.D. - NPI: [id number]  I certify that this patient is under my care and that they had a durable medical equipment(DME)face to face encounter by myself that meets the physician DME face-to-face encounter requirements with this patient on:    Date of encounter:   Patient:                    MRN:                       YOB: 2019  Venus Church  [id number]  4/22/1931     The encounter with the patient was in whole, or in part, for the following medical condition, which is the primary reason for durable medical equipment:  CHF    I certify that, based on my findings, the following durable medical equipment is medically necessary:  Walkers and Other DME Equipment - bedside cammode.    HOME O2 Saturation Measurements:(Values must be present for Home Oxygen orders)  Room air sat at rest: 93  Room air sat with amb: 87  With liters of O2: 2, O2 sat at rest with O2: 94  With Liters of O2: 4, O2 sat with amb with O2 : 95  Is the patient mobile?: Yes    My Clinical findings support the need for the above equipment due to:  Abnormal Gait    Supporting Symptoms: The patient requires supplemental oxygen, as the following interventions have been tried with limited or no improvement: \"Ambulation with oximetry    "

## 2019-02-19 NOTE — PROGRESS NOTES
Bedside report received. Patient sitting up in bed. Family at bedside. RN assessed pain; patient declines pain present at this time. Patient educated to call for assistance before ambulating; call light within reach. Need for bed alarm assessed; patient is at high risk for falls, bed alarm in place, fall precautions in place as appropriate.

## 2019-02-19 NOTE — CARE PLAN
Problem: Communication  Goal: The ability to communicate needs accurately and effectively will improve  Outcome: PROGRESSING AS EXPECTED  Patient educated to utilize call light. Patient and family oriented to hospital room. Patient encouraged to ask questions about plan of care. Patient effectively uses call light and is involved in POC.    Problem: Safety  Goal: Will remain free from injury  Outcome: PROGRESSING AS EXPECTED  Pt remains free from falls at this time. Safety precautions in place. Pt educated on calling for assistance when needed.

## 2019-02-19 NOTE — DISCHARGE PLANNING
Agency/Facility Name: [facility]  Spoke To: Anastasiia  Outcome: Patient referral for Oxygen accepted, and delivered to bedside.    Walker and Commode pending review. Call back in 2 hrs.  Amelia (JOSE CM) notified of status.

## 2019-02-19 NOTE — PROGRESS NOTES
MS   SR 80-99 occasionally in and out of Accelerated junctional   Frequent PAC, Frequent PJC, Frequent PVC  16/08/38

## 2019-02-20 PROBLEM — J96.10 CHRONIC RESPIRATORY FAILURE (HCC): Status: ACTIVE | Noted: 2019-02-13

## 2019-02-20 PROBLEM — D72.829 LEUKOCYTOSIS: Status: ACTIVE | Noted: 2019-02-20

## 2019-02-20 PROBLEM — I95.1 ORTHOSTATIC HYPOTENSION: Status: ACTIVE | Noted: 2019-02-20

## 2019-02-20 PROBLEM — R41.89 COGNITIVE IMPAIRMENT: Status: ACTIVE | Noted: 2019-02-20

## 2019-02-20 PROBLEM — R53.81 DEBILITY: Status: ACTIVE | Noted: 2019-02-20

## 2019-02-20 LAB
ALBUMIN SERPL BCP-MCNC: 3.2 G/DL (ref 3.2–4.9)
ALBUMIN/GLOB SERPL: 1.6 G/DL
ALP SERPL-CCNC: 51 U/L (ref 30–99)
ALT SERPL-CCNC: 25 U/L (ref 2–50)
ANION GAP SERPL CALC-SCNC: 8 MMOL/L (ref 0–11.9)
AST SERPL-CCNC: 18 U/L (ref 12–45)
BASOPHILS # BLD AUTO: 0.2 % (ref 0–1.8)
BASOPHILS # BLD: 0.03 K/UL (ref 0–0.12)
BILIRUB SERPL-MCNC: 0.5 MG/DL (ref 0.1–1.5)
BUN SERPL-MCNC: 27 MG/DL (ref 8–22)
CALCIUM SERPL-MCNC: 9.3 MG/DL (ref 8.5–10.5)
CHLORIDE SERPL-SCNC: 95 MMOL/L (ref 96–112)
CO2 SERPL-SCNC: 29 MMOL/L (ref 20–33)
CREAT SERPL-MCNC: 0.93 MG/DL (ref 0.5–1.4)
EOSINOPHIL # BLD AUTO: 0.18 K/UL (ref 0–0.51)
EOSINOPHIL NFR BLD: 1.1 % (ref 0–6.9)
ERYTHROCYTE [DISTWIDTH] IN BLOOD BY AUTOMATED COUNT: 45.1 FL (ref 35.9–50)
GLOBULIN SER CALC-MCNC: 2 G/DL (ref 1.9–3.5)
GLUCOSE SERPL-MCNC: 120 MG/DL (ref 65–99)
HCT VFR BLD AUTO: 40.5 % (ref 37–47)
HGB BLD-MCNC: 13.6 G/DL (ref 12–16)
IMM GRANULOCYTES # BLD AUTO: 0.5 K/UL (ref 0–0.11)
IMM GRANULOCYTES NFR BLD AUTO: 3.1 % (ref 0–0.9)
LYMPHOCYTES # BLD AUTO: 2.59 K/UL (ref 1–4.8)
LYMPHOCYTES NFR BLD: 16.1 % (ref 22–41)
MCH RBC QN AUTO: 32 PG (ref 27–33)
MCHC RBC AUTO-ENTMCNC: 33.6 G/DL (ref 33.6–35)
MCV RBC AUTO: 95.3 FL (ref 81.4–97.8)
MONOCYTES # BLD AUTO: 1.05 K/UL (ref 0–0.85)
MONOCYTES NFR BLD AUTO: 6.5 % (ref 0–13.4)
NEUTROPHILS # BLD AUTO: 11.76 K/UL (ref 2–7.15)
NEUTROPHILS NFR BLD: 73 % (ref 44–72)
NRBC # BLD AUTO: 0 K/UL
NRBC BLD-RTO: 0 /100 WBC
PLATELET # BLD AUTO: 346 K/UL (ref 164–446)
PMV BLD AUTO: 10.2 FL (ref 9–12.9)
POTASSIUM SERPL-SCNC: 3.5 MMOL/L (ref 3.6–5.5)
PROCALCITONIN SERPL-MCNC: <0.05 NG/ML
PROT SERPL-MCNC: 5.2 G/DL (ref 6–8.2)
RBC # BLD AUTO: 4.25 M/UL (ref 4.2–5.4)
SODIUM SERPL-SCNC: 132 MMOL/L (ref 135–145)
WBC # BLD AUTO: 16.1 K/UL (ref 4.8–10.8)

## 2019-02-20 PROCEDURE — 700102 HCHG RX REV CODE 250 W/ 637 OVERRIDE(OP): Performed by: FAMILY MEDICINE

## 2019-02-20 PROCEDURE — A9270 NON-COVERED ITEM OR SERVICE: HCPCS | Performed by: HOSPITALIST

## 2019-02-20 PROCEDURE — 99233 SBSQ HOSP IP/OBS HIGH 50: CPT | Performed by: FAMILY MEDICINE

## 2019-02-20 PROCEDURE — 770020 HCHG ROOM/CARE - TELE (206)

## 2019-02-20 PROCEDURE — 97530 THERAPEUTIC ACTIVITIES: CPT

## 2019-02-20 PROCEDURE — A9270 NON-COVERED ITEM OR SERVICE: HCPCS | Performed by: FAMILY MEDICINE

## 2019-02-20 PROCEDURE — 700102 HCHG RX REV CODE 250 W/ 637 OVERRIDE(OP): Performed by: HOSPITALIST

## 2019-02-20 PROCEDURE — 80053 COMPREHEN METABOLIC PANEL: CPT

## 2019-02-20 PROCEDURE — 36415 COLL VENOUS BLD VENIPUNCTURE: CPT

## 2019-02-20 PROCEDURE — 85025 COMPLETE CBC W/AUTO DIFF WBC: CPT

## 2019-02-20 PROCEDURE — 84145 PROCALCITONIN (PCT): CPT

## 2019-02-20 RX ORDER — ATORVASTATIN CALCIUM 40 MG/1
40 TABLET, FILM COATED ORAL EVERY EVENING
Status: DISCONTINUED | OUTPATIENT
Start: 2019-02-21 | End: 2019-02-21 | Stop reason: HOSPADM

## 2019-02-20 RX ORDER — QUETIAPINE FUMARATE 25 MG/1
25 TABLET, FILM COATED ORAL EVERY EVENING
Status: DISCONTINUED | OUTPATIENT
Start: 2019-02-20 | End: 2019-02-21 | Stop reason: HOSPADM

## 2019-02-20 RX ADMIN — FLUTICASONE PROPIONATE 110 MCG: 110 AEROSOL, METERED RESPIRATORY (INHALATION) at 17:31

## 2019-02-20 RX ADMIN — ATORVASTATIN CALCIUM 80 MG: 80 TABLET, FILM COATED ORAL at 17:31

## 2019-02-20 RX ADMIN — UMECLIDINIUM BROMIDE AND VILANTEROL TRIFENATATE 1 PUFF: 62.5; 25 POWDER RESPIRATORY (INHALATION) at 05:58

## 2019-02-20 RX ADMIN — CLOTRIMAZOLE 10 MG: 10 LOZENGE ORAL; TOPICAL at 16:28

## 2019-02-20 RX ADMIN — DABIGATRAN ETEXILATE MESYLATE 75 MG: 75 CAPSULE ORAL at 05:58

## 2019-02-20 RX ADMIN — CLOTRIMAZOLE 10 MG: 10 LOZENGE ORAL; TOPICAL at 20:12

## 2019-02-20 RX ADMIN — CLOTRIMAZOLE 10 MG: 10 LOZENGE ORAL; TOPICAL at 08:30

## 2019-02-20 RX ADMIN — DABIGATRAN ETEXILATE MESYLATE 75 MG: 75 CAPSULE ORAL at 17:31

## 2019-02-20 RX ADMIN — Medication 400 MG: at 05:58

## 2019-02-20 RX ADMIN — CLOTRIMAZOLE 10 MG: 10 LOZENGE ORAL; TOPICAL at 12:36

## 2019-02-20 RX ADMIN — QUETIAPINE FUMARATE 25 MG: 25 TABLET ORAL at 20:49

## 2019-02-20 RX ADMIN — FLUTICASONE PROPIONATE 110 MCG: 110 AEROSOL, METERED RESPIRATORY (INHALATION) at 05:58

## 2019-02-20 RX ADMIN — METOPROLOL TARTRATE 25 MG: 25 TABLET, FILM COATED ORAL at 17:31

## 2019-02-20 RX ADMIN — METOPROLOL TARTRATE 25 MG: 25 TABLET, FILM COATED ORAL at 05:58

## 2019-02-20 RX ADMIN — POTASSIUM CHLORIDE 40 MEQ: 1500 TABLET, EXTENDED RELEASE ORAL at 05:54

## 2019-02-20 ASSESSMENT — COGNITIVE AND FUNCTIONAL STATUS - GENERAL
CLIMB 3 TO 5 STEPS WITH RAILING: A LOT
TURNING FROM BACK TO SIDE WHILE IN FLAT BAD: A LITTLE
WALKING IN HOSPITAL ROOM: A LITTLE
STANDING UP FROM CHAIR USING ARMS: A LITTLE
SUGGESTED CMS G CODE MODIFIER MOBILITY: CK
MOBILITY SCORE: 17
MOVING FROM LYING ON BACK TO SITTING ON SIDE OF FLAT BED: A LITTLE
MOVING TO AND FROM BED TO CHAIR: A LITTLE

## 2019-02-20 ASSESSMENT — GAIT ASSESSMENTS
DISTANCE (FEET): 20
GAIT LEVEL OF ASSIST: MINIMAL ASSIST
DEVIATION: DECREASED BASE OF SUPPORT;DECREASED HEEL STRIKE;DECREASED TOE OFF;OTHER (COMMENT)
ASSISTIVE DEVICE: FRONT WHEEL WALKER

## 2019-02-20 ASSESSMENT — ENCOUNTER SYMPTOMS
SENSORY CHANGE: 0
NECK PAIN: 0
TINGLING: 0
WEAKNESS: 1
TREMORS: 0
BACK PAIN: 0
HEARTBURN: 0
MYALGIAS: 0
SPUTUM PRODUCTION: 0
DOUBLE VISION: 0
PALPITATIONS: 0
COUGH: 0
DEPRESSION: 0
HEADACHES: 0
FEVER: 0
BLURRED VISION: 0
VOMITING: 0
MEMORY LOSS: 1
NAUSEA: 0
HEMOPTYSIS: 0
DIZZINESS: 0

## 2019-02-20 NOTE — CARE PLAN
Problem: Safety  Goal: Will remain free from injury  Outcome: PROGRESSING AS EXPECTED  Pt remains free from falls at this time. Safety precautions in place. Pt educated on calling for assistance when needed.     Problem: Pain Management  Goal: Pain level will decrease to patient's comfort goal  Outcome: PROGRESSING AS EXPECTED  Patient educated to pain scale system. Patient encouraged to verbalize discomfort. Patient taught about non-pharmacological pain management. Patient is comfortable at this time without statements of discomfort or pain.    Problem: Psychosocial Needs:  Goal: Level of anxiety will decrease  Outcome: PROGRESSING SLOWER THAN EXPECTED

## 2019-02-20 NOTE — DISCHARGE PLANNING
Anticipated Discharge Disposition: Home with HH or SNF    Action: Discussed pt in rounds this am and pt's dtr Yashira is now going back and forth on whether she wants SNF or HH. Per MD, pt may be ready for d/c tomorrow.   CM met with pt and her son Dileep at bedside and Nelson called Yashira on the phone. Yashira was upset and shouting on the phone that she was told the MD would tell her in the morning what she should do and she doesn't want to talk about it until then.   CM let Dileep know that if pt goes to SNF this takes a bit of time and preparation and that is why this CM is starting ahead of the discharge date. Dileep states that he understands and will try to talk to his sister when she gets here.  Pt does not participate in conversation and defers decision making to her children.    Barriers to Discharge:     Plan: Home with HH vs SNF. Updated MD to the above.

## 2019-02-20 NOTE — DIETARY
"Nutrition services: Update Day 7 of admit.  Venus Church is a 87 y.o. female with admitting DX of NSTEMI and Hyponatremia.     This RD spoke with RN, Haydee, regarding pt. Stated though pt not consuming food provided from the hospital, the pt is consuming food brought in from home. Pt is currently a Dysphagia I with thin liquids and 1:1 supervision from nursing. This RD spoke with pt's DTR in room. Observed food brought in from home on bedside table. Pt's DTR stated she is eating toast this morning with cafe bobo (coffee). Toast appeared lightly burned in the center with rough edges. When asked about the food provided from the facility, the DTR raised the lid of the breakfast served and noted her mother would not eat what was provided due to the texture (puree). Also of note, boost supplement untouched. The DTR noted her mother likes real eggs, mashed potatoes, quiche, desserts, chicken, toast, etc and she has been bringing it in for the pt.     This RD discussed the current texture the pt is on in acute, dysphagia I thin. Pt's DTR asked if there is anyway to liberalize her to a higher texture. Noted her mother had 7 teeth out in January and currently only has some of her bottom teeth, but is able to put in her dentures if that might increase her texture.     This RD followed up with nursing, Haydee, regarding SLP revisiting the pt and re-evaluating her.     Assessment:  Height: 157.5 cm (5' 2\")  Weight: 48.5 kg (106 lb 14.8 oz)  Body mass index is 19.56 kg/m².  Diet/Intake: Dysphagia I thin liquid R to <50% x 6 and % x 3 of food from DTR per ADL flowsheet.     Evaluation:   1. Meds: Mag-ox 400mg QD, Kdur 40mEq QD, Senna (refused this AM), Lasix discontinued 2/19.    2. Fluids: NS 83mL/hr   3. Labs: WBC 16.1 BUN 27  2/13/19 A1C 5.9%   4. Per Palliative Care note 2/19, pt dx'd with Alzheimer's by PCP 6 months ago and PC RN discussed progressive/terminal nature of disease. Pt remains Full code and continuing to " discuss GOC.     Malnutrition Risk: Suspect non-severe (moderate) malnutrition given PO intake in acute variable though appears to be <75% of estimated energy req for >7 days and 8.5% wt loss over 5 day period though noted dx of CHF and on lasix (since dc'd 2/19).     Recommendations/Plan:  1. Diet as ordered. Advancements per SLP.   2. Spoke with nursing regarding food from home (not dysphagia I texture) and SLP re-eval for advancement versus assumed risk and re-eval of code status.   3. Supplements. Discontinue boost. Pt not drinking/refusing.   4. Encourage intake of 50% or greater.   5. Document intake of all meals  as % taken in ADL's to provide interdisciplinary communication across all shifts.   6. Monitor weight.  7. Nutrition rep will continue to see patient for ongoing meal and snack preferences.     RD following.

## 2019-02-20 NOTE — DISCHARGE PLANNING
Agency/Facility Name: [facility]  Spoke To: Petr  Outcome: Patient referral accepted. [employer] delivered yesterday.  Amelia (JOSE CM) notified.

## 2019-02-20 NOTE — ASSESSMENT & PLAN NOTE
Ejection fraction 55% with grade 2 diastolic dysfunction.  Currently clinically compensated.  Discontinue Lasix.   Continue metoprolol.  Hold Irbesartan due to hypotension.

## 2019-02-20 NOTE — PROGRESS NOTES
I discussed patient's plan of care hospital Medicine Daily Progress Note    Date of Service  2/19/2019    Chief Complaint  87 y.o. female admitted 2/13/2019 with hyponatremia with weakness and lethargy    Hospital Course    patient admitted with symptomatic hyponatremia with weakness and lethargy. Also found to have Trop elevation, NSTEMI likely from demand ischemia. As well as acute COPD exacerbation      Interval Problem Update  Seen and examined this morning, patient doing ok, still with O2 requirement,spoke to daughter and she does not want her to go to SNF since they had a bad experience 10 yrs ago. Daughter and son are willing to assist her 24/7 at home.     Patient with COPD exacerbation  HTN uncontrolled, she is on triple therapy. cxr showing some edema, will trail lasix and obtain echo.    2/17-patient seen and examined this morning , she is doing much better this morning patient had a good night of sleep.  Her daughter spent the night.  Patient continues to be on 2-3 L of nasal cannula oxygen.  Per her daughter she wears oxygen at night only.  Patient did have some diarrhea last night and this morning.  Continue to monitor  Vomiting has resolved completely.  Chest x-ray done yesterday showed hyperinflation.  No focal consolidation or pleural effusions were seen.    2/18- seen and examined, daughter and son at bedside. Patient still feeling weak. Daughter and son wondering if its safer to take her home with their assistance or snf. We discussed this in great detail today  Otherwise patient denies any chest pains, palpitations or shortness of breath.  She is down to 2 L of oxygen at this point  2/19: Laying in bed comfortably.  Still feels tired but better.  Family at bedside.  Questions answered.  Held blood pressure medications due to hypotension.  Orthostatic blood pressure positive for orthostatic hypotension.  Gentle hydration started today.    Consultants/Specialty  none    Code  Status  full    Disposition  Tbd; PT/OT-SNF  Daughter wants to talk to her brother to decide whether to take her home with home health or look at facilities    Review of Systems  Review of Systems   Constitutional: Positive for malaise/fatigue. Negative for chills and fever.   HENT: Positive for hearing loss. Negative for ear discharge and ear pain.    Eyes: Negative for blurred vision, double vision, photophobia and pain.   Respiratory: Negative for cough and hemoptysis.    Cardiovascular: Negative for chest pain, palpitations, orthopnea and claudication.   Gastrointestinal: Negative for abdominal pain, diarrhea, heartburn, nausea and vomiting.   Genitourinary: Negative for dysuria, frequency and urgency.   Musculoskeletal: Negative for myalgias and neck pain.   Skin: Negative for rash.   Neurological: Positive for weakness. Negative for dizziness, tingling, tremors, sensory change and headaches.   Psychiatric/Behavioral: Negative for depression and suicidal ideas.        Physical Exam  Temp:  [35.9 °C (96.7 °F)-36.9 °C (98.4 °F)] 36.2 °C (97.1 °F)  Pulse:  [63-97] 74  Resp:  [16-18] 18  BP: ()/(54-68) 101/59  SpO2:  [95 %-98 %] 95 %    Physical Exam   Constitutional: She is oriented to person, place, and time. No distress.   HENT:   Head: Normocephalic and atraumatic.   dry   Eyes: Pupils are equal, round, and reactive to light. Conjunctivae and EOM are normal. No scleral icterus.   Neck: Normal range of motion. Neck supple. No JVD present.   Cardiovascular: Normal rate, regular rhythm and intact distal pulses.    No murmur heard.  Pulmonary/Chest: Effort normal. No respiratory distress. She has no wheezes.   Abdominal: Soft. Bowel sounds are normal. She exhibits no distension. There is no tenderness.   Musculoskeletal: Normal range of motion. She exhibits no tenderness or deformity.   Lymphadenopathy:     She has no cervical adenopathy.   Neurological: She is alert and oriented to person, place, and time.  She exhibits normal muscle tone.   Skin: Skin is warm and dry. She is not diaphoretic. No erythema.   Psychiatric: She has a normal mood and affect.       Fluids  No intake or output data in the 24 hours ending 02/19/19 1634    Laboratory      Recent Labs      02/18/19   0857  02/19/19   0611  02/19/19   0851   SODIUM  133*  132*  131*   POTASSIUM  3.4*  3.3*  3.8   CHLORIDE  97  94*  91*   CO2  30  31  31   GLUCOSE  114*  127*  124*   BUN  17  25*  25*   CREATININE  0.80  0.93  0.95   CALCIUM  8.4*  9.4  9.3                   Imaging  DX-CHEST-PORTABLE (1 VIEW)   Final Result      Hyperinflation. No focal consolidation or pleural effusions.      EC-ECHOCARDIOGRAM COMPLETE W/O CONT   Final Result      CT-HEAD W/O   Final Result      1.  No acute intracranial findings.      2.  Diffuse atrophy and periventricular white matter changes, consistent with chronic small vessel disease.      3.  Pansinusitis, possibly acute         DX-CHEST-PORTABLE (1 VIEW)   Final Result      No significant interval change.      DX-CHEST-PORTABLE (1 VIEW)   Final Result      Cardiomegaly with mild interstitial edema.      DX-CHEST-PORTABLE (1 VIEW)   Final Result      1.  COPD. No focal consolidation or pleural effusions.   2.  Cardiomegaly.      CT-CTA CHEST PULMONARY ARTERY W/ RECONS   Final Result         1. No pulmonary embolus.   2. Emphysema and acute versus chronic bronchitis.   3. Cardiomegaly with right heart dysfunction.   4. A 4.1 cm ascending aortic aneurysm.   5. Mildly enlarged lower paratracheal and hilar nodes, most likely reactive.                 Assessment/Plan  Type II NSTEMI (non-ST elevated myocardial infarction) due to demand ischemia from hypoxia ([facility])- (present on admission)   Assessment & Plan    Likely demand ischemia from hypoxia and acute dhydration   No chest pain  Cardiac monitoring on tele  Serial troponin ; downtrending now  Already on pradaxa will continue   BB, ace   High dose statin added on  admission  Echo as above       (HFpEF) heart failure with preserved ejection fraction (HCC)- (present on admission)   Assessment & Plan    Ejection fraction 55% with grade 2 diastolic dysfunction.  Currently clinically compensated.  Discontinue Lasix.   Continue metoprolol.  Hold Irbesartan due to hypotension.      Essential hypertension   Assessment & Plan    Now hypotensive.  Hold blood pressure medications for now except for metoprolol for rate control.  Orthostatic was positive.  Gentle hydration.  Monitor blood pressure.       Anemia- (present on admission)   Assessment & Plan    Mild no evidence of bleeding   Cont to monitor      Hyponatremia- (present on admission)   Assessment & Plan    Chronic.  Worsen.  Discontinue hydrochlorothiazide.  Now improved.     COPD (chronic obstructive pulmonary disease) ([facility])- (present on admission)   Assessment & Plan    Continue with breathing treatments as needed.     PAF (paroxysmal atrial fibrillation) ([facility])- (present on admission)   Assessment & Plan    Resume home BB and pradaxa     Dyslipidemia- (present on admission)   Assessment & Plan    Statin      Acquired circulating anticoagulants (HCC)- (present on admission)   Assessment & Plan    On Pradaxa.     Plan of care discussed with multidisciplinary team during rounds.    VTE prophylaxis: pradaxa

## 2019-02-20 NOTE — PROGRESS NOTES
RN rounded back with patient's daughter regarding concern from registered dietitian regarding food patient's family was bringing for patient from home. RN reiterated diet consistency as directed by speech and provided education regarding aspiration if dietary guidelines aren't followed. RN also asked patient's daughter to educate the rest of patient's family as well as visitors on patient's recommended dietary plan. Patient's daughter verbalized understanding and agreed to educate the rest of her family and visitors.

## 2019-02-20 NOTE — CARE PLAN
Problem: Nutritional:  Goal: Achieve adequate nutritional intake  Patient will consume 50% or greater of meals   Outcome: PROGRESSING SLOWER THAN EXPECTED  See RD note 2/20.

## 2019-02-20 NOTE — PROGRESS NOTES
Bedside report received. Patient sitting up in bed. RN assessed pain; patient declines pain present at this time. Patient educated to call for assistance before ambulating; call light within reach. Need for bed alarm assessed; patient is at moderate risk for falls, precautions in place as appropriate.

## 2019-02-20 NOTE — PALLIATIVE CARE
Palliative Care follow-up  Appreciate call from BS RN with concerns r/t the discussion of the patient's wishes not being with the patient herself; it was deferred to the children d/t cultural considerations at the request of the patient. PC RN inquired about any conflicting information between what the patient is wanting and the family is requesting. The patient is wanting to go home, while family requesting SNF, though the patient's wishes are to get better at this point, which may require SNF for therapy. PC RN offered to have primary RN check in on Thursday but noted that if the patient expressed not wanting ongoing therapy or treatment and family insisted, this would be more of an ethical situation and should be addressed; this is not currently the case. Appreciate RN's concern for patient autonomy in this case.     Plan: PC RN to f/u Thursday    Thank you for allowing Palliative Care to participate in this patient's care. Please feel free to call [phone] with any questions or concerns.

## 2019-02-20 NOTE — PALLIATIVE CARE
"Palliative Care follow-up  PC RN with PC PAUL met with Dileep & Ivana in conference room. Discussed their understanding of pt's clinical picture, both children state they do not believe pt has a heart problem. They go with pt to her cardiology appointments and they are told every time they go that pt's heart is \"strong and just fine\". Ivana discussed that pt's PCP diagnosed pt with Alzheimer's 6 months ago, Dileep expressed disagreement that pt truly has that diagnosis. Dileep feels that pt was not properly evaluated, although Ivana expressed agreement with such a diagnosis. PC RN discussed the progressive and terminal nature of the disease, both Ivana and Dileep expressed disagreement with \"such a label\". They do not want pt to be treated differently, or not offered treatment simply because she has a diagnosis.     PC RN discussed benefits vs burdens of treatment and quality vs quantity of life. Dileep stated, \"But who are you to determine what is right for the patient?\" PC RN explained the purpose of GOC discussion, and including family in creating a POC when pt lacks the ability to make her own decisions.    Discussed code status, AD and POLST form. Dileep states pt does have a trust and will and will look for the Living Will, if pt does have one then he will provide document to hospital. Long discussion regarding resuscitation, intubation, and mechanical ventilation. Dileep stated that it is his belief that every effort should be done to prolong pt's live, even if pt is on the ventilator that it is only god who can ensure death, that it is a violation of \"god's law\" to withdraw that support. Ivana stated that pt, in her Living will, has expressed her wishes to not have her life prolonged if she were in a coma, stated that pt already made that choice. Dileep disagreed, saying that if pt were on the ventilator it is up to him, and he would choose to prolong pt's life. \"life is life, it is not anyone's right to take it away " "but god\". Ivana expressed disagreement. Both Dileep and Ivana agree that pt would want to remain full code for as long as there is hope.     PC RN discussed GOC, Dileep and Ivana both disagree again with what should be done. Dileep wants to have pt go to a SNF, he disagrees that pt could not benefit from therapy because he disagrees with the Alzheimer's diagnosis. Ivana expressed that she wants to take pt home, that if pt has a limited time on earth, and pt's mentation would just get worse outside of her home, then she wants to take pt home with home health to help her mind heal. Ivana and Dileep argued about this for a while, they do not agree on GOC. They do agree however that Hospice is not appropriate at this time.     Both Ivana and Dileep expressed needing to discuss pt's POC further before making any decisions.    PC RN provided contact card to family, encouraged them to call with any questions or concerns.     Updated:   Dr. Miles    Plan:   Continue GOC discussion with family, unknown POC for discharge at this time.     Thank you for allowing Palliative Care to participate in this patient's care. Please feel free to call [phone] with any questions or concerns.  "

## 2019-02-20 NOTE — CARE PLAN
Problem: Knowledge Deficit  Goal: Knowledge of disease process/condition, treatment plan, diagnostic tests, and medications will improve    Intervention: Assess knowledge level of disease process/condition, treatment plan, diagnostic tests, and medications  Rn will provided education to patient regarding disease process, treatment plan, diagnostic tests and medications pertinent to the patient's condition. RN will answer questions that the patient has related to their condition.         Problem: Discharge Barriers/Planning  Goal: Patient's continuum of care needs will be met    Intervention: Assess potential discharge barriers on admission and throughout hospital stay  RN will assess potential discharge barriers throughout patient's hospital stay.

## 2019-02-21 ENCOUNTER — PATIENT OUTREACH (OUTPATIENT)
Dept: HEALTH INFORMATION MANAGEMENT | Facility: OTHER | Age: 84
End: 2019-02-21

## 2019-02-21 VITALS
SYSTOLIC BLOOD PRESSURE: 105 MMHG | BODY MASS INDEX: 19.39 KG/M2 | TEMPERATURE: 98.7 F | HEIGHT: 62 IN | WEIGHT: 105.38 LBS | OXYGEN SATURATION: 96 % | RESPIRATION RATE: 18 BRPM | DIASTOLIC BLOOD PRESSURE: 53 MMHG | HEART RATE: 70 BPM

## 2019-02-21 PROBLEM — I95.1 ORTHOSTATIC HYPOTENSION: Status: RESOLVED | Noted: 2019-02-20 | Resolved: 2019-02-21

## 2019-02-21 LAB
ALBUMIN SERPL BCP-MCNC: 3.1 G/DL (ref 3.2–4.9)
ALBUMIN/GLOB SERPL: 1.5 G/DL
ALP SERPL-CCNC: 51 U/L (ref 30–99)
ALT SERPL-CCNC: 24 U/L (ref 2–50)
ANION GAP SERPL CALC-SCNC: 4 MMOL/L (ref 0–11.9)
ANION GAP SERPL CALC-SCNC: 6 MMOL/L (ref 0–11.9)
AST SERPL-CCNC: 18 U/L (ref 12–45)
BASOPHILS # BLD AUTO: 0.9 % (ref 0–1.8)
BASOPHILS # BLD: 0.12 K/UL (ref 0–0.12)
BILIRUB SERPL-MCNC: 0.6 MG/DL (ref 0.1–1.5)
BUN SERPL-MCNC: 17 MG/DL (ref 8–22)
BUN SERPL-MCNC: 20 MG/DL (ref 8–22)
CALCIUM SERPL-MCNC: 9.2 MG/DL (ref 8.5–10.5)
CALCIUM SERPL-MCNC: 9.4 MG/DL (ref 8.5–10.5)
CHLORIDE SERPL-SCNC: 95 MMOL/L (ref 96–112)
CHLORIDE SERPL-SCNC: 97 MMOL/L (ref 96–112)
CO2 SERPL-SCNC: 30 MMOL/L (ref 20–33)
CO2 SERPL-SCNC: 31 MMOL/L (ref 20–33)
CREAT SERPL-MCNC: 0.84 MG/DL (ref 0.5–1.4)
CREAT SERPL-MCNC: 0.86 MG/DL (ref 0.5–1.4)
EOSINOPHIL # BLD AUTO: 0.57 K/UL (ref 0–0.51)
EOSINOPHIL NFR BLD: 4.3 % (ref 0–6.9)
ERYTHROCYTE [DISTWIDTH] IN BLOOD BY AUTOMATED COUNT: 46.9 FL (ref 35.9–50)
GLOBULIN SER CALC-MCNC: 2.1 G/DL (ref 1.9–3.5)
GLUCOSE SERPL-MCNC: 101 MG/DL (ref 65–99)
GLUCOSE SERPL-MCNC: 105 MG/DL (ref 65–99)
HCT VFR BLD AUTO: 39.3 % (ref 37–47)
HGB BLD-MCNC: 13.2 G/DL (ref 12–16)
LYMPHOCYTES # BLD AUTO: 2.52 K/UL (ref 1–4.8)
LYMPHOCYTES NFR BLD: 19.1 % (ref 22–41)
MAGNESIUM SERPL-MCNC: 2 MG/DL (ref 1.5–2.5)
MANUAL DIFF BLD: NORMAL
MCH RBC QN AUTO: 32.5 PG (ref 27–33)
MCHC RBC AUTO-ENTMCNC: 33.6 G/DL (ref 33.6–35)
MCV RBC AUTO: 96.8 FL (ref 81.4–97.8)
METAMYELOCYTES NFR BLD MANUAL: 0.9 %
MONOCYTES # BLD AUTO: 0.57 K/UL (ref 0–0.85)
MONOCYTES NFR BLD AUTO: 4.3 % (ref 0–13.4)
MORPHOLOGY BLD-IMP: NORMAL
MYELOCYTES NFR BLD MANUAL: 0.9 %
NEUTROPHILS # BLD AUTO: 9.19 K/UL (ref 2–7.15)
NEUTROPHILS NFR BLD: 69.6 % (ref 44–72)
NRBC # BLD AUTO: 0 K/UL
NRBC BLD-RTO: 0 /100 WBC
PLATELET # BLD AUTO: 328 K/UL (ref 164–446)
PLATELET BLD QL SMEAR: NORMAL
PMV BLD AUTO: 10 FL (ref 9–12.9)
POTASSIUM SERPL-SCNC: 4.2 MMOL/L (ref 3.6–5.5)
POTASSIUM SERPL-SCNC: 4.9 MMOL/L (ref 3.6–5.5)
PROT SERPL-MCNC: 5.2 G/DL (ref 6–8.2)
RBC # BLD AUTO: 4.06 M/UL (ref 4.2–5.4)
RBC BLD AUTO: NORMAL
SODIUM SERPL-SCNC: 131 MMOL/L (ref 135–145)
SODIUM SERPL-SCNC: 132 MMOL/L (ref 135–145)
WBC # BLD AUTO: 13.2 K/UL (ref 4.8–10.8)

## 2019-02-21 PROCEDURE — 80048 BASIC METABOLIC PNL TOTAL CA: CPT

## 2019-02-21 PROCEDURE — A9270 NON-COVERED ITEM OR SERVICE: HCPCS | Performed by: FAMILY MEDICINE

## 2019-02-21 PROCEDURE — 36415 COLL VENOUS BLD VENIPUNCTURE: CPT

## 2019-02-21 PROCEDURE — 80053 COMPREHEN METABOLIC PANEL: CPT

## 2019-02-21 PROCEDURE — 97535 SELF CARE MNGMENT TRAINING: CPT

## 2019-02-21 PROCEDURE — 700102 HCHG RX REV CODE 250 W/ 637 OVERRIDE(OP): Performed by: FAMILY MEDICINE

## 2019-02-21 PROCEDURE — 99239 HOSP IP/OBS DSCHRG MGMT >30: CPT | Performed by: FAMILY MEDICINE

## 2019-02-21 PROCEDURE — 85007 BL SMEAR W/DIFF WBC COUNT: CPT

## 2019-02-21 PROCEDURE — 83735 ASSAY OF MAGNESIUM: CPT

## 2019-02-21 PROCEDURE — A9270 NON-COVERED ITEM OR SERVICE: HCPCS | Performed by: HOSPITALIST

## 2019-02-21 PROCEDURE — 700102 HCHG RX REV CODE 250 W/ 637 OVERRIDE(OP): Performed by: HOSPITALIST

## 2019-02-21 PROCEDURE — 85027 COMPLETE CBC AUTOMATED: CPT

## 2019-02-21 RX ORDER — CLOTRIMAZOLE 10 MG/1
10 LOZENGE ORAL; TOPICAL
Qty: 5 TROCHE | Refills: 0 | Status: SHIPPED | OUTPATIENT
Start: 2019-02-21 | End: 2019-02-22

## 2019-02-21 RX ORDER — QUETIAPINE FUMARATE 25 MG/1
25 TABLET, FILM COATED ORAL EVERY EVENING
Qty: 30 TAB | Refills: 0 | Status: SHIPPED | OUTPATIENT
Start: 2019-02-21 | End: 2019-02-26

## 2019-02-21 RX ORDER — ATORVASTATIN CALCIUM 40 MG/1
40 TABLET, FILM COATED ORAL EVERY EVENING
Qty: 30 TAB | Refills: 0 | Status: SHIPPED | OUTPATIENT
Start: 2019-02-21 | End: 2019-02-26

## 2019-02-21 RX ADMIN — POTASSIUM CHLORIDE 40 MEQ: 1500 TABLET, EXTENDED RELEASE ORAL at 05:33

## 2019-02-21 RX ADMIN — CLOTRIMAZOLE 10 MG: 10 LOZENGE ORAL; TOPICAL at 15:21

## 2019-02-21 RX ADMIN — CLOTRIMAZOLE 10 MG: 10 LOZENGE ORAL; TOPICAL at 09:25

## 2019-02-21 RX ADMIN — Medication 400 MG: at 05:33

## 2019-02-21 RX ADMIN — ACETAMINOPHEN 650 MG: 325 TABLET, FILM COATED ORAL at 14:24

## 2019-02-21 RX ADMIN — METOPROLOL TARTRATE 25 MG: 25 TABLET, FILM COATED ORAL at 05:34

## 2019-02-21 RX ADMIN — DABIGATRAN ETEXILATE MESYLATE 75 MG: 75 CAPSULE ORAL at 05:33

## 2019-02-21 RX ADMIN — SENNOSIDES AND DOCUSATE SODIUM 2 TABLET: 8.6; 5 TABLET ORAL at 05:34

## 2019-02-21 RX ADMIN — METOPROLOL TARTRATE 25 MG: 25 TABLET, FILM COATED ORAL at 17:18

## 2019-02-21 RX ADMIN — CLOTRIMAZOLE 10 MG: 10 LOZENGE ORAL; TOPICAL at 12:06

## 2019-02-21 RX ADMIN — UMECLIDINIUM BROMIDE AND VILANTEROL TRIFENATATE 1 PUFF: 62.5; 25 POWDER RESPIRATORY (INHALATION) at 05:35

## 2019-02-21 RX ADMIN — FLUTICASONE PROPIONATE 110 MCG: 110 AEROSOL, METERED RESPIRATORY (INHALATION) at 05:34

## 2019-02-21 RX ADMIN — ATORVASTATIN CALCIUM 40 MG: 40 TABLET, FILM COATED ORAL at 17:18

## 2019-02-21 ASSESSMENT — COGNITIVE AND FUNCTIONAL STATUS - GENERAL
DAILY ACTIVITIY SCORE: 18
HELP NEEDED FOR BATHING: A LITTLE
TOILETING: A LITTLE
PERSONAL GROOMING: A LITTLE
DRESSING REGULAR LOWER BODY CLOTHING: A LITTLE
SUGGESTED CMS G CODE MODIFIER DAILY ACTIVITY: CK
EATING MEALS: A LITTLE
DRESSING REGULAR UPPER BODY CLOTHING: A LITTLE

## 2019-02-21 NOTE — THERAPY
"Occupational Therapy Treatment completed with focus on ADLs, ADL transfers, patient education and caregiver training.  Plan of Care: Will benefit from Occupational Therapy 3 times per week  Discharge Recommendations:  Equipment Will Continue to Assess for Equipment Needs. Post-acute therapy Recommend inpatient transitional care services for continued occupational therapy services.     Patient seen for OT treat focused on CG standing ADLs and short mobility, Min A to low surfaces, with FWW. Dtr reports patient performance diminished today 2/2 meds and that DC home in familiar setting would likely be best. Patient would benefit from continued skilled OT in this setting followed by post acute placement however, given family wishes, patient plans to DC home. If home from hospital, rec home with services, including therapy.     See \"Rehab Therapy-Acute\" Patient Summary Report for complete documentation.   "

## 2019-02-21 NOTE — ASSESSMENT & PLAN NOTE
Likely steroid-induced.  Pro-calcitonin negative.  Blood cultures are negative so far.  No signs of active infection for now.

## 2019-02-21 NOTE — ASSESSMENT & PLAN NOTE
With sundowning indicating possible underlying undiagnosed dementia.  High risk for hospital-acquired delirium.  Avoid benzodiazepines and/or anticholinergic medications.  Minimize lines.  Avoid Rashid catheter if possible.  Low-dose Seroquel at night for agitation and to help with sleep.

## 2019-02-21 NOTE — PROGRESS NOTES
I discussed patient's plan of care hospital Medicine Daily Progress Note    Date of Service  2/20/2019    Chief Complaint  87 y.o. female admitted 2/13/2019 with hyponatremia with weakness and lethargy    Hospital Course    patient admitted with symptomatic hyponatremia with weakness and lethargy. Also found to have Trop elevation, NSTEMI likely from demand ischemia. As well as acute COPD exacerbation      Interval Problem Update  Seen and examined this morning, patient doing ok, still with O2 requirement,spoke to daughter and she does not want her to go to SNF since they had a bad experience 10 yrs ago. Daughter and son are willing to assist her 24/7 at home.     Patient with COPD exacerbation  HTN uncontrolled, she is on triple therapy. cxr showing some edema, will trail lasix and obtain echo.    2/17-patient seen and examined this morning , she is doing much better this morning patient had a good night of sleep.  Her daughter spent the night.  Patient continues to be on 2-3 L of nasal cannula oxygen.  Per her daughter she wears oxygen at night only.  Patient did have some diarrhea last night and this morning.  Continue to monitor  Vomiting has resolved completely.  Chest x-ray done yesterday showed hyperinflation.  No focal consolidation or pleural effusions were seen.    2/18- seen and examined, daughter and son at bedside. Patient still feeling weak. Daughter and son wondering if its safer to take her home with their assistance or snf. We discussed this in great detail today  Otherwise patient denies any chest pains, palpitations or shortness of breath.  She is down to 2 L of oxygen at this point  2/19: Laying in bed comfortably.  Still feels tired but better.  Family at bedside.  Questions answered.  Held blood pressure medications due to hypotension.  Orthostatic blood pressure positive for orthostatic hypotension.  Gentle hydration started today.  2/20: Blood pressure continues to improve.  Respiratory  status has been stable.  IV fluids discontinued.  Discussion regarding discharging home with home health versus SNF to take place in the morning.    Consultants/Specialty  none    Code Status  full    Disposition  Tbd; PT/OT-SNF  Daughter wants to talk to her brother to decide whether to take her home with home health or look at facilities    Review of Systems  Review of Systems   Constitutional: Positive for malaise/fatigue. Negative for fever.   HENT: Positive for hearing loss. Negative for ear discharge, ear pain and nosebleeds.    Eyes: Negative for blurred vision and double vision.   Respiratory: Negative for cough, hemoptysis and sputum production.    Cardiovascular: Negative for chest pain and palpitations.   Gastrointestinal: Negative for heartburn, nausea and vomiting.   Genitourinary: Negative for dysuria and urgency.   Musculoskeletal: Negative for back pain, myalgias and neck pain.   Skin: Negative for itching and rash.   Neurological: Positive for weakness. Negative for dizziness, tingling, tremors, sensory change and headaches.   Psychiatric/Behavioral: Positive for memory loss. Negative for depression and suicidal ideas.        Physical Exam  Temp:  [36.3 °C (97.3 °F)-36.7 °C (98 °F)] 36.7 °C (98 °F)  Pulse:  [73-97] 85  Resp:  [16-18] 18  BP: ()/(56-85) 133/70  SpO2:  [96 %-100 %] 99 %    Physical Exam   Constitutional: She is oriented to person, place, and time. No distress.   HENT:   Head: Normocephalic and atraumatic.   dry   Eyes: Pupils are equal, round, and reactive to light. Conjunctivae and EOM are normal. Right eye exhibits no discharge. Left eye exhibits no discharge.   Neck: Normal range of motion. Neck supple. No JVD present.   Cardiovascular: Normal rate and regular rhythm.  Exam reveals no friction rub.    Pulmonary/Chest: Effort normal. No respiratory distress. She has decreased breath sounds. She has no wheezes. She has no rhonchi. She has no rales.   Abdominal: Soft. She  exhibits no distension. There is no tenderness. There is no rebound.   Musculoskeletal: Normal range of motion. She exhibits no tenderness or deformity.   Neurological: She is alert and oriented to person, place, and time. She exhibits normal muscle tone.   Skin: Skin is warm and dry. She is not diaphoretic. No erythema.   Psychiatric: She is slowed. Cognition and memory are impaired. She expresses impulsivity. She exhibits abnormal recent memory.       Fluids    Intake/Output Summary (Last 24 hours) at 02/20/19 1808  Last data filed at 02/20/19 0900   Gross per 24 hour   Intake          1589.67 ml   Output              700 ml   Net           889.67 ml       Laboratory  Recent Labs      02/20/19   0219   WBC  16.1*   RBC  4.25   HEMOGLOBIN  13.6   HEMATOCRIT  40.5   MCV  95.3   MCH  32.0   MCHC  33.6   RDW  45.1   PLATELETCT  346   MPV  10.2     Recent Labs      02/19/19   0611  02/19/19   0851  02/20/19   0219   SODIUM  132*  131*  132*   POTASSIUM  3.3*  3.8  3.5*   CHLORIDE  94*  91*  95*   CO2  31  31  29   GLUCOSE  127*  124*  120*   BUN  25*  25*  27*   CREATININE  0.93  0.95  0.93   CALCIUM  9.4  9.3  9.3                   Imaging  DX-CHEST-PORTABLE (1 VIEW)   Final Result      Hyperinflation. No focal consolidation or pleural effusions.      EC-ECHOCARDIOGRAM COMPLETE W/O CONT   Final Result      CT-HEAD W/O   Final Result      1.  No acute intracranial findings.      2.  Diffuse atrophy and periventricular white matter changes, consistent with chronic small vessel disease.      3.  Pansinusitis, possibly acute         DX-CHEST-PORTABLE (1 VIEW)   Final Result      No significant interval change.      DX-CHEST-PORTABLE (1 VIEW)   Final Result      Cardiomegaly with mild interstitial edema.      DX-CHEST-PORTABLE (1 VIEW)   Final Result      1.  COPD. No focal consolidation or pleural effusions.   2.  Cardiomegaly.      CT-CTA CHEST PULMONARY ARTERY W/ RECONS   Final Result         1. No pulmonary embolus.    2. Emphysema and acute versus chronic bronchitis.   3. Cardiomegaly with right heart dysfunction.   4. A 4.1 cm ascending aortic aneurysm.   5. Mildly enlarged lower paratracheal and hilar nodes, most likely reactive.                 Assessment/Plan  Type II NSTEMI (non-ST elevated myocardial infarction) due to demand ischemia from hypoxia ([facility])- (present on admission)   Assessment & Plan    Likely demand ischemia from hypoxia and dehydration   No chest pain  Cardiac monitoring on tele  Serial troponin ; downtrending now  Already on pradaxa will continue   Continue metoprolol.  Lipitor 40 mg daily (statin naïve).       Debility- (present on admission)   Assessment & Plan    PT/OT.  Fall precautions.  Possible SNF placement.     Cognitive impairment- (present on admission)   Assessment & Plan    With sundowning indicating possible underlying undiagnosed dementia.  High risk for hospital-acquired delirium.  Avoid benzodiazepines and/or anticholinergic medications.  Minimize lines.  Avoid Rashid catheter if possible.  Low-dose Seroquel at night for agitation and to help with sleep.     Orthostatic hypotension- (present on admission)   Assessment & Plan    Holding blood pressure medications for now.  Lasix was discontinued.  Now improving.  Fall precautions.       Leukocytosis- (present on admission)   Assessment & Plan    Likely steroid-induced.  Pro-calcitonin negative.  Blood cultures are negative so far.  No signs of active infection for now.     (HFpEF) heart failure with preserved ejection fraction (HCC)- (present on admission)   Assessment & Plan    Ejection fraction 55% with grade 2 diastolic dysfunction.  Currently clinically compensated.  Discontinue Lasix.   Continue metoprolol.  Hold Irbesartan due to hypotension.      Essential hypertension   Assessment & Plan    Now hypotensive.  Hold blood pressure medications for now except for metoprolol for rate control.  Orthostatic was positive.  Gentle  hydration.  Monitor blood pressure.       Hypokalemia   Assessment & Plan     replace and monitor     Anemia- (present on admission)   Assessment & Plan    Mild no evidence of bleeding   Cont to monitor      Chronic respiratory failure (HCC)- (present on admission)   Assessment & Plan    Due to COPD exacerbation and bronchitis vs pulm edema  CXR showing some interstitial edema,   Echo noted: Left ventricular ejection fraction is visually estimated to be 55%.  Grade II diastolic dysfunction.  Monitor renal function  Cont to wean 02 as tolerated  Keep sats 88-92%    Breathing back to baseline now     Hyponatremia- (present on admission)   Assessment & Plan    Chronic.  Worsen.  Discontinue hydrochlorothiazide.  Stable.     COPD (chronic obstructive pulmonary disease) ([facility])- (present on admission)   Assessment & Plan    Continue with breathing treatments as needed.       PAF (paroxysmal atrial fibrillation) ([facility])- (present on admission)   Assessment & Plan    Resume home BB and pradaxa  Rate controlled.     Dyslipidemia- (present on admission)   Assessment & Plan    Statin      Acquired circulating anticoagulants (HCC)- (present on admission)   Assessment & Plan    On Pradaxa.     Plan of care discussed with multidisciplinary team during rounds.    VTE prophylaxis: pradaxa

## 2019-02-21 NOTE — CARE PLAN
Problem: Safety  Goal: Will remain free from injury  Outcome: PROGRESSING AS EXPECTED  Patient's risk for injury and falls assessed. Appropriate safety precautions in place. Patient educated to utilize call light for needs. Patient verbalizes understanding.    Problem: Infection  Goal: Will remain free from infection  Outcome: PROGRESSING AS EXPECTED  Pt remains free from infection at this time. Pt assessed for signs and symptoms of infection. Standard precautions in place.

## 2019-02-21 NOTE — DISCHARGE PLANNING
"Anticipated Discharge Disposition: Home with HH    Action: Met with pt's dtr Yashira at bedside again. Yashira is emotional and apologized stating \"this is such a hard decision.\"   CM provided emotional support to Yashira and understanding that this is a difficult decision. Yashira states that she has quit her job and is prepared to provide pt with 24 hour care and wants to take pt home stating that this is pt's wishes and she wants to follow through with that.   CM spoke with pt and she confirms, \"I want to go home.\"   Yashira states that she has a friend who can help her at home and she declined the list of attendant care services. Yashira states that if she feels pt is not doing well at home she will take pt to her PCP.  CM discussed that HH has been arranged and confirmed that the bedside commode and FWW are at bedside with Yashira, as well as the portable oxygen tank. CM asked Yashira if there is anything else she needs and again offered the attendant care list and Yashira declined and reports that there is not anything else that she needs.   Francisco ROTHMAN has called [facility] to let them know pt is being discharged.      Barriers to Discharge:     Plan: Pt home with HH, bedside commode, FWW and home 02.     "

## 2019-02-21 NOTE — DISCHARGE PLANNING
Anticipated Discharge Disposition: SNF vs HM with HH    Action: Pt was discussed in rounds this am and Dr. Miles states that he is ready for pt to d/c. CM met with pt and her dtr Yashira at bedside. Pt does not participate in conversation when addressed by this CM and defers decision making to her dtr. CM discussed the plan for d/c and Yashira states that she is willing to make [facility] to see if they have any beds open. CM provided the SNF choice form and Yashira then asked this CM to come back later and would not sign it.   CM updated Dr. Miles and he would like for pt to be seen by PT again. CM paged PT.     Barriers to Discharge: Decision from family    Plan: Awaiting decision from family regarding d/c plan. Need SNF choice form signed.

## 2019-02-21 NOTE — PROGRESS NOTES
Received report, assumed pt care. Pt a&o 4, VSS, assessment completed. Resting comfortably in bed with call light, bedside table in reach. No c/o at this time. Side rails up 2. Instructed to use call light when needing to get OOB verbalized understanding. Bed alarm on, bed in low position. Will continue to monitor. Pt daughter Yashira at bedside.

## 2019-02-21 NOTE — CARE PLAN
Problem: Safety  Goal: Will remain free from injury  Bed alarm on, call light within reach, hourly rounding in place, family and pt educated to use the call light when needing assistance. Pt and family confirmed understanding.     Problem: Discharge Barriers/Planning  Goal: Patient's continuum of care needs will be met  Pt son and daughter determining between home with HH or SNF

## 2019-02-21 NOTE — ASSESSMENT & PLAN NOTE
Holding blood pressure medications for now.  Lasix was discontinued.  Now improving.  Fall precautions.

## 2019-02-22 ENCOUNTER — PATIENT OUTREACH (OUTPATIENT)
Dept: HEALTH INFORMATION MANAGEMENT | Facility: OTHER | Age: 84
End: 2019-02-22

## 2019-02-22 ENCOUNTER — TELEPHONE (OUTPATIENT)
Dept: HEALTH INFORMATION MANAGEMENT | Facility: OTHER | Age: 84
End: 2019-02-22

## 2019-02-22 DIAGNOSIS — Z59.9 FINANCIAL DIFFICULTY: ICD-10-CM

## 2019-02-22 PROBLEM — D72.829 LEUKOCYTOSIS: Status: RESOLVED | Noted: 2019-02-20 | Resolved: 2019-02-22

## 2019-02-22 PROBLEM — R41.89 COGNITIVE IMPAIRMENT: Status: RESOLVED | Noted: 2019-02-20 | Resolved: 2019-02-22

## 2019-02-22 PROBLEM — I10 ESSENTIAL HYPERTENSION: Status: RESOLVED | Noted: 2019-02-16 | Resolved: 2019-02-22

## 2019-02-22 PROBLEM — J96.10 CHRONIC RESPIRATORY FAILURE (HCC): Status: RESOLVED | Noted: 2019-02-13 | Resolved: 2019-02-22

## 2019-02-22 PROBLEM — I50.30 (HFPEF) HEART FAILURE WITH PRESERVED EJECTION FRACTION (HCC): Status: RESOLVED | Noted: 2019-02-19 | Resolved: 2019-02-22

## 2019-02-22 PROBLEM — E87.1 HYPONATREMIA: Status: RESOLVED | Noted: 2019-02-13 | Resolved: 2019-02-22

## 2019-02-22 PROBLEM — D68.318 ACQUIRED CIRCULATING ANTICOAGULANTS (HCC): Status: RESOLVED | Noted: 2019-02-13 | Resolved: 2019-02-22

## 2019-02-22 PROBLEM — R53.81 DEBILITY: Status: RESOLVED | Noted: 2019-02-20 | Resolved: 2019-02-22

## 2019-02-22 PROBLEM — D64.9 ANEMIA: Status: RESOLVED | Noted: 2019-02-13 | Resolved: 2019-02-22

## 2019-02-22 PROBLEM — E87.6 HYPOKALEMIA: Status: RESOLVED | Noted: 2019-02-14 | Resolved: 2019-02-22

## 2019-02-22 SDOH — ECONOMIC STABILITY - INCOME SECURITY: PROBLEM RELATED TO HOUSING AND ECONOMIC CIRCUMSTANCES, UNSPECIFIED: Z59.9

## 2019-02-22 NOTE — PROGRESS NOTES
Dear Dr. Krueger,    Patient is having difficulty affording Pradaxa. Would you mind signing pended order for samples until [facility] SW can apply for patient assistance program?    Thanks for your time,  Kendrick Salomon, PharmD [phone]

## 2019-02-22 NOTE — PROGRESS NOTES
Pt [place] via wheelchair with CNA, daughter, son and all belongings without incident.     Pt sent home with commode, oxygen and walker

## 2019-02-22 NOTE — TELEPHONE ENCOUNTER
[facility] post discharge med rec completed. Spoke to patient's daughter, Ivana. No clinically significant medication issues noted. Patient denies any side effects, barriers to accessing medications, or trouble with adherence. Ivana states she will wait to start atorvastatin until f/u visit with cardiology as patient was taken off of this medication in the past d/t side effects.    Patient's daughter, Ivana states Pradaxa went up to $100/month on [facility] formulary this year. Referral placed to [facility] for resources.

## 2019-02-22 NOTE — DISCHARGE INSTRUCTIONS
Discharge Instructions    Discharged to home by car with relative. Discharged via wheelchair, hospital escort: Yes.  Special equipment needed: Oxygen, commode, walker    Be sure to schedule a follow-up appointment with your primary care doctor or any specialists as instructed.     Discharge Plan:   Diet Plan: Discussed  Activity Level: Discussed  Confirmed Follow up Appointment: Patient to Call and Schedule Appointment  Confirmed Symptoms Management: Discussed  Medication Reconciliation Updated: Yes  Influenza Vaccine Indication: Not indicated: Previously immunized this influenza season and > 8 years of age    I understand that a diet low in cholesterol, fat, and sodium is recommended for good health. Unless I have been given specific instructions below for another diet, I accept this instruction as my diet prescription.   Other diet: Regular     Special Instructions: None    · Is patient discharged on Warfarin / Coumadin?   No     Depression / Suicide Risk    As you are discharged from this [facility] facility, it is important to learn how to keep safe from harming yourself.    Recognize the warning signs:  · Abrupt changes in personality, positive or negative- including increase in energy   · Giving away possessions  · Change in eating patterns- significant weight changes-  positive or negative  · Change in sleeping patterns- unable to sleep or sleeping all the time   · Unwillingness or inability to communicate  · Depression  · Unusual sadness, discouragement and loneliness  · Talk of wanting to die  · Neglect of personal appearance   · Rebelliousness- reckless behavior  · Withdrawal from people/activities they love  · Confusion- inability to concentrate     If you or a loved one observes any of these behaviors or has concerns about self-harm, here's what you can do:  · Talk about it- your feelings and reasons for harming yourself  · Remove any means that you might use to hurt yourself (examples: pills, rope,  extension cords, firearm)  · Get professional help from the community (Mental Health, Substance Abuse, psychological counseling)  · Do not be alone:Call your Safe Contact- someone whom you trust who will be there for you.  · Call your local CRISIS HOTLINE 014-1959 or 403-106-5903  · Call your local Children's Mobile Crisis Response Team [place] (713) 255-4000 or www.FrienditePlus  · Call the toll free National Suicide Prevention Hotlines   · National Suicide Prevention Lifeline 654-632-NCPX (4023)  · National Hope Line Network 800-SUICIDE (980-7830)

## 2019-02-22 NOTE — PROGRESS NOTES
[facility] post discharge med rec completed. No clinically significant medication issues noted. Unable to reach patient for f/u. Left vm for patient to call [phone].      Remind patient to monitor O2 and vitals and keep record for provider.

## 2019-02-22 NOTE — DISCHARGE PLANNING
Spoke with [facility] and confirmed that pt already has a concentrator at home. Spoke with pts dtr Yashira and she confirmed that yes they do have the concentrator at home already.   Dr. Miles entered the room then and pt's dtr is requesting an order for a portable backpack tank for pt. Dr. Miles states that he will do this. Will await order and updated bedside RN to this and if needed evening SW can follow up.

## 2019-02-22 NOTE — DISCHARGE SUMMARY
Discharge Summary    CHIEF COMPLAINT ON ADMISSION  Chief Complaint   Patient presents with   • Weakness   • Shortness of Breath   • Fever       Reason for Admission  EMS     Admission Date  2/13/2019    CODE STATUS  Full Code    HPI & HOSPITAL COURSE  This is a 87 y.o. female patient admitted with symptomatic hyponatremia with weakness and lethargy. Also found to have Trop elevation.  Patient denied any chest pain.  Troponins trended down.  Felt to be secondary to demand ischemia.  Also found to be in COPD exacerbation.  Was started on aggressive breathing treatments with nebs and DuoNeb's along with steroids.  Respiratory status gradually improved over hospital course and return to his line.  Also patient has a history of paroxysmal A. fib and her Pradaxa was continued.  However, initially was felt that patient has CHF and was started on diuretics.  She developed hypotension and her orthostatic blood pressure was positive.  Her blood pressure medications including Lasix were discontinued.  Metoprolol also was discontinued initially and then blood pressure gradually improved.  She was given gentle hydration and her orthostatic hypotension improved.  Respiratory status stayed stable.  Patient was having hyperactive delirium at nighttime and Seroquel was added which helped to keep the patient calm.  Leukocytosis noted but was felt to be steroid induced with negative pro calcitonin.  Blood cultures were negative during this hospital stay.  There were no signs of infection noted.  WBCs trended down during this hospital stay.  Echocardiogram showed preserved ejection fraction at 55% with grade 2 diastolic dysfunction.  She was clinically compensated.  Her hyponatremia was chronic but improved gradually as hydrochlorothiazide and Lasix has been held.  Also noted that her hypokalemia has improved over hospital course once hydrochlorothiazide was held.  Patient and family members were advised to not restart  hydrochlorothiazide in the future as it might worsen hyponatremia and hypokalemia.  Patient was hemodynamically and clinically stable.  Toprol was restarted and heart rate was well controlled.  Blood pressure has been stable.  Physical therapy assessed the patient and recommended SNF.  However initially family members had conflicting opinions whether patient should go home or go to SNF.  Palliative care also consulted to help directing the patient's advanced planning.  Patient gradually improved over hospital course.  She was eventually able to ambulate with a walker.  No orthostatic hypotension.  Hemodynamically stable.  Physical therapy felt that patient can be safely discharged home with home health.  That was arranged for the patient.  Also oxygen and portable tank was ordered for the patient.  Patient and family members were advised to monitor oxygen and vital signs at home.  Patient has a follow-up appointments with cardiology and pulmonary medicine.  The daughter was also interested in memory clinic with  will research and provide information to the family members.  On day of discharge, patient was clinically stable.  She was hemodynamically stable.  Patient and family members were advised to hold blood pressure medications except metoprolol for now as blood pressure has been stable only on metoprolol.  Advised to monitor blood pressure at home.  Patient and family members verbalized understanding.  Was able to ambulate with a walker with staff.  Home health arranged for the patient in she was cleared for discharge from medical standpoint.    Therefore, she is discharged in guarded and stable condition to home with close outpatient follow-up.    The patient met 2-midnight criteria for an inpatient stay at the time of discharge.    Discharge Date  2/21/19    FOLLOW UP ITEMS POST DISCHARGE  Follow-up with PCP in 1 week.  Follow-up with cardiology as per recommendations.  Follow-up with pulmonary  medicine as per recommendations.    DISCHARGE DIAGNOSES  Principal Problem (Resolved):    Acute on chronic combined systolic and diastolic heart failure (HCC) POA: Unknown  Active Problems:    Type II NSTEMI (non-ST elevated myocardial infarction) due to demand ischemia from hypoxia (HCC) POA: Yes    Acquired circulating anticoagulants (HCC) POA: Yes    Dyslipidemia (Chronic) POA: Yes      Overview: 5/10 chol 163,trig 68,hdl 69,ldl 80      6/10 chol 163,trig 60,hdl 69,ldl 80      7/11 chol 118,trig 45,hdl 65,ldl 44 on crestor 10 mg      10/11 chol 165,trig 47,hdl 78,ldl 74 on crestor 10 mg done at [facility]      2/12 off crestor      6/12 chol 211,trig 104,hdl 64,ldl 126 off crestor      3/4/14 chol 222,trig 124,hdl 52,ldl 145 off meds    PAF (paroxysmal atrial fibrillation) (HCC) (Chronic) POA: Yes      Overview: 4/10 hospitalization on multaq, also on verapamil and metoprolol for rate       control per cardiology      1/11 [facility] note cont multaq      7/11 EKG atrial fibrillation hospitalization      7/8/11 echo normal LV function, EF 55%      7/8/11 Persantine thallium possible small area mild ischemia in the       lateral wall, no evidence of infarction      7/11 [facility] during hospitalization changed to amiodarone 200 mg plus pradaxa       restarted      7/11       9/11 [facility] note stop amiodarone, cont pradaxa and metoprolol 25 bid      3/12 cont pradaxa and increase metoprolol to 50 bid due to higher bp      5/12 pradaxa decreased from 150 bid to 75 bid per [facility] due to nosebleeds      6/12 EKG NSR      1/13 echo normal LV function, grade 2 diastolic dysfunction, EF 70%       moderate aortic insufficiency      6/11/13 on pradaxa and metoprolol 50 bid for rate control, NSR today      9/13 cardiology note stop pradaxa and start asa 81 mg      4/7/14 cardiology note atrial fibrillation, start pradaxa 75 mg bid, stop       asa, stop norvasc, decreased avalide 300/12.5 mg to 1/2 per day, increase       metoprolol to 50  mg 1 1/2 bid      4/14/14 cardiology note, NSR on exam, continue pradaxa 75 mg bid,       metoprolol 75 mg bid, avalide 300/12.5 mg 1/2 tab per day      10/20/14 metoprolol 50 mg decreased to 25 mg bid by [place] doctor due to       low heart rate, continues on pradaxa and avalide 300/12.5 mg       12/29/14  cardiology note continue pradaxa, metoprolol 25 mg 1/2       bid, avalide 300/12.5 mg      6/12/15 NSR on exam      8/24/16 cardiology note sinus rhythm on exam,[id number] score=2 continue       anticoagulation, low-dose metoprolol follow-up one year      12/14/17 cardiology note remains in sinus rhythm, follow-up one year      6/21/18  cardiology note paroxysmal atrial fibrillation sinus       rhythm on exam stable continue anticoagulation, follow-up 1 year      12/11/18 on metoprolol and pradaxa          COPD (chronic obstructive pulmonary disease) (HCC) (Chronic) POA: Yes      Overview: 7/11 CT spiral thorax extensive emphysematous change of lung, small left       pleural effusion left lung base with atelectasis unchanged from prior CT      5/10 CT spriral thorax emphysematous change and dependent atelectasis left       lung base      7/11 PFT FEV/FVC 59%, FEV1 1.1 L (75% predicted), significant post       bronchodilator response noted (FEV1 improved 16%). Mixed restrictive and       obstructive pattern,COPD with GOLD stage II with sig bronchodilator       response      7/11 trial of symbicort 80/4.5 mcg bid discussed with daughter plus       albuterol neb prn, [facility] home education ordered      5/12 off symbicort       5/22/14 cxr negative      6/23/14 on symbicort      7/20/15 change to advair 250/50 mcg from symbicort (not covered on       insurance)      9/3/15 cxr negative      10/4/15 add spiriva and change symbicort to advair 250/50 mcg bid      10/19/15 CT high resolution thorax, no evidence of interstitial lung       disease, minimal scattered opacification could indicate minimal  areas of       fibrosis periphery of each lung, similar to prior exam, emphysema most       prominent upper lobes bilateral      10/19/15 PFT FEV1 1.1 (61% predicted),FVC 1.9,FEV/FVC 58%, no       bronchodilator response,DLCO 34%; on advair 250/50 mcg bid and spiriva      11/2/15 change from advair discus to advair 250 inhaler and continue       spiriva       12/11/15 not taking advair, will start advair and cont spiriva      12/11/15 cxr negative      3/14/16 [facility] note complaining doxycycline and taper steroids, continue       nocturnal oxygen, continue rotating antibiotics for bronchiectasis,       follow-up laboratory testing ordered      3/14/16  (normal), quantiferon gold positive, allergen panel       negative, IgE 357 (less than 214), IgA 116 (),, IgM 27 (),IgG       947 ([phone])      4/13/16 cxr mild cardiomegaly      5/4/16 [facility] note continue advair 115/21 bid with spacer given doxycycline       and prednisone, continue oxygen 3 L at night, AFB samples ×3, follow-up 3       months      7/25/16 pulmonary note on prednisone taper one week out of the month and       doxycycline one per day for one week per month, on advair bid and spiriva       and oxygen 3 liters at night      11/8/16 pulmonary note continue advair 115/21 ucg bid,spiriva, xopenex as       needed, oxygen 3 L at night      10/4/17 pulmonary note continue advair 115/21 two inhalations bid, spiriva       daily, xoponex as needed, oxygen 3 L at night, history of positive       quantiferon gold, will order sputum sample follow-up 4 months      3/17/18 pulmonary note, continue nocturnal oxygen 2 L, start trelegy one       puff daily      9/10/18 pulmonary note intolerant to CPAP, resume nocturnal oxygen, latent       TB, repeat chest x-ray, stable on bronchodilators, as needed prednisone       and antibiotic prescription to pharmacy follow-up 3-6 months                Hyponatremia POA: Yes    Chronic respiratory failure (HCC) POA:  Yes    Anemia POA: Yes    Hypokalemia POA: Unknown    Essential hypertension POA: Unknown    (HFpEF) heart failure with preserved ejection fraction (HCC) POA: Yes    Leukocytosis POA: Yes    Cognitive impairment POA: Yes    Debility POA: Yes  Resolved Problems:    Acute bronchitis POA: Yes    Hypertension (Chronic) POA: Yes      Overview: 1/05 carotid u/s negative      1/07 echo LV EF 60% ,mild LVH      1/09 renal ultrasound 6 mm right cortical cyst      9/09 MRI brain with and without moderate nonspecific microvascular       ischemic change      9/09 MRA next mild plaque right internal carotid      5/24/10 echo normal LV size and function, EF 60%, mild to moderate AR,       RVSP 35-40 consistent with mild pulmonary hypertension      5/10 persantine thallium no ischemia, EF 72%      9/10 change avalide 150/12.5 to avapro 150 qday, had sodium 125 in ER      3/11 on avapro 300 mg      7/8/11 echo normal LV function, EF 55%      7/8/11 Persantine thallium possible small area mild ischemia in the       lateral wall, no evidence of infarction      7/11       3/12 increase metoprolol to 50 bid, cont avapro 300 mg, start norvasc 2.5       mg      5/1/12 increase norvasc to 5 mg, change avapro to avalide 300/12.5 mg,       cont metoprolol 50 bid      10/2/12 on avalide 300/12.5 mg and metoprolol 50 bid and norvasc 5 mg      10/12 Persantine thallium diaphragmatic uptake possible attenuation, fixed       inferolateral defect which may be secondary to attenuation, EF 76%, no       wall motion abnormalities, no evidence of significant ischemia.      1/13 echo normal LV function, grade 2 diastolic dysfunction, EF 70%       moderate aortic insufficiency      1/13       1/23/13 cxr cardiomegaly without pulmonary edema      6/11/13 on avalide 300/12.5 mg,norvasc 5 mg, metoprolol 50 bid      4/7/14 cardiology note atrial fibrillation, start pradaxa 75 mg bid, stop       asa, stop norvasc, decreased avalide 300/12.5 mg  to 1/2 per day, increase       metoprolol to 50 mg 1 1/2 bid      10/20/14 metoprolol 50 mg decreased to 25 mg bid by [place] doctor due to       low heart rate, continues on avalide 300/12.5 mg       12/29/14  cardiology note continue pradaxa, metoprolol 25 mg 1/2       bid, avalide 300/12.5 mg      7/25/16 avalide 150/12.5 mg decrease to 1/2 tab per day and continue       metoprolol 25 mg bid      8/24/16 cardiology note sinus rhythm on exam,[id number] score=2 continue       anticoagulation, low-dose metoprolol,avalide 150/12.5 mg 1/2 per day,       follow-up one year      12/14/17 cardiology note remains in sinus rhythm, follow-up one year on       avalide 150/12.5 mg,metoprolol      6/21/18  cardiology note paroxysmal atrial fibrillation sinus       rhythm on exam stable continue anticoagulation, follow-up 1 year          Acute exacerbation of chronic obstructive pulmonary disease (COPD) (HCC) POA: Yes    Acute metabolic encephalopathy POA: Yes    Vomiting POA: Unknown    Tachycardia POA: Unknown    Orthostatic hypotension POA: Yes      FOLLOW UP  Future Appointments  Date Time Provider Department Center   3/8/2019 9:30 AM SARAH De La Garza None   3/15/2019 9:30 AM SARAH De La Garza None   3/19/2019 9:00 AM Denis Krueger M.D. [facility]   3/28/2019 8:20 AM Alexy Smyth M.D. [facility] None     No follow-up provider specified.    MEDICATIONS ON DISCHARGE     Medication List      START taking these medications      Instructions   atorvastatin 40 MG Tabs  Commonly known as:  LIPITOR   Take 1 Tab by mouth every evening.  Dose:  40 mg     clotrimazole 10 MG Troc  Commonly known as:  MYCELEX   Take 1 Sherry by mouth 5 Times a Day for 1 day.  Dose:  10 mg     QUEtiapine 25 MG Tabs  Commonly known as:  SEROQUEL   Take 1 Tab by mouth every evening.  Dose:  25 mg        CONTINUE taking these medications      Instructions   acetaminophen 500 MG Tabs  Commonly known as:   TYLENOL   Take 1,000 mg by mouth every 6 hours as needed for Mild Pain or Moderate Pain.  Dose:  1000 mg     albuterol 2.5mg/3ml Nebu solution for nebulization  Commonly known as:  PROVENTIL   Doctor's comments:  Please consider 90 day supplies to promote better adherence  3 mL by Nebulization route every four hours as needed for Shortness of Breath.  Dose:  2.5 mg     dabigatran 75 MG Caps capsule  Commonly known as:  PRADAXA   Doctor's comments:  Please consider 90 day supplies to promote better adherence  Take 1 Cap by mouth 2 Times a Day.  Dose:  75 mg     Fluticasone-Umeclidin-Vilant 100-62.5-25 MCG/INH Aepb  Commonly known as:  TRELEGY ELLIPTA   Doctor's comments:  With mouth rinse  Inhale 1 Puff by mouth every day.  Dose:  1 Puff     metoprolol 25 MG Tabs  Commonly known as:  LOPRESSOR   Take 1 Tab by mouth 2 times a day.  Dose:  25 mg        STOP taking these medications    cefdinir 300 MG Caps  Commonly known as:  OMNICEF     irbesartan-hydrochlorothiazide 150-12.5 MG per tablet  Commonly known as:  AVALIDE     oxyCODONE CR 10 MG T12a  Commonly known as:  OXYCONTIN     sodium chloride 1 GM Tabs  Commonly known as:  SALT            Allergies  Allergies   Allergen Reactions   • Codeine Vomiting       DIET  Orders Placed This Encounter   Procedures   • Diet Order Regular     Standing Status:   Standing     Number of Occurrences:   1     Order Specific Question:   Diet:     Answer:   Regular [1]     Order Specific Question:   Texture/Fiber modifications:     Answer:   Dysphagia 1(Pureed)specify fluid consistency(question 6) [1]     Order Specific Question:   Consistency/Fluid modifications:     Answer:   Thin Liquids [3]     Order Specific Question:   Miscellaneous modifications:     Answer:   SLP - Deliver to Nursing Station [22]     Order Specific Question:   Miscellaneous modifications:     Answer:   SLP - 1:1 Supervision by Nursing [21]       ACTIVITY  As tolerated.  Weight bearing as  tolerated    CONSULTATIONS  None    PROCEDURES  None    LABORATORY  Lab Results   Component Value Date    SODIUM 132 (L) 02/21/2019    POTASSIUM 4.9 02/21/2019    CHLORIDE 95 (L) 02/21/2019    CO2 31 02/21/2019    GLUCOSE 101 (H) 02/21/2019    BUN 17 02/21/2019    CREATININE 0.84 02/21/2019    CREATININE 1.03 (H) 03/21/2011    GLOMRATE 52 (L) 03/21/2011        Lab Results   Component Value Date    WBC 13.2 (H) 02/21/2019    HEMOGLOBIN 13.2 02/21/2019    HEMATOCRIT 39.3 02/21/2019    PLATELETCT 328 02/21/2019        Total time of the discharge process exceeds 42 minutes.

## 2019-02-22 NOTE — PROGRESS NOTES
Pt D/C'd. PIV removed. Discharge instructions provided to pt daughter.  Pt daughter states understanding.  Pt daughter states all questions have been answered. Signed copy in chart. Pt daughter states that all personal belongings are in possession.

## 2019-02-25 ENCOUNTER — TELEPHONE (OUTPATIENT)
Dept: MEDICAL GROUP | Facility: MEDICAL CENTER | Age: 84
End: 2019-02-25

## 2019-02-25 ENCOUNTER — PATIENT OUTREACH (OUTPATIENT)
Dept: HEALTH INFORMATION MANAGEMENT | Facility: OTHER | Age: 84
End: 2019-02-25

## 2019-02-25 ENCOUNTER — TELEPHONE (OUTPATIENT)
Dept: CARDIOLOGY | Facility: MEDICAL CENTER | Age: 84
End: 2019-02-25

## 2019-02-25 NOTE — TELEPHONE ENCOUNTER
----- Message from Pina White sent at 2/25/2019 10:38 AM PST -----  Regarding: issues following hospital stay & appt advise  Contact: [phone]  JAMIE/gina    Pt's daughter Yashira calling, insisting she speak with you about getting pt in for hospital follow up sooner than 3/28.   Pt is having some issues also that Yashira wants to advise you of.  Please call Yashira at .

## 2019-02-25 NOTE — PROGRESS NOTES
Pt referred to [facility] by pharmacist CC (KEDAR Desir) with report of increased cost of her Pradaxa medication. Outreach call to pt to introduce self, discuss role of SW CC and pt's identified needs. Pt's daughter, Ivana answered but placed pt on the phone. Per conversation with pt she requested MSW review with her daughter Ivana and gave verbal consent for MSW to release and discuss medical information and social needs/concerns.     Pt's daughter came back on the line, she wasn't able to discuss long explaining she had the oxygen company currently there setting up pt's oxygen. She confirmed the cost of pt's Pradaxa had increased this year to $100/copayment. She confirmed pt has the SPAP/Senior Rx program should she hit the donut hole but the copayment cost currently outside of the donut hole is more than pt can afford. She reported CC pharmacist is working with them to obtain a sample at the [facility] pharmacy-pending script from provider.     MSW briefly discussed potential programs with daughter such as Extra Help LIS and manufacture assistance program. Discussed that pt would need to have her proof of income to determine eligibility. 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 Based on information pt maybe over the Extra Help eligibility guidelines but could still qualify for manufacture assistance program. Daughter would like to look into further. Discussed scheduling future appt to discuss further and complete assessment. Daughter reported a home visit would be best as it is difficult for pt to get out. Home visit scheduled for later this week.     Plan:  MSW will meet with pt and daughter at scheduled home-visit 02/28/2019 at 11:00 am  Daughter and pt to gather copies of supporting documentation: 2019 proof of income  Care plan initiated.

## 2019-02-25 NOTE — TELEPHONE ENCOUNTER
ESTABLISHED PATIENT PRE-VISIT PLANNING     Patient was contacted to complete PVP. Spoke w/pt son.     Note: Patient will not be contacted if there is no indication to call.     1.  Reviewed notes from the last few office visits within the medical group: Yes    2.  If any orders were placed at last visit or intended to be done for this visit (i.e. 6 mos follow-up), do we have Results/Consult Notes?        •  Labs - Labs were not ordered at last office visit.   Note: If patient appointment is for lab review and patient did not complete labs, check with provider if OK to reschedule patient until labs completed.       •  Imaging - Imaging was not ordered at last office visit.       •  Referrals - Referral ordered, patient has NOT been seen.    3. Is this appointment scheduled as a Hospital Follow-Up? Yes, visit was at [facility].     4.  Immunizations were updated in Epic using WebIZ?: Epic matches WebIZ       •  Web Iz Recommendations: TD and SHINGRIX (Shingles)    5.  Patient is due for the following Health Maintenance Topics:   Health Maintenance Due   Topic Date Due   • IMM HEP B VACCINE (1 of 3 - Risk 3-dose series) 04/22/1950   • IMM ZOSTER VACCINES (2 of 3) 11/27/2012   • PFT SCREENING-FEV1 AND FEV/FVC RATIO / SPIROMETRY SHOULD BE PERFORMED ANNUALLY  10/21/2016   • BONE DENSITY  06/21/2018   • URINE DRUG SCREEN  08/02/2018       - Pt son states they need to talk about medications first and HF first before they decide to talk about vaccines and bone density.   - Pt son states he is unsure of pt last PFT.    6. Orders for overdue Health Maintenance topics pended in Pre-Charting? NO    7.  AHA (MDX) form printed for Provider? YES    8.  Patient was informed to arrive 15 min prior to their scheduled appointment and bring in their medication bottles.

## 2019-02-25 NOTE — TELEPHONE ENCOUNTER
"Spoke with Yashira. Pt. Was admitted for hyponatremia, elevated Troponin. Treated for HF, then developed hypotension.   Yashira is requesting FV this week. Pt's. BP's=85//80, usually 100's/60's. Per Yashira, managing pt. Is \"like walking a tightrope\" between holding diuretics, occasionally holding Lopressor 25mg for hypotension, trying to replace sodium without worsening heart failure, etc.  Scheduled pt. To see Dr. Smyth on Wed. She sees Dr. Sequeira on Tues.   "

## 2019-02-26 ENCOUNTER — OFFICE VISIT (OUTPATIENT)
Dept: MEDICAL GROUP | Facility: MEDICAL CENTER | Age: 84
End: 2019-02-26
Payer: MEDICARE

## 2019-02-26 ENCOUNTER — TELEPHONE (OUTPATIENT)
Dept: MEDICAL GROUP | Facility: MEDICAL CENTER | Age: 84
End: 2019-02-26

## 2019-02-26 VITALS
TEMPERATURE: 98.6 F | SYSTOLIC BLOOD PRESSURE: 126 MMHG | RESPIRATION RATE: 14 BRPM | OXYGEN SATURATION: 99 % | BODY MASS INDEX: 22.64 KG/M2 | WEIGHT: 115.3 LBS | HEIGHT: 60 IN | DIASTOLIC BLOOD PRESSURE: 68 MMHG | HEART RATE: 70 BPM

## 2019-02-26 DIAGNOSIS — I21.4 NSTEMI (NON-ST ELEVATED MYOCARDIAL INFARCTION) (HCC): ICD-10-CM

## 2019-02-26 DIAGNOSIS — R41.3 MEMORY LOSS: ICD-10-CM

## 2019-02-26 DIAGNOSIS — E87.1 HYPONATREMIA: ICD-10-CM

## 2019-02-26 DIAGNOSIS — F51.01 PRIMARY INSOMNIA: ICD-10-CM

## 2019-02-26 LAB — EKG IMPRESSION: NORMAL

## 2019-02-26 PROCEDURE — 99214 OFFICE O/P EST MOD 30 MIN: CPT | Performed by: PHYSICIAN ASSISTANT

## 2019-02-26 RX ORDER — DABIGATRAN ETEXILATE 75 MG/1
75 CAPSULE ORAL 2 TIMES DAILY
Qty: 60 CAP | Refills: 0 | OUTPATIENT
Start: 2019-02-26 | End: 2019-02-26 | Stop reason: SDUPTHER

## 2019-02-26 RX ORDER — DABIGATRAN ETEXILATE 75 MG/1
75 CAPSULE ORAL 2 TIMES DAILY
Qty: 60 CAP | Refills: 0 | Status: SHIPPED
Start: 2019-02-26 | End: 2019-02-27 | Stop reason: SDUPTHER

## 2019-02-26 RX ORDER — TRAZODONE HYDROCHLORIDE 50 MG/1
25 TABLET ORAL
Qty: 30 TAB | Refills: 3 | Status: SHIPPED | OUTPATIENT
Start: 2019-02-26 | End: 2019-04-19

## 2019-02-26 NOTE — ASSESSMENT & PLAN NOTE
Daughter is also concerned about recent memory loss.  She does have a chronic history of mild cognitive impairment.  Has been placed on medication in the past by her PCP.  Side effects were significant.  Not on any current medications.  She is wondering if there was some program to help with her memory.

## 2019-02-26 NOTE — PROGRESS NOTES
Subjective:   Venus Church is a 87 y.o. female here today for hospitalization secondary to type II non-STEMI and exacerbation of COPD plus memory loss and hyponatremia as well as to discuss her chronic opiate use.    Type II NSTEMI (non-ST elevated myocardial infarction) due to demand ischemia from hypoxia (HCC)  This is an 87-year-old female who is here today with her son and daughter.  Daughter states that currently she is staying with her mother.  She was recently hospitalized at [facility] from the 13th to the 19th.  Daughter was noticing her being tired.  Not herself.  During her hospital stay troponin levels were found to be elevated.  The trended lower.  It was thought that possibly her history of hypoxia secondary to COPD or emphysema caused her non-ST elevated MI.  She was discharged on metoprolol 25 mg twice a day.  She has been taking that medication for many years.  She also was discharged on Lipitor 40 mg but they will wait to speak with her cardiologist about starting that medication.  Since her hospitalization she is doing better.  Does complain of some abdominal bloating.  She was weaned off of her chronic use of opiates at the hospital.  Daughter wants to know if she should restart the medication.  Oxycodone 10 mg twice daily.  Daughter states that she continues to complain about back pain.  She sighs often.  She is not sleeping well.    Memory loss  Daughter is also concerned about recent memory loss.  She does have a chronic history of mild cognitive impairment.  Has been placed on medication in the past by her PCP.  Side effects were significant.  Not on any current medications.  She is wondering if there was some program to help with her memory.    Hyponatremia  Also upon discharge her BMP showed that her sodium level was slightly low.  At 132.  Initially at 127.  Fluctuated during her hospitalization.  She was eating well prior to her hospitalization.       Current medicines (including changes  today)  Current Outpatient Prescriptions   Medication Sig Dispense Refill   • traZODone (DESYREL) 50 MG Tab Take 0.5 Tabs by mouth every bedtime. 30 Tab 3   • dabigatran (PRADAXA) 75 MG Cap capsule Take 1 Cap by mouth 2 Times a Day. 180 Cap 1   • acetaminophen (TYLENOL) 500 MG Tab Take 1,000 mg by mouth every 6 hours as needed for Mild Pain or Moderate Pain.     • metoprolol (LOPRESSOR) 25 MG Tab Take 1 Tab by mouth 2 times a day. 180 Tab 3   • albuterol (PROVENTIL) 2.5mg/3ml Nebu Soln solution for nebulization 3 mL by Nebulization route every four hours as needed for Shortness of Breath. 150 Bullet 11   • Fluticasone-Umeclidin-Vilant (TRELEGY ELLIPTA) 100-62.5-25 MCG/INH AEROSOL POWDER, BREATH ACTIVATED Inhale 1 Puff by mouth every day. 1 Each 11     No current facility-administered medications for this visit.      She  has a past medical history of Anesthesia; Aortic regurgitation (4/25/2012); Arrhythmia (7/10); Arthritis; Atypical chest pain (4/25/2012); CATARACT (2007,08); Chronic anticoagulation (4/25/2012); Constipation (4/25/2012); COPD (chronic obstructive pulmonary disease) (HCC); DDD (degenerative disc disease); Dental disorder; Dyspnea (4/25/2012); Generalized osteoarthritis (4/25/2012); Headache(784.0) (4/25/2012); Heart valve insufficiency; Hypercholesteremia; Hyperlipidemia (4/25/2012); Hypertension; MEDICAL HOME; On home oxygen therapy; Osteoarthritis of knee (4/25/2012); Osteoporosis; Other accident; Pain; Pneumonia (6/4); TB lung, 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    Social History and Family History were reviewed and updated.    ROS   No chest pain, no shortness of breath, no abdominal pain and all other systems were reviewed and are negative.       Objective:     Blood pressure 126/68, pulse 70, temperature 37 °C (98.6 °F), resp. rate 14, height 1.524 m (5'), weight 52.3 kg (115 lb 4.8 oz), SpO2 99 %, not currently breastfeeding.  Body mass index is 22.52 kg/m².   Physical Exam:  Constitutional: Alert, no distress.  Skin: Warm, dry, good turgor, no rashes in visible areas.  Eye: Equal, round and reactive, conjunctiva clear, lids normal.  ENMT: Lips without lesions, good dentition, oropharynx clear.  Neck: Trachea midline, no masses.   Lymph: No cervical or supraclavicular lymphadenopathy  Respiratory: Unlabored respiratory effort, lungs clear to auscultation, no wheezes, no ronchi.  Cardiovascular: Normal S1, S2, no murmur, no edema.  Abdomen: Soft, non-tender, no masses.  Psych: Alert and oriented x3, normal affect and mood.        Assessment and Plan:   The following treatment plan was discussed    1. Type II NSTEMI (non-ST elevated myocardial infarction) due to demand ischemia from hypoxia (HCC)  Acute condition.  Resolved.  Follow with cardiology.  Continue metoprolol as directed.  Advised to discuss statin with cardiologist.    2. Hyponatremia  Acute, new onset condition.  Repeat BMP in 2 weeks prior to PCP visit.  Advised to continue with her current diet.  - Basic Metabolic Panel; Future    3. Memory loss  Chronic condition.  Referred to neurology and memory disorder clinic.  Ordered vitamin B12 level.  - REFERRAL TO NEUROLOGY  - VITAMIN B12; Future    4. Primary insomnia  Chronic condition.  Discussed with restarting her back on oxycodone.  Potentially cut dose in half.  Also provided trazodone.  Advised take half a tablet.  Provided right at bedtime.  May be a good alternative if the opiate is not helpful.  Advised to follow-up with her PCP to discuss refilling the controlled medication and continuing the control medication long-term.  - traZODone (DESYREL) 50 MG Tab; Take 0.5 Tabs by mouth every bedtime.  Dispense: 30 Tab; Refill: 3    Patient was seen for 40 minutes face to face of which > 50% of appointment time was spent on counseling and coordination of care regarding the above.    Followup: Return if symptoms worsen or fail to  improve.    Please note that this dictation was created using voice recognition software. I have made every reasonable attempt to correct obvious errors, but I expect that there are errors of grammar and possibly content that I did not discover before finalizing the note.

## 2019-02-26 NOTE — TELEPHONE ENCOUNTER
Dileep Church  [phone]    Nelson called and needs [facility] paperwork done for him re: his mother. She has an appt to see Dr Michael tomorrow for her pain. How does he need to do this? Will you fill out for him? Please advise.

## 2019-02-26 NOTE — ASSESSMENT & PLAN NOTE
Also upon discharge her BMP showed that her sodium level was slightly low.  At 132.  Initially at 127.  Fluctuated during her hospitalization.  She was eating well prior to her hospitalization.

## 2019-02-26 NOTE — PROGRESS NOTES
Inbound call from Ivana requesting status update on Pradaxa. Per communication with Dr. Krueger and Janessa Soriano (CC ), patient is applying for patient assistance program for Pradaxa. [facility]. Ivana notified of above and provided with pharmacy contact info.

## 2019-02-26 NOTE — TELEPHONE ENCOUNTER
Dileep was informed of the info you need and he will drop off paperwork later today.    Dileep also wanted to ask you if you could write a letter to [employer]. He and his wife had planned a honeymoon and bought tickets to [place] and had to cancel their trip due to Venus's health. He would like to try to get a refund. Travel dates would have been 2/24 through 3/3/19. Please advise.

## 2019-02-26 NOTE — TELEPHONE ENCOUNTER
I can complete the [facility] paperwork, I just need to know if this is for continuous leave, intermittent leave, etc.      If this is a continuous leave of absence f I need to know the exact start and end date.    If this is intermittent leave, I need to know how frequently he will need days off, would this be 1-2 days/week, 3-4 days/month, etc.?    He can put that information on a sticky note and attach that to the [facility] request with his name and contact information.

## 2019-02-26 NOTE — TELEPHONE ENCOUNTER
----- Message from FELIZ Fierro, ELIZABETH sent at 2/26/2019  8:01 AM PST -----  Regarding: RE: Manufacture assistance form  I do at [phone] but it doesn't fax over well due to how small the font is. It become illegible and the company then won't accept it. So email would be better. If the MA needs help on how to email I'm happy to walk her/him through it. They can call me at ext [phone].   ----- Message -----  From: Denis Krueger M.D.  Sent: 2/25/2019   8:53 PM  To: FELIZ Fierro, MSW  Subject: FW: Manufacture assistance form                  Thank you, I will complete the form.  Do you have a fax number? That may be easier to send to you.    Denis        ----- Message -----  From: FELIZ Fierro, ELIZABETH  Sent: 2/25/2019  12:00 PM  To: Denis Krueger M.D.  Subject: Manufacture assistance form                      Laith Krueger,    I emailed you a manufacture form for the medication Pradaxa via your [facility] email.  If agreeable with plan to try to get medication for pt from the manufacture at a no charge to her, can you complete form and have your MA email back to me.     I find with his form it's better to email vs fax due to the small size. After a few faxes it becomes illegible.     Thank you,    Rekha  Ext: [phone]  [email or web address]

## 2019-02-26 NOTE — ASSESSMENT & PLAN NOTE
This is an 87-year-old female who is here today with her son and daughter.  Daughter states that currently she is staying with her mother.  She was recently hospitalized at [facility] from the 13th to the 19th.  Daughter was noticing her being tired.  Not herself.  During her hospital stay troponin levels were found to be elevated.  The trended lower.  It was thought that possibly her history of hypoxia secondary to COPD or emphysema caused her non-ST elevated MI.  She was discharged on metoprolol 25 mg twice a day.  She has been taking that medication for many years.  She also was discharged on Lipitor 40 mg but they will wait to speak with her cardiologist about starting that medication.  Since her hospitalization she is doing better.  Does complain of some abdominal bloating.  She was weaned off of her chronic use of opiates at the hospital.  Daughter wants to know if she should restart the medication.  Oxycodone 10 mg twice daily.  Daughter states that she continues to complain about back pain.  She sighs often.  She is not sleeping well.

## 2019-02-27 ENCOUNTER — TELEPHONE (OUTPATIENT)
Dept: MEDICAL GROUP | Facility: MEDICAL CENTER | Age: 84
End: 2019-02-27

## 2019-02-27 ENCOUNTER — OFFICE VISIT (OUTPATIENT)
Dept: CARDIOLOGY | Facility: MEDICAL CENTER | Age: 84
End: 2019-02-27
Payer: MEDICARE

## 2019-02-27 VITALS
BODY MASS INDEX: 22.58 KG/M2 | HEART RATE: 66 BPM | WEIGHT: 115 LBS | OXYGEN SATURATION: 93 % | SYSTOLIC BLOOD PRESSURE: 122 MMHG | HEIGHT: 60 IN | DIASTOLIC BLOOD PRESSURE: 68 MMHG

## 2019-02-27 DIAGNOSIS — Z87.81 HISTORY OF COMPRESSION FRACTURE OF SPINE: ICD-10-CM

## 2019-02-27 DIAGNOSIS — I48.0 PAF (PAROXYSMAL ATRIAL FIBRILLATION) (HCC): Chronic | ICD-10-CM

## 2019-02-27 DIAGNOSIS — M54.40 CHRONIC BILATERAL LOW BACK PAIN WITH SCIATICA, SCIATICA LATERALITY UNSPECIFIED: Chronic | ICD-10-CM

## 2019-02-27 DIAGNOSIS — J41.1 MUCOPURULENT CHRONIC BRONCHITIS (HCC): Chronic | ICD-10-CM

## 2019-02-27 DIAGNOSIS — I71.40 ABDOMINAL AORTIC ANEURYSM (AAA) WITHOUT RUPTURE (HCC): ICD-10-CM

## 2019-02-27 DIAGNOSIS — G89.29 CHRONIC BILATERAL LOW BACK PAIN WITH SCIATICA, SCIATICA LATERALITY UNSPECIFIED: Chronic | ICD-10-CM

## 2019-02-27 DIAGNOSIS — Z79.01 CHRONIC ANTICOAGULATION: Chronic | ICD-10-CM

## 2019-02-27 DIAGNOSIS — Z79.891 CHRONIC USE OF OPIATE FOR THERAPEUTIC PURPOSE: Chronic | ICD-10-CM

## 2019-02-27 PROBLEM — F51.01 PRIMARY INSOMNIA: Status: RESOLVED | Noted: 2019-02-26 | Resolved: 2019-02-27

## 2019-02-27 PROCEDURE — 99214 OFFICE O/P EST MOD 30 MIN: CPT | Performed by: INTERNAL MEDICINE

## 2019-02-27 RX ORDER — DABIGATRAN ETEXILATE 75 MG/1
75 CAPSULE ORAL 2 TIMES DAILY
Qty: 60 CAP | Refills: 0 | Status: SHIPPED
Start: 2019-02-27 | End: 2019-02-27

## 2019-02-27 RX ORDER — CELECOXIB 100 MG/1
100 CAPSULE ORAL
Qty: 30 CAP | Refills: 3 | Status: SHIPPED | OUTPATIENT
Start: 2019-02-27 | End: 2019-03-19 | Stop reason: SDUPTHER

## 2019-02-27 ASSESSMENT — ENCOUNTER SYMPTOMS
WEAKNESS: 1
NAUSEA: 0
BACK PAIN: 1
DIZZINESS: 0
LOSS OF CONSCIOUSNESS: 0
VOMITING: 0
MEMORY LOSS: 1
BRUISES/BLEEDS EASILY: 0
PALPITATIONS: 0
BLOOD IN STOOL: 0
DEPRESSION: 1
SPEECH CHANGE: 0
SHORTNESS OF BREATH: 0

## 2019-02-28 ENCOUNTER — PATIENT OUTREACH (OUTPATIENT)
Dept: HEALTH INFORMATION MANAGEMENT | Facility: OTHER | Age: 84
End: 2019-02-28

## 2019-02-28 NOTE — PROGRESS NOTES
Chief Complaint   Patient presents with   • Atrial Fibrillation       Subjective:   Venus Church is a 87 y.o. female who presents today for follow-up of PAF, history of hypertension chronic anticoagulation.  She is company by her son and daughter today.  She was hospitalized in mid February with weakness and altered mental status.  She was hyponatremic on admission with sodium of 126.  Diuretic was stopped.  She did not have a urinary tract infection.  Troponins were in the low indeterminate range.  There is no evidence of an MI.  She was discharged on the same medicines except HCTZ was stopped.  She is still living independently but with very close follow up and supervision by the family.  The patient has dementia and is getting progressively worse.  The children are considering placing her in an assisted living center.      Past Medical History:   Diagnosis Date   • Anesthesia     n/v   • Aortic regurgitation 4/25/2012   • Arrhythmia 7/10      A. Fib.   • Arthritis     general   • Atypical chest pain 4/25/2012   • CATARACT 2007,08   • Chronic anticoagulation 4/25/2012   • Constipation 4/25/2012   • COPD (chronic obstructive pulmonary disease) ([facility])    • DDD (degenerative disc disease)    • Dental disorder     partials   • Dyspnea 4/25/2012   • Generalized osteoarthritis 4/25/2012   • Headache(784.0) 4/25/2012   • Heart valve insufficiency     minimal, watched by cardio   • Hypercholesteremia    • Hyperlipidemia 4/25/2012   • Hypertension    • MEDICAL HOME    • On home oxygen therapy     at [facility]   • Osteoarthritis of knee 4/25/2012   • Osteoporosis    • Other accident     C-Spine FX  2006   • Pain     back pain   • Pneumonia 6/4   • TB lung, latent 5/4/2016   • Valvular heart disease 4/25/2012   • Vertebral fracture    • Vertigo 4/25/2012     Past Surgical History:   Procedure Laterality Date   • RECOVERY  7/28/2010    Performed by SURGERY, IR-RECOVERY at SURGERY SAME DAY [facility] ORS   • OTHER ORTHOPEDIC SURGERY  may,  2010      T11 kypho.   • CATARACT PHACO WITH IOL  1/5/2009    Performed by ROSE MARIE MANN at SURGERY SAME DAY [facility] ORS   • CARPAL TUNNEL RELEASE  @30 yrs ago    rt hand     Family History   Problem Relation Age of Onset   • Heart Disease Father    • Diabetes Brother      Social History     Social History   • Marital status:      Spouse name: N/A   • Number of children: N/A   • Years of education: N/A     Occupational History   • Not on file.     Social History Main Topics   • Smoking status: Former Smoker     Packs/day: 1.50     Years: 25.00     Types: Cigarettes     Quit date: 4/14/1984   • Smokeless tobacco: Never Used      Comment: smoked 25 yrs, quit 1984   • Alcohol use No   • Drug use: No   • Sexual activity: Not on file     Other Topics Concern   • Not on file     Social History Narrative   • No narrative on file     Allergies   Allergen Reactions   • Codeine Vomiting     Outpatient Encounter Prescriptions as of 2/27/2019   Medication Sig Dispense Refill   • traZODone (DESYREL) 50 MG Tab Take 0.5 Tabs by mouth every bedtime. 30 Tab 3   • [DISCONTINUED] dabigatran (PRADAXA) 75 MG Cap capsule Take 1 Cap by mouth 2 Times a Day for 30 days. 60 Cap 0   • metoprolol (LOPRESSOR) 25 MG Tab Take 1 Tab by mouth 2 times a day. 180 Tab 3   • albuterol (PROVENTIL) 2.5mg/3ml Nebu Soln solution for nebulization 3 mL by Nebulization route every four hours as needed for Shortness of Breath. 150 Bullet 11   • Fluticasone-Umeclidin-Vilant (TRELEGY ELLIPTA) 100-62.5-25 MCG/INH AEROSOL POWDER, BREATH ACTIVATED Inhale 1 Puff by mouth every day. 1 Each 11     No facility-administered encounter medications on file as of 2/27/2019.      Review of Systems   Constitutional: Positive for malaise/fatigue.   HENT: Negative for nosebleeds.    Respiratory: Negative for shortness of breath.    Cardiovascular: Negative for chest pain, palpitations and leg swelling.   Gastrointestinal: Negative for blood in stool, melena, nausea  and vomiting.   Genitourinary: Negative for hematuria.   Musculoskeletal: Positive for back pain.   Neurological: Positive for weakness. Negative for dizziness, speech change and loss of consciousness.   Endo/Heme/Allergies: Does not bruise/bleed easily.   Psychiatric/Behavioral: Positive for depression and memory loss.        Objective:   /68 (BP Location: Left arm, Patient Position: Sitting, BP Cuff Size: Adult)   Pulse 66   Ht 1.524 m (5')   Wt 52.2 kg (115 lb)   SpO2 93%   BMI 22.46 kg/m²     Physical Exam   Constitutional: She is oriented to person, place, and time. She appears well-developed and well-nourished. No distress.   HENT:   Head: Atraumatic.   Eyes: Pupils are equal, round, and reactive to light. Conjunctivae and EOM are normal.   Neck: Neck supple. No JVD present.   Cardiovascular: Normal rate and regular rhythm.    Murmur heard.   Systolic murmur is present with a grade of 1/6   Pulmonary/Chest: Effort normal and breath sounds normal. No respiratory distress. She has no wheezes. She has no rales.   Abdominal: There is tenderness.   Musculoskeletal: She exhibits no edema.   Neurological: She is alert and oriented to person, place, and time.   Skin: Skin is warm and dry. She is not diaphoretic.   Psychiatric: She is slowed. She exhibits a depressed mood.       Assessment:     1. Chronic anticoagulation     2. Chronic bilateral low back pain with sciatica, sciatica laterality unspecified     3. PAF (paroxysmal atrial fibrillation) ([facility])         Medical Decision Making:  Today's Assessment / Status / Plan:   PAF: Patient is in sinus rhythm today.  She is on metoprolol twice daily recommend reducing that to once daily.    Chronic anticoagulation: We long discussion with son and daughter today regarding the pros and cons of continued anticoagulation and the risk of stroke.  For now we will keep her on anticoagulation.    History of hypertension: Pressure is actually low normal range  today.    The majority of the of the visit was discussing the patient's declining functional status and memory loss and placement options.  CODE STATUS was discussed.  I do not think it is feasible for this patient lives by herself at this point.  We also discussed narcotic use.  Apparently she was on twice daily OxyContin prior to admission and went through withdrawal in the hospital.  We discussed the fact that the less of this medication the better.  Return for reevaluation in 3 months

## 2019-02-28 NOTE — PROGRESS NOTES
MSW met with pt and family at pt's residences for scheduled home-visit. MSW greeted by pt's daughter, Ivana and permitted entry. MSW met with pt's daughter, Ivana and son, Dileep; total time spent with family 1 hour 30 mins.  Pt was resting/napping in her room. General, ADL/iADLs and Food insecurity assessments completed with daughter (See hyperlinks) Unable to complete PHQ-9 as assessment needs to be completed with pt directly and pt currently sleeping. Pt currently lives alone in her single-story home. Her son lives locally in [place] and provides support. Her daughter lives in [place] but is currently staying with pt and helping with pt's care.     Daughter discussed that she spoke with pt's PCP yesterday and is agreeable to trying Eliquis medication as recommended by PCP. Discussed that manufacture program for Pradaxa would not need to be completed in this event. While at residence family received notification from [facility], pt's pharmacy, that pt's Eliquis and Celebrex had been filled and ready for . The Eliquis was a $47 copayment vs $100 copayment for the Pradaxa and would be more affordable for pt.     Daughter interested in potential assistance programs for the Eliquis as pt's income is limited. Per review, there is a manufacture assistance program for Eliquis; however, program requires pt met 3% of her annual income on out of pocket medical expenses. This would be about $570 for pt given her Social Security FCI Income is $751.50/mo and daughter reported she receives an additional income of around $830/month gross from her assets. Daughter unable to clarify if fund was an MAXINE, annuity, stocks/bonds, etc but did explain it was related to money pt and her late- invested vs income from a pension. Reviewed program benefits with family and discussed currently pt would not be able to apply for program. Discussed should pt reach $570 of her out of pocket cost on medication and not be in the donut hole or  coverage gap, program would be worth looking into. However due to pt currently having the State Pharmacy Assistance Program, if she was in the donut hole at time of reaching $570 out of pocket or in donut hole prior to reaching this amount, she would not need to apply for manufacture assistance program because the SPAP would cover medication cost in full. Daughter voiced understanding.     During home-visit, MSW provided further education and information to family regarding tools such as the medicare.gov website. Encouraged family to utilize tools with pt to help plan for the financial cost associated with pt's medical care. Discussed how they can utilize website to estimate when pt may hit the donut hole or cost of medication/coverage of medications.     Long-term care options also reviewed with family should pt need additional care. Discussed long-term placement options and funding sources as well as in-home care options and funding sources. Given pt's current assets it is likely all services at this time would be of private pay cost. Family reported they are aware of this as they have been researching options. Discussed if there is a potential of needing medicaid funding in the future, MSW would recommend family meet with Elder Law  whom specialize in Medicaid to determine pt's assets would allow for or could be arranged to meet criteria for medicaid funding in the future. Family voiced understanding and reported they received similar advice/recommendation from the [employer]. Daughter reported she is also considering to move pt back to [place] with her to live.     Lastly discussed Advanced Directive documents. Daughter reported pt has completed the forms. Discussed that currently forms are not on file in pt's chart and discussed importance of forms being in pt's chart so documents and wishes can be honored. Encouraged family to bring copy of documents to next PCP appt or visit with [facility] so  information can be scanned into pt's medical record. Daughter voiced understanding.     Plan:  · MSW will follow up with family in a few weeks to determine if there are any additional social needs or assistance and move towards graduation of care coordination as current need has been met.

## 2019-02-28 NOTE — TELEPHONE ENCOUNTER
Discussed with pharmacy and patient's daughter.  Patient is on Pradaxa 75 mg twice daily and the insurance does not cover that medication.  She can get 30 days supply of the Pradaxa for free at the [facility] pharmacy but the duration of the free supply is unclear.    Patient will remain on anticoagulation per cardiology, whom she saw today, and given that the patient still requires anticoagulation.  Patient's daughter understands the risk of anticoagulation in her mother who has memory loss, gait instability, chronic pain. Given these multiple risk factors for falling and subsequent bleeding as a consequence of falling while on anticoagulation which could be life-threatening and contribute to increasing morbidity and mortality, we discussed the risks and benefits of anticoagulation and her mother.      The benefits would be decreasing risk of stroke, given the chads vascular score of 5, that equals a 7.8% annual stroke risk, this would be the benefit of Pradaxa or Eliquis.  The risks would be the aforementioned bleeding.  The hasbled score of 3 equals a risk of 3.74 bleeds per patient 100 years.  She has not had a history of bleeding peptic ulcer disease.  Also given her age and frail status and weight of 115 pounds, after consultation with pharmacy, she would benefit from a lower dose of Eliquis 2.5 mg twice daily which will be equal in efficacy for stroke prevention but associated with a lower rate of GI bleeds in the senior population.  Thus the daughter does agree to Eliquis 2.5 mg twice daily.  I will also send a note to her cardiologist Dr. Smyth to see if he has any concerns with regards to the medication regimen change.  I will update the daughter if cardiology would like Pradaxa instead of the Eliquis.    The other issue we discussed over the phone was her chronic back pain, she has seen multiple pain management specialist locally and at [place] as well as neurosurgery.  She has had physical  therapy, occupational therapy, trials of multiple medications and procedures with no clear benefit, and pain management locally and at [place] had maintained her on OxyContin twice daily, however this was discontinued abruptly in the hospital because of her presenting symptoms, and with her age, the chronic opiate therapy may have been affecting her mood, balance, sedation, and memory.  Patient is currently off OxyContin and the daughter is inquiring about different pain options that are nonnarcotic in nature or that will not affect her mentation, mood, and balance.  Since she has seen multiple specialists and tried multiple medications including anti-inflammatories, Celebrex, Cymbalta, gabapentin, Lyrica, topical Lidoderm, Voltaren gel, oxycodone, hydrocodone, OxyContin.  I am not sure if she has tried tramadol over these many years however that may also impact her mentation and memory.  As I discussed with her daughter again, there is no best option or even good option, there are only options with potential side effects and we need to take into account her cardiac status, age, and frailty.    We would like to optimize her mobility ADLs and functional ADLs, minimize her pain, and minimize side effects associated with any pain medication.  Narcotics are currently not an option, tramadol would be a consideration versus a trial of Celebrex again which she had responded to positively in the past according to the daughter.  Again long-term risks of the Celebrex in conjunction with Eliquis would be life-threatening GI bleed or intracranial hemorrhage should she fall.  Daughter understands the risks.  The daughter does have medical and legal power of .  Given the aforementioned data, understanding the patient's preferences and the daughter's preferences, we decided to proceed with Celebrex 100 mg daily on an as-needed basis to see how she responds.  Daughter will monitor her pain status, functional status,  monitor for any gastrointestinal issues such as nausea, vomiting, abdominal pain, melena or hematochezia.  She will notify me if she suspects any side effects with the Celebrex.  She will reinforce using the walker at all times.  Patient does not drink or smoke.  She will not take any other over-the-counter NSAIDs with Celebrex.

## 2019-03-05 ENCOUNTER — PATIENT OUTREACH (OUTPATIENT)
Dept: HEALTH INFORMATION MANAGEMENT | Facility: OTHER | Age: 84
End: 2019-03-05

## 2019-03-06 ENCOUNTER — PATIENT OUTREACH (OUTPATIENT)
Dept: HEALTH INFORMATION MANAGEMENT | Facility: OTHER | Age: 84
End: 2019-03-06

## 2019-03-06 NOTE — PROGRESS NOTES
Referred patient to [facility] for pharamcy outreach. Patient's daughter has questions about CBD chocolates or other edible options, and possible interactions with patient's medications.

## 2019-03-06 NOTE — PROGRESS NOTES
Received referral from [facility] patient advocate Chely that patient's daughter Ivana had questions regarding CBD and potential interactions with Venus's medications.     Drug interaction search with micromedex and Ria-comp did not reveal interactions. Per epocrates, caution advised with combination of cannabidiol and celecoxib as combination may increase celecoxib levels, risk of adverse effects. Concern for increased risk of bleeding if celecoxib levels increase as patient is taking eliquis. Per epocrates, caution advised with combination of cannabidiol and trazodone as combination may increase risk of CNS depression and psychomotor impairment. Outbound call to patient's daughter vIana. Discussed above. Per Ivana, patient is not taking trazodone at this time. She states her mother was recently taken off oxycontin and started on celecoxib. Ivana reports that the celecoxib seems to be helping somewhat but she is exploring options for pain control.     Discussed that CBD is not regulated so it is difficult to know if the product contains what the label states. More research is needed to determine if CBD could be an option in treating pain and what doses would be effective.   Encouraged Ivana to discuss with patient's PCP.     Amparo Mills, PharmD     CC: Dr. Krueger

## 2019-03-08 ENCOUNTER — APPOINTMENT (OUTPATIENT)
Dept: PULMONOLOGY | Facility: HOSPICE | Age: 84
End: 2019-03-08
Payer: MEDICARE

## 2019-03-14 ENCOUNTER — TELEPHONE (OUTPATIENT)
Dept: MEDICAL GROUP | Facility: MEDICAL CENTER | Age: 84
End: 2019-03-14

## 2019-03-15 ENCOUNTER — APPOINTMENT (OUTPATIENT)
Dept: PULMONOLOGY | Facility: HOSPICE | Age: 84
End: 2019-03-15
Payer: MEDICARE

## 2019-03-15 NOTE — TELEPHONE ENCOUNTER
Dileep Church  [phone] (home)       Nelson called to say that his mother is experiencing more pain. Wants to know if there is any additional meds she could be taking to relieve her pain. Please advise.   Do you want me to schedule her an appt?  
Please notify patient dana Falk, they can try Celebrex twice a day for the next 3 days over the weekend to see how she does and if the pain improves try going back to one Celebrex per day next week  
Spoke with Dileep, they will try that plan and let us know if they need anything.   
YAN Falk to call office.   
- - -

## 2019-03-17 DIAGNOSIS — J44.9 CHRONIC OBSTRUCTIVE PULMONARY DISEASE, UNSPECIFIED COPD TYPE (HCC): ICD-10-CM

## 2019-03-18 ENCOUNTER — TELEPHONE (OUTPATIENT)
Dept: MEDICAL GROUP | Facility: MEDICAL CENTER | Age: 84
End: 2019-03-18

## 2019-03-18 DIAGNOSIS — J44.9 CHRONIC OBSTRUCTIVE PULMONARY DISEASE, UNSPECIFIED COPD TYPE (HCC): ICD-10-CM

## 2019-03-18 NOTE — TELEPHONE ENCOUNTER
RX received via fax   [id number] - [place], [place] - [address]  [address]  [place] [address]  Phone: [phone] Fax: [phone]    Have we ever prescribed this med? Yes.  If yes, what date? 3/7/18    Last OV: 9/10/18 MARILYN Trejo     Next OV: 3/22/19 MARILYN Adams     DX: COPD     Medications: Trelegy

## 2019-03-18 NOTE — TELEPHONE ENCOUNTER
Have we ever prescribed this med? Yes.  If yes, what date? 03/07/2018    Last OV: 09/10/2018-Maida     Next OV: 03/22/2018-Andriy     DX: COPD    Medications:   Requested Prescriptions     Pending Prescriptions Disp Refills   • TRELEGY ELLIPTA 100-62.5-25 MCG/INH AEROSOL POWDER, BREATH ACTIVATED [Pharmacy Med Name: TRELEGY ELLIPTA     AER]  11     Sig: INHALE 1 PUFF ONCE DAILY (RINSE  MOUTH  AFTER  USE)

## 2019-03-19 ENCOUNTER — OFFICE VISIT (OUTPATIENT)
Dept: MEDICAL GROUP | Facility: MEDICAL CENTER | Age: 84
End: 2019-03-19
Payer: MEDICARE

## 2019-03-19 VITALS
SYSTOLIC BLOOD PRESSURE: 152 MMHG | BODY MASS INDEX: 23.56 KG/M2 | HEIGHT: 60 IN | TEMPERATURE: 98.3 F | HEART RATE: 71 BPM | WEIGHT: 120 LBS | OXYGEN SATURATION: 96 % | DIASTOLIC BLOOD PRESSURE: 80 MMHG

## 2019-03-19 DIAGNOSIS — I48.0 PAF (PAROXYSMAL ATRIAL FIBRILLATION) (HCC): Chronic | ICD-10-CM

## 2019-03-19 DIAGNOSIS — M54.40 CHRONIC BILATERAL LOW BACK PAIN WITH SCIATICA, SCIATICA LATERALITY UNSPECIFIED: Chronic | ICD-10-CM

## 2019-03-19 DIAGNOSIS — I71.40 ABDOMINAL AORTIC ANEURYSM (AAA) WITHOUT RUPTURE (HCC): ICD-10-CM

## 2019-03-19 DIAGNOSIS — G89.29 CHRONIC BILATERAL LOW BACK PAIN WITH SCIATICA, SCIATICA LATERALITY UNSPECIFIED: Chronic | ICD-10-CM

## 2019-03-19 DIAGNOSIS — J41.1 MUCOPURULENT CHRONIC BRONCHITIS (HCC): Chronic | ICD-10-CM

## 2019-03-19 DIAGNOSIS — M54.5 LOW BACK PAIN, UNSPECIFIED BACK PAIN LATERALITY, UNSPECIFIED CHRONICITY, WITH SCIATICA PRESENCE UNSPECIFIED: ICD-10-CM

## 2019-03-19 DIAGNOSIS — Z92.29 HISTORY OF OPIATE THERAPY: ICD-10-CM

## 2019-03-19 PROCEDURE — 99214 OFFICE O/P EST MOD 30 MIN: CPT | Performed by: INTERNAL MEDICINE

## 2019-03-19 RX ORDER — CELECOXIB 100 MG/1
100 CAPSULE ORAL 2 TIMES DAILY
Qty: 180 CAP | Refills: 3 | Status: SHIPPED | OUTPATIENT
Start: 2019-03-19 | End: 2019-10-04

## 2019-03-19 RX ORDER — GABAPENTIN 100 MG/1
100 CAPSULE ORAL EVERY EVENING
Qty: 30 CAP | Refills: 5 | Status: SHIPPED | OUTPATIENT
Start: 2019-03-19 | End: 2019-08-31 | Stop reason: SDUPTHER

## 2019-03-19 NOTE — PROGRESS NOTES
Subjective:      Venus Church is a 87 y.o. female who presents back pain           HPI   Patient is here with daughter Yashira. Son and daughter live with patient alternating two weeks at a time so patient is always supervised with family members present.  Chronic low back pain over 10 years, has been seen by pain management locally, and at [place] [facility] for chronic low back pain status post T12 compression fracture, has had multiple imaging studies, seen physical therapy, neurosurgery, has tried NSAIDs, Celebrex, Neurontin, Lyrica, Cymbalta, TENS, Lidoderm, topical Voltaren, epidural injections, kyphoplasty, all without benefit.  Can use walker for ambulation, no falls.  She had been on chronic opiate therapy, she has tapered off OxyContin due to side effects of drowsiness, sedation, confusion.  Daughter states her thinking is clearer.  Patient still is complaining of low back pain, no radiation.  Worse first thing in the morning, improved as she moves around during the day.  Still does ADLs without assist.  Denies depression, patient denies memory loss but daughter states that short-term memory is decreased.  Previously we had initiated Celebrex, patient is on Eliquis for atrial fibrillation understanding increased risk of GI bleed with Celebrex Eliquis combination, no other NSAIDs, aspirin, no nausea, vomiting, diarrhea, abdominal pain, melena or hematochezia.  We will try Celebrex for 3 days twice a day at 100 mg twice daily unclear if that was beneficial.  Currently back to Celebrex once a day 100 mg tablets, also has tried Lidoderm patches for pain low back now off oxycontin, back pain is worse in the day when up and active.  Paroxysmal atrial fibrillation followed by cardiology, on Eliquis 2.5 mg twice daily adjusted for age and weight, no chest pain, palpitations, lightheadedness.  COPD followed by pulmonary, intolerant to CPAP, on nocturnal oxygen for nocturnal hypoxemia, no current chest pain,  shortness of breath, cough.  Remains on trelegy ellipta  AAA, by CT 4.1 cm, asymptomatic        Current Outpatient Prescriptions   Medication Sig Dispense Refill   • Fluticasone-Umeclidin-Vilant (TRELEGY ELLIPTA) 100-62.5-25 MCG/INH AEROSOL POWDER, BREATH ACTIVATED Inhale 1 Puff by mouth every day. 1 Each 11   • celecoxib (CELEBREX) 100 MG Cap Take 1 Cap by mouth 1 time daily as needed. 30 Cap 3   • apixaban (ELIQUIS) 2.5mg Tab Take 1 Tab by mouth 2 Times a Day. 60 Tab 6   • traZODone (DESYREL) 50 MG Tab Take 0.5 Tabs by mouth every bedtime. 30 Tab 3   • metoprolol (LOPRESSOR) 25 MG Tab Take 1 Tab by mouth 2 times a day. 180 Tab 3   • albuterol (PROVENTIL) 2.5mg/3ml Nebu Soln solution for nebulization 3 mL by Nebulization route every four hours as needed for Shortness of Breath. 150 Bullet 11     No current facility-administered medications for this visit.          AAA  10/19/15 CT chest high-resolution thorax atherosclerosis aorta  2/13/19 CT-CTA chest pulmonary artery with reconstruction 4.1 cm ascending aortic aneurysm     Cervical pain  4/08 MRI cervical spine C3-C4 moderate left-sided foraminal stenosis, C4-C5 moderate foraminal stenosis right, C5-C6 moderate right-sided foraminal stenosis, C6-C7 moderate bilateral foraminal stenosis  5/08 CT cervical spine C3-C4 uncovertebral joint arthropathy and significant left-sided neural foraminal narrowing, C4-C5 uncovertebral joint arthropathy right significant neuroforaminal narrowing, C5-C6 uncovertebral joint arthropathy right moderate neural foraminal narrowing, C6-C7 uncovertebral joint arthropathy moderate to severe right neural foraminal narrowing  7/08 C3-C5 cervical facet injections   1/09 medial branch nerve block diagnostic   2/09 C4-C5 cervical epidural under fluoroscopy   4/13 Daughter called [place] pain suggest taper pain med, she has sched to taper the oxycontin  4/15/13 resumed oxycontin 10 bid  6/11/13 off oxcontin  7/24/13  increase oxycontin from qday to 10 mg bid   9/14/17 custom PT discharge summary patient did not improve walking tolerance or standing tolerance, medicare g-code status 60-79% impairment, thoracolumbar flexion decreased from 50-25%, extension 10%, sidebending 10-25%  1/17/18  pain note left cervical paraspinal muscle trigger point injections  1/31/18  pain note  2/2/18  pain note, reviewed cervical spine x-ray multilevel DJD, recommend consider intrathecal pain pump trial     Chronic opiate  2/26/15 narcotic contract  6/12/15 opiate risk score 0  10/4/15   7/25/16   9/20/16  pain note recommended left L3-L5 MBBB  10/31/16   2/6/17   5/9/17 narcotic contract  5/9/17 opiate risk score 0  5/9/17   5/9/17 depression screening score 0  6/28/17 trial cymbalta 30 mg  8/7/17 did not start cymbalta, will start  8/7/17   8/7/17 UDT  8/21/17 change cymbalta to qod  9/18/17 off cymbalta  9/14/17 custom PT discharge summary patient did not improve walking tolerance or standing tolerance, medicare g-code status 60-79% impairment, thoracolumbar flexion decreased from 50-25%, extension 10%, sidebending 10-25%  11/3/17   2/6/18   4/10/18   4/10/18 controlled substances contract, decrease oxycontin to 10 mg am and 5 mg pm  5/17/18 decrease to oxycontin 10 mg am and 5 mg pm  5/17/18   7/10/18 increase oxycontin back to 10 mg bid after fall  9/18/18   2/27/19 off oxycontin after hospital discharge, would like to avoid narcotics given side effects, will try celebrex 100 mg daily as had that previously  Has seen neurosurgery, pain management locally and at [place], has tried NSAIDs, celebrex, cymbalta, pregabalin, gabapentin, lidoderm, voltaren gel     COPD  7/11 CT spiral thorax extensive emphysematous change of lung, small left pleural effusion left lung base with atelectasis unchanged from prior CT  5/10 CT spriral thorax emphysematous change and dependent  atelectasis left lung base  7/11 PFT FEV/FVC 59%, FEV1 1.1 L (75% predicted), significant post bronchodilator response noted (FEV1 improved 16%). Mixed restrictive and obstructive pattern,COPD with GOLD stage II with sig bronchodilator response  7/11 trial of symbicort 80/4.5 mcg bid discussed with daughter plus albuterol neb prn, [facility] home education ordered  5/12 off symbicort   5/22/14 cxr negative  6/23/14 on symbicort  7/20/15 change to advair 250/50 mcg from symbicort (not covered on insurance)  9/3/15 cxr negative  10/4/15 add spiriva and change symbicort to advair 250/50 mcg bid  10/19/15 CT high resolution thorax, no evidence of interstitial lung disease, minimal scattered opacification could indicate minimal areas of fibrosis periphery of each lung, similar to prior exam, emphysema most prominent upper lobes bilateral  10/19/15 PFT FEV1 1.1 (61% predicted),FVC 1.9,FEV/FVC 58%, no bronchodilator response,DLCO 34%; on advair 250/50 mcg bid and spiriva  11/2/15 change from advair discus to advair 250 inhaler and continue spiriva   12/11/15 not taking advair, will start advair and cont spiriva  12/11/15 cxr negative  3/14/16 [facility] note complaining doxycycline and taper steroids, continue nocturnal oxygen, continue rotating antibiotics for bronchiectasis, follow-up laboratory testing ordered  3/14/16  (normal), quantiferon gold positive, allergen panel negative, IgE 357 (less than 214), IgA 116 (),, IgM 27 (),IgG 947 ([phone])  4/13/16 cxr mild cardiomegaly  5/4/16 [facility] note continue advair 115/21 bid with spacer given doxycycline and prednisone, continue oxygen 3 L at night, AFB samples ×3, follow-up 3 months  7/25/16 pulmonary note on prednisone taper one week out of the month and doxycycline one per day for one week per month, on advair bid and spiriva and oxygen 3 liters at night  11/8/16 pulmonary note continue advair 115/21 ucg bid,spiriva, xopenex as needed, oxygen 3 L at night  10/4/17  pulmonary note continue advair 115/21 two inhalations bid, spiriva daily, xoponex as needed, oxygen 3 L at night, history of positive quantiferon gold, will order sputum sample follow-up 4 months  3/17/18 pulmonary note, continue nocturnal oxygen 2 L, start trelegy one puff daily  9/10/18 pulmonary note intolerant to CPAP, resume nocturnal oxygen, latent TB, repeat chest x-ray, stable on bronchodilators, as needed prednisone and antibiotic prescription to pharmacy follow-up 3-6 months  2/13/19 CT-CTA chest pulmonary artery with reconstruction no pulmonary embolism, emphysema and chronic bronchitis, cardiomegaly with right heart dysfunction, 4.1 cm ascending aortic aneurysm     Dyslipidemia   5/10 chol 163,trig 68,hdl 69,ldl 80  6/10 chol 163,trig 60,hdl 69,ldl 80  7/11 chol 118,trig 45,hdl 65,ldl 44 on crestor 10 mg  10/11 chol 165,trig 47,hdl 78,ldl 74 on crestor 10 mg done at [place]  2/12 off crestor  6/12 chol 211,trig 104,hdl 64,ldl 126 off crestor  3/4/14 chol 222,trig 124,hdl 52,ldl 145 off meds     History of compression fracture  5/10 MRI thoracic spine subacute T12 compression fracture  5/27/10 kyphoplasty T11  6/10 MRI lumbar spine T12 compression fracture mild to moderate central canal narrowing, interval T11 kyphoplasty, L2-L3 moderate foraminal stenosis, L3-L4 severe left foraminal stenosis, moderate right foraminal stenosis, L4-L5 moderate central canal stenosis  7/10 dexa LS +1.0,hip -1.6  7/28/10 T12 vertebral plasty  8/11 [place] pain evaluation  pain management, recommend continue oxycontin 10 bid  10/11 [place] pain  T11-L1 injection  2/13  pain note, T10-T11 epidural injection  6/13 dexa LS +1.8, forearm -2.7  9/23/13 reclast injection  9/4/14 reclast IV infusion  9/14/17 custom PT discharge summary patient did not improve walking tolerance or standing tolerance, medicare g-code status 60-79% impairment, thoracolumbar flexion decreased from 50-25%, extension 10%,  sidebending 10-25%  7/1/18 x-ray lumbar spine complete or near collapse L1 vertebral body anteriorly which is likely chronic, extensive degenerative changes, prior percutaneous vertebral augmentation T12-L1  2/27/19 off oxycontin after hospital discharge, would like to avoid narcotics given side effects, will try celebrex 100 mg daily as had that previously     History H. pylori  9/11 serum IgG positive s/p prevpack  9/19/12 cbc,cmp,lipase neg; u/s abd gallbladder sludge without biliary dilatation or stone  12/12/12 [facility] eval rec EGD/colon pt did not follow up      History shingles     Hypertension  1/05 carotid u/s negative  1/07 echo LV EF 60% ,mild LVH  1/09 renal ultrasound 6 mm right cortical cyst  9/09 MRI brain with and without moderate nonspecific microvascular ischemic change  9/09 MRA next mild plaque right internal carotid  5/24/10 echo normal LV size and function, EF 60%, mild to moderate AR, RVSP 35-40 consistent with mild pulmonary hypertension  5/10 persantine thallium no ischemia, EF 72%  9/10 change avalide 150/12.5 to avapro 150 qday, had sodium 125 in ER  3/11 on avapro 300 mg  7/8/11 echo normal LV function, EF 55%  7/8/11 Persantine thallium possible small area mild ischemia in the lateral wall, no evidence of infarction  7/11   3/12 increase metoprolol to 50 bid, cont avapro 300 mg, start norvasc 2.5 mg  5/1/12 increase norvasc to 5 mg, change avapro to avalide 300/12.5 mg, cont metoprolol 50 bid  10/2/12 on avalide 300/12.5 mg and metoprolol 50 bid and norvasc 5 mg  10/12 Persantine thallium diaphragmatic uptake possible attenuation, fixed inferolateral defect which may be secondary to attenuation, EF 76%, no wall motion abnormalities, no evidence of significant ischemia.  1/13 echo normal LV function, grade 2 diastolic dysfunction, EF 70% moderate aortic insufficiency  1/13   1/23/13 cxr cardiomegaly without pulmonary edema  6/11/13 on avalide 300/12.5 mg,norvasc 5 mg, metoprolol  50 bid  4/7/14 cardiology note atrial fibrillation, start pradaxa 75 mg bid, stop asa, stop norvasc, decreased avalide 300/12.5 mg to 1/2 per day, increase metoprolol to 50 mg 1 1/2 bid  10/20/14 metoprolol 50 mg decreased to 25 mg bid by [place] doctor due to low heart rate, continues on avalide 300/12.5 mg   12/29/14  cardiology note continue pradaxa, metoprolol 25 mg 1/2 bid, avalide 300/12.5 mg  7/25/16 avalide 150/12.5 mg decrease to 1/2 tab per day and continue metoprolol 25 mg bid  8/24/16 cardiology note sinus rhythm on exam,[id number] score=2 continue anticoagulation, low-dose metoprolol,avalide 150/12.5 mg 1/2 per day, follow-up one year  12/14/17 cardiology note remains in sinus rhythm, follow-up one year  6/21/18  cardiology note paroxysmal atrial fibrillation sinus rhythm on exam stable continue anticoagulation, follow-up 1 year  2/27/19 cardiology note paroxysmal atrial fibrillation, chronic anticoagulation    Low back pain  6/06 epidural L4-L5   12/08 x-ray LS moderate to severe DJD  6/10 MRI lumbar spine T12 compression fracture with mild-to-moderate central spinal canal narrowing, interval T11 kyphoplasty, L2-L3 moderate frontal stenosis, L3-L4 severe left foraminal stenosis, moderate right foraminal stenosis, L4-L5 moderate central spinal canal  7/10 interventional rad consult epidural T11 to L1 region  7/28/10 T12 vertebroplasty  7/30/10 norco taper, and lyrica 50 mg bid  8/5/10 reaction to lyrica, stop lyrica  8/19/10 lumbar steroid epidural   9/10  note rec decompression if pain persist  10/10 bilateral lumbar facet joint injections L3-S1   12/10 xray LS old T11 and T12 fracture status post kyphoplasty  1/11 [place] [facility]  3/11 trial of spinal stimulator  8/11 [facility] note evaluation  pain management cont oxycontin 10 bid  11/22/11 [facility] note dr. hodge; trial of baclofen, T11 plus T12 left-sided nerve root  block pending  7/12  pain note;Left-sided T11-T12 transforaminal epidural   4/13 daughter called [facility] suggest taper pain med, she has sched to taper the oxycontin  4/15/13 resumed oxycontin 10 bid  6/11/13 off oxycontin  7/24/13 increase oxycontin from qday to 10 mg bid   2/28/14 on celebrex 200 mg as needed per  and oxycontin 10 mg bid  4/25/14 trial of cymbalta 30 mg, continue oxycontin 10 mg twice a day, recommend not use tramadol (side effects since on oxycontin already) or celebrex (on pradaxa)  5/29/14  pain management [facility]; recommend T11-T12 left-sided transforaminal nerve block  7/2/14 [facility] left T11-T12 epidural injection  7/21/14  [facility] note; increase oxycontin to 10 mg tid x 3 weeks and then taper down  10/27/14 [facility] [facility]; samples zorvolex  2/26/15 xray lumbar spine mild compression fracture of L4 of indeterminate age but new compared to 3/25/13 vertebral augmentation and T11 and T12 with severe compression fracture of T12 and focal kyphosis at this level  4/15/16 outpatient physical therapy phone call indicating patient unable to make appointments, recommending home health physical therapy  9/20/16  pain note recommended left L3-L5 MBBB  3/22/17  pain procedure note left L3-L5 MBBB #1  6/28/17 trial cymbalta 30 mg  6/29/17 custom physical therapy note  8/3/17 custom PT note  9/18/17 off cymbalta  9/14/17 custom PT discharge summary patient did not improve walking tolerance or standing tolerance, medicare g-code status 60-79% impairment, thoracolumbar flexion decreased from 50-25%, extension 10%, sidebending 10-25%  11/14/17 custom PT discharge summary no improvement  11/29/17 MRI lumbar spine no acute fracture, lumbar levoscoliosis T12-L1, kyphotic deformity he talked L1, previous compression fractures T11, T12, L1, previous vertebral augmentation procedures T11 and L1, moderate central canal  stenosis T12-L3, severe canal stenosis L3-L4, no significant change  1/31/18  pain note  2/2/18  pain note, reviewed cervical spine x-ray multilevel DJD, recommend consider intrathecal pain pump trial  5/17/18 decrease to oxycontin 10 mg am and 5 mg pm  7/1/18 x-ray lumbar spine complete or near collapse L1 vertebral body anteriorly which is likely chronic, extensive degenerative changes, prior percutaneous vertebral augmentation T12-L1  7/10/18 increase oxycontin back to 10 mg bid after fall  2/27/19 off oxycontin after hospital discharge, would like to avoid narcotics given side effects, will try celebrex 100 mg daily as had that previously  Tried NSAIDs, celebrex, pregabalin, gabapentin, cymbalta, physical therapy,TENS,lidoderm, voltaren gel, epidural, facet injections, kyphoplasty, pain management locally and at [place]     Macular degeneration  7/1/13 dr.mills mcnair note, start PreserVision AREDS II vitamin followup 6 months     Mild cognitive impairment  1/11/16 MMSE 24/30  7/14/17 MMSE 23/30 offered aricept  4/10/18 MMSE 24/30  5/2/18 start aricept 5 mg daily  9/18/18 off aricept not tolerated  10/31/18 trial namenda XR started pack  11/8/18 namenda titration pack not available, will start namenda XR 7 mg daily     Nocturnal hypoxemia  3/17/18 pulmonary note, continue nocturnal oxygen 2 L, start trelegy one puff daily  9/10/18 pulmonary note intolerant to CPAP, resume nocturnal oxygen, latent TB, repeat chest x-ray, stable on bronchodilators, as needed prednisone and antibiotic prescription to pharmacy follow-up 3-6 months     Osteoporosis  3/08 dexa LS +0.3,hip -1.4  5/10 MRI thoracic spine subacute T12 compression fracture  5/27/10 T12 kyphoplasty  6/10 on actonel  7/10 dexa LS +1.0,hip -1.6  8/10 vit d 56  3/11 reclast referral made  6/12 vit d 42 off actonel  6/21/13 dexa LS +1.8, forearm -2.7, I rec reclast, she would like to think it over  9/23/13 reclast injection  3/4/14 vit d  26 9/4/14 reclast IV infusion  2/26/15 xray rib series left; mild deformity of the lateral 10th rib on the left may be related to a fracture  2/26/15 xray lumbar spine mild compression fracture of L4 of indeterminate age but new compared to /25/13, vertebral augmentation and T11 and T12 with severe compression fracture of T12 and focal kyphosis at this level  12/7/15 reclast IV infusion     Paroxysmal atrial fibrillation  4/10 hospitalization on multaq, also on verapamil and metoprolol for rate control per cardiology  1/11 [facility] note cont multaq  7/11 EKG atrial fibrillation hospitalization  7/8/11 echo normal LV function, EF 55%  7/8/11 Persantine thallium possible small area mild ischemia in the lateral wall, no evidence of infarction  7/11 [facility] during hospitalization changed to amiodarone 200 mg plus pradaxa restarted  7/11   9/11 [facility] note stop amiodarone, cont pradaxa and metoprolol 25 bid  3/12 cont pradaxa and increase metoprolol to 50 bid due to higher bp  5/12 pradaxa decreased from 150 bid to 75 bid per [facility] due to nosebleeds  6/12 EKG NSR  1/13 echo normal LV function, grade 2 diastolic dysfunction, EF 70% moderate aortic insufficiency  6/11/13 on pradaxa and metoprolol 50 bid for rate control, NSR today  9/13 cardiology note stop pradaxa and start asa 81 mg  4/7/14 cardiology note atrial fibrillation, start pradaxa 75 mg bid, stop asa, stop norvasc, decreased avalide 300/12.5 mg to 1/2 per day, increase metoprolol to 50 mg 1 1/2 bid  4/14/14 cardiology note, NSR on exam, continue pradaxa 75 mg bid, metoprolol 75 mg bid, avalide 300/12.5 mg 1/2 tab per day  10/20/14 metoprolol 50 mg decreased to 25 mg bid by [place] doctor due to low heart rate, continues on pradaxa and avalide 300/12.5 mg   12/29/14  cardiology note continue pradaxa, metoprolol 25 mg 1/2 bid, avalide 300/12.5 mg  6/12/15 NSR on exam  8/24/16 cardiology note sinus rhythm on exam,[id number] score=2 continue anticoagulation,  low-dose metoprolol follow-up one year  12/14/17 cardiology note remains in sinus rhythm, follow-up one year  6/21/18  cardiology note paroxysmal atrial fibrillation sinus rhythm on exam stable continue anticoagulation, follow-up 1 year  12/11/18 on metoprolol and pradaxa  2/27/19 [id number] (age, gender, aortic atherosclerosis) = 5 (7.8% risk of stroke per year) and HAS-BLED score 3 points (age, hypertension, con commitment antiplatelet agents)  = 3.74 bleeds per 100 patient years) will change pradaxa not covered to eliquis 2.5 mg bid adjust for age and weight    Preventative health  5/05 colon per [facility] polyp no path report, f/u rec 5 yrs (12/12/12 [facility] note)  4/09 stool occult blood negative  12/09 mammogram  10/1/11 pneumovax  10/2/12 zoster vaccine  6/11/13 declines mammogram, tdap  6/13 dexa LS +1.8, forearm -2.7  3/4/14 vit d 26  10/12/15 prevnar  12/111/18 shingrix vaccine rx to get at pharmacy  12/11/18 tdap     Right hip pain  3/11 MRI right hip DJD and tear of the anterior/superior acetabular labrum  4/11  ortho note not believe hip is primary problem, referred to  or [facility]     Sensorineural hearing loss  12/7/15  audiology evaluation mild sensorineural hearing loss     Sleep apnea  8/11 [facility] note sleep study apnea-hypopnea index 86, low sat 74%, start CPAP  8-18 cm    2013 off cpap not comfortable  3/14/16  (normal), quantiferon gold positive, allergen panel negative, IgE 357 (less than 214), IgA 116 (),, IgM 27 (),IgG 947 ([phone])  4/13/16 cxr mild cardiomegaly  5/4/16 [facility] note continue oxygen 3 L at night  11/8/16 pulmonary note continue oxygen 3 L at night  10/4/17 pulmonary note continue oxygen 3 L at night  3/17/18 pulmonary note, continue nocturnal oxygen 2 L, start trelegy one puff daily  9/10/18 pulmonary note intolerant to CPAP, resume nocturnal oxygen, latent TB, repeat chest x-ray, stable on bronchodilators, as needed prednisone and  antibiotic prescription to pharmacy follow-up 3-6 months     tb latent  3/14/16 positive quantiferon gold test  3/14/16  (normal), quantiferon gold positive, allergen panel negative, IgE 357 (less than 214), IgA 116 (),, IgM 27 (),IgG 947 ([phone])  4/13/16 cxr mild cardiomegaly  5/4/16 [facility] note continue advair 115/21 bid with spacer given doxycycline and prednisone, continue oxygen 3 L at night, AFB samples ×3, follow-up 3 months  7/25/16 pulmonary note on prednisone taper one week out of the month and doxycycline one per day for one week per month, on advair bid and spiriva and oxygen 3 liters at night  7/27/17 PPD negative  10/4/17 pulmonary note continue advair 115/21 two inhalations bid, spiriva daily, xoponex as needed, oxygen 3 L at night, history of positive quantiferon gold, will order sputum sample follow-up 4 months  9/10/18 pulmonary note intolerant to CPAP, resume nocturnal oxygen, latent TB, repeat chest x-ray, stable on bronchodilators, as needed prednisone and antibiotic prescription to pharmacy follow-up 3-6 months     Tension headache  6/11/13 on fioricet bid  2/28/14 taper fioricet to once a day  4/25/14 now on fioricet prn               Health Maintenance Summary                IMM HEP B VACCINE Overdue 4/22/1950     IMM ZOSTER VACCINES Overdue 11/27/2012      Done 10/2/2012 Imm Admin: Zoster Vaccine Live (ZVL) (Zostavax)    PFT SCREENING-FEV1 AND FEV/FVC RATIO / SPIROMETRY SHOULD BE PERFORMED ANNUALLY Overdue 10/21/2016      Done 10/21/2015 PFT DICTATED RESULTS     Patient has more history with this topic...    BONE DENSITY Overdue 6/21/2018      Done 6/21/2013 DS-BONE DENSITY STUDY (DEXA)     Patient has more history with this topic...    Annual Wellness Visit Next Due 4/11/2019      Done 4/10/2018 Visit Dx: Medicare annual wellness visit, subsequent     Patient has more history with this topic...    IMM DTaP/Tdap/Td Vaccine Next Due 12/11/2028      Done 12/11/2018 Imm  Admin: Tdap Vaccine          Patient Care Team:  Denis Krueger M.D. as PCP - General  Nathan Fajardo M.D. as Consulting Physician (Anesthesia)  Esdras Thornton M.D. as Consulting Physician (Pulmonary Medicine)  Aashish as Respiratory  SARAH De La Garza as Consulting Physician (Pulmonary Medicine)  [facility] as Home Health Provider  Chely Russell as Patient Advocate - [facility]  Janessa Soriano, LSW, MSW as Community Care Manager (Social Work)           Patient Active Problem List   Diagnosis   • Hypertension   • Dyslipidemia   • Preventative health care   • Low back pain   • Cervical pain   • Osteoporosis, idiopathic   • History of compression fracture of spine   • PAF (paroxysmal atrial fibrillation) ([facility])   • Right hip pain   • COPD (chronic obstructive pulmonary disease) ([facility])   • Sleep apnea   • History of Helicobacter pylori infection   • Chronic anticoagulation   • History of shingles   • Tension headache   • Macular degeneration   • Chronic use of opiate for therapeutic purpose   • Sensorineural hearing loss   • Mild cognitive impairment   • TB lung, latent   • Nocturnal hypoxemia   • AAA (abdominal aortic aneurysm) ([facility])         ROS       Objective:     /80 (BP Location: Right arm, Patient Position: Sitting, BP Cuff Size: Adult)   Pulse 71   Temp 36.8 °C (98.3 °F)   Ht 1.524 m (5')   Wt 54.4 kg (120 lb)   SpO2 96%   BMI 23.44 kg/m²      Physical Exam   HENT:   Head: Normocephalic and atraumatic.   Mouth/Throat: Oropharynx is clear and moist.   Eyes: Conjunctivae are normal. Right eye exhibits no discharge. Left eye exhibits no discharge. No scleral icterus.   Neck: Neck supple. No JVD present. No thyromegaly present.   Cardiovascular: Normal rate and regular rhythm.    Pulmonary/Chest: Effort normal and breath sounds normal. No respiratory distress.   Musculoskeletal: She exhibits tenderness.   Neurological: She is alert.   Skin: Skin is warm. She is not diaphoretic.    Psychiatric: She has a normal mood and affect. Her behavior is normal.   Nursing note and vitals reviewed.    Normal affect, responds verbally to questions and commands  No hallucinations or delusions  Kyphosis, limited back flexion extension          Assessment/Plan:     Assessment  #!  Chronic low back pain has seen multiple pain management specialist, neurosurgery, physical therapy, currently off narcotics with improvement in mentation, tried and failed multiple medications including standard NSAIDs, Celebrex, Cymbalta, pregabalin, gabapentin, Lidoderm patch, Voltaren gel.  Has tried epidural injections, lumbar facet injections, kyphoplasty, nerve block, without benefit.  We did transition to Celebrex 100 mg once a day after narcotics, then trying twice a day but she only tried twice daily dosing for a few days.  Patient is on chronic anticoagulation with Eliquis however benefits and risks reviewed with patient and daughter annually decided to try Celebrex while she is on Eliquis, attempting to avoid all narcotics again, pain still not well controlled on Celebrex once a day, trial of twice a day dosing unclear benefit, pain seems to be worse first thing in the morning, still does ADLs    #2 paroxysmal atrial fibrillation stable on metoprolol rate controlled sinus and Eliquis, followed by cardiology, no side effects with Eliquis and Celebrex combination      #3 COPD stable followed by pulmonary on trelegy ellpta and nocturnal oxygen    #4 mild cognitive impairment stable to improved off narcotic therapy     #5 AAA by CT stable asymptomatic    Plan  #1 pain referral [email or web address]    #2 declines physical therapy, falling precautions emphasized, use walker for ambulation at all times    #3 increase Celebrex to 100 mg twice daily understanding risk for GI bleed which may be life-threatening on Eliquis, reviewed options with patient and daughter, they would prefer not to resume narcotic therapy which I agree with,  thus they understand that Celebrex may be beneficial for pain control without the narcotic side effects but on Eliquis can increase risk for bleeding, monitor for GI symptoms, abdominal pain, nausea, vomiting, melena, hematochezia continue no regular NSAIDs or aspirin    #4 try low-dose gabapentin 100 mg at night, has had this before without side effects, but since we are trying to minimize pain without narcotics, and her pain seems to be worse in the morning, low-dose gabapentin added, can cause daytime drowsiness, sedation, memory loss, confusion, mood changes, daughter will monitor    #5 continue no alcohol    #6 follow-up 3 months    #7 daughter will work long-term on setting up mother with 24-hour supervision, possibly independent or assisted living facility with monitoring    #8 continue to avoid narcotics, she remains off narcotic therapy

## 2019-03-22 ENCOUNTER — PATIENT OUTREACH (OUTPATIENT)
Dept: HEALTH INFORMATION MANAGEMENT | Facility: OTHER | Age: 84
End: 2019-03-22

## 2019-03-22 ENCOUNTER — APPOINTMENT (OUTPATIENT)
Dept: PULMONOLOGY | Facility: HOSPICE | Age: 84
End: 2019-03-22
Payer: MEDICARE

## 2019-03-27 NOTE — PROGRESS NOTES
Patient Venus Church discharged on 2/21/19. [facility] Patient Advocate assisted with discharge orders including two PCP appointments and one cardiology appointment. Patient is scheduled for a pulmonology appointment on 3/22/19, a neurology appointment on 5/02/19, one cardiology appointment on 5/29/19, and one PCP appointment on 6/20/19. Patient discharged with [facility] services, and received an Oxygen setup, walker, and bedside commode from [facility] upon discharge. [facility] also assisted patient in facilitating a pharmacy consult with [facility] to answer follow-up questions about CBD products for pain managements. Patient discharged with a low LACE score, therefore, a PPS screening was not conducted.

## 2019-03-29 ENCOUNTER — PATIENT OUTREACH (OUTPATIENT)
Dept: MEDICAL GROUP | Facility: MEDICAL CENTER | Age: 84
End: 2019-03-29

## 2019-03-29 NOTE — PROGRESS NOTES
Follow up call to pt and daughter regarding  care plan. Daughter, Ivana reported pt was doing better. She reported they are currently in [place] at the daughter's home as pt was completing dental work there. Daughter reported the plan is travel back to [place] by this upcoming Monday. Daughter confirmed pt has been able to obtain and afford the new medication Eliquis. She indicated a family member is with pt at all times helping to provide care at this time and denied any additional social needs. Discussed if family had MSW contact information and encouraged family to follow up with MSW if there are any additional social needs. Daughter confirmed she had contact information and agreeable to follow up if additional assistance is needed in the future.     Plan:  Pt graduated from [facility] services at this time, will not actively follow.

## 2019-04-19 ENCOUNTER — OFFICE VISIT (OUTPATIENT)
Dept: PULMONOLOGY | Facility: HOSPICE | Age: 84
End: 2019-04-19
Payer: MEDICARE

## 2019-04-19 VITALS
WEIGHT: 120 LBS | DIASTOLIC BLOOD PRESSURE: 70 MMHG | RESPIRATION RATE: 16 BRPM | BODY MASS INDEX: 23.56 KG/M2 | HEART RATE: 77 BPM | SYSTOLIC BLOOD PRESSURE: 110 MMHG | HEIGHT: 60 IN | OXYGEN SATURATION: 92 %

## 2019-04-19 DIAGNOSIS — J44.9 CHRONIC OBSTRUCTIVE PULMONARY DISEASE, UNSPECIFIED COPD TYPE (HCC): ICD-10-CM

## 2019-04-19 DIAGNOSIS — Z09 HOSPITAL DISCHARGE FOLLOW-UP: ICD-10-CM

## 2019-04-19 DIAGNOSIS — Z22.7 TB LUNG, LATENT: ICD-10-CM

## 2019-04-19 DIAGNOSIS — G47.33 OSA (OBSTRUCTIVE SLEEP APNEA): ICD-10-CM

## 2019-04-19 PROCEDURE — 99214 OFFICE O/P EST MOD 30 MIN: CPT | Performed by: NURSE PRACTITIONER

## 2019-04-19 NOTE — PATIENT INSTRUCTIONS
1) Continue nocturnal oxygen at 2L  2) Continue Trelegy 1 puff, once a day with mouth rinse  3) Continue Xopenex and nebulizer as needed  4) Add Mucinex to daily regimen  5) Vaccines: Up to date with Prevnar 13, Pneumovax 23, flu  6) Medrol and Zpack on hand   7) Light conditioning encouraged  8) Continue smoking cessation   9) Return in about 6 months (around 10/19/2019) for follow up with MARILYN Garcia, if not sooner, review of symptoms, pulmonary function results.

## 2019-04-19 NOTE — PROGRESS NOTES
CC:  Here for f/u sleep and pulmonary issues as listed below    HPI:     Venus presents today for follow up for COPD and MAMI, with hospital f/u.    Past medical history of chronic pain, hypertension, atrial fibrillation on chronic anticoagulation, AAA.    Patient was admitted February 13, 2019 and discharged on the 21st for symptomatic hyponatremia with weakness and lethargy.  Troponins were elevated found to be secondary to demand ischemia.  Also was in COPD exacerbation.  Utilized respiratory protocol.  She required diuresis for CHF.  CTA completed February 13, 2019 which I reviewed showed no pulmonary embolus.  Emphysema and acute versus chronic bronchitis.  Cardiomegaly with right heart dysfunction.  4.1 cm a sending aortic aneurysm.  Mildly enlarged lower paratracheal and hilar nodes most likely reactive.  Echocardiogram was completed February 2019 showing estimated ejection fraction be 55%, grade 2 diastolic dysfunction, moderate aortic insufficiency, moderate tricuspid regurgitation, estimated RVSP is 45 mmHg.  She was discharged in stable condition home.    Since discharge from the hospital she feels she is back to her baseline.  She has shortness of breath with exertion. She has a chronic cough producing yellow sputum that is difficult to expectorate. She is using Trelegy 1 puff, once a day with mouth rinse, where use of Xopenex HFA, nebulizer 5-6 times a week.     PSG from 2011 indicated an AHI of 86.3 and low oxygenation of 74%. Intolerant to CPAP  Currently she is being treated with oxygen at 2L, but admits she does not use it.      PFTs from 2015 indicate a Fev1 of 1.10L or 61% predicted with a mild bronchodilator response, Fev1/FVC ratio of 58, DLCO 34%. She is a former smoker for 37.5 pack years, quitting in 1984. HRCT completed 10/2015 noted No evidence of diffuse interstitial lung disease. 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 She  "is originally from [place] and son reports \"[place]one from [place] has it\". She has no known contacts of TB.          Patient Active Problem List    Diagnosis Date Noted   • Hospital discharge follow-up 04/22/2019   • AAA (abdominal aortic aneurysm) ([facility]) 02/27/2019   • Nocturnal hypoxemia 03/20/2018   • TB lung, latent 05/04/2016   • Mild cognitive impairment 01/11/2016   • Sensorineural hearing loss 12/16/2015   • History of opiate therapy 04/23/2015   • Macular degeneration 07/08/2013   • Tension headache 06/11/2013   • History of shingles 06/07/2012   • Chronic anticoagulation 04/25/2012   • MAMI (obstructive sleep apnea) 11/01/2011   • COPD (chronic obstructive pulmonary disease) ([facility]) 07/20/2011   • Right hip pain 03/28/2011   • PAF (paroxysmal atrial fibrillation) ([facility]) 06/10/2010   • History of compression fracture of spine 05/26/2010   • Low back pain 05/22/2010   • Cervical pain 05/22/2010   • Osteoporosis, idiopathic 05/22/2010   • Hypertension 05/20/2010   • Dyslipidemia 05/20/2010   • Preventative health care 05/20/2010       Past Medical History:   Diagnosis Date   • Anesthesia     n/v   • Aortic regurgitation 4/25/2012   • Arrhythmia 7/10      A. Fib.   • Arthritis     general   • Atypical chest pain 4/25/2012   • CATARACT 2007,08   • Chronic anticoagulation 4/25/2012   • Constipation 4/25/2012   • COPD (chronic obstructive pulmonary disease) ([facility])    • DDD (degenerative disc disease)    • Dental disorder     partials   • Dyspnea 4/25/2012   • Generalized osteoarthritis 4/25/2012   • Headache(784.0) 4/25/2012   • Heart valve insufficiency     minimal, watched by cardio   • Hypercholesteremia    • Hyperlipidemia 4/25/2012   • Hypertension    • MEDICAL HOME    • On home oxygen therapy     [facility]   • Osteoarthritis of knee 4/25/2012   • Osteoporosis    • Other accident     C-Spine FX  2006   • Pain     back pain   • Pneumonia 6/4   • TB lung, latent 5/4/2016   • Valvular heart disease 4/25/2012 "   • Vertebral fracture    • Vertigo 4/25/2012       Past Surgical History:   Procedure Laterality Date   • RECOVERY  7/28/2010    Performed by SURGERY, IR-RECOVERY at [facility]   • OTHER ORTHOPEDIC SURGERY  may, 2010      T11 kypho.   • CATARACT PHACO WITH IOL  1/5/2009    Performed by ROSE MARIE MANN at [facility]   • CARPAL TUNNEL RELEASE  @30 yrs ago    rt hand       Family History   Problem Relation Age of Onset   • Heart Disease Father    • Diabetes Brother        Social History     Social History   • Marital status:      Spouse name: N/A   • Number of children: N/A   • Years of education: N/A     Occupational History   • Not on file.     Social History Main Topics   • Smoking status: Former Smoker     Packs/day: 1.50     Years: 25.00     Types: Cigarettes     Quit date: 4/14/1984   • Smokeless tobacco: Never Used      Comment: smoked 25 yrs, quit 1984   • Alcohol use No   • Drug use: No   • Sexual activity: Not on file     Other Topics Concern   • Not on file     Social History Narrative   • No narrative on file       Current Outpatient Prescriptions   Medication Sig Dispense Refill   • celecoxib (CELEBREX) 100 MG Cap Take 1 Cap by mouth 2 times a day. 180 Cap 3   • gabapentin (NEURONTIN) 100 MG Cap Take 1 Cap by mouth every evening. 30 Cap 5   • Fluticasone-Umeclidin-Vilant (TRELEGY ELLIPTA) 100-62.5-25 MCG/INH AEROSOL POWDER, BREATH ACTIVATED Inhale 1 Puff by mouth every day. 1 Each 11   • apixaban (ELIQUIS) 2.5mg Tab Take 1 Tab by mouth 2 Times a Day. 60 Tab 6   • TRELEGY ELLIPTA 100-62.5-25 MCG/INH AEROSOL POWDER, BREATH ACTIVATED INHALE 1 PUFF ONCE DAILY (RINSE  MOUTH  AFTER  USE) 3 Each 3   • albuterol (PROVENTIL) 2.5mg/3ml Nebu Soln solution for nebulization 3 mL by Nebulization route every four hours as needed for Shortness of Breath. 150 Bullet 11     No current facility-administered medications for this visit.           Allergies: Codeine      ROS   Gen:  Denies fever, chills, unintentional weight loss, fatigue, night sweats  E/N/T: Denies ear pain, nasal congestion  Resp:Denies Dyspnea, wheezing, cough, sputum production, hemoptysis  CV: Denies chest pain, chest tightness, palpitations, BLE edema  Sleep:Denies morning headache, insomnia, daytime somnolence, snoring, gasping for air, apnea  Neuro: Denies frequent headaches, weakness, dizziness  GI: Denies N/V, acid reflux/heartburn  See HPI.  All other systems reviewed and negative      Vital signs for this encounter:  Vitals:    04/19/19 1453   Height: 1.524 m (5')   Weight: 54.4 kg (120 lb)   Weight % change since last entry.: 0 %   BP: 110/70   Pulse: 77   BMI (Calculated): 23.44   Resp: 16                 Physical Exam:   Appearance: well developed, well nourished, no acute distress.  Eyes: PERRL, EOM intact, sclere white, conjunctiva moist.  Ears: no lesions or deformities.  Hearing: grossly intact.  Nose: no lesions or deformities.  Dentition: good dentition.  Oropharynx: tongue normal, posterior pharynx without erythema or exudate.  Neck: supple, trachea midline, no masses.  Respiratory effort: no intercostal retractions or use of accessory muscles.  Lung auscultation: Bilateral diminished   Heart auscultation: no murmur, rub, or gallop.   Extremities: no cyanosis or edema.  Abdomen: soft, non-tender, no masses.  Gait and station: grossly normal   Digits and Nails: no clubbing, cyanosis, petechiae, or nodes.  Cranial nerves: grossly normal.  Motor: no focal deficits observed.  Skin: no rashes, lesions, or ulcers noted.  Orientation: oriented to time, place, and person.  Mood and affect: mood and affect appropriate, normal interaction with examiner.    Assessment   1. Chronic obstructive pulmonary disease, unspecified COPD type (HCC)  PULMONARY FUNCTION TESTS -Test requested: Complete Pulmonary Function Test   2. TB lung, latent     3. MAMI (obstructive sleep apnea)     4. Hospital discharge follow-up          Patient was seen for 25 minutes, more than 50% of time spent in face to face review, counseling, and arranging future evaluation and follow up of medical conditions and care related to review and answering all daughters questions.  Patient plans to live with her for a few months in [place].  Reviewed light conditioning that is important and provided daughter with a pulmonary rehab brochure so she can utilize that as guidelines.  Reviewed importance of using her oxygen nocturnally every night.. Patient is clinically stable and will proceed with following plan.     PLAN:   Patient Instructions   1) Continue nocturnal oxygen at 2L  2) Continue Trelegy 1 puff, once a day with mouth rinse  3) Continue Xopenex and nebulizer as needed  4) Add Mucinex to daily regimen  5) Vaccines: Up to date with Prevnar 13, Pneumovax 23, flu  6) Medrol and Zpack on hand   7) Light conditioning encouraged  8) Continue smoking cessation   9) Return in about 6 months (around 10/19/2019) for follow up with MARILYN Garcia, if not sooner, review of symptoms, pulmonary function results.

## 2019-04-22 PROBLEM — Z09 HOSPITAL DISCHARGE FOLLOW-UP: Status: ACTIVE | Noted: 2019-04-22

## 2019-04-25 ENCOUNTER — TELEPHONE (OUTPATIENT)
Dept: MEDICAL GROUP | Facility: MEDICAL CENTER | Age: 84
End: 2019-04-25

## 2019-04-25 NOTE — TELEPHONE ENCOUNTER
Yashira Galo (dtr)  called to ask if you would write a letter for her (Like you did for her brother) to [employer] explaining that she had to come back from [facility]' early ( 3/14 return, was supposed to stay until 3/21) to care for her mom. She would like to get a refund. Please advise.

## 2019-05-06 ENCOUNTER — PATIENT MESSAGE (OUTPATIENT)
Dept: PULMONOLOGY | Facility: HOSPICE | Age: 84
End: 2019-05-06

## 2019-05-07 NOTE — TELEPHONE ENCOUNTER
From: Venus Church  To: SARAH De La Garza  Sent: 5/6/2019 10:18 AM PDT  Subject: Non-Urgent Medical Question    Laith Guillermo,  Mom is not using oxygen during the day, but we are being charged for portable O2 concentrators that we do not use. In order for [facility] to stop charging for the portables and to pick them up they need a request from you to do so. Please let me know if there is any information that we can provide you with in order to proceed.  Thank you,  Ivana Galo (daughter)  For: Venus Bauerd

## 2019-05-10 DIAGNOSIS — R09.02 HYPOXIA: ICD-10-CM

## 2019-05-16 ENCOUNTER — OFFICE VISIT (OUTPATIENT)
Dept: NEUROLOGY | Facility: MEDICAL CENTER | Age: 84
End: 2019-05-16
Payer: MEDICARE

## 2019-05-16 ENCOUNTER — HOSPITAL ENCOUNTER (OUTPATIENT)
Dept: LAB | Facility: MEDICAL CENTER | Age: 84
End: 2019-05-16
Payer: MEDICARE

## 2019-05-16 VITALS
HEIGHT: 60 IN | RESPIRATION RATE: 14 BRPM | DIASTOLIC BLOOD PRESSURE: 60 MMHG | BODY MASS INDEX: 24.4 KG/M2 | WEIGHT: 124.3 LBS | SYSTOLIC BLOOD PRESSURE: 120 MMHG | OXYGEN SATURATION: 95 % | HEART RATE: 62 BPM | TEMPERATURE: 98.2 F

## 2019-05-16 DIAGNOSIS — R41.3 MEMORY LOSS: ICD-10-CM

## 2019-05-16 LAB
FOLATE SERPL-MCNC: >23.6 NG/ML
TSH SERPL DL<=0.005 MIU/L-ACNC: 1.14 UIU/ML (ref 0.38–5.33)
VIT B12 SERPL-MCNC: 937 PG/ML (ref 211–911)

## 2019-05-16 PROCEDURE — 82607 VITAMIN B-12: CPT

## 2019-05-16 PROCEDURE — 82746 ASSAY OF FOLIC ACID SERUM: CPT

## 2019-05-16 PROCEDURE — 84443 ASSAY THYROID STIM HORMONE: CPT

## 2019-05-16 PROCEDURE — 36415 COLL VENOUS BLD VENIPUNCTURE: CPT

## 2019-05-16 PROCEDURE — 99205 OFFICE O/P NEW HI 60 MIN: CPT

## 2019-05-16 NOTE — PATIENT INSTRUCTIONS
Natural extract to try  Alpha lipoic acid  ginko biloba extract  Coenzyme Q10  Guillermo green tea extract   Vitamin D3     Alzheimer Disease  Alzheimer disease is a brain disease that affects memory, thinking, and behavior. People with Alzheimer disease lose mental abilities, and the disease gets worse over time. Survival with Alzheimer disease ranges from several years to as long as 20 years.  What are the causes?  This condition develops when a protein called beta-amyloid forms deposits in the brain. It is not known what causes these deposits to form.  What increases the risk?  This condition is more likely to develop in people who:  · Are elderly.  · Have a family history of dementia.  · Have had a brain injury.  · Have heart or blood vessel disease.  · Have had a stroke.  · Have high blood pressure or high cholesterol.  · Have diabetes.  What are the signs or symptoms?  Symptoms of this condition happen in three stages, which often overlap.  Early stage  In this stage, you may continue to be independent. You may still be able to drive, work, and be social. Symptoms in this stage include:  · Minor memory problems, such as forgetting a name or what you read.  · Difficulty with:  ¨ Paying attention.  ¨ Communicating.  ¨ Doing familiar tasks.  ¨ Learning new things.  · Needing more time to do daily activities.  · Anxiety.  · Social withdrawal.  · Loss of motivation.  Moderate stage  In this stage, you will start to need care. This stage usually lasts the longest. Symptoms in this stage include:  · Difficulty with expressing thoughts.  · Memory loss that affects daily life. This can include forgetting:  ¨ Your address or phone number.  ¨ Events that have happened.  ¨ Parts of your personal history, like where you went to school.  · Confusion about where you are or what time it is.  · Difficulty in judging distance.  · Changes in personality, mood, and behavior. You may be lundberg, irritable, angry, frustrated, fearful,  anxious, or suspicious.  · Poor reasoning and judgment.  · Delusions or hallucinations.  · Changes in sleep patterns.  · Wandering and getting lost.  Severe stage  In the final stage, you will need help with your personal care and daily activities. Symptoms in this stage include:  · Worsening memory loss.  · Personality changes.  · Loss of awareness of your surroundings.  · Changes in physical abilities, including the ability to walk, sit, and swallow.  · Difficulty in communicating.  · Inability to control the bladder and bowels.  · Increasing confusion.  · Increasing disruptive behavior.  How is this diagnosed?  This condition is diagnosed with an assessment by your health care provider. During this assessment, your health care provider will talk with you and your family, friends, or caregivers about your symptoms.  A thorough medical history will be taken, and you will have a physical exam and tests. Tests may include:  · Lab tests, such as blood or urine tests.  · Imaging tests, such as a CT scan, PET scan, or MRI.  · A lumbar puncture. This test involves removing and testing a small amount of the fluid that surrounds the brain and spinal cord.  · An electroencephalogram (EEG). In this test, small metal discs are used to measure electrical activity in the brain.  · Memory tests, cognitive tests, and neuropsychological tests. These tests evaluate brain function.  How is this treated?  At this time, there is no treatment to cure Alzheimer disease or stop it from getting worse. The goals of treatment are:  · To slow down the disease.  · To manage behavioral problems.  · To provide you with a safe environment.  · To make life easier for you and your caregivers.  The following treatment options are available:  · Medicines. Medicines may help to slow down memory loss and control behavioral symptoms.  · Talk therapy. Talk therapy provides you with education, support, and memory aids. It is most helpful in the early  stages of the condition.  · Counseling or spiritual guidance. It is normal to have a lot of feelings, including anger, relief, fear, and isolation. Counseling and guidance can help you deal with these feelings.  · Caregiving. This involves having caregivers help you with your daily activities. Caregivers may be family members, friends, or trained medical professionals. Caregiving can be done at home or outside the home.  · Family support groups. These provide education, emotional support, and information about community resources to family members who are taking care of you.  Follow these instructions at home:  Medicines  · Take over-the-counter and prescription medicines only as told by your health care provider.  · Avoid taking medicines that can affect thinking, such as pain or sleeping medicines.  Lifestyle  · Make healthy lifestyle choices:  ¨ Be physically active as told by your health care provider.  ¨ Do not use any tobacco products, such as cigarettes, chewing tobacco, and e-cigarettes. If you need help quitting, ask your health care provider.  ¨ Eat a healthy diet.  ¨ Practice stress-management techniques when you get stressed.  ¨ Stay social.  · Drink enough fluid to keep your urine clear or pale yellow.  · Make sure to get quality sleep. These tips can help you get a good night's rest:  ¨ Avoid napping during the day.  ¨ Keep your sleeping area dark and cool.  ¨ Avoid exercising during the few hours before you go to bed.  ¨ Avoid caffeine products in the evening.  General instructions  · Work with your health care provider to determine what you need help with and what your safety needs are.  · If you were given a bracelet that tracks your location, make sure to wear it.  · Keep all follow-up visits as told by your health care provider. This is important.  · If you have questions or would like additional support, you may contact The Alzheimer[employer]:  ¨ 24-hour helpline: 1-673.403.5082  ¨ Website:  www.alz.org  Contact a health care provider if:  · You have nausea, vomiting, or trouble with eating.  · You have dizziness, or weakness.  · You have new or worsening trouble with sleeping.  · You or your family members become concerned for your safety.  Get help right away if:  · You develop chest pain or difficulty with breathing.  · You pass out.  This information is not intended to replace advice given to you by your health care provider. Make sure you discuss any questions you have with your health care provider.  Document Released: 08/29/2005 Document Revised: 08/18/2017 Document Reviewed: 09/14/2016  Elsevier Interactive Patient Education © 2017 [employer]

## 2019-05-16 NOTE — PROGRESS NOTES
Neurocognitive evaluation     Referred for memory loss by Ping NEGRETE    CC : I am forgetting things     HPI  Pleasant 89 yo woman with afib on AC  who presents for initial memory loss evaluation.  Forgetting things slowly.  Was on oxycodone 8-9 years and stopped two month ago. Memory loss started few years ago and it did not resolve after she stopped taking opioids.  She is forgetting names, appointments, has trouble remembering conversations, forgetting what she needs to buy or where she is going.  Daughter and son do not live here and she lives alone.  This had all started 3-4 years ago and is slowly getting more worrisome for the entire family.  When recently in hospital she had low oxygen and low sodium which had aggravated her cognitive dysfunction.  They had stopped oxycodone and she now has low back pain which is not pleasant. She does not wish to take anymore oxycodone as it makes her have side effects and confusion.  She does not drive anymore and takes a cab or uber.  Makes her breakfast of cereal or toast  Does not use the stove but uses [employer] and microwave,  Makes coffee  Can use [employer]  Daughter monitors her medications use as she was doubling her meds.  Walks on her own  Fixes her bed on her own.  Had not been falling but she is in pain and she tips over to the right when walking.  Walks with a walker when going outside.  COPD and emphysema  Has more shortness of breath.  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  Her sleep is on and off and her mood is reported OK.  There are no prior strokes and she has no hallucinations or delusions. No personality changes and no parkinsonism.    Review of Systems   Psychiatric/Behavioral: Positive for  memory loss.     Past Medical History:   Diagnosis Date   • Anesthesia     n/v   • Aortic regurgitation 4/25/2012   • Arrhythmia 7/10      A. Fib.   • Arthritis     general   • Atypical chest pain 4/25/2012   • CATARACT 2007,08   • Chronic anticoagulation 4/25/2012   • Constipation 4/25/2012   • COPD (chronic obstructive pulmonary disease) (HCC)    • DDD (degenerative disc disease)    • Dental disorder     partials   • Dyspnea 4/25/2012   • Generalized osteoarthritis 4/25/2012   • Headache(784.0) 4/25/2012   • Heart valve insufficiency     minimal, watched by cardio   • Hypercholesteremia    • Hyperlipidemia 4/25/2012   • Hypertension    • MEDICAL HOME    • On home oxygen therapy     at [facility]   • Osteoarthritis of knee 4/25/2012   • Osteoporosis    • Other accident     C-Spine FX  2006   • Pain     back pain   • Pneumonia 6/4   • TB lung, latent 5/4/2016   • Valvular heart disease 4/25/2012   • Vertebral fracture    • Vertigo 4/25/2012     Past Surgical History:   Procedure Laterality Date   • RECOVERY  7/28/2010    Performed by SURGERY, IR-RECOVERY at [facility]   • OTHER ORTHOPEDIC SURGERY  may, 2010      T11 kypho.   • CATARACT PHACO WITH IOL  1/5/2009    Performed by ROSE MARIE MANN at [facility]   • CARPAL TUNNEL RELEASE  @30 yrs ago    rt hand     Family History   Problem Relation Age of Onset   • Heart Disease Father    • Diabetes Brother      Social History     Social History   • Marital status:      Spouse name: N/A   • Number of children: N/A   • Years of education: N/A     Occupational History   • Not on file.     Social History Main Topics   • Smoking status: Former Smoker     Packs/day: 1.50     Years: 25.00     Types: Cigarettes     Quit date: 4/14/1984   • Smokeless tobacco: Never Used      Comment: smoked 25 yrs, quit 1984   • Alcohol use No   • Drug use: No   • Sexual activity: Not on file     Other Topics Concern   • Not on file     Social History  Narrative   • No narrative on file     Codeine allery     .  Current Outpatient Prescriptions on File Prior to Visit   Medication Sig Dispense Refill   • celecoxib (CELEBREX) 100 MG Cap Take 1 Cap by mouth 2 times a day. 180 Cap 3   • gabapentin (NEURONTIN) 100 MG Cap Take 1 Cap by mouth every evening. 30 Cap 5   • Fluticasone-Umeclidin-Vilant (TRELEGY ELLIPTA) 100-62.5-25 MCG/INH AEROSOL POWDER, BREATH ACTIVATED Inhale 1 Puff by mouth every day. 1 Each 11   • apixaban (ELIQUIS) 2.5mg Tab Take 1 Tab by mouth 2 Times a Day. 60 Tab 6   • TRELEGY ELLIPTA 100-62.5-25 MCG/INH AEROSOL POWDER, BREATH ACTIVATED INHALE 1 PUFF ONCE DAILY (RINSE  MOUTH  AFTER  USE) 3 Each 3   • albuterol (PROVENTIL) 2.5mg/3ml Nebu Soln solution for nebulization 3 mL by Nebulization route every four hours as needed for Shortness of Breath. 150 Bullet 11     No current facility-administered medications on file prior to visit.        Vitals:    05/16/19 1042   BP: 120/60   Pulse: 62   Resp: 14   Temp: 36.8 °C (98.2 °F)   SpO2: 95%     Physical exam   Constitutional: Well-developed, well-nourished, good hygiene. Appears stated age.  Cardiovascular: RRR, with no murmurs, rubs or gallops. No carotid bruits.   Respiratory: Lungs CTA B/L, no W/R/R.   Abdomen: Soft Non-tender to Palpation. Non-distended.  Extremities: No peripheral edema, pedal pulses intact.   Skin: Warm, dry, intact. No rashes observed.  Eyes: Sclera anicteric   Funduscopic: Optic discs flat with no evidence of papilledema or pallor.   Neurologic:   Mental Status: Awake, alert, oriented x 1  MOCA 18/30   Speech: Fluent with normal prosody.   Memory: Unable to recall recent and remote details.   Concentration: Inattentive             Fund of Knowledge: see MOCA   Cranial Nerves:    CN II: PERRL. No afferent pupillary defect.    CN III, IV, VI: Good eye closure, EOMI.     CN V: Facial sensation intact and symmetric.     CN VII: No facial asymmetry.     CN VIII: Hearing intact.     CN  IX and X: Palate elevates symmetrically. Normal gag reflex.    CN XI: Symmetric shoulder shrug.     CN XII: Tongue midline.    Sensory: Intact light touch, vibration and temperature.    Coordination: No evidence of past-pointing on finger to nose testing, no dysdiadochokinesia. Heel to shin intact.             DTR's: 2+ throughout without clonus.    Babinski: Toes downgoing bilaterally.   Romberg: Negative.   Movements: No tremors observed.   Musculoskeletal:    Strength: 4+/5 in upper and lower extremities bilaterally.   Gait: Steady, narrow based.    Tone: Normal bulk and tone.   Joints: No swelling.     Labs and imaging reviewed  CT head reviewed by me  Chronic small vessel disease  Diffuse atrophy in temporal and parietal lobes     Plan  Memory loss   Likely Alzheimer's disease  Will obtain MRI Brain w/o contrast to r/o structural lesion and to further estimate degree of atrophy  B12, folate and TSH  She would not tolerate Neuropsychological evaluation as she performed poorly on MOCA today with poor focus and attention. Will hold off for now.    Natural remedies OK but not confirmed it would help:  Alpha lipoic acid  ginko biloba extract  Coenzyme Q10  Guillermo green tea extract   Vitamin D3     Luis A Alonso MD PhD  Behavioral Neurologist  Board Certified Neurologist     Patient was seen for 60 minutes face to face of which > 50% of appointment time was spent on counseling and coordination of care regarding the above.

## 2019-05-17 ENCOUNTER — TELEPHONE (OUTPATIENT)
Dept: MEDICAL GROUP | Facility: MEDICAL CENTER | Age: 84
End: 2019-05-17

## 2019-05-17 DIAGNOSIS — Z91.81 AT HIGH RISK FOR INJURY RELATED TO FALL: ICD-10-CM

## 2019-05-17 DIAGNOSIS — M54.9 BACK PAIN, UNSPECIFIED BACK LOCATION, UNSPECIFIED BACK PAIN LATERALITY, UNSPECIFIED CHRONICITY: ICD-10-CM

## 2019-05-17 DIAGNOSIS — R26.9 GAIT DISORDER: ICD-10-CM

## 2019-05-19 ASSESSMENT — ENCOUNTER SYMPTOMS: MEMORY LOSS: 1

## 2019-05-21 ENCOUNTER — TELEPHONE (OUTPATIENT)
Dept: PULMONOLOGY | Facility: HOSPICE | Age: 84
End: 2019-05-21

## 2019-05-21 NOTE — TELEPHONE ENCOUNTER
Patients daughter called saying that her mom is staring to get a little cough with light yellow phlegm she does have her emergency pack on hand but want to see if she starts it our if she waits of a little, she does know to encourage liquids and her nebulizer still no tempeture  O2 levels have been around the 92's.   Please advice.

## 2019-05-22 NOTE — TELEPHONE ENCOUNTER
If she starts to produce more sputum and not feeling good in general, then start the medicine. If she does not feel run down or sick she can wait it out a little longer, but have a low threshold of starting it.

## 2019-05-23 ENCOUNTER — APPOINTMENT (OUTPATIENT)
Dept: RADIOLOGY | Facility: MEDICAL CENTER | Age: 84
End: 2019-05-23
Payer: MEDICARE

## 2019-05-23 DIAGNOSIS — R41.3 MEMORY LOSS: ICD-10-CM

## 2019-05-29 ENCOUNTER — OFFICE VISIT (OUTPATIENT)
Dept: CARDIOLOGY | Facility: MEDICAL CENTER | Age: 84
End: 2019-05-29
Payer: MEDICARE

## 2019-05-29 VITALS
SYSTOLIC BLOOD PRESSURE: 122 MMHG | HEART RATE: 70 BPM | WEIGHT: 123 LBS | OXYGEN SATURATION: 94 % | HEIGHT: 61 IN | DIASTOLIC BLOOD PRESSURE: 64 MMHG | BODY MASS INDEX: 23.22 KG/M2

## 2019-05-29 DIAGNOSIS — I35.1 NONRHEUMATIC AORTIC VALVE INSUFFICIENCY: Chronic | ICD-10-CM

## 2019-05-29 DIAGNOSIS — I10 ESSENTIAL HYPERTENSION: Chronic | ICD-10-CM

## 2019-05-29 DIAGNOSIS — R60.0 LOCALIZED EDEMA: ICD-10-CM

## 2019-05-29 DIAGNOSIS — Z79.01 CHRONIC ANTICOAGULATION: Chronic | ICD-10-CM

## 2019-05-29 DIAGNOSIS — I48.0 PAF (PAROXYSMAL ATRIAL FIBRILLATION) (HCC): Chronic | ICD-10-CM

## 2019-05-29 PROCEDURE — 99214 OFFICE O/P EST MOD 30 MIN: CPT | Performed by: INTERNAL MEDICINE

## 2019-05-29 ASSESSMENT — ENCOUNTER SYMPTOMS
VOMITING: 0
BLOOD IN STOOL: 0
DIZZINESS: 0
BACK PAIN: 1
LOSS OF CONSCIOUSNESS: 0
BRUISES/BLEEDS EASILY: 0
NAUSEA: 0
SPEECH CHANGE: 0
WEAKNESS: 1
SHORTNESS OF BREATH: 0
PALPITATIONS: 0
DEPRESSION: 1
MEMORY LOSS: 1

## 2019-05-29 NOTE — PROGRESS NOTES
Chief Complaint   Patient presents with   • Atrial Fibrillation       Subjective:   Venus Church is a 88 y.o. female who presents today for follow-up of PAF, history of hypertension and anticoagulation.  She is accompanied by her son.  Patient was last seen in February after she was discharged to the hospital.  She is much brighter today and has been more active at home.  The family was staying with her 24 hours a day and now they are leaving her alone at night and having a tendon that spent some time with her during the day.  Son confirms that she is feeling better and moving better than she was a few months ago.  No bleeding problems on her anticoagulant.  No falls.  No sense of palpitations and no chest pain.  She does complain of significant edema.  Patient has COPD and moderately elevated pulmonary pressures.  Most recent echo images were personally reviewed and she does have mild to moderate aortic insufficiency.    Past Medical History:   Diagnosis Date   • Anesthesia     n/v   • Aortic regurgitation 4/25/2012   • Arrhythmia 7/10      A. Fib.   • Arthritis     general   • Atypical chest pain 4/25/2012   • CATARACT 2007,08   • Chronic anticoagulation 4/25/2012   • Constipation 4/25/2012   • COPD (chronic obstructive pulmonary disease) (HCC)    • DDD (degenerative disc disease)    • Dental disorder     partials   • Dyspnea 4/25/2012   • Generalized osteoarthritis 4/25/2012   • Headache(784.0) 4/25/2012   • Heart valve insufficiency     minimal, watched by cardio   • Hypercholesteremia    • Hyperlipidemia 4/25/2012   • Hypertension    • MEDICAL HOME    • On home oxygen therapy     at [facility]   • Osteoarthritis of knee 4/25/2012   • Osteoporosis    • Other accident     C-Spine FX  2006   • Pain     back pain   • Pneumonia 6/4   • TB lung, latent 5/4/2016   • Valvular heart disease 4/25/2012   • Vertebral fracture    • Vertigo 4/25/2012     Past Surgical History:   Procedure Laterality Date   • RECOVERY  7/28/2010     Performed by SURGERY, IR-RECOVERY at [facility]   • OTHER ORTHOPEDIC SURGERY  may, 2010      T11 kypho.   • CATARACT PHACO WITH IOL  1/5/2009    Performed by ROSE MARIE MANN at [facility]   • CARPAL TUNNEL RELEASE  @30 yrs ago    rt hand     Family History   Problem Relation Age of Onset   • Heart Disease Father    • Diabetes Brother      Social History     Social History   • Marital status:      Spouse name: N/A   • Number of children: N/A   • Years of education: N/A     Occupational History   • Not on file.     Social History Main Topics   • Smoking status: Former Smoker     Packs/day: 1.50     Years: 25.00     Types: Cigarettes     Quit date: 4/14/1984   • Smokeless tobacco: Never Used      Comment: smoked 25 yrs, quit 1984   • Alcohol use No   • Drug use: No   • Sexual activity: Not on file     Other Topics Concern   • Not on file     Social History Narrative   • No narrative on file     Allergies   Allergen Reactions   • Codeine Vomiting     Outpatient Encounter Prescriptions as of 5/29/2019   Medication Sig Dispense Refill   • celecoxib (CELEBREX) 100 MG Cap Take 1 Cap by mouth 2 times a day. 180 Cap 3   • gabapentin (NEURONTIN) 100 MG Cap Take 1 Cap by mouth every evening. 30 Cap 5   • Fluticasone-Umeclidin-Vilant (TRELEGY ELLIPTA) 100-62.5-25 MCG/INH AEROSOL POWDER, BREATH ACTIVATED Inhale 1 Puff by mouth every day. 1 Each 11   • apixaban (ELIQUIS) 2.5mg Tab Take 1 Tab by mouth 2 Times a Day. 60 Tab 6   • TRELEGY ELLIPTA 100-62.5-25 MCG/INH AEROSOL POWDER, BREATH ACTIVATED INHALE 1 PUFF ONCE DAILY (RINSE  MOUTH  AFTER  USE) (Patient not taking: Reported on 5/29/2019) 3 Each 3   • albuterol (PROVENTIL) 2.5mg/3ml Nebu Soln solution for nebulization 3 mL by Nebulization route every four hours as needed for Shortness of Breath. 150 Bullet 11     No facility-administered encounter medications on file as of 5/29/2019.      Review of Systems   Constitutional: Negative  "for malaise/fatigue.   HENT: Negative for nosebleeds.    Respiratory: Negative for shortness of breath.    Cardiovascular: Negative for chest pain, palpitations and leg swelling.   Gastrointestinal: Negative for blood in stool, melena, nausea and vomiting.   Genitourinary: Negative for hematuria.   Musculoskeletal: Positive for back pain.   Neurological: Positive for weakness. Negative for dizziness, speech change and loss of consciousness.   Endo/Heme/Allergies: Does not bruise/bleed easily.   Psychiatric/Behavioral: Positive for depression and memory loss.        Objective:   /64 (BP Location: Left arm, Patient Position: Sitting, BP Cuff Size: Adult)   Pulse 70   Ht 1.549 m (5' 1\")   Wt 55.8 kg (123 lb)   SpO2 94%   BMI 23.24 kg/m²     Physical Exam   Constitutional: She is oriented to person, place, and time. She appears well-developed and well-nourished. No distress.   HENT:   Head: Atraumatic.   Eyes: Pupils are equal, round, and reactive to light. Conjunctivae and EOM are normal.   Neck: Neck supple. No JVD present.   Cardiovascular: Normal rate and regular rhythm.    Murmur heard.   Systolic murmur is present with a grade of 1/6    Diastolic murmur is present with a grade of 3/6   Pulmonary/Chest: Effort normal and breath sounds normal. No respiratory distress. She has no wheezes. She has no rales.   Abdominal: There is tenderness.   Musculoskeletal:   Trace edema bilaterally   Neurological: She is alert and oriented to person, place, and time.   Skin: Skin is warm and dry. She is not diaphoretic.   Psychiatric: She is not slowed.       Assessment:     1. Chronic anticoagulation     2. PAF (paroxysmal atrial fibrillation) ([facility])     3. Essential hypertension     4. Nonrheumatic aortic valve insufficiency         Medical Decision Making:  Today's Assessment / Status / Plan:   PAF: Patient had no sense of palpitations.  She is in sinus rhythm today.  Anticoagulation: No bleeding problems on her " anticoagulant.  Valvular heart disease patient with mild mitral sufficiency of mild to moderate aortic insufficiency.  I have personally reviewed the most recent echo images and my impression is that the AI is more in the mild range.  Dependent edema: This multifactorial due in part to varicosities, age, inactivity, and her pulmonary hypertension.  She does have COPD.  This was discussed with she and her son today.  Recommend a trial compression stockings.  The edema is mild would not recommend going back on diuretics at this time.

## 2019-06-13 ENCOUNTER — TELEPHONE (OUTPATIENT)
Dept: MEDICAL GROUP | Facility: MEDICAL CENTER | Age: 84
End: 2019-06-13

## 2019-06-13 NOTE — TELEPHONE ENCOUNTER
Ivana called today to report that Venus stated that this am she coughed up some phlegm with just the slighted trace of blood. Wanted you to know and they will continue to check her all day. Wanted you to know. Ph: [phone]

## 2019-06-13 NOTE — TELEPHONE ENCOUNTER
Thank you for the notification, just monitor her respiratory status, if she has further coughing up of blood, develops any chest pain, fevers, chills, shortness of breath, she should be seen at the emergency room.

## 2019-08-22 ENCOUNTER — OFFICE VISIT (OUTPATIENT)
Dept: URGENT CARE | Facility: CLINIC | Age: 84
End: 2019-08-22
Payer: MEDICARE

## 2019-08-22 ENCOUNTER — HOSPITAL ENCOUNTER (OUTPATIENT)
Facility: MEDICAL CENTER | Age: 84
End: 2019-08-23
Attending: EMERGENCY MEDICINE | Admitting: INTERNAL MEDICINE
Payer: MEDICARE

## 2019-08-22 ENCOUNTER — APPOINTMENT (OUTPATIENT)
Dept: RADIOLOGY | Facility: MEDICAL CENTER | Age: 84
End: 2019-08-22
Attending: EMERGENCY MEDICINE
Payer: MEDICARE

## 2019-08-22 VITALS
HEIGHT: 62 IN | RESPIRATION RATE: 16 BRPM | WEIGHT: 130 LBS | HEART RATE: 76 BPM | SYSTOLIC BLOOD PRESSURE: 168 MMHG | DIASTOLIC BLOOD PRESSURE: 98 MMHG | BODY MASS INDEX: 23.92 KG/M2 | OXYGEN SATURATION: 90 % | TEMPERATURE: 99.1 F

## 2019-08-22 DIAGNOSIS — R07.2 PRECORDIAL PAIN: ICD-10-CM

## 2019-08-22 LAB
ALBUMIN SERPL BCP-MCNC: 4.5 G/DL (ref 3.2–4.9)
ALBUMIN/GLOB SERPL: 1.4 G/DL
ALP SERPL-CCNC: 84 U/L (ref 30–99)
ALT SERPL-CCNC: 25 U/L (ref 2–50)
ANION GAP SERPL CALC-SCNC: 12 MMOL/L (ref 0–11.9)
AST SERPL-CCNC: 28 U/L (ref 12–45)
BASOPHILS # BLD AUTO: 0.6 % (ref 0–1.8)
BASOPHILS # BLD: 0.05 K/UL (ref 0–0.12)
BILIRUB SERPL-MCNC: 0.7 MG/DL (ref 0.1–1.5)
BUN SERPL-MCNC: 20 MG/DL (ref 8–22)
CALCIUM SERPL-MCNC: 10 MG/DL (ref 8.4–10.2)
CHLORIDE SERPL-SCNC: 99 MMOL/L (ref 96–112)
CO2 SERPL-SCNC: 27 MMOL/L (ref 20–33)
CREAT SERPL-MCNC: 0.78 MG/DL (ref 0.5–1.4)
EOSINOPHIL # BLD AUTO: 0.53 K/UL (ref 0–0.51)
EOSINOPHIL NFR BLD: 6.5 % (ref 0–6.9)
ERYTHROCYTE [DISTWIDTH] IN BLOOD BY AUTOMATED COUNT: 49.5 FL (ref 35.9–50)
GLOBULIN SER CALC-MCNC: 3.2 G/DL (ref 1.9–3.5)
GLUCOSE SERPL-MCNC: 107 MG/DL (ref 65–99)
HCT VFR BLD AUTO: 43.2 % (ref 37–47)
HGB BLD-MCNC: 14.3 G/DL (ref 12–16)
IMM GRANULOCYTES # BLD AUTO: 0.03 K/UL (ref 0–0.11)
IMM GRANULOCYTES NFR BLD AUTO: 0.4 % (ref 0–0.9)
LYMPHOCYTES # BLD AUTO: 1.8 K/UL (ref 1–4.8)
LYMPHOCYTES NFR BLD: 22.2 % (ref 22–41)
MCH RBC QN AUTO: 31.6 PG (ref 27–33)
MCHC RBC AUTO-ENTMCNC: 33.1 G/DL (ref 33.6–35)
MCV RBC AUTO: 95.6 FL (ref 81.4–97.8)
MONOCYTES # BLD AUTO: 0.68 K/UL (ref 0–0.85)
MONOCYTES NFR BLD AUTO: 8.4 % (ref 0–13.4)
NEUTROPHILS # BLD AUTO: 5.03 K/UL (ref 2–7.15)
NEUTROPHILS NFR BLD: 61.9 % (ref 44–72)
NRBC # BLD AUTO: 0 K/UL
NRBC BLD-RTO: 0 /100 WBC
PLATELET # BLD AUTO: 214 K/UL (ref 164–446)
PMV BLD AUTO: 10.7 FL (ref 9–12.9)
POTASSIUM SERPL-SCNC: 4 MMOL/L (ref 3.6–5.5)
PROT SERPL-MCNC: 7.7 G/DL (ref 6–8.2)
RBC # BLD AUTO: 4.52 M/UL (ref 4.2–5.4)
SODIUM SERPL-SCNC: 138 MMOL/L (ref 135–145)
TROPONIN T SERPL-MCNC: 38 NG/L (ref 6–19)
WBC # BLD AUTO: 8.1 K/UL (ref 4.8–10.8)

## 2019-08-22 PROCEDURE — 96374 THER/PROPH/DIAG INJ IV PUSH: CPT

## 2019-08-22 PROCEDURE — 80053 COMPREHEN METABOLIC PANEL: CPT

## 2019-08-22 PROCEDURE — 96375 TX/PRO/DX INJ NEW DRUG ADDON: CPT

## 2019-08-22 PROCEDURE — 93005 ELECTROCARDIOGRAM TRACING: CPT

## 2019-08-22 PROCEDURE — 700101 HCHG RX REV CODE 250: Performed by: EMERGENCY MEDICINE

## 2019-08-22 PROCEDURE — 94640 AIRWAY INHALATION TREATMENT: CPT

## 2019-08-22 PROCEDURE — 71045 X-RAY EXAM CHEST 1 VIEW: CPT

## 2019-08-22 PROCEDURE — 84484 ASSAY OF TROPONIN QUANT: CPT

## 2019-08-22 PROCEDURE — 99220 PR INITIAL OBSERVATION CARE,LEVL III: CPT | Performed by: INTERNAL MEDICINE

## 2019-08-22 PROCEDURE — 85025 COMPLETE CBC W/AUTO DIFF WBC: CPT

## 2019-08-22 PROCEDURE — G0378 HOSPITAL OBSERVATION PER HR: HCPCS

## 2019-08-22 PROCEDURE — 700111 HCHG RX REV CODE 636 W/ 250 OVERRIDE (IP): Performed by: EMERGENCY MEDICINE

## 2019-08-22 PROCEDURE — 99285 EMERGENCY DEPT VISIT HI MDM: CPT

## 2019-08-22 PROCEDURE — 94760 N-INVAS EAR/PLS OXIMETRY 1: CPT

## 2019-08-22 PROCEDURE — 99215 OFFICE O/P EST HI 40 MIN: CPT | Performed by: NURSE PRACTITIONER

## 2019-08-22 PROCEDURE — 93005 ELECTROCARDIOGRAM TRACING: CPT | Performed by: EMERGENCY MEDICINE

## 2019-08-22 RX ORDER — BISACODYL 10 MG
10 SUPPOSITORY, RECTAL RECTAL
Status: DISCONTINUED | OUTPATIENT
Start: 2019-08-22 | End: 2019-08-23 | Stop reason: HOSPADM

## 2019-08-22 RX ORDER — METHYLPREDNISOLONE SODIUM SUCCINATE 125 MG/2ML
125 INJECTION, POWDER, LYOPHILIZED, FOR SOLUTION INTRAMUSCULAR; INTRAVENOUS ONCE
Status: COMPLETED | OUTPATIENT
Start: 2019-08-22 | End: 2019-08-22

## 2019-08-22 RX ORDER — ONDANSETRON 2 MG/ML
4 INJECTION INTRAMUSCULAR; INTRAVENOUS EVERY 4 HOURS PRN
Status: DISCONTINUED | OUTPATIENT
Start: 2019-08-22 | End: 2019-08-23 | Stop reason: HOSPADM

## 2019-08-22 RX ORDER — ACETAMINOPHEN 325 MG/1
650 TABLET ORAL EVERY 6 HOURS PRN
Status: DISCONTINUED | OUTPATIENT
Start: 2019-08-22 | End: 2019-08-23 | Stop reason: HOSPADM

## 2019-08-22 RX ORDER — METOPROLOL TARTRATE 1 MG/ML
5 INJECTION, SOLUTION INTRAVENOUS ONCE
Status: DISCONTINUED | OUTPATIENT
Start: 2019-08-22 | End: 2019-08-22

## 2019-08-22 RX ORDER — GABAPENTIN 100 MG/1
100 CAPSULE ORAL EVERY EVENING
Status: DISCONTINUED | OUTPATIENT
Start: 2019-08-23 | End: 2019-08-23 | Stop reason: HOSPADM

## 2019-08-22 RX ORDER — ENALAPRILAT 1.25 MG/ML
1.25 INJECTION INTRAVENOUS EVERY 6 HOURS PRN
Status: DISCONTINUED | OUTPATIENT
Start: 2019-08-22 | End: 2019-08-23 | Stop reason: HOSPADM

## 2019-08-22 RX ORDER — POLYETHYLENE GLYCOL 3350 17 G/17G
1 POWDER, FOR SOLUTION ORAL
Status: DISCONTINUED | OUTPATIENT
Start: 2019-08-22 | End: 2019-08-23 | Stop reason: HOSPADM

## 2019-08-22 RX ORDER — ONDANSETRON 4 MG/1
4 TABLET, ORALLY DISINTEGRATING ORAL EVERY 4 HOURS PRN
Status: DISCONTINUED | OUTPATIENT
Start: 2019-08-22 | End: 2019-08-23 | Stop reason: HOSPADM

## 2019-08-22 RX ORDER — LISINOPRIL 5 MG/1
10 TABLET ORAL DAILY
Status: DISCONTINUED | OUTPATIENT
Start: 2019-08-23 | End: 2019-08-23 | Stop reason: HOSPADM

## 2019-08-22 RX ORDER — LABETALOL HYDROCHLORIDE 5 MG/ML
10 INJECTION, SOLUTION INTRAVENOUS EVERY 4 HOURS PRN
Status: DISCONTINUED | OUTPATIENT
Start: 2019-08-22 | End: 2019-08-23

## 2019-08-22 RX ORDER — GUAIFENESIN/DEXTROMETHORPHAN 100-10MG/5
10 SYRUP ORAL EVERY 6 HOURS PRN
Status: DISCONTINUED | OUTPATIENT
Start: 2019-08-22 | End: 2019-08-23 | Stop reason: HOSPADM

## 2019-08-22 RX ORDER — HALOPERIDOL 5 MG/ML
1 INJECTION INTRAMUSCULAR
Status: DISCONTINUED | OUTPATIENT
Start: 2019-08-22 | End: 2019-08-23 | Stop reason: HOSPADM

## 2019-08-22 RX ORDER — HYDRALAZINE HYDROCHLORIDE 20 MG/ML
20 INJECTION INTRAMUSCULAR; INTRAVENOUS ONCE
Status: COMPLETED | OUTPATIENT
Start: 2019-08-22 | End: 2019-08-22

## 2019-08-22 RX ORDER — IPRATROPIUM BROMIDE AND ALBUTEROL SULFATE 2.5; .5 MG/3ML; MG/3ML
3 SOLUTION RESPIRATORY (INHALATION) ONCE
Status: COMPLETED | OUTPATIENT
Start: 2019-08-22 | End: 2019-08-22

## 2019-08-22 RX ORDER — AMOXICILLIN 250 MG
2 CAPSULE ORAL 2 TIMES DAILY
Status: DISCONTINUED | OUTPATIENT
Start: 2019-08-23 | End: 2019-08-23 | Stop reason: HOSPADM

## 2019-08-22 RX ADMIN — HYDRALAZINE HYDROCHLORIDE 20 MG: 20 INJECTION INTRAMUSCULAR; INTRAVENOUS at 21:32

## 2019-08-22 RX ADMIN — IPRATROPIUM BROMIDE AND ALBUTEROL SULFATE 3 ML: .5; 3 SOLUTION RESPIRATORY (INHALATION) at 21:38

## 2019-08-22 RX ADMIN — METHYLPREDNISOLONE SODIUM SUCCINATE 125 MG: 125 INJECTION, POWDER, FOR SOLUTION INTRAMUSCULAR; INTRAVENOUS at 21:32

## 2019-08-22 ASSESSMENT — ENCOUNTER SYMPTOMS
NAUSEA: 0
VOMITING: 0
DIAPHORESIS: 0
CHILLS: 0
SHORTNESS OF BREATH: 0
NECK PAIN: 0
MYALGIAS: 0
COUGH: 0
FEVER: 0

## 2019-08-22 ASSESSMENT — LIFESTYLE VARIABLES: SUBSTANCE_ABUSE: 0

## 2019-08-23 ENCOUNTER — PATIENT OUTREACH (OUTPATIENT)
Dept: HEALTH INFORMATION MANAGEMENT | Facility: OTHER | Age: 84
End: 2019-08-23

## 2019-08-23 VITALS
OXYGEN SATURATION: 92 % | TEMPERATURE: 97.8 F | HEIGHT: 63 IN | HEART RATE: 75 BPM | SYSTOLIC BLOOD PRESSURE: 108 MMHG | BODY MASS INDEX: 22.3 KG/M2 | WEIGHT: 125.88 LBS | DIASTOLIC BLOOD PRESSURE: 53 MMHG | RESPIRATION RATE: 18 BRPM

## 2019-08-23 PROBLEM — F03.90 DEMENTIA (HCC): Status: ACTIVE | Noted: 2019-08-23

## 2019-08-23 PROBLEM — I16.1 HYPERTENSIVE EMERGENCY: Status: ACTIVE | Noted: 2019-08-23

## 2019-08-23 LAB
ANION GAP SERPL CALC-SCNC: 12 MMOL/L (ref 0–11.9)
BUN SERPL-MCNC: 18 MG/DL (ref 8–22)
CALCIUM SERPL-MCNC: 9.6 MG/DL (ref 8.4–10.2)
CHLORIDE SERPL-SCNC: 99 MMOL/L (ref 96–112)
CO2 SERPL-SCNC: 28 MMOL/L (ref 20–33)
CREAT SERPL-MCNC: 0.87 MG/DL (ref 0.5–1.4)
EKG IMPRESSION: NORMAL
ERYTHROCYTE [DISTWIDTH] IN BLOOD BY AUTOMATED COUNT: 51.8 FL (ref 35.9–50)
GLUCOSE SERPL-MCNC: 204 MG/DL (ref 65–99)
HCT VFR BLD AUTO: 44.2 % (ref 37–47)
HGB BLD-MCNC: 14.5 G/DL (ref 12–16)
MCH RBC QN AUTO: 32.1 PG (ref 27–33)
MCHC RBC AUTO-ENTMCNC: 32.8 G/DL (ref 33.6–35)
MCV RBC AUTO: 97.8 FL (ref 81.4–97.8)
PLATELET # BLD AUTO: 202 K/UL (ref 164–446)
PMV BLD AUTO: 10.9 FL (ref 9–12.9)
POTASSIUM SERPL-SCNC: 4.2 MMOL/L (ref 3.6–5.5)
RBC # BLD AUTO: 4.52 M/UL (ref 4.2–5.4)
SODIUM SERPL-SCNC: 139 MMOL/L (ref 135–145)
TROPONIN T SERPL-MCNC: 36 NG/L (ref 6–19)
TROPONIN T SERPL-MCNC: 48 NG/L (ref 6–19)
WBC # BLD AUTO: 7.7 K/UL (ref 4.8–10.8)

## 2019-08-23 PROCEDURE — G0378 HOSPITAL OBSERVATION PER HR: HCPCS

## 2019-08-23 PROCEDURE — 85027 COMPLETE CBC AUTOMATED: CPT

## 2019-08-23 PROCEDURE — 84484 ASSAY OF TROPONIN QUANT: CPT

## 2019-08-23 PROCEDURE — 94760 N-INVAS EAR/PLS OXIMETRY 1: CPT

## 2019-08-23 PROCEDURE — 96375 TX/PRO/DX INJ NEW DRUG ADDON: CPT

## 2019-08-23 PROCEDURE — 700102 HCHG RX REV CODE 250 W/ 637 OVERRIDE(OP): Performed by: INTERNAL MEDICINE

## 2019-08-23 PROCEDURE — 700111 HCHG RX REV CODE 636 W/ 250 OVERRIDE (IP)

## 2019-08-23 PROCEDURE — 700101 HCHG RX REV CODE 250: Performed by: INTERNAL MEDICINE

## 2019-08-23 PROCEDURE — 700111 HCHG RX REV CODE 636 W/ 250 OVERRIDE (IP): Performed by: INTERNAL MEDICINE

## 2019-08-23 PROCEDURE — A9270 NON-COVERED ITEM OR SERVICE: HCPCS | Performed by: INTERNAL MEDICINE

## 2019-08-23 PROCEDURE — 80048 BASIC METABOLIC PNL TOTAL CA: CPT

## 2019-08-23 PROCEDURE — 99217 PR OBSERVATION CARE DISCHARGE: CPT | Performed by: HOSPITALIST

## 2019-08-23 PROCEDURE — 94640 AIRWAY INHALATION TREATMENT: CPT

## 2019-08-23 RX ORDER — METOPROLOL TARTRATE 50 MG/1
50 TABLET, FILM COATED ORAL 2 TIMES DAILY
Status: DISCONTINUED | OUTPATIENT
Start: 2019-08-23 | End: 2019-08-23 | Stop reason: HOSPADM

## 2019-08-23 RX ORDER — DILTIAZEM HYDROCHLORIDE 5 MG/ML
10 INJECTION INTRAVENOUS EVERY 4 HOURS PRN
Status: DISCONTINUED | OUTPATIENT
Start: 2019-08-23 | End: 2019-08-23 | Stop reason: HOSPADM

## 2019-08-23 RX ORDER — LISINOPRIL 10 MG/1
10 TABLET ORAL DAILY
Qty: 30 TAB | Refills: 3 | Status: SHIPPED | OUTPATIENT
Start: 2019-08-24 | End: 2019-09-06

## 2019-08-23 RX ORDER — CLONIDINE HYDROCHLORIDE 0.1 MG/1
0.2 TABLET ORAL EVERY 8 HOURS PRN
Status: DISCONTINUED | OUTPATIENT
Start: 2019-08-23 | End: 2019-08-23 | Stop reason: HOSPADM

## 2019-08-23 RX ORDER — DILTIAZEM HYDROCHLORIDE 5 MG/ML
INJECTION INTRAVENOUS
Status: COMPLETED
Start: 2019-08-23 | End: 2019-08-23

## 2019-08-23 RX ADMIN — ALBUTEROL SULFATE 2.5 MG: 2.5 SOLUTION RESPIRATORY (INHALATION) at 07:44

## 2019-08-23 RX ADMIN — DILTIAZEM HYDROCHLORIDE 10 MG: 5 INJECTION INTRAVENOUS at 02:46

## 2019-08-23 RX ADMIN — SENNOSIDES,DOCUSATE SODIUM 2 TABLET: 8.6; 5 TABLET, FILM COATED ORAL at 06:09

## 2019-08-23 RX ADMIN — LISINOPRIL 10 MG: 5 TABLET ORAL at 06:09

## 2019-08-23 RX ADMIN — ACETAMINOPHEN 650 MG: 325 TABLET, FILM COATED ORAL at 01:17

## 2019-08-23 RX ADMIN — METOPROLOL TARTRATE 50 MG: 50 TABLET ORAL at 06:09

## 2019-08-23 RX ADMIN — APIXABAN 2.5 MG: 5 TABLET, FILM COATED ORAL at 06:09

## 2019-08-23 RX ADMIN — ACETAMINOPHEN 650 MG: 325 TABLET, FILM COATED ORAL at 11:32

## 2019-08-23 RX ADMIN — ENALAPRILAT 1.25 MG: 2.5 INJECTION INTRAVENOUS at 01:17

## 2019-08-23 ASSESSMENT — LIFESTYLE VARIABLES
EVER FELT BAD OR GUILTY ABOUT YOUR DRINKING: NO
TOTAL SCORE: 0
EVER_SMOKED: YES
EVER HAD A DRINK FIRST THING IN THE MORNING TO STEADY YOUR NERVES TO GET RID OF A HANGOVER: NO
HAVE PEOPLE ANNOYED YOU BY CRITICIZING YOUR DRINKING: NO
TOTAL SCORE: 0
ON A TYPICAL DAY WHEN YOU DRINK ALCOHOL HOW MANY DRINKS DO YOU HAVE: 0
EVER_SMOKED: YES
HAVE YOU EVER FELT YOU SHOULD CUT DOWN ON YOUR DRINKING: NO
HOW MANY TIMES IN THE PAST YEAR HAVE YOU HAD 5 OR MORE DRINKS IN A DAY: 0
ALCOHOL_USE: NO
AVERAGE NUMBER OF DAYS PER WEEK YOU HAVE A DRINK CONTAINING ALCOHOL: 0
CONSUMPTION TOTAL: NEGATIVE
TOTAL SCORE: 0

## 2019-08-23 ASSESSMENT — ENCOUNTER SYMPTOMS
COUGH: 1
WEAKNESS: 0
CHILLS: 0
TINGLING: 0
CONSTIPATION: 0
SHORTNESS OF BREATH: 1
HEADACHES: 0
DIZZINESS: 0
DIARRHEA: 0
VOMITING: 0
FEVER: 0
LOSS OF CONSCIOUSNESS: 0
NAUSEA: 0
ABDOMINAL PAIN: 0
STRIDOR: 0
DEPRESSION: 0
PALPITATIONS: 0
SPUTUM PRODUCTION: 0
FALLS: 0
MYALGIAS: 0

## 2019-08-23 ASSESSMENT — COGNITIVE AND FUNCTIONAL STATUS - GENERAL
WALKING IN HOSPITAL ROOM: A LITTLE
MOBILITY SCORE: 22
SUGGESTED CMS G CODE MODIFIER MOBILITY: CJ
SUGGESTED CMS G CODE MODIFIER DAILY ACTIVITY: CH
DAILY ACTIVITIY SCORE: 24
CLIMB 3 TO 5 STEPS WITH RAILING: A LITTLE

## 2019-08-23 ASSESSMENT — CHA2DS2 SCORE
DIABETES: NO
AGE 75 OR GREATER: YES
CHA2DS2 VASC SCORE: 4
PRIOR STROKE OR TIA OR THROMBOEMBOLISM: NO
SEX: FEMALE
AGE 65 TO 74: NO
VASCULAR DISEASE: NO
CHF OR LEFT VENTRICULAR DYSFUNCTION: NO
HYPERTENSION: YES

## 2019-08-23 ASSESSMENT — COPD QUESTIONNAIRES
HAVE YOU SMOKED AT LEAST 100 CIGARETTES IN YOUR ENTIRE LIFE: YES
DURING THE PAST 4 WEEKS HOW MUCH DID YOU FEEL SHORT OF BREATH: SOME OF THE TIME
DO YOU EVER COUGH UP ANY MUCUS OR PHLEGM?: YES, A FEW DAYS A WEEK OR MONTH
COPD SCREENING SCORE: 6

## 2019-08-23 ASSESSMENT — PATIENT HEALTH QUESTIONNAIRE - PHQ9
2. FEELING DOWN, DEPRESSED, IRRITABLE, OR HOPELESS: NOT AT ALL
SUM OF ALL RESPONSES TO PHQ9 QUESTIONS 1 AND 2: 0
1. LITTLE INTEREST OR PLEASURE IN DOING THINGS: NOT AT ALL

## 2019-08-23 NOTE — ED TRIAGE NOTES
"BP (!) 194/102   Pulse 79   Temp 37.2 °C (98.9 °F) (Temporal)   Resp 18   Ht 1.6 m (5' 3\")   Wt 57.4 kg (126 lb 8.7 oz)   SpO2 92%   Breastfeeding? No   BMI 22.42 kg/m²     Chief Complaint   Patient presents with   • Chest Pain     pt c/o chest pain since this morning   • Shortness of Breath     pt c/o sob since chest pain     Pt assisted to triage in wheelchair. Pt alert and oriented, regular unlabored respirations. Triage process explained to pt. Pt told to alert staff with any changes in condition. EKG done in traige.    "

## 2019-08-23 NOTE — PROGRESS NOTES
Son at bedside, patient complaining of left posterior thigh pain. Will apply heat pack and give PRN tylenol and continue to monitor. Plan for possible discharge today. Patient and family aware.

## 2019-08-23 NOTE — ASSESSMENT & PLAN NOTE
-She does not carry an official diagnosis but I do think there is a component of dementia but this  -Last time she was here, she became very confused at night and required restraints  -Family is concerned about us using restraints again, we will certainly try to avoid it unless it becomes a safety issue  -PRN Haldol

## 2019-08-23 NOTE — DISCHARGE SUMMARY
Discharge Summary    CHIEF COMPLAINT ON ADMISSION  Chief Complaint   Patient presents with   • Chest Pain     pt c/o chest pain since this morning   • Shortness of Breath     pt c/o sob since chest pain       Reason for Admission  Shortness of Breath, High Blood Pr*     Admission Date  8/22/2019    CODE STATUS  Prior    HPI & HOSPITAL COURSE  This is a 88 y.o. female here with chest pain. She was admitted and ruled out for a myocardial infarction with serial troponins. She had no further pain while here. Given her age and cormid conditions, she did not undergo a stress test. An echo was initially ordered. I deferred this has she had one in [facility] this year. I believe that her pain may have been gerd related. She will try TUMS at home if this recurs and follow up with her PCP.   .         Therefore, she is discharged in good and stable condition to home with close outpatient follow-up.    The patient recovered much more quickly than anticipated on admission.    Discharge Date  8/23/2019    FOLLOW UP ITEMS POST DISCHARGE  none    DISCHARGE DIAGNOSES  Principal Problem:    Chest pain (Chronic) POA: Yes      Overview: Dr. Alexy Smyth  Active Problems:    Hypertensive emergency POA: Yes    PAF (paroxysmal atrial fibrillation) (HCC) (Chronic) POA: Yes      Overview: 4/10 hospitalization on multaq, also on verapamil and metoprolol for rate       control per cardiology      1/11 [facility] note cont multaq      7/11 EKG atrial fibrillation hospitalization      7/8/11 echo normal LV function, EF 55%      7/8/11 Persantine thallium possible small area mild ischemia in the       lateral wall, no evidence of infarction      7/11 [facility] during hospitalization changed to amiodarone 200 mg plus pradaxa       restarted      7/11       9/11 [facility] note stop amiodarone, cont pradaxa and metoprolol 25 bid      3/12 cont pradaxa and increase metoprolol to 50 bid due to higher bp      5/12 pradaxa decreased from 150 bid to 75 bid per [facility]  due to nosebleeds      6/12 EKG NSR      1/13 echo normal LV function, grade 2 diastolic dysfunction, EF 70%       moderate aortic insufficiency      6/11/13 on pradaxa and metoprolol 50 bid for rate control, NSR today      9/13 cardiology note stop pradaxa and start asa 81 mg      4/7/14 cardiology note atrial fibrillation, start pradaxa 75 mg bid, stop       asa, stop norvasc, decreased avalide 300/12.5 mg to 1/2 per day, increase       metoprolol to 50 mg 1 1/2 bid      4/14/14 cardiology note, NSR on exam, continue pradaxa 75 mg bid,       metoprolol 75 mg bid, avalide 300/12.5 mg 1/2 tab per day      10/20/14 metoprolol 50 mg decreased to 25 mg bid by [place] doctor due to       low heart rate, continues on pradaxa and avalide 300/12.5 mg       12/29/14  cardiology note continue pradaxa, metoprolol 25 mg 1/2       bid, avalide 300/12.5 mg      6/12/15 NSR on exam      8/24/16 cardiology note sinus rhythm on exam,[id number] score=2 continue       anticoagulation, low-dose metoprolol follow-up one year      12/14/17 cardiology note remains in sinus rhythm, follow-up one year      6/21/18  cardiology note paroxysmal atrial fibrillation sinus       rhythm on exam stable continue anticoagulation, follow-up 1 year      12/11/18 on metoprolol and pradaxa      2/27/19 [id number] (age, gender, aortic atherosclerosis) = 5 (7.8% risk       of stroke per year) and HAS-BLED score 3 points (age, hypertension, con       commitment antiplatelet agents)  = 3.74 bleeds per 100 patient years) will       change pradaxa not covered to eliquis 2.5 mg bid adjust for age and weight          COPD (chronic obstructive pulmonary disease) (HCC) (Chronic) POA: Yes      Overview: 7/11 CT spiral thorax extensive emphysematous change of lung, small left       pleural effusion left lung base with atelectasis unchanged from prior CT      5/10 CT spriral thorax emphysematous change and dependent atelectasis left       lung  base      7/11 PFT FEV/FVC 59%, FEV1 1.1 L (75% predicted), significant post       bronchodilator response noted (FEV1 improved 16%). Mixed restrictive and       obstructive pattern,COPD with GOLD stage II with sig bronchodilator       response      7/11 trial of symbicort 80/4.5 mcg bid discussed with daughter plus       albuterol neb prn, [employer] home education ordered      5/12 off symbicort       5/22/14 cxr negative      6/23/14 on symbicort      7/20/15 change to advair 250/50 mcg from symbicort (not covered on       insurance)      9/3/15 cxr negative      10/4/15 add spiriva and change symbicort to advair 250/50 mcg bid      10/19/15 CT high resolution thorax, no evidence of interstitial lung       disease, minimal scattered opacification could indicate minimal areas of       fibrosis periphery of each lung, similar to prior exam, emphysema most       prominent upper lobes bilateral      10/19/15 PFT FEV1 1.1 (61% predicted),FVC 1.9,FEV/FVC 58%, no       bronchodilator response,DLCO 34%; on advair 250/50 mcg bid and spiriva      11/2/15 change from advair discus to advair 250 inhaler and continue       spiriva       12/11/15 not taking advair, will start advair and cont spiriva      12/11/15 cxr negative      3/14/16 [facility] note complaining doxycycline and taper steroids, continue       nocturnal oxygen, continue rotating antibiotics for bronchiectasis,       follow-up laboratory testing ordered      3/14/16  (normal), quantiferon gold positive, allergen panel       negative, IgE 357 (less than 214), IgA 116 (),, IgM 27 (),IgG       947 ([phone])      4/13/16 cxr mild cardiomegaly      5/4/16 [facility] note continue advair 115/21 bid with spacer given doxycycline       and prednisone, continue oxygen 3 L at night, AFB samples ×3, follow-up 3       months      7/25/16 pulmonary note on prednisone taper one week out of the month and       doxycycline one per day for one week per month, on advair bid  and spiriva       and oxygen 3 liters at night      11/8/16 pulmonary note continue advair 115/21 ucg bid,spiriva, xopenex as       needed, oxygen 3 L at night      10/4/17 pulmonary note continue advair 115/21 two inhalations bid, spiriva       daily, xoponex as needed, oxygen 3 L at night, history of positive       quantiferon gold, will order sputum sample follow-up 4 months      3/17/18 pulmonary note, continue nocturnal oxygen 2 L, start trelegy one       puff daily      9/10/18 pulmonary note intolerant to CPAP, resume nocturnal oxygen, latent       TB, repeat chest x-ray, stable on bronchodilators, as needed prednisone       and antibiotic prescription to pharmacy follow-up 3-6 months      2/13/19 CT-CTA chest pulmonary artery with reconstruction no pulmonary       embolism, emphysema and chronic bronchitis, cardiomegaly with right heart       dysfunction, 4.1 cm ascending aortic aneurysm      3/19/19 on trelegy ellipta and nocturnal oxygen 2 L          Dementia POA: Yes  Resolved Problems:    * No resolved hospital problems. *      FOLLOW UP  Future Appointments   Date Time Provider Department Center   11/1/2019  8:45 AM [facility] None   11/1/2019 10:00 AM SARAH De [place] [facility] None   11/13/2019  4:20 PM Alexy Smyth M.D. [facility] None     Denis Krueger M.D.  [address]  [id number]  [facility] [id number] [phone]      Office will call you to make an appt.       MEDICATIONS ON DISCHARGE     Medication List      START taking these medications      Instructions   lisinopril 10 MG Tabs  Commonly known as:  PRINIVIL   Take 1 Tab by mouth every day.  Dose:  10 mg        CONTINUE taking these medications      Instructions   albuterol 2.5mg/3ml Nebu solution for nebulization  Commonly known as:  PROVENTIL   Doctor's comments:  Please consider 90 day supplies to promote better adherence  3 mL by Nebulization route every four hours as needed for Shortness of Breath.  Dose:  2.5 mg      apixaban 2.5mg Tabs  Commonly known as:  ELIQUIS   Take 1 Tab by mouth 2 Times a Day.  Dose:  2.5 mg     celecoxib 100 MG Caps  Commonly known as:  CELEBREX   Take 1 Cap by mouth 2 times a day.  Dose:  100 mg     gabapentin 100 MG Caps  Commonly known as:  NEURONTIN   Take 1 Cap by mouth every evening.  Dose:  100 mg     metoprolol 25 MG Tabs  Commonly known as:  LOPRESSOR   Doctor's comments:  Please consider 90 day supplies to promote better adherence  Take 1 Tab by mouth 2 times a day.  Dose:  25 mg     * Fluticasone-Umeclidin-Vilant 100-62.5-25 MCG/INH Aepb   Doctor's comments:  With mouth rinse  Inhale 1 Puff by mouth every day.  Dose:  1 Puff     * TRELEGY ELLIPTA 100-62.5-25 MCG/INH Aepb  Generic drug:  Fluticasone-Umeclidin-Vilant   Doctor's comments:  Please consider 90 day supplies to promote better adherence  INHALE 1 PUFF ONCE DAILY (RINSE  MOUTH  AFTER  USE)         * This list has 2 medication(s) that are the same as other medications prescribed for you. Read the directions carefully, and ask your doctor or other care provider to review them with you.                Allergies  Allergies   Allergen Reactions   • Codeine Vomiting       DIET  No orders of the defined types were placed in this encounter.      ACTIVITY  As tolerated.  Weight bearing as tolerated    CONSULTATIONS  none    PROCEDURES  none    LABORATORY  Lab Results   Component Value Date    SODIUM 139 08/23/2019    POTASSIUM 4.2 08/23/2019    CHLORIDE 99 08/23/2019    CO2 28 08/23/2019    GLUCOSE 204 (H) 08/23/2019    BUN 18 08/23/2019    CREATININE 0.87 08/23/2019    CREATININE 1.03 (H) 03/21/2011    GLOMRATE 52 (L) 03/21/2011        Lab Results   Component Value Date    WBC 7.7 08/23/2019    HEMOGLOBIN 14.5 08/23/2019    HEMATOCRIT 44.2 08/23/2019    PLATELETCT 202 08/23/2019        Total time of the discharge process exceeds 34 minutes.

## 2019-08-23 NOTE — PROGRESS NOTES
2 RN skin check complete.   Devices in place tele monitor.  Skin assessed under devices intact.  Confirmed pressure ulcers found on n/a.  New potential pressure ulcers noted on n/a.  The following interventions in place patient turns self side to side, patient ambulates occasionally*

## 2019-08-23 NOTE — PROGRESS NOTES
Pt arrived to unit via gurney. Ambulated from rney to bed, stand by assist. Tele monitor applied, vitals taken. Pt assessed. A&O x4. Admit profile and med rec complete with dana Falk at bedside. Discussed POC with pt, including echocardiogram. Welcome folder provided and discussed. Communication board filled out. Questions and concerns addressed, verbalized understanding. Fall precautions in place. Pt demonstrates ability to use call light appropriately. Pt left in lowest position. Bed locked and low, bed alarm on.

## 2019-08-23 NOTE — ED PROVIDER NOTES
ED Provider Note    ED Provider Note      Primary care provider: Denis Krueger M.D.    CHIEF COMPLAINT  Chief Complaint   Patient presents with   • Chest Pain     pt c/o chest pain since this morning   • Shortness of Breath     pt c/o sob since chest pain       HPI  Venus Church is a 88 y.o. female who presents to the Emergency Department with chief complaint of chest pain.  Sent here for further evaluation from urgent treatment center.  Patient also reports intermittent shortness of breath.  She states that the pain came on while playing solitaire.  She states that the pain is in the left side of her chest into the central part of her chest and also into the left upper extremity.  She reports it as a heavy aching type pain and rates it as moderate.  She is noted no alleviating or aggravating factors.  Patient has a history of COPD she states that she has not had any in several months and that she is had some increased cough and increased sputum production.  Denies chills no headache no altered mental status no abdominal pain no changes in urination bowel movement patient does follow with her cardiologist Dr. Haskins regularly and states that she does believe that she has had a stress test within the last 5 years but cannot state exactly how soon it was.    REVIEW OF SYSTEMS  10 systems reviewed and otherwise negative, pertinent positives and negatives listed in the history of present illness.    PAST MEDICAL HISTORY   has a past medical history of Anesthesia, Aortic regurgitation (4/25/2012), Arrhythmia (7/10), Arthritis, Atypical chest pain (4/25/2012), CATARACT (2007,08), Chronic anticoagulation (4/25/2012), Constipation (4/25/2012), COPD (chronic obstructive pulmonary disease) (HCC), DDD (degenerative disc disease), Dental disorder, Dyspnea (4/25/2012), Generalized osteoarthritis (4/25/2012), Headache(784.0) (4/25/2012), Heart valve insufficiency, Hypercholesteremia, Hyperlipidemia (4/25/2012), Hypertension,  "MEDICAL HOME, On home oxygen therapy, Osteoarthritis of knee (2012), Osteoporosis, Other accident, Pain, Pneumonia (), TB lung, latent (2016), Valvular heart disease (2012), Vertebral fracture, and Vertigo (2012).    SURGICAL HISTORY   has a past surgical history that includes cataract phaco with iol (2009); other orthopedic surgery (may, 2010); carpal tunnel release (@30 yrs ago); and recovery (2010).    SOCIAL HISTORY  Social History     Tobacco Use   • Smoking status: Former Smoker     Packs/day: 1.50     Years: 25.00     Pack years: 37.50     Types: Cigarettes     Last attempt to quit: 1984     Years since quittin.3   • Smokeless tobacco: Never Used   • Tobacco comment: smoked 25 yrs, quit    Substance Use Topics   • Alcohol use: No     Alcohol/week: 0.0 oz   • Drug use: No      Social History     Substance and Sexual Activity   Drug Use No       FAMILY HISTORY  Non-Contributory    CURRENT MEDICATIONS  Home Medications     Reviewed by Palmira Enriquez R.N. (Registered Nurse) on 19 at   Med List Status: Partial   Medication Last Dose Status   albuterol (PROVENTIL) 2.5mg/3ml Nebu Soln solution for nebulization  Active   apixaban (ELIQUIS) 2.5mg Tab  Active   celecoxib (CELEBREX) 100 MG Cap  Active   Fluticasone-Umeclidin-Vilant (TRELEGY ELLIPTA) 100-62.5-25 MCG/INH AEROSOL POWDER, BREATH ACTIVATED  Active   gabapentin (NEURONTIN) 100 MG Cap  Active   metoprolol (LOPRESSOR) 25 MG Tab  Active   TRELEGY ELLIPTA 100-62.5-25 MCG/INH AEROSOL POWDER, BREATH ACTIVATED  Active                ALLERGIES  Allergies   Allergen Reactions   • Codeine Vomiting       PHYSICAL EXAM  VITAL SIGNS: BP (!) 208/99   Pulse 79   Temp 37.2 °C (98.9 °F) (Temporal)   Resp 19   Ht 1.6 m (5' 3\")   Wt 57.4 kg (126 lb 8.7 oz)   SpO2 94%   Breastfeeding? No   BMI 22.42 kg/m²   Pulse ox interpretation: I interpret this pulse ox as normal.  Constitutional: Alert and oriented x 3, no " acute distress  HEENT: Atraumatic normocephalic, pupils are equal round reactive to light extraocular movements are intact. The nares is clear, external ears are normal, mouth shows moist mucous membranes  Neck: Supple, no JVD no tracheal deviation  Cardiovascular: Regular rate and rhythm no murmur rub or gallop 2+ pulses peripherally x4  Thorax & Lungs: Borderline tachypneic decreased air movement throughout with minimal wheeze the apices bilaterally.   GI: Soft nontender nondistended positive bowel sounds, no peritoneal signs  Skin: Warm dry no acute rash or lesion  Musculoskeletal: Moving all extremities with full range and 5 of 5 strength, no acute  deformity  Neurologic: Cranial nerves III through XII are grossly intact, no sensory deficit, no cerebellar dysfunction   Psychiatric: Appropriate affect for situation at this time      DIAGNOSTIC STUDIES / PROCEDURES  LABS      Results for orders placed or performed during the hospital encounter of 08/22/19   CBC with Differential   Result Value Ref Range    WBC 8.1 4.8 - 10.8 K/uL    RBC 4.52 4.20 - 5.40 M/uL    Hemoglobin 14.3 12.0 - 16.0 g/dL    Hematocrit 43.2 37.0 - 47.0 %    MCV 95.6 81.4 - 97.8 fL    MCH 31.6 27.0 - 33.0 pg    MCHC 33.1 (L) 33.6 - 35.0 g/dL    RDW 49.5 35.9 - 50.0 fL    Platelet Count 214 164 - 446 K/uL    MPV 10.7 9.0 - 12.9 fL    Neutrophils-Polys 61.90 44.00 - 72.00 %    Lymphocytes 22.20 22.00 - 41.00 %    Monocytes 8.40 0.00 - 13.40 %    Eosinophils 6.50 0.00 - 6.90 %    Basophils 0.60 0.00 - 1.80 %    Immature Granulocytes 0.40 0.00 - 0.90 %    Nucleated RBC 0.00 /100 WBC    Neutrophils (Absolute) 5.03 2.00 - 7.15 K/uL    Lymphs (Absolute) 1.80 1.00 - 4.80 K/uL    Monos (Absolute) 0.68 0.00 - 0.85 K/uL    Eos (Absolute) 0.53 (H) 0.00 - 0.51 K/uL    Baso (Absolute) 0.05 0.00 - 0.12 K/uL    Immature Granulocytes (abs) 0.03 0.00 - 0.11 K/uL    NRBC (Absolute) 0.00 K/uL   Complete Metabolic Panel (CMP)   Result Value Ref Range    Sodium 138  135 - 145 mmol/L    Potassium 4.0 3.6 - 5.5 mmol/L    Chloride 99 96 - 112 mmol/L    Co2 27 20 - 33 mmol/L    Anion Gap 12.0 (H) 0.0 - 11.9    Glucose 107 (H) 65 - 99 mg/dL    Bun 20 8 - 22 mg/dL    Creatinine 0.78 0.50 - 1.40 mg/dL    Calcium 10.0 8.4 - 10.2 mg/dL    AST(SGOT) 28 12 - 45 U/L    ALT(SGPT) 25 2 - 50 U/L    Alkaline Phosphatase 84 30 - 99 U/L    Total Bilirubin 0.7 0.1 - 1.5 mg/dL    Albumin 4.5 3.2 - 4.9 g/dL    Total Protein 7.7 6.0 - 8.2 g/dL    Globulin 3.2 1.9 - 3.5 g/dL    A-G Ratio 1.4 g/dL   Troponin   Result Value Ref Range    Troponin T 38 (H) 6 - 19 ng/L   ESTIMATED GFR   Result Value Ref Range    GFR If African American >60 >60 mL/min/1.73 m 2    GFR If Non African American >60 >60 mL/min/1.73 m 2   EKG   Result Value Ref Range    Report       [facility] Emergency [facility].    Test Date:  2019  Pt Name:    IGOR KLINE                    Department: [facility]  MRN:        [id number]                      Room:       3311  Gender:     Female                       Technician: HRS  :        [id number]                   Requested By:ER TRIAGE PROTOCOL  Order #:    [id number]                    Reading MD: NIKOLAI NICK MD    Measurements  Intervals                                Axis  Rate:       74                           P:          -21  AZ:         186                          QRS:        -46  QRSD:       100                          T:          80  QT:         416  QTc:        462    Interpretive Statements  Sinus rhythm  Atrial premature complexes in couplets  LAD, consider left anterior fascicular block  LVH with secondary repolarization abnormality  Baseline wander in lead(s) II,III,aVF  Compared to ECG 2019 11:28:21  Early repolarization now present  Left-axis deviation no longer present  ST  (T wave) deviation no longer present  Myocardial infarct finding no longer present    Electronically Signed On 2019 0:36:28 PDT by NIKOLAI NICK MD    "    *Note: Due to a large number of results and/or encounters for the requested time period, some results have not been displayed. A complete set of results can be found in Results Review.       All labs reviewed by me.      RADIOLOGY  DX-CHEST-PORTABLE (1 VIEW)   Final Result      1.  Persistently enlarged cardiac silhouette   2.  Atherosclerosis        The radiologist's interpretation of all radiological studies have been reviewed by me.    COURSE & MEDICAL DECISION MAKING  Pertinent Labs & Imaging studies reviewed. (See chart for details)    9:26 PM - Patient seen and examined at bedside.  Will place order for cardiac markers chest x-ray.  Patient will also be given dose of methylprednisolone and DuoNeb.  We will also treat blood pressure with hydralazine.      Patient noted to have slightly elevated blood pressure likely circumstantial secondary to presenting complaint. Referred to primary care physician for further evaluation.        Medical Decision Making: Blood pressure transiently showed vast improvement hours back on the rise patient does have slightly elevated troponin T.  Discussed at length with the patient as well as multiple family members inpatient evaluation versus outpatient evaluation this was also discussed between the patient and Chrissie.  Patient and family have made the decision to stay for further evaluation and treatment admitted in guarded condition.    BP (!) 189/89   Pulse (!) 101   Temp 37.2 °C (98.9 °F) (Temporal)   Resp (!) 24   Ht 1.6 m (5' 3\")   Wt 57.4 kg (126 lb 8.7 oz)   SpO2 93%   Breastfeeding? No   BMI 22.42 kg/m²             FINAL IMPRESSION  1.  Chest pain  2.  Hypertensive emergency      This dictation has been created using voice recognition software and/or scribes. The accuracy of the dictation is limited by the abilities of the software and the expertise of the scribes. I expect there may be some errors of grammar and possibly content. I made every attempt to " manually correct the errors within my dictation. However, errors related to voice recognition software and/or scribes may still exist and should be interpreted within the appropriate context.

## 2019-08-23 NOTE — CARE PLAN
Problem: Communication  Goal: The ability to communicate needs accurately and effectively will improve  Outcome: PROGRESSING AS EXPECTED  Note:   Allow time for patient to verbalize feelings and ask questions. Answer questions to best of ability. Update patient on plan of care.       Problem: Safety  Goal: Will remain free from injury  Outcome: PROGRESSING AS EXPECTED  Note:   Keep call light within reach. Ensure environment is clutter free. Have patient wear treaded socks.

## 2019-08-23 NOTE — H&P
Hospital Medicine History & Physical Note    Date of Service  8/22/2019    Primary Care Physician  Denis Krueger M.D.    Consultants  None    Code Status  Full    Chief Complaint  Chest pain    History of Presenting Illness  88 y.o. female who presented 8/22/2019 with chest pain and shortness of breath.  Patient was apparently fine whenever she went to bed last night.  Woke up this morning short of breath with some chest pressure.  Pressure is substernal, described as a nagging.  This information is primarily obtained from the son at bedside, it is very difficult to get information from the patient herself.  Initially because of this he went to urgent care, blood pressure was noted to be very high there so it was recommended to come to the emergency department.  Upon arrival, she was noted to be quite hypertensive.  Around 15 minutes after arriving she had worse substernal chest pain that seem to radiate to her left upper quadrant.  She denied any fever or chills.  She does have some chronic shortness of breath.  She did complain of some new congestion.  I did discuss the case with the son bedside as well as the daughter via phone, they were concerned about patient being admitted since last time she did require sedation and restraints due to confusion at night.  I did discuss the case including labs and imaging with the ER physician    Review of Systems  Review of Systems   Constitutional: Negative for chills, fever and malaise/fatigue.   HENT: Positive for congestion.    Respiratory: Positive for cough and shortness of breath. Negative for sputum production and stridor.    Cardiovascular: Positive for chest pain. Negative for palpitations and leg swelling.   Gastrointestinal: Negative for abdominal pain, constipation, diarrhea, nausea and vomiting.   Genitourinary: Negative for dysuria and urgency.   Musculoskeletal: Negative for falls and myalgias.   Neurological: Negative for dizziness, tingling, loss of  consciousness, weakness and headaches.   Psychiatric/Behavioral: Negative for depression and suicidal ideas.   All other systems reviewed and are negative.      Past Medical History   has a past medical history of Anesthesia, Aortic regurgitation (4/25/2012), Arrhythmia (7/10), Arthritis, Atypical chest pain (4/25/2012), CATARACT (2007,08), Chronic anticoagulation (4/25/2012), Constipation (4/25/2012), COPD (chronic obstructive pulmonary disease) (HCC), DDD (degenerative disc disease), Dental disorder, Dyspnea (4/25/2012), Generalized osteoarthritis (4/25/2012), Headache(784.0) (4/25/2012), Heart valve insufficiency, Hypercholesteremia, Hyperlipidemia (4/25/2012), Hypertension, MEDICAL HOME, On home oxygen therapy, Osteoarthritis of knee (4/25/2012), Osteoporosis, Other accident, Pain, Pneumonia (6/4), TB lung, 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    Surgical History   has a past surgical history that includes cataract phaco with iol (1/5/2009); other orthopedic surgery (may, 2010); carpal tunnel release (@30 yrs ago); and recovery (7/28/2010).     Family History  family history includes Diabetes in her brother; Heart Disease in her father.     Social History   reports that she quit smoking about 35 years ago. Her smoking use included cigarettes. She has a 37.50 pack-year smoking history. She has never used smokeless tobacco. She reports that she does not drink alcohol or use drugs.    Allergies  Allergies   Allergen Reactions   • Codeine Vomiting       Medications  Prior to Admission Medications   Prescriptions Last Dose Informant Patient Reported? Taking?   Fluticasone-Umeclidin-Vilant (TRELEGY ELLIPTA) 100-62.5-25 MCG/INH AEROSOL POWDER, BREATH ACTIVATED   No No   Sig: Inhale 1 Puff by mouth every day.   TRELEGY ELLIPTA 100-62.5-25 MCG/INH AEROSOL POWDER, BREATH ACTIVATED   No No   Sig: INHALE 1 PUFF ONCE DAILY (RINSE  MOUTH  AFTER  USE)   Patient not  taking: Reported on 5/29/2019   albuterol (PROVENTIL) 2.5mg/3ml Nebu Soln solution for nebulization   No No   Sig: 3 mL by Nebulization route every four hours as needed for Shortness of Breath.   apixaban (ELIQUIS) 2.5mg Tab   No No   Sig: Take 1 Tab by mouth 2 Times a Day.   celecoxib (CELEBREX) 100 MG Cap   No No   Sig: Take 1 Cap by mouth 2 times a day.   gabapentin (NEURONTIN) 100 MG Cap   No No   Sig: Take 1 Cap by mouth every evening.   metoprolol (LOPRESSOR) 25 MG Tab   No No   Sig: Take 1 Tab by mouth 2 times a day.      Facility-Administered Medications: None       Physical Exam  Temp:  [37.2 °C (98.9 °F)] 37.2 °C (98.9 °F)  Pulse:  [] 101  Resp:  [18-34] 24  BP: (158-222)/() 189/89  SpO2:  [92 %-95 %] 93 %    Physical Exam   Constitutional: She is oriented to person, place, and time. She appears well-developed. She is cooperative. No distress.   HENT:   Head: Normocephalic and atraumatic. Not macrocephalic and not microcephalic. Head is without raccoon's eyes and without Sanchez's sign.   Right Ear: External ear normal.   Left Ear: External ear normal.   Mouth/Throat: Oropharynx is clear and moist. No oropharyngeal exudate.   Eyes: Conjunctivae are normal. Right eye exhibits no discharge. Left eye exhibits no discharge. No scleral icterus.   Neck: Neck supple. No tracheal deviation present.   Cardiovascular: Normal rate, regular rhythm and intact distal pulses. Exam reveals no gallop, no distant heart sounds and no friction rub.   No murmur heard.  Pulmonary/Chest: Effort normal and breath sounds normal. No accessory muscle usage or stridor. No tachypnea and no bradypnea. No respiratory distress. She has no decreased breath sounds. She has no wheezes. She has no rhonchi. She has no rales. She exhibits no tenderness.   Abdominal: Soft. Bowel sounds are normal. She exhibits no distension. There is no hepatosplenomegaly, splenomegaly or hepatomegaly. There is no tenderness. There is no rebound and  no guarding.   Musculoskeletal: Normal range of motion. She exhibits no edema or tenderness.   Lymphadenopathy:     She has no cervical adenopathy.   Neurological: She is alert and oriented to person, place, and time. No cranial nerve deficit. She displays no seizure activity.   Some confusion    Skin: Skin is warm, dry and intact. No rash noted. She is not diaphoretic. No erythema. No pallor.   Psychiatric: Her speech is delayed. She is slowed. Cognition and memory are impaired.   Nursing note and vitals reviewed.      Laboratory:  Recent Labs     08/22/19 2051   WBC 8.1   RBC 4.52   HEMOGLOBIN 14.3   HEMATOCRIT 43.2   MCV 95.6   MCH 31.6   MCHC 33.1*   RDW 49.5   PLATELETCT 214   MPV 10.7     Recent Labs     08/22/19 2051   SODIUM 138   POTASSIUM 4.0   CHLORIDE 99   CO2 27   GLUCOSE 107*   BUN 20   CREATININE 0.78   CALCIUM 10.0     Recent Labs     08/22/19 2051   ALTSGPT 25   ASTSGOT 28   ALKPHOSPHAT 84   TBILIRUBIN 0.7   GLUCOSE 107*         No results for input(s): NTPROBNP in the last 72 hours.      Recent Labs     08/22/19 2051   TROPONINT 38*       Urinalysis:    No results found     Imaging:  DX-CHEST-PORTABLE (1 VIEW)   Final Result      1.  Persistently enlarged cardiac silhouette   2.  Atherosclerosis      EC-ECHOCARDIOGRAM LTD W/O CONT    (Results Pending)         Assessment/Plan:  I anticipate this patient is appropriate for observation status at this time.    * Chest pain- (present on admission)  Assessment & Plan  -It was difficult to figure out exactly what kind of chest pain she was having  -Likely due to hypertensive emergency  -I did personally review her chest x-ray, noted no acute cardiopulmonary process, changes of COPD  -I did discuss the plan for quite some time with the family, they do not think she would tolerate a stress test  -I will trend troponin and obtain an echocardiogram  -Patient is a high risk for an event and does require close monitoring  -I did personally review her EKG,  noted sinus rhythm with no ST changes    Hypertensive emergency- (present on admission)  Assessment & Plan  -Profound elevation with mild troponin increase  -Continue to trend troponin  -Obtain echocardiogram  -Continue home metoprolol and add lisinopril  -Start PRN enalapril as well as labetalol  -Adjust as needed    Dementia- (present on admission)  Assessment & Plan  -She does not carry an official diagnosis but I do think there is a component of dementia but this  -Last time she was here, she became very confused at night and required restraints  -Family is concerned about us using restraints again, we will certainly try to avoid it unless it becomes a safety issue  -PRN Haldol    COPD (chronic obstructive pulmonary disease) ([facility])- (present on admission)  Assessment & Plan  -No acute exacerbation  -Does complain of some shortness of breath, start respiratory care per protocol    PAF (paroxysmal atrial fibrillation) ([facility])- (present on admission)  Assessment & Plan  -Currently in sinus rhythm  -Continue home metoprolol and Eliquis  -Monitor on telemetry      VTE prophylaxis: Eliquis

## 2019-08-23 NOTE — PROGRESS NOTES
"Subjective:      Venus Church is a 88 y.o. female who presents with Chest Pain (Since today AM, mid sternal pain. )    Reviewed past medical, surgical and family history. Reviewed prescription and OTC medications with patient in electronic health record today.     Allergies   Allergen Reactions   • Codeine Vomiting         EMERGENT triage into exam room.     Chest Pain    This is a new problem. The current episode started today. The onset quality is sudden. The problem has been unchanged. Pain scale: \"above a 5  but not a 10\" The pain is moderate. The quality of the pain is described as dull (pressure feeling). The pain does not radiate. Pertinent negatives include no cough, diaphoresis, fever, nausea, shortness of breath or vomiting. The pain is aggravated by nothing. She has tried nothing for the symptoms.       Review of Systems   Unable to perform ROS: Dementia   Constitutional: Negative for chills, diaphoresis and fever.   HENT: Negative for congestion.    Respiratory: Negative for cough and shortness of breath.    Cardiovascular: Positive for chest pain. Negative for leg swelling.   Gastrointestinal: Negative for nausea and vomiting.   Musculoskeletal: Negative for myalgias and neck pain.   Psychiatric/Behavioral: Negative for substance abuse.          Objective:     BP (!) 168/98   Pulse 76   Temp 37.3 °C (99.1 °F) (Temporal)   Resp 16   Ht 1.575 m (5' 2\")   Wt 59 kg (130 lb)   SpO2 90%   BMI 23.78 kg/m²      Physical Exam   Constitutional: Vital signs are normal. She appears well-developed and well-nourished. She is cooperative.  Non-toxic appearance. She does not appear ill. No distress.   HENT:   Head: Normocephalic.   Eyes: Pupils are equal, round, and reactive to light.   Neck: Normal range of motion.   Cardiovascular: Normal rate, regular rhythm and normal pulses. PMI is not displaced.   Murmur heard.  Varicose veins bilaterally    Pulmonary/Chest: Effort normal and breath sounds normal. She " exhibits no mass, no tenderness, no laceration, no crepitus, no edema, no deformity, no swelling and no retraction.       Abdominal: Soft. Normal appearance. There is no tenderness. There is no rigidity and no CVA tenderness.   Neurological: She is alert. She has normal strength. No cranial nerve deficit or sensory deficit. Gait normal.   Skin: Skin is warm. Capillary refill takes less than 2 seconds.   Psychiatric: She has a normal mood and affect. Her speech is normal and behavior is normal. Judgment and thought content normal. Cognition and memory are impaired.   Nursing note and vitals reviewed.         EKG Interpretation    Interpreted by me    Rhythm: normal sinus   Rate: normal  Axis: left  Ectopy: none  Conduction: normal  ST Segments: no acute change  T Waves: no acute change  Q Waves: nonspecific    Clinical Impression: non-specific EKG         Assessment/Plan:     1. Precordial pain         To ER for further evalution and management. She requires higher level of care. To be transported POV by her son. Report called to Dr. Penaloza at [facility] ER . Declines ER transportation to ER.

## 2019-08-23 NOTE — ASSESSMENT & PLAN NOTE
-It was difficult to figure out exactly what kind of chest pain she was having  -Likely due to hypertensive emergency  -I did personally review her chest x-ray, noted no acute cardiopulmonary process, changes of COPD  -I did discuss the plan for quite some time with the family, they do not think she would tolerate a stress test  -I will trend troponin and obtain an echocardiogram  -Patient is a high risk for an event and does require close monitoring  -I did personally review her EKG, noted sinus rhythm with no ST changes

## 2019-08-23 NOTE — PROGRESS NOTES
Report received from JOSE Alonso. Patient resting comfortably in bed. Declines any needs at this time. Call light in reach. Bed alarm on.

## 2019-08-23 NOTE — ASSESSMENT & PLAN NOTE
-No acute exacerbation  -Does complain of some shortness of breath, start respiratory care per protocol

## 2019-08-23 NOTE — PROGRESS NOTES
Telemetry Shift Summary    Rhythm 2 to 1 Aflutter, converted to Afib @ 0058 w/ RVR, converted to SR @ 0555  HR Range 115-160s, high: 182 @ 0115  Ectopy RPVC, FPAC  Measurements   0049: 0.20/0.10/0.38  0058: -/0.10/-   0555: 0.18/0.12/0.40      Normal Values  Rhythm SR  HR Range    Measurements 0.12-0.20 / 0.06-0.10  / 0.30-0.52

## 2019-08-23 NOTE — DISCHARGE INSTRUCTIONS
"  Discharge Instructions per Alberto Gee M.D.    See your PCP   Check blood pressure 3x per week, if systolic blood pressure is lower than 100 (top number) for more than a week stop lisinopril and contact primary care physician.     DIET: as before    ACTIVITY: as tolerated    DIAGNOSIS: hypertensive urgency    Return to ER if symptoms return      Managing Your Hypertension  Hypertension is commonly called high blood pressure. Blood pressure is a measurement of how strongly your blood is pressing against the walls of your arteries. Arteries are blood vessels that carry blood from your heart throughout your body. Blood pressure does not stay the same. It rises when you are active, excited, or nervous. It lowers when you are sleeping or relaxed.  If the numbers that measure your blood pressure stay above normal most of the time, you are at risk for health problems. Hypertension is a long-term (chronic) condition in which blood pressure is elevated. This condition often has no signs or symptoms. The cause of the condition is usually not known.  What are blood pressure readings?  A blood pressure reading is recorded as two numbers, such as \"120 over 80\" (or 120/80). The first (\"top\") number is called the systolic pressure. It is a measure of the pressure in your arteries as the heart beats. The second (\"bottom\") number is called the diastolic pressure. It is a measure of the pressure in your arteries as the heart relaxes between beats.  What does my blood pressure reading mean?  Blood pressure is classified into four stages. Based on your blood pressure reading, your health care provider may use the following stages to determine what type of treatment, if any, is needed. Systolic pressure and diastolic pressure are measured in a unit called mm Hg.  Normal  · Systolic pressure: below 120.  · Diastolic pressure: below 80.  Prehypertension  · Systolic pressure: 120-139.  · Diastolic pressure: 80-89.  Hypertension " stage 1  · Systolic pressure: 140-159.  · Diastolic pressure: 90-99.  Hypertension stage 2  · Systolic pressure: 160 or above.  · Diastolic pressure: 100 or above.  What health risks are associated with hypertension?  Managing your hypertension is an important responsibility. Uncontrolled hypertension can lead to:  · A heart attack.  · A stroke.  · A weakened blood vessel (aneurysm).  · Heart failure.  · Kidney damage.  · Eye damage.  · Metabolic syndrome.  · Memory and concentration problems.  What changes can I make to manage my hypertension?  Hypertension can be managed effectively by making lifestyle changes and possibly by taking medicines. Your health care provider will help you come up with a plan to bring your blood pressure within a normal range. Your plan should include the following:  Monitoring  · Monitor your blood pressure at home as told by your health care provider. Your personal target blood pressure may vary depending on your medical conditions, your age, and other factors.  · Have your blood pressure rechecked as told by your health care provider.  Lifestyle  · Lose weight if necessary.  · Get at least 30-45 minutes of aerobic exercise at least 4 times a week.  · Do not use any products that contain nicotine or tobacco, such as cigarettes and e-cigarettes. If you need help quitting, ask your health care provider.  · Learn ways to reduce stress.  · Control any chronic conditions, such as high cholesterol or diabetes.  Eating and drinking  · Follow the DASH diet. This diet is high in fruits, vegetables, and whole grains. It is low in salt, red meat, and added sugars.  · Keep your sodium intake below 2,300 mg per day.  · Limit alcoholic beverages.  Communication  · Review all the medicines you take with your health care provider because there may be side effects or interactions.  · Talk with your health care provider about your diet, exercise habits, and other lifestyle factors that may be  contributing to hypertension.  · See your health care provider regularly. Your health care provider can help you create and adjust your plan for managing hypertension.  Will I need medicine to control my blood pressure?  Your health care provider may prescribe medicine if lifestyle changes are not enough to get your blood pressure under control, and if one of the following is true:  · You are 18-59 years of age, and your systolic blood pressure is 140 or higher.  · You are 60 years of age or older, and your systolic blood pressure is 150 or higher.  · Your diastolic blood pressure is 90 or higher.  · You have diabetes, and your systolic blood pressure is over 140 or your diastolic blood pressure is over 90.  · You have kidney disease, and your blood pressure is above 140/90.  · You have heart disease or a history of stroke, and your blood pressure is 140/90 or higher.  Take medicines only as told by your health care provider. Follow the directions carefully. Blood pressure medicines must be taken as prescribed. The medicine does not work as well when you skip doses. Skipping doses also puts you at risk for problems.  Contact a health care provider if:  · You think you are having a reaction to medicines you have taken.  · You have repeated (recurrent) headaches.  · You feel dizzy.  · You have swelling in your ankles.  · You have trouble with your vision.  Get help right away if:  · You develop a severe headache or confusion.  · You have unusual weakness or numbness, or you feel faint.  · You have severe pain in your chest or abdomen.  · You vomit repeatedly.  · You have trouble breathing.  This information is not intended to replace advice given to you by your health care provider. Make sure you discuss any questions you have with your health care provider.  Document Released: 09/11/2013 Document Revised: 08/22/2017 Document Reviewed: 03/17/2017  Elsevier Interactive Patient Education © 2017 [employer]    Atrial  Fibrillation  Atrial fibrillation is a type of irregular or rapid heartbeat (arrhythmia). In atrial fibrillation, the heart quivers continuously in a chaotic pattern. This occurs when parts of the heart receive disorganized signals that make the heart unable to pump blood normally. This can increase the risk for stroke, heart failure, and other heart-related conditions. There are different types of atrial fibrillation, including:  · Paroxysmal atrial fibrillation. This type starts suddenly, and it usually stops on its own shortly after it starts.  · Persistent atrial fibrillation. This type often lasts longer than a week. It may stop on its own or with treatment.  · Long-lasting persistent atrial fibrillation. This type lasts longer than 12 months.  · Permanent atrial fibrillation. This type does not go away.  Talk with your health care provider to learn about the type of atrial fibrillation that you have.  What are the causes?  This condition is caused by some heart-related conditions or procedures, including:  · A heart attack.  · Coronary artery disease.  · Heart failure.  · Heart valve conditions.  · High blood pressure.  · Inflammation of the sac that surrounds the heart (pericarditis).  · Heart surgery.  · Certain heart rhythm disorders, such as Patel-Parkinson-White syndrome.  Other causes include:  · Pneumonia.  · Obstructive sleep apnea.  · Blockage of an artery in the lungs (pulmonary embolism, or PE).  · Lung cancer.  · Chronic lung disease.  · Thyroid problems, especially if the thyroid is overactive (hyperthyroidism).  · Caffeine.  · Excessive alcohol use or illegal drug use.  · Use of some medicines, including certain decongestants and diet pills.  Sometimes, the cause cannot be found.  What increases the risk?  This condition is more likely to develop in:  · People who are older in age.  · People who smoke.  · People who have diabetes mellitus.  · People who are overweight (obese).  · Athletes who  exercise vigorously.  What are the signs or symptoms?  Symptoms of this condition include:  · A feeling that your heart is beating rapidly or irregularly.  · A feeling of discomfort or pain in your chest.  · Shortness of breath.  · Sudden light-headedness or weakness.  · Getting tired easily during exercise.  In some cases, there are no symptoms.  How is this diagnosed?  Your health care provider may be able to detect atrial fibrillation when taking your pulse. If detected, this condition may be diagnosed with:  · An electrocardiogram (ECG).  · A Holter monitor test that records your heartbeat patterns over a 24-hour period.  · Transthoracic echocardiogram (TTE) to evaluate how blood flows through your heart.  · Transesophageal echocardiogram (ELIEL) to view more detailed images of your heart.  · A stress test.  · Imaging tests, such as a CT scan or chest X-ray.  · Blood tests.  How is this treated?  The main goals of treatment are to prevent blood clots from forming and to keep your heart beating at a normal rate and rhythm. The type of treatment that you receive depends on many factors, such as your underlying medical conditions and how you feel when you are experiencing atrial fibrillation.  This condition may be treated with:  · Medicine to slow down the heart rate, bring the heart’s rhythm back to normal, or prevent clots from forming.  · Electrical cardioversion. This is a procedure that resets your heart’s rhythm by delivering a controlled, low-energy shock to the heart through your skin.  · Different types of ablation, such as catheter ablation, catheter ablation with pacemaker, or surgical ablation. These procedures destroy the heart tissues that send abnormal signals. When the pacemaker is used, it is placed under your skin to help your heart beat in a regular rhythm.  Follow these instructions at home:  · Take over-the counter and prescription medicines only as told by your health care provider.  · If your  health care provider prescribed a blood-thinning medicine (anticoagulant), take it exactly as told. Taking too much blood-thinning medicine can cause bleeding. If you do not take enough blood-thinning medicine, you will not have the protection that you need against stroke and other problems.  · Do not use tobacco products, including cigarettes, chewing tobacco, and e-cigarettes. If you need help quitting, ask your health care provider.  · If you have obstructive sleep apnea, manage your condition as told by your health care provider.  · Do not drink alcohol.  · Do not drink beverages that contain caffeine, such as coffee, soda, and tea.  · Maintain a healthy weight. Do not use diet pills unless your health care provider approves. Diet pills may make heart problems worse.  · Follow diet instructions as told by your health care provider.  · Exercise regularly as told by your health care provider.  · Keep all follow-up visits as told by your health care provider. This is important.  How is this prevented?  · Avoid drinking beverages that contain caffeine or alcohol.  · Avoid certain medicines, especially medicines that are used for breathing problems.  · Avoid certain herbs and herbal medicines, such as those that contain ephedra or ginseng.  · Do not use illegal drugs, such as cocaine and amphetamines.  · Do not smoke.  · Manage your high blood pressure.  Contact a health care provider if:  · You notice a change in the rate, rhythm, or strength of your heartbeat.  · You are taking an anticoagulant and you notice increased bruising.  · You tire more easily when you exercise or exert yourself.  Get help right away if:  · You have chest pain, abdominal pain, sweating, or weakness.  · You feel nauseous.  · You notice blood in your vomit, bowel movement, or urine.  · You have shortness of breath.  · You suddenly have swollen feet and ankles.  · You feel dizzy.  · You have sudden weakness or numbness of the face, arm, or  leg, especially on one side of the body.  · You have trouble speaking, trouble understanding, or both (aphasia).  · Your face or your eyelid droops on one side.  These symptoms may represent a serious problem that is an emergency. Do not wait to see if the symptoms will go away. Get medical help right away. Call your local emergency services (911 in the [place] Do not drive yourself to the hospital.   This information is not intended to replace advice given to you by your health care provider. Make sure you discuss any questions you have with your health care provider.  Document Released: 12/18/2006 Document Revised: 04/26/2017 Document Reviewed: 04/13/2016  [employer] Interactive Patient Education © 2017 [employer] Inc.    Lisinopril tablets  What is this medicine?  LISINOPRIL (lyse IN oh pril) is an ACE inhibitor. This medicine is used to treat high blood pressure and heart failure. It is also used to protect the heart immediately after a heart attack.  This medicine may be used for other purposes; ask your health care provider or pharmacist if you have questions.  COMMON BRAND NAME(S): Prinivil, Zestril  What should I tell my health care provider before I take this medicine?  They need to know if you have any of these conditions:  -diabetes  -heart or blood vessel disease  -kidney disease  -low blood pressure  -previous swelling of the tongue, face, or lips with difficulty breathing, difficulty swallowing, hoarseness, or tightening of the throat  -an unusual or allergic reaction to lisinopril, other ACE inhibitors, insect venom, foods, dyes, or preservatives  -pregnant or trying to get pregnant  -breast-feeding  How should I use this medicine?  Take this medicine by mouth with a glass of water. Follow the directions on your prescription label. You may take this medicine with or without food. If it upsets your stomach, take it with food. Take your medicine at regular intervals. Do not take it more often than directed.  Do not stop taking except on your doctor's advice.  Talk to your pediatrician regarding the use of this medicine in children. Special care may be needed. While this drug may be prescribed for children as young as 6 years of age for selected conditions, precautions do apply.  Overdosage: If you think you have taken too much of this medicine contact a poison control center or emergency room at once.  NOTE: This medicine is only for you. Do not share this medicine with others.  What if I miss a dose?  If you miss a dose, take it as soon as you can. If it is almost time for your next dose, take only that dose. Do not take double or extra doses.  What may interact with this medicine?  Do not take this medicine with any of the following medications:  -hymenoptera venom  -sacubitril; valsartan  This medicines may also interact with the following medications:  -aliskiren  -angiotensin receptor blockers, like losartan or valsartan  -certain medicines for diabetes  -diuretics  -everolimus  -gold compounds  -lithium  -NSAIDs, medicines for pain and inflammation, like ibuprofen or naproxen  -potassium salts or supplements  -salt substitutes  -sirolimus  -temsirolimus  This list may not describe all possible interactions. Give your health care provider a list of all the medicines, herbs, non-prescription drugs, or dietary supplements you use. Also tell them if you smoke, drink alcohol, or use illegal drugs. Some items may interact with your medicine.  What should I watch for while using this medicine?  Visit your doctor or health care professional for regular check ups. Check your blood pressure as directed. Ask your doctor what your blood pressure should be, and when you should contact him or her.  Do not treat yourself for coughs, colds, or pain while you are using this medicine without asking your doctor or health care professional for advice. Some ingredients may increase your blood pressure.  Women should inform their  doctor if they wish to become pregnant or think they might be pregnant. There is a potential for serious side effects to an unborn child. Talk to your health care professional or pharmacist for more information.  Check with your doctor or health care professional if you get an attack of severe diarrhea, nausea and vomiting, or if you sweat a lot. The loss of too much body fluid can make it dangerous for you to take this medicine.  You may get drowsy or dizzy. Do not drive, use machinery, or do anything that needs mental alertness until you know how this drug affects you. Do not stand or sit up quickly, especially if you are an older patient. This reduces the risk of dizzy or fainting spells. Alcohol can make you more drowsy and dizzy. Avoid alcoholic drinks.  Avoid salt substitutes unless you are told otherwise by your doctor or health care professional.  What side effects may I notice from receiving this medicine?  Side effects that you should report to your doctor or health care professional as soon as possible:  -allergic reactions like skin rash, itching or hives, swelling of the hands, feet, face, lips, throat, or tongue  -breathing problems  -signs and symptoms of kidney injury like trouble passing urine or change in the amount of urine  -signs and symptoms of increased potassium like muscle weakness; chest pain; or fast, irregular heartbeat  -signs and symptoms of liver injury like dark yellow or brown urine; general ill feeling or flu-like symptoms; light-colored stools; loss of appetite; nausea; right upper belly pain; unusually weak or tired; yellowing of the eyes or skin  -signs and symptoms of low blood pressure like dizziness; feeling faint or lightheaded, falls; unusually weak or tired  -stomach pain with or without nausea and vomiting  Side effects that usually do not require medical attention (report to your doctor or health care professional if they continue or are bothersome):  -changes in  taste  -cough  -dizziness  -fever  -headache  -sensitivity to light  This list may not describe all possible side effects. Call your doctor for medical advice about side effects. You may report side effects to FDA at 6-160-ZFW-9435.  Where should I keep my medicine?  Keep out of the reach of children.  Store at room temperature between 15 and 30 degrees C (59 and 86 degrees F). Protect from moisture. Keep container tightly closed. Throw away any unused medicine after the expiration date.  NOTE: This sheet is a summary. It may not cover all possible information. If you have questions about this medicine, talk to your doctor, pharmacist, or health care provider.  © 2018 Elsevier/[employer] (2017-02-06 12:52:35)      Discharge Instructions    Discharged to home by car with relative. Discharged via wheelchair, hospital escort: Yes.  Special equipment needed: Not Applicable    Be sure to schedule a follow-up appointment with your primary care doctor or any specialists as instructed.     Discharge Plan:   Diet Plan: Discussed  Activity Level: Discussed  Confirmed Follow up Appointment: Appointment Scheduled  Confirmed Symptoms Management: Discussed  Medication Reconciliation Updated: Yes  Influenza Vaccine Indication: Indicated: Not available from distributor/    I understand that a diet low in cholesterol, fat, and sodium is recommended for good health. Unless I have been given specific instructions below for another diet, I accept this instruction as my diet prescription.   Other diet: regular/low sodium    Special Instructions: None    · Is patient discharged on Warfarin / Coumadin?   No     Depression / Suicide Risk    As [facility] are [facility], it is important to learn how to keep safe from harming yourself.    Recognize the warning signs:  · Abrupt changes in personality, positive or negative- including increase in energy   · Giving away possessions  · Change in eating  patterns- significant weight changes-  positive or negative  · Change in sleeping patterns- unable to sleep or sleeping all the time   · Unwillingness or inability to communicate  · Depression  · Unusual sadness, discouragement and loneliness  · Talk of wanting to die  · Neglect of personal appearance   · Rebelliousness- reckless behavior  · Withdrawal from people/activities they love  · Confusion- inability to concentrate     If you or a loved one observes any of these behaviors or has concerns about self-harm, here's what you can do:  · Talk about it- your feelings and reasons for harming yourself  · Remove any means that you might use to hurt yourself (examples: pills, rope, extension cords, firearm)  · Get professional help from the community (Mental Health, Substance Abuse, psychological counseling)  · Do not be alone:Call your Safe Contact- someone whom you trust who will be there for you.  · Call your local CRISIS HOTLINE 686-7646 or 391-966-8782  · Call your local Children's Mobile Crisis Response Team [place] (228) 520-5666 or www.[employer]  · Call the toll free National Suicide Prevention Hotlines   · National Suicide Prevention Lifeline 727-724-NVTQ (7724)  · National Hope Line Network 800-SUICIDE (446-7350)

## 2019-08-23 NOTE — PROGRESS NOTES
0159: MD Dickens notified of patient's conversion to Afib. MD to put in new orders.    0246: Patient given diltiazem per MAR for sustaining HR of 150s while in Afib.     0345: Patient's HR 110s-120s. Will continue to monitor.

## 2019-08-23 NOTE — PROGRESS NOTES
Report given to Viola GARCIA. Plan of care discussed. Safety precautions in place. Son at bedside, updated son on plan of care.

## 2019-08-23 NOTE — DISCHARGE PLANNING
Discharge order written. IV removed, patient tolerated well. Tele removed and returned to monitor tech. Belongings gathered. Pt states that all personal belongings are in possession. AVS printed, reviewed and copy signed and placed on the chart. Patient has no further questions. Prescriptions sent to pharmacy. Discharged in satisfactory condition home with son. Pt off unit via wheelchair, escorted by transport staff.    Home inhaler left here in pharmacy. Son Dileep notified. He will come get it later today.

## 2019-08-23 NOTE — FLOWSHEET NOTE
08/22/19 2138   Interdisciplinary Plan of Care-Goals (Indications)   Bronchodilator Indications History / Diagnosis   Interdisciplinary Plan of Care-Outcomes    Bronchodilator Outcome Patient at Stable Baseline   Education   Education Yes - Pt. / Family has been Instructed in use of Respiratory Equipment;Yes - Pt. / Family has been Instructed in use of Respiratory Medications and Adverse Reactions   RT Assessment of Delivered Medications   Evaluation of Medication Delivery Daily Yes-- Pt /Family has been Instructed in use of Respiratory Medications and Adverse Reactions   SVN Group   #SVN Performed Yes   Given By: Mouthpiece   Date SVN Last Changed 08/22/19   Date SVN Next Change Due (Q 7 Days) 08/29/19   Respiratory WDL   Respiratory (WDL) X   Chest Exam   Respiration (!) 34   Pulse (!) 101   Breath Sounds   Pre/Post Intervention Pre Intervention Assessment   RUL Breath Sounds Diminished   RML Breath Sounds Diminished   RLL Breath Sounds Diminished   CHARITY Breath Sounds Diminished   LLL Breath Sounds Diminished   Oximetry   #Pulse Oximetry (Single Determination) Yes   Oxygen   Home O2 Use Prior To Admission? Yes   Home O2 LPM Flow 2 LPM   Home O2 Delivery Method Nasal Cannula   Home O2 Frequency of Use At Sleep   Pulse Oximetry 93 %   O2 Daily Delivery Respiratory  Room Air with O2 Available       Improved post treatment.

## 2019-08-23 NOTE — FLOWSHEET NOTE
08/23/19 0746   Events/Summary/Plan   Events/Summary/Plan Tx given   Non-Invasive Resp Device Site Inspection Completed Intact   Interdisciplinary Plan of Care-Goals (Indications)   Bronchodilator Indications History / Diagnosis   Interdisciplinary Plan of Care-Outcomes    Bronchodilator Outcome Patient at Stable Baseline   Education   Education Yes - Pt. / Family has been Instructed in use of Respiratory Medications and Adverse Reactions;Yes - Pt. / Family has been Instructed in use of Respiratory Equipment   RT Assessment of Delivered Medications   Evaluation of Medication Delivery Daily Yes-- Pt /Family has been Instructed in use of Respiratory Medications and Adverse Reactions   SVN Group   #SVN Performed Yes   Given By: Mouthpiece   Respiratory WDL   Respiratory (WDL) X   Chest Exam   Respiration 18   Pulse 76   Breath Sounds   Pre/Post Intervention Post Intervention Assessment   RUL Breath Sounds Clear;Diminished   RML Breath Sounds Diminished   RLL Breath Sounds Diminished   CHARITY Breath Sounds Clear;Diminished   LLL Breath Sounds Diminished   Oximetry   #Pulse Oximetry (Single Determination) Yes   Oxygen   Pulse Oximetry 96 %   O2 (LPM) 2   O2 Daily Delivery Respiratory  Silicone Nasal Cannula

## 2019-08-23 NOTE — ASSESSMENT & PLAN NOTE
-Profound elevation with mild troponin increase  -Continue to trend troponin  -Obtain echocardiogram  -Continue home metoprolol and add lisinopril  -Start PRN enalapril as well as labetalol  -Adjust as needed

## 2019-08-24 NOTE — PROGRESS NOTES
Telemetry Shift Summary    Rhythm SR  HR Range 70-80s  Ectopy Rare PACs  Measurements .20/.10/.42        Normal Values  Rhythm SR  HR Range    Measurements 0.12-0.20 / 0.06-0.10  / 0.30-0.52

## 2019-08-26 ENCOUNTER — TELEPHONE (OUTPATIENT)
Dept: CARDIOLOGY | Facility: MEDICAL CENTER | Age: 84
End: 2019-08-26

## 2019-08-26 ENCOUNTER — PATIENT OUTREACH (OUTPATIENT)
Dept: HEALTH INFORMATION MANAGEMENT | Facility: OTHER | Age: 84
End: 2019-08-26

## 2019-08-26 NOTE — TELEPHONE ENCOUNTER
Returned Ivana's call. She reports that pt was admitted through the ER on 8/22/19, and at discharge metoprolol was increased to 25mg BID and lisinopril 10mg daily was added. Since March pt has been taking metoprolol 25mg daily. Ivana thinks a physician told pt to decrease her metoprolol to once a day in March, though MAR does not reflect this. BP first thing in the AM has been in the 170s/80s, and one hour later is down to 130s-140s/70s, but is back up to the 170s by the afternoon. HR 70s. Pt denies symptoms.    To RS

## 2019-08-26 NOTE — TELEPHONE ENCOUNTER
RS    Pt's daughter Ivana left a voicemail re: high BP. She'd like a call back to discuss at [phone]

## 2019-08-28 ENCOUNTER — TELEPHONE (OUTPATIENT)
Dept: MEDICAL GROUP | Facility: MEDICAL CENTER | Age: 84
End: 2019-08-28

## 2019-08-28 NOTE — TELEPHONE ENCOUNTER
ESTABLISHED PATIENT PRE-VISIT PLANNING     Patient was contacted to complete PVP.     Note: Patient will not be contacted if there is no indication to call.     1.  Reviewed notes from the last few office visits within the medical group: Yes    2.  If any orders were placed at last visit or intended to be done for this visit (i.e. 6 mos follow-up), do we have Results/Consult Notes?        •  Labs - Labs ordered, completed on 8/23/19 and results are in chart.   Note: If patient appointment is for lab review and patient did not complete labs, check with provider if OK to reschedule patient until labs completed.       •  Imaging - Imaging ordered, completed and results are in chart.       •  Referrals - Referral ordered, patient has NOT been seen.    3. Is this appointment scheduled as a Hospital Follow-Up? Yes, visit was at [facility].     4.  Immunizations were updated in [facility] using WebIZ?: Yes       •  Web Iz Recommendations: HEPATITIS B and SHINGRIX (Shingles)    5.  Patient is due for the following Health Maintenance Topics:   Health Maintenance Due   Topic Date Due   • PFT SCREENING-FEV1 AND FEV/FVC RATIO / SPIROMETRY SHOULD BE PERFORMED ANNUALLY  04/22/1949   • IMM HEP B VACCINE (1 of 3 - Risk 3-dose series) 04/22/1950   • IMM ZOSTER VACCINES (2 of 3) 11/27/2012   • BONE DENSITY  06/21/2018   • Annual Wellness Visit  04/11/2019       - Patient already has appointment scheduled for Annual Wellness Visit (AWV). Declines DEXA scan at this time    6. Orders for overdue Health Maintenance topics pended in Pre-Charting? NO    7.  AHA (MDX) form printed for Provider? No, already completed    8.  Patient was informed to arrive 15 min prior to their scheduled appointment and bring in their medication bottles.

## 2019-08-29 ENCOUNTER — OFFICE VISIT (OUTPATIENT)
Dept: MEDICAL GROUP | Facility: MEDICAL CENTER | Age: 84
End: 2019-08-29
Payer: MEDICARE

## 2019-08-29 VITALS
OXYGEN SATURATION: 91 % | WEIGHT: 126 LBS | DIASTOLIC BLOOD PRESSURE: 82 MMHG | HEIGHT: 65 IN | TEMPERATURE: 97.6 F | SYSTOLIC BLOOD PRESSURE: 144 MMHG | HEART RATE: 86 BPM | BODY MASS INDEX: 20.99 KG/M2

## 2019-08-29 DIAGNOSIS — I10 ESSENTIAL HYPERTENSION: ICD-10-CM

## 2019-08-29 DIAGNOSIS — I10 ESSENTIAL HYPERTENSION: Chronic | ICD-10-CM

## 2019-08-29 PROBLEM — R60.0 LOCALIZED EDEMA: Status: RESOLVED | Noted: 2019-05-29 | Resolved: 2019-08-29

## 2019-08-29 PROBLEM — I16.1 HYPERTENSIVE EMERGENCY: Status: RESOLVED | Noted: 2019-08-23 | Resolved: 2019-08-29

## 2019-08-29 PROBLEM — F03.90 DEMENTIA (HCC): Status: RESOLVED | Noted: 2019-08-23 | Resolved: 2019-08-29

## 2019-08-29 PROCEDURE — 99213 OFFICE O/P EST LOW 20 MIN: CPT | Performed by: INTERNAL MEDICINE

## 2019-08-29 ASSESSMENT — PATIENT HEALTH QUESTIONNAIRE - PHQ9: CLINICAL INTERPRETATION OF PHQ2 SCORE: 0

## 2019-08-29 NOTE — TELEPHONE ENCOUNTER
Called Ivana to give Dr. Smyth's recommendations. Apparently pt. Was taking both Lisinopril 10mg and Metoprolol Tartrate 25mg in am. Yesterday Ivana gave Lisinopril 10mg in am and gave Metoprolol 25mg once in pm. This am BT=459/58 before am meds.  Pt. Will continue this for a few days and Ivana will call with update next week. If BP increases Ivana knows she can increase Lisinopril 10mg to BID.      Alexy Smyth M.D.  Sangita Martinez R.N. 22 minutes ago (2:15 PM)      Continue metoprolol only once a day.  Increase lisinopril to 10 mg twice daily

## 2019-08-30 NOTE — PROGRESS NOTES
Subjective:      Venus Church is a 88 y.o. female who presents with Follow-Up (htn)            HPI   Patient is here with daughter.  Currently living alone but daughter stays with her 2 weeks out of the month, during the rest of the time when daughter goes back to [place] where she lives, patient has a set up where she has a caregiver 6 days per week from 2 pm until 8 pm, and patient's son el comes at 930 in am on a daily basis.  Her medications are being provided, she is taking lisinopril 10 mg in the morning and Lopressor 25 mg in the evening.  Blood pressure typically runs higher in the morning according to the daughter ranging 110-150.  No lightheadedness or dizziness.  Patient has had no falls.  Not using walker for ambulation.  Recent hospitalization 8/23/19 overnight for atypical chest pain, EKG no changes, troponin 38 on admit, second troponin 36, last troponin 48.  Discharged home.  Patient has had no recurrent chest pain, shortness of breath, palpitations, lightheadedness, syncope.  Remains on Eliquis, lisinopril, Lopressor per cardiology.              Current Outpatient Medications   Medication Sig Dispense Refill   • metoprolol (LOPRESSOR) 25 MG Tab Take 1 Tab by mouth every day. 30 Tab 0   • lisinopril (PRINIVIL) 10 MG Tab Take 1 Tab by mouth every day. 30 Tab 3   • albuterol (PROVENTIL) 2.5mg/3ml Nebu Soln solution for nebulization 3 mL by Nebulization route every four hours as needed for Shortness of Breath. 150 Bullet 11   • TRELEGY ELLIPTA 100-62.5-25 MCG/INH AEROSOL POWDER, BREATH ACTIVATED INHALE 1 PUFF ONCE DAILY (RINSE  MOUTH  AFTER  USE) 3 Each 3   • celecoxib (CELEBREX) 100 MG Cap Take 1 Cap by mouth 2 times a day. 180 Cap 3   • gabapentin (NEURONTIN) 100 MG Cap Take 1 Cap by mouth every evening. 30 Cap 5   • Fluticasone-Umeclidin-Vilant (TRELEGY ELLIPTA) 100-62.5-25 MCG/INH AEROSOL POWDER, BREATH ACTIVATED Inhale 1 Puff by mouth every day. 1 Each 11   • apixaban (ELIQUIS) 2.5mg Tab Take 1  Tab by mouth 2 Times a Day. 60 Tab 6     No current facility-administered medications for this visit.      Patient Active Problem List   Diagnosis   • Hypertension   • Dyslipidemia   • Preventative health care   • Low back pain   • Cervical pain   • Osteoporosis, idiopathic   • History of compression fracture of spine   • PAF (paroxysmal atrial fibrillation) ([facility])   • Right hip pain   • COPD (chronic obstructive pulmonary disease) ([facility])   • MAMI (obstructive sleep apnea)   • Aortic insufficiency   • Chest pain   • Chronic anticoagulation   • History of shingles   • Tension headache   • Macular degeneration   • History of opiate therapy   • Sensorineural hearing loss   • Mild cognitive impairment   • TB lung, latent   • Nocturnal hypoxemia   • AAA (abdominal aortic aneurysm) ([facility])   • Hospital discharge follow-up   • Localized edema   • Hypertensive emergency   • Dementia     Depression Screening    Little interest or pleasure in doing things?  0 - not at all  Feeling down, depressed , or hopeless? 0 - not at all  Patient Health Questionnaire Score: 0          Health Maintenance Summary                PFT SCREENING-FEV1 AND FEV/FVC RATIO / SPIROMETRY SHOULD BE PERFORMED ANNUALLY Overdue 4/22/1949     IMM HEP B VACCINE Overdue 4/22/1950     IMM ZOSTER VACCINES Overdue 11/27/2012      Done 10/2/2012 Imm Admin: Zoster Vaccine Live (ZVL) (Zostavax)    BONE DENSITY Overdue 6/21/2018      Done 6/21/2013 DS-BONE DENSITY STUDY (DEXA)     Patient has more history with this topic...    Annual Wellness Visit Overdue 4/11/2019      Done 4/10/2018 Visit Dx: Medicare annual wellness visit, subsequent     Patient has more history with this topic...    IMM INFLUENZA Next Due 9/1/2019      Done 9/18/2018 Imm Admin: Influenza Vaccine Adult HD     Patient has more history with this topic...    IMM DTaP/Tdap/Td Vaccine Next Due 12/11/2028      Done 12/11/2018 Imm Admin: Tdap Vaccine          Patient Care Team:  Denis Krueger M.D. as  "PCP - General  Nathan Fajardo M.D. as Consulting Physician (Anesthesia)  Esdras Thornton M.D. as Consulting Physician (Pulmonary Medicine)  Aashish as Respiratory  SARAH De La Garza as Consulting Physician (Pulmonary Medicine)  [facility] as Home Health Provider  India Stuart as Patient Advocate - IHD    ROS       Objective:     /82 (BP Location: Right arm, Patient Position: Sitting, BP Cuff Size: Adult)   Pulse 86   Temp 36.4 °C (97.6 °F)   Ht 1.651 m (5' 5\")   Wt 57.2 kg (126 lb)   SpO2 91%   BMI 20.97 kg/m²      Physical Exam   Constitutional: No distress.   HENT:   Head: Normocephalic and atraumatic.   Eyes: Conjunctivae are normal. Right eye exhibits no discharge. Left eye exhibits no discharge.   Neck: Neck supple.   Cardiovascular: Normal rate and regular rhythm.   Pulmonary/Chest: Effort normal and breath sounds normal.   Abdominal: She exhibits no distension.   Musculoskeletal: She exhibits no edema.   Neurological: She is alert.   Skin: Skin is warm. She is not diaphoretic.   Psychiatric: She has a normal mood and affect. Her behavior is normal.   Nursing note and vitals reviewed.              Assessment/Plan:     Assessment  #!  Hypertension stable on lisinopril 10 mg in the a.m., metoprolol 25 mg in the p.m., daughter checking blood pressures daily.    #2 recent hospitalization atypical chest pain, EKG no acute changes, small increase in troponin evaluate in the hospital, felt no stress testing necessary, discharged home, no recurrent chest pain since discharge    #3 paroxysmal atrial fibrillation stable on Eliquis, sinus on exam today, rate controlled      Plan  #1 continue medications no changes    #2 chest pain ER precautions    #3 has declined stress testing    #4 had a long discussion with patient and daughter, patient understands that she requires someone to look after on a daily basis, at some point she would benefit from going into assisted living or " independent living facility, patient declines and is not interested at all in leaving her home even though she understands that she is requiring more and more care and supervision which her daughter is finding difficult to provide for her on a regular basis, discussed at length different options with patient and daughter.  Risk of falling and ending up in a nursing home without supervision or assistance    #5 use walker for ambulation    #6  Follow-up 3 months    #7 continue check blood pressure and record, send cardiology an update as well as myself

## 2019-09-04 NOTE — TELEPHONE ENCOUNTER
Pt's daughter, Ivana was told to call this week with b/p readings, she can be reached at [phone].

## 2019-09-05 NOTE — TELEPHONE ENCOUNTER
"Spoke with Ivana. Pt. Has been taking Lisinopril 10mg Q AM and Lopressior 25mg Q PM. BP's are generally fine in am but by evening before Lopressor dose [id number]=695's-170. (When Ivana first called Dr Smyth recommended increasing Lisinopril to 10mg BID but pt. amy tried it.)  Now Ivana notes that pt. Has \"dry cough\". Pt. Scheduled to see Dr. Smyth in opening tomorrow.   To authorizations.  "

## 2019-09-05 NOTE — TELEPHONE ENCOUNTER
JAMIE/gina    Pt's daughter Yashira gemma Sangita's call from yesterday about pt's b/p & to ask if a med adjustment is recommended.    Please call Yashira

## 2019-09-06 ENCOUNTER — OFFICE VISIT (OUTPATIENT)
Dept: CARDIOLOGY | Facility: MEDICAL CENTER | Age: 84
End: 2019-09-06
Payer: MEDICARE

## 2019-09-06 VITALS
WEIGHT: 128 LBS | DIASTOLIC BLOOD PRESSURE: 86 MMHG | OXYGEN SATURATION: 94 % | HEART RATE: 72 BPM | HEIGHT: 61 IN | SYSTOLIC BLOOD PRESSURE: 164 MMHG | BODY MASS INDEX: 24.17 KG/M2

## 2019-09-06 DIAGNOSIS — R05.8 ACE-INHIBITOR COUGH: ICD-10-CM

## 2019-09-06 DIAGNOSIS — I35.1 NONRHEUMATIC AORTIC VALVE INSUFFICIENCY: Chronic | ICD-10-CM

## 2019-09-06 DIAGNOSIS — I10 ESSENTIAL HYPERTENSION: Chronic | ICD-10-CM

## 2019-09-06 DIAGNOSIS — Z79.01 CHRONIC ANTICOAGULATION: Chronic | ICD-10-CM

## 2019-09-06 DIAGNOSIS — T46.4X5A ACE-INHIBITOR COUGH: ICD-10-CM

## 2019-09-06 PROCEDURE — 99213 OFFICE O/P EST LOW 20 MIN: CPT | Performed by: INTERNAL MEDICINE

## 2019-09-06 RX ORDER — TORSEMIDE 5 MG/1
5 TABLET ORAL
Qty: 30 TAB | Refills: 2 | Status: SHIPPED | OUTPATIENT
Start: 2019-09-06 | End: 2019-09-17

## 2019-09-06 RX ORDER — IRBESARTAN 75 MG/1
75 TABLET ORAL DAILY
Qty: 30 TAB | Refills: 4 | Status: SHIPPED | OUTPATIENT
Start: 2019-09-06 | End: 2019-09-09 | Stop reason: SDUPTHER

## 2019-09-06 ASSESSMENT — ENCOUNTER SYMPTOMS
BACK PAIN: 1
SPEECH CHANGE: 0
MEMORY LOSS: 1
BRUISES/BLEEDS EASILY: 0
DEPRESSION: 1
PALPITATIONS: 0
WEAKNESS: 1
SHORTNESS OF BREATH: 1
NAUSEA: 0
BLOOD IN STOOL: 0
LOSS OF CONSCIOUSNESS: 0
DIZZINESS: 0
VOMITING: 0

## 2019-09-06 NOTE — PROGRESS NOTES
Chief Complaint   Patient presents with   • Atrial Fibrillation   • Hypertension       Subjective:   Venus Church is a 88 y.o. female who presents today accompanied by her daughter.  She was hospitalized late last month with chest pain.  Lisinopril was added to her regimen.  Her daughter notes that her pressures at home have been variable and at times high and she is also developed a cough since lisinopril was started.  The daughter also notes that she is a bit short of breath.  By echo in February she had preserved LV systolic function, moderate aortic insufficiency and diastolic dysfunction.  Pulmonary pressure was elevated.  It should be recalled that she was hyponatremic when she was on HCTZ.  She has history of PAF, hypertension and chronic anticoagulation.  She is had no bleeding problems    Past Medical History:   Diagnosis Date   • Anesthesia     n/v   • Aortic regurgitation 4/25/2012   • Arrhythmia 7/10      A. Fib.   • Arthritis     general   • Atypical chest pain 4/25/2012   • CATARACT 2007,08   • Chronic anticoagulation 4/25/2012   • Constipation 4/25/2012   • COPD (chronic obstructive pulmonary disease) ([facility])    • DDD (degenerative disc disease)    • Dental disorder     partials   • Dyspnea 4/25/2012   • Generalized osteoarthritis 4/25/2012   • Headache(784.0) 4/25/2012   • Heart valve insufficiency     minimal, watched by cardio   • Hypercholesteremia    • Hyperlipidemia 4/25/2012   • Hypertension    • MEDICAL HOME    • [facility] home oxygen therapy     [facility]   • Osteoarthritis of knee 4/25/2012   • Osteoporosis    • Other accident     C-Spine FX  2006   • Pain     back pain   • Pneumonia 6/4   • TB lung, latent 5/4/2016   • Valvular heart disease 4/25/2012   • Vertebral fracture    • Vertigo 4/25/2012     Past Surgical History:   Procedure Laterality Date   • RECOVERY  7/28/2010    Performed by SURGERY, IR-RECOVERY at SURGERY SAME DAY [facility] ORS   • OTHER ORTHOPEDIC SURGERY  may, 2010      T11 kypho.   •  CATARACT PHACO WITH IOL  2009    Performed by ROSE MARIE MANN at SURGERY SAME DAY [facility] ORS   • CARPAL TUNNEL RELEASE  @30 yrs ago    rt hand     Family History   Problem Relation Age of Onset   • Heart Disease Father    • Diabetes Brother      Social History     Socioeconomic History   • Marital status:      Spouse name: Not on file   • Number of children: Not on file   • Years of education: Not on file   • Highest education level: Not on file   Occupational History   • Not on file   Social Needs   • Financial resource strain: Not on file   • Food insecurity:     Worry: Not on file     Inability: Not on file   • Transportation needs:     Medical: Not on file     Non-medical: Not on file   Tobacco Use   • Smoking status: Former Smoker     Packs/day: 1.50     Years: 25.00     Pack years: 37.50     Types: Cigarettes     Last attempt to quit: 1984     Years since quittin.4   • Smokeless tobacco: Never Used   • Tobacco comment: smoked 25 yrs, quit    Substance and Sexual Activity   • Alcohol use: No     Alcohol/week: 0.0 oz   • Drug use: No   • Sexual activity: Not on file   Lifestyle   • Physical activity:     Days per week: Not on file     Minutes per session: Not on file   • Stress: Not on file   Relationships   • Social connections:     Talks on phone: Not on file     Gets together: Not on file     Attends [employer] service: Not on file     Active member of club or organization: Not on file     Attends meetings of clubs or organizations: Not on file     Relationship status: Not on file   • Intimate partner violence:     Fear of current or ex partner: Not on file     Emotionally abused: Not on file     Physically abused: Not on file     Forced sexual activity: Not on file   Other Topics Concern   • Not on file   Social History Narrative   • Not on file     Allergies   Allergen Reactions   • Codeine Vomiting     Outpatient Encounter Medications as of 2019   Medication Sig Dispense  "Refill   • irbesartan (AVAPRO) 75 MG tablet Take 1 Tab by mouth every day. 30 Tab 4   • torsemide (DEMADEX) 5 MG Tab Take 1 Tab by mouth every Monday, Wednesday, and Friday. 30 Tab 2   • gabapentin (NEURONTIN) 100 MG Cap Take 1 Cap by mouth every evening. 90 Cap 3   • apixaban (ELIQUIS) 2.5mg Tab Take 1 Tab by mouth 2 Times a Day. 180 Tab 3   • metoprolol (LOPRESSOR) 25 MG Tab Take 1 Tab by mouth every day. 30 Tab 0   • albuterol (PROVENTIL) 2.5mg/3ml Nebu Soln solution for nebulization 3 mL by Nebulization route every four hours as needed for Shortness of Breath. 150 Bullet 11   • celecoxib (CELEBREX) 100 MG Cap Take 1 Cap by mouth 2 times a day. 180 Cap 3   • Fluticasone-Umeclidin-Vilant (TRELEGY ELLIPTA) 100-62.5-25 MCG/INH AEROSOL POWDER, BREATH ACTIVATED Inhale 1 Puff by mouth every day. 1 Each 11   • [DISCONTINUED] lisinopril (PRINIVIL) 10 MG Tab Take 1 Tab by mouth every day. 30 Tab 3   • TRELEGY ELLIPTA 100-62.5-25 MCG/INH AEROSOL POWDER, BREATH ACTIVATED INHALE 1 PUFF ONCE DAILY (RINSE  MOUTH  AFTER  USE) (Patient not taking: Reported on 9/6/2019) 3 Each 3     No facility-administered encounter medications on file as of 9/6/2019.      Review of Systems   Constitutional: Negative for malaise/fatigue.   HENT: Negative for nosebleeds.    Respiratory: Positive for shortness of breath.    Cardiovascular: Positive for leg swelling. Negative for chest pain and palpitations.   Gastrointestinal: Negative for blood in stool, melena, nausea and vomiting.   Genitourinary: Negative for hematuria.   Musculoskeletal: Positive for back pain.   Neurological: Positive for weakness. Negative for dizziness, speech change and loss of consciousness.   Endo/Heme/Allergies: Does not bruise/bleed easily.   Psychiatric/Behavioral: Positive for depression and memory loss.        Objective:   BP (!) 164/86 (BP Location: Right arm, Patient Position: Sitting, BP Cuff Size: Adult)   Pulse 72   Ht 1.549 m (5' 1\")   Wt 58.1 kg (128 lb)  "  SpO2 94%   BMI 24.19 kg/m²     Physical Exam   Constitutional: She is oriented to person, place, and time. She appears well-developed and well-nourished. No distress.   HENT:   Head: Atraumatic.   Eyes: Pupils are equal, round, and reactive to light. Conjunctivae and EOM are normal.   Neck: Neck supple. No JVD present.   Cardiovascular: Normal rate and regular rhythm.   Murmur heard.   Systolic murmur is present with a grade of 1/6.   Diastolic murmur is present with a grade of 3/6.  Pulmonary/Chest: Effort normal and breath sounds normal. No respiratory distress. She has no wheezes. She has no rales.   Musculoskeletal: She exhibits edema.   Trace edema bilaterally   Neurological: She is alert and oriented to person, place, and time.   Skin: Skin is warm and dry. She is not diaphoretic.   Psychiatric: She has a normal mood and affect. She is not slowed.       Assessment:     1. Essential hypertension  Basic Metabolic Panel   2. ACE-inhibitor cough  Basic Metabolic Panel   3. Nonrheumatic aortic valve insufficiency  Basic Metabolic Panel   4. Chronic anticoagulation  Basic Metabolic Panel       Medical Decision Making:  Today's Assessment / Status / Plan:   Chronic anticoagulation: No bleeding problems.    Hypertension: Controlled but has cough we will change her from lisinopril to irbesartan to 75 mg a day.    Dyspnea: Likely secondary to diastolic dysfunction and valvular disease.  She had hyponatremia on daily HCTZ will place her on Demadex 5 mg Monday Wednesday Friday regimen.    The daughter will call if she is not improved.  Otherwise reevaluate in 2 weeks with a basic metabolic panel prior to the next visit.

## 2019-09-09 ENCOUNTER — PATIENT MESSAGE (OUTPATIENT)
Dept: CARDIOLOGY | Facility: MEDICAL CENTER | Age: 84
End: 2019-09-09

## 2019-09-09 DIAGNOSIS — I10 ESSENTIAL HYPERTENSION: Primary | Chronic | ICD-10-CM

## 2019-09-09 RX ORDER — IRBESARTAN 75 MG/1
75 TABLET ORAL DAILY
Qty: 100 TAB | Refills: 0 | Status: SHIPPED | OUTPATIENT
Start: 2019-09-09 | End: 2019-10-04

## 2019-09-09 NOTE — TELEPHONE ENCOUNTER
----- Message from Pina White sent at 9/9/2019  8:07 AM PDT -----  Regarding: b/p 105/58  Contact: [phone]  JAMIE/gina    Pt's son Nelson calling to report b/p this morning 105/58.  As Dileep was taking her b/p at 10:30am at exact time the pt took b/p meds. Dileep is worried the b/p will drop lower.      Please call Dileep .

## 2019-09-09 NOTE — PATIENT COMMUNICATION
Called Dileep. Pt. Takes Irbesartan 75mg in am, Lopressor 25mg once daily in am, and Torsemide 5mg M-W-F. She took Torsemide yesterday so did not take it today. When I called at 11:30am BP=97/59. Pt. Feels well, denied dizziness, lightheadedness, fatigue. BP's [id number], 142/75. Pt. Ate and drank well yesterday.   Teaching done re; methods of treating hypotension. Pt. Will try taking Lopressor at night tomorrow not with Irbesartan in am. Pt. Will not take Torsemide until Wed. And Dileep will hold it for systolic<110.   Awaiting Dr.. rodriguez's recommendations on Thurs.

## 2019-09-12 ENCOUNTER — TELEPHONE (OUTPATIENT)
Dept: CARDIOLOGY | Facility: MEDICAL CENTER | Age: 84
End: 2019-09-12

## 2019-09-12 NOTE — TELEPHONE ENCOUNTER
Procedure Question     Alexy Rodriguez M.D.  You 1 hour ago (12:32 PM)      Did cough get better off lisinopril.  She was changed to irbesartan because of cough.  Also did her breathing get better with the torsemide.  She can stop her metoprolol.    Routing comment       You routed conversation to You; Alexy Rodriguez M.D. 3 days ago      You 3 days ago         Called Dileep. Pt. Takes Irbesartan 75mg in am, Lopressor 25mg once daily in am, and Torsemide 5mg M-W-F. She took Torsemide yesterday so did not take it today. When I called at 11:30am BP=97/59. Pt. Feels well, denied dizziness, lightheadedness, fatigue. BP's [id number]=[id number], 142/75. Pt. Ate and drank well yesterday.   Teaching done re; methods of treating hypotension. Pt. Will try taking Lopressor at night tomorrow not with Irbesartan in am. Pt. Will not take Torsemide until Wed. And Dileep will hold it for systolic<110.   Awaiting Dr.. rodriguez's recommendations on Thurs.

## 2019-09-12 NOTE — TELEPHONE ENCOUNTER
RS      Patient's son Dileep said he is returning your call from today. He can be reached at [phone].

## 2019-09-12 NOTE — TELEPHONE ENCOUNTER
Spoke with Dileep. Cough has resolved off of Lisinopril. Shortness of breath has decreased since starting Torsemide.   Pt. Will stop Lopressor. BP will be monitored. Pt. Has [facility] next week.

## 2019-09-16 ENCOUNTER — HOSPITAL ENCOUNTER (OUTPATIENT)
Dept: LAB | Facility: MEDICAL CENTER | Age: 84
End: 2019-09-16
Attending: INTERNAL MEDICINE
Payer: MEDICARE

## 2019-09-16 DIAGNOSIS — I10 ESSENTIAL HYPERTENSION: Chronic | ICD-10-CM

## 2019-09-16 DIAGNOSIS — Z79.01 CHRONIC ANTICOAGULATION: Chronic | ICD-10-CM

## 2019-09-16 DIAGNOSIS — R05.8 ACE-INHIBITOR COUGH: ICD-10-CM

## 2019-09-16 DIAGNOSIS — T46.4X5A ACE-INHIBITOR COUGH: ICD-10-CM

## 2019-09-16 DIAGNOSIS — I35.1 NONRHEUMATIC AORTIC VALVE INSUFFICIENCY: Chronic | ICD-10-CM

## 2019-09-16 LAB
ANION GAP SERPL CALC-SCNC: 8 MMOL/L (ref 0–11.9)
BUN SERPL-MCNC: 28 MG/DL (ref 8–22)
CALCIUM SERPL-MCNC: 9.8 MG/DL (ref 8.5–10.5)
CHLORIDE SERPL-SCNC: 102 MMOL/L (ref 96–112)
CO2 SERPL-SCNC: 31 MMOL/L (ref 20–33)
CREAT SERPL-MCNC: 0.99 MG/DL (ref 0.5–1.4)
GLUCOSE SERPL-MCNC: 75 MG/DL (ref 65–99)
POTASSIUM SERPL-SCNC: 4.6 MMOL/L (ref 3.6–5.5)
SODIUM SERPL-SCNC: 141 MMOL/L (ref 135–145)

## 2019-09-16 PROCEDURE — 80048 BASIC METABOLIC PNL TOTAL CA: CPT

## 2019-09-16 PROCEDURE — 36415 COLL VENOUS BLD VENIPUNCTURE: CPT

## 2019-09-17 ENCOUNTER — OFFICE VISIT (OUTPATIENT)
Dept: CARDIOLOGY | Facility: MEDICAL CENTER | Age: 84
End: 2019-09-17
Payer: MEDICARE

## 2019-09-17 VITALS
HEART RATE: 81 BPM | BODY MASS INDEX: 24.47 KG/M2 | OXYGEN SATURATION: 97 % | SYSTOLIC BLOOD PRESSURE: 188 MMHG | WEIGHT: 129.63 LBS | HEIGHT: 61 IN | DIASTOLIC BLOOD PRESSURE: 82 MMHG

## 2019-09-17 DIAGNOSIS — E78.5 DYSLIPIDEMIA: Chronic | ICD-10-CM

## 2019-09-17 DIAGNOSIS — Z79.01 CHRONIC ANTICOAGULATION: Chronic | ICD-10-CM

## 2019-09-17 DIAGNOSIS — I35.1 NONRHEUMATIC AORTIC VALVE INSUFFICIENCY: Chronic | ICD-10-CM

## 2019-09-17 DIAGNOSIS — I71.40 ABDOMINAL AORTIC ANEURYSM (AAA) WITHOUT RUPTURE (HCC): ICD-10-CM

## 2019-09-17 DIAGNOSIS — I10 ESSENTIAL HYPERTENSION: ICD-10-CM

## 2019-09-17 DIAGNOSIS — I48.0 PAF (PAROXYSMAL ATRIAL FIBRILLATION) (HCC): Chronic | ICD-10-CM

## 2019-09-17 DIAGNOSIS — J41.1 MUCOPURULENT CHRONIC BRONCHITIS (HCC): Chronic | ICD-10-CM

## 2019-09-17 PROBLEM — Z09 HOSPITAL DISCHARGE FOLLOW-UP: Status: RESOLVED | Noted: 2019-04-22 | Resolved: 2019-09-17

## 2019-09-17 PROCEDURE — 99214 OFFICE O/P EST MOD 30 MIN: CPT | Performed by: NURSE PRACTITIONER

## 2019-09-17 RX ORDER — TORSEMIDE 5 MG/1
5 TABLET ORAL
COMMUNITY
Start: 2019-09-17 | End: 2019-10-04

## 2019-09-17 ASSESSMENT — ENCOUNTER SYMPTOMS
ORTHOPNEA: 0
COUGH: 0
ABDOMINAL PAIN: 0
SHORTNESS OF BREATH: 0
FEVER: 0
PND: 0
PALPITATIONS: 0
CLAUDICATION: 0
MYALGIAS: 0
DIZZINESS: 0
MEMORY LOSS: 1

## 2019-09-17 NOTE — PROGRESS NOTES
Chief Complaint   Patient presents with   • Hypertension     Subjective:   Venus Church is a 88 y.o. female who presents today for BP check and lab review.    She is a patient of Dr. Smyth. Hx of PAF on Eliquis, HTN, HLD, and moderate AI/TR, COPD, and memory loss.    She presents today with her son. She doesn't know what we are talking about. She is pleasant but confused.    He does her medications and helps with her care.    She has no symptoms to report and he doesn't think she has any.    Her BP has been high ever since the metoprolol was stopped.    Her breathing and cough have improved.    She wouldn't let me listen to her, she was anexious to leave.    Past Medical History:   Diagnosis Date   • Anesthesia     n/v   • Aortic regurgitation 4/25/2012   • Arrhythmia 7/10      A. Fib.   • Arthritis     general   • Atypical chest pain 4/25/2012   • CATARACT 2007,08   • Chronic anticoagulation 4/25/2012   • Constipation 4/25/2012   • COPD (chronic obstructive pulmonary disease) ([facility])    • DDD (degenerative disc disease)    • Dental disorder     partials   • Dyspnea 4/25/2012   • Generalized osteoarthritis 4/25/2012   • Headache(784.0) 4/25/2012   • Heart valve insufficiency     minimal, watched by cardio   • Hypercholesteremia    • Hyperlipidemia 4/25/2012   • Hypertension    • MEDICAL HOME    • On home oxygen therapy     at [facility]   • Osteoarthritis of knee 4/25/2012   • Osteoporosis    • Other accident     C-Spine FX  2006   • Pain     back pain   • Pneumonia 6/4   • TB lung, latent 5/4/2016   • Valvular heart disease 4/25/2012   • Vertebral fracture    • Vertigo 4/25/2012     Past Surgical History:   Procedure Laterality Date   • RECOVERY  7/28/2010    Performed by SURGERY, IR-RECOVERY at SURGERY SAME DAY [facility] ORS   • OTHER ORTHOPEDIC SURGERY  may, 2010      T11 kypho.   • CATARACT PHACO WITH IOL  1/5/2009    Performed by ROSE MARIE MANN at SURGERY SAME DAY [facility] ORS   • CARPAL TUNNEL RELEASE  @30 yrs ago    rt  hand     Family History   Problem Relation Age of Onset   • Heart Disease Father    • Diabetes Brother      Social History     Socioeconomic History   • Marital status:      Spouse name: Not on file   • Number of children: Not on file   • Years of education: Not on file   • Highest education level: Not on file   Occupational History   • Not on file   Social Needs   • Financial resource strain: Not on file   • Food insecurity:     Worry: Not on file     Inability: Not on file   • Transportation needs:     Medical: Not on file     Non-medical: Not on file   Tobacco Use   • Smoking status: Former Smoker     Packs/day: 1.50     Years: 25.00     Pack years: 37.50     Types: Cigarettes     Last attempt to quit: 1984     Years since quittin.4   • Smokeless tobacco: Never Used   • Tobacco comment: smoked 25 yrs, quit    Substance and Sexual Activity   • Alcohol use: No     Alcohol/week: 0.0 oz   • Drug use: No   • Sexual activity: Not on file   Lifestyle   • Physical activity:     Days per week: Not on file     Minutes per session: Not on file   • Stress: Not on file   Relationships   • Social connections:     Talks on phone: Not on file     Gets together: Not on file     Attends Sikhism service: Not on file     Active member of club or organization: Not on file     Attends meetings of clubs or organizations: Not on file     Relationship status: Not on file   • Intimate partner violence:     Fear of current or ex partner: Not on file     Emotionally abused: Not on file     Physically abused: Not on file     Forced sexual activity: Not on file   Other Topics Concern   • Not on file   Social History Narrative   • Not on file     Allergies   Allergen Reactions   • Codeine Vomiting     Outpatient Encounter Medications as of 2019   Medication Sig Dispense Refill   • metoprolol (LOPRESSOR) 25 MG Tab Take 25 mg by mouth every evening. 30 Tab 11   • torsemide (DEMADEX) 5 MG Tab Take 1 Tab by mouth 1 time  "daily as needed (worsening shortness of breath).     • irbesartan (AVAPRO) 75 MG tablet Take 1 Tab by mouth every day. 100 Tab 0   • gabapentin (NEURONTIN) 100 MG Cap Take 1 Cap by mouth every evening. 90 Cap 3   • apixaban (ELIQUIS) 2.5mg Tab Take 1 Tab by mouth 2 Times a Day. 180 Tab 3   • albuterol (PROVENTIL) 2.5mg/3ml Nebu Soln solution for nebulization 3 mL by Nebulization route every four hours as needed for Shortness of Breath. 150 Bullet 11   • celecoxib (CELEBREX) 100 MG Cap Take 1 Cap by mouth 2 times a day. 180 Cap 3   • Fluticasone-Umeclidin-Vilant (TRELEGY ELLIPTA) 100-62.5-25 MCG/INH AEROSOL POWDER, BREATH ACTIVATED Inhale 1 Puff by mouth every day. 1 Each 11   • [DISCONTINUED] torsemide (DEMADEX) 5 MG Tab Take 1 Tab by mouth every Monday, Wednesday, and Friday. 30 Tab 2   • [DISCONTINUED] metoprolol (LOPRESSOR) 25 MG Tab Take 1 Tab by mouth every day. (Patient not taking: Reported on 9/17/2019) 30 Tab 0   • [DISCONTINUED] TRELEGY ELLIPTA 100-62.5-25 MCG/INH AEROSOL POWDER, BREATH ACTIVATED INHALE 1 PUFF ONCE DAILY (RINSE  MOUTH  AFTER  USE) (Patient not taking: Reported on 9/6/2019) 3 Each 3     No facility-administered encounter medications on file as of 9/17/2019.      Review of Systems   Constitutional: Negative for fever and malaise/fatigue.   Respiratory: Negative for cough and shortness of breath.    Cardiovascular: Negative for chest pain, palpitations, orthopnea, claudication, leg swelling and PND.   Gastrointestinal: Negative for abdominal pain.   Musculoskeletal: Negative for myalgias.   Neurological: Negative for dizziness.   Psychiatric/Behavioral: Positive for memory loss.        Objective:   BP (!) 188/82 (BP Location: Right arm, Patient Position: Sitting, BP Cuff Size: Adult)   Pulse 81   Ht 1.549 m (5' 1\")   Wt 58.8 kg (129 lb 10.1 oz)   SpO2 97%   BMI 24.49 kg/m²     Physical Exam   Constitutional: She appears cachectic.   Psychiatric: Her mood appears anxious. She exhibits " abnormal recent memory and abnormal remote memory.   Nursing note and vitals reviewed.      Assessment:     1. Essential hypertension  metoprolol (LOPRESSOR) 25 MG Tab   2. Abdominal aortic aneurysm (AAA) without rupture (HCC)     3. Nonrheumatic aortic valve insufficiency     4. PAF (paroxysmal atrial fibrillation) (HCC)     5. Chronic anticoagulation     6. Mucopurulent chronic bronchitis (HCC)     7. Dyslipidemia       Medical Decision Making:  Today's Assessment / Status / Plan:     1. HTN  -BP check, remains elevated off metoprolol  -no low readings, SBP average >150  -recommend re-start back on metoprolol 25 mg QPM  -watch BP at home, call or MyChart BP log in 2 weeks  -asymptomatic, memory loss present  -son helps with medications and care  -torsemide reduce to PRN for shortness of breath  -she is completely asymptomatic today    2. Memory loss and anxiety  -recommend PCP review    3. PAF on Eliquis  -asymptomatic, rate controlled  -cont eliquis for OAC, no bleeding  -cont metoprolol for rate control    4. AAA  -recommend re-start metoprolol with BP elevation  -see above, keep BP log  -surveillance with imaging yearly per primary cardiologist    5. COPD  -chronic BALLESTEROS, stable today on exam    6. Moderate valve disease  -stable, euvolemic on exam  -follow clinically  -torsemide PRN    FU in clinic in 2 months with RS as planned; BP changes to call or My Chart in 2 weeks with BP log    Patient verbalizes understanding and agrees with the plan of care.     Collaborating MD: Magno RICHTER

## 2019-09-17 NOTE — PATIENT INSTRUCTIONS
Systolic (Top #) goal is 120-140     Call with BP readings in 2 weeks to Dr. Libra Jensen's nurse. Or My Chart.     Re-start metoprolol 25 mg to the evening.    Continue taking irbesartan 75 mg in the morning.    Only give her torsemide as needed for worsening shortness of breath.

## 2019-09-19 ENCOUNTER — TELEPHONE (OUTPATIENT)
Dept: CARDIOLOGY | Facility: MEDICAL CENTER | Age: 84
End: 2019-09-19

## 2019-09-19 NOTE — TELEPHONE ENCOUNTER
----- Message from Alexy Smyth M.D. sent at 9/19/2019  2:57 PM PDT -----  Lab reviewed.  Kidney function is stable potassium is normal.  Continue same medications

## 2019-09-23 ENCOUNTER — PATIENT OUTREACH (OUTPATIENT)
Dept: HEALTH INFORMATION MANAGEMENT | Facility: OTHER | Age: 84
End: 2019-09-23

## 2019-09-29 DIAGNOSIS — J20.9 ACUTE BRONCHITIS, UNSPECIFIED ORGANISM: ICD-10-CM

## 2019-09-30 NOTE — TELEPHONE ENCOUNTER
LVM for patient to return my call with any current symptoms or if patient needs medications to have on hand     Have we ever prescribed this med? Yes.  If yes, what date? 12/27/18 Kathy SANDERS (PredniSONE) , 09/10/18 Kathy SANDERS ( CEFDINIR)     Last OV: 04/19/19 Corey CABRALESN     Next OV: 11/01/19    DX: Acute bronchitis, unspecified organism     Medications: cefdinir (OMNICEF) 300 MG Cap, predniSONE (DELTASONE) 10 MG Tab

## 2019-09-30 NOTE — TELEPHONE ENCOUNTER
"Vm: no name was mentioned returning call. Call back number [phone]       I called call back number left in message. I spoke to the patient's daughter Yashira. States she had left message regarding patient's currently symptoms Wednesday- Thursday. I informed I reviewed chart and there is no documentation. I asked the daughter if she had any further information regarding the message if she spoke to anyone in the pulmonary clinic or left voice message. Daughter Yashira stated she is unsure. I informed her the only thing we have received was regarding the prescription refill. Asked if the patient was currently having symptoms or needed prescription for on hand use. [facility] will like to speak to someone in charge like a provider who can go over symptoms. [facility] pulmonary prescribed medications for antibiotic and steroid a long time ago and she never took them. [facility] she contacted the pharmacy to confirm if okay for the patient to currently take with her current symptoms. I explained to the daughter we will need to have the providers review the patients current symptoms and determine use of medications. I informed the message will be transferred to the aprn's , MARILYN Garcia is currently out of the office. Daughter states did not see MARILYN Garcia last. I informed the daughter she was seen last with MARILYN Garcia 4/19/19. Pt daughter then states she was [place] the medications [address] I informed the daughter we are the pulmonary clinic located on [address]. I continue to inform the daughter I need symptoms to review with the providers.     Daughter states patient started a \"croupy cough Wednesday- Thursday, yellow to green phlegm coming from nose and chest, SOB, chest pain and tightness from cough. \" Daughter states also sinus. Daughter stated no fever or chills, no wheezing, oxygen above 91 percent, tempeture \"95\" daughter stated pt using albuterol nebulizer 3 times a day, " trelegy inhaler daily. Daughter stated she was upset. I informed she may speak to my supervisor regarding issues. Pt's daughter then transferred to [place]

## 2019-09-30 NOTE — TELEPHONE ENCOUNTER
Patients daughter Yashira also needs to know if it is okay to take intermittent steroids with Celebrex?

## 2019-10-01 RX ORDER — PREDNISONE 10 MG/1
TABLET ORAL
Qty: 18 TAB | Refills: 0 | OUTPATIENT
Start: 2019-10-01 | End: 2019-10-18

## 2019-10-01 RX ORDER — CEFDINIR 300 MG/1
CAPSULE ORAL
Qty: 20 CAP | Refills: 0 | OUTPATIENT
Start: 2019-10-01

## 2019-10-01 NOTE — TELEPHONE ENCOUNTER
It is my recommendation that the patient be seen in the office to further evaluate her symptoms and discuss appropriate medication as well as potential side effects or interactions with her Celebrex.  Please schedule the patient as soon as possible to see a pulmonary provider.

## 2019-10-01 NOTE — PROGRESS NOTES
An 88-year-old female was an emergent admission to [facility] from 8/22/2019 to 8/23/2019 for chest pain and shortness of breath. IHD visited the patient bedside. The patient was discharged home. The patient's medical conditions included: high blood pressure and pain. The patient was not under clinical case management.     Per discharge orders, patient was instructed to see her PCP, and cardiologist. Patient saw her PCP on 8/29/19. Patient saw her cardiologist on 9/6/19, and again on 9/17/19.    IHD patient advocate was able to successfully engage with patient's daughter Ivana, post-discharge and throughout the case. Ivana expressed that patient might not be able to afford her hospital bills. [facility] sent Ivana a [employer] financial assistance program application. The patient and family are still working to complete the application.    PPS screening was conducted and scored above 50%.

## 2019-10-02 ENCOUNTER — OFFICE VISIT (OUTPATIENT)
Dept: PULMONOLOGY | Facility: HOSPICE | Age: 84
End: 2019-10-02
Payer: MEDICARE

## 2019-10-02 ENCOUNTER — APPOINTMENT (OUTPATIENT)
Dept: RADIOLOGY | Facility: IMAGING CENTER | Age: 84
End: 2019-10-02
Attending: PHYSICIAN ASSISTANT
Payer: MEDICARE

## 2019-10-02 VITALS
SYSTOLIC BLOOD PRESSURE: 130 MMHG | OXYGEN SATURATION: 91 % | WEIGHT: 129 LBS | RESPIRATION RATE: 16 BRPM | HEIGHT: 57 IN | HEART RATE: 95 BPM | BODY MASS INDEX: 27.83 KG/M2 | DIASTOLIC BLOOD PRESSURE: 70 MMHG

## 2019-10-02 DIAGNOSIS — R05.9 COUGH: ICD-10-CM

## 2019-10-02 DIAGNOSIS — G31.84 MILD COGNITIVE IMPAIRMENT: Chronic | ICD-10-CM

## 2019-10-02 DIAGNOSIS — J41.1 MUCOPURULENT CHRONIC BRONCHITIS (HCC): Chronic | ICD-10-CM

## 2019-10-02 DIAGNOSIS — M25.551 RIGHT HIP PAIN: ICD-10-CM

## 2019-10-02 PROCEDURE — 71046 X-RAY EXAM CHEST 2 VIEWS: CPT | Mod: TC | Performed by: PHYSICIAN ASSISTANT

## 2019-10-02 PROCEDURE — 99214 OFFICE O/P EST MOD 30 MIN: CPT | Performed by: PHYSICIAN ASSISTANT

## 2019-10-02 RX ORDER — PREDNISONE 10 MG/1
10 TABLET ORAL DAILY
COMMUNITY
Start: 2019-09-30 | End: 2019-10-10

## 2019-10-02 RX ORDER — AZITHROMYCIN 250 MG/1
250 TABLET, FILM COATED ORAL DAILY
COMMUNITY
End: 2019-10-04

## 2019-10-02 ASSESSMENT — ENCOUNTER SYMPTOMS
FALLS: 0
EYES NEGATIVE: 1
PALPITATIONS: 0
HEADACHES: 1
ORTHOPNEA: 0
DIZZINESS: 1
DIAPHORESIS: 0
WEIGHT LOSS: 0
SHORTNESS OF BREATH: 1
COUGH: 1
TREMORS: 0
CHILLS: 0
SPUTUM PRODUCTION: 1
SORE THROAT: 0
BACK PAIN: 1
CLAUDICATION: 0
FEVER: 0
HEARTBURN: 0
INSOMNIA: 1
WHEEZING: 0
SINUS PAIN: 1

## 2019-10-02 NOTE — PATIENT INSTRUCTIONS
1-continue Mucinex twice daily  2-avoid dairy   3-check xray  4-continue prednisone and cefdinir until complete then okay to take celebrex  5-nebulizer treatment up to every 4 hours   6-follow up in 6 weeks

## 2019-10-02 NOTE — PROGRESS NOTES
CC: Cough    HPI:  Venus Church is a 88 y.o. year old female here today for follow-up on medication concerns and COPD. Last seen in clinic 4/19/2019.  Patient is accompanied by her daughter Ivana who comes from [place] for 3 weeks at a time to assist in her mother's care.  Patient is a former smoker with reported quit date 1984 and 37.5-pack-year history.  Continued abstinence advised.    Daughter reports symptoms began last week of increased cough and congestion, wheezing.  Apparently attempted to reach out to our office but I see no documentation to support that.  Patient did have an emergency pack of cefdinir and prednisone.  She had recently been prescribed Celebrex for back pain and they had a concern about her mother mixing these medications.  She did reach out to her primary Dr. Krueger.  She was advised not to take both prednisone and Celebrex at the same time.    Reviewed in clinic vitals including blood pressure 130/70, heart rate of 95, O2 sat of 91% and BMI of 27.92 kg/m².    Reviewed home medication regimen, patient takes Trelegy, albuterol nebulized, torsemide as needed and Eliquis.  She is on day 4 of her antibiotic and steroid course.  She is not taking Celebrex at this time.    Reviewed most recent imaging obtained 8/22/2019, demonstrating a stable enlarged cardiac silhouette, atherosclerosis, no acute pulmonary process.    Chest x-ray was obtained in clinic today and demonstrated hyperinflation consistent with COPD, no pneumonia or overt pulmonary edema, stable cardiomegaly.  There was evidence of prior multilevel lower thoracic kyphoplasty and severe compression of lower thoracic vertebral body.    Daughter reports breathing may be marginally better since starting steroid and antibiotic, does not appear cough has improved but has not been using cough medicine previously recommended Mucinex.  She also reports increased pain today, mother indicates right hip.    Review of Systems   Constitutional:  Positive for malaise/fatigue. Negative for chills, diaphoresis, fever and weight loss.   HENT: Positive for congestion, hearing loss (wearing hearing aids ) and sinus pain. Negative for nosebleeds, sore throat and tinnitus.    Eyes: Negative.    Respiratory: Positive for cough, sputum production and shortness of breath (some better ). Negative for wheezing.    Cardiovascular: Positive for leg swelling. Negative for chest pain, palpitations, orthopnea and claudication.   Gastrointestinal: Negative for heartburn.        No difficulty swallowing, up and down dentures   Musculoskeletal: Positive for back pain. Negative for falls.   Skin: Negative.    Neurological: Positive for dizziness (occasional ) and headaches. Negative for tremors.        Increased imbalance   Psychiatric/Behavioral: The patient has insomnia (with prednisone and cough ).        Past Medical History:   Diagnosis Date   • Anesthesia     n/v   • Aortic regurgitation 4/25/2012   • Arrhythmia 7/10      A. Fib.   • Arthritis     general   • Atypical chest pain 4/25/2012   • CATARACT 2007,08   • Chronic anticoagulation 4/25/2012   • Constipation 4/25/2012   • COPD (chronic obstructive pulmonary disease) ([facility])    • DDD (degenerative disc disease)    • Dental disorder     partials   • Dyspnea 4/25/2012   • Generalized osteoarthritis 4/25/2012   • Headache(784.0) 4/25/2012   • Heart valve insufficiency     minimal, watched by cardio   • Hypercholesteremia    • Hyperlipidemia 4/25/2012   • Hypertension    • MEDICAL HOME    • On home oxygen therapy     at [facility]   • Osteoarthritis of knee 4/25/2012   • Osteoporosis    • Other accident     C-Spine FX  2006   • Pain     back pain   • Pneumonia 6/4   • TB lung, latent 5/4/2016   • Valvular heart disease 4/25/2012   • Vertebral fracture    • Vertigo 4/25/2012       Past Surgical History:   Procedure Laterality Date   • RECOVERY  7/28/2010    Performed by SURGERY, IR-RECOVERY at SURGERY SAME DAY [facility] ORS   • OTHER  ORTHOPEDIC SURGERY  may, 2010      T11 kypho.   • CATARACT PHACO WITH IOL  2009    Performed by ROSE MARIE MANN at SURGERY SAME DAY [facility] ORS   • CARPAL TUNNEL RELEASE  @30 yrs ago    rt hand       Family History   Problem Relation Age of Onset   • Heart Disease Father    • Diabetes Brother        Social History     Socioeconomic History   • Marital status:      Spouse name: Not on file   • Number of children: Not on file   • Years of education: Not on file   • Highest education level: Not on file   Occupational History   • Not on file   Social Needs   • Financial resource strain: Not on file   • Food insecurity:     Worry: Not on file     Inability: Not on file   • Transportation needs:     Medical: Not on file     Non-medical: Not on file   Tobacco Use   • Smoking status: Former Smoker     Packs/day: 1.50     Years: 25.00     Pack years: 37.50     Types: Cigarettes     Last attempt to quit: 1984     Years since quittin.4   • Smokeless tobacco: Never Used   • Tobacco comment: smoked 25 yrs, quit    Substance and Sexual Activity   • Alcohol use: No     Alcohol/week: 0.0 oz   • Drug use: No   • Sexual activity: Not on file   Lifestyle   • Physical activity:     Days per week: Not on file     Minutes per session: Not on file   • Stress: Not on file   Relationships   • Social connections:     Talks on phone: Not on file     Gets together: Not on file     Attends Shinto service: Not on file     Active member of club or organization: Not on file     Attends meetings of clubs or organizations: Not on file     Relationship status: Not on file   • Intimate partner violence:     Fear of current or ex partner: Not on file     Emotionally abused: Not on file     Physically abused: Not on file     Forced sexual activity: Not on file   Other Topics Concern   • Not on file   Social History Narrative   • Not on file       Allergies as of 10/02/2019 - Reviewed 10/02/2019   Allergen Reaction Noted   •  "Codeine Vomiting 07/12/2018        @Vital signs for this encounter:  Vitals:    10/02/19 1306   Height: 1.448 m (4' 9\")   Weight: 58.5 kg (129 lb)   Weight % change since last entry.: 0 %   BP: 130/70   Pulse: 95   BMI (Calculated): 27.92   Resp: 16       Current medications as of today   Current Outpatient Medications   Medication Sig Dispense Refill   • predniSONE (DELTASONE) 10 MG Tab Take 10 mg by mouth every day.     • azithromycin (ZITHROMAX) 250 MG Tab Take 250 mg by mouth every day.     • metoprolol (LOPRESSOR) 25 MG Tab Take 25 mg by mouth every evening. 30 Tab 11   • irbesartan (AVAPRO) 75 MG tablet Take 1 Tab by mouth every day. 100 Tab 0   • gabapentin (NEURONTIN) 100 MG Cap Take 1 Cap by mouth every evening. 90 Cap 3   • apixaban (ELIQUIS) 2.5mg Tab Take 1 Tab by mouth 2 Times a Day. 180 Tab 3   • albuterol (PROVENTIL) 2.5mg/3ml Nebu Soln solution for nebulization 3 mL by Nebulization route every four hours as needed for Shortness of Breath. 150 Bullet 11   • celecoxib (CELEBREX) 100 MG Cap Take 1 Cap by mouth 2 times a day. 180 Cap 3   • Fluticasone-Umeclidin-Vilant (TRELEGY ELLIPTA) 100-62.5-25 MCG/INH AEROSOL POWDER, BREATH ACTIVATED Inhale 1 Puff by mouth every day. 1 Each 11   • torsemide (DEMADEX) 5 MG Tab Take 1 Tab by mouth 1 time daily as needed (worsening shortness of breath).       No current facility-administered medications for this visit.          Physical Exam:   Gen:           Alert and complaining of thick mucus with cough and right hip pain. Mood and affect was mildly agitated, appropriate interaction with provider.  Eyes:          sclere white, conjunctive moist.  Hearing:     Grossly intact.  Dentition:    Upper and lower dentures  Oropharynx:   Tongue normal, posterior pharynx without erythema or exudate.  Neck:        Supple, trachea midline, no masses.  Respiratory Effort: Frequent cough, no intercostal retractions or use of accessory muscles.   Lung Auscultation:      Decreased " throughout, no rales, rhonchi or wheezing.  CV:            Regular rate and rhythm.  Trace pretibial edema. No murmurs, rubs or gallops.  Digits, Nails, Ext: No clubbing, cyanosis, petechiae, or nodes.   Skin:        No rashes, lesions or ulcers noted on back or exposed skin surfaces.                     Assessment:  1. Cough  DX-CHEST-2 VIEWS   2. Mild cognitive impairment   mild agitation noted in clinic directed towards daughter   3. Right hip pain   reproducible on palpation   4. Mucopurulent chronic bronchitis (HCC)   on antibiotic and prednisone, okay to continue Mucinex, increase nebulizer treatments up to every 4 hours if benefit seen       Immunizations:    Flu: 9/18/2018  Pneumovax 23: 11/8/2012  Prevnar 13: 10/12/2015    Plan:    1-continue Mucinex twice daily, had stopped when starting prednisone  2-avoid dairy, encourage fluids  3-check xray  4-continue prednisone and cefdinir until complete then okay to take celebrex  5-nebulizer treatment up to every 4 hours   6-follow up in 6 weeks, recommend moving appointment date back from November 1, sooner if needed.    Nebulizer treatment was given in clinic with marked decrease in coughing during treatment, increased coughing with talking.  Chest x-ray obtained will call daughter with results.    Daughter contacted, chest x-ray does not indicate a pneumonia or other acute concern.  Daughter reports patient complaining of increased pain over the course of the day, pointing at right hip area as before.  Recommended if pain is worsening, patient be evaluated further in the emergency department.  Support provided to daughter regarding eldercare and dementia concerns.    This dictation was created using voice recognition software. The accuracy of the dictation is limited to the abilities of the software. I expect there may be some errors of grammar and possibly content.

## 2019-10-03 RX ORDER — BENZONATATE 100 MG/1
100 CAPSULE ORAL 3 TIMES DAILY PRN
Qty: 30 CAP | Refills: 0 | Status: SHIPPED | OUTPATIENT
Start: 2019-10-03 | End: 2019-10-04

## 2019-10-04 ENCOUNTER — APPOINTMENT (OUTPATIENT)
Dept: RADIOLOGY | Facility: MEDICAL CENTER | Age: 84
End: 2019-10-04
Attending: EMERGENCY MEDICINE
Payer: MEDICARE

## 2019-10-04 ENCOUNTER — HOSPITAL ENCOUNTER (EMERGENCY)
Facility: MEDICAL CENTER | Age: 84
End: 2019-10-04
Attending: EMERGENCY MEDICINE
Payer: MEDICARE

## 2019-10-04 VITALS
OXYGEN SATURATION: 98 % | DIASTOLIC BLOOD PRESSURE: 87 MMHG | TEMPERATURE: 99.7 F | HEIGHT: 57 IN | BODY MASS INDEX: 28.97 KG/M2 | RESPIRATION RATE: 18 BRPM | HEART RATE: 77 BPM | WEIGHT: 134.26 LBS | SYSTOLIC BLOOD PRESSURE: 162 MMHG

## 2019-10-04 DIAGNOSIS — S30.1XXA ABDOMINAL WALL HEMATOMA, INITIAL ENCOUNTER: ICD-10-CM

## 2019-10-04 DIAGNOSIS — D68.9 COAGULOPATHY (HCC): ICD-10-CM

## 2019-10-04 LAB
ALBUMIN SERPL BCP-MCNC: 3.9 G/DL (ref 3.2–4.9)
ALBUMIN/GLOB SERPL: 1.5 G/DL
ALP SERPL-CCNC: 62 U/L (ref 30–99)
ALT SERPL-CCNC: 28 U/L (ref 2–50)
ANION GAP SERPL CALC-SCNC: 12 MMOL/L (ref 0–11.9)
AST SERPL-CCNC: 25 U/L (ref 12–45)
BASOPHILS # BLD AUTO: 0.2 % (ref 0–1.8)
BASOPHILS # BLD: 0.03 K/UL (ref 0–0.12)
BILIRUB SERPL-MCNC: 0.7 MG/DL (ref 0.1–1.5)
BUN SERPL-MCNC: 13 MG/DL (ref 8–22)
CALCIUM SERPL-MCNC: 8.6 MG/DL (ref 8.4–10.2)
CHLORIDE SERPL-SCNC: 92 MMOL/L (ref 96–112)
CO2 SERPL-SCNC: 24 MMOL/L (ref 20–33)
CREAT SERPL-MCNC: 0.84 MG/DL (ref 0.5–1.4)
EKG IMPRESSION: NORMAL
EOSINOPHIL # BLD AUTO: 0.04 K/UL (ref 0–0.51)
EOSINOPHIL NFR BLD: 0.3 % (ref 0–6.9)
ERYTHROCYTE [DISTWIDTH] IN BLOOD BY AUTOMATED COUNT: 46.4 FL (ref 35.9–50)
FLUAV RNA SPEC QL NAA+PROBE: NEGATIVE
FLUBV RNA SPEC QL NAA+PROBE: NEGATIVE
GLOBULIN SER CALC-MCNC: 2.6 G/DL (ref 1.9–3.5)
GLUCOSE SERPL-MCNC: 141 MG/DL (ref 65–99)
HCT VFR BLD AUTO: 36.8 % (ref 37–47)
HGB BLD-MCNC: 12.3 G/DL (ref 12–16)
IMM GRANULOCYTES # BLD AUTO: 0.16 K/UL (ref 0–0.11)
IMM GRANULOCYTES NFR BLD AUTO: 1.1 % (ref 0–0.9)
LIPASE SERPL-CCNC: 40 U/L (ref 7–58)
LYMPHOCYTES # BLD AUTO: 1 K/UL (ref 1–4.8)
LYMPHOCYTES NFR BLD: 7.2 % (ref 22–41)
MCH RBC QN AUTO: 32 PG (ref 27–33)
MCHC RBC AUTO-ENTMCNC: 33.4 G/DL (ref 33.6–35)
MCV RBC AUTO: 95.8 FL (ref 81.4–97.8)
MONOCYTES # BLD AUTO: 1.16 K/UL (ref 0–0.85)
MONOCYTES NFR BLD AUTO: 8.3 % (ref 0–13.4)
NEUTROPHILS # BLD AUTO: 11.55 K/UL (ref 2–7.15)
NEUTROPHILS NFR BLD: 82.9 % (ref 44–72)
NRBC # BLD AUTO: 0 K/UL
NRBC BLD-RTO: 0 /100 WBC
PLATELET # BLD AUTO: 235 K/UL (ref 164–446)
PMV BLD AUTO: 10.4 FL (ref 9–12.9)
POTASSIUM SERPL-SCNC: 4 MMOL/L (ref 3.6–5.5)
PROT SERPL-MCNC: 6.5 G/DL (ref 6–8.2)
RBC # BLD AUTO: 3.84 M/UL (ref 4.2–5.4)
SODIUM SERPL-SCNC: 128 MMOL/L (ref 135–145)
TROPONIN T SERPL-MCNC: 36 NG/L (ref 6–19)
WBC # BLD AUTO: 13.9 K/UL (ref 4.8–10.8)

## 2019-10-04 PROCEDURE — 84484 ASSAY OF TROPONIN QUANT: CPT

## 2019-10-04 PROCEDURE — 700117 HCHG RX CONTRAST REV CODE 255: Performed by: EMERGENCY MEDICINE

## 2019-10-04 PROCEDURE — 71046 X-RAY EXAM CHEST 2 VIEWS: CPT

## 2019-10-04 PROCEDURE — 99285 EMERGENCY DEPT VISIT HI MDM: CPT

## 2019-10-04 PROCEDURE — 96375 TX/PRO/DX INJ NEW DRUG ADDON: CPT

## 2019-10-04 PROCEDURE — 74177 CT ABD & PELVIS W/CONTRAST: CPT

## 2019-10-04 PROCEDURE — 80053 COMPREHEN METABOLIC PANEL: CPT

## 2019-10-04 PROCEDURE — 85025 COMPLETE CBC W/AUTO DIFF WBC: CPT

## 2019-10-04 PROCEDURE — 83690 ASSAY OF LIPASE: CPT

## 2019-10-04 PROCEDURE — 700111 HCHG RX REV CODE 636 W/ 250 OVERRIDE (IP): Performed by: EMERGENCY MEDICINE

## 2019-10-04 PROCEDURE — 96374 THER/PROPH/DIAG INJ IV PUSH: CPT | Mod: XU

## 2019-10-04 PROCEDURE — 87502 INFLUENZA DNA AMP PROBE: CPT

## 2019-10-04 PROCEDURE — 72170 X-RAY EXAM OF PELVIS: CPT

## 2019-10-04 PROCEDURE — 93005 ELECTROCARDIOGRAM TRACING: CPT | Performed by: EMERGENCY MEDICINE

## 2019-10-04 RX ORDER — CELECOXIB 100 MG/1
100 CAPSULE ORAL 2 TIMES DAILY
Status: SHIPPED | COMMUNITY
End: 2020-03-24

## 2019-10-04 RX ORDER — IRBESARTAN 75 MG/1
75 TABLET ORAL EVERY MORNING
Status: SHIPPED | COMMUNITY
End: 2019-10-06

## 2019-10-04 RX ORDER — GABAPENTIN 100 MG/1
100 CAPSULE ORAL EVERY EVENING
Status: SHIPPED | COMMUNITY
End: 2020-09-22

## 2019-10-04 RX ORDER — BENZONATATE 100 MG/1
100 CAPSULE ORAL 3 TIMES DAILY PRN
Status: SHIPPED | COMMUNITY
End: 2019-10-21

## 2019-10-04 RX ORDER — ONDANSETRON 2 MG/ML
4 INJECTION INTRAMUSCULAR; INTRAVENOUS ONCE
Status: COMPLETED | OUTPATIENT
Start: 2019-10-04 | End: 2019-10-04

## 2019-10-04 RX ORDER — MORPHINE SULFATE 4 MG/ML
4 INJECTION, SOLUTION INTRAMUSCULAR; INTRAVENOUS ONCE
Status: COMPLETED | OUTPATIENT
Start: 2019-10-04 | End: 2019-10-04

## 2019-10-04 RX ORDER — CEFDINIR 300 MG/1
300 CAPSULE ORAL 2 TIMES DAILY
Status: SHIPPED | COMMUNITY
Start: 2019-09-30 | End: 2019-10-10

## 2019-10-04 RX ORDER — ALBUTEROL SULFATE 2.5 MG/3ML
2.5 SOLUTION RESPIRATORY (INHALATION) 3 TIMES DAILY
Status: SHIPPED | COMMUNITY
End: 2020-05-27

## 2019-10-04 RX ORDER — HYDROCODONE BITARTRATE AND ACETAMINOPHEN 5; 325 MG/1; MG/1
1-2 TABLET ORAL EVERY 4 HOURS PRN
Qty: 20 TAB | Refills: 0 | Status: SHIPPED | OUTPATIENT
Start: 2019-10-04 | End: 2019-10-09

## 2019-10-04 RX ADMIN — IOHEXOL 100 ML: 350 INJECTION, SOLUTION INTRAVENOUS at 13:10

## 2019-10-04 RX ADMIN — MORPHINE SULFATE 4 MG: 4 INJECTION INTRAVENOUS at 10:43

## 2019-10-04 RX ADMIN — ONDANSETRON 4 MG: 2 INJECTION INTRAMUSCULAR; INTRAVENOUS at 12:02

## 2019-10-04 ASSESSMENT — LIFESTYLE VARIABLES: DO YOU DRINK ALCOHOL: NO

## 2019-10-04 NOTE — ED NOTES
Patient IV discontinued in tact, NAD, VSS, patient and daughter verbalized understanding of dc instructions and prescriptions.

## 2019-10-04 NOTE — ED TRIAGE NOTES
"Chief Complaint   Patient presents with   • Pain     right hip when coughing     Pt on abx for cough. Has developed pain to hip from cough. Would like hip pain evaluated.   Pulse 92   Temp 37.6 °C (99.7 °F) (Temporal)   Resp 20   Ht 1.448 m (4' 9\")   Wt 60.9 kg (134 lb 4.2 oz)   SpO2 92% .  Pt informed of wait times. Educated on triage process.  Asked to return to triage RN for any new or worsening of symptoms. Thanked for patience.        "

## 2019-10-04 NOTE — ED NOTES
MED REC UPDATED AND COMPLETE PER PT FAMILY AT BEDSIDE  ALLERGIES HAVE BEEN VERIFIED AND UPDATED   PT HOME PHARMACY: [facility]    PT FAMILY REPORTS THAT PT IS ON A COURSE OF OMNICEF & PREDNISONE THAT WAS STARTED ON 09/30/19. PT FAMILY REPORTS PT IS TO TAKE MEDICATION UNTIL GONE. MED REC UNABLE TO VERIFY MEDICATION COURSE WITH PT HOME PHARMACY    PT FAMILY REPORTS THAT PT ONLY HAS 2 DAYS LEFT OF PREDNISONE

## 2019-10-04 NOTE — ED PROVIDER NOTES
ED Provider Note    ED Provider    Means of arrival: Private vehicle  History obtained from: Daughter at the bedside  History limited by: Patient has dementia    CHIEF COMPLAINT  Chief Complaint   Patient presents with   • Pain     right hip when coughing       HPI  Venus Church is a 88 y.o. female who presents complains of cough for 2 to 3 days.  She has a history of COPD she is seen her primary doctor has been placed on antibiotics and steroid medications.  The cough is not improving and is getting worse.  There is no shortness of breath.  The pain that brought the patient in today was she is complaining of right hip pain the pain is in the right ilium area.  There was no known history of trauma.  The pain is constant, worse with coughing worse with movement.  The daughter also stated that just before my evaluation she started complaining of a left-sided chest pain that seems to have resolved.  The pain lasted for a brief period of time but this just started now.    REVIEW OF SYSTEMS  See HPI for further details. All other systems are negative.     PAST MEDICAL HISTORY   has a past medical history of Anesthesia, Aortic regurgitation (4/25/2012), Arrhythmia (7/10), Arthritis, Atypical chest pain (4/25/2012), CATARACT (2007,08), Chronic anticoagulation (4/25/2012), Constipation (4/25/2012), COPD (chronic obstructive pulmonary disease) (HCC), DDD (degenerative disc disease), Dental disorder, Dyspnea (4/25/2012), Generalized osteoarthritis (4/25/2012), Headache(784.0) (4/25/2012), Heart valve insufficiency, Hypercholesteremia, Hyperlipidemia (4/25/2012), Hypertension, MEDICAL HOME, On home oxygen therapy, Osteoarthritis of knee (4/25/2012), Osteoporosis, Other accident, Pain, Pneumonia (6/4), TB lung, 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    SOCIAL HISTORY  Social History     Tobacco Use   • Smoking status: Former Smoker     Packs/day: 1.50     Years: 25.00     " Pack years: 37.50     Types: Cigarettes     Last attempt to quit: 1984     Years since quittin.4   • Smokeless tobacco: Never Used   • Tobacco comment: Continued abstinence advised.   Substance and Sexual Activity   • Alcohol use: No     Alcohol/week: 0.0 oz   • Drug use: No   • Sexual activity: Not on file       SURGICAL HISTORY   has a past surgical history that includes cataract phaco with iol (2009); other orthopedic surgery (may, 2010); carpal tunnel release (@30 yrs ago); and recovery (2010).    CURRENT MEDICATIONS  Home Medications     Reviewed by Sarina Lyle (Pharmacy Tech) on 10/04/19 at 1126  Med List Status: Complete   Medication Last Dose Status   albuterol (PROVENTIL) 2.5mg/3ml Nebu Soln solution for nebulization 10/4/2019 Active   Alpha-Lipoic Acid (LIPOIC ACID PO) 10/4/2019 Active   apixaban (ELIQUIS) 2.5mg Tab 10/4/2019 Active   benzonatate (TESSALON) 100 MG Cap 10/4/2019 Active   cefdinir (OMNICEF) 300 MG Cap 10/4/2019 Active   celecoxib (CELEBREX) 100 MG Cap 2019 Active   Coenzyme Q10 (COQ10 PO) 10/3/2019 Active   Fluticasone-Umeclidin-Vilant (TRELEGY ELLIPTA) 100-62.5-25 MCG/INH AEROSOL POWDER, BREATH ACTIVATED 10/4/2019 Active   gabapentin (NEURONTIN) 100 MG Cap 10/3/2019 Active   Ginkgo Biloba 120 MG Cap 10/4/2019 Active   Green Tea, Olya sinensis, (GREEN TEA EXTRACT PO) 10/3/2019 Active   irbesartan (AVAPRO) 75 MG tablet 10/4/2019 Active   metoprolol (LOPRESSOR) 25 MG Tab 10/3/2019 Active   predniSONE (DELTASONE) 10 MG Tab 10/4/2019 Active   Probiotic Product (PROBIOTIC PO) 10/3/2019 Active                ALLERGIES  Allergies   Allergen Reactions   • Codeine Vomiting       PHYSICAL EXAM  VITAL SIGNS: /68   Pulse 67   Temp 37.6 °C (99.7 °F) (Temporal)   Resp 18   Ht 1.448 m (4' 9\")   Wt 60.9 kg (134 lb 4.2 oz)   SpO2 99%   BMI 29.05 kg/m²   Constitutional: Alert in no apparent distress.  HENT: No signs of trauma, Mucous membranes are moist "   Eyes:  Conjunctiva normal, Non-icteric.   Neck: Normal range of motion, No tenderness, Supple,  Lymphatic: No lymphadenopathy noted.   Cardiovascular: Regular rate and rhythm, no murmurs.   Thorax & Lungs: Normal breath sounds, No respiratory distress, No wheezing, diffuse chest tenderness is present  Abdomen: Bowel sounds normal, Soft, No tenderness, No masses, No pulsatile masses. No peritoneal signs.  There is tenderness to the palpation of the right ilium   Skin: Warm, Dry,Normal color  Back: No bony tenderness, No CVA tenderness.   Extremities:No edema, No tenderness, No cyanosis,    Musculoskeletal: Good range of motion in all major joints. No tenderness to palpation or major deformities noted.  Range of motion of the hips does not elicit tenderness, there is no deformity or shortening  Neurologic: Alert ,Oriented x4, Normal motor function, Normal sensory function, No focal deficits noted.   Psychiatric: Affect normal, Judgment normal, Mood normal.       MEDICAL DECISION MAKING  This is a 88 y.o. female who presents history of COPD has having a bad cough, currently a bronchitis is endemic in the community and this may be the etiology.  She is not getting improvement with steroids, or antibiotics.  She is also developing chest pain which will be evaluated for to evaluate for cardiac disease in the hip will be x-ray to evaluate for any signs of fracture displacement.    DIAGNOSTIC STUDIES / PROCEDURES    EKG  Results for orders placed or performed during the hospital encounter of 10/04/19   CBC w/ Differential   Result Value Ref Range    WBC 13.9 (H) 4.8 - 10.8 K/uL    RBC 3.84 (L) 4.20 - 5.40 M/uL    Hemoglobin 12.3 12.0 - 16.0 g/dL    Hematocrit 36.8 (L) 37.0 - 47.0 %    MCV 95.8 81.4 - 97.8 fL    MCH 32.0 27.0 - 33.0 pg    MCHC 33.4 (L) 33.6 - 35.0 g/dL    RDW 46.4 35.9 - 50.0 fL    Platelet Count 235 164 - 446 K/uL    MPV 10.4 9.0 - 12.9 fL    Neutrophils-Polys 82.90 (H) 44.00 - 72.00 %    Lymphocytes 7.20  (L) 22.00 - 41.00 %    Monocytes 8.30 0.00 - 13.40 %    Eosinophils 0.30 0.00 - 6.90 %    Basophils 0.20 0.00 - 1.80 %    Immature Granulocytes 1.10 (H) 0.00 - 0.90 %    Nucleated RBC 0.00 /100 WBC    Neutrophils (Absolute) 11.55 (H) 2.00 - 7.15 K/uL    Lymphs (Absolute) 1.00 1.00 - 4.80 K/uL    Monos (Absolute) 1.16 (H) 0.00 - 0.85 K/uL    Eos (Absolute) 0.04 0.00 - 0.51 K/uL    Baso (Absolute) 0.03 0.00 - 0.12 K/uL    Immature Granulocytes (abs) 0.16 (H) 0.00 - 0.11 K/uL    NRBC (Absolute) 0.00 K/uL   Complete Metabolic Panel (CMP)   Result Value Ref Range    Sodium 128 (L) 135 - 145 mmol/L    Potassium 4.0 3.6 - 5.5 mmol/L    Chloride 92 (L) 96 - 112 mmol/L    Co2 24 20 - 33 mmol/L    Anion Gap 12.0 (H) 0.0 - 11.9    Glucose 141 (H) 65 - 99 mg/dL    Bun 13 8 - 22 mg/dL    Creatinine 0.84 0.50 - 1.40 mg/dL    Calcium 8.6 8.4 - 10.2 mg/dL    AST(SGOT) 25 12 - 45 U/L    ALT(SGPT) 28 2 - 50 U/L    Alkaline Phosphatase 62 30 - 99 U/L    Total Bilirubin 0.7 0.1 - 1.5 mg/dL    Albumin 3.9 3.2 - 4.9 g/dL    Total Protein 6.5 6.0 - 8.2 g/dL    Globulin 2.6 1.9 - 3.5 g/dL    A-G Ratio 1.5 g/dL   Troponin STAT   Result Value Ref Range    Troponin T 36 (H) 6 - 19 ng/L   Lipase   Result Value Ref Range    Lipase 40 7 - 58 U/L   Influenza A/B By PCR (Adult - Flu Only)   Result Value Ref Range    Influenza virus A RNA Negative Negative    Influenza virus B, PCR Negative Negative   ESTIMATED GFR   Result Value Ref Range    GFR If African American >60 >60 mL/min/1.73 m 2    GFR If Non African American >60 >60 mL/min/1.73 m 2   EKG   Result Value Ref Range    Report       [facility] Emergency Dept.    Test Date:  2019-10-04  Pt Name:    IGOR KLINE                    Department: [facility]  MRN:        [id number]                      [facility]  Gender:     Female                       Technician: [id number]  :        [id number]                   Requested By:BOY BRYANT  Order #:    [id number]                     Reading MD: BOY BRYANT D.O.    Measurements  Intervals                                Axis  Rate:       93                           P:          8  OK:         172                          QRS:        -34  QRSD:       98                           T:          84  QT:         372  QTc:        463    Interpretive Statements  SINUS RHYTHM  VENTRICULAR PREMATURE COMPLEX  LEFT AXIS DEVIATION  LVH WITH SECONDARY REPOLARIZATION ABNORMALITY  CONSIDER ANTERIOR INFARCT  BASELINE WANDER IN LEAD(S) V5  Compared to ECG 08/22/2019 19:55:43  Ventricular premature complex(es) now present  Left-axis deviation now present  Myoc ardial infarct finding now present  Atrial premature complex(es) no longer present    Electronically Signed On 10-4-2019 14:16:07 PDT by BOY BRYANT D.O.       *Note: Due to a large number of results and/or encounters for the requested time period, some results have not been displayed. A complete set of results can be found in Results Review.         LABS      RADIOLOGY  CT-ABDOMEN-PELVIS WITH   Final Result      1.  There is a right lateral oblique musculature heterogeneous appearing hematoma with several small foci of increased density which could be areas of recent or acute blood. Recommend correlation with patient's blood work.   2.  There is extensive colonic diverticulosis without diverticulitis.   3.  There is a small epigastric midline abdominal wall fat-containing ventral hernia.   4.  There is extensive atherosclerosis.      Findings were discussed with BOY BRYANT on 10/4/2019 at 1:18 PM.      DX-CHEST-2 VIEWS   Final Result      1.  Stable cardiomegaly.      2.  Again seen hyperinflated lungs.      DX-PELVIS-1 OR 2 VIEWS   Final Result      1.  No evidence of fracture.      2.  Degenerative change of the lumbar spine and SI joints.          COURSE  Pertinent Labs & Imaging studies reviewed. (See chart for details)    10:28 AM - Patient seen and examined at bedside.  Discussed plan of care,     Hospital course: The patient presented with tenderness and pain in the right ilium.  Remainder of abdominal exam was normal.  X-rays showed no concerns for fracture or displacement.  But due to her advanced age and concerns of pain in the abdomen CT was done.  CT shows that she has a 6 x 8 hematoma in the right oblique muscles.  There is question of still slow bleeding.  She is on Eliquis for atrial fibrillation.  She is also on Celebrex.  These are both contributing to Holter finding.  I spoke with the daughter at length.  My initial plan was to admit the patient for observation and a repeat H&H and monitoring of blood pressure and symptoms.  However the daughter would feel more comfortable have the patient at home and she will observe the patient at home.  I instructed her to check the blood pressure every 4-6 hours, return if the blood pressure goes below 100 if her heart rate goes above 90, or if she develops lightheadedness or dizziness.  She is had this pain for 3 days.  Her hemoglobin remained stable on hemodynamically she is still stable with that I believe home outpatient observation by this caring, and reasonable daughter is appropriate.    Her troponin is mildly elevated but review of previous labs shows that this is consistent with her baseline of troponin I do not suspect recent cardiac event.      FINAL IMPRESSION  1. Abdominal wall hematoma, initial encounter    2. Coagulopathy (HCC)

## 2019-10-04 NOTE — DISCHARGE INSTRUCTIONS
Stop using Eliquis, please follow-up with your primary care doctor to get approval to get started back on this office see her cardiologist.    Return if pulse rate goes above 90, return if blood pressure goes below 100, return if she has any dizziness lightheadedness or passing out.

## 2019-10-06 ENCOUNTER — APPOINTMENT (OUTPATIENT)
Dept: RADIOLOGY | Facility: MEDICAL CENTER | Age: 84
End: 2019-10-06
Attending: EMERGENCY MEDICINE
Payer: MEDICARE

## 2019-10-06 ENCOUNTER — HOSPITAL ENCOUNTER (EMERGENCY)
Facility: MEDICAL CENTER | Age: 84
End: 2019-10-06
Attending: EMERGENCY MEDICINE
Payer: MEDICARE

## 2019-10-06 VITALS
TEMPERATURE: 98.5 F | WEIGHT: 130.07 LBS | HEIGHT: 60 IN | HEART RATE: 70 BPM | SYSTOLIC BLOOD PRESSURE: 187 MMHG | DIASTOLIC BLOOD PRESSURE: 86 MMHG | BODY MASS INDEX: 25.54 KG/M2 | OXYGEN SATURATION: 98 % | RESPIRATION RATE: 22 BRPM

## 2019-10-06 DIAGNOSIS — T14.8XXA MUSCLE STRAIN: ICD-10-CM

## 2019-10-06 DIAGNOSIS — R05.9 COUGH: ICD-10-CM

## 2019-10-06 LAB
ALBUMIN SERPL BCP-MCNC: 3.8 G/DL (ref 3.2–4.9)
ALBUMIN/GLOB SERPL: 1.3 G/DL
ALP SERPL-CCNC: 77 U/L (ref 30–99)
ALT SERPL-CCNC: 25 U/L (ref 2–50)
ANION GAP SERPL CALC-SCNC: 14 MMOL/L (ref 0–11.9)
AST SERPL-CCNC: 21 U/L (ref 12–45)
BASOPHILS # BLD AUTO: 0.1 % (ref 0–1.8)
BASOPHILS # BLD: 0.02 K/UL (ref 0–0.12)
BILIRUB SERPL-MCNC: 0.6 MG/DL (ref 0.1–1.5)
BUN SERPL-MCNC: 20 MG/DL (ref 8–22)
CALCIUM SERPL-MCNC: 8.8 MG/DL (ref 8.4–10.2)
CHLORIDE SERPL-SCNC: 94 MMOL/L (ref 96–112)
CO2 SERPL-SCNC: 25 MMOL/L (ref 20–33)
CREAT SERPL-MCNC: 0.85 MG/DL (ref 0.5–1.4)
EOSINOPHIL # BLD AUTO: 0.03 K/UL (ref 0–0.51)
EOSINOPHIL NFR BLD: 0.2 % (ref 0–6.9)
ERYTHROCYTE [DISTWIDTH] IN BLOOD BY AUTOMATED COUNT: 46.6 FL (ref 35.9–50)
GLOBULIN SER CALC-MCNC: 2.9 G/DL (ref 1.9–3.5)
GLUCOSE SERPL-MCNC: 144 MG/DL (ref 65–99)
HCT VFR BLD AUTO: 36.6 % (ref 37–47)
HGB BLD-MCNC: 12.1 G/DL (ref 12–16)
IMM GRANULOCYTES # BLD AUTO: 0.11 K/UL (ref 0–0.11)
IMM GRANULOCYTES NFR BLD AUTO: 0.8 % (ref 0–0.9)
INR PPP: 1.01 (ref 0.87–1.13)
LIPASE SERPL-CCNC: 36 U/L (ref 7–58)
LYMPHOCYTES # BLD AUTO: 1.36 K/UL (ref 1–4.8)
LYMPHOCYTES NFR BLD: 9.8 % (ref 22–41)
MCH RBC QN AUTO: 31.7 PG (ref 27–33)
MCHC RBC AUTO-ENTMCNC: 33.1 G/DL (ref 33.6–35)
MCV RBC AUTO: 95.8 FL (ref 81.4–97.8)
MONOCYTES # BLD AUTO: 0.87 K/UL (ref 0–0.85)
MONOCYTES NFR BLD AUTO: 6.3 % (ref 0–13.4)
NEUTROPHILS # BLD AUTO: 11.47 K/UL (ref 2–7.15)
NEUTROPHILS NFR BLD: 82.8 % (ref 44–72)
NRBC # BLD AUTO: 0 K/UL
NRBC BLD-RTO: 0 /100 WBC
PLATELET # BLD AUTO: 296 K/UL (ref 164–446)
PMV BLD AUTO: 10.3 FL (ref 9–12.9)
POTASSIUM SERPL-SCNC: 5.1 MMOL/L (ref 3.6–5.5)
PROT SERPL-MCNC: 6.7 G/DL (ref 6–8.2)
PROTHROMBIN TIME: 13.4 SEC (ref 12–14.6)
RBC # BLD AUTO: 3.82 M/UL (ref 4.2–5.4)
SODIUM SERPL-SCNC: 133 MMOL/L (ref 135–145)
WBC # BLD AUTO: 13.9 K/UL (ref 4.8–10.8)

## 2019-10-06 PROCEDURE — 99285 EMERGENCY DEPT VISIT HI MDM: CPT

## 2019-10-06 PROCEDURE — 85025 COMPLETE CBC W/AUTO DIFF WBC: CPT

## 2019-10-06 PROCEDURE — 94760 N-INVAS EAR/PLS OXIMETRY 1: CPT

## 2019-10-06 PROCEDURE — 700101 HCHG RX REV CODE 250: Performed by: EMERGENCY MEDICINE

## 2019-10-06 PROCEDURE — 94640 AIRWAY INHALATION TREATMENT: CPT

## 2019-10-06 PROCEDURE — 700117 HCHG RX CONTRAST REV CODE 255: Performed by: EMERGENCY MEDICINE

## 2019-10-06 PROCEDURE — 80053 COMPREHEN METABOLIC PANEL: CPT

## 2019-10-06 PROCEDURE — 71275 CT ANGIOGRAPHY CHEST: CPT

## 2019-10-06 PROCEDURE — 85610 PROTHROMBIN TIME: CPT

## 2019-10-06 PROCEDURE — 83690 ASSAY OF LIPASE: CPT

## 2019-10-06 RX ORDER — AMLODIPINE BESYLATE 5 MG/1
5 TABLET ORAL DAILY
Qty: 30 TAB | Refills: 0 | Status: SHIPPED | OUTPATIENT
Start: 2019-10-06 | End: 2019-10-10

## 2019-10-06 RX ORDER — IPRATROPIUM BROMIDE AND ALBUTEROL SULFATE 2.5; .5 MG/3ML; MG/3ML
3 SOLUTION RESPIRATORY (INHALATION)
Status: DISCONTINUED | OUTPATIENT
Start: 2019-10-06 | End: 2019-10-06 | Stop reason: HOSPADM

## 2019-10-06 RX ADMIN — IOHEXOL 100 ML: 350 INJECTION, SOLUTION INTRAVENOUS at 20:25

## 2019-10-06 RX ADMIN — IPRATROPIUM BROMIDE AND ALBUTEROL SULFATE 3 ML: .5; 3 SOLUTION RESPIRATORY (INHALATION) at 18:30

## 2019-10-06 ASSESSMENT — COPD QUESTIONNAIRES
COPD SCREENING SCORE: 5
HAVE YOU SMOKED AT LEAST 100 CIGARETTES IN YOUR ENTIRE LIFE: YES
DO YOU EVER COUGH UP ANY MUCUS OR PHLEGM?: NO/ONLY WITH OCCASIONAL COLDS OR INFECTIONS
DURING THE PAST 4 WEEKS HOW MUCH DID YOU FEEL SHORT OF BREATH: SOME OF THE TIME

## 2019-10-06 ASSESSMENT — LIFESTYLE VARIABLES: EVER_SMOKED: YES

## 2019-10-06 NOTE — ED TRIAGE NOTES
Chief Complaint   Patient presents with   • Cough     pt was seen here on 10/4 for hematoma to her abd. pt BIB her daughter c/o increased coughing and bruising to R hip area. denies any trauma. last dose of elaquist was 10/4     BP (!) 175/82   Pulse 83   Temp 36.9 °C (98.5 °F) (Temporal)   Resp 18   Ht 1.524 m (5')   Wt 59 kg (130 lb 1.1 oz)   SpO2 91%   BMI 25.40 kg/m²

## 2019-10-07 ENCOUNTER — PATIENT MESSAGE (OUTPATIENT)
Dept: CARDIOLOGY | Facility: MEDICAL CENTER | Age: 84
End: 2019-10-07

## 2019-10-07 PROBLEM — N28.89 RENAL MASS: Status: ACTIVE | Noted: 2019-10-07

## 2019-10-07 PROBLEM — K43.9 VENTRAL HERNIA: Status: ACTIVE | Noted: 2019-10-07

## 2019-10-07 NOTE — DISCHARGE INSTRUCTIONS
The CT failed to reveal any evidence of pneumonia, your labs are very reassuring, your blood count has not changed.  It is important to follow-up with your primary care physician for ongoing monitoring of the cough and hematoma.  He does have a very small.  Also thes ibersartin can sometimes cause a chronic cough, therefore we will hold this, do not take this any longer and instead take amlodipine.  Amlodipine is generally tolerated well sometimes it can cause some minor leg swelling.

## 2019-10-07 NOTE — ED NOTES
FUTURE APPOINTMENTS  Follow up in 2-3 weeks.     TOPICAL STEROID INSTRUCTIONS  Triamcinolone 0.1% cream.  1. Wash hands before applying topical steroid. Use the adult fingertip unit (FTU) as a guide.    2. Apply sparingly (just enough to rub in) onto affected areas of the hands and legs (not to exceed 1 FTU), two times per day for 10-14 days.  3. Wash off any excess, unused topical steroid.    This higher strength steroid should never be used on face nor groin.    After the initial treatment, topical steroid may be used as needed for flare-ups but only for short-term treatment.    If you are using this for prolonged periods of time to control flare-ups, return to clinic for re-evaluation of treatment.    Keep in mind to also regularly use moisturizer, as this preventative measure can help maintain your skin's natural protective moisture barrier.    DRY SKIN MANAGEMENT INSTRUCTIONS  Routine use of moisturizer is important for healthy, resilient skin not just for soft skin.     Sealing in moisture    Twice daily use of a moisturizer such as over-the-counter (OTC) CeraVe moisturizer cream (in the jar). CeraVe products contain ceramides and filaggrin proteins that can help to maintain the body's moisture layer.    After cleansing or washing, always apply moisturizer immediately after drying off (pat dry only) for best effect.    Protection while hydrating    Do not overuse soap. Unless you have been sweating extensively, just apply soap to groin and armpits.    Recommended products for body include: OTC unscented Dove for sensitive skin or OTC Vanicream cleansing bar.    Recommended facial cleansers include: OTC CeraVe hydrating facial cleanser or OTC Cetaphil daily facial cleanser.    Avoid use of    Scented/perfumed products    Irritating clothing (wool, new jeans, new/unwashed clothing, scratchy synthetics)    Neosporin or triple antibiotic topical products    Products containing aloe, herbs, Vitamin E, or other  Pt given d/c paperwork and prescriptions, pt verbalized understanding all information given. Pt assisted out of the ER in w/c by tech and family w/o difficulty.    "\"natural ingredients\".    Dryer sheets or fabric softeners (while symptoms are present)    If a topical medication is prescribed, apply topical prescription first, followed by use of moisturizing product.    "

## 2019-10-07 NOTE — FLOWSHEET NOTE
10/06/19 1830   Events/Summary/Plan   Events/Summary/Plan SVN with duoneb   Interdisciplinary Plan of Care-Goals (Indications)   Bronchodilator Indications History / Diagnosis   Interdisciplinary Plan of Care-Outcomes    Bronchodilator Outcome Improvement in Airflow (peak flow, PFT)   Education   Education Yes - Pt. / Family has been Instructed in use of Respiratory Equipment;Yes - Pt. / Family has been Instructed in use of Respiratory Medications and Adverse Reactions   RT Assessment of Delivered Medications   Evaluation of Medication Delivery Daily Yes-- Pt /Family has been Instructed in use of Respiratory Medications and Adverse Reactions   SVN Group   #SVN Performed Yes   Given By: Mask   Date SVN Last Changed 10/06/19   Date SVN Next Change Due (Q 7 Days) 10/13/19   Chest Exam   Respiration 20   Pulse 77   Breath Sounds   RUL Breath Sounds Clear;Diminished   RML Breath Sounds Diminished   RLL Breath Sounds Diminished   CHARITY Breath Sounds Clear;Diminished   LLL Breath Sounds Diminished   Secretions   Cough Productive   How Sputum Obtained Expectorated   Sputum Amount Moderate   Sputum Color Yellow   Sputum Consistency Thick;Thin   Oximetry   #Pulse Oximetry (Single Determination) Yes   Oxygen   Home O2 Use Prior To Admission? Yes   Home O2 LPM Flow 2 LPM   Home O2 Delivery Method Nasal Cannula   Home O2 Frequency of Use At Sleep   Pulse Oximetry 93 %   O2 (LPM) 0   O2 Daily Delivery Respiratory  Room Air with O2 Available

## 2019-10-07 NOTE — ED PROVIDER NOTES
ED Provider Note    CHIEF COMPLAINT  Chief Complaint   Patient presents with   • Cough     pt was seen here on 10/4 for hematoma to her abd. pt BIB her daughter c/o increased coughing and bruising to R hip area. denies any trauma. last dose of elaquist was 10/4       [facility]  Venus Church is a 88 y.o. female who presents with ongoing cough.  She reports that she initially was seen by her primary care physician who started her on steroids and Ceftinir; cough was not improving and therefore patient came to the emergency department.  She also had developed some right hip pain.  Patient's work-up included a CT scan which showed possible hematoma in the right lateral oblique.  She is on anticoagulation for paroxysmal A. fib, but this was held given the hematoma.  There is no hematoma on exam on the fourth, however this afternoon patient woke up with considerable diffuse hematoma along her right flank.  Patient reports considerable pain at this hematoma.  She reports cough is ongoing and mildly worse.  She denies any hemoptysis.  Patient denies any fevers or constitutional symptoms.    REVIEW OF SYSTEMS  ROS    See HPI for further details. All other systems are negative.     PAST MEDICAL HISTORY   has a past medical history of Anesthesia, Aortic regurgitation (4/25/2012), Arrhythmia (7/10), Arthritis, Atypical chest pain (4/25/2012), CATARACT (2007,08), Chronic anticoagulation (4/25/2012), Constipation (4/25/2012), COPD (chronic obstructive pulmonary disease) (HCC), DDD (degenerative disc disease), Dental disorder, Dyspnea (4/25/2012), Generalized osteoarthritis (4/25/2012), Headache(784.0) (4/25/2012), Heart valve insufficiency, Hypercholesteremia, Hyperlipidemia (4/25/2012), Hypertension, MEDICAL HOME, On home oxygen therapy, Osteoarthritis of knee (4/25/2012), Osteoporosis, Other accident, Pain, Pneumonia (6/4), TB lung, latent (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    SOCIAL HISTORY  Social History     Tobacco Use   • Smoking status: Former Smoker     Packs/day: 1.50     Years: 25.00     Pack years: 37.50     Types: Cigarettes     Last attempt to quit: 1984     Years since quittin.5   • Smokeless tobacco: Never Used   • Tobacco comment: Continued abstinence advised.   Substance and Sexual Activity   • Alcohol use: No     Alcohol/week: 0.0 oz   • Drug use: No   • Sexual activity: Not on file       SURGICAL HISTORY   has a past surgical history that includes cataract phaco with iol (2009); other orthopedic surgery (may, 2010); carpal tunnel release (@30 yrs ago); and recovery (2010).    CURRENT MEDICATIONS  Home Medications    **Home medications have not yet been reviewed for this encounter**         ALLERGIES  Allergies   Allergen Reactions   • Codeine Vomiting       PHYSICAL EXAM  Physical Exam   Constitutional: She is oriented to person, place, and time. She appears well-developed and well-nourished.   HENT:   Head: Normocephalic and atraumatic.   Eyes: Conjunctivae are normal.   Neck: Normal range of motion. Neck supple.   Cardiovascular: Normal rate and regular rhythm.   Pulmonary/Chest: Effort normal.   Mild diffuse wheezing throughout   Abdominal: Soft. Bowel sounds are normal. She exhibits no distension. There is no rebound.   Patient's right flank with diffuse hematoma along the entirety of her oblique and extending into her back.  Tenderness along the entirety of hematoma with any associated overlying erythema induration or underlying fluctuance.  Patient abdominal exam is unremarkable without any tenderness to palpation.   Neurological: She is alert and oriented to person, place, and time.   Skin: Skin is warm and dry. No rash noted.   Psychiatric: She has a normal mood and affect. Her behavior is normal.         DIAGNOSTIC STUDIES / PROCEDURES    LABS  Results for orders placed or performed during the hospital encounter of 10/06/19    CBC WITH DIFFERENTIAL   Result Value Ref Range    WBC 13.9 (H) 4.8 - 10.8 K/uL    RBC 3.82 (L) 4.20 - 5.40 M/uL    Hemoglobin 12.1 12.0 - 16.0 g/dL    Hematocrit 36.6 (L) 37.0 - 47.0 %    MCV 95.8 81.4 - 97.8 fL    MCH 31.7 27.0 - 33.0 pg    MCHC 33.1 (L) 33.6 - 35.0 g/dL    RDW 46.6 35.9 - 50.0 fL    Platelet Count 296 164 - 446 K/uL    MPV 10.3 9.0 - 12.9 fL    Neutrophils-Polys 82.80 (H) 44.00 - 72.00 %    Lymphocytes 9.80 (L) 22.00 - 41.00 %    Monocytes 6.30 0.00 - 13.40 %    Eosinophils 0.20 0.00 - 6.90 %    Basophils 0.10 0.00 - 1.80 %    Immature Granulocytes 0.80 0.00 - 0.90 %    Nucleated RBC 0.00 /100 WBC    Neutrophils (Absolute) 11.47 (H) 2.00 - 7.15 K/uL    Lymphs (Absolute) 1.36 1.00 - 4.80 K/uL    Monos (Absolute) 0.87 (H) 0.00 - 0.85 K/uL    Eos (Absolute) 0.03 0.00 - 0.51 K/uL    Baso (Absolute) 0.02 0.00 - 0.12 K/uL    Immature Granulocytes (abs) 0.11 0.00 - 0.11 K/uL    NRBC (Absolute) 0.00 K/uL   CMP   Result Value Ref Range    Sodium 133 (L) 135 - 145 mmol/L    Potassium 5.1 3.6 - 5.5 mmol/L    Chloride 94 (L) 96 - 112 mmol/L    Co2 25 20 - 33 mmol/L    Anion Gap 14.0 (H) 0.0 - 11.9    Glucose 144 (H) 65 - 99 mg/dL    Bun 20 8 - 22 mg/dL    Creatinine 0.85 0.50 - 1.40 mg/dL    Calcium 8.8 8.4 - 10.2 mg/dL    AST(SGOT) 21 12 - 45 U/L    ALT(SGPT) 25 2 - 50 U/L    Alkaline Phosphatase 77 30 - 99 U/L    Total Bilirubin 0.6 0.1 - 1.5 mg/dL    Albumin 3.8 3.2 - 4.9 g/dL    Total Protein 6.7 6.0 - 8.2 g/dL    Globulin 2.9 1.9 - 3.5 g/dL    A-G Ratio 1.3 g/dL   PT/INR   Result Value Ref Range    PT 13.4 12.0 - 14.6 sec    INR 1.01 0.87 - 1.13   LIPASE   Result Value Ref Range    Lipase 36 7 - 58 U/L   ESTIMATED GFR   Result Value Ref Range    GFR If African American >60 >60 mL/min/1.73 m 2    GFR If Non African American >60 >60 mL/min/1.73 m 2     *Note: Due to a large number of results and/or encounters for the requested time period, some results have not been displayed. A complete set of results  can be found in Results Review.         RADIOLOGY  CT-CTA COMPLETE THORACOABDOMINAL AORTA   Final Result         1.  Right lateral abdominal wall intramuscular hematoma, decreased in size since prior study.   2.  No visualized aortic aneurysm or dissection.   3.  Atherosclerosis with greater than 50% stenosis of the bilateral renal arteries and less than 50% stenosis of the celiac and superior mesenteric arteries.   4.  Left interpolar low-density lesion, recommend follow-up 3 phase CT of the kidneys for further characterization.   5.  Fat-containing umbilical hernia   6.  Diverticulosis   7.  Emphysema   8.  Hepatomegaly              COURSE & MEDICAL DECISION MAKING  Pertinent Labs & Imaging studies reviewed. (See chart for details)  Patient here with large hematoma check CT for possible worsening symptoms.  Given x-ray was negative sometimes CT abdomen will also include her chest to evaluate for possible occult pneumonia versus pulmonary embolus.  CT reveals resolving hematoma, no other acute abnormalities were disclosed to family.  Patient is on an ARB, this may be the cause of her chronic cough.  Will hold this and have patient take amlodipine.  Patient's basic labs are reassuring.  Patient to follow-up with primary care physician.  The patient will return for worsening symptoms and is stable at the time of discharge. The patient verbalizes understanding and will comply.    FINAL IMPRESSION  1.   1. Cough    2. Muscle strain            Electronically signed by: Esdras Farr, 10/6/2019 6:36 PM

## 2019-10-09 RX ORDER — METHYLPREDNISOLONE 4 MG/1
TABLET ORAL
Qty: 21 TAB | Refills: 0 | Status: SHIPPED | OUTPATIENT
Start: 2019-10-09 | End: 2019-10-21

## 2019-10-09 RX ORDER — AZITHROMYCIN 250 MG/1
TABLET, FILM COATED ORAL
Qty: 6 TAB | Refills: 0 | Status: SHIPPED | OUTPATIENT
Start: 2019-10-09 | End: 2019-10-21

## 2019-10-09 NOTE — PATIENT COMMUNICATION
Noted that pt has had a couple recent ER visits for abdominal hematoma and cough - Irbesartan was changed to amlodipine on 10/6/19.    Called and s/w pt's daughter, Ivana. She reports that pt started taking amlodipine on 10/7/19 and BPs were 140s/60s, HR 70s. Today BP has been 89-95/50s, HR 90s. Most recently it was 111/62, HR 70 this afternoon. Ivana has not given pt any BP meds today and plans to hold tonight's metoprolol dose. She reports that her O2 sats have been 87-93% on RA during the day for the past week, and in lower to mid 90s while wearing O2 at night. Her non productive cough is still present, but perhaps less frequent than before. Advised Ivana that it would probably be best for pt to be seen in clinic. Scheduled with SC tomorrow 10/10/19 at 8:45am. Also discussed ED precautions in the meantime.    AJ to SC

## 2019-10-10 ENCOUNTER — OFFICE VISIT (OUTPATIENT)
Dept: CARDIOLOGY | Facility: MEDICAL CENTER | Age: 84
End: 2019-10-10
Payer: MEDICARE

## 2019-10-10 VITALS
HEIGHT: 60 IN | DIASTOLIC BLOOD PRESSURE: 58 MMHG | HEART RATE: 88 BPM | OXYGEN SATURATION: 94 % | BODY MASS INDEX: 25.32 KG/M2 | WEIGHT: 128.97 LBS | SYSTOLIC BLOOD PRESSURE: 112 MMHG

## 2019-10-10 DIAGNOSIS — I71.40 ABDOMINAL AORTIC ANEURYSM (AAA) WITHOUT RUPTURE (HCC): ICD-10-CM

## 2019-10-10 DIAGNOSIS — J41.1 MUCOPURULENT CHRONIC BRONCHITIS (HCC): Chronic | ICD-10-CM

## 2019-10-10 DIAGNOSIS — I10 ESSENTIAL HYPERTENSION: Chronic | ICD-10-CM

## 2019-10-10 DIAGNOSIS — E78.5 DYSLIPIDEMIA: Chronic | ICD-10-CM

## 2019-10-10 DIAGNOSIS — I48.0 PAF (PAROXYSMAL ATRIAL FIBRILLATION) (HCC): Chronic | ICD-10-CM

## 2019-10-10 DIAGNOSIS — I35.1 NONRHEUMATIC AORTIC VALVE INSUFFICIENCY: Chronic | ICD-10-CM

## 2019-10-10 DIAGNOSIS — Z79.01 CHRONIC ANTICOAGULATION: Chronic | ICD-10-CM

## 2019-10-10 PROCEDURE — 99214 OFFICE O/P EST MOD 30 MIN: CPT | Performed by: NURSE PRACTITIONER

## 2019-10-10 RX ORDER — AMLODIPINE BESYLATE 5 MG/1
5 TABLET ORAL
COMMUNITY
Start: 2019-10-10 | End: 2019-11-14

## 2019-10-10 ASSESSMENT — ENCOUNTER SYMPTOMS
COUGH: 0
ABDOMINAL PAIN: 0
DIZZINESS: 0
ORTHOPNEA: 0
CLAUDICATION: 0
PALPITATIONS: 0
MEMORY LOSS: 1
MYALGIAS: 0
SHORTNESS OF BREATH: 0
PND: 0
FEVER: 0

## 2019-10-10 NOTE — PROGRESS NOTES
"No chief complaint on file.    Subjective:   Venus Church is a 88 y.o. female who presents today for BP check with recent ER visit with medication changes.    She is a patient of Dr. Smyth.     Hx of PAF on Eliquis, HTN, HLD, and moderate AI/TR, COPD, and severe memory loss.    She presents today with her daughter who is very worried about her. She admits that she has been \"off\" with labile BP readings, tiredness, confusion, and not being herself.    She and her brother do her medications and help with her care.    She is sleeping with her head on the table, she is very tired. This is an early appointment for her. She normally doesn't get up until 10-11 AM.    Her side pain from her recent hematoma has improved. Her bruising is improving.    She has no other symptoms to report.    Past Medical History:   Diagnosis Date   • Anesthesia     n/v   • Aortic regurgitation 4/25/2012   • Arrhythmia 7/10      A. Fib.   • Arthritis     general   • Atypical chest pain 4/25/2012   • CATARACT 2007,08   • Chronic anticoagulation 4/25/2012   • Constipation 4/25/2012   • COPD (chronic obstructive pulmonary disease) ([facility])    • DDD (degenerative disc disease)    • Dental disorder     partials   • Dyspnea 4/25/2012   • Generalized osteoarthritis 4/25/2012   • Headache(784.0) 4/25/2012   • Heart valve insufficiency     minimal, watched by cardio   • Hypercholesteremia    • Hyperlipidemia 4/25/2012   • Hypertension    • MEDICAL HOME    • On home oxygen therapy     at [facility]   • Osteoarthritis of knee 4/25/2012   • Osteoporosis    • Other accident     C-Spine FX  2006   • Pain     back pain   • Pneumonia 6/4   • TB lung, latent 5/4/2016   • Valvular heart disease 4/25/2012   • Vertebral fracture    • Vertigo 4/25/2012     Past Surgical History:   Procedure Laterality Date   • RECOVERY  7/28/2010    Performed by SURGERY, IR-RECOVERY at SURGERY [facility]   • OTHER ORTHOPEDIC SURGERY  may, 2010      T11 kypho.   • CATARACT PHACO WITH " IOL  2009    Performed by ROSE MARIE MANN at [facility]   • CARPAL TUNNEL RELEASE  @30 yrs ago    rt hand     Family History   Problem Relation Age of Onset   • Heart Disease Father    • Diabetes Brother      Social History     Socioeconomic History   • Marital status:      Spouse name: Not on file   • Number of children: Not on file   • Years of education: Not on file   • Highest education level: Not on file   Occupational History   • Not on file   Social Needs   • Financial resource strain: Not on file   • Food insecurity:     Worry: Not on file     Inability: Not on file   • Transportation needs:     Medical: Not on file     Non-medical: Not on file   Tobacco Use   • Smoking status: Former Smoker     Packs/day: 1.50     Years: 25.00     Pack years: 37.50     Types: Cigarettes     Last attempt to quit: 1984     Years since quittin.5   • Smokeless tobacco: Never Used   • Tobacco comment: Continued abstinence advised.   Substance and Sexual Activity   • Alcohol use: No     Alcohol/week: 0.0 oz   • Drug use: No   • Sexual activity: Not on file   Lifestyle   • Physical activity:     Days per week: Not on file     Minutes per session: Not on file   • Stress: Not on file   Relationships   • Social connections:     Talks on phone: Not on file     Gets together: Not on file     Attends Orthodox service: Not on file     Active member of club or organization: Not on file     Attends meetings of clubs or organizations: Not on file     Relationship status: Not on file   • Intimate partner violence:     Fear of current or ex partner: Not on file     Emotionally abused: Not on file     Physically abused: Not on file     Forced sexual activity: Not on file   Other Topics Concern   • Not on file   Social History Narrative   • Not on file     Allergies   Allergen Reactions   • Codeine Vomiting   • Irbesartan Cough     Strong cough, pt.s daughter reports the ER  Advised she stop taking     • Lisinopril Shortness of Breath and Cough     Outpatient Encounter Medications as of 10/10/2019   Medication Sig Dispense Refill   • azithromycin (ZITHROMAX) 250 MG Tab Take 2 tablets on day 1, then take 1 tablet a day for 4 days. 6 Tab 0   • methylPREDNISolone (MEDROL DOSEPAK) 4 MG Tablet Therapy Pack Take as directed. 21 Tab 0   • albuterol 108 (90 Base) MCG/ACT Aero Soln inhalation aerosol Inhale 2 Puffs by mouth every 6 hours as needed for Shortness of Breath. 1 Inhaler 6   • amLODIPine (NORVASC) 5 MG Tab Take 1 Tab by mouth every day. 30 Tab 0   • ipratropium (ATROVENT) 17 MCG/ACT Aero Soln Inhale 2 Puffs by mouth every 6 hours for 5 days. 17 g 0   • benzonatate (TESSALON) 100 MG Cap Take 100 mg by mouth 3 times a day as needed for Cough.     • apixaban (ELIQUIS) 2.5mg Tab Take 2.5 mg by mouth 2 Times a Day.     • celecoxib (CELEBREX) 100 MG Cap Take 100 mg by mouth 2 times a day.     • cefdinir (OMNICEF) 300 MG Cap Take 300 mg by mouth 2 times a day.     • gabapentin (NEURONTIN) 100 MG Cap Take 100 mg by mouth every evening.     • albuterol (PROVENTIL) 2.5mg/3ml Nebu Soln solution for nebulization 2.5 mg by Nebulization route 3 times a day.     • Green Tea, Olya sinensis, (GREEN TEA EXTRACT PO) Take 1 Tab by mouth every day.     • Coenzyme Q10 (COQ10 PO) Take 1 Tab by mouth every evening.     • Probiotic Product (PROBIOTIC PO) Take 1 Cap by mouth every day.     • Alpha-Lipoic Acid (LIPOIC ACID PO) Take 1 Tab by mouth every morning.     • Ginkgo Biloba 120 MG Cap Take 120 mg by mouth every morning.     • predniSONE (DELTASONE) 10 MG Tab Take 10 mg by mouth every day.     • metoprolol (LOPRESSOR) 25 MG Tab Take 25 mg by mouth every evening. 30 Tab 11   • Fluticasone-Umeclidin-Vilant (TRELEGY ELLIPTA) 100-62.5-25 MCG/INH AEROSOL POWDER, BREATH ACTIVATED Inhale 1 Puff by mouth every day. 1 Each 11     No facility-administered encounter medications on file as of 10/10/2019.      Review of Systems    Constitutional: Negative for fever and malaise/fatigue.   Respiratory: Negative for cough and shortness of breath.    Cardiovascular: Negative for chest pain, palpitations, orthopnea, claudication, leg swelling and PND.   Gastrointestinal: Negative for abdominal pain.   Musculoskeletal: Negative for myalgias.   Neurological: Negative for dizziness.   Psychiatric/Behavioral: Positive for memory loss.        Objective:   Ht 1.524 m (5')   Wt 58.5 kg (128 lb 15.5 oz)   BMI 25.19 kg/m²     Physical Exam   Constitutional: She appears cachectic.   Psychiatric: Her mood appears anxious. She exhibits abnormal recent memory and abnormal remote memory.   Nursing note and vitals reviewed.      Assessment:     1. PAF (paroxysmal atrial fibrillation) ([facility])     2. Essential hypertension     3. Dyslipidemia     4. Mucopurulent chronic bronchitis ([facility])     5. Chronic anticoagulation     6. Nonrheumatic aortic valve insufficiency     7. Abdominal aortic aneurysm (AAA) without rupture ([facility])       Medical Decision Making:  Today's Assessment / Status / Plan:     1. HTN  -BP check normal in office today but may be too low for her  -she is tired and sleeping in the office  -recommend smaller home BP cuff (child size) due to arm cuff circumference being smaller  -watch BP at home and bring log to next apt  -asymptomatic, memory loss present-recommend discussion with PCP soon about this  -son and daughter help with medications and care  -torsemide only PRN for worsening shortness of breath  -cont metoprolol but reduce dose to 12.5 mg BID and follow BP at home, okay to take an additional 12.5 mg PRN for SBP >150  -call or My Chart for BP concerns     2. Memory loss and anxiety  -recommend PCP review    3. PAF on Eliquis now with spontaneous R side hematoma (presumed to be from vigorous coughing)  -asymptomatic, rate controlled  -hematoma healing, yellow in color  -recommend re-start eliquis 1 week post ER visit  -call for  concerns  -cont metoprolol for rate control    4. AAA  -see above, keep BP log  -surveillance with imaging yearly per primary cardiologist    5. COPD  -chronic BALLESTEROS, stable today on exam    6. Moderate valve disease  -stable, euvolemic on exam  -follow clinically  -torsemide PRN    FU in clinic in 1 month with RS with BP log; obtain smaller cuff for home BP readings; sooner apt for BP check if warranted    Patient verbalizes understanding and agrees with the plan of care.     Collaborating MD: Ralph RICHTER

## 2019-10-10 NOTE — PATIENT INSTRUCTIONS
Go back to metoprolol but on reduced dose of 12.5 mg twice a day.    Watch BP in the morning or when feeling poorly.    If BP in the morning >150 on the top #, ok to give an additional 12.5 mg metoprolol.    Follow up with her PCP on her confused, tiredness, and her ER visits.

## 2019-10-15 ENCOUNTER — TELEPHONE (OUTPATIENT)
Dept: MEDICAL GROUP | Facility: MEDICAL CENTER | Age: 84
End: 2019-10-15

## 2019-10-15 NOTE — TELEPHONE ENCOUNTER
Future Appointments       Provider Department Center    10/21/2019 2:00 PM Denis TARAN Krueger M.D.; [facility]  [facility] [place]    11/1/2019 8:45 AM [id number] [facility]     11/1/2019 10:00 AM SARAH De La Garza [facility]     11/13/2019 4:20 PM Alexy Smyth M.D. [facility]           ANNUAL WELLNESS VISIT PRE-VISIT PLANNING WITHOUT OUTREACH    1.  Reviewed note from last office visit with PCP: YES    2.  If any orders were placed at last visit, do we have Results/Consult Notes?        •  Labs - Labs ordered, completed on 10/6/19 and results are in chart.  Note: If patient appointment is for lab review and patient did not complete labs, check with provider if OK to reschedule patient until labs completed.       •  Imaging - Imaging ordered, completed and results are in chart.       •  Referrals - No referrals were ordered at last office visit.    3.  Immunizations were updated in [email or web address] using [employer]?: Yes       •  [employer] Recommendations: FLU, HEPATITIS B and SHINGRIX (Shingles)        •  Is patient due for Tdap? NO       •  Is patient due for Shingrix? YES. Patient was not notified of copay/out of pocket cost.     4.  Patient is due for the following Health Maintenance Topics:   Health Maintenance Due   Topic Date Due   • PFT SCREENING-FEV1 AND FEV/FVC RATIO / SPIROMETRY SHOULD BE PERFORMED ANNUALLY  04/22/1949   • IMM HEP B VACCINE (1 of 3 - Risk 3-dose series) 04/22/1950   • IMM ZOSTER VACCINES (2 of 3) 11/27/2012   • BONE DENSITY  06/21/2018   • Annual Wellness Visit  04/11/2019   • IMM INFLUENZA (1) 09/01/2019       - Patient already has appointment scheduled for Annual Wellness Visit (AWV).    5.  Reviewed/Updated the following with patient:       •   Preferred Pharmacy? YES       •   Preferred Lab? YES       •   Preferred Communication? YES       •   Allergies?  NO       •   Medications? NO       •   Social History? NO       •   Family History (document living status of immediate family members and if + hx of  cancer, diabetes, hypertension, hyperlipidemia, heart attack, stroke) NO    6.  Care Team Updated:       •   DME Company (gait device, O2, CPAP, etc.): NO       •   Other Specialists (eye doctor, derm, GYN, cardiology, endo, etc): N\A    7. Orders for overdue Health Maintenance topics pended in Pre-Charting? NO    8.  Patient has the following Care Path diagnoses on Problem List:  COPD    1.  Is patient under the care of a pulmonologist? Yes, CareTeam was updated.  2.  Has patient ever completed a PFT or spirometry? Yes, on 10/20/15.  3.  Is patient on oxygen or CPAP? No.  4.  Has patient ever had instruction on inhaler technique by health care professional? Yes  5.  Is patient interested in a referral to respiratory therapy for more information on COPD, inhaler technique, and/or information on establishing an action plan?  Yes, and is NOT interested in more at this time.      9.  Patient was advised: “This is a free wellness visit. The provider will screen for medical conditions to help you stay healthy. If you have other concerns to address you may be asked to discuss these at a separate visit or there may be an additional fee.”     10.  Patient was informed to arrive 15 min prior to their scheduled appointment and bring in their medication bottles.

## 2019-10-16 ENCOUNTER — TELEPHONE (OUTPATIENT)
Dept: MEDICAL GROUP | Facility: MEDICAL CENTER | Age: 84
End: 2019-10-16

## 2019-10-16 DIAGNOSIS — M54.9 BACK PAIN, UNSPECIFIED BACK LOCATION, UNSPECIFIED BACK PAIN LATERALITY, UNSPECIFIED CHRONICITY: ICD-10-CM

## 2019-10-21 ENCOUNTER — OFFICE VISIT (OUTPATIENT)
Dept: MEDICAL GROUP | Facility: MEDICAL CENTER | Age: 84
End: 2019-10-21
Payer: MEDICARE

## 2019-10-21 VITALS
DIASTOLIC BLOOD PRESSURE: 68 MMHG | SYSTOLIC BLOOD PRESSURE: 160 MMHG | BODY MASS INDEX: 25.4 KG/M2 | HEIGHT: 60 IN | WEIGHT: 129.4 LBS | OXYGEN SATURATION: 94 % | HEART RATE: 78 BPM | TEMPERATURE: 97.4 F

## 2019-10-21 DIAGNOSIS — I71.40 ABDOMINAL AORTIC ANEURYSM (AAA) WITHOUT RUPTURE (HCC): ICD-10-CM

## 2019-10-21 DIAGNOSIS — I10 ESSENTIAL HYPERTENSION: Chronic | ICD-10-CM

## 2019-10-21 DIAGNOSIS — J41.1 MUCOPURULENT CHRONIC BRONCHITIS (HCC): ICD-10-CM

## 2019-10-21 DIAGNOSIS — N28.89 RENAL MASS: ICD-10-CM

## 2019-10-21 DIAGNOSIS — I48.0 PAF (PAROXYSMAL ATRIAL FIBRILLATION) (HCC): ICD-10-CM

## 2019-10-21 DIAGNOSIS — Z23 NEED FOR HEPATITIS B VACCINATION: ICD-10-CM

## 2019-10-21 DIAGNOSIS — Z79.01 CHRONIC ANTICOAGULATION: Chronic | ICD-10-CM

## 2019-10-21 DIAGNOSIS — Z00.00 MEDICARE ANNUAL WELLNESS VISIT, SUBSEQUENT: ICD-10-CM

## 2019-10-21 PROCEDURE — G0439 PPPS, SUBSEQ VISIT: HCPCS | Performed by: INTERNAL MEDICINE

## 2019-10-21 PROCEDURE — 90746 HEPB VACCINE 3 DOSE ADULT IM: CPT | Performed by: INTERNAL MEDICINE

## 2019-10-21 PROCEDURE — G0010 ADMIN HEPATITIS B VACCINE: HCPCS | Performed by: INTERNAL MEDICINE

## 2019-10-21 ASSESSMENT — ACTIVITIES OF DAILY LIVING (ADL): BATHING_REQUIRES_ASSISTANCE: 0

## 2019-10-21 ASSESSMENT — ENCOUNTER SYMPTOMS: GENERAL WELL-BEING: GOOD

## 2019-10-21 NOTE — PROGRESS NOTES
Chief Complaint   Patient presents with   • Annual Wellness Visit         HPI:  Venus is a 88 y.o. here for Medicare Annual Wellness Visit  Here with son Dileep who checks on the patient daily  Meds and allergies reviewed and updated, medical history, surgical history, social history, family history reviewed and updated  Recently seen in the emergency room patient developed hematoma right side, denies falling, no trauma history, she is on Eliquis, Celebrex, no other NSAIDs.  Seen ER imaging study showed 10/4/19 CT abdomen pelvis with, right lateral oblique hematoma, small epigastric midline abdominal wall fat-containing ventral hernia, subsequent follow-up CT  10/7/19 CT-CTA complete thoracoabdominal, right lateral abdominal wall intramuscular hematoma decreased in size, no aneurysm or dissection, atherosclerosis greater than 50% stenosis bilateral renal arteries, left renal low-density lesion 1.4 cm, 31 units  No melena or hematochezia, hematuria        Patient Active Problem List    Diagnosis Date Noted   • AAA (abdominal aortic aneurysm) ([facility]) 02/27/2019     Priority: Medium   • Aortic insufficiency 04/25/2012     Priority: Medium   • Chronic anticoagulation 04/25/2012     Priority: Medium   • COPD (chronic obstructive pulmonary disease) ([facility]) 07/20/2011     Priority: Medium   • PAF (paroxysmal atrial fibrillation) ([facility]) 06/10/2010     Priority: Medium   • Hypertension 05/20/2010     Priority: Medium   • Dyslipidemia 05/20/2010     Priority: Medium   • ACE-inhibitor cough 09/06/2019     Priority: Low   • Nocturnal hypoxemia 03/20/2018     Priority: Low   • TB lung, latent 05/04/2016     Priority: Low   • Mild cognitive impairment 01/11/2016     Priority: Low   • Sensorineural hearing loss 12/16/2015     Priority: Low   • History of opiate therapy 04/23/2015     Priority: Low   • Macular degeneration 07/08/2013     Priority: Low   • Tension headache 06/11/2013     Priority: Low   • History of shingles 06/07/2012      Priority: Low   • MAMI (obstructive sleep apnea) 11/01/2011     Priority: Low   • Right hip pain 03/28/2011     Priority: Low   • History of compression fracture of spine 05/26/2010     Priority: Low   • Low back pain 05/22/2010     Priority: Low   • Cervical pain 05/22/2010     Priority: Low   • Osteoporosis, idiopathic 05/22/2010     Priority: Low   • Preventative health care 05/20/2010     Priority: Low   • Renal mass 10/07/2019   • Ventral hernia 10/07/2019   Cervical pain  2/26/15 narcotic contract  6/12/15 opiate risk score 0  10/4/15   7/25/16   9/20/16  pain note recommended left L3-L5 MBBB  10/31/16   2/6/17   5/9/17 narcotic contract  5/9/17 opiate risk score 0  5/9/17   5/9/17 depression screening score 0  6/28/17 trial cymbalta 30 mg  8/7/17 did not start cymbalta, will start  8/7/17   8/7/17 UDT  8/21/17 change cymbalta to qod  9/18/17 off cymbalta  9/14/17 custom PT discharge summary patient did not improve walking tolerance or standing tolerance, medicare g-code status 60-79% impairment, thoracolumbar flexion decreased from 50-25%, extension 10%, sidebending 10-25%  11/3/17   2/6/18   4/10/18   4/10/18 controlled substances contract, decrease oxycontin to 10 mg am and 5 mg pm  5/17/18 decrease to oxycontin 10 mg am and 5 mg pm  5/17/18   7/10/18 increase oxycontin back to 10 mg bid after fall  9/18/18   2/27/19 off oxycontin after hospital discharge, would like to avoid narcotics given side effects, will try celebrex 100 mg daily as had that previously  4/11/19 dr.zollinger pain note offered trigger point injections and lumbar radiofrequency ablation, she declines for now, consider tylenol in place of celebrex, follow-up as needed  Has seen neurosurgery, pain management locally and at [place], has tried NSAIDs, celebrex, cymbalta, pregabalin, gabapentin, lidoderm, voltaren gel     COPD  7/11 CT spiral thorax extensive emphysematous change of lung, small left  pleural effusion left lung base with atelectasis unchanged from prior CT  5/10 CT spriral thorax emphysematous change and dependent atelectasis left lung base  7/11 PFT FEV/FVC 59%, FEV1 1.1 L (75% predicted), significant post bronchodilator response noted (FEV1 improved 16%). Mixed restrictive and obstructive pattern,COPD with GOLD stage II with sig bronchodilator response  7/11 trial of symbicort 80/4.5 mcg bid discussed with daughter plus albuterol neb prn, [facility] home education ordered  5/12 off symbicort   5/22/14 cxr negative  6/23/14 on symbicort  7/20/15 change to advair 250/50 mcg from symbicort (not covered on insurance)  9/3/15 cxr negative  10/4/15 add spiriva and change symbicort to advair 250/50 mcg bid  10/19/15 CT high resolution thorax, no evidence of interstitial lung disease, minimal scattered opacification could indicate minimal areas of fibrosis periphery of each lung, similar to prior exam, emphysema most prominent upper lobes bilateral  10/19/15 PFT FEV1 1.1 (61% predicted),FVC 1.9,FEV/FVC 58%, no bronchodilator response,DLCO 34%; on advair 250/50 mcg bid and spiriva  11/2/15 change from advair discus to advair 250 inhaler and continue spiriva   12/11/15 not taking advair, will start advair and cont spiriva  12/11/15 cxr negative  3/14/16 [facility] note complaining doxycycline and taper steroids, continue nocturnal oxygen, continue rotating antibiotics for bronchiectasis, follow-up laboratory testing ordered  3/14/16  (normal), quantiferon gold positive, allergen panel negative, IgE 357 (less than 214), IgA 116 (),, IgM 27 (),IgG 947 ([phone])  4/13/16 cxr mild cardiomegaly  5/4/16 [facility] note continue advair 115/21 bid with spacer given doxycycline and prednisone, continue oxygen 3 L at night, AFB samples ×3, follow-up 3 months  7/25/16 pulmonary note on prednisone taper one week out of the month and doxycycline one per day for one week per month, on advair bid and spiriva and  oxygen 3 liters at night  11/8/16 pulmonary note continue advair 115/21 ucg bid,spiriva, xopenex as needed, oxygen 3 L at night  10/4/17 pulmonary note continue advair 115/21 two inhalations bid, spiriva daily, xoponex as needed, oxygen 3 L at night, history of positive quantiferon gold, will order sputum sample follow-up 4 months  3/17/18 pulmonary note, continue nocturnal oxygen 2 L, start trelegy one puff daily  9/10/18 pulmonary note intolerant to CPAP, resume nocturnal oxygen, latent TB, repeat chest x-ray, stable on bronchodilators, as needed prednisone and antibiotic prescription to pharmacy follow-up 3-6 months  2/13/19 CT-CTA chest pulmonary artery with reconstruction no pulmonary embolism, emphysema and chronic bronchitis, cardiomegaly with right heart dysfunction, 4.1 cm ascending aortic aneurysm  3/19/19 on trelegy ellipta and nocturnal oxygen 2 L  10/2/19 pulmonary note cough and bronchitis continue prednisone and cefdinir until complete then resume celebrex, chest x-ray ordered, continue mucinex and nebulizer     Dyslipidemia   5/10 chol 163,trig 68,hdl 69,ldl 80  6/10 chol 163,trig 60,hdl 69,ldl 80  7/11 chol 118,trig 45,hdl 65,ldl 44 on crestor 10 mg  10/11 chol 165,trig 47,hdl 78,ldl 74 on crestor 10 mg done at [facility]  2/12 off crestor  6/12 chol 211,trig 104,hdl 64,ldl 126 off crestor  3/4/14 chol 222,trig 124,hdl 52,ldl 145 off meds     History of compression fracture  5/10 MRI thoracic spine subacute T12 compression fracture  5/27/10 kyphoplasty T11  6/10 MRI lumbar spine T12 compression fracture mild to moderate central canal narrowing, interval T11 kyphoplasty, L2-L3 moderate foraminal stenosis, L3-L4 severe left foraminal stenosis, moderate right foraminal stenosis, L4-L5 moderate central canal stenosis  7/10 dexa LS +1.0,hip -1.6  7/28/10 T12 vertebral plasty  8/11 [facility] pain evaluation  pain management, recommend continue oxycontin 10 bid  10/11 [facility] pain  T11-L1  injection  2/13  pain note, T10-T11 epidural injection  6/13 dexa LS +1.8, forearm -2.7  9/23/13 reclast injection  9/4/14 reclast IV infusion  9/14/17 custom PT discharge summary patient did not improve walking tolerance or standing tolerance, medicare g-code status 60-79% impairment, thoracolumbar flexion decreased from 50-25%, extension 10%, sidebending 10-25%  7/1/18 x-ray lumbar spine complete or near collapse L1 vertebral body anteriorly which is likely chronic, extensive degenerative changes, prior percutaneous vertebral augmentation T12-L1  2/27/19 off oxycontin after hospital discharge, would like to avoid narcotics given side effects, will try celebrex 100 mg daily as had that previously     History H. pylori  9/11 serum IgG positive s/p prevpack  9/19/12 cbc,cmp,lipase neg; u/s abd gallbladder sludge without biliary dilatation or stone  12/12/12 [facility] eval rec EGD/colon pt did not follow up     history opiate  2/26/15 narcotic contract  6/12/15 opiate risk score 0  10/4/15   7/25/16   9/20/16  pain note recommended left L3-L5 MBBB  10/31/16   2/6/17   5/9/17 narcotic contract  5/9/17 opiate risk score 0  5/9/17   5/9/17 depression screening score 0  6/28/17 trial cymbalta 30 mg  8/7/17 did not start cymbalta, will start  8/7/17   8/7/17 UDT  8/21/17 change cymbalta to qod  9/18/17 off cymbalta  9/14/17 custom PT discharge summary patient did not improve walking tolerance or standing tolerance, medicare g-code status 60-79% impairment, thoracolumbar flexion decreased from 50-25%, extension 10%, sidebending 10-25%  11/3/17   2/6/18   4/10/18   4/10/18 controlled substances contract, decrease oxycontin to 10 mg am and 5 mg pm  5/17/18 decrease to oxycontin 10 mg am and 5 mg pm  5/17/18   7/10/18 increase oxycontin back to 10 mg bid after fall  7/10/18    9/18/18   2/27/19 off oxycontin after hospital discharge, would like to avoid narcotics given side  effects, will try celebrex 100 mg daily as had that previously  3/19/19 refer to dr.zollinger pain  3/19/19 increase to celebrex 100 mg bid, add neurontin 100 mg qpm, continue lidoderm patch  4/11/19 dr.zollinger pain note offered trigger point injections and lumbar radiofrequency ablation, she declines for now, consider tylenol in place of celebrex, follow-up as needed  Has seen neurosurgery, pain management locally and at [place], has tried NSAIDs, celebrex, cymbalta, pregabalin, gabapentin, lidoderm, voltaren gel     History shingles     Hypertension  1/05 carotid u/s negative  1/07 echo LV EF 60% ,mild LVH  1/09 renal ultrasound 6 mm right cortical cyst  9/09 MRI brain with and without moderate nonspecific microvascular ischemic change  9/09 MRA next mild plaque right internal carotid  5/24/10 echo normal LV size and function, EF 60%, mild to moderate AR, RVSP 35-40 consistent with mild pulmonary hypertension  5/10 persantine thallium no ischemia, EF 72%  9/10 change avalide 150/12.5 to avapro 150 qday, had sodium 125 in ER  3/11 on avapro 300 mg  7/8/11 echo normal LV function, EF 55%  7/8/11 Persantine thallium possible small area mild ischemia in the lateral wall, no evidence of infarction  7/11   3/12 increase metoprolol to 50 bid, cont avapro 300 mg, start norvasc 2.5 mg  5/1/12 increase norvasc to 5 mg, change avapro to avalide 300/12.5 mg, cont metoprolol 50 bid  10/2/12 on avalide 300/12.5 mg and metoprolol 50 bid and norvasc 5 mg  10/12 Persantine thallium diaphragmatic uptake possible attenuation, fixed inferolateral defect which may be secondary to attenuation, EF 76%, no wall motion abnormalities, no evidence of significant ischemia.  1/13 echo normal LV function, grade 2 diastolic dysfunction, EF 70% moderate aortic insufficiency  1/13   1/23/13 cxr cardiomegaly without pulmonary edema  6/11/13 on avalide 300/12.5 mg,norvasc 5 mg, metoprolol 50 bid  4/7/14 cardiology note atrial  fibrillation, start pradaxa 75 mg bid, stop asa, stop norvasc, decreased avalide 300/12.5 mg to 1/2 per day, increase metoprolol to 50 mg 1 1/2 bid  10/20/14 metoprolol 50 mg decreased to 25 mg bid by [facility] doctor due to low heart rate, continues on avalide 300/12.5 mg   12/29/14  cardiology note continue pradaxa, metoprolol 25 mg 1/2 bid, avalide 300/12.5 mg  7/25/16 avalide 150/12.5 mg decrease to 1/2 tab per day and continue metoprolol 25 mg bid  8/24/16 cardiology note sinus rhythm on exam,[id number] score=2 continue anticoagulation, low-dose metoprolol,avalide 150/12.5 mg 1/2 per day, follow-up one year  12/14/17 cardiology note remains in sinus rhythm, follow-up one year  6/21/18  cardiology note paroxysmal atrial fibrillation sinus rhythm on exam stable continue anticoagulation, follow-up 1 year  2/27/19 cardiology note paroxysmal atrial fibrillation, chronic anticoagulation  8/29/19 now on lisinopril 10 mg qam and metoprolol 25 mg qpm  10/7/19 CT-CTA complete thoracoabdominal atherosclerosis greater than 50% stenosis bilateral renal arteries   10/10/19 cardiology note reduce metoprolol to 12.5 mg bid as blood pressure normal in the office but may be lower at home contributing to tiredness and fatigue, torsemide as needed for shortness of breath     Low back pain  6/06 epidural L4-L5   12/08 x-ray LS moderate to severe DJD  6/10 MRI lumbar spine T12 compression fracture with mild-to-moderate central spinal canal narrowing, interval T11 kyphoplasty, L2-L3 moderate frontal stenosis, L3-L4 severe left foraminal stenosis, moderate right foraminal stenosis, L4-L5 moderate central spinal canal  7/10 interventional rad consult epidural T11 to L1 region  7/28/10 T12 vertebroplasty  7/30/10 norco taper, and lyrica 50 mg bid  8/5/10 reaction to lyrica, stop lyrica  8/19/10 lumbar steroid epidural   9/10  note rec decompression if pain persist  10/10 bilateral lumbar facet  joint injections L3-S1   12/10 xray LS old T11 and T12 fracture status post kyphoplasty  1/11 [facility] note  3/11 trial of spinal stimulator  8/11 [facility] note evaluation  pain management cont oxycontin 10 bid  11/22/11 [facility] pain note dr. hodge; trial of baclofen, T11 plus T12 left-sided nerve root block pending  7/12  pain note;Left-sided T11-T12 transforaminal epidural   4/13 daughter called [facility] suggest taper pain med, she has sched to taper the oxycontin  4/15/13 resumed oxycontin 10 bid  6/11/13 off oxycontin  7/24/13 increase oxycontin from qday to 10 mg bid   2/28/14 on celebrex 200 mg as needed per  and oxycontin 10 mg bid  4/25/14 trial of cymbalta 30 mg, continue oxycontin 10 mg twice a day, recommend not use tramadol (side effects since on oxycontin already) or celebrex (on pradaxa)  5/29/14  pain management [facility]; recommend T11-T12 left-sided transforaminal nerve block  7/2/14 [facility] left T11-T12 epidural injection  7/21/14  [facility] note; increase oxycontin to 10 mg tid x 3 weeks and then taper down  10/27/14 [facility]; samples zorvolex  2/26/15 xray lumbar spine mild compression fracture of L4 of indeterminate age but new compared to 3/25/13 vertebral augmentation and T11 and T12 with severe compression fracture of T12 and focal kyphosis at this level  4/15/16 outpatient physical therapy phone call indicating patient unable to make appointments, recommending home health physical therapy  9/20/16  pain note recommended left L3-L5 MBBB  3/22/17  pain procedure note left L3-L5 MBBB #1  6/28/17 trial cymbalta 30 mg  6/29/17 custom physical therapy note  8/3/17 custom PT note  9/18/17 off cymbalta  9/14/17 custom PT discharge summary patient did not improve walking tolerance or standing tolerance, medicare g-code status 60-79% impairment, thoracolumbar flexion  decreased from 50-25%, extension 10%, sidebending 10-25%  11/14/17 custom PT discharge summary no improvement  11/29/17 MRI lumbar spine no acute fracture, lumbar levoscoliosis T12-L1, kyphotic deformity he talked L1, previous compression fractures T11, T12, L1, previous vertebral augmentation procedures T11 and L1, moderate central canal stenosis T12-L3, severe canal stenosis L3-L4, no significant change  1/31/18  pain note  2/2/18  pain note, reviewed cervical spine x-ray multilevel DJD, recommend consider intrathecal pain pump trial  5/17/18 decrease to oxycontin 10 mg am and 5 mg pm  7/1/18 x-ray lumbar spine complete or near collapse L1 vertebral body anteriorly which is likely chronic, extensive degenerative changes, prior percutaneous vertebral augmentation T12-L1  7/10/18 increase oxycontin back to 10 mg bid after fall  2/27/19 off oxycontin after hospital discharge, would like to avoid narcotics given side effects, will try celebrex 100 mg daily as had that previously  Tried NSAIDs, celebrex, pregabalin, gabapentin, cymbalta, physical therapy,TENS,lidoderm, voltaren gel, epidural, facet injections5/15/19 dr.zollinger pain management note left lumbar paraspinal and quadratus lumborum areas  Tried NSAIDs, celebrex, pregabalin, gabapentin, cymbalta, physical therapy,TENS,lidoderm, voltaren gel, epidural, facet injections, kyphoplasty, pain management locally and at [facility]     Macular degeneration  7/1/13  [facility] note, start PreserVision AREDS II vitamin followup 6 months     Mild cognitive impairment  1/11/16 MMSE 24/30  7/14/17 MMSE 23/30 offered aricept  4/10/18 MMSE 24/30  5/2/18 start aricept 5 mg daily  9/18/18 off aricept not tolerated  10/31/18 trial namenda XR started pack  11/8/18 namenda titration pack not available, will start namenda XR 7 mg daily  5/16/19 neurology note, memory loss likely alzheimer's, will obtain MRI brain, b12, folate, tsh, would not tolerate  neuropsychiatric evaluation secondary to poor focus and attention     Nocturnal hypoxemia  3/17/18 pulmonary note, continue nocturnal oxygen 2 L, start trelegy one puff daily  9/10/18 pulmonary note intolerant to CPAP, resume nocturnal oxygen, latent TB, repeat chest x-ray, stable on bronchodilators, as needed prednisone and antibiotic prescription to pharmacy follow-up 3-6 months     Osteoporosis  3/08 dexa LS +0.3,hip -1.4  5/10 MRI thoracic spine subacute T12 compression fracture  5/27/10 T12 kyphoplasty  6/10 on actonel  7/10 dexa LS +1.0,hip -1.6  8/10 vit d 56  3/11 reclast referral made  6/12 vit d 42 off actonel  6/21/13 dexa LS +1.8, forearm -2.7, I rec reclast, she would like to think it over  9/23/13 reclast injection  3/4/14 vit d 26  9/4/14 reclast IV infusion  2/26/15 xray rib series left; mild deformity of the lateral 10th rib on the left may be related to a fracture  2/26/15 xray lumbar spine mild compression fracture of L4 of indeterminate age but new compared to /25/13, vertebral augmentation and T11 and T12 with severe compression fracture of T12 and focal kyphosis at this level  12/7/15 reclast IV infusion     Paroxysmal atrial fibrillation  4/10 hospitalization on multaq, also on verapamil and metoprolol for rate control per cardiology  1/11 [facility] note cont multaq  7/11 EKG atrial fibrillation hospitalization  7/8/11 echo normal LV function, EF 55%  7/8/11 Persantine thallium possible small area mild ischemia in the lateral wall, no evidence of infarction  7/11 [facility] during hospitalization changed to amiodarone 200 mg plus pradaxa restarted  7/11   9/11 [facility] note stop amiodarone, cont pradaxa and metoprolol 25 bid  3/12 cont pradaxa and increase metoprolol to 50 bid due to higher bp  5/12 pradaxa decreased from 150 bid to 75 bid per [facility] due to nosebleeds  6/12 EKG NSR  1/13 echo normal LV function, grade 2 diastolic dysfunction, EF 70% moderate aortic insufficiency  6/11/13 on pradaxa and  metoprolol 50 bid for rate control, NSR today  9/13 cardiology note stop pradaxa and start asa 81 mg  4/7/14 cardiology note atrial fibrillation, start pradaxa 75 mg bid, stop asa, stop norvasc, decreased avalide 300/12.5 mg to 1/2 per day, increase metoprolol to 50 mg 1 1/2 bid  4/14/14 cardiology note, NSR on exam, continue pradaxa 75 mg bid, metoprolol 75 mg bid, avalide 300/12.5 mg 1/2 tab per day  10/20/14 metoprolol 50 mg decreased to 25 mg bid by [facility] doctor due to low heart rate, continues on pradaxa and avalide 300/12.5 mg   12/29/14  cardiology note continue pradaxa, metoprolol 25 mg 1/2 bid, avalide 300/12.5 mg  6/12/15 NSR on exam  8/24/16 cardiology note sinus rhythm on exam,[id number] score=2 continue anticoagulation, low-dose metoprolol follow-up one year  12/14/17 cardiology note remains in sinus rhythm, follow-up one year  6/21/18  cardiology note paroxysmal atrial fibrillation sinus rhythm on exam stable continue anticoagulation, follow-up 1 year  12/11/18 on metoprolol and pradaxa  2/27/19 [id number] (age, gender, aortic atherosclerosis) = 5 (7.8% risk of stroke per year) and HAS-BLED score 3 points (age, hypertension, con commitment antiplatelet agents)  = 3.74 bleeds per 100 patient years) will change pradaxa not covered to eliquis 2.5 mg bid adjust for age and weight  9/6/19  cardiology note, cough since starting lisinopril was discontinued changed to irbesartan 75 mg, continue metoprolol and eliquis, hyponatremia related to hydrochlorothiazide, changed to demadex 5 mg monday, wednesday, friday  10/10/19 cardiology note reduce metoprolol to 12.5 mg bid as blood pressure normal in the office but may be lower at home contributing to tiredness and fatigue, torsemide as needed for shortness of breath     Preventative health  5/05 colon per [facility] polyp no path report, f/u rec 5 yrs (12/12/12 [facility] note)  4/09 stool occult blood negative  12/09 mammogram  10/1/11  pneumovax  10/2/12 zoster vaccine  6/11/13 declines mammogram, tdap  6/13 dexa LS +1.8, forearm -2.7  3/4/14 vit d 26  10/12/15 prevnar  12/11/18 tdap     Right hip pain  3/11 MRI right hip DJD and tear of the anterior/superior acetabular labrum  4/11  ortho note not believe hip is primary problem, referred to  or [facility]     Sensorineural hearing loss  12/7/15  audiology evaluation mild sensorineural hearing loss     Sleep apnea  8/11 [facility] note sleep study apnea-hypopnea index 86, low sat 74%, start CPAP  8-18 cm    2013 off cpap not comfortable  3/14/16  (normal), quantiferon gold positive, allergen panel negative, IgE 357 (less than 214), IgA 116 (),, IgM 27 (),IgG 947 ([phone])  4/13/16 cxr mild cardiomegaly  5/4/16 [facility] note continue oxygen 3 L at night  11/8/16 pulmonary note continue oxygen 3 L at night  10/4/17 pulmonary note continue oxygen 3 L at night  3/17/18 pulmonary note, continue nocturnal oxygen 2 L, start trelegy one puff daily  9/10/18 pulmonary note intolerant to CPAP, resume nocturnal oxygen, latent TB, repeat chest x-ray, stable on bronchodilators, as needed prednisone and antibiotic prescription to pharmacy follow-up 3-6 months     tb latent  3/14/16 positive quantiferon gold test  3/14/16  (normal), quantiferon gold positive, allergen panel negative, IgE 357 (less than 214), IgA 116 (),, IgM 27 (),IgG 947 ([phone])  4/13/16 cxr mild cardiomegaly  5/4/16 [facility] note continue advair 115/21 bid with spacer given doxycycline and prednisone, continue oxygen 3 L at night, AFB samples ×3, follow-up 3 months  7/25/16 pulmonary note on prednisone taper one week out of the month and doxycycline one per day for one week per month, on advair bid and spiriva and oxygen 3 liters at night  7/27/17 PPD negative  10/4/17 pulmonary note continue advair 115/21 two inhalations bid, spiriva daily, xoponex as needed, oxygen 3 L at night, history of  positive quantiferon gold, will order sputum sample follow-up 4 months  9/10/18 pulmonary note intolerant to CPAP, resume nocturnal oxygen, latent TB, repeat chest x-ray, stable on bronchodilators, as needed prednisone and antibiotic prescription to pharmacy follow-up 3-6 months     Tension headache  6/11/13 on fioricet bid  2/28/14 taper fioricet to once a day  4/25/14 now on fioricet prn         Current Outpatient Medications   Medication Sig Dispense Refill   • Multiple Vitamins-Minerals (CENTRUM SILVER PO) Take  by mouth.     • metoprolol (LOPRESSOR) 25 MG Tab Take 1 Tab by mouth 2 Times a Day. 60 Tab 11   • albuterol 108 (90 Base) MCG/ACT Aero Soln inhalation aerosol Inhale 2 Puffs by mouth every 6 hours as needed for Shortness of Breath. 1 Inhaler 6   • apixaban (ELIQUIS) 2.5mg Tab Take 2.5 mg by mouth 2 Times a Day.     • celecoxib (CELEBREX) 100 MG Cap Take 100 mg by mouth 2 times a day.     • gabapentin (NEURONTIN) 100 MG Cap Take 100 mg by mouth every evening.     • albuterol (PROVENTIL) 2.5mg/3ml Nebu Soln solution for nebulization 2.5 mg by Nebulization route 3 times a day.     • Green Tea, Olya sinensis, (GREEN TEA EXTRACT PO) Take 1 Tab by mouth every day.     • Coenzyme Q10 (COQ10 PO) Take 1 Tab by mouth every evening.     • Probiotic Product (PROBIOTIC PO) Take 1 Cap by mouth every day.     • Alpha-Lipoic Acid (LIPOIC ACID PO) Take 1 Tab by mouth every morning.     • Ginkgo Biloba 120 MG Cap Take 120 mg by mouth every morning.     • Fluticasone-Umeclidin-Vilant (TRELEGY ELLIPTA) 100-62.5-25 MCG/INH AEROSOL POWDER, BREATH ACTIVATED Inhale 1 Puff by mouth every day. 1 Each 11   • amLODIPine (NORVASC) 5 MG Tab Take 1 Tab by mouth 1 time daily as needed (SBP >160).     • azithromycin (ZITHROMAX) 250 MG Tab Take 2 tablets on day 1, then take 1 tablet a day for 4 days. (Patient not taking: Reported on 10/10/2019) 6 Tab 0   • methylPREDNISolone (MEDROL DOSEPAK) 4 MG Tablet Therapy Pack Take as directed.  (Patient not taking: Reported on 10/10/2019) 21 Tab 0   • benzonatate (TESSALON) 100 MG Cap Take 100 mg by mouth 3 times a day as needed for Cough.       No current facility-administered medications for this visit.         Patient is taking medications as noted in medication list.  Current supplements as per medication list.     Allergies: Codeine; Irbesartan; and Lisinopril    Current social contact/activities: Pt states she plays cards and colors at home. Pt states she visits with her family.      Is patient current with immunizations? No, due for FLU, HEPATITIS B and SHINGRIX (Shingles). Patient is interested in receiving NONE today.    She  reports that she quit smoking about 35 years ago. Her smoking use included cigarettes. She has a 37.50 pack-year smoking history. She has never used smokeless tobacco. She reports that she does not drink alcohol or use drugs.  Counseling given: Not Answered  Comment: Continued abstinence advised.        DPA/Advanced directive: Patient has Advanced Directive, but it is not on file. Instructed to bring in a copy to scan into their chart.    ROS:    Gait: Uses a walker    Ostomy: No   Other tubes: No   Amputations: No   Chronic oxygen use Yes   Last eye exam Pt states doesn't recall    Wears hearing aids: Yes    : Denies any urinary leakage during the last 6 months       Screening:    COPD    1.  Is patient under the care of a pulmonologist? Yes, CareTeam was updated.  2.  Has patient ever completed a PFT or spirometry? Yes, on about a year ago.  3.  Is patient on oxygen or CPAP? Yes, CareTeam was updated.  4.  Has patient ever had instruction on inhaler technique by health care professional? Yes  5.  Is patient interested in a referral to respiratory therapy for more information on COPD, inhaler technique, and/or information on establishing an action plan?  No, patient is NOT interested.    Depression Screening    Little interest or pleasure in doing things? n    Feeling  down, depressed, or hopeless?n    Trouble falling or staying asleep, or sleeping too much?n     Feeling tired or having little energy?n     Poor appetite or overeating? n    Feeling bad about yourself - or that you are a failure or have let yourself or your family down?n    Trouble concentrating on things, such as reading the newspaper or watching television?n    Moving or speaking so slowly that other people could have noticed.  Or the opposite - being so fidgety or restless that you have been moving around a lot more than usual?n     Thoughts that you would be better off dead, or of hurting yourself?n     Patient Health Questionnaire Score:0           Interpretation of PHQ-9 Total Score   Score Severity   1-4 No Depression   5-9 Mild Depression   10-14 Moderate Depression   15-19 Moderately Severe Depression   20-27 Severe Depression      Screening for Cognitive Impairment    Three Minute Recall (village, kitchen, baby)  0/3    Draw clock face with all 12 numbers and set the hands to show 10 past 10.  No 2/5  If cognitive concerns identified, deferred for follow up unless specifically addressed in assessment and plan.    Fall Risk Assessment    Has the patient had two or more falls in the last year or any fall with injury in the last year?  No  If fall risk identified, deferred for follow up unless specifically addressed in assessment and plan.      Safety Assessment    Throw rugs on floor.  Yes  Handrails on all stairs.  Yes  Good lighting in all hallways.  Yes  Difficulty hearing.  No  Patient counseled about all safety risks that were identified.    Functional Assessment ADLs    Are there any barriers preventing you from cooking for yourself or meeting nutritional needs?  No.    Are there any barriers preventing you from driving safely or obtaining transportation?  No.    Are there any barriers preventing you from using a telephone or calling for help?  No.    Are there any barriers preventing you from  shopping?  No.    Are there any barriers preventing you from taking care of your own finances?  No.    Are there any barriers preventing you from managing your medications?    No.    Are there any barriers preventing you from showering, bathing or dressing yourself?  No.    Are you currently engaging in any exercise or physical activity?  No.     What is your perception of your health?  Good.    Health Maintenance Summary                PFT SCREENING-FEV1 AND FEV/FVC RATIO / SPIROMETRY SHOULD BE PERFORMED ANNUALLY Overdue 1949     IMM HEP B VACCINE Overdue 1950     BONE DENSITY Overdue 2018      Done 2013 DS-BONE DENSITY STUDY (DEXA)     Patient has more history with this topic...    Annual Wellness Visit Overdue 2019      Done 4/10/2018 Visit Dx: Medicare annual wellness visit, subsequent     Patient has more history with this topic...    IMM INFLUENZA Overdue 2019      Done 2018 Imm Admin: Influenza Vaccine Adult HD     Patient has more history with this topic...    IMM ZOSTER VACCINES Postponed 3/18/2020 Originally 2012. System: vaccine not available, other system reasons     Done 10/2/2012 Imm Admin: Zoster Vaccine Live (ZVL) (Zostavax)    IMM DTaP/Tdap/Td Vaccine Next Due 2028      Done 2018 Imm Admin: Tdap Vaccine          Patient Care Team:  Denis Krueger M.D. as PCP - General  Nathan Fajardo M.D. as Consulting Physician (Anesthesia)  Esdras Thornton M.D. as Consulting Physician (Pulmonary Medicine)  [place] as Respiratory  SARAH De La Garza as Consulting Physician (Pulmonary Medicine)  [facility] as Home Health Provider  Alexy Smyth M.D. as Consulting Physician (Interventional Cardiology)      Social History     Tobacco Use   • Smoking status: Former Smoker     Packs/day: 1.50     Years: 25.00     Pack years: 37.50     Types: Cigarettes     Last attempt to quit: 1984     Years since quittin.5   • Smokeless tobacco:  Never Used   • Tobacco comment: Continued abstinence advised.   Substance Use Topics   • Alcohol use: No     Alcohol/week: 0.0 oz   • Drug use: No     Family History   Problem Relation Age of Onset   • Heart Disease Father    • Diabetes Brother      She  has a past medical history of Anesthesia, Aortic regurgitation (4/25/2012), Arrhythmia (7/10), Arthritis, Atypical chest pain (4/25/2012), CATARACT (2007,08), Chronic anticoagulation (4/25/2012), Constipation (4/25/2012), COPD (chronic obstructive pulmonary disease) ([facility]), DDD (degenerative disc disease), Dental disorder, Dyspnea (4/25/2012), Generalized osteoarthritis (4/25/2012), Headache(784.0) (4/25/2012), Heart valve insufficiency, Hypercholesteremia, Hyperlipidemia (4/25/2012), Hypertension, MEDICAL HOME, On home oxygen therapy, Osteoarthritis of knee (4/25/2012), Osteoporosis, Other accident, Pain, Pneumonia (6/4), TB lung, 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   Past Surgical History:   Procedure Laterality Date   • RECOVERY  7/28/2010    Performed by SURGERY, IR-RECOVERY at [facility]   • OTHER ORTHOPEDIC SURGERY  may, 2010      T11 kypho.   • CATARACT PHACO WITH IOL  1/5/2009    Performed by ROSE MARIE MANN at [facility]   • CARPAL TUNNEL RELEASE  @30 yrs ago    rt hand         Exam:        Hearing decreased  Dentition fair  Alert, oriented in no acute distress.  Eye contact is good, speech goal directed, affect calm  Lungs clear  Cv s1s2  Abdominal wall hematoma extending from right posterior iliac crest to right inguinal region mildly tender to palpation, walks without use of walker or cane    Assessment and Plan. The following treatment and monitoring plan is recommended:     Assessment  #!  Medicare wellness visit    #2 paroxysmal atrial fibrillation followed by cardiology, on metoprolol for rate control, Eliquis status post recent fall with hematoma,  rate controlled    #3 chronic low back pain has seen pain management locally as well as at [place] status post T12 vertebroplasty 2010 for compression fracture, has tried NSAIDs, Celebrex, OxyContin, hydrocodone, oxycodone, gabapentin, Cymbalta, pregabalin, has had trial of spinal cord stimulator, physical therapy, facet injections, epidural, TENS, topical Lidoderm, Voltaren gel    #4 hypertension on metoprolol 12.5 mg bid stable    #5 COPD followed by pulmonary on Trelegy Ellipta, no tobacco, no recent flareups    #6 nocturnal hypoxemia 2 L per pulmonary    #7 AAA 4.1 cm a sending aorta aneurysm by CT-CTA February 2019    #8 mild cognitive impairment has seen neurology, likely Alzheimer's    #9 osteoporosis has been on Reclast previously    #10 preventive health  5/05 colon per [facility] polyp no path report, f/u rec 5 yrs (12/12/12 [facility] note)  4/09 stool occult blood negative  12/09 mammogram  10/1/11 pneumovax  10/2/12 zoster vaccine  6/11/13 declines mammogram, tdap  6/13 dexa LS +1.8, forearm -2.7  3/4/14 vit d 26  10/12/15 prevnar  12/11/18 tdap    #11 hematoma no history of fall or trauma, most likely with some type of inciting physical event perhaps straining a muscle or patient did not recall falling or hitting herself accidentally over that area, it appears to be resolving, initial scan 10/4/19 CT abdomen pelvis with, right lateral oblique hematoma, small epigastric midline abdominal wall fat-containing ventral hernia and follow-up scan showing  0/7/19 CT-CTA complete thoracoabdominal, right lateral abdominal wall intramuscular hematoma decreased in size, no aneurysm or dissection    #12 renal mass 10/7/19 CT-CTA complete thoracoabdominal,  left renal low-density lesion 1.4 cm, 31 units      Services suggested:   Health Care Screening recommendations as per orders if indicated.  Referrals offered: PT/OT/Nutrition counseling/Behavioral Health/Smoking cessation as per orders if indicated.    Discussion today about  general wellness and lifestyle habits:    · Prevent falls and reduce trip hazards; Cautioned about securing or removing rugs.  · Have a working fire alarm and carbon monoxide detector;   · Engage in regular physical activity and social activities       Follow-up:     plan  #1 health maintenance reviewed and updated    #2 medications reviewed with patient and son    #3 annual influenza vaccine recommended when available, we do not have any in stock, recommend go to pharmacy    #4  Recommend bone density    #5 long-term risk of anticoagulation discussed, with paroxysmal atrial fibrillation, at risk for stroke [id number] = 5, at high risk for stroke however increased risk for bleeding complications as well, patient is at high risk for stroke understands Eliquis increases risk for bleeding complications such as the hematoma, additionally she has chronic back pain which is problematic because she has seen multiple specialist from physical therapy, pain management, orthopedics, neurosurgery, has had treatment, injections, oral therapy with NSAIDs, Celebrex, gabapentin with no benefit.  We have tapered her off chronic narcotics because of memory loss, and that was affecting her mood.  Her memory is much clearer without these medications but the narcotics did control her pain more effectively than NSAIDs.  Additionally NSAIDs have risk of peptic ulcer disease or exacerbating bleeding with Eliquis.  All of this discussed with patient and son.  I have discussed this with her daughter as well via my chart.  There is a tenuous balance between pain control, and complications from medications.  The deciding factor being that the narcotics were causing too much drowsiness, sedation, confusion and placing the patient at risk for falls and the patient at her baseline off narcotics is much clearer mentally and prefers to not be as forgetful understanding the risk of treatment with Celebrex and cognition with Eliquis of increasing  risk for GI bleed or bleeding ulcer which could be life-threatening.  There is no correct or definitive answer, I believe either treatment with narcotics or Henderson 2 inhibitors would be reasonable and justified and the patient does understand the long-term risk of both medications, as to the son and daughter.  The patient was quite high functioning, also well educated and over time has become more forgetful and this has been very discouraging and disheartening for her.  Any medication that affects her mentation and memory seems to additionally cause her to feel sad, depressed, and narcotics definitely will cause that side effect compared to Celebrex.  Thus after deliberation and consideration of the above-mentioned factors, I believe it is reasonable to continue Celebrex with Eliquis, and not use tramadol, hydrocodone, or oxycodone which she has had previously and all cause memory issues.    #6 falling precautions    #7 declines physical therapy, hearing test    #8 continue check blood pressure and record    #9 also recommend no further follow-up of left renal lesion, potentially this could be a renal cell carcinoma however given her age, the work-up involved, she would not be a candidate for invasive treatment and therapy, explained the issue to the patient and son and they agree, also have discussed this previously with daughter on my chart     #10 continue to keep active, falling precautions, the hematoma should slowly resolve although the discomfort may take months to improve and dissipate, she will always have the chronic underlying back pain    #11 follow-up cardiology and pulmonary    #12 no need for further follow AAA or renal mass    #13 follow-up 3 months

## 2019-11-01 ENCOUNTER — APPOINTMENT (OUTPATIENT)
Dept: PULMONOLOGY | Facility: HOSPICE | Age: 84
End: 2019-11-01
Payer: MEDICARE

## 2019-11-01 ENCOUNTER — APPOINTMENT (OUTPATIENT)
Dept: PULMONOLOGY | Facility: HOSPICE | Age: 84
End: 2019-11-01
Attending: NURSE PRACTITIONER
Payer: MEDICARE

## 2019-11-13 ENCOUNTER — OFFICE VISIT (OUTPATIENT)
Dept: CARDIOLOGY | Facility: MEDICAL CENTER | Age: 84
End: 2019-11-13
Payer: MEDICARE

## 2019-11-13 VITALS
SYSTOLIC BLOOD PRESSURE: 130 MMHG | WEIGHT: 128 LBS | DIASTOLIC BLOOD PRESSURE: 80 MMHG | BODY MASS INDEX: 27.61 KG/M2 | HEART RATE: 76 BPM | OXYGEN SATURATION: 94 % | HEIGHT: 57 IN

## 2019-11-13 DIAGNOSIS — I48.0 PAF (PAROXYSMAL ATRIAL FIBRILLATION) (HCC): Chronic | ICD-10-CM

## 2019-11-13 DIAGNOSIS — I10 ESSENTIAL HYPERTENSION: Chronic | ICD-10-CM

## 2019-11-13 PROCEDURE — 99214 OFFICE O/P EST MOD 30 MIN: CPT | Performed by: INTERNAL MEDICINE

## 2019-11-13 ASSESSMENT — ENCOUNTER SYMPTOMS
WEAKNESS: 1
SHORTNESS OF BREATH: 0
SPEECH CHANGE: 0
MEMORY LOSS: 1
VOMITING: 0
DIZZINESS: 0
BRUISES/BLEEDS EASILY: 0
PALPITATIONS: 0
BACK PAIN: 1
BLOOD IN STOOL: 0
DEPRESSION: 1
LOSS OF CONSCIOUSNESS: 0
NAUSEA: 0

## 2019-11-14 ENCOUNTER — TELEPHONE (OUTPATIENT)
Dept: CARDIOLOGY | Facility: MEDICAL CENTER | Age: 84
End: 2019-11-14

## 2019-11-14 DIAGNOSIS — I10 ESSENTIAL HYPERTENSION: Chronic | ICD-10-CM

## 2019-11-14 RX ORDER — IRBESARTAN 75 MG/1
75 TABLET ORAL DAILY
Qty: 1 TAB | Refills: 1 | COMMUNITY
Start: 2019-11-14 | End: 2019-12-03

## 2019-11-14 NOTE — PROGRESS NOTES
Chief Complaint   Patient presents with   • Hypertension     Follow up       Subjective:   Venus Church is a 88 y.o. female who is accompanied by her son today.  The major issue is labile blood pressure.  Her son notes that her blood pressure is quite labile at home and can be as high as 170 and sometimes as low as 100.  The family was taking the blood pressure 3 times a day.    I last saw her in September.  At that time lisinopril was added to her regimen but this caused cough and she was started on Erbe Nina 75 mg a day.  The son states that this medicine was also stopped due to cough.  Last visit, she was also placed on Demadex 5 mg on a 3 times a week basis because of dyspnea.  This medication is also apparently been stopped.  The patient is on anticoagulation for PAF and was seen in the ER of about a month ago for spontaneous flank hematoma.  She is had no further bleeding problems.    She is also been seen recently in the office for blood pressure checks.  Most recently she has been on low-dose metoprolol 12.5 mg twice daily.    Echo from February, 2019 reveals moderate aortic insufficiency, mild mitral sufficiency, and moderately elevated pulmonary pressures of 45 mmHg.  EF is preserved at 55%.    Past Medical History:   Diagnosis Date   • Anesthesia     n/v   • Aortic regurgitation 4/25/2012   • Arrhythmia 7/10      A. Fib.   • Arthritis     general   • Atypical chest pain 4/25/2012   • CATARACT 2007,08   • Chronic anticoagulation 4/25/2012   • Constipation 4/25/2012   • COPD (chronic obstructive pulmonary disease) ([facility])    • DDD (degenerative disc disease)    • Dental disorder     partials   • Dyspnea 4/25/2012   • Generalized osteoarthritis 4/25/2012   • Headache(784.0) 4/25/2012   • Heart valve insufficiency     minimal, watched by cardio   • Hypercholesteremia    • Hyperlipidemia 4/25/2012   • Hypertension    • MEDICAL HOME    • On home oxygen therapy     at [facility]   • Osteoarthritis of knee 4/25/2012   •  Osteoporosis    • Other accident     C-Spine FX     • Pain     back pain   • Pneumonia    • TB lung, latent 2016   • Valvular heart disease 2012   • Vertebral fracture    • Vertigo 2012     Past Surgical History:   Procedure Laterality Date   • RECOVERY  2010    Performed by SURGERY, IR-RECOVERY at [facility]   • OTHER ORTHOPEDIC SURGERY  may, 2010      T11 kypho.   • CATARACT PHACO WITH IOL  2009    Performed by ROSE MARIE MANN [facility]   • CARPAL TUNNEL RELEASE  @30 yrs ago    rt hand     Family History   Problem Relation Age of Onset   • Heart Disease Father    • Diabetes Brother      Social History     Socioeconomic History   • Marital status:      Spouse name: Not on file   • Number of children: Not on file   • Years of education: Not on file   • Highest education level: Not on file   Occupational History   • Not on file   Social Needs   • Financial resource strain: Not on file   • Food insecurity:     Worry: Not on file     Inability: Not on file   • Transportation needs:     Medical: Not on file     Non-medical: Not on file   Tobacco Use   • Smoking status: Former Smoker     Packs/day: 1.50     Years: 25.00     Pack years: 37.50     Types: Cigarettes     Last attempt to quit: 1984     Years since quittin.6   • Smokeless tobacco: Never Used   • Tobacco comment: Continued abstinence advised.   Substance and Sexual Activity   • Alcohol use: No     Alcohol/week: 0.0 oz   • Drug use: No   • Sexual activity: Not Currently   Lifestyle   • Physical activity:     Days per week: Not on file     Minutes per session: Not on file   • Stress: Not on file   Relationships   • Social connections:     Talks on phone: Not on file     Gets together: Not on file     Attends [employer] service: Not on file     Active member of club or organization: Not on file     Attends meetings of clubs or organizations: Not on file     Relationship  status: Not on file   • Intimate partner violence:     Fear of current or ex partner: Not on file     Emotionally abused: Not on file     Physically abused: Not on file     Forced sexual activity: Not on file   Other Topics Concern   • Not on file   Social History Narrative   • Not on file     Allergies   Allergen Reactions   • Codeine Vomiting   • Irbesartan Cough     Strong cough, pt.s daughter reports the ER  Advised she stop taking    • Lisinopril Shortness of Breath and Cough     Outpatient Encounter Medications as of 11/13/2019   Medication Sig Dispense Refill   • Multiple Vitamins-Minerals (CENTRUM SILVER PO) Take  by mouth.     • metoprolol (LOPRESSOR) 25 MG Tab Take 1 Tab by mouth 2 Times a Day. (Patient taking differently: Take 12.5 mg by mouth 2 Times a Day.) 60 Tab 11   • albuterol 108 (90 Base) MCG/ACT Aero Soln inhalation aerosol Inhale 2 Puffs by mouth every 6 hours as needed for Shortness of Breath. 1 Inhaler 6   • apixaban (ELIQUIS) 2.5mg Tab Take 2.5 mg by mouth 2 Times a Day.     • celecoxib (CELEBREX) 100 MG Cap Take 100 mg by mouth 2 times a day.     • gabapentin (NEURONTIN) 100 MG Cap Take 100 mg by mouth every evening.     • albuterol (PROVENTIL) 2.5mg/3ml Nebu Soln solution for nebulization 2.5 mg by Nebulization route 3 times a day.     • Green Tea, Olya sinensis, (GREEN TEA EXTRACT PO) Take 1 Tab by mouth every day.     • Coenzyme Q10 (COQ10 PO) Take 1 Tab by mouth every evening.     • Probiotic Product (PROBIOTIC PO) Take 1 Cap by mouth every day.     • Alpha-Lipoic Acid (LIPOIC ACID PO) Take 1 Tab by mouth every morning.     • Ginkgo Biloba 120 MG Cap Take 120 mg by mouth every morning.     • Fluticasone-Umeclidin-Vilant (TRELEGY ELLIPTA) 100-62.5-25 MCG/INH AEROSOL POWDER, BREATH ACTIVATED Inhale 1 Puff by mouth every day. 1 Each 11   • amLODIPine (NORVASC) 5 MG Tab Take 1 Tab by mouth 1 time daily as needed (SBP >160).       No facility-administered encounter medications on file  "as of 11/13/2019.      Review of Systems   Constitutional: Negative for malaise/fatigue.   HENT: Negative for nosebleeds.    Respiratory: Negative for shortness of breath.    Cardiovascular: Negative for chest pain, palpitations and leg swelling.   Gastrointestinal: Negative for blood in stool, melena, nausea and vomiting.   Genitourinary: Negative for hematuria.   Musculoskeletal: Positive for back pain.   Neurological: Positive for weakness. Negative for dizziness, speech change and loss of consciousness.   Endo/Heme/Allergies: Does not bruise/bleed easily.   Psychiatric/Behavioral: Positive for depression and memory loss.        Objective:   /80 (BP Location: Left arm, Patient Position: Sitting, BP Cuff Size: Adult)   Pulse 76   Ht 1.448 m (4' 9\")   Wt 58.1 kg (128 lb)   SpO2 94%   BMI 27.70 kg/m²     Physical Exam   Constitutional: She is oriented to person, place, and time. She appears well-developed and well-nourished. No distress.   HENT:   Head: Atraumatic.   Eyes: Pupils are equal, round, and reactive to light. Conjunctivae and EOM are normal.   Neck: Neck supple. No JVD present.   Cardiovascular: Normal rate and regular rhythm.   Murmur heard.   Systolic murmur is present with a grade of 1/6.   Diastolic murmur is present with a grade of 3/6.  Pulmonary/Chest: Effort normal and breath sounds normal. No respiratory distress. She has no wheezes. She has no rales.   Musculoskeletal:         General: Edema present.      Comments: Trace edema bilaterally   Neurological: She is alert and oriented to person, place, and time.   Skin: Skin is warm and dry. She is not diaphoretic.   Psychiatric: Her mood appears anxious. She is agitated. She is not slowed.       Assessment:     1. PAF (paroxysmal atrial fibrillation) (HCC)     2. Essential hypertension         Medical Decision Making:  Today's Assessment / Status / Plan:   Labile hypertension: By my reading today her blood pressure is 180 systolic this is " verified by palpation.  The patient has had a lot of different medication changes.  Recommend he go back to the Irbesartan 75 mg a day.  I reassured the son this is unlikely to cause cough.  Our office will call tomorrow to ensure they have this medication at home.  The family will record blood pressures and reevaluate in about 2 weeks.  We will try and stay away from amlodipine unless low-dose because the risk of orthostatic hypotension.    Dementia: Patient has developed a significant dementia.  Today she is quite agitated and walking around the exam room and in the hallway.  She replaced the paper on the exam table 2 or 3 times during the visit.  The family does not seem to notice any change in her behavior.    Anticoagulation: One episode of spontaneous bleeding on low-dose apixaban.  She has further bleeding episodes would need to evaluate the risk/benefit of continuing anticoagulation.    History of PAF: Remains asymptomatic.    Return 2 weeks for blood pressure check as noted above.  There is been no lot of rapid medication changes and will try and minimize this.  Return to see me in 2 months

## 2019-11-14 NOTE — TELEPHONE ENCOUNTER
I called Venus's daughter to review medication list.       --Venus was taking Irbesartan 75mg when she developed the severe cough in October, and the ER doctor took her off of it.  Daughter is willing to try this again (see comments below about HCTZ) and will stop right away and call us if she develops the cough again.    -Daughter states that the medication that seemed to work great for her was the HCTZ (when it was combined with the Irbesartan); however, it brought her BP down too low.  She is wondering if she would just do better with diuretic alone.      -Patient is NOT taking Amlodipine at all    I advised her that I would let Dr. Smyth know and he will advise which medication he would like to try.

## 2019-11-21 ENCOUNTER — TELEPHONE (OUTPATIENT)
Dept: CARDIOLOGY | Facility: MEDICAL CENTER | Age: 84
End: 2019-11-21

## 2019-11-21 NOTE — TELEPHONE ENCOUNTER
Ivana notified via My Chart.    Message   Received: Today   Message Contents   Alexy Smyth M.D.  Camila Oleary L.P.N.   Caller: Unspecified (Today, 10:11 AM)             She may have some highs and lows in her blood pressure but as long as the average is reasonable control will continue current medications.  Would avoid taking her blood pressure too often. All in all, BP's look reasonable.

## 2019-11-21 NOTE — TELEPHONE ENCOUNTER
To Dr. Smyth. Systolic BP'l=[phone].        Laith Smyth,   Last week Amy contacted us and we spoke of treatment for mom's BP.  We agreed that we should try:   1.  Metropol (25mg tab) , 1/2 pill am, and 1/2 pm. Mom's BP continued to fluctuate (BP notes for the week are attached.)   2.  Nov 17, mark started Metropol, 1 whole pill am, and 1/2 pm.   3. Nov 20, we started Irbesartan 75mg,  1pill am, and continued with 1/2 Metropol am and 1/2 pm.   We are to watch closely for any signs of a cough or shortness of breath, as Irbesartar was the reason the ER Doctor gave for her persistent cough and her last two visits to ER.     Dr. Smyth, Mom becomes frustrated and experiences anxiety and some lows brought on by her dementia. Could it be contributing to her fluctuating BP?     Thank you,   Ivana Asp

## 2019-12-02 ENCOUNTER — OFFICE VISIT (OUTPATIENT)
Dept: CARDIOLOGY | Facility: MEDICAL CENTER | Age: 84
End: 2019-12-02
Payer: MEDICARE

## 2019-12-02 ENCOUNTER — PATIENT OUTREACH (OUTPATIENT)
Dept: HEALTH INFORMATION MANAGEMENT | Facility: OTHER | Age: 84
End: 2019-12-02

## 2019-12-02 VITALS
HEART RATE: 86 BPM | WEIGHT: 130.8 LBS | OXYGEN SATURATION: 90 % | BODY MASS INDEX: 28.22 KG/M2 | HEIGHT: 57 IN | DIASTOLIC BLOOD PRESSURE: 80 MMHG | SYSTOLIC BLOOD PRESSURE: 178 MMHG

## 2019-12-02 DIAGNOSIS — G47.33 OSA (OBSTRUCTIVE SLEEP APNEA): ICD-10-CM

## 2019-12-02 DIAGNOSIS — E78.5 DYSLIPIDEMIA: Chronic | ICD-10-CM

## 2019-12-02 DIAGNOSIS — G47.34 NOCTURNAL HYPOXEMIA: ICD-10-CM

## 2019-12-02 DIAGNOSIS — I48.0 PAF (PAROXYSMAL ATRIAL FIBRILLATION) (HCC): Chronic | ICD-10-CM

## 2019-12-02 DIAGNOSIS — I10 ESSENTIAL HYPERTENSION: Chronic | ICD-10-CM

## 2019-12-02 PROCEDURE — 99214 OFFICE O/P EST MOD 30 MIN: CPT | Performed by: NURSE PRACTITIONER

## 2019-12-02 RX ORDER — INFLUENZA A VIRUS A/MICHIGAN/45/2015 X-275 (H1N1) ANTIGEN (FORMALDEHYDE INACTIVATED), INFLUENZA A VIRUS A/SINGAPORE/INFIMH-16-0019/2016 IVR-186 (H3N2) ANTIGEN (FORMALDEHYDE INACTIVATED), AND INFLUENZA B VIRUS B/MARYLAND/15/2016 BX-69A (A B/COLORADO/6/2017-LIKE VIRUS) ANTIGEN (FORMALDEHYDE INACTIVATED) 60; 60; 60 UG/.5ML; UG/.5ML; UG/.5ML
INJECTION, SUSPENSION INTRAMUSCULAR
COMMUNITY
Start: 2019-10-23 | End: 2019-12-02

## 2019-12-02 NOTE — PROGRESS NOTES
Outcome: Left Message to call back so I am able to complete my introduction as their SCP     Please transfer to Patient Outreach Team at [phone] when patient returns call.    [facility] Verified: yes    Attempt # 1

## 2019-12-02 NOTE — PROGRESS NOTES
Chief Complaint   Patient presents with   • Hypertension       Subjective:   Venus Church is a 88 y.o. female who presents today with her daughter for hypertension.    She is followed by Dr. Smyth in our clinic, history of paroxysmal atrial fibrillation, hypertension, hyperlipidemia, COPD, AAA, MAMI, and dementia.    Patient daughter has noticed, patient has been coughing more and desating with irbesartan. Blood pressure overall has been sbp 150s. Her blood pressure in the evening tend to run high, because of change in setting with caregiver and patient is more agitated in the evenings.     Denies any chest pain, sob, or palpitations.     Past Medical History:   Diagnosis Date   • Anesthesia     n/v   • Aortic regurgitation 4/25/2012   • Arrhythmia 7/10      A. Fib.   • Arthritis     general   • Atypical chest pain 4/25/2012   • CATARACT 2007,08   • Chronic anticoagulation 4/25/2012   • Constipation 4/25/2012   • COPD (chronic obstructive pulmonary disease) ([facility])    • DDD (degenerative disc disease)    • Dental disorder     partials   • Dyspnea 4/25/2012   • Generalized osteoarthritis 4/25/2012   • Headache(784.0) 4/25/2012   • Heart valve insufficiency     minimal, watched by cardio   • Hypercholesteremia    • Hyperlipidemia 4/25/2012   • Hypertension    • MEDICAL HOME    • On home oxygen therapy     at [facility]   • Osteoarthritis of knee 4/25/2012   • Osteoporosis    • Other accident     C-Spine FX  2006   • Pain     back pain   • Pneumonia 6/4   • TB lung, latent 5/4/2016   • Valvular heart disease 4/25/2012   • Vertebral fracture    • Vertigo 4/25/2012     Past Surgical History:   Procedure Laterality Date   • RECOVERY  7/28/2010    Performed by SURGERY, IR-RECOVERY at [facility]   • OTHER ORTHOPEDIC SURGERY  may, 2010      T11 kypho.   • CATARACT PHACO WITH IOL  1/5/2009    Performed by ROSE MARIE MANN at [facility]   • CARPAL TUNNEL RELEASE  @30 yrs ago    rt hand     Family  History   Problem Relation Age of Onset   • Heart Disease Father    • Diabetes Brother      Social History     Socioeconomic History   • Marital status:      Spouse name: Not on file   • Number of children: Not on file   • Years of education: Not on file   • Highest education level: Not on file   Occupational History   • Not on file   Social Needs   • Financial resource strain: Not on file   • Food insecurity:     Worry: Not on file     Inability: Not on file   • Transportation needs:     Medical: Not on file     Non-medical: Not on file   Tobacco Use   • Smoking status: Former Smoker     Packs/day: 1.50     Years: 25.00     Pack years: 37.50     Types: Cigarettes     Last attempt to quit: 1984     Years since quittin.6   • Smokeless tobacco: Never Used   • Tobacco comment: Continued abstinence advised.   Substance and Sexual Activity   • Alcohol use: No     Alcohol/week: 0.0 oz   • Drug use: No   • Sexual activity: Not Currently   Lifestyle   • Physical activity:     Days per week: Not on file     Minutes per session: Not on file   • Stress: Not on file   Relationships   • Social connections:     Talks on phone: Not on file     Gets together: Not on file     Attends Faith service: Not on file     Active member of club or organization: Not on file     Attends meetings of clubs or organizations: Not on file     Relationship status: Not on file   • Intimate partner violence:     Fear of current or ex partner: Not on file     Emotionally abused: Not on file     Physically abused: Not on file     Forced sexual activity: Not on file   Other Topics Concern   • Not on file   Social History Narrative   • Not on file     Allergies   Allergen Reactions   • Codeine Vomiting   • Irbesartan Cough     Strong cough, pt.s daughter reports the ER  Advised she stop taking    • Lisinopril Shortness of Breath and Cough     Outpatient Encounter Medications as of 2019   Medication Sig Dispense Refill   •  "metoprolol (LOPRESSOR) 25 MG Tab Take 0.5 Tabs by mouth 2 Times a Day. 1 Tab 1   • irbesartan (AVAPRO) 75 MG tablet Take 1 Tab by mouth every day. 1 Tab 1   • Multiple Vitamins-Minerals (CENTRUM SILVER PO) Take  by mouth.     • apixaban (ELIQUIS) 2.5mg Tab Take 2.5 mg by mouth 2 Times a Day.     • celecoxib (CELEBREX) 100 MG Cap Take 100 mg by mouth 2 times a day.     • gabapentin (NEURONTIN) 100 MG Cap Take 100 mg by mouth every evening.     • albuterol (PROVENTIL) 2.5mg/3ml Nebu Soln solution for nebulization 2.5 mg by Nebulization route 3 times a day.     • Green Tea, Olya sinensis, (GREEN TEA EXTRACT PO) Take 1 Tab by mouth every day.     • Coenzyme Q10 (COQ10 PO) Take 1 Tab by mouth every evening.     • Probiotic Product (PROBIOTIC PO) Take 1 Cap by mouth every day.     • Alpha-Lipoic Acid (LIPOIC ACID PO) Take 1 Tab by mouth every morning.     • Ginkgo Biloba 120 MG Cap Take 120 mg by mouth every morning.     • Fluticasone-Umeclidin-Vilant (TRELEGY ELLIPTA) 100-62.5-25 MCG/INH AEROSOL POWDER, BREATH ACTIVATED Inhale 1 Puff by mouth every day. 1 Each 11   • [DISCONTINUED] FLUZONE HIGH-DOSE 0.5 ML Suspension Prefilled Syringe injection        No facility-administered encounter medications on file as of 12/2/2019.      Review of Systems   Constitutional: Negative for malaise/fatigue.   Respiratory: Positive for cough. Negative for shortness of breath.    Cardiovascular: Negative for chest pain, palpitations, claudication and leg swelling.   Gastrointestinal: Negative for nausea and vomiting.   Neurological: Negative for dizziness and weakness.   Psychiatric/Behavioral: Positive for memory loss. The patient is nervous/anxious.         Objective:   BP (!) 178/80 (BP Location: Left arm, Patient Position: Sitting, BP Cuff Size: Adult)   Pulse 86   Ht 1.448 m (4' 9\")   Wt 59.3 kg (130 lb 12.8 oz)   SpO2 90%   BMI 28.30 kg/m²     Physical Exam   Constitutional: She appears well-developed and well-nourished. No " distress.   HENT:   Head: Normocephalic and atraumatic.   Eyes: Pupils are equal, round, and reactive to light.   Neck: No JVD present.   Cardiovascular: Normal rate, regular rhythm and normal heart sounds.   Pulmonary/Chest: Effort normal and breath sounds normal.   Abdominal: Soft. Bowel sounds are normal. She exhibits no distension.   Musculoskeletal:         General: No edema.   Neurological: She is alert. She is disoriented.   Skin: Skin is warm and dry. She is not diaphoretic.   Psychiatric: She is agitated. She exhibits abnormal recent memory and abnormal remote memory.       Assessment:     1. Essential hypertension     2. PAF (paroxysmal atrial fibrillation) (HCC)     3. MAMI (obstructive sleep apnea)     4. Nocturnal hypoxemia     5. Dyslipidemia         Medical Decision Making:  Today's Assessment / Status / Plan:     1. Hypertension:  - unable to tolerate irbesartan due to persistent cough and desaturating to 80s on room air. We will stop irebsartan and metoprolol. Start low dose coreg 6.25mg BID and increase as tolerated for goal sbp 140s.     2. PAF:  - pulse is regular today   - continue eliquis 2.5mg BID     3. MAMI:  - on oxygen therapy at night     Follow up in 1 month with Dr. Smyth, Sooner as needed.     Collaborating Provider: Dr. brian     Add coreg 6.25mg BID

## 2019-12-02 NOTE — PROGRESS NOTES
Members daughter called back and I was able to complete my introduction with her as she handles all of her mother medical matters. She had no questions about the program at this time. She declined to scheduling all of her mother's care gaps at the moment. She ask that I look into a bill acct # [id number] for $105. She let me know she is part of a financial aid program with [employer] and wants to know exactly how much she needs to pay and is responsible for. I let her know that I would look into it and call billing to see what they say and give her a call back with an amount she owes. She had no other questions for me at this time and had to leave the house so we were not able to go over her mothers chart to update anything. If she calls back with any questions or concerns please transfer to [phone].

## 2019-12-03 RX ORDER — CARVEDILOL 6.25 MG/1
6.25 TABLET ORAL 2 TIMES DAILY WITH MEALS
Qty: 60 TAB | Refills: 3 | Status: SHIPPED | OUTPATIENT
Start: 2019-12-03 | End: 2020-03-02 | Stop reason: SDUPTHER

## 2019-12-03 ASSESSMENT — ENCOUNTER SYMPTOMS
WEAKNESS: 0
COUGH: 1
NERVOUS/ANXIOUS: 1
DIZZINESS: 0
NAUSEA: 0
PALPITATIONS: 0
CLAUDICATION: 0
MEMORY LOSS: 1
VOMITING: 0
SHORTNESS OF BREATH: 0

## 2019-12-05 NOTE — PROGRESS NOTES
Calling back member's daughter to let her know that I looked into the bill she  got for  her mother for $105. I advised that the bill for $105 has been adjusted based on the financial aid she receives from the billing department at [facility]. Also advised that she is on a payment plan for $50 per month. Advised to calling billing and request detailed bills so she is able to see all the adjustments that have been made due to the financial aid. Also advising to call the billing department to let them explain more in depth how the payment plan works and clear up any confusion on their end with receiving bills. I also let her know that she may have to apply for assistance every time she gets a bill as well. She had no further questions at this time. If she calls back please transfer to [phone].

## 2019-12-09 ENCOUNTER — PATIENT MESSAGE (OUTPATIENT)
Dept: MEDICAL GROUP | Facility: MEDICAL CENTER | Age: 84
End: 2019-12-09

## 2019-12-09 ENCOUNTER — TELEPHONE (OUTPATIENT)
Dept: MEDICAL GROUP | Facility: MEDICAL CENTER | Age: 84
End: 2019-12-09

## 2019-12-09 DIAGNOSIS — I48.0 PAF (PAROXYSMAL ATRIAL FIBRILLATION) (HCC): Chronic | ICD-10-CM

## 2019-12-09 DIAGNOSIS — E78.5 DYSLIPIDEMIA: ICD-10-CM

## 2019-12-09 DIAGNOSIS — E53.8 B12 DEFICIENCY: ICD-10-CM

## 2019-12-09 DIAGNOSIS — D72.829 LEUKOCYTOSIS, UNSPECIFIED TYPE: ICD-10-CM

## 2019-12-09 DIAGNOSIS — E55.9 HYPOVITAMINOSIS D: ICD-10-CM

## 2019-12-09 DIAGNOSIS — R05.9 COUGH: ICD-10-CM

## 2019-12-10 NOTE — TELEPHONE ENCOUNTER
Please notify patient's daughter Yashira [phone] that I did receive the cardiology message to her regarding the blood pressure medication adjustment.    If the cough still persists, I would recommend getting a chest x-ray, that order has been placed in the computer system.

## 2019-12-10 NOTE — TELEPHONE ENCOUNTER
----- Message from Claudia Rojas, Med Ass't sent at 12/9/2019  8:33 AM PST -----  Regarding: FW: Procedure Question  Contact: [phone]    ----- Message -----  From: Venus Church  Sent: 12/9/2019   8:00 AM PST  To: Emily Carias  Subject: Procedure Question                               Dr. Krueger,    Will adj. dosage and get back to you?    Cough can be concerning, I recommend she follow up with Dr. Krueger for follow up on her COPD. She can decrease her coreg to 1/2 tablet in the morning and 1/2 tablet in the evening.    Hope she feels better!  Thanks,  Margo Conrado     Good Morning, Ms. Camp!  I am writing to let you know that over the weekend mom's cough has turned into a deeper hacking cough and she is struggling with it, coughing more often to where it makes her short of breath and weak. This after using Carvedidol for 4 days.  Do you recommend lowering to 1pill per day and I can continue to monitor changes to her BP, breathing and cough? She is in a great deal of Pain every time she coughs.  Please advise!  Thank you!  Ivana

## 2019-12-10 NOTE — TELEPHONE ENCOUNTER
Patient notified, Yashira wants to see if we can order blood work to check on sodium levels as well.     Claudia Rojas  [facility] Assistant

## 2019-12-10 NOTE — TELEPHONE ENCOUNTER
----- Message from Claudia Rojas, Med Ass't sent at 12/9/2019  8:33 AM PST -----  Regarding: FW: Procedure Question  Contact: [phone]    ----- Message -----  From: Venus Church  Sent: 12/9/2019   8:00 AM PST  To: Emily Carias  Subject: Procedure Question                               Dr. Krueger,    Will adj. dosage and get back to you?    Cough can be concerning, I recommend she follow up with Dr. Krueger for follow up on her COPD. She can decrease her coreg to 1/2 tablet in the morning and 1/2 tablet in the evening.    Hope she feels better!  Thanks,  Margo Conrado     Good Morning, Ms. Camp!  I am writing to let you know that over the weekend mom's cough has turned into a deeper hacking cough and she is struggling with it, coughing more often to where it makes her short of breath and weak. This after using Carvedidol for 4 days.  Do you recommend lowering to 1pill per day and I can continue to monitor changes to her BP, breathing and cough? She is in a great deal of Pain every time she coughs.  Please advise!  Thank you!  Ivana

## 2019-12-10 NOTE — PATIENT COMMUNICATION
Please notify patient's daughter Yashira [phone] that I did receive the cardiology message to her regarding the blood pressure medication adjustment.    If the cough still persists, I would recommend getting a chest x-ray, that order has been placed in the computer system.

## 2019-12-13 ENCOUNTER — APPOINTMENT (OUTPATIENT)
Dept: RADIOLOGY | Facility: IMAGING CENTER | Age: 84
End: 2019-12-13
Payer: MEDICARE

## 2019-12-13 ENCOUNTER — TELEPHONE (OUTPATIENT)
Dept: MEDICAL GROUP | Facility: MEDICAL CENTER | Age: 84
End: 2019-12-13

## 2019-12-13 ENCOUNTER — OFFICE VISIT (OUTPATIENT)
Dept: PULMONOLOGY | Facility: HOSPICE | Age: 84
End: 2019-12-13
Payer: MEDICARE

## 2019-12-13 DIAGNOSIS — R05.9 COUGH: ICD-10-CM

## 2019-12-13 DIAGNOSIS — J44.9 CHRONIC OBSTRUCTIVE PULMONARY DISEASE, UNSPECIFIED COPD TYPE (HCC): ICD-10-CM

## 2019-12-13 DIAGNOSIS — R09.02 HYPOXIA: ICD-10-CM

## 2019-12-13 PROCEDURE — 71046 X-RAY EXAM CHEST 2 VIEWS: CPT | Mod: TC | Performed by: NURSE PRACTITIONER

## 2019-12-13 PROCEDURE — 99213 OFFICE O/P EST LOW 20 MIN: CPT | Performed by: PHYSICIAN ASSISTANT

## 2019-12-13 NOTE — TELEPHONE ENCOUNTER
Ivana called to say that Venus's back pain is so bad that she is screaming in pain. Spoke with Dr. Krueger and because the system was out, he agreed that she needs to go to ER. Spoke with Ivana and she will take her.

## 2019-12-15 ENCOUNTER — TELEPHONE (OUTPATIENT)
Dept: MEDICAL GROUP | Facility: MEDICAL CENTER | Age: 84
End: 2019-12-15

## 2019-12-15 VITALS
HEIGHT: 61 IN | HEART RATE: 75 BPM | RESPIRATION RATE: 16 BRPM | DIASTOLIC BLOOD PRESSURE: 80 MMHG | BODY MASS INDEX: 24.35 KG/M2 | OXYGEN SATURATION: 87 % | SYSTOLIC BLOOD PRESSURE: 130 MMHG | WEIGHT: 129 LBS

## 2019-12-15 RX ORDER — LIDOCAINE 50 MG/G
PATCH TOPICAL
Qty: 90 PATCH | Refills: 6 | Status: SHIPPED | OUTPATIENT
Start: 2019-12-15 | End: 2020-05-29

## 2019-12-15 ASSESSMENT — ENCOUNTER SYMPTOMS
WEIGHT LOSS: 0
CHILLS: 0
EYES NEGATIVE: 1
TREMORS: 0
FEVER: 0
PALPITATIONS: 1
HEARTBURN: 0
ORTHOPNEA: 0
SPUTUM PRODUCTION: 0
CLAUDICATION: 0
WHEEZING: 0
DIAPHORESIS: 0
HEADACHES: 1
SORE THROAT: 0
SHORTNESS OF BREATH: 1
SINUS PAIN: 0
BACK PAIN: 1
DIZZINESS: 1
INSOMNIA: 0
COUGH: 1

## 2019-12-15 NOTE — PROGRESS NOTES
CC: Back pain    HPI:  Venus Church is a 88 y.o. year old female here today for follow-up on decreased oxygen saturations. Last seen in clinic 10//2019.   At that time patient was experiencing productive cough with purulent secretions and was on day 4 of steroids and antibiotics.  She was subsequently prescribed antibiotics and steroids on 10/9 by another provider.  Unclear as to whether this was in addition or to replace emergency pack.    This is a sick visit today for concerns regarding cough now slightly improved and back pain.  Patient is a former smoker with reported quit date in 1984 and 37.5-pack-year history.  Continued abstinence advised.    Patient is accompanied by her daughter who reports most of patient history/review of systems.  She has been coming to [place] frequently to help assist with her mother's care.  She mentions possibly needing to have her mom come to [place] with her.  Per daughter's report patient has had several changes in blood pressure medications over the last 4 weeks.  She was on lisinopril with increased shortness of breath, cough and reported emergency department visit.  She was placed on Ibesartan and metoprolol, this was replaced with Carvedidol and that has now been decreased to half dosing am and pm.  Daughter reports cough is now improved.    Pertinent past medical history includes pneumonia, shingles, back pain, hip pain and cervical pain, chronic heart failure, ventral hernia and ACE inhibitor cough.    Reviewed in clinic vitals including blood pressure 130/80, heart rate is 75, O2 sat of 87 on room air and 93 on 2 L.  BMI of 24.37 kg/m².  Daughter remains concerned for increased oxygen needs, memory issues, anxiety, general frailty.      Reviewed home medication regimen including albuterol which he is using up to 2-3 times per day, Trelegy.    Reviewed most recent imaging including CTA obtained 10/6/2019 for large right flank hematoma.  Study demonstrated right lateral  abdominal wall intramuscular hematoma decreased in size from prior study, atherosclerosis with greater than 50% stenosis of bilateral renal arteries and less than 50% stenosis of the celiac and superior mesenteric arteries, left interpolar low-density lesion, with recommendation for follow-up CT kidneys, fat-containing umbilical hernia, diverticulosis, emphysema, hepatomegaly.    Chest x-ray obtained 10/2/2019 demonstrated stable cardiomegaly, hyperinflated lungs.  Multiple thoracic spine compression fractures were seen some of which demonstrate vertebral augmentation change, scoliotic curvature.    Patient also has CT abdomen and pelvis on 10/2/2019 which demonstrated right lateral oblique musculature heterogenous appearing hematoma, extensive colonic diverticulosis, small epigastric midline abdominal wall fat-containing ventral hernia, extensive atherosclerosis.  No lymphadenopathy.    Last pulmonary function test was in 2015 demonstrating FEV1 of 1.10 L or 61% predicted, FVC of 1.9 L or 78% predicted, FEV1/FVC ratio of 57, no significant postbronchodilator change, residual volume 111% predicted, TLC 92% predicted, DLCO 34% predicted.    Per pulmonologist interpretation FEV1 of 61% consistent with gold stage II COPD, no significant postbronchodilator improvement in FEV1, low normal lung volumes, significantly reduced gas transfer at 34% normal airway resistance, flow volume loop confirms COPD staging.    Patient oxygen saturations were 87% on room air would advised 2 L O2 during the day, daughter expresses concern that her mother's oxygen level might be quite low at night as she is now requiring oxygen during the day.  Will obtain overnight pulse oximetry to ascertain adequate oxygenation at night.        Review of Systems   Constitutional: Positive for malaise/fatigue. Negative for chills, diaphoresis, fever and weight loss.   HENT: Positive for hearing loss. Negative for congestion, nosebleeds, sinus pain, sore  throat and tinnitus.    Eyes: Negative.         Prescription reading glasses   Respiratory: Positive for cough (Now improved) and shortness of breath. Negative for sputum production and wheezing.    Cardiovascular: Positive for palpitations (History of atrial fibrillation) and leg swelling (Primarily on left). Negative for chest pain, orthopnea and claudication.   Gastrointestinal: Negative for heartburn.        Upper and lower dentures, occasional choking episodes   Musculoskeletal: Positive for back pain.   Skin: Negative.    Neurological: Positive for dizziness (Occasional with position change) and headaches (Occasional). Negative for tremors.   Psychiatric/Behavioral: The patient does not have insomnia.        Past Medical History:   Diagnosis Date   • Anesthesia     n/v   • Aortic regurgitation 4/25/2012   • Arrhythmia 7/10      A. Fib.   • Arthritis     general   • Atypical chest pain 4/25/2012   • CATARACT 2007,08   • Chronic anticoagulation 4/25/2012   • Constipation 4/25/2012   • COPD (chronic obstructive pulmonary disease) ([facility])    • DDD (degenerative disc disease)    • Dental disorder     partials   • Dyspnea 4/25/2012   • Generalized osteoarthritis 4/25/2012   • Headache(784.0) 4/25/2012   • Heart valve insufficiency     minimal, watched by cardio   • Hypercholesteremia    • Hyperlipidemia 4/25/2012   • Hypertension    • MEDICAL HOME    • On home oxygen therapy     at [facility]   • Osteoarthritis of knee 4/25/2012   • Osteoporosis    • Other accident     C-Spine FX  2006   • Pain     back pain   • Pneumonia 6/4   • TB lung, latent 5/4/2016   • Valvular heart disease 4/25/2012   • Vertebral fracture    • Vertigo 4/25/2012       Past Surgical History:   Procedure Laterality Date   • RECOVERY  7/28/2010    Performed by SURGERY, IR-RECOVERY at SURGERY SAME DAY [facility] ORS   • OTHER ORTHOPEDIC SURGERY  may, 2010      T11 kypho.   • CATARACT PHACO WITH IOL  1/5/2009    Performed by ROSE MARIE MANN at SURGERY SAME  DAY [facility]   • CARPAL TUNNEL RELEASE  @30 yrs ago    rt hand       Family History   Problem Relation Age of Onset   • Heart Disease Father    • Diabetes Brother        Social History     Socioeconomic History   • Marital status:      Spouse name: Not on file   • Number of children: Not on file   • Years of education: Not on file   • Highest education level: Not on file   Occupational History   • Not on file   Social Needs   • Financial resource strain: Not on file   • Food insecurity:     Worry: Not on file     Inability: Not on file   • Transportation needs:     Medical: Not on file     Non-medical: Not on file   Tobacco Use   • Smoking status: Former Smoker     Packs/day: 1.50     Years: 25.00     Pack years: 37.50     Types: Cigarettes     Last attempt to quit: 1984     Years since quittin.6   • Smokeless tobacco: Never Used   • Tobacco comment: Continued abstinence advised.   Substance and Sexual Activity   • Alcohol use: No     Alcohol/week: 0.0 oz   • Drug use: No   • Sexual activity: Not Currently   Lifestyle   • Physical activity:     Days per week: Not on file     Minutes per session: Not on file   • Stress: Not on file   Relationships   • Social connections:     Talks on phone: Not on file     Gets together: Not on file     Attends Denominational service: Not on file     Active member of club or organization: Not on file     Attends meetings of clubs or organizations: Not on file     Relationship status: Not on file   • Intimate partner violence:     Fear of current or ex partner: Not on file     Emotionally abused: Not on file     Physically abused: Not on file     Forced sexual activity: Not on file   Other Topics Concern   • Not on file   Social History Narrative   • Not on file       Allergies as of 2019 - Reviewed 2019   Allergen Reaction Noted   • Codeine Vomiting 2018   • Irbesartan Cough 10/10/2019   • Lisinopril Shortness of Breath and Cough 10/10/2019         @Vital signs for this encounter:  There were no vitals filed for this visit.    Current medications as of today   Current Outpatient Medications   Medication Sig Dispense Refill   • lidocaine (LIDODERM) 5 % Patch Apply 3 patches to affected area 12 hours on, then off 12 hours 90 Patch 6   • carvedilol (COREG) 6.25 MG Tab Take 1 Tab by mouth 2 times a day, with meals. 60 Tab 3   • Multiple Vitamins-Minerals (CENTRUM SILVER PO) Take  by mouth.     • apixaban (ELIQUIS) 2.5mg Tab Take 2.5 mg by mouth 2 Times a Day.     • celecoxib (CELEBREX) 100 MG Cap Take 100 mg by mouth 2 times a day.     • gabapentin (NEURONTIN) 100 MG Cap Take 100 mg by mouth every evening.     • albuterol (PROVENTIL) 2.5mg/3ml Nebu Soln solution for nebulization 2.5 mg by Nebulization route 3 times a day.     • Green Tea, Olya sinensis, (GREEN TEA EXTRACT PO) Take 1 Tab by mouth every day.     • Coenzyme Q10 (COQ10 PO) Take 1 Tab by mouth every evening.     • Probiotic Product (PROBIOTIC PO) Take 1 Cap by mouth every day.     • Alpha-Lipoic Acid (LIPOIC ACID PO) Take 1 Tab by mouth every morning.     • Ginkgo Biloba 120 MG Cap Take 120 mg by mouth every morning.     • Fluticasone-Umeclidin-Vilant (TRELEGY ELLIPTA) 100-62.5-25 MCG/INH AEROSOL POWDER, BREATH ACTIVATED Inhale 1 Puff by mouth every day. 1 Each 11     No current facility-administered medications for this visit.          Physical Exam:   Gen:           Alert and oriented, No apparent distress. Mood and affect     appropriate, normal interaction with provider.  Eyes:          sclere white, conjunctive moist.  Hearing:     Grossly intact.  Dentition:    Upper and lower dentures  Oropharynx:   Tongue normal, posterior pharynx without erythema or exudate.  Neck:        Supple, trachea midline, no masses.  Respiratory Effort: No intercostal retractions or use of accessory muscles.   Lung Auscultation:      Decreased; no rales, rhonchi or wheezing.  CV:            Regular rate and  rhythm. No edema. No murmurs, rubs or gallops.  Digits, Nails, Ext: No clubbing, cyanosis, petechiae, or nodes.   Skin:        No rashes, lesions or ulcers noted on back or exposed skin    surfaces.                     Assessment:  1. Chronic obstructive pulmonary disease, unspecified COPD type (HCC)   continue Trelegy and albuterol as needed   2. Hypoxia  Multiple Oximetry   3. Cough   some improvement with decrease in Carvedilol       Immunizations:    Flu: 10/23/2019  Pneumovax 23: 11/8/2012  Prevnar 13: 10/12/2015    Plan:    1- did demonstrate need for daytime oxygen in clinic  2- will order order overnight oximetry  3-continue current home regimen, Mucinex if benefit seen  4-did report occasional choking episodes, question contributing factor  5-follow-up with Mima as scheduled      This dictation was created using voice recognition software. The accuracy of the dictation is limited to the abilities of the software. I expect there may be some errors of grammar and possibly content.

## 2019-12-16 NOTE — TELEPHONE ENCOUNTER
Need PAR please  (1) Lidocaine 5% patches use 3 patches on affected area prn pain on for 12 hours, off for 12 hours  (2) #90 per 30 days  (3) diagnosis: Postherpetic neuralgia  (4) unable to take NSAIDs, has used Lidoderm patches previously with benefit, already taking gabapentin

## 2019-12-16 NOTE — PROCEDURES
Multi-Ox Readings  Multi Ox #1 Room air   O2 sat % at rest 87   O2 sat % on exertion     O2 sat average on exertion     Multi Ox #2 2 LPM   O2 sat % at rest 94   O2 sat % on exertion 92   O2 sat average on exertion       Oxygen Use     Oxygen Frequency     Duration of need     Is the patient mobile within the home?     CPAP Use?     BIPAP Use?     Servo Titration

## 2019-12-16 NOTE — PATIENT INSTRUCTIONS
1- did demonstrate need for daytime oxygen in clinic  2- will order order overnight oximetry  3-continue current home regimen, Mucinex if benefit seen  4-did report occasional choking episodes, question contributing factor  5-follow-up with Mima as scheduled

## 2020-01-05 ENCOUNTER — PATIENT MESSAGE (OUTPATIENT)
Dept: MEDICAL GROUP | Facility: MEDICAL CENTER | Age: 85
End: 2020-01-05

## 2020-01-05 DIAGNOSIS — M54.9 MID BACK PAIN: ICD-10-CM

## 2020-01-05 DIAGNOSIS — M54.50 LOW BACK PAIN, UNSPECIFIED BACK PAIN LATERALITY, UNSPECIFIED CHRONICITY, UNSPECIFIED WHETHER SCIATICA PRESENT: ICD-10-CM

## 2020-01-05 PROBLEM — J96.11 CHRONIC RESPIRATORY FAILURE WITH HYPOXIA (HCC): Status: ACTIVE | Noted: 2019-02-13

## 2020-01-05 PROBLEM — T46.4X5A ACE-INHIBITOR COUGH: Status: RESOLVED | Noted: 2019-09-06 | Resolved: 2020-01-05

## 2020-01-05 PROBLEM — R05.8 ACE-INHIBITOR COUGH: Status: RESOLVED | Noted: 2019-09-06 | Resolved: 2020-01-05

## 2020-01-06 ENCOUNTER — HOSPITAL ENCOUNTER (OUTPATIENT)
Dept: RADIOLOGY | Facility: MEDICAL CENTER | Age: 85
End: 2020-01-06
Attending: INTERNAL MEDICINE
Payer: MEDICARE

## 2020-01-06 DIAGNOSIS — M54.50 LOW BACK PAIN, UNSPECIFIED BACK PAIN LATERALITY, UNSPECIFIED CHRONICITY, UNSPECIFIED WHETHER SCIATICA PRESENT: ICD-10-CM

## 2020-01-06 DIAGNOSIS — M54.9 MID BACK PAIN: ICD-10-CM

## 2020-01-06 DIAGNOSIS — R05.9 COUGH: ICD-10-CM

## 2020-01-06 PROBLEM — I70.0 ATHEROSCLEROSIS OF AORTA (HCC): Status: ACTIVE | Noted: 2020-01-06

## 2020-01-06 PROCEDURE — 72070 X-RAY EXAM THORAC SPINE 2VWS: CPT

## 2020-01-06 PROCEDURE — 71046 X-RAY EXAM CHEST 2 VIEWS: CPT

## 2020-01-06 PROCEDURE — 72100 X-RAY EXAM L-S SPINE 2/3 VWS: CPT

## 2020-01-13 ENCOUNTER — OFFICE VISIT (OUTPATIENT)
Dept: CARDIOLOGY | Facility: MEDICAL CENTER | Age: 85
End: 2020-01-13
Payer: MEDICARE

## 2020-01-13 VITALS
HEART RATE: 80 BPM | WEIGHT: 128 LBS | DIASTOLIC BLOOD PRESSURE: 68 MMHG | SYSTOLIC BLOOD PRESSURE: 142 MMHG | HEIGHT: 57 IN | OXYGEN SATURATION: 86 % | BODY MASS INDEX: 27.61 KG/M2

## 2020-01-13 DIAGNOSIS — I48.0 PAF (PAROXYSMAL ATRIAL FIBRILLATION) (HCC): Chronic | ICD-10-CM

## 2020-01-13 DIAGNOSIS — I10 ESSENTIAL HYPERTENSION: Chronic | ICD-10-CM

## 2020-01-13 DIAGNOSIS — Z71.89 DNR (DO NOT RESUSCITATE) DISCUSSION: Chronic | ICD-10-CM

## 2020-01-13 PROCEDURE — 99214 OFFICE O/P EST MOD 30 MIN: CPT | Performed by: INTERNAL MEDICINE

## 2020-01-13 RX ORDER — PREDNISONE 1 MG/1
4 TABLET ORAL DAILY
COMMUNITY
End: 2020-01-14

## 2020-01-13 ASSESSMENT — ENCOUNTER SYMPTOMS
PALPITATIONS: 0
BLOOD IN STOOL: 0
SHORTNESS OF BREATH: 0
SPEECH CHANGE: 0
COUGH: 1
DEPRESSION: 1
NAUSEA: 0
BACK PAIN: 1
DIZZINESS: 0
LOSS OF CONSCIOUSNESS: 0
WEAKNESS: 1
BRUISES/BLEEDS EASILY: 0
MEMORY LOSS: 1
VOMITING: 0

## 2020-01-13 NOTE — PROGRESS NOTES
Chief Complaint   Patient presents with   • Atrial Fibrillation       Subjective:   Venus Church is a 88 y.o. female who presents today primarily for follow-up of blood pressure.  The family was taking her pressure 2 or 3 times daily.  On the carvedilol pressures have been under generally good control.  Daughter is aware that cough is coming from blood pressure medications and she is reassured that it is not.  Patient has COPD and is on domiciliary oxygen.  [place] oximetry is above 90% on room air.  Patient is having progressive decline in memory.    Past Medical History:   Diagnosis Date   • Anesthesia     n/v   • Aortic regurgitation 4/25/2012   • Arrhythmia 7/10      A. Fib.   • Arthritis     general   • Atypical chest pain 4/25/2012   • CATARACT 2007,08   • Chronic anticoagulation 4/25/2012   • Constipation 4/25/2012   • COPD (chronic obstructive pulmonary disease) (HCC)    • DDD (degenerative disc disease)    • Dental disorder     partials   • Dyspnea 4/25/2012   • Generalized osteoarthritis 4/25/2012   • Headache(784.0) 4/25/2012   • Heart valve insufficiency     minimal, watched by cardio   • Hypercholesteremia    • Hyperlipidemia 4/25/2012   • Hypertension    • MEDICAL HOME    • On home oxygen therapy     at [facility]   • Osteoarthritis of knee 4/25/2012   • Osteoporosis    • Other accident     C-Spine FX  2006   • Pain     back pain   • Pneumonia 6/4   • TB lung, latent 5/4/2016   • Valvular heart disease 4/25/2012   • Vertebral fracture    • Vertigo 4/25/2012     Past Surgical History:   Procedure Laterality Date   • RECOVERY  7/28/2010    Performed by SURGERY, IR-RECOVERY at [facility]GERY SAME DAY [facility]   • OTHER ORTHOPEDIC SURGERY  may, 2010      T11 kypho.   • CATARACT PHACO WITH IOL  1/5/2009    Performed by ROSE MARIE MANN at SURGERY SAME DAY [facility] ORS   • CARPAL TUNNEL RELEASE  @30 yrs ago    rt hand     Family History   Problem Relation Age of Onset   • Heart Disease  Father    • Diabetes Brother      Social History     Socioeconomic History   • Marital status:      Spouse name: Not on file   • Number of children: Not on file   • Years of education: Not on file   • Highest education level: Not on file   Occupational History   • Not on file   Social Needs   • Financial resource strain: Not on file   • Food insecurity:     Worry: Not on file     Inability: Not on file   • Transportation needs:     Medical: Not on file     Non-medical: Not on file   Tobacco Use   • Smoking status: Former Smoker     Packs/day: 1.50     Years: 25.00     Pack years: 37.50     Types: Cigarettes     Last attempt to quit: 1984     Years since quittin.7   • Smokeless tobacco: Never Used   • Tobacco comment: Continued abstinence advised.   Substance and Sexual Activity   • Alcohol use: No     Alcohol/week: 0.0 oz   • Drug use: No   • Sexual activity: Not Currently   Lifestyle   • Physical activity:     Days per week: Not on file     Minutes per session: Not on file   • Stress: Not on file   Relationships   • Social connections:     Talks on phone: Not on file     Gets together: Not on file     Attends Worship service: Not on file     Active member of club or organization: Not on file     Attends meetings of clubs or organizations: Not on file     Relationship status: Not on file   • Intimate partner violence:     Fear of current or ex partner: Not on file     Emotionally abused: Not on file     Physically abused: Not on file     Forced sexual activity: Not on file   Other Topics Concern   • Not on file   Social History Narrative   • Not on file     Allergies   Allergen Reactions   • Codeine Vomiting   • Irbesartan Cough     Strong cough, pt.s daughter reports the ER  Advised she stop taking    • Lisinopril Shortness of Breath and Cough     Outpatient Encounter Medications as of 2020   Medication Sig Dispense Refill   • predniSONE (DELTASONE) 1 MG Tab Take 4 mg by mouth every day.      • lidocaine (LIDODERM) 5 % Patch Apply 3 patches to affected area 12 hours on, then off 12 hours 90 Patch 6   • carvedilol (COREG) 6.25 MG Tab Take 1 Tab by mouth 2 times a day, with meals. 60 Tab 3   • Multiple Vitamins-Minerals (CENTRUM SILVER PO) Take  by mouth.     • apixaban (ELIQUIS) 2.5mg Tab Take 2.5 mg by mouth 2 Times a Day.     • celecoxib (CELEBREX) 100 MG Cap Take 100 mg by mouth 2 times a day.     • gabapentin (NEURONTIN) 100 MG Cap Take 100 mg by mouth every evening.     • albuterol (PROVENTIL) 2.5mg/3ml Nebu Soln solution for nebulization 2.5 mg by Nebulization route 3 times a day.     • Green Tea, Olya sinensis, (GREEN TEA EXTRACT PO) Take 1 Tab by mouth every day.     • Coenzyme Q10 (COQ10 PO) Take 1 Tab by mouth every evening.     • Probiotic Product (PROBIOTIC PO) Take 1 Cap by mouth every day.     • Alpha-Lipoic Acid (LIPOIC ACID PO) Take 1 Tab by mouth every morning.     • Ginkgo Biloba 120 MG Cap Take 120 mg by mouth every morning.     • Fluticasone-Umeclidin-Vilant (TRELEGY ELLIPTA) 100-62.5-25 MCG/INH AEROSOL POWDER, BREATH ACTIVATED Inhale 1 Puff by mouth every day. 1 Each 11     No facility-administered encounter medications on file as of 1/13/2020.      Review of Systems   Constitutional: Negative for malaise/fatigue.   HENT: Negative for nosebleeds.    Respiratory: Positive for cough. Negative for shortness of breath.    Cardiovascular: Negative for chest pain, palpitations and leg swelling.   Gastrointestinal: Negative for blood in stool, melena, nausea and vomiting.   Genitourinary: Negative for hematuria.   Musculoskeletal: Positive for back pain.   Neurological: Positive for weakness. Negative for dizziness, speech change and loss of consciousness.   Endo/Heme/Allergies: Does not bruise/bleed easily.   Psychiatric/Behavioral: Positive for depression and memory loss.        Objective:   /68 (BP Location: Left arm, Patient Position: Sitting, BP Cuff Size: Adult)   Pulse  "80   Ht 1.448 m (4' 9\")   Wt 58.1 kg (128 lb)   SpO2 (!) 86%   BMI 27.70 kg/m²     Physical Exam   Constitutional: She is oriented to person, place, and time. She appears well-developed and well-nourished. No distress.   Frail.  Uses supplemental oxygen   HENT:   Head: Atraumatic.   Eyes: Pupils are equal, round, and reactive to light. Conjunctivae and EOM are normal.   Neck: Neck supple. No JVD present.   Cardiovascular: Normal rate and regular rhythm.   Murmur heard.   Systolic murmur is present with a grade of 1/6.  Pulmonary/Chest: Effort normal and breath sounds normal. No respiratory distress. She has no wheezes. She has no rales.   Musculoskeletal:         General: Edema present.      Comments: Trace edema bilaterally   Neurological: She is alert and oriented to person, place, and time.   Skin: Skin is warm and dry. She is not diaphoretic.   Psychiatric: Her mood appears not anxious. Her affect is blunt. She is not agitated and not slowed.       Assessment:     1. Essential hypertension     2. PAF (paroxysmal atrial fibrillation) (HCC)     3. DNR (do not resuscitate) discussion         Medical Decision Making:  Today's Assessment / Status / Plan:   Hypertension: Under adequate control on current medications.  By my reading she is 140/84.  The family is been hypervigilant with her blood pressure I think they could reduce the readings to 3 or 4 times a week from 2 or 3 times a day.  I reassured them that the cough is not from her blood pressure medications.    End-of-life discussion discussed CODE STATUS with daughter.  Clearly she wants mom to be DNR and is the decision maker for her.  They were given literature regarding end-of-life discussions and advanced planning.  I don't think the family has accepted the fact that their mom is in declining health.  "

## 2020-01-14 ENCOUNTER — OFFICE VISIT (OUTPATIENT)
Dept: MEDICAL GROUP | Facility: MEDICAL CENTER | Age: 85
End: 2020-01-14
Payer: MEDICARE

## 2020-01-14 VITALS
HEIGHT: 60 IN | BODY MASS INDEX: 24.94 KG/M2 | DIASTOLIC BLOOD PRESSURE: 92 MMHG | HEART RATE: 78 BPM | TEMPERATURE: 97.9 F | SYSTOLIC BLOOD PRESSURE: 160 MMHG | OXYGEN SATURATION: 91 % | WEIGHT: 127 LBS

## 2020-01-14 DIAGNOSIS — I10 ESSENTIAL HYPERTENSION: Chronic | ICD-10-CM

## 2020-01-14 DIAGNOSIS — Z92.29 HISTORY OF OPIATE THERAPY: ICD-10-CM

## 2020-01-14 DIAGNOSIS — Z00.00 PREVENTATIVE HEALTH CARE: ICD-10-CM

## 2020-01-14 DIAGNOSIS — J44.9 CHRONIC OBSTRUCTIVE PULMONARY DISEASE, UNSPECIFIED COPD TYPE (HCC): ICD-10-CM

## 2020-01-14 DIAGNOSIS — I71.40 ABDOMINAL AORTIC ANEURYSM (AAA) WITHOUT RUPTURE (HCC): ICD-10-CM

## 2020-01-14 DIAGNOSIS — I70.0 ATHEROSCLEROSIS OF AORTA (HCC): ICD-10-CM

## 2020-01-14 DIAGNOSIS — M54.50 LOW BACK PAIN, UNSPECIFIED BACK PAIN LATERALITY, UNSPECIFIED CHRONICITY, UNSPECIFIED WHETHER SCIATICA PRESENT: Chronic | ICD-10-CM

## 2020-01-14 DIAGNOSIS — I48.0 PAF (PAROXYSMAL ATRIAL FIBRILLATION) (HCC): Chronic | ICD-10-CM

## 2020-01-14 DIAGNOSIS — M54.2 CERVICAL PAIN: Chronic | ICD-10-CM

## 2020-01-14 DIAGNOSIS — J96.11 CHRONIC RESPIRATORY FAILURE WITH HYPOXIA (HCC): ICD-10-CM

## 2020-01-14 DIAGNOSIS — Z91.81 AT HIGH RISK FOR INJURY RELATED TO FALL: ICD-10-CM

## 2020-01-14 DIAGNOSIS — J96.10 CHRONIC RESPIRATORY FAILURE, UNSPECIFIED WHETHER WITH HYPOXIA OR HYPERCAPNIA (HCC): ICD-10-CM

## 2020-01-14 DIAGNOSIS — G47.34 NOCTURNAL HYPOXEMIA: ICD-10-CM

## 2020-01-14 DIAGNOSIS — F11.20 UNCOMPLICATED OPIOID DEPENDENCE (HCC): ICD-10-CM

## 2020-01-14 PROCEDURE — 99214 OFFICE O/P EST MOD 30 MIN: CPT | Performed by: INTERNAL MEDICINE

## 2020-01-14 RX ORDER — DOXYCYCLINE HYCLATE 100 MG
100 TABLET ORAL 2 TIMES DAILY
Qty: 20 TAB | Refills: 0 | Status: SHIPPED | OUTPATIENT
Start: 2020-01-14 | End: 2020-02-24

## 2020-01-14 RX ORDER — PREDNISONE 20 MG/1
TABLET ORAL
Qty: 18 TAB | Refills: 0 | Status: SHIPPED | OUTPATIENT
Start: 2020-01-14 | End: 2020-02-24

## 2020-01-14 RX ORDER — HYDROCODONE BITARTRATE AND ACETAMINOPHEN 7.5; 325 MG/1; MG/1
1-2 TABLET ORAL 2 TIMES DAILY PRN
Qty: 60 TAB | Refills: 0 | Status: SHIPPED | OUTPATIENT
Start: 2020-01-14 | End: 2020-02-24 | Stop reason: SDUPTHER

## 2020-01-14 RX ORDER — AMOXICILLIN AND CLAVULANATE POTASSIUM 875; 125 MG/1; MG/1
1 TABLET, FILM COATED ORAL 2 TIMES DAILY
Qty: 20 TAB | Refills: 0 | Status: SHIPPED | OUTPATIENT
Start: 2020-01-14 | End: 2020-02-24

## 2020-01-14 ASSESSMENT — PATIENT HEALTH QUESTIONNAIRE - PHQ9: CLINICAL INTERPRETATION OF PHQ2 SCORE: 0

## 2020-01-14 NOTE — PROGRESS NOTES
Subjective:      Venus Church is a 88 y.o. female who presents with polst form  Follow-Up            HPI   Patient is here with her daughter Yashira and would like to complete a POLST form. Patient's daughter has power of  and she was kind enough to bring the paperwork along for the visit which were able to review and make copies of the pertinent areas of the advanced directive.  I reviewed the POLST form with patient and daughter, patient is awake, alert, understands the decision-making process regarding the form.  We discussed this as it pertains    understands DO NOT RESUSCITATE status and what this implies.  Patient states that she does not want any intubation or mechanical ventilation, no CPR.  We discussed this as it pertains to cardiopulmonary resuscitation, medical interventions, artificially administered nutrition and fluids.  We went over each section.  The patient is DO NOT RESUSCITATE, patient understands that if she has no pulse and not breathing, emergency medical services will not administer CPR or intubate, and she agrees that this is her wishes.  Daughter understands and agrees as well.  With regards to medical interventions should she have a pulse and is breathing, full treatment is not an option, patient and daughter do agree to selective treatment if necessary IV antibiotics and fluids, but no intubation or mechanical ventilation, hospital admission if indicated.  With regards to artificial administered nutrition and fluids, we discussed feeding tubes or artificial nutrition by IV, since the patient was unclear about a duration or trial, we opted to list long-term artificial nutrition or feeding tube as initial option, since daughter has power of  she is comfortable with the fact that this could be amended based upon circumstances.  The patient does have the decisional capacity based upon today's evaluation to make these decisions, and the daughter is here to provide support in the  decision-making process.  Patient is chronic back pain for which she has a history of compression fractures, with history of prior T12 compression fracture, T12 vertebroplasty, has seen pain management, has had physical therapy, has had multiple steroid injections, has seen pain management locally and at [place], has had attempted nerve ablations, nerve blocks, has had a trial of spinal stimulator, has tried NSAIDs, muscle relaxants, narcotics, Celebrex, Cymbalta, tramadol, multiple trigger point injections, most recently seen by Dr. Zollinger pain management in May, recent x-rays of thoracic and lumbar spine showed old T11-T12 compression fracture.  Daughter states that the pain has been more intense recently, today patient complains of pain in her right neck area, no radiation down the arm.  The daughter has been giving her old OxyContin which she had been on previously but was able to wean off.  Daughter states that the patient did see Dr. Zollinger from pain management a few months ago for an injection, we do not have those records.  She continues on the Celebrex, in addition to gabapentin.  We have made the decision to continue Celebrex despite Eliquis understanding risk for GI bleeding in a patient who is severely limited by chronic back pain, difficulty with movement, and history of falls.  Previously we have worked with the family and patient to taper off narcotics given increasing confusion, falls related to chronic opiate therapy.  She was weaned off narcotics in February after hospital admission, with attempts at pain control utilizing Celebrex, gabapentin, topical therapy such as Lidoderm patch, and pain management injections.  Daughter and patient they state that the pain has been more severe, recent x-ray thoracic and lumbar spine showed new fracture, but she has started to use the old OxyContin which she had previously, having been on 10 mg twice daily.  Daughter is with the patient at all times,  daughter states that when the patient was in the [place] area she appeared to move around much more easily, this may be related to her lung disease improving at sea level compared to altitude.  Recently she did start prednisone and Zithromax which she has on as-needed basis per pulmonary, given underlying COPD, she does not smoke and has home oxygen which she does not use consistently, recent pulmonary note from December indicates that she is on Trelegy, nocturnal oxygen.  She started the Zithromax and prednisone with a few days remaining of therapy for cough, productive sputum.  1/13/20 cardiology note blood pressure stable on coreg 6.25 mg bid, information given regarding DNR by cardiology and this is already reflected in the advanced directive that the patient completed in 2013.    Current Outpatient Medications   Medication Sig Dispense Refill   • predniSONE (DELTASONE) 1 MG Tab Take 4 mg by mouth every day.     • lidocaine (LIDODERM) 5 % Patch Apply 3 patches to affected area 12 hours on, then off 12 hours 90 Patch 6   • carvedilol (COREG) 6.25 MG Tab Take 1 Tab by mouth 2 times a day, with meals. 60 Tab 3   • Multiple Vitamins-Minerals (CENTRUM SILVER PO) Take  by mouth.     • apixaban (ELIQUIS) 2.5mg Tab Take 2.5 mg by mouth 2 Times a Day.     • celecoxib (CELEBREX) 100 MG Cap Take 100 mg by mouth 2 times a day.     • gabapentin (NEURONTIN) 100 MG Cap Take 100 mg by mouth every evening.     • albuterol (PROVENTIL) 2.5mg/3ml Nebu Soln solution for nebulization 2.5 mg by Nebulization route 3 times a day.     • Green Tea, Olya sinensis, (GREEN TEA EXTRACT PO) Take 1 Tab by mouth every day.     • Coenzyme Q10 (COQ10 PO) Take 1 Tab by mouth every evening.     • Probiotic Product (PROBIOTIC PO) Take 1 Cap by mouth every day.     • Alpha-Lipoic Acid (LIPOIC ACID PO) Take 1 Tab by mouth every morning.     • Ginkgo Biloba 120 MG Cap Take 120 mg by mouth every morning.     • Fluticasone-Umeclidin-Vilant (TRELEGY  ELLIPTA) 100-62.5-25 MCG/INH AEROSOL POWDER, BREATH ACTIVATED Inhale 1 Puff by mouth every day. 1 Each 11     No current facility-administered medications for this visit.      Atherosclerosis aorta  1/6/20 chest x-ray aortic atherosclerosis    AAA  10/19/15 CT chest high-resolution thorax atherosclerosis aorta  2/13/19 CT-CTA chest pulmonary artery with reconstruction 4.1 cm ascending aortic aneurysm    Cervical pain  2/26/15 narcotic contract  6/12/15 opiate risk score 0  10/4/15   7/25/16   9/20/16  pain note recommended left L3-L5 MBBB  10/31/16   2/6/17   5/9/17 narcotic contract  5/9/17 opiate risk score 0  5/9/17   5/9/17 depression screening score 0  6/28/17 trial cymbalta 30 mg  8/7/17 did not start cymbalta, will start  8/7/17   8/7/17 UDT  8/21/17 change cymbalta to qod  9/18/17 off cymbalta  9/14/17 custom PT discharge summary patient did not improve walking tolerance or standing tolerance, medicare g-code status 60-79% impairment, thoracolumbar flexion decreased from 50-25%, extension 10%, sidebending 10-25%  11/3/17   2/6/18   4/10/18   4/10/18 controlled substances contract, decrease oxycontin to 10 mg am and 5 mg pm  5/17/18 decrease to oxycontin 10 mg am and 5 mg pm  5/17/18   7/10/18 increase oxycontin back to 10 mg bid after fall  9/18/18   2/27/19 off oxycontin after hospital discharge, would like to avoid narcotics given side effects, will try celebrex 100 mg daily as had that previously  4/11/19 dr.zollinger pain note offered trigger point injections and lumbar radiofrequency ablation, she declines for now, consider tylenol in place of celebrex, follow-up as needed  Has seen neurosurgery, pain management locally and at [place], has tried NSAIDs, celebrex, cymbalta, pregabalin, gabapentin, lidoderm, voltaren gel    Chronic respiratory failure  12/13/19 pulmonary note, on trelegy and nocturnal oxygen, room air saturation at rest 87%, 2 L oxygen at rest  saturation 94%, 2 L oxygen saturation with exertion 92% documenting need for oxygen 2 L, will order overnight oximetry, follow-up as scheduled  1/14/20 room air saturation at rest 91%, saturation with exercise room air 86 to 87%     COPD  7/11 CT spiral thorax extensive emphysematous change of lung, small left pleural effusion left lung base with atelectasis unchanged from prior CT  5/10 CT spriral thorax emphysematous change and dependent atelectasis left lung base  7/11 PFT FEV/FVC 59%, FEV1 1.1 L (75% predicted), significant post bronchodilator response noted (FEV1 improved 16%). Mixed restrictive and obstructive pattern,COPD with GOLD stage II with sig bronchodilator response  7/11 trial of symbicort 80/4.5 mcg bid discussed with daughter plus albuterol neb prn, [employer] home education ordered  5/12 off symbicort   5/22/14 cxr negative  6/23/14 on symbicort  7/20/15 change to advair 250/50 mcg from symbicort (not covered on insurance)  9/3/15 cxr negative  10/4/15 add spiriva and change symbicort to advair 250/50 mcg bid  10/19/15 CT high resolution thorax, no evidence of interstitial lung disease, minimal scattered opacification could indicate minimal areas of fibrosis periphery of each lung, similar to prior exam, emphysema most prominent upper lobes bilateral  10/19/15 PFT FEV1 1.1 (61% predicted),FVC 1.9,FEV/FVC 58%, no bronchodilator response,DLCO 34%; on advair 250/50 mcg bid and spiriva  11/2/15 change from advair discus to advair 250 inhaler and continue spiriva   12/11/15 not taking advair, will start advair and cont spiriva  12/11/15 cxr negative  3/14/16 [facility] note complaining doxycycline and taper steroids, continue nocturnal oxygen, continue rotating antibiotics for bronchiectasis, follow-up laboratory testing ordered  3/14/16  (normal), quantiferon gold positive, allergen panel negative, IgE 357 (less than 214), IgA 116 (),, IgM 27 (),IgG 947 ([phone])  4/13/16 cxr mild  cardiomegaly  5/4/16 [facility] note continue advair 115/21 bid with spacer given doxycycline and prednisone, continue oxygen 3 L at night, AFB samples ×3, follow-up 3 months  7/25/16 pulmonary note on prednisone taper one week out of the month and doxycycline one per day for one week per month, on advair bid and spiriva and oxygen 3 liters at night  11/8/16 pulmonary note continue advair 115/21 ucg bid,spiriva, xopenex as needed, oxygen 3 L at night  10/4/17 pulmonary note continue advair 115/21 two inhalations bid, spiriva daily, xoponex as needed, oxygen 3 L at night, history of positive quantiferon gold, will order sputum sample follow-up 4 months  3/17/18 pulmonary note, continue nocturnal oxygen 2 L, start trelegy one puff daily  9/10/18 pulmonary note intolerant to CPAP, resume nocturnal oxygen, latent TB, repeat chest x-ray, stable on bronchodilators, as needed prednisone and antibiotic prescription to pharmacy follow-up 3-6 months  2/13/19 CT-CTA chest pulmonary artery with reconstruction no pulmonary embolism, emphysema and chronic bronchitis, cardiomegaly with right heart dysfunction, 4.1 cm ascending aortic aneurysm  3/19/19 on trelegy ellipta and nocturnal oxygen 2 L  10/2/19 pulmonary note cough and bronchitis continue prednisone and cefdinir until complete then resume celebrex, chest x-ray ordered, continue mucinex and nebulizer  12/13/19 pulmonary note, on trelegy and nocturnal oxygen, room air saturation at rest 87%, 2 L oxygen at rest saturation 94%, 2 L oxygen saturation with exertion 92% documenting need for oxygen 2 L, will order overnight oximetry, follow-up as scheduled  1/6/20 chest x-ray with no acute abnormality identified, hypoinflation consistent with chronic obstructive pulmonary disease  1/14/20 room air saturation at rest 91%, saturation with exercise room air 86 to 87%     Dyslipidemia   5/10 chol 163,trig 68,hdl 69,ldl 80  6/10 chol 163,trig 60,hdl 69,ldl 80  7/11 chol 118,trig 45,hdl  65,ldl 44 on crestor 10 mg  10/11 chol 165,trig 47,hdl 78,ldl 74 on crestor 10 mg done at [place]  2/12 off crestor  6/12 chol 211,trig 104,hdl 64,ldl 126 off crestor  3/4/14 chol 222,trig 124,hdl 52,ldl 145 off meds     History of compression fracture  5/10 MRI thoracic spine subacute T12 compression fracture  5/27/10 kyphoplasty T11  6/10 MRI lumbar spine T12 compression fracture mild to moderate central canal narrowing, interval T11 kyphoplasty, L2-L3 moderate foraminal stenosis, L3-L4 severe left foraminal stenosis, moderate right foraminal stenosis, L4-L5 moderate central canal stenosis  7/10 dexa LS +1.0,hip -1.6  7/28/10 T12 vertebral plasty  8/11 [facility] pain evaluation  pain management, recommend continue oxycontin 10 bid  10/11 [facility] pain  T11-L1 injection  2/13  pain note, T10-T11 epidural injection  6/13 dexa LS +1.8, forearm -2.7  9/23/13 reclast injection  9/4/14 reclast IV infusion  9/14/17 custom PT discharge summary patient did not improve walking tolerance or standing tolerance, medicare g-code status 60-79% impairment, thoracolumbar flexion decreased from 50-25%, extension 10%, sidebending 10-25%  7/1/18 x-ray lumbar spine complete or near collapse L1 vertebral body anteriorly which is likely chronic, extensive degenerative changes, prior percutaneous vertebral augmentation T12-L1  2/27/19 off oxycontin after hospital discharge, would like to avoid narcotics given side effects, will try celebrex 100 mg daily as had that previously  1/6/20 x-ray thoracic spine old compression fracture T11 and T12 post augmentation, kyphosis and levoconvex scoliosis no acute fracture  1/6/20 x-ray lumbar spine old T11 and T12 compression fracture, levoconvex scoliosis thoracolumbar junction, old T11 and T12 compression fracture     History H. pylori  9/11 serum IgG positive s/p prevpack  9/19/12 cbc,cmp,lipase neg; u/s abd gallbladder sludge without biliary dilatation or  stone  12/12/12 [facility] eval rec EGD/colon pt did not follow up      history opiate  2/26/15 narcotic contract  6/12/15 opiate risk score 0  10/4/15   7/25/16   9/20/16  pain note recommended left L3-L5 MBBB  10/31/16   2/6/17   5/9/17 narcotic contract  5/9/17 opiate risk score 0  5/9/17   5/9/17 depression screening score 0  6/28/17 trial cymbalta 30 mg  8/7/17 did not start cymbalta, will start  8/7/17   8/7/17 UDT  8/21/17 change cymbalta to qod  9/18/17 off cymbalta  9/14/17 custom PT discharge summary patient did not improve walking tolerance or standing tolerance, medicare g-code status 60-79% impairment, thoracolumbar flexion decreased from 50-25%, extension 10%, sidebending 10-25%  11/3/17   2/6/18   4/10/18   4/10/18 controlled substances contract, decrease oxycontin to 10 mg am and 5 mg pm  5/17/18 decrease to oxycontin 10 mg am and 5 mg pm  5/17/18   7/10/18 increase oxycontin back to 10 mg bid after fall  7/10/18    9/18/18   2/27/19 off oxycontin after hospital discharge, would like to avoid narcotics given side effects, will try celebrex 100 mg daily as had that previously  3/19/19 refer to dr.zollinger pain  3/19/19 increase to celebrex 100 mg bid, add neurontin 100 mg qpm, continue lidoderm patch  4/11/19 dr.zollinger pain note offered trigger point injections and lumbar radiofrequency ablation, she declines for now, consider tylenol in place of celebrex, follow-up as needed  1/14/20   1/14/20 controlled substance contract  1/14/20 resume norco 7.5 mg bid prn pain, continue neurontin 100 mg qpm and celebrex (hold while on prednisone)   Has seen neurosurgery, pain management locally and at [place], has tried NSAIDs, celebrex, cymbalta, pregabalin, gabapentin, lidoderm, voltaren gel     History shingles     Hypertension  1/05 carotid u/s negative  1/07 echo LV EF 60% ,mild LVH  1/09 renal ultrasound 6 mm right cortical cyst  9/09 MRI brain with and without  moderate nonspecific microvascular ischemic change  9/09 MRA next mild plaque right internal carotid  5/24/10 echo normal LV size and function, EF 60%, mild to moderate AR, RVSP 35-40 consistent with mild pulmonary hypertension  5/10 persantine thallium no ischemia, EF 72%  9/10 change avalide 150/12.5 to avapro 150 qday, had sodium 125 in ER  3/11 on avapro 300 mg  7/8/11 echo normal LV function, EF 55%  7/8/11 Persantine thallium possible small area mild ischemia in the lateral wall, no evidence of infarction  7/11   3/12 increase metoprolol to 50 bid, cont avapro 300 mg, start norvasc 2.5 mg  5/1/12 increase norvasc to 5 mg, change avapro to avalide 300/12.5 mg, cont metoprolol 50 bid  10/2/12 on avalide 300/12.5 mg and metoprolol 50 bid and norvasc 5 mg  10/12 Persantine thallium diaphragmatic uptake possible attenuation, fixed inferolateral defect which may be secondary to attenuation, EF 76%, no wall motion abnormalities, no evidence of significant ischemia.  1/13 echo normal LV function, grade 2 diastolic dysfunction, EF 70% moderate aortic insufficiency  1/13   1/23/13 cxr cardiomegaly without pulmonary edema  6/11/13 on avalide 300/12.5 mg,norvasc 5 mg, metoprolol 50 bid  4/7/14 cardiology note atrial fibrillation, start pradaxa 75 mg bid, stop asa, stop norvasc, decreased avalide 300/12.5 mg to 1/2 per day, increase metoprolol to 50 mg 1 1/2 bid  10/20/14 metoprolol 50 mg decreased to 25 mg bid by [place] doctor due to low heart rate, continues on avalide 300/12.5 mg   12/29/14  cardiology note continue pradaxa, metoprolol 25 mg 1/2 bid, avalide 300/12.5 mg  7/25/16 avalide 150/12.5 mg decrease to 1/2 tab per day and continue metoprolol 25 mg bid  8/24/16 cardiology note sinus rhythm on exam,[id number] score=2 continue anticoagulation, low-dose metoprolol,avalide 150/12.5 mg 1/2 per day, follow-up one year  12/14/17 cardiology note remains in sinus rhythm, follow-up one year  6/21/18   cardiology note paroxysmal atrial fibrillation sinus rhythm on exam stable continue anticoagulation, follow-up 1 year  2/27/19 cardiology note paroxysmal atrial fibrillation, chronic anticoagulation  8/29/19 now on lisinopril 10 mg qam and metoprolol 25 mg qpm  10/7/19 CT-CTA complete thoracoabdominal atherosclerosis greater than 50% stenosis bilateral renal arteries   10/10/19 cardiology note reduce metoprolol to 12.5 mg bid as blood pressure normal in the office but may be lower at home contributing to tiredness and fatigue, torsemide as needed for shortness of breath  10/21/19 on metoprolol 25 mg on 12.5 mg bid  12/2/19 cardiology note unable to tolerate irbesartan and irbesartan secondary to cough and desaturation, discontinue irbesartan and metoprolol, start low-dose carvedilol 6.25 mg bid and continue eliquiw 2.5 mg bid and continue oxygen at night, room air saturation 90%  1/13/20 cardiology note blood pressure stable on coreg 6.25 mg bid, information given regarding DNR by cardiology      Low back pain  6/06 epidural L4-L5   12/08 x-ray LS moderate to severe DJD  6/10 MRI lumbar spine T12 compression fracture with mild-to-moderate central spinal canal narrowing, interval T11 kyphoplasty, L2-L3 moderate frontal stenosis, L3-L4 severe left foraminal stenosis, moderate right foraminal stenosis, L4-L5 moderate central spinal canal  7/10 interventional rad consult epidural T11 to L1 region  7/28/10 T12 vertebroplasty  7/30/10 norco taper, and lyrica 50 mg bid  8/5/10 reaction to lyrica, stop lyrica  8/19/10 lumbar steroid epidural   9/10  note rec decompression if pain persist  10/10 bilateral lumbar facet joint injections L3-S1   12/10 xray LS old T11 and T12 fracture status post kyphoplasty  1/11 [facility]  3/11 trial of spinal stimulator  8/11 [facility] note evaluation  pain management cont oxycontin 10 bid  11/22/11 [facility] note  dr. hodge; trial of baclofen, T11 plus T12 left-sided nerve root block pending  7/12  pain note;Left-sided T11-T12 transforaminal epidural   4/13 daughter called [facility] suggest taper pain med, she has sched to taper the oxycontin  4/15/13 resumed oxycontin 10 bid  6/11/13 off oxycontin  7/24/13 increase oxycontin from qday to 10 mg bid   2/28/14 on celebrex 200 mg as needed per  and oxycontin 10 mg bid  4/25/14 trial of cymbalta 30 mg, continue oxycontin 10 mg twice a day, recommend not use tramadol (side effects since on oxycontin already) or celebrex (on pradaxa)  5/29/14  pain management [facility]; recommend T11-T12 left-sided transforaminal nerve block  7/2/14 [facility] left T11-T12 epidural injection  7/21/14  [facility] note; increase oxycontin to 10 mg tid x 3 weeks and then taper down  10/27/14 [facility]; samples zorvolex  2/26/15 xray lumbar spine mild compression fracture of L4 of indeterminate age but new compared to 3/25/13 vertebral augmentation and T11 and T12 with severe compression fracture of T12 and focal kyphosis at this level  4/15/16 outpatient physical therapy phone call indicating patient unable to make appointments, recommending home health physical therapy  9/20/16  pain note recommended left L3-L5 MBBB  3/22/17  pain procedure note left L3-L5 MBBB #1  6/28/17 trial cymbalta 30 mg  6/29/17 [facility] physical therapy note  8/3/17 custom PT note  9/18/17 off cymbalta  9/14/17 custom PT discharge summary patient did not improve walking tolerance or standing tolerance, medicare g-code status 60-79% impairment, thoracolumbar flexion decreased from 50-25%, extension 10%, sidebending 10-25%  11/14/17 custom PT discharge summary no improvement  11/29/17 MRI lumbar spine no acute fracture, lumbar levoscoliosis T12-L1, kyphotic deformity he talked L1, previous compression fractures T11, T12, L1, previous  vertebral augmentation procedures T11 and L1, moderate central canal stenosis T12-L3, severe canal stenosis L3-L4, no significant change  1/31/18  pain note  2/2/18  pain note, reviewed cervical spine x-ray multilevel DJD, recommend consider intrathecal pain pump trial  5/17/18 decrease to oxycontin 10 mg am and 5 mg pm  7/1/18 x-ray lumbar spine complete or near collapse L1 vertebral body anteriorly which is likely chronic, extensive degenerative changes, prior percutaneous vertebral augmentation T12-L1  7/10/18 increase oxycontin back to 10 mg bid after fall  2/27/19 off oxycontin after hospital discharge, would like to avoid narcotics given side effects, will try celebrex 100 mg daily as had that previously  3/19/19 refer to dr.zollinger pain  3/19/19 increase to celebrex 100 mg bid, add neurontin 100 mg qpm, continue lidoderm patch  4/11/19 dr.zollinger pain note offered trigger point injections and lumbar radiofrequency ablation, she declines for now, consider tylenol in place of celebrex, follow-up as needed  5/15/19 dr.zollinger pain management note left lumbar paraspinal and quadratus lumborum areas  1/6/20 x-ray thoracic spine old compression fracture T11 and T12 post augmentation, kyphosis and levoconvex scoliosis no acute fracture  1/6/20 x-ray lumbar spine old T11 and T12 compression fracture, levoconvex scoliosis thoracolumbar junction, old T11 and T12 compression fracture  1/14/20 resume norco 7.5 mg bid prn pain, continue neurontin 100 mg qpm and celebrex (hold while on prednisone)   Tried NSAIDs, celebrex, pregabalin, gabapentin, cymbalta, physical therapy,TENS,lidoderm, voltaren gel, epidural, facet injections, kyphoplasty, pain management locally and at [facility]     Macular degeneration  7/1/13 dr.mills mcnair note, start PreserVision AREDS II vitamin followup 6 months     Mild cognitive impairment  1/11/16 MMSE 24/30  7/14/17 MMSE 23/30 offered aricept  4/10/18 MMSE  24/30 5/2/18 start aricept 5 mg daily  9/18/18 off aricept not tolerated  10/31/18 trial namenda XR started pack  11/8/18 namenda titration pack not available, will start namenda XR 7 mg daily  5/16/19 neurology note, memory loss likely alzheimer's, will obtain MRI brain, b12, folate, tsh, would not tolerate neuropsychiatric evaluation secondary to poor focus and attention     Nocturnal hypoxemia  3/17/18 pulmonary note, continue nocturnal oxygen 2 L, start trelegy one puff daily  9/10/18 pulmonary note intolerant to CPAP, resume nocturnal oxygen, latent TB, repeat chest x-ray, stable on bronchodilators, as needed prednisone and antibiotic prescription to pharmacy follow-up 3-6 months  12/13/19 pulmonary note room air saturation at rest 87%, 2 L oxygen at rest saturation 94%, 2 L oxygen saturation with exertion 92% documenting need for oxygen 2 L, will order overnight oximetry, follow-up as scheduled  1/14/20 room air saturation at rest 91%, saturation with exercise room air 86 to 87%     Osteoporosis  3/08 dexa LS +0.3,hip -1.4  5/10 MRI thoracic spine subacute T12 compression fracture  5/27/10 T12 kyphoplasty  6/10 on actonel  7/10 dexa LS +1.0,hip -1.6  8/10 vit d 56  3/11 reclast referral made  6/12 vit d 42 off actonel  6/21/13 dexa LS +1.8, forearm -2.7, I rec reclast, she would like to think it over  9/23/13 reclast injection  3/4/14 vit d 26  9/4/14 reclast IV infusion  2/26/15 xray rib series left; mild deformity of the lateral 10th rib on the left may be related to a fracture  2/26/15 xray lumbar spine mild compression fracture of L4 of indeterminate age but new compared to /25/13, vertebral augmentation and T11 and T12 with severe compression fracture of T12 and focal kyphosis at this level  12/7/15 reclast IV infusion     Paroxysmal atrial fibrillation  4/10 hospitalization on multaq, also on verapamil and metoprolol for rate control per cardiology  1/11 [facility] note cont multaq  7/11 EKG atrial  fibrillation hospitalization  7/8/11 echo normal LV function, EF 55%  7/8/11 Persantine thallium possible small area mild ischemia in the lateral wall, no evidence of infarction  7/11 [facility] during hospitalization changed to amiodarone 200 mg plus pradaxa restarted  7/11   9/11 [facility] note stop amiodarone, cont pradaxa and metoprolol 25 bid  3/12 cont pradaxa and increase metoprolol to 50 bid due to higher bp  5/12 pradaxa decreased from 150 bid to 75 bid per [facility] due to nosebleeds  6/12 EKG NSR  1/13 echo normal LV function, grade 2 diastolic dysfunction, EF 70% moderate aortic insufficiency  6/11/13 on pradaxa and metoprolol 50 bid for rate control, NSR today  9/13 cardiology note stop pradaxa and start asa 81 mg  4/7/14 cardiology note atrial fibrillation, start pradaxa 75 mg bid, stop asa, stop norvasc, decreased avalide 300/12.5 mg to 1/2 per day, increase metoprolol to 50 mg 1 1/2 bid  4/14/14 cardiology note, NSR on exam, continue pradaxa 75 mg bid, metoprolol 75 mg bid, avalide 300/12.5 mg 1/2 tab per day  10/20/14 metoprolol 50 mg decreased to 25 mg bid by [place] doctor due to low heart rate, continues on pradaxa and avalide 300/12.5 mg   12/29/14  cardiology note continue pradaxa, metoprolol 25 mg 1/2 bid, avalide 300/12.5 mg  6/12/15 NSR on exam  8/24/16 cardiology note sinus rhythm on exam,[id number] score=2 continue anticoagulation, low-dose metoprolol follow-up one year  12/14/17 cardiology note remains in sinus rhythm, follow-up one year  6/21/18  cardiology note paroxysmal atrial fibrillation sinus rhythm on exam stable continue anticoagulation, follow-up 1 year  12/11/18 on metoprolol and pradaxa  2/27/19 [id number] (age, gender, aortic atherosclerosis) = 5 (7.8% risk of stroke per year) and HAS-BLED score 3 points (age, hypertension, con commitment antiplatelet agents)  = 3.74 bleeds per 100 patient years) will change pradaxa not covered to eliquis 2.5 mg bid adjust for age and  weight  9/6/19  cardiology note, cough since starting lisinopril was discontinued changed to irbesartan 75 mg, continue metoprolol and eliquis, hyponatremia related to hydrochlorothiazide, changed to demadex 5 mg monday, wednesday, friday  10/10/19 cardiology note reduce metoprolol to 12.5 mg bid as blood pressure normal in the office but may be lower at home contributing to tiredness and fatigue, torsemide as needed for shortness of breath  12/2/19 cardiology note unable to tolerate irbesartan and irbesartan secondary to cough and desaturation, discontinue irbesartan and metoprolol, start low-dose carvedilol 6.25 mg bid and continue eliquiw 2.5 mg bid and continue oxygen at night, room air saturation 90%  12/9/19 cardiology mychart note, decrease carvedilol to 6.25 mg 1/2 bid and continue eliquis 2.5 mg     Preventative health  5/05 colon per [facility] polyp no path report, f/u rec 5 yrs (12/12/12 [facility] note)  4/09 stool occult blood negative  12/09 mammogram  10/1/11 pneumovax  10/2/12 zoster vaccine  6/11/13 declines mammogram, tdap  6/13 dexa LS +1.8, forearm -2.7  3/4/14 vit d 26  10/12/15 prevnar  12/11/18 tdap  1/14/20 POLST completed already has advanced directive with poa son el and daughter dominga       Right hip pain  3/11 MRI right hip DJD and tear of the anterior/superior acetabular labrum  4/11  ortho note not believe hip is primary problem, referred to  or reji     Sensorineural hearing loss  12/7/15  audiology evaluation mild sensorineural hearing loss     Sleep apnea  8/11 [facility] note sleep study apnea-hypopnea index 86, low sat 74%, start CPAP  8-18 cm    2013 off cpap not comfortable  3/14/16  (normal), quantiferon gold positive, allergen panel negative, IgE 357 (less than 214), IgA 116 (),, IgM 27 (),IgG 947 ([phone])  4/13/16 cxr mild cardiomegaly  5/4/16 [facility] note continue oxygen 3 L at night  11/8/16 pulmonary note continue oxygen 3 L at  night  10/4/17 pulmonary note continue oxygen 3 L at night  3/17/18 pulmonary note, continue nocturnal oxygen 2 L, start trelegy one puff daily  9/10/18 pulmonary note intolerant to CPAP, resume nocturnal oxygen, latent TB, repeat chest x-ray, stable on bronchodilators, as needed prednisone and antibiotic prescription to pharmacy follow-up 3-6 months     tb latent  3/14/16 positive quantiferon gold test  3/14/16  (normal), quantiferon gold positive, allergen panel negative, IgE 357 (less than 214), IgA 116 (),, IgM 27 (),IgG 947 ([phone])  4/13/16 cxr mild cardiomegaly  5/4/16 [facility] note continue advair 115/21 bid with spacer given doxycycline and prednisone, continue oxygen 3 L at night, AFB samples ×3, follow-up 3 months  7/25/16 pulmonary note on prednisone taper one week out of the month and doxycycline one per day for one week per month, on advair bid and spiriva and oxygen 3 liters at night  7/27/17 PPD negative  10/4/17 pulmonary note continue advair 115/21 two inhalations bid, spiriva daily, xoponex as needed, oxygen 3 L at night, history of positive quantiferon gold, will order sputum sample follow-up 4 months  9/10/18 pulmonary note intolerant to CPAP, resume nocturnal oxygen, latent TB, repeat chest x-ray, stable on bronchodilators, as needed prednisone and antibiotic prescription to pharmacy follow-up 3-6 months     Tension headache  6/11/13 on fioricet bid  2/28/14 taper fioricet to once a day  4/25/14 now on fioricet prn                   Patient Active Problem List   Diagnosis   • Hypertension   • Dyslipidemia   • DNR (do not resuscitate) discussion   • Low back pain   • Cervical pain   • Osteoporosis, idiopathic   • History of compression fracture of spine   • PAF (paroxysmal atrial fibrillation) ([facility])   • Right hip pain   • COPD (chronic obstructive pulmonary disease) ([facility])   • History positive h.pylori titer   • Aortic insufficiency   • History of shingles   • Tension headache   •  Macular degeneration   • History of opiate therapy   • Sensorineural hearing loss   • Mild cognitive impairment   • TB lung, latent   • Nocturnal hypoxemia   • Chronic respiratory failure with hypoxia (HCC)   • AAA (abdominal aortic aneurysm) (HCC)   • Renal mass   • Ventral hernia   • Atherosclerosis of aorta (HCC)     Depression Screening    Little interest or pleasure in doing things?  0 - not at all  Feeling down, depressed , or hopeless? 0 - not at all  Patient Health Questionnaire Score: 0            Health Maintenance Summary                ANNUAL PFT SCREENING-FEV1 AND FEV/FVC RATIO / SPIROMETRY Overdue 10/21/2016      Done 10/21/2015 PFT DICTATED RESULTS     Patient has more history with this topic...    BONE DENSITY Overdue 6/21/2018      Done 6/21/2013 DS-BONE DENSITY STUDY (DEXA)     Patient has more history with this topic...    IMM ZOSTER VACCINES Postponed 3/18/2020 Originally 11/27/2012. System: vaccine not available, other system reasons     Done 10/2/2012 Imm Admin: Zoster Vaccine Live (ZVL) (Zostavax)    Annual Wellness Visit Next Due 10/21/2020      Done 10/21/2019 Visit Dx: Medicare annual wellness visit, subsequent     Patient has more history with this topic...    IMM DTaP/Tdap/Td Vaccine Next Due 12/11/2028      Done 12/11/2018 Imm Admin: Tdap Vaccine          Patient Care Team:  Denis Krueger M.D. as PCP - General  Nathan Fajardo M.D. as Consulting Physician (Anesthesia)  Esdras Thornton M.D. as Consulting Physician (Pulmonary Medicine)  Aashish as Respiratory  SARAH De La Garza as Consulting Physician (Pulmonary Medicine)  [facility] as Home Health Provider  Alexy Smyth M.D. as Consulting Physician (Interventional Cardiology)  Cecile Valenzuela as    Zaida Merchant P.A.-C. as Mid Level Provider (Physician Assistant)      ROS       Objective:        Physical Exam  Vitals signs and nursing note reviewed.   HENT:      Head: Normocephalic and  atraumatic.   Eyes:      General:         Right eye: No discharge.         Left eye: No discharge.   Neck:      Musculoskeletal: Neck supple.   Cardiovascular:      Rate and Rhythm: Normal rate.      Heart sounds: No murmur.   Pulmonary:      Effort: No respiratory distress.      Breath sounds: Rhonchi present. No wheezing.      Comments: Mild rhonchi at bases, no wheezing, good air movement bilateral on auscultation, saturation 91% room air  Abdominal:      General: There is no distension.   Musculoskeletal:         General: No swelling.   Skin:     General: Skin is warm.   Neurological:      General: No focal deficit present.      Mental Status: She is alert.   Psychiatric:         Mood and Affect: Mood normal.         Behavior: Behavior normal.                 Assessment/Plan:     Assessment  #! POLST discussion and advanced directive reviewed with patient and daughter.  Patient indicates that she is DO NOT RESUSCITATE status and we discussed the different scenarios and ramifications of CPR, intubation, aggressive treatment.    #2 advanced directive status and power of  with daughter Yashira and son Dileep    #3 chronic back pain related to old compression fractures, has seen multiple specialists and pain management locally as well as at [place], has had vertebroplasty, spinal stimulator, epidural, trigger point injections, facet injections, transforaminal blocks, medial branch blocks, has tried physical therapy on multiple occasions, as well as multiple medications including NSAIDs, Celebrex, gabapentin, Lyrica, Cymbalta, topical Lidoderm, Voltaren, oxycodone, hydrocodone, Tylenol.  She has become more limited and fragile and more susceptible to flareups of back pain as well as falls.  She currently requires 24-hour supervision with her daughter and son.  The back pain has become more limiting, such that she has began to use OxyContin 10 mg twice daily as needed which she had been on previously.  The  OxyContin does provide pain relief.  She currently remains on Celebrex and gabapentin.    #4 chronic opiate therapy has resumed OxyContin which she had been on previously but was discontinued previously secondary to confusion, and increasing fall risk.    #5 COPD recent exacerbation on prednisone taper per pulmonary and Zithromax, still has some rhonchi on auscultation with desaturation with activity, does have chronic hypoxemia as well related to COPD, activity, nocturnal hypoxemia for which pulmonary has seen her and she is on trelegy inhaler as well as 2 L oxygen nocturnally and as needed during the day.  Saturation today 91% at rest but decreases to 86 to 87% with activity.  Recent chest x-ray last week negative for pneumonia    #6 chronic hypoxemia and respiratory failure with supplemental oxygen 2 L at night and as needed with activity during the day    #7 fall risk, has seen physical therapy, recommended to use walker at all times but she does not use that all the time    #8 paroxysmal atrial fibrillation, sinus today, followed by cardiology, on carvedilol for rate control and Eliquis for anticoagulation, we have opted to use Celebrex with Eliquis understanding risk for GI bleed but given the taper off OxyContin earlier this year, we decided to continue with Henderson 2 inhibitor understanding potential for GI bleed, with the benefit of pain control without causing sedation, memory loss or increasing risk for falls with narcotic therapy, she has not had any abdominal pain or GI bleeding on recent prednisone course with Celebrex and Eliquis.  However she has decided to resume OxyContin therapy prior to this visit    #9 hypertension stable on carvedilol    #10 aortic atherosclerosis 1/6/20 chest x-ray aortic atherosclerosis    #11 AAA on imaging, asymptomatic 2/13/19 CT-CTA chest pulmonary artery with reconstruction 4.1 cm ascending aortic aneurysm      Plan  #!  Prednisone increased dose and extended taper for COPD  exacerbation has had before tachycardia upset, discontinue Celebrex while on prednisone, daughter agrees    #2 continue Eliquis for now understanding prednisone may increase risk for GI bleed with Eliquis, after discontinuation of prednisone she can resume Celebrex again understanding increased risk for GI bleed, we are balancing the risk of GI bleed with Eliquis and Celebrex combination with the potential for Celebrex to decrease reliance on narcotics which can cause drowsiness, increased risk of falls, sedation, memory loss, confusion.  Long-term we will have to continue to monitor the risk and benefit of direct oral anticoagulation therapy or any type of anticoagulation therapy as she becomes less mobile and more fragile    #3 start Augmentin 875 mg twice daily x10 days x10 days and doxycycline 100 mg twice daily x10 days for COPD exacerbation as she has not improved with Zithromax, can cause nausea, vomiting, diarrhea, rash, chest x-ray 10 days ago no infiltrate or pneumonia, notify us if side effects, continue inhaler    #4     #5 reinstitute narcotics, but not OxyContin right now, trial of hydrocodone 7.5 mg twice daily, previously she had been on OxyContin 10 mg twice daily which the daughter had begun giving her again for her breakthrough pain, patient has had narcotics previously which we discontinued because of occasional confusion and falls.  Daughter understands, patient understands medication can increase risk for falls, constipation, depression, mood changes, confusion.  Daughter or family will administer medication, constant supervision advised.  Prescription for quantity 60 of hydrocodone 7.5 mg patient to follow-up in 4 weeks for reevaluation, daughter can send me questions about dosing in my chart.  Since she has seen multiple pain specialists, she has tried multiple interventions, and still has breakthrough pain limiting her activity level, we need to balance her quality of life with pain  control understanding the risks of opiate therapy in the elderly increasing her risk for falls, potential fracture, potential bleed on anticoagulation, and confusion, daughter and patient understand, at this point I believe the benefits outweigh the potential risks and at least providing the patient some relief from pain or diminution of pain which may provide some quality of life for her.  She does not drink alcohol.  No illicit drugs.  Daughter will monitor for side effects mentioned above.    #6 POLST form completed with patient and daughter as we spent 20 minutes alone of the appointment to complete and discuss with the patient and daughter the POLST form and reviewing the patient's medical wishes as well as daughter's power of  and advanced directive, copy made for patient, form scanned into computer system    #7 controlled substance contract    #8 daughter will put living will in lockbox    #9 recommend utilizing oxygen 2 L at all times given nocturnal desaturation, hypoxemia with activity, COPD exacerbation    #10 follow-up 4 weeks

## 2020-01-30 ENCOUNTER — TELEPHONE (OUTPATIENT)
Dept: PULMONOLOGY | Facility: HOSPICE | Age: 85
End: 2020-01-30

## 2020-01-30 DIAGNOSIS — J44.9 CHRONIC OBSTRUCTIVE PULMONARY DISEASE, UNSPECIFIED COPD TYPE (HCC): ICD-10-CM

## 2020-01-31 ENCOUNTER — APPOINTMENT (OUTPATIENT)
Dept: PULMONOLOGY | Facility: HOSPICE | Age: 85
End: 2020-01-31
Payer: MEDICARE

## 2020-01-31 ENCOUNTER — APPOINTMENT (OUTPATIENT)
Dept: PULMONOLOGY | Facility: HOSPICE | Age: 85
End: 2020-01-31
Attending: NURSE PRACTITIONER
Payer: MEDICARE

## 2020-01-31 NOTE — TELEPHONE ENCOUNTER
1. Caller Name: Pt's son                      Call Back Number: [phone] (home)       2. Message: Pt's son called and said they are unable to bring pt tomorrow. R/S pt appt for 2020 and PFT prior. (They wanted to be seen on Monday with Andriy).          Pt's PFT is going to  on 2020. Pended new order for PFT .

## 2020-02-09 ENCOUNTER — TELEPHONE (OUTPATIENT)
Dept: MEDICAL GROUP | Facility: MEDICAL CENTER | Age: 85
End: 2020-02-09

## 2020-02-17 DIAGNOSIS — J44.9 CHRONIC OBSTRUCTIVE PULMONARY DISEASE, UNSPECIFIED COPD TYPE (HCC): ICD-10-CM

## 2020-02-18 NOTE — TELEPHONE ENCOUNTER
Have we ever prescribed this med? Yes.  If yes, what date? 03/18/19(Andriy)    Last OV: 12/13/19 with Zaida Merchant PA-C    Next OV: 04/27/2020 with Mima Ashraf PA-C    DX: Chronic obstructive pulmonary disease, unspecified COPD type (HCC) (J44.9)    Medications:   Requested Prescriptions     Pending Prescriptions Disp Refills   • TRELEGY ELLIPTA 100-62.5-25 MCG/INH AEROSOL POWDER, BREATH ACTIVATED [Pharmacy Med Name: Trelegy Ellipta 100-62.5-25 MCG/INH Inhalation Aerosol Powder Breath Activated] 60 Each 0     Sig: INHALE 1 PUFF BY MOUTH ONCE DAILY. RINSE MOUTH AFTERWARDS.

## 2020-02-24 ENCOUNTER — OFFICE VISIT (OUTPATIENT)
Dept: MEDICAL GROUP | Facility: MEDICAL CENTER | Age: 85
End: 2020-02-24
Payer: MEDICARE

## 2020-02-24 VITALS
WEIGHT: 134 LBS | OXYGEN SATURATION: 93 % | DIASTOLIC BLOOD PRESSURE: 68 MMHG | SYSTOLIC BLOOD PRESSURE: 130 MMHG | HEART RATE: 69 BPM | HEIGHT: 60 IN | TEMPERATURE: 97.6 F | BODY MASS INDEX: 26.31 KG/M2

## 2020-02-24 DIAGNOSIS — M54.50 LOW BACK PAIN, UNSPECIFIED BACK PAIN LATERALITY, UNSPECIFIED CHRONICITY, UNSPECIFIED WHETHER SCIATICA PRESENT: Chronic | ICD-10-CM

## 2020-02-24 DIAGNOSIS — Z92.29 HISTORY OF OPIATE THERAPY: ICD-10-CM

## 2020-02-24 DIAGNOSIS — F11.20 UNCOMPLICATED OPIOID DEPENDENCE (HCC): ICD-10-CM

## 2020-02-24 DIAGNOSIS — G47.34 NOCTURNAL HYPOXEMIA: ICD-10-CM

## 2020-02-24 DIAGNOSIS — J41.1 MUCOPURULENT CHRONIC BRONCHITIS (HCC): ICD-10-CM

## 2020-02-24 DIAGNOSIS — I48.0 PAF (PAROXYSMAL ATRIAL FIBRILLATION) (HCC): ICD-10-CM

## 2020-02-24 PROCEDURE — 99214 OFFICE O/P EST MOD 30 MIN: CPT | Performed by: INTERNAL MEDICINE

## 2020-02-24 RX ORDER — HYDROCODONE BITARTRATE AND ACETAMINOPHEN 7.5; 325 MG/1; MG/1
1-2 TABLET ORAL 2 TIMES DAILY PRN
Qty: 60 TAB | Refills: 0 | Status: SHIPPED | OUTPATIENT
Start: 2020-03-25 | End: 2020-02-24 | Stop reason: SDUPTHER

## 2020-02-24 RX ORDER — TRIAMCINOLONE ACETONIDE 1 MG/G
1 CREAM TOPICAL 2 TIMES DAILY PRN
Qty: 60 G | Refills: 3 | Status: SHIPPED | OUTPATIENT
Start: 2020-02-24 | End: 2020-05-29

## 2020-02-24 RX ORDER — HYDROCODONE BITARTRATE AND ACETAMINOPHEN 7.5; 325 MG/1; MG/1
1-2 TABLET ORAL 2 TIMES DAILY PRN
Qty: 60 TAB | Refills: 0 | Status: SHIPPED | OUTPATIENT
Start: 2020-04-24 | End: 2020-05-18 | Stop reason: SDUPTHER

## 2020-02-24 RX ORDER — HYDROCODONE BITARTRATE AND ACETAMINOPHEN 7.5; 325 MG/1; MG/1
1-2 TABLET ORAL 2 TIMES DAILY PRN
Qty: 60 TAB | Refills: 0 | Status: SHIPPED | OUTPATIENT
Start: 2020-02-24 | End: 2020-02-24 | Stop reason: SDUPTHER

## 2020-02-24 NOTE — PROGRESS NOTES
Subjective:      Venus Church is a 88 y.o. female who presents with follow up back pain       HPI       Patient here with daughter who has power of , for follow-up back pain, resumed hydrocodone 7.5 mg twice a day last month, continues on gabapentin 100 mg at night and Celebrex, previous history of compression fracture T12, status post kyphoplasty in 2010, vertebroplasty, seen by local pain management as well as pain management at [place], has had epidural injections, physical therapy, TENS, Lidoderm, Voltaren gel, facet injections, has tried NSAIDs, Celebrex, gabapentin, Lyrica, Cymbalta.  Medications, allergies reviewed, medical, surgical, social history reviewed.  Daughter lives out of state but comes to visit mother frequently, son lives in town, patient has 24-hour care and supervision arranged by daughter.  They have been looking at assisted living at [facility].  They should be comments that when the daughter is not around, the patient does not do as much activity or walking as she should.  She should be using a walker at all times along with the oxygen.  Daughter states that she is using the oxygen continuously. Daughter states that patient fell last week while walking outdoors.  Has been to physical therapy previously.  On hydrocodone 7.5 mg up to 2 tablets/day, no excessive drowsiness, sedation, patient does have chronic memory loss stable, no hallucinations or delusions on the hydrocodone.  Is off prednisone back on Celebrex, gabapentin. Per daughter has been starting to wheeze more recently patient denies cough, no productive sputum, no chest pain   Off prednisone and back on Celebrex.  Remains on Eliquis paroxysmal atrial fibrillation followed by cardiology, on Eliquis, no regular NSAIDs, no GI bleeding.  COPD on Trelegy, continuous oxygen 2 L at rest and nocturnally, no current antibiotics.  Shortness of breath with activity, January 14, 2020 room air saturation with activity 86 to 87%, has  had rash on abdomen according to daughter, some itching and scratching by the patient.  Able to move freely around the house with her oxygen 2 L with a walker.  Difficulty leaving the house because of necessity of oxygen concentrator, walker, fall risk, chronic back pain.  Uses trelegy inhaler and nebulizer tid   Using oxygen 1 liters continuous           Current Outpatient Medications   Medication Sig Dispense Refill   • TRELEGY ELLIPTA 100-62.5-25 MCG/INH AEROSOL POWDER, BREATH ACTIVATED INHALE 1 PUFF BY MOUTH ONCE DAILY. RINSE MOUTH AFTERWARDS. 1 Each 5   • amoxicillin-clavulanate (AUGMENTIN) 875-125 MG Tab Take 1 Tab by mouth 2 times a day. 20 Tab 0   • doxycycline (VIBRAMYCIN) 100 MG Tab Take 1 Tab by mouth 2 times a day. 20 Tab 0   • predniSONE (DELTASONE) 20 MG Tab Take 2 po qday x 5 days, then 1 po qday x 5 days, then 1/2 po qday x 6 days then stop 18 Tab 0   • lidocaine (LIDODERM) 5 % Patch Apply 3 patches to affected area 12 hours on, then off 12 hours 90 Patch 6   • carvedilol (COREG) 6.25 MG Tab Take 1 Tab by mouth 2 times a day, with meals. 60 Tab 3   • Multiple Vitamins-Minerals (CENTRUM SILVER PO) Take  by mouth.     • apixaban (ELIQUIS) 2.5mg Tab Take 2.5 mg by mouth 2 Times a Day.     • celecoxib (CELEBREX) 100 MG Cap Take 100 mg by mouth 2 times a day.     • gabapentin (NEURONTIN) 100 MG Cap Take 100 mg by mouth every evening.     • albuterol (PROVENTIL) 2.5mg/3ml Nebu Soln solution for nebulization 2.5 mg by Nebulization route 3 times a day.     • Green Tea, Olya sinensis, (GREEN TEA EXTRACT PO) Take 1 Tab by mouth every day.     • Coenzyme Q10 (COQ10 PO) Take 1 Tab by mouth every evening.     • Probiotic Product (PROBIOTIC PO) Take 1 Cap by mouth every day.     • Alpha-Lipoic Acid (LIPOIC ACID PO) Take 1 Tab by mouth every morning.     • Ginkgo Biloba 120 MG Cap Take 120 mg by mouth every morning.       No current facility-administered medications for this visit.        Atherosclerosis  aorta  1/6/20 chest x-ray aortic atherosclerosis     AAA  10/19/15 CT chest high-resolution thorax atherosclerosis aorta  2/13/19 CT-CTA chest pulmonary artery with reconstruction 4.1 cm ascending aortic aneurysm     Cervical pain  2/26/15 narcotic contract  6/12/15 opiate risk score 0  10/4/15   7/25/16   9/20/16  pain note recommended left L3-L5 MBBB  10/31/16   2/6/17   5/9/17 narcotic contract  5/9/17 opiate risk score 0  5/9/17   5/9/17 depression screening score 0  6/28/17 trial cymbalta 30 mg  8/7/17 did not start cymbalta, will start  8/7/17   8/7/17 UDT  8/21/17 change cymbalta to qod  9/18/17 off cymbalta  9/14/17 custom PT discharge summary patient did not improve walking tolerance or standing tolerance, medicare g-code status 60-79% impairment, thoracolumbar flexion decreased from 50-25%, extension 10%, sidebending 10-25%  11/3/17   2/6/18   4/10/18   4/10/18 controlled substances contract, decrease oxycontin to 10 mg am and 5 mg pm  5/17/18 decrease to oxycontin 10 mg am and 5 mg pm  5/17/18   7/10/18 increase oxycontin back to 10 mg bid after fall  9/18/18   2/27/19 off oxycontin after hospital discharge, would like to avoid narcotics given side effects, will try celebrex 100 mg daily as had that previously  4/11/19 dr.zollinger pain note offered trigger point injections and lumbar radiofrequency ablation, she declines for now, consider tylenol in place of celebrex, follow-up as needed  1/14/20 resume norco 7.5 mg bid prn pain, continue neurontin 100 mg qpm and celebrex (hold while on prednisone)   Has seen neurosurgery, pain management locally and at [place], has tried NSAIDs, celebrex, cymbalta, pregabalin, gabapentin, lidoderm, voltaren gel     Chronic respiratory failure  12/13/19 pulmonary note, on trelegy and nocturnal oxygen, room air saturation at rest 87%, 2 L oxygen at rest saturation 94%, 2 L oxygen saturation with exertion 92% documenting need for  oxygen 2 L, will order overnight oximetry, follow-up as scheduled  1/14/20 room air saturation at rest 91%, saturation with exercise room air 86 to 87%     COPD  7/11 CT spiral thorax extensive emphysematous change of lung, small left pleural effusion left lung base with atelectasis unchanged from prior CT  5/10 CT spriral thorax emphysematous change and dependent atelectasis left lung base  7/11 PFT FEV/FVC 59%, FEV1 1.1 L (75% predicted), significant post bronchodilator response noted (FEV1 improved 16%). Mixed restrictive and obstructive pattern,COPD with GOLD stage II with sig bronchodilator response  7/11 trial of symbicort 80/4.5 mcg bid discussed with daughter plus albuterol neb prn, [employer] home education ordered  5/12 off symbicort   5/22/14 cxr negative  6/23/14 on symbicort  7/20/15 change to advair 250/50 mcg from symbicort (not covered on insurance)  9/3/15 cxr negative  10/4/15 add spiriva and change symbicort to advair 250/50 mcg bid  10/19/15 CT high resolution thorax, no evidence of interstitial lung disease, minimal scattered opacification could indicate minimal areas of fibrosis periphery of each lung, similar to prior exam, emphysema most prominent upper lobes bilateral  10/19/15 PFT FEV1 1.1 (61% predicted),FVC 1.9,FEV/FVC 58%, no bronchodilator response,DLCO 34%; on advair 250/50 mcg bid and spiriva  11/2/15 change from advair discus to advair 250 inhaler and continue spiriva   12/11/15 not taking advair, will start advair and cont spiriva  12/11/15 cxr negative  3/14/16 [facility] note complaining doxycycline and taper steroids, continue nocturnal oxygen, continue rotating antibiotics for bronchiectasis, follow-up laboratory testing ordered  3/14/16  (normal), quantiferon gold positive, allergen panel negative, IgE 357 (less than 214), IgA 116 (),, IgM 27 (),IgG 947 ([id number])  4/13/16 cxr mild cardiomegaly  5/4/16 [facility] note continue advair 115/21 bid with spacer given doxycycline and  prednisone, continue oxygen 3 L at night, AFB samples ×3, follow-up 3 months  7/25/16 pulmonary note on prednisone taper one week out of the month and doxycycline one per day for one week per month, on advair bid and spiriva and oxygen 3 liters at night  11/8/16 pulmonary note continue advair 115/21 ucg bid,spiriva, xopenex as needed, oxygen 3 L at night  10/4/17 pulmonary note continue advair 115/21 two inhalations bid, spiriva daily, xoponex as needed, oxygen 3 L at night, history of positive quantiferon gold, will order sputum sample follow-up 4 months  3/17/18 pulmonary note, continue nocturnal oxygen 2 L, start trelegy one puff daily  9/10/18 pulmonary note intolerant to CPAP, resume nocturnal oxygen, latent TB, repeat chest x-ray, stable on bronchodilators, as needed prednisone and antibiotic prescription to pharmacy follow-up 3-6 months  2/13/19 CT-CTA chest pulmonary artery with reconstruction no pulmonary embolism, emphysema and chronic bronchitis, cardiomegaly with right heart dysfunction, 4.1 cm ascending aortic aneurysm  3/19/19 on trelegy ellipta and nocturnal oxygen 2 L  10/2/19 pulmonary note cough and bronchitis continue prednisone and cefdinir until complete then resume celebrex, chest x-ray ordered, continue mucinex and nebulizer  12/13/19 pulmonary note, on trelegy and nocturnal oxygen, room air saturation at rest 87%, 2 L oxygen at rest saturation 94%, 2 L oxygen saturation with exertion 92% documenting need for oxygen 2 L, will order overnight oximetry, follow-up as scheduled  1/6/20 chest x-ray with no acute abnormality identified, hypoinflation consistent with chronic obstructive pulmonary disease  1/14/20 room air saturation at rest 91%, saturation with exercise room air 86 to 87%     Dyslipidemia   5/10 chol 163,trig 68,hdl 69,ldl 80  6/10 chol 163,trig 60,hdl 69,ldl 80  7/11 chol 118,trig 45,hdl 65,ldl 44 on crestor 10 mg  10/11 chol 165,trig 47,hdl 78,ldl 74 on crestor 10 mg done at  [facility]  2/12 off crestor  6/12 chol 211,trig 104,hdl 64,ldl 126 off crestor  3/4/14 chol 222,trig 124,hdl 52,ldl 145 off meds     History of compression fracture  5/10 MRI thoracic spine subacute T12 compression fracture  5/27/10 kyphoplasty T11  6/10 MRI lumbar spine T12 compression fracture mild to moderate central canal narrowing, interval T11 kyphoplasty, L2-L3 moderate foraminal stenosis, L3-L4 severe left foraminal stenosis, moderate right foraminal stenosis, L4-L5 moderate central canal stenosis  7/10 dexa LS +1.0,hip -1.6  7/28/10 T12 vertebral plasty  8/11 [facility] evaluation  pain management, recommend continue oxycontin 10 bid  10/11 [facility]  T11-L1 injection  2/13  pain note, T10-T11 epidural injection  6/13 dexa LS +1.8, forearm -2.7  9/23/13 reclast injection  9/4/14 reclast IV infusion  9/14/17 custom PT discharge summary patient did not improve walking tolerance or standing tolerance, medicare g-code status 60-79% impairment, thoracolumbar flexion decreased from 50-25%, extension 10%, sidebending 10-25%  7/1/18 x-ray lumbar spine complete or near collapse L1 vertebral body anteriorly which is likely chronic, extensive degenerative changes, prior percutaneous vertebral augmentation T12-L1  2/27/19 off oxycontin after hospital discharge, would like to avoid narcotics given side effects, will try celebrex 100 mg daily as had that previously  1/6/20 x-ray thoracic spine old compression fracture T11 and T12 post augmentation, kyphosis and levoconvex scoliosis no acute fracture  1/6/20 x-ray lumbar spine old T11 and T12 compression fracture, levoconvex scoliosis thoracolumbar junction, old T11 and T12 compression fracture     History H. pylori  9/11 serum IgG positive s/p prevpack  9/19/12 cbc,cmp,lipase neg; u/s abd gallbladder sludge without biliary dilatation or stone  12/12/12 [facility] eval rec EGD/colon pt did not follow up      history opiate  2/26/15 narcotic  contract  6/12/15 opiate risk score 0  10/4/15   7/25/16   9/20/16  pain note recommended left L3-L5 MBBB  10/31/16   2/6/17   5/9/17 narcotic contract  5/9/17 opiate risk score 0  5/9/17   5/9/17 depression screening score 0  6/28/17 trial cymbalta 30 mg  8/7/17 did not start cymbalta, will start  8/7/17   8/7/17 UDT  8/21/17 change cymbalta to qod  9/18/17 off cymbalta  9/14/17 custom PT discharge summary patient did not improve walking tolerance or standing tolerance, medicare g-code status 60-79% impairment, thoracolumbar flexion decreased from 50-25%, extension 10%, sidebending 10-25%  11/3/17   2/6/18   4/10/18   4/10/18 controlled substances contract, decrease oxycontin to 10 mg am and 5 mg pm  5/17/18 decrease to oxycontin 10 mg am and 5 mg pm  5/17/18   7/10/18 increase oxycontin back to 10 mg bid after fall  7/10/18    9/18/18   2/27/19 off oxycontin after hospital discharge, would like to avoid narcotics given side effects, will try celebrex 100 mg daily as had that previously  3/19/19 refer to dr.zollinger pain  3/19/19 increase to celebrex 100 mg bid, add neurontin 100 mg qpm, continue lidoderm patch  4/11/19 dr.zollinger pain note offered trigger point injections and lumbar radiofrequency ablation, she declines for now, consider tylenol in place of celebrex, follow-up as needed  1/14/20   1/14/20 controlled substance contract  1/14/20 resume norco 7.5 mg bid prn pain, continue neurontin 100 mg qpm and celebrex (hold while on prednisone)   Has seen neurosurgery, pain management locally and at [place], has tried NSAIDs, celebrex, cymbalta, pregabalin, gabapentin, lidoderm, voltaren gel     History shingles     Hypertension  1/05 carotid u/s negative  1/07 echo LV EF 60% ,mild LVH  1/09 renal ultrasound 6 mm right cortical cyst  9/09 MRI brain with and without moderate nonspecific microvascular ischemic change  9/09 MRA next mild plaque right internal  carotid  5/24/10 echo normal LV size and function, EF 60%, mild to moderate AR, RVSP 35-40 consistent with mild pulmonary hypertension  5/10 persantine thallium no ischemia, EF 72%  9/10 change avalide 150/12.5 to avapro 150 qday, had sodium 125 in ER  3/11 on avapro 300 mg  7/8/11 echo normal LV function, EF 55%  7/8/11 Persantine thallium possible small area mild ischemia in the lateral wall, no evidence of infarction  7/11   3/12 increase metoprolol to 50 bid, cont avapro 300 mg, start norvasc 2.5 mg  5/1/12 increase norvasc to 5 mg, change avapro to avalide 300/12.5 mg, cont metoprolol 50 bid  10/2/12 on avalide 300/12.5 mg and metoprolol 50 bid and norvasc 5 mg  10/12 Persantine thallium diaphragmatic uptake possible attenuation, fixed inferolateral defect which may be secondary to attenuation, EF 76%, no wall motion abnormalities, no evidence of significant ischemia.  1/13 echo normal LV function, grade 2 diastolic dysfunction, EF 70% moderate aortic insufficiency  1/13   1/23/13 cxr cardiomegaly without pulmonary edema  6/11/13 on avalide 300/12.5 mg,norvasc 5 mg, metoprolol 50 bid  4/7/14 cardiology note atrial fibrillation, start pradaxa 75 mg bid, stop asa, stop norvasc, decreased avalide 300/12.5 mg to 1/2 per day, increase metoprolol to 50 mg 1 1/2 bid  10/20/14 metoprolol 50 mg decreased to 25 mg bid by [place] doctor due to low heart rate, continues on avalide 300/12.5 mg   12/29/14  cardiology note continue pradaxa, metoprolol 25 mg 1/2 bid, avalide 300/12.5 mg  7/25/16 avalide 150/12.5 mg decrease to 1/2 tab per day and continue metoprolol 25 mg bid  8/24/16 cardiology note sinus rhythm on exam,[id number] score=2 continue anticoagulation, low-dose metoprolol,avalide 150/12.5 mg 1/2 per day, follow-up one year  12/14/17 cardiology note remains in sinus rhythm, follow-up one year  6/21/18  cardiology note paroxysmal atrial fibrillation sinus rhythm on exam stable continue  anticoagulation, follow-up 1 year  2/27/19 cardiology note paroxysmal atrial fibrillation, chronic anticoagulation  8/29/19 now on lisinopril 10 mg qam and metoprolol 25 mg qpm  10/7/19 CT-CTA complete thoracoabdominal atherosclerosis greater than 50% stenosis bilateral renal arteries   10/10/19 cardiology note reduce metoprolol to 12.5 mg bid as blood pressure normal in the office but may be lower at home contributing to tiredness and fatigue, torsemide as needed for shortness of breath  10/21/19 on metoprolol 25 mg on 12.5 mg bid  12/2/19 cardiology note unable to tolerate irbesartan and irbesartan secondary to cough and desaturation, discontinue irbesartan and metoprolol, start low-dose carvedilol 6.25 mg bid and continue eliquiw 2.5 mg bid and continue oxygen at night, room air saturation 90%  1/13/20 cardiology note blood pressure stable on coreg 6.25 mg bid, information given regarding DNR by cardiology      Low back pain  6/06 epidural L4-L5   12/08 x-ray LS moderate to severe DJD  6/10 MRI lumbar spine T12 compression fracture with mild-to-moderate central spinal canal narrowing, interval T11 kyphoplasty, L2-L3 moderate frontal stenosis, L3-L4 severe left foraminal stenosis, moderate right foraminal stenosis, L4-L5 moderate central spinal canal  7/10 interventional rad consult epidural T11 to L1 region  7/28/10 T12 vertebroplasty  7/30/10 norco taper, and lyrica 50 mg bid  8/5/10 reaction to lyrica, stop lyrica  8/19/10 lumbar steroid epidural   9/10  note rec decompression if pain persist  10/10 bilateral lumbar facet joint injections L3-S1   12/10 xray LS old T11 and T12 fracture status post kyphoplasty  1/11 [facility]  3/11 trial of spinal stimulator  8/11 [facility] note evaluation  pain management cont oxycontin 10 bid  11/22/11 [facility] note dr. hodge; trial of baclofen, T11 plus T12 left-sided nerve root block pending  7/12   pain note;Left-sided T11-T12 transforaminal epidural   4/13 daughter called [facility] suggest taper pain med, she has sched to taper the oxycontin  4/15/13 resumed oxycontin 10 bid  6/11/13 off oxycontin  7/24/13 increase oxycontin from qday to 10 mg bid   2/28/14 on celebrex 200 mg as needed per  and oxycontin 10 mg bid  4/25/14 trial of cymbalta 30 mg, continue oxycontin 10 mg twice a day, recommend not use tramadol (side effects since on oxycontin already) or celebrex (on pradaxa)  5/29/14  pain management [facility]; recommend T11-T12 left-sided transforaminal nerve block  7/2/14 [facility] left T11-T12 epidural injection  7/21/14  [facility] note; increase oxycontin to 10 mg tid x 3 weeks and then taper down  10/27/14 [facility] note; samples zorvolex  2/26/15 xray lumbar spine mild compression fracture of L4 of indeterminate age but new compared to 3/25/13 vertebral augmentation and T11 and T12 with severe compression fracture of T12 and focal kyphosis at this level  4/15/16 outpatient physical therapy phone call indicating patient unable to make appointments, recommending home health physical therapy  9/20/16  pain note recommended left L3-L5 MBBB  3/22/17  pain procedure note left L3-L5 MBBB #1  6/28/17 trial cymbalta 30 mg  6/29/17 [facility] physical therapy note  8/3/17 custom PT note  9/18/17 off cymbalta  9/14/17 custom PT discharge summary patient did not improve walking tolerance or standing tolerance, medicare g-code status 60-79% impairment, thoracolumbar flexion decreased from 50-25%, extension 10%, sidebending 10-25%  11/14/17 custom PT discharge summary no improvement  11/29/17 MRI lumbar spine no acute fracture, lumbar levoscoliosis T12-L1, kyphotic deformity he talked L1, previous compression fractures T11, T12, L1, previous vertebral augmentation procedures T11 and L1, moderate central canal stenosis T12-L3,  severe canal stenosis L3-L4, no significant change  1/31/18  pain note  2/2/18  pain note, reviewed cervical spine x-ray multilevel DJD, recommend consider intrathecal pain pump trial  5/17/18 decrease to oxycontin 10 mg am and 5 mg pm  7/1/18 x-ray lumbar spine complete or near collapse L1 vertebral body anteriorly which is likely chronic, extensive degenerative changes, prior percutaneous vertebral augmentation T12-L1  7/10/18 increase oxycontin back to 10 mg bid after fall  2/27/19 off oxycontin after hospital discharge, would like to avoid narcotics given side effects, will try celebrex 100 mg daily as had that previously  3/19/19 refer to dr.zollinger pain  3/19/19 increase to celebrex 100 mg bid, add neurontin 100 mg qpm, continue lidoderm patch  4/11/19 dr.zollinger pain note offered trigger point injections and lumbar radiofrequency ablation, she declines for now, consider tylenol in place of celebrex, follow-up as needed  5/15/19 dr.zollinger pain management note left lumbar paraspinal and quadratus lumborum areas  1/6/20 x-ray thoracic spine old compression fracture T11 and T12 post augmentation, kyphosis and levoconvex scoliosis no acute fracture  1/6/20 x-ray lumbar spine old T11 and T12 compression fracture, levoconvex scoliosis thoracolumbar junction, old T11 and T12 compression fracture  1/14/20 resume norco 7.5 mg bid prn pain, continue neurontin 100 mg qpm and celebrex (hold while on prednisone)   Tried NSAIDs, celebrex, pregabalin, gabapentin, cymbalta, physical therapy,TENS,lidoderm, voltaren gel, epidural, facet injections, kyphoplasty, pain management locally and at [place]     Macular degeneration  7/1/13 dr.mills mcnair note, start PreserVision AREDS II vitamin followup 6 months     Mild cognitive impairment  1/11/16 MMSE 24/30  7/14/17 MMSE 23/30 offered aricept  4/10/18 MMSE 24/30  5/2/18 start aricept 5 mg daily  9/18/18 off aricept not tolerated  10/31/18 trial namenda  XR started pack  11/8/18 namenda titration pack not available, will start namenda XR 7 mg daily  5/16/19 neurology note, memory loss likely alzheimer's, will obtain MRI brain, b12, folate, tsh, would not tolerate neuropsychiatric evaluation secondary to poor focus and attention     Nocturnal hypoxemia  3/17/18 pulmonary note, continue nocturnal oxygen 2 L, start trelegy one puff daily  9/10/18 pulmonary note intolerant to CPAP, resume nocturnal oxygen, latent TB, repeat chest x-ray, stable on bronchodilators, as needed prednisone and antibiotic prescription to pharmacy follow-up 3-6 months  12/13/19 pulmonary note room air saturation at rest 87%, 2 L oxygen at rest saturation 94%, 2 L oxygen saturation with exertion 92% documenting need for oxygen 2 L, will order overnight oximetry, follow-up as scheduled  1/14/20 room air saturation at rest 91%, saturation with exercise room air 86 to 87%     Osteoporosis  3/08 dexa LS +0.3,hip -1.4  5/10 MRI thoracic spine subacute T12 compression fracture  5/27/10 T12 kyphoplasty  6/10 on actonel  7/10 dexa LS +1.0,hip -1.6  8/10 vit d 56  3/11 reclast referral made  6/12 vit d 42 off actonel  6/21/13 dexa LS +1.8, forearm -2.7, I rec reclast, she would like to think it over  9/23/13 reclast injection  3/4/14 vit d 26  9/4/14 reclast IV infusion  2/26/15 xray rib series left; mild deformity of the lateral 10th rib on the left may be related to a fracture  2/26/15 xray lumbar spine mild compression fracture of L4 of indeterminate age but new compared to /25/13, vertebral augmentation and T11 and T12 with severe compression fracture of T12 and focal kyphosis at this level  12/7/15 reclast IV infusion     Paroxysmal atrial fibrillation  4/10 hospitalization on multaq, also on verapamil and metoprolol for rate control per cardiology  1/11 [facility] note cont multaq  7/11 EKG atrial fibrillation hospitalization  7/8/11 echo normal LV function, EF 55%  7/8/11 Persantine thallium possible  small area mild ischemia in the lateral wall, no evidence of infarction  7/11 [facility] during hospitalization changed to amiodarone 200 mg plus pradaxa restarted  7/11   9/11 [facility] note stop amiodarone, cont pradaxa and metoprolol 25 bid  3/12 cont pradaxa and increase metoprolol to 50 bid due to higher bp  5/12 pradaxa decreased from 150 bid to 75 bid per [facility] due to nosebleeds  6/12 EKG NSR  1/13 echo normal LV function, grade 2 diastolic dysfunction, EF 70% moderate aortic insufficiency  6/11/13 on pradaxa and metoprolol 50 bid for rate control, NSR today  9/13 cardiology note stop pradaxa and start asa 81 mg  4/7/14 cardiology note atrial fibrillation, start pradaxa 75 mg bid, stop asa, stop norvasc, decreased avalide 300/12.5 mg to 1/2 per day, increase metoprolol to 50 mg 1 1/2 bid  4/14/14 cardiology note, NSR on exam, continue pradaxa 75 mg bid, metoprolol 75 mg bid, avalide 300/12.5 mg 1/2 tab per day  10/20/14 metoprolol 50 mg decreased to 25 mg bid by [place] doctor due to low heart rate, continues on pradaxa and avalide 300/12.5 mg   12/29/14  cardiology note continue pradaxa, metoprolol 25 mg 1/2 bid, avalide 300/12.5 mg  6/12/15 NSR on exam  8/24/16 cardiology note sinus rhythm on exam,[id number] score=2 continue anticoagulation, low-dose metoprolol follow-up one year  12/14/17 cardiology note remains in sinus rhythm, follow-up one year  6/21/18  cardiology note paroxysmal atrial fibrillation sinus rhythm on exam stable continue anticoagulation, follow-up 1 year  12/11/18 on metoprolol and pradaxa  2/27/19 [id number] (age, gender, aortic atherosclerosis) = 5 (7.8% risk of stroke per year) and HAS-BLED score 3 points (age, hypertension, con commitment antiplatelet agents)  = 3.74 bleeds per 100 patient years) will change pradaxa not covered to eliquis 2.5 mg bid adjust for age and weight  9/6/19  cardiology note, cough since starting lisinopril was discontinued changed to  irbesartan 75 mg, continue metoprolol and eliquis, hyponatremia related to hydrochlorothiazide, changed to demadex 5 mg monday, wednesday, friday  10/10/19 cardiology note reduce metoprolol to 12.5 mg bid as blood pressure normal in the office but may be lower at home contributing to tiredness and fatigue, torsemide as needed for shortness of breath  12/2/19 cardiology note unable to tolerate irbesartan and irbesartan secondary to cough and desaturation, discontinue irbesartan and metoprolol, start low-dose carvedilol 6.25 mg bid and continue eliquiw 2.5 mg bid and continue oxygen at night, room air saturation 90%  12/9/19 cardiology mychart note, decrease carvedilol to 6.25 mg 1/2 bid and continue eliquis 2.5 mg     Preventative health  5/05 colon per [facility] polyp no path report, f/u rec 5 yrs (12/12/12 [facility] note)  4/09 stool occult blood negative  12/09 mammogram  10/1/11 pneumovax  10/2/12 zoster vaccine  6/11/13 declines mammogram, tdap  6/13 dexa LS +1.8, forearm -2.7  3/4/14 vit d 26  10/12/15 prevnar  12/11/18 tdap  1/14/20 POLST completed already has advanced directive with poa son el and daughter dominga     Right hip pain  3/11 MRI right hip DJD and tear of the anterior/superior acetabular labrum  4/11  ortho note not believe hip is primary problem, referred to  or reji     Sensorineural hearing loss  12/7/15  audiology evaluation mild sensorineural hearing loss     Sleep apnea  8/11 [facility] note sleep study apnea-hypopnea index 86, low sat 74%, start CPAP  8-18 cm    2013 off cpap not comfortable  3/14/16  (normal), quantiferon gold positive, allergen panel negative, IgE 357 (less than 214), IgA 116 (),, IgM 27 (),IgG 947 ([phone])  4/13/16 cxr mild cardiomegaly  5/4/16 [facility] note continue oxygen 3 L at night  11/8/16 pulmonary note continue oxygen 3 L at night  10/4/17 pulmonary note continue oxygen 3 L at night  3/17/18 pulmonary note, continue nocturnal oxygen 2  L, start trelegy one puff daily  9/10/18 pulmonary note intolerant to CPAP, resume nocturnal oxygen, latent TB, repeat chest x-ray, stable on bronchodilators, as needed prednisone and antibiotic prescription to pharmacy follow-up 3-6 months     tb latent  3/14/16 positive quantiferon gold test  3/14/16  (normal), quantiferon gold positive, allergen panel negative, IgE 357 (less than 214), IgA 116 (),, IgM 27 (),IgG 947 ([phone])  4/13/16 cxr mild cardiomegaly  5/4/16 [facility] note continue advair 115/21 bid with spacer given doxycycline and prednisone, continue oxygen 3 L at night, AFB samples ×3, follow-up 3 months  7/25/16 pulmonary note on prednisone taper one week out of the month and doxycycline one per day for one week per month, on advair bid and spiriva and oxygen 3 liters at night  7/27/17 PPD negative  10/4/17 pulmonary note continue advair 115/21 two inhalations bid, spiriva daily, xoponex as needed, oxygen 3 L at night, history of positive quantiferon gold, will order sputum sample follow-up 4 months  9/10/18 pulmonary note intolerant to CPAP, resume nocturnal oxygen, latent TB, repeat chest x-ray, stable on bronchodilators, as needed prednisone and antibiotic prescription to pharmacy follow-up 3-6 months     Tension headache  6/11/13 on fioricet bid  2/28/14 taper fioricet to once a day  4/25/14 now on fioricet prn    Patient Active Problem List   Diagnosis   • Hypertension   • Dyslipidemia   • Preventative health care   • Low back pain   • Cervical pain   • Osteoporosis, idiopathic   • History of compression fracture of spine   • PAF (paroxysmal atrial fibrillation) ([facility])   • Right hip pain   • COPD (chronic obstructive pulmonary disease) ([facility])   • History positive h.pylori titer   • History of shingles   • Tension headache   • Macular degeneration   • Opioid dependence ([facility])   • Sensorineural hearing loss   • Mild cognitive impairment   • TB lung, latent   • Nocturnal and exertional  hypoxemia   • Chronic respiratory failure with hypoxia (HCC)   • AAA (abdominal aortic aneurysm) (HCC)   • Renal mass   • Ventral hernia   • Atherosclerosis of aorta (HCC)          Health Maintenance Summary                Annual Pulmonary Function Test / Spirometry Overdue 10/21/2016      Done 10/21/2015 PFT DICTATED RESULTS     Patient has more history with this topic...    BONE DENSITY Overdue 6/21/2018      Done 6/21/2013 DS-BONE DENSITY STUDY (DEXA)     Patient has more history with this topic...    IMM HEP B VACCINE Overdue 11/18/2019      Done 10/21/2019 Imm Admin: Hepatitis B Vaccine Recombivax (Adol/Adult)    IMM ZOSTER VACCINES Postponed 3/18/2020 Originally 11/27/2012. System: vaccine not available, other system reasons     Done 10/2/2012 Imm Admin: Zoster Vaccine Live (ZVL) (Zostavax)    Annual Wellness Visit Next Due 10/21/2020      Done 10/21/2019 Visit Dx: Medicare annual wellness visit, subsequent     Patient has more history with this topic...    IMM DTaP/Tdap/Td Vaccine Next Due 12/11/2028      Done 12/11/2018 Imm Admin: Tdap Vaccine          Patient Care Team:  Denis Krueger M.D. as PCP - General  Nathan Fajardo M.D. as Consulting Physician (Anesthesia)  Esdras Thornton M.D. as Consulting Physician (Pulmonary Medicine)  Aashish as Respiratory  SARAH De La Garza as Consulting Physician (Pulmonary Medicine)  [facility] as Home Health Provider  Alexy Smyth M.D. as Consulting Physician (Interventional Cardiology)  Cecile Valenzuela as    Zaida Merchant P.A.-C. as Mid Level Provider (Physician Assistant)           Health Maintenance Summary                Annual Pulmonary Function Test / Spirometry Overdue 10/21/2016      Done 10/21/2015 PFT DICTATED RESULTS     Patient has more history with this topic...    BONE DENSITY Overdue 6/21/2018      Done 6/21/2013 DS-BONE DENSITY STUDY (DEXA)     Patient has more history with this topic...    IMM HEP B  VACCINE Overdue 11/18/2019      Done 10/21/2019 Imm Admin: Hepatitis B Vaccine Recombivax (Adol/Adult)    IMM ZOSTER VACCINES Postponed 3/18/2020 Originally 11/27/2012. System: vaccine not available, other system reasons     Done 10/2/2012 Imm Admin: Zoster Vaccine Live (ZVL) (Zostavax)    Annual Wellness Visit Next Due 10/21/2020      Done 10/21/2019 Visit Dx: Medicare annual wellness visit, subsequent     Patient has more history with this topic...    IMM DTaP/Tdap/Td Vaccine Next Due 12/11/2028      Done 12/11/2018 Imm Admin: Tdap Vaccine          Patient Care Team:  Denis Krueger M.D. as PCP - General  Nathan Fajardo M.D. as Consulting Physician (Anesthesia)  Esdras Thornton M.D. as Consulting Physician (Pulmonary Medicine)  Aashish as Respiratory  SARAH De La Garza as Consulting Physician (Pulmonary Medicine)  [facility] as Home Health Provider  Alexy Smyth M.D. as Consulting Physician (Interventional Cardiology)  Cecile Valenzuela as    Zaida Merchant P.A.-C. as Mid Level Provider (Physician Assistant)    ROS       Objective:        Physical Exam  Vitals signs and nursing note reviewed.   Constitutional:       Appearance: Normal appearance.   HENT:      Head: Normocephalic and atraumatic.      Right Ear: External ear normal.      Left Ear: External ear normal.   Eyes:      Conjunctiva/sclera: Conjunctivae normal.   Cardiovascular:      Rate and Rhythm: Normal rate and regular rhythm.      Heart sounds: Murmur present.   Pulmonary:      Effort: No respiratory distress.      Breath sounds: Normal breath sounds.   Abdominal:      General: There is no distension.      Palpations: Abdomen is soft.   Skin:     General: Skin is warm.      Findings: Rash present.   Neurological:      General: No focal deficit present.      Mental Status: She is alert.   Psychiatric:         Mood and Affect: Mood normal.         Behavior: Behavior normal.                  Assessment/Plan:     Assessment  #1 chronic low back pain followed by pain management previously, has seen neurosurgery, has seen [facility] pain management, has completed physical therapy, has had injections, epidural, facet, has tried NSAIDs, Celebrex, gabapentin, Neurontin, Cymbalta, TENS, Lidoderm, Voltaren gel, currently on hydrocodone 7.5 mg twice daily as needed for pain, Lidoderm patch, Celebrex 100 mg twice daily without GI upset, gabapentin 100 mg at night     #2 chronic opiate therapy hydrocodone 7.5 mg twice daily with improvement in pain without complication drowsiness, sedation, memory loss, depression, mood changes, no alcohol with medication, daughter or family members monitor usage    #3 COPD oxygen dependent on 2 L daily, Trelegy Ellipta, nebulizer as needed    #4 chronic hypoxemia and respiratory failure on 2 L continuously, desaturation on room air with exercise and activity as documented by pulmonary December 13, 2019 at that time 87% room air saturation at rest, improved to 94% oxygenation with 2 L oxygen.  On January 14 pulse oxygenation with activity 86 to 87%, on room air    #5 paroxysmal atrial fibrillation followed by cardiology on carvedilol and Eliquis, no excessive bruising or bleeding    #6 rash likely eczema      Plan  #!     #2 reviewed narcotic contract signed previously    #3 refill hydrocodone 7.5 mg twice daily as needed for back pain, risks and benefits discussed, daughter understands long-term increases risk for drowsiness, sedation, memory loss, falls, depression, continue no alcohol with medication.  Since the patient has tried multiple medications, has seen pain management here and at [facility], has tried other modalities, has had multiple imaging studies, I believe the benefits outweigh the risk understanding the risk for worsening memory loss and falls using chronic opiate therapy    #4 daughter has power of     #5 triamcinolone for rash     #6 continue  nebulizer, Trelegy Ellipta, oxygen 1 to 2 L continuously    #7 follow-up cardiology    #8 falling precautions has seen physical therapy, use walker at all times    #9 follow-up 3 months    #10 prescription hydrocodone 7.5 mg twice daily quantity 63 days provided 3 months at a time    #11 risk of Eliquis including bleeding especially in conjunction with Celebrex, Celebrex seem to be beneficial at limiting the amount of hydrocodone she needs so we will continue with Celebrex understanding risk for GI bleed balance by a lesser amount of hydrocodone being required for pain control, daughter understands risks    #12 referral to [facility] for portable oxygen concentrator, I believe the patient would benefit from portable oxygen given COPD, chronic hypoxemia on continuous oxygen 2 L per pulmonary, chronic low back pain making it difficult for her to ambulate long distances as she needs to use a walker and the oxygen concentrator and tubing make walking long distances difficult, she is mobile in the home but needs to use oxygen and the walker to move around indoors and outdoors, because of her low back pain she is unable to ambulate long distances or carry significant weight since her back pain is limiting as well as her use of the walker.

## 2020-02-27 ENCOUNTER — PATIENT OUTREACH (OUTPATIENT)
Dept: HEALTH INFORMATION MANAGEMENT | Facility: OTHER | Age: 85
End: 2020-02-27

## 2020-02-28 NOTE — PROGRESS NOTES
The pts daughter called stating that she enrolled her mother into a new plan starting 1/1/2020, but there was some confusion and disconnect at the health plan and they did not do it on time so her new plan was effective 2/1/2020. The member received services in the month of January when the new plan should have been active, but it was not. This resulted in the member paying higher copays for some services. The members daughter wanted to know if there was anyway she could be refunded the money. I advised that she would need to file a grievance if she wanted to get a refund. She understood. She then asked what [employer] was. I explained what it was and directed her to the information on the senior care plus website. She also asked if we had a nurse hotline. I let her know that she can call into [phone] and asked to be transferred to the advice nurse line. She understood and had no other questions at this time. If the pt call back, please transfer to [phone].

## 2020-03-02 ENCOUNTER — PATIENT MESSAGE (OUTPATIENT)
Dept: CARDIOLOGY | Facility: MEDICAL CENTER | Age: 85
End: 2020-03-02

## 2020-03-02 ENCOUNTER — PATIENT MESSAGE (OUTPATIENT)
Dept: PULMONOLOGY | Facility: HOSPICE | Age: 85
End: 2020-03-02

## 2020-03-02 RX ORDER — CARVEDILOL 3.12 MG/1
3.12 TABLET ORAL 2 TIMES DAILY WITH MEALS
Qty: 180 TAB | Refills: 3 | OUTPATIENT
Start: 2020-03-02 | End: 2021-03-17 | Stop reason: SDUPTHER

## 2020-03-02 RX ORDER — CARVEDILOL 6.25 MG/1
6.25 TABLET ORAL 2 TIMES DAILY WITH MEALS
Qty: 180 TAB | Refills: 3 | Status: SHIPPED | OUTPATIENT
Start: 2020-03-02 | End: 2020-03-02

## 2020-03-02 NOTE — PATIENT COMMUNICATION
Per Dr. Smyth: pt. May take Carvedilol 3.125mg BID.  Called [facility] pharmacist to cancel Carvedilol 6.25mg BID and order Carvedilol 3.125mg BID.  Ivana advised via my chart.

## 2020-03-05 ENCOUNTER — PATIENT MESSAGE (OUTPATIENT)
Dept: CARDIOLOGY | Facility: MEDICAL CENTER | Age: 85
End: 2020-03-05

## 2020-03-06 RX ORDER — TORSEMIDE 5 MG/1
TABLET ORAL
Qty: 30 TAB | Refills: 2 | Status: SHIPPED | OUTPATIENT
Start: 2020-03-06 | End: 2020-05-27 | Stop reason: SDUPTHER

## 2020-03-25 ENCOUNTER — PATIENT MESSAGE (OUTPATIENT)
Dept: PULMONOLOGY | Facility: HOSPICE | Age: 85
End: 2020-03-25

## 2020-03-25 NOTE — TELEPHONE ENCOUNTER
From: Venus Church  To: Mima SARAH Martinez  Sent: 3/25/2020 2:40 PM PDT  Subject: Non-Urgent Medical Question    Laith Guillermo, I hope you are well!  We are currently moving mom's daytime oxygen level from 1 to 2 in order to be within the 88-94 range. I wonder if we now need to look at adjusting the [facility] time level set to 2 for many years?     Second, mom's oxygen has not been stable in the past months, she's had shortness of breath, or we move it from 1.5 to 2 and she also takes it off and forgets to put it back on until we catch her.     All the while her memory seems to be deteriorating faster and she seems more in a fog. Is it possible that fluctuating levels are causing some of this memory disconnect. If so, is there anything you can recommend? Going to the hospital at this point poses a risk.    Your thoughts are appreciated!  Ivana

## 2020-03-26 DIAGNOSIS — R09.02 HYPOXIA: ICD-10-CM

## 2020-03-29 ENCOUNTER — TELEPHONE (OUTPATIENT)
Dept: MEDICAL GROUP | Facility: MEDICAL CENTER | Age: 85
End: 2020-03-29

## 2020-03-29 RX ORDER — DOXYCYCLINE HYCLATE 100 MG
100 TABLET ORAL 2 TIMES DAILY
Qty: 20 TAB | Refills: 0 | Status: SHIPPED | OUTPATIENT
Start: 2020-03-29 | End: 2020-05-04

## 2020-04-06 DIAGNOSIS — J96.11 CHRONIC RESPIRATORY FAILURE WITH HYPOXIA (HCC): ICD-10-CM

## 2020-04-06 DIAGNOSIS — G47.34 NOCTURNAL HYPOXEMIA: ICD-10-CM

## 2020-04-06 DIAGNOSIS — J41.1 MUCOPURULENT CHRONIC BRONCHITIS (HCC): Chronic | ICD-10-CM

## 2020-04-20 ENCOUNTER — PATIENT MESSAGE (OUTPATIENT)
Dept: MEDICAL GROUP | Facility: MEDICAL CENTER | Age: 85
End: 2020-04-20

## 2020-04-20 DIAGNOSIS — Z59.9 FINANCIAL DIFFICULTY: ICD-10-CM

## 2020-04-20 SDOH — ECONOMIC STABILITY - INCOME SECURITY: PROBLEM RELATED TO HOUSING AND ECONOMIC CIRCUMSTANCES, UNSPECIFIED: Z59.9

## 2020-04-21 NOTE — TELEPHONE ENCOUNTER
----- Message from Claudia Rojas, Med Ass't sent at 4/20/2020  3:56 PM PDT -----  Regarding: FW: Prescription Question  Contact: [phone]    ----- Message -----  From: Venus Church  Sent: 4/20/2020  12:00 PM PDT  To: Emily Carias  Subject: Prescription Question                            Laith  Doctor Evans!    Hope all is well with you.  I wanted to give you a heads-up that we have reached the [facility] already on our medications and the RX program that was in place no longer is available.  It used to be that mom qualified for $0 prescription costs but now We are  expected to pay $139.26 for Trelegy and $114.41 per month for Eliquis plus other meds...    This will affect mom's finances quite a bit.  I reached out to [facility] who will be contacting your office today regarding a possible lowering of our Co-pay to be less than the above prices, but they need your input.  Thought to give you a heads-up.    Thank you for all that you do!  Ivana

## 2020-04-22 ENCOUNTER — PATIENT OUTREACH (OUTPATIENT)
Dept: MEDICAL GROUP | Facility: MEDICAL CENTER | Age: 85
End: 2020-04-22

## 2020-04-22 ENCOUNTER — PATIENT OUTREACH (OUTPATIENT)
Dept: HEALTH INFORMATION MANAGEMENT | Facility: OTHER | Age: 85
End: 2020-04-22

## 2020-04-22 NOTE — PROGRESS NOTES
Referral received from PCP-MD, specifying social need of financial strain (Orlin Gonzalez). TC to pt to engage, assess and establish care plan. Pt educated on role of SW and reason for referral. Pt gave verbal permission to speak with Son and Daughter about medical and financial information. Daughter took over telephone call.     Ivana reports recently applying for Eliquis PAP through [employer], she is waiting for approval. Additionally, Ivana has been in contact with [employer] to decrease Tier on Trelegy medication (it is unknown whether this will be recurrent or one-time assistance). SW reviewed medication cost and medications to focus on for PAP applications. High costs medications are Eliquis and Trelegy, kin agreeable to pursue Trelegy PAP as well. Educated eligibility includes $600 out of pocket in medications and providing pt's income statements for review. Kin will gain these items.       EliquisEn Lucio's Application  Trelogy dropped in tier.     Assessment completed as follows:     Financial limitations/difficulties:  $1,430/mo. Assets are aprox $18,000 at this time. Kin is aware of [employer] HCBW programs and financial eligibility guidelines.    Food insecurities:  Meals on Wheels. Family also cooks intermittently.    Lack of housing/environmental issues:  Own residence, resides independently.    Transportation:  Not active , family provides transportation as needed.     Social isolation/support/(NOK) next-of-kin:  Daughter resides in [place] and visits regularly. Brother resides in [place]. Additionally, there is a private pay caregiver who assists intermittently. Pt is established with [facility] for Social Work (Shelbi Li), MOW and homemaker.     Loss of independence-need assistance with ADLs/IADLs:  Assistance needed with shower/bathing, dressing, transferring, incontinence, stairs, walking briefly, grocery shopping, cooking, house keeping, laundry, finance management.      Advanced life planning:  POA-HC, POA-F and POLST completed. Ddi discuss obtain a POA specific to real estate. Kin reports pt has Living with and Trust which she will review.     Link to POA for real estate:  [email or web address]-[email or web address][email or web address]-power-[email or web address]--[email or web address]     Behavioral/Mental:  Lack short term memory (examples are pt forgets what she is doing or where she has placed items) but maintains long term memory.     Assessments completed:      Plan:  Pt added to caseload  Care Plan Established    Mailed following documents to BOTH Pt and kin's address:  [employer] PAP  POA Real Estate  Mailed to pt address and kin's address.   Ivana Asp  [address] [address]

## 2020-04-22 NOTE — PROGRESS NOTES
Called and wish the member a happy birthday. There was nothing the member needed at this time. If the member calls back, please transfer to [phone].

## 2020-04-22 NOTE — LETTER
April 22, 2020        Ivana Asp  In care of Venus Church  [address] [address]        Dear Venus:    Enclosed are resources we discused over the phone to include:    Power of  for Real Estate  [facility] Patient Assistance Program for Trelegy    If you have any questions or concerns, please don't hesitate to call, [phone].        Sincerely,        Nieves Torres, LSW, MSW    Electronically Signed

## 2020-04-22 NOTE — LETTER
April 22, 2020        Ivana Asp  In care of Venus Church  [address]  [place] [address]        Dear Venus:    Enclosed are resources we discused over the phone to include:    Power of  for Real Estate  [facility] Patient Assistance Program for Trelegy    If you have any questions or concerns, please don't hesitate to call, [phone].        Sincerely,        Nieves Torres, LSW, MSW    Electronically Signed

## 2020-04-29 ENCOUNTER — PATIENT OUTREACH (OUTPATIENT)
Dept: MEDICAL GROUP | Facility: MEDICAL CENTER | Age: 85
End: 2020-04-29

## 2020-04-29 DIAGNOSIS — I48.0 PAF (PAROXYSMAL ATRIAL FIBRILLATION) (HCC): Primary | Chronic | ICD-10-CM

## 2020-04-29 NOTE — PROGRESS NOTES
TC from kin concerned regarding the timeline of apply for patient assistance programs. Kin has not received mailed Trelegy PAP, per chart review PAUL miss stated mailing address in letter to kin's residence (copy was sent to pt's residence). [employer] message sent with link to Trelegy PAP. Kin educated to complete document and collect all supporting documents to then be submitted to this SW for PCP to complete provider areas. Isaiah does not have scanner, she anticipates taking pictures with her smartphone and mailing to this SW's e-mail address ([email or web address]) for SW to process and create PDF.     Plan:  TC to pt/kin at later time/date

## 2020-04-29 NOTE — LETTER
May 1, 2020        Venus Church  [address]  [place] [address]        Current Outpatient Medications on File Prior to Visit   Medication Sig Dispense Refill   • doxycycline (VIBRAMYCIN) 100 MG Tab Take 1 Tab by mouth 2 times a day. 20 Tab 0   • celecoxib (CELEBREX) 100 MG Cap Take 1 Cap by mouth 2 times a day as needed for Moderate Pain. Indications: Back pain 180 Cap 1   • torsemide (DEMADEX) 5 MG Tab Take 1-2 tablets daily prn as directed by  For edema. 30 Tab 2   • carvedilol (COREG) 3.125 MG Tab Take 1 Tab by mouth 2 times a day, with meals. 180 Tab 3   • triamcinolone acetonide (KENALOG) 0.1 % Cream Apply 1 Application to affected area(s) 2 times a day as needed (rash). 60 g 3   • HYDROcodone-acetaminophen (NORCO) 7.5-325 MG per tablet Take 1-2 Tabs by mouth 2 times a day as needed for Moderate Pain for up to 30 days. 60 Tab 0   • TRELEGY ELLIPTA 100-62.5-25 MCG/INH AEROSOL POWDER, BREATH ACTIVATED INHALE 1 PUFF BY MOUTH ONCE DAILY. RINSE MOUTH AFTERWARDS. 1 Each 5   • lidocaine (LIDODERM) 5 % Patch Apply 3 patches to affected area 12 hours on, then off 12 hours 90 Patch 6   • Multiple Vitamins-Minerals (CENTRUM SILVER PO) Take  by mouth.     • gabapentin (NEURONTIN) 100 MG Cap Take 100 mg by mouth every evening.     • albuterol (PROVENTIL) 2.5mg/3ml Nebu Soln solution for nebulization 2.5 mg by Nebulization route 3 times a day.     • Coenzyme Q10 (COQ10 PO) Take 1 Tab by mouth every evening.     • Probiotic Product (PROBIOTIC PO) Take 1 Cap by mouth every day.     • Alpha-Lipoic Acid (LIPOIC ACID PO) Take 1 Tab by mouth every morning.     • Ginkgo Biloba 120 MG Cap Take 120 mg by mouth every morning.       No current facility-administered medications on file prior to visit.              Sincerely,        Nieves Torres, LSW, MSW    Electronically Signed

## 2020-04-29 NOTE — LETTER
May 1, 2020        Venus Church  [address]  [place] [address]      Summary of allergies:    Codeine; Irbesartan; and Lisinopril.          Sincerely,        Nieves Torres, LSW, MSW    Electronically Signed

## 2020-04-29 NOTE — LETTER
May 1, 2020        Venus Church  [address]  [place] [address]        Diagnosis history:  Patient Active Problem List   Diagnosis   • Hypertension   • Dyslipidemia   • Preventative health care   • Low back pain   • Cervical pain   • Osteoporosis, idiopathic   • History of compression fracture of spine   • PAF (paroxysmal atrial fibrillation) (HCC)   • Right hip pain   • COPD (chronic obstructive pulmonary disease) (HCC)   • History positive h.pylori titer   • History of shingles   • Tension headache   • Macular degeneration   • Opioid dependence (HCC)   • Sensorineural hearing loss   • Mild cognitive impairment   • TB lung, latent   • Nocturnal and exertional hypoxemia   • Chronic respiratory failure with hypoxia (HCC)   • AAA (abdominal aortic aneurysm) (HCC)   • Renal mass   • Ventral hernia   • Atherosclerosis of aorta (HCC)             Sincerely,        Nieves Torres, LSW, MSW    Electronically Signed

## 2020-05-01 ENCOUNTER — PATIENT MESSAGE (OUTPATIENT)
Dept: MEDICAL GROUP | Facility: MEDICAL CENTER | Age: 85
End: 2020-05-01

## 2020-05-01 ENCOUNTER — TELEPHONE (OUTPATIENT)
Dept: MEDICAL GROUP | Facility: PHYSICIAN GROUP | Age: 85
End: 2020-05-01

## 2020-05-01 DIAGNOSIS — I48.0 PAF (PAROXYSMAL ATRIAL FIBRILLATION) (HCC): Chronic | ICD-10-CM

## 2020-05-01 NOTE — TELEPHONE ENCOUNTER
Ms. Ranjit Messer Dr[employer]s [employer] PAP application for Trelegy is prepared for the prescription to be added. SW will travel to [facility] [phone] to drop off application. Please add Trelegy prescription, upload to media, fax using the provided fax cover and mail using the provided envelope.    Than you for your assistance in caring for this patient.     ELIZABETH Galicia

## 2020-05-04 DIAGNOSIS — J44.9 CHRONIC OBSTRUCTIVE PULMONARY DISEASE, UNSPECIFIED COPD TYPE (HCC): ICD-10-CM

## 2020-05-13 ENCOUNTER — PATIENT OUTREACH (OUTPATIENT)
Dept: MEDICAL GROUP | Facility: MEDICAL CENTER | Age: 85
End: 2020-05-13

## 2020-05-13 NOTE — PROGRESS NOTES
TC to kin who reports not receiving call from [employer]. PAUL provided phone number for kin to f/u with PAP company directly. Discussed [facility] PAP which was denied as pt/kin did not provide the appropriate out of pocket medication costs documentation; kin anticipates mailing document this week.     TC from kin who reports calling [employer]; they deny receiving document via fax. Per chart review, there is no fax confirmation uploaded with the Eli[facility] PAP application. PAUL educated kin PCP office faxes and mails application to ensure it is received. Kin is agreeable to wait until the following week to re-fax the PAP application if not found or received via mail by the [employer] organization. Kin anticipates calling [employer] Monday.      Plan:  TC to kin at later time/date

## 2020-05-18 ENCOUNTER — OFFICE VISIT (OUTPATIENT)
Dept: MEDICAL GROUP | Facility: MEDICAL CENTER | Age: 85
End: 2020-05-18
Payer: MEDICARE

## 2020-05-18 VITALS
SYSTOLIC BLOOD PRESSURE: 160 MMHG | BODY MASS INDEX: 26.76 KG/M2 | TEMPERATURE: 99.2 F | OXYGEN SATURATION: 92 % | WEIGHT: 137 LBS | HEART RATE: 81 BPM | DIASTOLIC BLOOD PRESSURE: 94 MMHG

## 2020-05-18 DIAGNOSIS — F11.20 UNCOMPLICATED OPIOID DEPENDENCE (HCC): ICD-10-CM

## 2020-05-18 DIAGNOSIS — I10 ESSENTIAL HYPERTENSION: Chronic | ICD-10-CM

## 2020-05-18 DIAGNOSIS — R60.0 LEG EDEMA: ICD-10-CM

## 2020-05-18 DIAGNOSIS — I48.0 PAF (PAROXYSMAL ATRIAL FIBRILLATION) (HCC): ICD-10-CM

## 2020-05-18 DIAGNOSIS — Z92.29 HISTORY OF OPIATE THERAPY: ICD-10-CM

## 2020-05-18 DIAGNOSIS — M25.551 RIGHT HIP PAIN: ICD-10-CM

## 2020-05-18 DIAGNOSIS — J96.11 CHRONIC RESPIRATORY FAILURE WITH HYPOXIA (HCC): ICD-10-CM

## 2020-05-18 PROCEDURE — 99214 OFFICE O/P EST MOD 30 MIN: CPT | Performed by: INTERNAL MEDICINE

## 2020-05-18 RX ORDER — HYDROCODONE BITARTRATE AND ACETAMINOPHEN 7.5; 325 MG/1; MG/1
1-2 TABLET ORAL 2 TIMES DAILY PRN
Qty: 60 TAB | Refills: 0 | Status: SHIPPED | OUTPATIENT
Start: 2020-06-17 | End: 2020-07-17

## 2020-05-18 RX ORDER — HYDROCODONE BITARTRATE AND ACETAMINOPHEN 7.5; 325 MG/1; MG/1
1-2 TABLET ORAL 2 TIMES DAILY PRN
Qty: 60 TAB | Refills: 0 | Status: SHIPPED | OUTPATIENT
Start: 2020-05-18 | End: 2020-05-18 | Stop reason: SDUPTHER

## 2020-05-18 ASSESSMENT — FIBROSIS 4 INDEX: FIB4 SCORE: 1.26

## 2020-05-18 NOTE — PROGRESS NOTES
Subjective:      Venus Church is a 89 y.o. female who presents follow up pain medication refill  HPI     Patient is here with her daughter, who has power of , chronic back pain related to previous compression fracture, has had kyphoplasty before, vertebroplasty T12, has seen pain management locally and at [place], has had epidural injections, facet injections, nerve blocks, has had imaging, seen physical therapy, has tried spinal stimulator, NSAIDs, Celebrex, Lyrica, Neurontin, Cymbalta, tramadol, TENS, Lidoderm, Voltaren.  On chronic opioid therapy hydrocodone 7.5 mg  Daughter lives with daughter 3 weeks at a time, then goes back to [place], and then patient's son Dileep will then stay with her at night and during the day has day care to watch over.  Patient needs constant supervision.  Daughter has been working to try to get mother admitted to an elder care facility but with coronavirus in the area and across the nation, no elder [facility] are accepting new patients.  Daughter states she gives the patient the hydrocodone 7.5 mg twice a day as needed for pain, the medication is effective, no excessive drowsiness, sedation, memory loss worse than normal, no falls.  Does use a walker for ambulation.  COPD followed by pulmonary, remains on Trelegy Ellipta as well as chronic oxygen therapy 2 L continuous, no current shortness of breath, cough, no COVID exposures   Also maintains on Celebrex 100 mg bid no GI pain, nausea, melena, also maintained on gabapentin  Hypertension followed by cardiology on carvedilol 6.25 mg twice daily, paroxysmal atrial fibrillation on Eliquis 2.5 mg twice daily  Has had more leg swelling, advised by cardiology to take torsemide 10 mg once a day for 3 days to help with swelling, limiting salt intake, torsemide has been beneficial, also maintained on carvedilol.  Paroxysmal atrial fibrillation on Eliquis no bruising, bleeding, Celebrex twice daily with no GI upset, no other NSAIDs.    Current Outpatient Medications   Medication Sig Dispense Refill   • Fluticasone-Umeclidin-Vilant (TRELEGY ELLIPTA) 100-62.5-25 MCG/INH AEROSOL POWDER, BREATH ACTIVATED Inhale 1 Inhalation by mouth every day. Indications: Chronic Obstructive Lung Disease 1 Each 11   • apixaban (ELIQUIS) 2.5mg Tab Take 1 Tab by mouth 2 Times a Day. 60 Tab 0   • celecoxib (CELEBREX) 100 MG Cap Take 1 Cap by mouth 2 times a day as needed for Moderate Pain. Indications: Back pain 180 Cap 1   • torsemide (DEMADEX) 5 MG Tab Take 1-2 tablets daily prn as directed by  For edema. 30 Tab 2   • carvedilol (COREG) 3.125 MG Tab Take 1 Tab by mouth 2 times a day, with meals. 180 Tab 3   • triamcinolone acetonide (KENALOG) 0.1 % Cream Apply 1 Application to affected area(s) 2 times a day as needed (rash). 60 g 3   • HYDROcodone-acetaminophen (NORCO) 7.5-325 MG per tablet Take 1-2 Tabs by mouth 2 times a day as needed for Moderate Pain for up to 30 days. 60 Tab 0   • lidocaine (LIDODERM) 5 % Patch Apply 3 patches to affected area 12 hours on, then off 12 hours 90 Patch 6   • Multiple Vitamins-Minerals (CENTRUM SILVER PO) Take  by mouth.     • gabapentin (NEURONTIN) 100 MG Cap Take 100 mg by mouth every evening.     • albuterol (PROVENTIL) 2.5mg/3ml Nebu Soln solution for nebulization 2.5 mg by Nebulization route 3 times a day.     • Coenzyme Q10 (COQ10 PO) Take 1 Tab by mouth every evening.     • Probiotic Product (PROBIOTIC PO) Take 1 Cap by mouth every day.     • Alpha-Lipoic Acid (LIPOIC ACID PO) Take 1 Tab by mouth every morning.     • Ginkgo Biloba 120 MG Cap Take 120 mg by mouth every morning.       No current facility-administered medications for this visit.                 Atherosclerosis aorta  1/6/20 chest x-ray aortic atherosclerosis     AAA  10/19/15 CT chest high-resolution thorax atherosclerosis aorta  2/13/19 CT-CTA chest pulmonary artery with reconstruction 4.1 cm ascending aortic aneurysm     Cervical pain  4/08 MRI cervical  spine C3-C4 moderate left-sided foraminal stenosis, C4-C5 moderate foraminal stenosis right, C5-C6 moderate right-sided foraminal stenosis, C6-C7 moderate bilateral foraminal stenosis  5/08 CT cervical spine C3-C4 uncovertebral joint arthropathy and significant left-sided neural foraminal narrowing, C4-C5 uncovertebral joint arthropathy right significant neuroforaminal narrowing, C5-C6 uncovertebral joint arthropathy right moderate neural foraminal narrowing, C6-C7 uncovertebral joint arthropathy moderate to severe right neural foraminal narrowing  7/08 C3-C5 cervical facet injections   1/09 medial branch nerve block diagnostic   2/09 C4-C5 cervical epidural under fluoroscopy   4/13 Daughter called [facility] suggest taper pain med, she has sched to taper the oxycontin  4/15/13 resumed oxycontin 10 bid  6/11/13 off oxcontin  7/24/13 increase oxycontin from qday to 10 mg bid   9/14/17 custom PT discharge summary patient did not improve walking tolerance or standing tolerance, medicare g-code status 60-79% impairment, thoracolumbar flexion decreased from 50-25%, extension 10%, sidebending 10-25%  6/28/17  pain note left L3-L5 medial branch block  9/14/17 custom PT discharge summary patient did not improve walking tolerance or standing tolerance, medicare g-code status 60-79% impairment, thoracolumbar flexion decreased from 50-25%, extension 10%, sidebending 10-25%  1/17/18  pain note left cervical paraspinal muscle trigger point injections  1/31/18  pain note  2/2/18  pain note, reviewed cervical spine x-ray multilevel DJD, recommend consider intrathecal pain pump trial  4/11/19 dr.zollinger pain note offered trigger point injections and lumbar radiofrequency ablation, she declines for now, consider tylenol in place of celebrex, follow-up as needed  1/14/20 resume norco 7.5 mg bid prn pain, continue neurontin 100 mg qpm and celebrex (hold while on  prednisone)   Has seen neurosurgery, pain management locally and at [place], has tried NSAIDs, celebrex, cymbalta, pregabalin, gabapentin, lidoderm, voltaren gel      Chronic respiratory failure  12/13/19 pulmonary note, on trelegy and nocturnal oxygen, room air saturation at rest 87%, 2 L oxygen at rest saturation 94%, 2 L oxygen saturation with exertion 92% documenting need for oxygen 2 L, will order overnight oximetry, follow-up as scheduled  1/14/20 room air saturation at rest 91%, saturation with exercise room air 86 to 87%     COPD  7/11 CT spiral thorax extensive emphysematous change of lung, small left pleural effusion left lung base with atelectasis unchanged from prior CT  5/10 CT spriral thorax emphysematous change and dependent atelectasis left lung base  7/11 PFT FEV/FVC 59%, FEV1 1.1 L (75% predicted), significant post bronchodilator response noted (FEV1 improved 16%). Mixed restrictive and obstructive pattern,COPD with GOLD stage II with sig bronchodilator response  7/11 trial of symbicort 80/4.5 mcg bid discussed with daughter plus albuterol neb prn, [employer] home education ordered  5/12 off symbicort   5/22/14 cxr negative  6/23/14 on symbicort  7/20/15 change to advair 250/50 mcg from symbicort (not covered on insurance)  9/3/15 cxr negative  10/4/15 add spiriva and change symbicort to advair 250/50 mcg bid  10/19/15 CT high resolution thorax, no evidence of interstitial lung disease, minimal scattered opacification could indicate minimal areas of fibrosis periphery of each lung, similar to prior exam, emphysema most prominent upper lobes bilateral  10/19/15 PFT FEV1 1.1 (61% predicted),FVC 1.9,FEV/FVC 58%, no bronchodilator response,DLCO 34%; on advair 250/50 mcg bid and spiriva  11/2/15 change from advair discus to advair 250 inhaler and continue spiriva   12/11/15 not taking advair, will start advair and cont spiriva  12/11/15 cxr negative  3/14/16 [facility] note complaining doxycycline and taper  steroids, continue nocturnal oxygen, continue rotating antibiotics for bronchiectasis, follow-up laboratory testing ordered  3/14/16  (normal), quantiferon gold positive, allergen panel negative, IgE 357 (less than 214), IgA 116 (),, IgM 27 (),IgG 947 ([phone])  4/13/16 cxr mild cardiomegaly  5/4/16 [facility] note continue advair 115/21 bid with spacer given doxycycline and prednisone, continue oxygen 3 L at night, AFB samples ×3, follow-up 3 months  7/25/16 pulmonary note on prednisone taper one week out of the month and doxycycline one per day for one week per month, on advair bid and spiriva and oxygen 3 liters at night  11/8/16 pulmonary note continue advair 115/21 ucg bid,spiriva, xopenex as needed, oxygen 3 L at night  10/4/17 pulmonary note continue advair 115/21 two inhalations bid, spiriva daily, xoponex as needed, oxygen 3 L at night, history of positive quantiferon gold, will order sputum sample follow-up 4 months  3/17/18 pulmonary note, continue nocturnal oxygen 2 L, start trelegy one puff daily  9/10/18 pulmonary note intolerant to CPAP, resume nocturnal oxygen, latent TB, repeat chest x-ray, stable on bronchodilators, as needed prednisone and antibiotic prescription to pharmacy follow-up 3-6 months  2/13/19 CT-CTA chest pulmonary artery with reconstruction no pulmonary embolism, emphysema and chronic bronchitis, cardiomegaly with right heart dysfunction, 4.1 cm ascending aortic aneurysm  3/19/19 on trelegy ellipta and nocturnal oxygen 2 L  10/2/19 pulmonary note cough and bronchitis continue prednisone and cefdinir until complete then resume celebrex, chest x-ray ordered, continue mucinex and nebulizer  12/13/19 pulmonary note, on trelegy and nocturnal oxygen, room air saturation at rest 87%, 2 L oxygen at rest saturation 94%, 2 L oxygen saturation with exertion 92% documenting need for oxygen 2 L, will order overnight oximetry, follow-up as scheduled  1/6/20 chest x-ray with no acute  abnormality identified, hypoinflation consistent with chronic obstructive pulmonary disease  1/14/20 room air saturation at rest 91%, saturation with exercise room air 86 to 87%     Dyslipidemia   5/10 chol 163,trig 68,hdl 69,ldl 80  6/10 chol 163,trig 60,hdl 69,ldl 80  7/11 chol 118,trig 45,hdl 65,ldl 44 on crestor 10 mg  10/11 chol 165,trig 47,hdl 78,ldl 74 on crestor 10 mg done at [place]  2/12 off crestor  6/12 chol 211,trig 104,hdl 64,ldl 126 off crestor  3/4/14 chol 222,trig 124,hdl 52,ldl 145 off meds     History of compression fracture  5/10 MRI thoracic spine subacute T12 compression fracture  5/27/10 kyphoplasty T11  6/10 MRI lumbar spine T12 compression fracture mild to moderate central canal narrowing, interval T11 kyphoplasty, L2-L3 moderate foraminal stenosis, L3-L4 severe left foraminal stenosis, moderate right foraminal stenosis, L4-L5 moderate central canal stenosis  7/10 dexa LS +1.0,hip -1.6  7/28/10 T12 vertebral plasty  8/11 [place] pain evaluation  pain management, recommend continue oxycontin 10 bid  10/11 [place] pain  T11-L1 injection  2/13  pain note, T10-T11 epidural injection  6/13 dexa LS +1.8, forearm -2.7  9/23/13 reclast injection  9/4/14 reclast IV infusion  9/14/17 custom PT discharge summary patient did not improve walking tolerance or standing tolerance, medicare g-code status 60-79% impairment, thoracolumbar flexion decreased from 50-25%, extension 10%, sidebending 10-25%  7/1/18 x-ray lumbar spine complete or near collapse L1 vertebral body anteriorly which is likely chronic, extensive degenerative changes, prior percutaneous vertebral augmentation T12-L1  2/27/19 off oxycontin after hospital discharge, would like to avoid narcotics given side effects, will try celebrex 100 mg daily as had that previously  1/6/20 x-ray thoracic spine old compression fracture T11 and T12 post augmentation, kyphosis and levoconvex scoliosis no acute fracture  1/6/20  x-ray lumbar spine old T11 and T12 compression fracture, levoconvex scoliosis thoracolumbar junction, old T11 and T12 compression fracture     History H. pylori  9/11 serum IgG positive s/p prevpack  9/19/12 cbc,cmp,lipase neg; u/s abd gallbladder sludge without biliary dilatation or stone  12/12/12 [facility] eval rec EGD/colon pt did not follow up   2/9/20 start prilosec 20 mg on celebrex and eliquis     history opiate  2/26/15 narcotic contract  6/12/15 opiate risk score 0  10/4/15   7/25/16   9/20/16  pain note recommended left L3-L5 MBBB  10/31/16   2/6/17   5/9/17 narcotic contract  5/9/17 opiate risk score 0  5/9/17   5/9/17 depression screening score 0  6/28/17 trial cymbalta 30 mg  8/7/17 did not start cymbalta, will start  8/7/17   8/7/17 UDT  8/21/17 change cymbalta to qod  9/18/17 off cymbalta  9/14/17 custom PT discharge summary patient did not improve walking tolerance or standing tolerance, medicare g-code status 60-79% impairment, thoracolumbar flexion decreased from 50-25%, extension 10%, sidebending 10-25%  11/3/17   2/6/18   4/10/18   4/10/18 controlled substances contract, decrease oxycontin to 10 mg am and 5 mg pm  5/17/18 decrease to oxycontin 10 mg am and 5 mg pm  5/17/18   7/10/18 increase oxycontin back to 10 mg bid after fall  7/10/18    9/18/18   2/27/19 off oxycontin after hospital discharge, would like to avoid narcotics given side effects, will try celebrex 100 mg daily as had that previously  3/19/19 refer to dr.zollinger pain  3/19/19 increase to celebrex 100 mg bid, add neurontin 100 mg qpm, continue lidoderm patch  4/11/19 dr.zollinger pain note offered trigger point injections and lumbar radiofrequency ablation, she declines for now, consider tylenol in place of celebrex, follow-up as needed  1/14/20   1/14/20 controlled substance contract  1/14/20 resume norco 7.5 mg bid prn pain, continue neurontin 100 mg qpm and celebrex (hold while on  prednisone)   Has seen neurosurgery, pain management locally and at [place], has tried NSAIDs, celebrex, cymbalta, pregabalin, gabapentin, lidoderm, voltaren gel     History shingles     Hypertension  1/05 carotid u/s negative  1/07 echo LV EF 60% ,mild LVH  1/09 renal ultrasound 6 mm right cortical cyst  9/09 MRI brain with and without moderate nonspecific microvascular ischemic change  9/09 MRA next mild plaque right internal carotid  5/24/10 echo normal LV size and function, EF 60%, mild to moderate AR, RVSP 35-40 consistent with mild pulmonary hypertension  5/10 persantine thallium no ischemia, EF 72%  9/10 change avalide 150/12.5 to avapro 150 qday, had sodium 125 in ER  3/11 on avapro 300 mg  7/8/11 echo normal LV function, EF 55%  7/8/11 Persantine thallium possible small area mild ischemia in the lateral wall, no evidence of infarction  7/11   3/12 increase metoprolol to 50 bid, cont avapro 300 mg, start norvasc 2.5 mg  5/1/12 increase norvasc to 5 mg, change avapro to avalide 300/12.5 mg, cont metoprolol 50 bid  10/2/12 on avalide 300/12.5 mg and metoprolol 50 bid and norvasc 5 mg  10/12 Persantine thallium diaphragmatic uptake possible attenuation, fixed inferolateral defect which may be secondary to attenuation, EF 76%, no wall motion abnormalities, no evidence of significant ischemia.  1/13 echo normal LV function, grade 2 diastolic dysfunction, EF 70% moderate aortic insufficiency  1/13   1/23/13 cxr cardiomegaly without pulmonary edema  6/11/13 on avalide 300/12.5 mg,norvasc 5 mg, metoprolol 50 bid  4/7/14 cardiology note atrial fibrillation, start pradaxa 75 mg bid, stop asa, stop norvasc, decreased avalide 300/12.5 mg to 1/2 per day, increase metoprolol to 50 mg 1 1/2 bid  10/20/14 metoprolol 50 mg decreased to 25 mg bid by [facility] doctor due to low heart rate, continues on avalide 300/12.5 mg   12/29/14  cardiology note continue pradaxa, metoprolol 25 mg 1/2 bid, avalide  300/12.5 mg  7/25/16 avalide 150/12.5 mg decrease to 1/2 tab per day and continue metoprolol 25 mg bid  8/24/16 cardiology note sinus rhythm on exam,[id number] score=2 continue anticoagulation, low-dose metoprolol,avalide 150/12.5 mg 1/2 per day, follow-up one year  12/14/17 cardiology note remains in sinus rhythm, follow-up one year  6/21/18  cardiology note paroxysmal atrial fibrillation sinus rhythm on exam stable continue anticoagulation, follow-up 1 year  2/27/19 cardiology note paroxysmal atrial fibrillation, chronic anticoagulation  8/29/19 now on lisinopril 10 mg qam and metoprolol 25 mg qpm  10/7/19 CT-CTA complete thoracoabdominal atherosclerosis greater than 50% stenosis bilateral renal arteries   10/10/19 cardiology note reduce metoprolol to 12.5 mg bid as blood pressure normal in the office but may be lower at home contributing to tiredness and fatigue, torsemide as needed for shortness of breath  10/21/19 on metoprolol 25 mg on 12.5 mg bid  12/2/19 cardiology note unable to tolerate irbesartan and irbesartan secondary to cough and desaturation, discontinue irbesartan and metoprolol, start low-dose carvedilol 6.25 mg bid and continue eliquiw 2.5 mg bid and continue oxygen at night, room air saturation 90%  1/13/20 cardiology note blood pressure stable on coreg 6.25 mg bid, information given regarding DNR by cardiology      Low back pain  6/06 epidural L4-L5   12/08 x-ray LS moderate to severe DJD  6/10 MRI lumbar spine T12 compression fracture with mild-to-moderate central spinal canal narrowing, interval T11 kyphoplasty, L2-L3 moderate frontal stenosis, L3-L4 severe left foraminal stenosis, moderate right foraminal stenosis, L4-L5 moderate central spinal canal  7/10 interventional rad consult epidural T11 to L1 region  7/28/10 T12 vertebroplasty  7/30/10 norco taper, and lyrica 50 mg bid  8/5/10 reaction to lyrica, stop lyrica  8/19/10 lumbar steroid epidural   9/10   note rec decompression if pain persist  10/10 bilateral lumbar facet joint injections L3-S1   12/10 xray LS old T11 and T12 fracture status post kyphoplasty  1/11 [facility]  3/11 trial of spinal stimulator  8/11 [facility] pain note evaluation  pain management cont oxycontin 10 bid  11/22/11 [facility] note dr. hodge; trial of baclofen, T11 plus T12 left-sided nerve root block pending  7/12  pain note;Left-sided T11-T12 transforaminal epidural   4/13 daughter called [facility] suggest taper pain med, she has sched to taper the oxycontin  4/15/13 resumed oxycontin 10 bid  6/11/13 off oxycontin  7/24/13 increase oxycontin from qday to 10 mg bid   2/28/14 on celebrex 200 mg as needed per  and oxycontin 10 mg bid  4/25/14 trial of cymbalta 30 mg, continue oxycontin 10 mg twice a day, recommend not use tramadol (side effects since on oxycontin already) or celebrex (on pradaxa)  5/29/14  pain management [facility]; recommend T11-T12 left-sided transforaminal nerve block  7/2/14 [facility] left T11-T12 epidural injection  7/21/14  [facility] note; increase oxycontin to 10 mg tid x 3 weeks and then taper down  10/27/14 [facility]; samples zorvolex  2/26/15 xray lumbar spine mild compression fracture of L4 of indeterminate age but new compared to 3/25/13 vertebral augmentation and T11 and T12 with severe compression fracture of T12 and focal kyphosis at this level  4/15/16 outpatient physical therapy phone call indicating patient unable to make appointments, recommending home health physical therapy  9/20/16  pain note recommended left L3-L5 MBBB  3/22/17  pain procedure note left L3-L5 MBBB #1  6/28/17 trial cymbalta 30 mg  6/29/17 custom physical therapy note  8/3/17 custom PT note  9/18/17 off cymbalta  9/14/17 custom PT discharge summary patient did not improve walking tolerance or standing  tolerance, medicare g-code status 60-79% impairment, thoracolumbar flexion decreased from 50-25%, extension 10%, sidebending 10-25%  11/14/17 custom PT discharge summary no improvement  11/29/17 MRI lumbar spine no acute fracture, lumbar levoscoliosis T12-L1, kyphotic deformity he talked L1, previous compression fractures T11, T12, L1, previous vertebral augmentation procedures T11 and L1, moderate central canal stenosis T12-L3, severe canal stenosis L3-L4, no significant change  1/31/18  pain note  2/2/18  pain note, reviewed cervical spine x-ray multilevel DJD, recommend consider intrathecal pain pump trial  5/17/18 decrease to oxycontin 10 mg am and 5 mg pm  7/1/18 x-ray lumbar spine complete or near collapse L1 vertebral body anteriorly which is likely chronic, extensive degenerative changes, prior percutaneous vertebral augmentation T12-L1  7/10/18 increase oxycontin back to 10 mg bid after fall  2/27/19 off oxycontin after hospital discharge, would like to avoid narcotics given side effects, will try celebrex 100 mg daily as had that previously  3/19/19 refer to dr.zollinger pain  3/19/19 increase to celebrex 100 mg bid, add neurontin 100 mg qpm, continue lidoderm patch  4/11/19 dr.zollinger pain note offered trigger point injections and lumbar radiofrequency ablation, she declines for now, consider tylenol in place of celebrex, follow-up as needed  5/15/19 dr.zollinger pain management note left lumbar paraspinal and quadratus lumborum areas  1/6/20 x-ray thoracic spine old compression fracture T11 and T12 post augmentation, kyphosis and levoconvex scoliosis no acute fracture  1/6/20 x-ray lumbar spine old T11 and T12 compression fracture, levoconvex scoliosis thoracolumbar junction, old T11 and T12 compression fracture  1/14/20 resume norco 7.5 mg bid prn pain, continue neurontin 100 mg qpm and celebrex (hold while on prednisone)   Tried NSAIDs, celebrex, pregabalin, gabapentin, cymbalta,  physical therapy,TENS,lidoderm, voltaren gel, epidural, facet injections, kyphoplasty, pain management locally and at [place]     Macular degeneration  7/1/13 dr.mills mcnair note, start PreserVision AREDS II vitamin followup 6 months     Mild cognitive impairment  1/11/16 MMSE 24/30  7/14/17 MMSE 23/30 offered aricept  4/10/18 MMSE 24/30  5/2/18 start aricept 5 mg daily  9/18/18 off aricept not tolerated  10/31/18 trial namenda XR started pack  11/8/18 namenda titration pack not available, will start namenda XR 7 mg daily  5/16/19 neurology note, memory loss likely alzheimer's, will obtain MRI brain, b12, folate, tsh, would not tolerate neuropsychiatric evaluation secondary to poor focus and attention     Nocturnal hypoxemia  3/17/18 pulmonary note, continue nocturnal oxygen 2 L, start trelegy one puff daily  9/10/18 pulmonary note intolerant to CPAP, resume nocturnal oxygen, latent TB, repeat chest x-ray, stable on bronchodilators, as needed prednisone and antibiotic prescription to pharmacy follow-up 3-6 months  12/13/19 pulmonary note room air saturation at rest 87%, 2 L oxygen at rest saturation 94%, 2 L oxygen saturation with exertion 92% documenting need for oxygen 2 L, will order overnight oximetry, follow-up as scheduled  1/14/20 room air saturation at rest 91%, saturation with exercise room air 86 to 87%  2/24/20 93% saturation on 2 liters    opioid dependance  2/26/15 narcotic contract  6/12/15 opiate risk score 0  10/4/15   7/25/16   9/20/16  pain note recommended left L3-L5 MBBB  10/31/16   2/6/17   5/9/17 narcotic contract  5/9/17 opiate risk score 0  5/9/17   5/9/17 depression screening score 0  6/28/17 trial cymbalta 30 mg  8/7/17 did not start cymbalta, will start  8/7/17   8/7/17 UDT  8/21/17 change cymbalta to qod  9/18/17 off cymbalta  9/14/17 custom PT discharge summary patient did not improve walking tolerance or standing tolerance, medicare g-code status 60-79%  impairment, thoracolumbar flexion decreased from 50-25%, extension 10%, sidebending 10-25%  11/3/17   2/6/18   4/10/18   4/10/18 controlled substances contract, decrease oxycontin to 10 mg am and 5 mg pm  5/17/18 decrease to oxycontin 10 mg am and 5 mg pm  5/17/18   7/10/18 increase oxycontin back to 10 mg bid after fall  7/10/18    9/18/18   2/27/19 off oxycontin after hospital discharge, would like to avoid narcotics given side effects, will try celebrex 100 mg daily as had that previously  3/19/19 refer to dr.zollinger pain  3/19/19 increase to celebrex 100 mg bid, add neurontin 100 mg qpm, continue lidoderm patch  4/11/19 dr.zollinger pain note offered trigger point injections and lumbar radiofrequency ablation, she declines for now, consider tylenol in place of celebrex, follow-up as needed  1/14/20   1/14/20 controlled substance contract  1/14/20 resume norco 7.5 mg bid prn pain, continue neurontin 100 mg qpm and celebrex (hold while on prednisone)   2/24/20    NSAIDs, celebrex, pregabalin, gabapentin, cymbalta, ultram, physical therapy,TENS, lidoderm patches, voltaren gel, epidural, facet injections, kyphoplasty, spinal stimulator, pain management locally and at [place], neurosurgery      Osteoporosis  3/08 dexa LS +0.3,hip -1.4  5/10 MRI thoracic spine subacute T12 compression fracture  5/27/10 T12 kyphoplasty  6/10 on actonel  7/10 dexa LS +1.0,hip -1.6  8/10 vit d 56  3/11 reclast referral made  6/12 vit d 42 off actonel  6/21/13 dexa LS +1.8, forearm -2.7, I rec reclast, she would like to think it over  9/23/13 reclast injection  3/4/14 vit d 26  9/4/14 reclast IV infusion  2/26/15 xray rib series left; mild deformity of the lateral 10th rib on the left may be related to a fracture  2/26/15 xray lumbar spine mild compression fracture of L4 of indeterminate age but new compared to /25/13, vertebral augmentation and T11 and T12 with severe compression fracture of T12 and focal  kyphosis at this level  12/7/15 reclast IV infusion     Paroxysmal atrial fibrillation  4/10 hospitalization on multaq, also on verapamil and metoprolol for rate control per cardiology  1/11 [facility] note cont multaq  7/11 EKG atrial fibrillation hospitalization  7/8/11 echo normal LV function, EF 55%  7/8/11 Persantine thallium possible small area mild ischemia in the lateral wall, no evidence of infarction  7/11 [facility] during hospitalization changed to amiodarone 200 mg plus pradaxa restarted  7/11   9/11 [facility] note stop amiodarone, cont pradaxa and metoprolol 25 bid  3/12 cont pradaxa and increase metoprolol to 50 bid due to higher bp  5/12 pradaxa decreased from 150 bid to 75 bid per [facility] due to nosebleeds  6/12 EKG NSR  1/13 echo normal LV function, grade 2 diastolic dysfunction, EF 70% moderate aortic insufficiency  6/11/13 on pradaxa and metoprolol 50 bid for rate control, NSR today  9/13 cardiology note stop pradaxa and start asa 81 mg  4/7/14 cardiology note atrial fibrillation, start pradaxa 75 mg bid, stop asa, stop norvasc, decreased avalide 300/12.5 mg to 1/2 per day, increase metoprolol to 50 mg 1 1/2 bid  4/14/14 cardiology note, NSR on exam, continue pradaxa 75 mg bid, metoprolol 75 mg bid, avalide 300/12.5 mg 1/2 tab per day  10/20/14 metoprolol 50 mg decreased to 25 mg bid by [place] doctor due to low heart rate, continues on pradaxa and avalide 300/12.5 mg   12/29/14  cardiology note continue pradaxa, metoprolol 25 mg 1/2 bid, avalide 300/12.5 mg  6/12/15 NSR on exam  8/24/16 cardiology note sinus rhythm on exam,[id number] score=2 continue anticoagulation, low-dose metoprolol follow-up one year  12/14/17 cardiology note remains in sinus rhythm, follow-up one year  6/21/18  cardiology note paroxysmal atrial fibrillation sinus rhythm on exam stable continue anticoagulation, follow-up 1 year  12/11/18 on metoprolol and pradaxa  2/27/19 [id number] (age, gender, aortic atherosclerosis) =  5 (7.8% risk of stroke per year) and HAS-BLED score 3 points (age, hypertension, con commitment antiplatelet agents)  = 3.74 bleeds per 100 patient years) will change pradaxa not covered to eliquis 2.5 mg bid adjust for age and weight  9/6/19  cardiology note, cough since starting lisinopril was discontinued changed to irbesartan 75 mg, continue metoprolol and eliquis, hyponatremia related to hydrochlorothiazide, changed to demadex 5 mg monday, wednesday, friday  10/10/19 cardiology note reduce metoprolol to 12.5 mg bid as blood pressure normal in the office but may be lower at home contributing to tiredness and fatigue, torsemide as needed for shortness of breath  12/2/19 cardiology note unable to tolerate irbesartan and irbesartan secondary to cough and desaturation, discontinue irbesartan and metoprolol, start low-dose carvedilol 6.25 mg bid and continue eliquiw 2.5 mg bid and continue oxygen at night, room air saturation 90%  12/9/19 cardiology mychart note, decrease carvedilol to 6.25 mg 1/2 bid and continue eliquis 2.5 mg  1/13/20 cardiology note blood pressure stable on coreg 6.25 mg bid and eliquis 2.5 mg bid, information given regarding DNR by cardiology      Preventative health  5/05 colon per [facility] polyp no path report, f/u rec 5 yrs (12/12/12 [facility] note)  4/09 stool occult blood negative  12/09 mammogram  10/1/11 pneumovax  10/2/12 zoster vaccine  6/11/13 declines mammogram, tdap  6/13 dexa LS +1.8, forearm -2.7  3/4/14 vit d 26  10/12/15 prevnar  12/11/18 tdap  1/14/20 POLST completed already has advanced directive with poa son el and daughter dominga    renal mass  10/7/19 CT-CTA complete thoracoabdominal, left renal low-density lesion 1.4 cm, 31 units, mychart correspondence with daughter, given age and comorbidities recommend no further evaluation     Right hip pain  3/11 MRI right hip DJD and tear of the anterior/superior acetabular labrum  4/11  ortho note not believe hip is primary  problem, referred to  or [facility]     Sensorineural hearing loss  12/7/15  audiology evaluation mild sensorineural hearing loss     Sleep apnea  8/11 [facility] note sleep study apnea-hypopnea index 86, low sat 74%, start CPAP  8-18 cm    2013 off cpap not comfortable  3/14/16  (normal), quantiferon gold positive, allergen panel negative, IgE 357 (less than 214), IgA 116 (),, IgM 27 (),IgG 947 ([phone])  4/13/16 cxr mild cardiomegaly  5/4/16 [facility] note continue oxygen 3 L at night  11/8/16 pulmonary note continue oxygen 3 L at night  10/4/17 pulmonary note continue oxygen 3 L at night  3/17/18 pulmonary note, continue nocturnal oxygen 2 L, start trelegy one puff daily  9/10/18 pulmonary note intolerant to CPAP, resume nocturnal oxygen, latent TB, repeat chest x-ray, stable on bronchodilators, as needed prednisone and antibiotic prescription to pharmacy follow-up 3-6 months     tb latent  3/14/16 positive quantiferon gold test  3/14/16  (normal), quantiferon gold positive, allergen panel negative, IgE 357 (less than 214), IgA 116 (),, IgM 27 (),IgG 947 ([phone])  4/13/16 cxr mild cardiomegaly  5/4/16 [facility] note continue advair 115/21 bid with spacer given doxycycline and prednisone, continue oxygen 3 L at night, AFB samples ×3, follow-up 3 months  7/25/16 pulmonary note on prednisone taper one week out of the month and doxycycline one per day for one week per month, on advair bid and spiriva and oxygen 3 liters at night  7/27/17 PPD negative  10/4/17 pulmonary note continue advair 115/21 two inhalations bid, spiriva daily, xoponex as needed, oxygen 3 L at night, history of positive quantiferon gold, will order sputum sample follow-up 4 months  9/10/18 pulmonary note intolerant to CPAP, resume nocturnal oxygen, latent TB, repeat chest x-ray, stable on bronchodilators, as needed prednisone and antibiotic prescription to pharmacy follow-up 3-6 months     Tension  headache  6/11/13 on fioricet bid  2/28/14 taper fioricet to once a day  4/25/14 now on fioricet prn    ventral hernia  10/4/19 CT abdomen pelvis with; small epigastric midline abdominal wall fat-containing ventral hernia        Patient Active Problem List   Diagnosis   • Hypertension   • Dyslipidemia   • Preventative health care   • Low back pain   • Cervical pain   • Osteoporosis, idiopathic   • History of compression fracture of spine   • PAF (paroxysmal atrial fibrillation) ([facility])   • Right hip pain   • COPD (chronic obstructive pulmonary disease) (HCC)   • History positive h.pylori titer   • History of shingles   • Tension headache   • Macular degeneration   • Opioid dependence (HCC)   • Sensorineural hearing loss   • Mild cognitive impairment   • TB lung, latent   • Nocturnal and exertional hypoxemia   • Chronic respiratory failure with hypoxia (HCC)   • AAA (abdominal aortic aneurysm) (HCC)   • Renal mass   • Ventral hernia   • Atherosclerosis of aorta (HCC)          Health Maintenance Summary                IMM HEP B VACCINE Overdue 4/22/1950      Done 10/21/2019 Imm Admin: Hepatitis B Vaccine Recombivax (Adol/Adult)    IMM ZOSTER VACCINES Overdue 11/27/2012      Done 10/2/2012 Imm Admin: Zoster Vaccine Live (ZVL) (Zostavax)    Annual Pulmonary Function Test / Spirometry Overdue 10/21/2016      Done 10/21/2015 PFT DICTATED RESULTS     Patient has more history with this topic...    BONE DENSITY Overdue 6/21/2018      Done 6/21/2013 DS-BONE DENSITY STUDY (DEXA)     Patient has more history with this topic...    Annual Wellness Visit Next Due 10/21/2020      Done 10/21/2019 Visit Dx: Medicare annual wellness visit, subsequent     Patient has more history with this topic...    IMM DTaP/Tdap/Td Vaccine Next Due 12/11/2028      Done 12/11/2018 Imm Admin: Tdap Vaccine          Patient Care Team:  Denis Krueger M.D. as PCP - General  Nathan Fajardo M.D. as Consulting Physician (Anesthesia)  Esdras Thornton M.D.  as Consulting Physician (Pulmonary Medicine)  SARAH De La Garza as Consulting Physician (Pulmonary Medicine)  [facility] as Home Health Provider  Alexy Smyth M.D. as Consulting Physician (Interventional Cardiology)  Cecile Valenzuela as [facility]   Zaida Merchant P.A.-C. as Mid Level Provider (Physician Assistant)  [facility] as Respiratory (DME Supplier)  BOSTON DasilvaW, MSW as Community Care Manager (Social Work)    ROS       Objective:          Physical Exam  Vitals signs and nursing note reviewed.   HENT:      Head: Normocephalic and atraumatic.      Right Ear: External ear normal.      Left Ear: External ear normal.      Nose: Nose normal.   Eyes:      Conjunctiva/sclera: Conjunctivae normal.   Neck:      Musculoskeletal: Neck supple.   Cardiovascular:      Rate and Rhythm: Normal rate and regular rhythm.      Heart sounds: Normal heart sounds.   Pulmonary:      Effort: No respiratory distress.      Breath sounds: Normal breath sounds.   Abdominal:      General: There is no distension.   Musculoskeletal:         General: No swelling.   Skin:     General: Skin is warm.   Neurological:      General: No focal deficit present.      Mental Status: She is alert.   Psychiatric:         Mood and Affect: Mood normal.                 Assessment/Plan:       Assessment  #1 chronic mid to low back pain, history of vertebral compression fracture status post kyphoplasty, vertebroplasty T11 and T12, has seen orthopedics, pain management, neurosurgery, physical therapy, has had multiple injections with steroids, ablations, facet blocks, and even spinal stimulator, continues to have chronic pain limiting her activity    #2 chronic opiate dependence on hydrocodone 7.5 mg once or twice per day provided by her daughter or son, daughter states that the patient has not exhibited any significant confusion, drowsiness, sedation with receiving the pain medication, it does  seem to work well to diminish her pain.  She also maintains on Celebrex and gabapentin.    #3 hypertension on carvedilol, slightly elevated today related to coming in from the parking lot and ambulating without her walker and she is holding onto her daughter for support    #4 lower extremity edema on torsemide 5 mg taking 2 tablets daily for 3 days in a row as needed for lower extremity edema per cardiology    #5 COPD on Trelegy Ellipta 2 L oxygen foll we have maintained her on owed by pulmonary, has portable oxygen container    #6 chronic hypoxemia and respiratory failure on 2 L oxygen    #7 cognitive impairment, likely Alzheimer saw neurology last May daughter Yashira and son Dileep have power of  over medical and legal affairs\    #8 paroxysmal atrial fibrillation rate controlled on carvedilol, also on Eliquis, no falls.  We have maintained her on Celebrex as well, no GI upset, no GI bleed signs or symptoms, we are using the Eliquis with Celebrex understanding increased risk for GI bleed especially given her age however we are doing this to minimize the amount of narcotic necessary to control her pain, unfortunately this is a difficult decision as Celebrex will increase risk of GI bleed however Celebrex has been somewhat beneficial for decreasing her need for increasing narcotic amounts, as narcotic use long-term will increase her risk for falling, depression, respiratory suppression, this has been discussed in full with her daughter previously and we have come to the determination to continue the Celebrex with hopes of decreasing her reliance on narcotics    Plan  #1 repeat labs previously ordered in the computer system    #2     #3  Refill hydrocodone 7.5 mg twice daily quantity 60 per 30 days, 2 separate refills, patient has seen pain management locally at [place], has had multiple procedures, spinal stimulator, vertebroplasty, kyphoplasty, has tried physical therapy, NSAIDs, gabapentin, pregabalin,  topical patches and creams with no benefit, she is maintained on hydrocodone 7.5 mg twice daily, daughter and son provide this medication to her monitor for side effects.    #4 I believe the benefits outweigh the risk, in this particular situation, chronic opiate therapy especially in an 89-year-old female poses significant health concerns, with regards to falling, confusion, worsening memory, respiratory suppression and an oxygen dependent individual, confusion, drowsiness, however she has 24-hour supervision with a supportive family, and at this point her main limiting health disease issue ongoing is her chronic pain/that she needs 24-hour supervision.  Understanding the pain medication has the above-mentioned health concerns, we balance this by understanding that the medication does allow her to be pain-free or at least function with decreased pain levels without significantly impacting her cognition levels.  Since she has tried and failed physical therapy, pain management injections, other than medications we have no other options currently to improve her pain level and pain control without increasing the narcotic dosing regimen and I would prefer not to do that, thus we are using supplemental gabapentin and Celebrex as multiple other additional therapies and modalities.  Based upon this we will continue on the hydrocodone 7.5 mg twice daily, Celebrex 100 mg twice daily, and the gabapentin.    #5 follow-up 3 months    #6 blood pressure checked regularly and record    #7 low-sodium diet    #8 continue walker for ambulation falling precautions    #9 follow-up cardiology    #10 monitor weights, low-sodium diet    #11 torsemide per cardiology    #12 continue oxygen 2 L chronically, inhalers, follow-up pulmonary for COPD    #13 daughter is investigating different placement options, I believe this is a good idea given the patient's age and health status, unfortunately with COVID-19 nationwide, nursing homes, and other  communities are not taking new residents currently which is understandable, daughter understands that nursing homes particularly or long-term care facilities are at increased risk for COVID-19 and without a vaccine we cannot guarantee that she will be safe, but for now we will continue to investigate different elder placement options

## 2020-05-19 ENCOUNTER — PATIENT OUTREACH (OUTPATIENT)
Dept: MEDICAL GROUP | Facility: MEDICAL CENTER | Age: 85
End: 2020-05-19

## 2020-05-19 NOTE — PROGRESS NOTES
TC to kin who states she has not received a telephone call from [employer] regarding the PAP program for [place]. Kin anticipates calling [facility] to inquire if hard copy was received as they previously denied receiving faxed copy. Kin requested to call this  back regarding the outcome and for assistance in taking necessary steps to ensure the application is received.    TC from kin who reached out to [employer]. [facility] reports they have not received the faxed or hard copy application.  will re-fax application tomorrow's date.     Plan:  TC to kin at later time/date

## 2020-05-20 ENCOUNTER — PATIENT OUTREACH (OUTPATIENT)
Dept: MEDICAL GROUP | Facility: MEDICAL CENTER | Age: 85
End: 2020-05-20

## 2020-05-20 NOTE — PROGRESS NOTES
Re-faxed [facility] PAP w/ confirmation.    TC to kin. Informed of the above information. Kin anticipates calling PAP program next week if communication is not established by then.     Plan:  TC to kin at later time/date

## 2020-05-21 ENCOUNTER — PATIENT OUTREACH (OUTPATIENT)
Dept: MEDICAL GROUP | Facility: MEDICAL CENTER | Age: 85
End: 2020-05-21

## 2020-05-21 ENCOUNTER — HOSPITAL ENCOUNTER (OUTPATIENT)
Dept: LAB | Facility: MEDICAL CENTER | Age: 85
End: 2020-05-21
Attending: INTERNAL MEDICINE
Payer: MEDICARE

## 2020-05-21 DIAGNOSIS — E55.9 HYPOVITAMINOSIS D: ICD-10-CM

## 2020-05-21 DIAGNOSIS — E78.5 DYSLIPIDEMIA: ICD-10-CM

## 2020-05-21 DIAGNOSIS — E53.8 B12 DEFICIENCY: ICD-10-CM

## 2020-05-21 LAB
25(OH)D3 SERPL-MCNC: 33 NG/ML (ref 30–100)
ALBUMIN SERPL BCP-MCNC: 4.1 G/DL (ref 3.2–4.9)
ALBUMIN/GLOB SERPL: 1.8 G/DL
ALP SERPL-CCNC: 72 U/L (ref 30–99)
ALT SERPL-CCNC: 31 U/L (ref 2–50)
ANION GAP SERPL CALC-SCNC: 12 MMOL/L (ref 7–16)
AST SERPL-CCNC: 32 U/L (ref 12–45)
BASOPHILS # BLD AUTO: 0.7 % (ref 0–1.8)
BASOPHILS # BLD: 0.05 K/UL (ref 0–0.12)
BILIRUB SERPL-MCNC: 0.6 MG/DL (ref 0.1–1.5)
BUN SERPL-MCNC: 21 MG/DL (ref 8–22)
CALCIUM SERPL-MCNC: 9 MG/DL (ref 8.4–10.2)
CHLORIDE SERPL-SCNC: 98 MMOL/L (ref 96–112)
CO2 SERPL-SCNC: 28 MMOL/L (ref 20–33)
CREAT SERPL-MCNC: 1.07 MG/DL (ref 0.5–1.4)
EOSINOPHIL # BLD AUTO: 0.44 K/UL (ref 0–0.51)
EOSINOPHIL NFR BLD: 5.7 % (ref 0–6.9)
ERYTHROCYTE [DISTWIDTH] IN BLOOD BY AUTOMATED COUNT: 48.7 FL (ref 35.9–50)
FASTING STATUS PATIENT QL REPORTED: NORMAL
GLOBULIN SER CALC-MCNC: 2.3 G/DL (ref 1.9–3.5)
GLUCOSE SERPL-MCNC: 119 MG/DL (ref 65–99)
HCT VFR BLD AUTO: 38.8 % (ref 37–47)
HGB BLD-MCNC: 12.5 G/DL (ref 12–16)
IMM GRANULOCYTES # BLD AUTO: 0.04 K/UL (ref 0–0.11)
IMM GRANULOCYTES NFR BLD AUTO: 0.5 % (ref 0–0.9)
LYMPHOCYTES # BLD AUTO: 1.67 K/UL (ref 1–4.8)
LYMPHOCYTES NFR BLD: 21.8 % (ref 22–41)
MCH RBC QN AUTO: 31.3 PG (ref 27–33)
MCHC RBC AUTO-ENTMCNC: 32.2 G/DL (ref 33.6–35)
MCV RBC AUTO: 97.2 FL (ref 81.4–97.8)
MONOCYTES # BLD AUTO: 0.61 K/UL (ref 0–0.85)
MONOCYTES NFR BLD AUTO: 8 % (ref 0–13.4)
NEUTROPHILS # BLD AUTO: 4.85 K/UL (ref 2–7.15)
NEUTROPHILS NFR BLD: 63.3 % (ref 44–72)
NRBC # BLD AUTO: 0 K/UL
NRBC BLD-RTO: 0 /100 WBC
PLATELET # BLD AUTO: 170 K/UL (ref 164–446)
PMV BLD AUTO: 10.7 FL (ref 9–12.9)
POTASSIUM SERPL-SCNC: 4.5 MMOL/L (ref 3.6–5.5)
PROT SERPL-MCNC: 6.4 G/DL (ref 6–8.2)
RBC # BLD AUTO: 3.99 M/UL (ref 4.2–5.4)
SODIUM SERPL-SCNC: 138 MMOL/L (ref 135–145)
TSH SERPL DL<=0.005 MIU/L-ACNC: 2.12 UIU/ML (ref 0.38–5.33)
VIT B12 SERPL-MCNC: 1132 PG/ML (ref 211–911)
WBC # BLD AUTO: 7.7 K/UL (ref 4.8–10.8)

## 2020-05-21 PROCEDURE — 82306 VITAMIN D 25 HYDROXY: CPT

## 2020-05-21 PROCEDURE — 82607 VITAMIN B-12: CPT

## 2020-05-21 PROCEDURE — 88185 FLOWCYTOMETRY/TC ADD-ON: CPT

## 2020-05-21 PROCEDURE — 84443 ASSAY THYROID STIM HORMONE: CPT

## 2020-05-21 PROCEDURE — 88184 FLOWCYTOMETRY/ TC 1 MARKER: CPT

## 2020-05-21 PROCEDURE — 85025 COMPLETE CBC W/AUTO DIFF WBC: CPT

## 2020-05-21 PROCEDURE — 80053 COMPREHEN METABOLIC PANEL: CPT

## 2020-05-21 PROCEDURE — 36415 COLL VENOUS BLD VENIPUNCTURE: CPT

## 2020-05-21 NOTE — PROGRESS NOTES
TC from kin who reports [employer] made telephone contact. They are requesting documents be re-faxed as they were not clear in the second application faxed. SW informed kin that it would be beneficial for kin to send documents directly as the documents are less legible after being processed into the pt's chart and sent. Kin anticipates doing so tomorrow's date and following up with [facility] next week.     Plan:  TC to kin at later time/date

## 2020-05-22 ENCOUNTER — TELEPHONE (OUTPATIENT)
Dept: MEDICAL GROUP | Facility: MEDICAL CENTER | Age: 85
End: 2020-05-22

## 2020-05-22 NOTE — TELEPHONE ENCOUNTER
Notified daughter with results, patient is taking torsemide 5 mg once a day, has been taking this 5 days in row, still with some leg edema, advised daughter to check daily weights, record, she can take torsemide 2 extra days since her renal function, sodium, potassium are normal.  After that we will evaluate her baseline weight and response to the medication with regards to lower extremity edema.  Daughter understands and agrees.

## 2020-05-23 LAB
ACUTE LEUKEMIA MARKERS SPEC-IMP: NORMAL
EVENTS COUNTED SPEC: 21 MARKERS
SOURCE 9121: NORMAL

## 2020-05-27 RX ORDER — TORSEMIDE 5 MG/1
5 TABLET ORAL DAILY
Qty: 90 TAB | Refills: 3 | Status: SHIPPED | OUTPATIENT
Start: 2020-05-27 | End: 2020-07-14

## 2020-05-27 RX ORDER — ALBUTEROL SULFATE 2.5 MG/3ML
SOLUTION RESPIRATORY (INHALATION)
Qty: 300 ML | Refills: 5 | Status: SHIPPED | OUTPATIENT
Start: 2020-05-27 | End: 2021-04-26 | Stop reason: SDUPTHER

## 2020-05-27 NOTE — TELEPHONE ENCOUNTER
Received request via: Pharmacy    Was the patient seen in the last year in this department? Yes 5/18/2020    Does the patient have an active prescription (recently filled or refills available) for medication(s) requested? No

## 2020-05-28 ENCOUNTER — TELEPHONE (OUTPATIENT)
Dept: MEDICAL GROUP | Facility: MEDICAL CENTER | Age: 85
End: 2020-05-28

## 2020-05-28 DIAGNOSIS — M54.9 BACK PAIN, UNSPECIFIED BACK LOCATION, UNSPECIFIED BACK PAIN LATERALITY, UNSPECIFIED CHRONICITY: ICD-10-CM

## 2020-05-28 DIAGNOSIS — I48.0 PAF (PAROXYSMAL ATRIAL FIBRILLATION) (HCC): Chronic | ICD-10-CM

## 2020-05-28 DIAGNOSIS — J41.1 MUCOPURULENT CHRONIC BRONCHITIS (HCC): Chronic | ICD-10-CM

## 2020-05-28 DIAGNOSIS — J96.11 CHRONIC RESPIRATORY FAILURE WITH HYPOXIA (HCC): ICD-10-CM

## 2020-05-28 DIAGNOSIS — I50.9 CONGESTIVE HEART FAILURE, UNSPECIFIED HF CHRONICITY, UNSPECIFIED HEART FAILURE TYPE (HCC): ICD-10-CM

## 2020-05-28 DIAGNOSIS — J42 CHRONIC BRONCHITIS, UNSPECIFIED CHRONIC BRONCHITIS TYPE (HCC): ICD-10-CM

## 2020-05-28 NOTE — TELEPHONE ENCOUNTER
Spoke with Ivana because Dileep did not . He was the one that LM on my VM originally.     Spoke with Venus and advised her to go to UC or ER. DECLINED. States that they would rather see you and can she come in today. Advised that he had no appt's today but booked her for 7:40 am on Mon. Advised that if her symptoms got worse over the weekend that she needed to go to ER or UC

## 2020-05-29 ENCOUNTER — HOSPITAL ENCOUNTER (EMERGENCY)
Facility: MEDICAL CENTER | Age: 85
End: 2020-05-29
Attending: EMERGENCY MEDICINE
Payer: MEDICARE

## 2020-05-29 ENCOUNTER — APPOINTMENT (OUTPATIENT)
Dept: RADIOLOGY | Facility: MEDICAL CENTER | Age: 85
End: 2020-05-29
Attending: EMERGENCY MEDICINE
Payer: MEDICARE

## 2020-05-29 ENCOUNTER — TELEPHONE (OUTPATIENT)
Dept: CARDIOLOGY | Facility: MEDICAL CENTER | Age: 85
End: 2020-05-29

## 2020-05-29 VITALS
HEIGHT: 60 IN | RESPIRATION RATE: 29 BRPM | HEART RATE: 72 BPM | WEIGHT: 140 LBS | TEMPERATURE: 99.7 F | OXYGEN SATURATION: 96 % | BODY MASS INDEX: 27.48 KG/M2 | SYSTOLIC BLOOD PRESSURE: 186 MMHG | DIASTOLIC BLOOD PRESSURE: 87 MMHG

## 2020-05-29 DIAGNOSIS — R60.9 PERIPHERAL EDEMA: ICD-10-CM

## 2020-05-29 DIAGNOSIS — J81.0 ACUTE PULMONARY EDEMA (HCC): ICD-10-CM

## 2020-05-29 LAB
ALBUMIN SERPL BCP-MCNC: 3.9 G/DL (ref 3.2–4.9)
ALBUMIN/GLOB SERPL: 1.9 G/DL
ALP SERPL-CCNC: 71 U/L (ref 30–99)
ALT SERPL-CCNC: 27 U/L (ref 2–50)
ANION GAP SERPL CALC-SCNC: 11 MMOL/L (ref 7–16)
APPEARANCE UR: CLEAR
AST SERPL-CCNC: 26 U/L (ref 12–45)
BASOPHILS # BLD AUTO: 0.3 % (ref 0–1.8)
BASOPHILS # BLD: 0.02 K/UL (ref 0–0.12)
BILIRUB SERPL-MCNC: 0.5 MG/DL (ref 0.1–1.5)
BILIRUB UR QL STRIP.AUTO: NEGATIVE
BUN SERPL-MCNC: 23 MG/DL (ref 8–22)
CALCIUM SERPL-MCNC: 8.8 MG/DL (ref 8.4–10.2)
CHLORIDE SERPL-SCNC: 100 MMOL/L (ref 96–112)
CO2 SERPL-SCNC: 27 MMOL/L (ref 20–33)
COLOR UR: YELLOW
COVID ORDER STATUS COVID19: NORMAL
CREAT SERPL-MCNC: 0.81 MG/DL (ref 0.5–1.4)
EKG IMPRESSION: NORMAL
EOSINOPHIL # BLD AUTO: 0.42 K/UL (ref 0–0.51)
EOSINOPHIL NFR BLD: 5.8 % (ref 0–6.9)
ERYTHROCYTE [DISTWIDTH] IN BLOOD BY AUTOMATED COUNT: 50.9 FL (ref 35.9–50)
GLOBULIN SER CALC-MCNC: 2.1 G/DL (ref 1.9–3.5)
GLUCOSE SERPL-MCNC: 117 MG/DL (ref 65–99)
GLUCOSE UR STRIP.AUTO-MCNC: NEGATIVE MG/DL
HCT VFR BLD AUTO: 36.6 % (ref 37–47)
HGB BLD-MCNC: 11.9 G/DL (ref 12–16)
IMM GRANULOCYTES # BLD AUTO: 0.02 K/UL (ref 0–0.11)
IMM GRANULOCYTES NFR BLD AUTO: 0.3 % (ref 0–0.9)
KETONES UR STRIP.AUTO-MCNC: NEGATIVE MG/DL
LACTATE BLD-SCNC: 1.3 MMOL/L (ref 0.5–2)
LEUKOCYTE ESTERASE UR QL STRIP.AUTO: NEGATIVE
LYMPHOCYTES # BLD AUTO: 1.14 K/UL (ref 1–4.8)
LYMPHOCYTES NFR BLD: 15.7 % (ref 22–41)
MCH RBC QN AUTO: 31.6 PG (ref 27–33)
MCHC RBC AUTO-ENTMCNC: 32.5 G/DL (ref 33.6–35)
MCV RBC AUTO: 97.3 FL (ref 81.4–97.8)
MICRO URNS: NORMAL
MONOCYTES # BLD AUTO: 0.66 K/UL (ref 0–0.85)
MONOCYTES NFR BLD AUTO: 9.1 % (ref 0–13.4)
NEUTROPHILS # BLD AUTO: 5 K/UL (ref 2–7.15)
NEUTROPHILS NFR BLD: 68.8 % (ref 44–72)
NITRITE UR QL STRIP.AUTO: NEGATIVE
NRBC # BLD AUTO: 0 K/UL
NRBC BLD-RTO: 0 /100 WBC
NT-PROBNP SERPL IA-MCNC: 3811 PG/ML (ref 0–125)
PH UR STRIP.AUTO: 6 [PH] (ref 5–8)
PLATELET # BLD AUTO: 165 K/UL (ref 164–446)
PMV BLD AUTO: 10.7 FL (ref 9–12.9)
POTASSIUM SERPL-SCNC: 3.8 MMOL/L (ref 3.6–5.5)
PROT SERPL-MCNC: 6 G/DL (ref 6–8.2)
PROT UR QL STRIP: NEGATIVE MG/DL
RBC # BLD AUTO: 3.76 M/UL (ref 4.2–5.4)
RBC UR QL AUTO: NEGATIVE
SARS-COV-2 RNA RESP QL NAA+PROBE: NOTDETECTED
SODIUM SERPL-SCNC: 138 MMOL/L (ref 135–145)
SP GR UR STRIP.AUTO: 1.01
SPECIMEN SOURCE: NORMAL
TROPONIN T SERPL-MCNC: 44 NG/L (ref 6–19)
WBC # BLD AUTO: 7.3 K/UL (ref 4.8–10.8)

## 2020-05-29 PROCEDURE — 83605 ASSAY OF LACTIC ACID: CPT

## 2020-05-29 PROCEDURE — 71045 X-RAY EXAM CHEST 1 VIEW: CPT

## 2020-05-29 PROCEDURE — 99284 EMERGENCY DEPT VISIT MOD MDM: CPT

## 2020-05-29 PROCEDURE — 81003 URINALYSIS AUTO W/O SCOPE: CPT

## 2020-05-29 PROCEDURE — 83880 ASSAY OF NATRIURETIC PEPTIDE: CPT

## 2020-05-29 PROCEDURE — 80053 COMPREHEN METABOLIC PANEL: CPT

## 2020-05-29 PROCEDURE — 93005 ELECTROCARDIOGRAM TRACING: CPT | Performed by: EMERGENCY MEDICINE

## 2020-05-29 PROCEDURE — 700102 HCHG RX REV CODE 250 W/ 637 OVERRIDE(OP): Performed by: EMERGENCY MEDICINE

## 2020-05-29 PROCEDURE — U0004 COV-19 TEST NON-CDC HGH THRU: HCPCS

## 2020-05-29 PROCEDURE — A9270 NON-COVERED ITEM OR SERVICE: HCPCS | Performed by: EMERGENCY MEDICINE

## 2020-05-29 PROCEDURE — 84484 ASSAY OF TROPONIN QUANT: CPT

## 2020-05-29 PROCEDURE — 87040 BLOOD CULTURE FOR BACTERIA: CPT

## 2020-05-29 PROCEDURE — 87086 URINE CULTURE/COLONY COUNT: CPT

## 2020-05-29 PROCEDURE — 36415 COLL VENOUS BLD VENIPUNCTURE: CPT

## 2020-05-29 PROCEDURE — 96374 THER/PROPH/DIAG INJ IV PUSH: CPT

## 2020-05-29 PROCEDURE — C9803 HOPD COVID-19 SPEC COLLECT: HCPCS | Performed by: EMERGENCY MEDICINE

## 2020-05-29 PROCEDURE — 85025 COMPLETE CBC W/AUTO DIFF WBC: CPT

## 2020-05-29 RX ORDER — TORSEMIDE 10 MG/1
10 TABLET ORAL DAILY
Qty: 30 TAB | Refills: 3 | Status: SHIPPED | OUTPATIENT
Start: 2020-05-29 | End: 2020-09-24 | Stop reason: SDUPTHER

## 2020-05-29 RX ORDER — TORSEMIDE 10 MG/1
20 TABLET ORAL ONCE
Status: COMPLETED | OUTPATIENT
Start: 2020-05-29 | End: 2020-05-29

## 2020-05-29 RX ORDER — METOLAZONE 2.5 MG/1
TABLET ORAL
Qty: 15 TAB | Refills: 0 | Status: SHIPPED | OUTPATIENT
Start: 2020-05-29 | End: 2020-07-14

## 2020-05-29 RX ORDER — METOLAZONE 5 MG/1
2.5 TABLET ORAL ONCE
Status: COMPLETED | OUTPATIENT
Start: 2020-05-29 | End: 2020-05-29

## 2020-05-29 RX ADMIN — TORSEMIDE 20 MG: 10 TABLET ORAL at 14:06

## 2020-05-29 RX ADMIN — METOLAZONE 2.5 MG: 5 TABLET ORAL at 14:05

## 2020-05-29 ASSESSMENT — FIBROSIS 4 INDEX: FIB4 SCORE: 3.01

## 2020-05-29 NOTE — ED PROVIDER NOTES
ED Provider Note    CHIEF COMPLAINT  Chief Complaint   Patient presents with   • Leg Swelling   • Shortness of Breath          HPI  Venus Church is a 89 y.o. female who presents to the ED with shortness of breath.  Symptoms started on the 18th.  Sweats been going on for the last 11 days.  The patient has a history of diastolic dysfunction, atrial fibrillation, on Eliquis.  The patient has been having increasing lower extremity edema and shortness of breath over the last 11 days, they have increase the patient's diuretic and put the patient on multiple days of diuretic without any improvement.  Currently the patient denies any fevers, does have a cough, has been using her Ventolin with minimal improvement.  Patient denies any chest pain, abdominal pains, nausea vomiting.  Mild pain in the lower extremities.    REVIEW OF SYSTEMS  See HPI for further details. All other systems are negative.     PAST MEDICAL HISTORY  Past Medical History:   Diagnosis Date   • Anesthesia     n/v   • Aortic regurgitation 4/25/2012   • Arrhythmia 7/10      A. Fib.   • Arthritis     general   • Atypical chest pain 4/25/2012   • CATARACT 2007,08   • Chronic anticoagulation 4/25/2012   • Constipation 4/25/2012   • COPD (chronic obstructive pulmonary disease) (HCC)    • DDD (degenerative disc disease)    • Dental disorder     partials   • Dyspnea 4/25/2012   • Generalized osteoarthritis 4/25/2012   • Headache(784.0) 4/25/2012   • Heart valve insufficiency     minimal, watched by cardio   • Hypercholesteremia    • Hyperlipidemia 4/25/2012   • Hypertension    • MEDICAL HOME    • On home oxygen therapy     at [facility]   • Osteoarthritis of knee 4/25/2012   • Osteoporosis    • Other accident     C-Spine FX  2006   • Pain     back pain   • Pneumonia 6/4   • TB lung, latent 5/4/2016   • Valvular heart disease 4/25/2012   • Vertebral fracture    • Vertigo 4/25/2012       FAMILY HISTORY  Family History   Problem Relation Age of Onset   • Heart Disease Father     • Diabetes Brother        SOCIAL HISTORY  Social History     Socioeconomic History   • Marital status:      Spouse name: Not on file   • Number of children: Not on file   • Years of education: Not on file   • Highest education level: Not on file   Occupational History   • Not on file   Social Needs   • Financial resource strain: Not on file   • Food insecurity     Worry: Not on file     Inability: Not on file   • Transportation needs     Medical: Not on file     Non-medical: Not on file   Tobacco Use   • Smoking status: Former Smoker     Packs/day: 1.50     Years: 25.00     Pack years: 37.50     Types: Cigarettes     Last attempt to quit: 1984     Years since quittin.1   • Smokeless tobacco: Never Used   • Tobacco comment: Continued abstinence advised.   Substance and Sexual Activity   • Alcohol use: No     Alcohol/week: 0.0 oz   • Drug use: No   • Sexual activity: Not Currently   Lifestyle   • Physical activity     Days per week: Not on file     Minutes per session: Not on file   • Stress: Not on file   Relationships   • Social connections     Talks on phone: Not on file     Gets together: Not on file     Attends [employer] service: Not on file     Active member of club or organization: Not on file     Attends meetings of clubs or organizations: Not on file     Relationship status: Not on file   • Intimate partner violence     Fear of current or ex partner: Not on file     Emotionally abused: Not on file     Physically abused: Not on file     Forced sexual activity: Not on file   Other Topics Concern   • Not on file   Social History Narrative   • Not on file       SURGICAL HISTORY  Past Surgical History:   Procedure Laterality Date   • RECOVERY  2010    Performed by SURGERY, IR-RECOVERY at SURGERY SAME DAY [facility]   • OTHER ORTHOPEDIC SURGERY  may, 2010      T11 kypho.   • CATARACT PHACO WITH IOL  2009    Performed by ROSE MARIE MANN at SURGERY SAME DAY [facility]   • CARPAL  TUNNEL RELEASE  @30 yrs ago    rt hand       CURRENT MEDICATIONS  Home Medications     Reviewed by Sarina Rivero (Pharmacy Tech) on 05/29/20 at 1342  Med List Status: Complete   Medication Last Dose Status   albuterol (PROVENTIL) 2.5mg/3ml Nebu Soln solution for nebulization 5/29/2020 Active   Alpha-Lipoic Acid (LIPOIC ACID PO) 5/29/2020 Active   apixaban (ELIQUIS) 2.5mg Tab 5/29/2020 Active   carvedilol (COREG) 3.125 MG Tab 5/29/2020 Active   celecoxib (CELEBREX) 100 MG Cap 5/29/2020 Active   Coenzyme Q10 (COQ10 PO) 5/28/2020 Active   Fluticasone-Umeclidin-Vilant (TRELEGY ELLIPTA) 100-62.5-25 MCG/INH AEROSOL POWDER, BREATH ACTIVATED 5/29/2020 Active   gabapentin (NEURONTIN) 100 MG Cap 5/28/2020 Active   Ginkgo Biloba 120 MG Cap 5/29/2020 Active   Green Tea, Olya sinensis, (GREEN TEA PO) 5/27/2020 Active   HYDROcodone-acetaminophen (NORCO) 7.5-325 MG per tablet 5/29/2020 Active   Magnesium 100 MG Tab 5/28/2020 Active   Multiple Vitamins-Minerals (CENTRUM SILVER PO) 5/27/2020 Active   Probiotic Product (PROBIOTIC PO) 5/27/2020 Active   torsemide (DEMADEX) 5 MG Tab 5/29/2020 Active                ALLERGIES  Allergies   Allergen Reactions   • Codeine Vomiting   • Irbesartan Cough     Strong cough, pt.s daughter reports the ER DrEn Advised she stop taking    • Lisinopril Shortness of Breath and Cough       PHYSICAL EXAM  VITAL SIGNS: BP (!) 186/87   Pulse 72   Temp 37.6 °C (99.7 °F) (Temporal)   Resp (!) 29   Ht 1.524 m (5')   Wt 63.5 kg (140 lb)   SpO2 96%   BMI 27.34 kg/m²   Constitutional: Well developed, Well nourished, moderate respiratory distress, Non-toxic appearance.   HENT: Normocephalic, Atraumatic, Bilateral external ears normal, Oropharynx moist, No oral exudates, Nose normal.   Eyes: PERRLA, EOMI, Conjunctiva normal, No discharge.   Neck: Normal range of motion, No tenderness, Supple, No stridor.   Cardiovascular: Regular rate and rhythm, no murmurs  Thorax & Lungs: Diffuse crackles  throughout some scattered wheezes, mild respiratory distress  Abdomen: Bowel sounds normal, Soft, No tenderness, No masses, No pulsatile masses.   Skin: Warm, Dry, No erythema, No rash.   Back: No tenderness, No CVA tenderness.   Extremities: Intact distal pulses, No tenderness, No cyanosis, No clubbing.  2+ pitting edema from the knees distal  Neurologic: Alert & oriented x 3, Normal motor function, Normal sensory function, No focal deficits noted.     Results for orders placed or performed during the hospital encounter of 05/29/20   CBC WITH DIFFERENTIAL   Result Value Ref Range    WBC 7.3 4.8 - 10.8 K/uL    RBC 3.76 (L) 4.20 - 5.40 M/uL    Hemoglobin 11.9 (L) 12.0 - 16.0 g/dL    Hematocrit 36.6 (L) 37.0 - 47.0 %    MCV 97.3 81.4 - 97.8 fL    MCH 31.6 27.0 - 33.0 pg    MCHC 32.5 (L) 33.6 - 35.0 g/dL    RDW 50.9 (H) 35.9 - 50.0 fL    Platelet Count 165 164 - 446 K/uL    MPV 10.7 9.0 - 12.9 fL    Neutrophils-Polys 68.80 44.00 - 72.00 %    Lymphocytes 15.70 (L) 22.00 - 41.00 %    Monocytes 9.10 0.00 - 13.40 %    Eosinophils 5.80 0.00 - 6.90 %    Basophils 0.30 0.00 - 1.80 %    Immature Granulocytes 0.30 0.00 - 0.90 %    Nucleated RBC 0.00 /100 WBC    Neutrophils (Absolute) 5.00 2.00 - 7.15 K/uL    Lymphs (Absolute) 1.14 1.00 - 4.80 K/uL    Monos (Absolute) 0.66 0.00 - 0.85 K/uL    Eos (Absolute) 0.42 0.00 - 0.51 K/uL    Baso (Absolute) 0.02 0.00 - 0.12 K/uL    Immature Granulocytes (abs) 0.02 0.00 - 0.11 K/uL    NRBC (Absolute) 0.00 K/uL   COMP METABOLIC PANEL   Result Value Ref Range    Sodium 138 135 - 145 mmol/L    Potassium 3.8 3.6 - 5.5 mmol/L    Chloride 100 96 - 112 mmol/L    Co2 27 20 - 33 mmol/L    Anion Gap 11.0 7.0 - 16.0    Glucose 117 (H) 65 - 99 mg/dL    Bun 23 (H) 8 - 22 mg/dL    Creatinine 0.81 0.50 - 1.40 mg/dL    Calcium 8.8 8.4 - 10.2 mg/dL    AST(SGOT) 26 12 - 45 U/L    ALT(SGPT) 27 2 - 50 U/L    Alkaline Phosphatase 71 30 - 99 U/L    Total Bilirubin 0.5 0.1 - 1.5 mg/dL    Albumin 3.9 3.2 - 4.9  g/dL    Total Protein 6.0 6.0 - 8.2 g/dL    Globulin 2.1 1.9 - 3.5 g/dL    A-G Ratio 1.9 g/dL   URINALYSIS    Specimen: Blood   Result Value Ref Range    Color Yellow     Character Clear     Specific Gravity 1.010 <1.035    Ph 6.0 5.0 - 8.0    Glucose Negative Negative mg/dL    Ketones Negative Negative mg/dL    Protein Negative Negative mg/dL    Bilirubin Negative Negative    Nitrite Negative Negative    Leukocyte Esterase Negative Negative    Occult Blood Negative Negative    Micro Urine Req see below    COVID/SARS CoV-2    Specimen: Nasopharyngeal; Respirate   Result Value Ref Range    COVID Order Status Received    TROPONIN   Result Value Ref Range    Troponin T 44 (H) 6 - 19 ng/L   proBrain Natriuretic Peptide, NT   Result Value Ref Range    NT-proBNP 3811 (H) 0 - 125 pg/mL   LACTIC ACID   Result Value Ref Range    Lactic Acid 1.3 0.5 - 2.0 mmol/L   SARS-CoV-2, PCR (In-House)   Result Value Ref Range    SARS-CoV-2 Source NP Swab     SARS-CoV-2 by PCR NotDetected NotDetected   ESTIMATED GFR   Result Value Ref Range    GFR If African American >60 >60 mL/min/1.73 m 2    GFR If Non African American >60 >60 mL/min/1.73 m 2   EKG   Result Value Ref Range    Report       [facility] Emergency Dept[facility]    Test Date:  2020  Pt Name:    IGOR KLINE                    Department: [facility]  MRN:        [id number]                      [facility]  Gender:     Female                       Technician: HRS  :        [id number]                   Requested By:RIGOBERTO CLARK  Order #:    [id number]                    Reading MD: RIGOBERTO CLARK MD    Measurements  Intervals                                Axis  Rate:       72                           P:          -8  CT:         194                          QRS:        -39  QRSD:       102                          T:          100  QT:         420  QTc:        460    Interpretive Statements  Sinus rhythm  Atrial premature complex  Probable left  atrial enlargement  Abnormal R-wave progression, late transition  LVH with secondary repolarization abnormality  Compared to ECG 10/04/2019 10:40:58  Atrial premature complex(es) now present  Ventricular premature complex(es) no longer pr esent  Left-axis deviation no longer present  Myocardial infarct finding no longer present  Electronically Signed On 5- 13:39:25 PDT by RIGOBERTO CLARK MD       *Note: Due to a large number of results and/or encounters for the requested time period, some results have not been displayed. A complete set of results can be found in Results Review.        RADIOLOGY/PROCEDURES  DX-CHEST-PORTABLE (1 VIEW)   Final Result      1.  Cardiomegaly.      2.  Prominent interstitial markings are consistent with mild edema            COURSE & MEDICAL DECISION MAKING  Pertinent Labs & Imaging studies reviewed. (See chart for details)  Patient is coming in secondary to increasing lower extremity edema shortness of breath, some cough.  No fevers but her temperature here is 99.7.  We will do a sepsis evaluation, BNP, troponin, COVID-19 test.  The patient will likely benefit from inpatient diuresis.    Patient is chest x-ray with cardiomegaly, mild edema, BNP is elevated.  The patient does not want to be admitted to the hospital, discussed the case with cardiology, Dr. Paredes who recommends doubling the patient's torsemide and starting the patient on metolazone.  She will follow-up with the patient as an outpatient, return with any other concerns.        FINAL IMPRESSION  1. Peripheral edema    2. Acute pulmonary edema (HCC)        Patient referred to primary care provider for blood pressure management     This dictation was created using voice recognition software. The accuracy of the dictation is limited to the abilities of the software. I expect there may be some errors of grammar and possibly content. The nursing notes were reviewed and certain aspects of this information were  incorporated into this note.    Electronically signed by: Donnie Cristobal M.D., 5/29/2020 11:10 AM

## 2020-05-29 NOTE — ED TRIAGE NOTES
Pt BIB daughter with c/o SOB, cough and BLE swelling. Pt has been seen by PCP and was placed on a diuretic with no relief. Was told to come to ER. Pt has dementia so is poor historian.    Pt is negative for COVID screening. She lives at home with daughter and has not been around anyone with COVID or has not been sick herself.

## 2020-05-29 NOTE — TELEPHONE ENCOUNTER
Spoke with Ivana. Pt's. Sx. Started 5-18-20 and have steadily worsened. Pt. Saw Dr. Krueger on 5-18-20. See chart notes, pt. Messages. Shortness of breath is worse accompanied by increase in weight and dependent edema in spite of increased Torsemide and Ventolin treatments. Pt. Is VERY uncomfortable, moaning.  Daughter advised to take pt. To ER now for assessment. Ivana is concerned that she won't be able to stay with pt. And concerned re: covid exposure, but nurse reminded her that pt. Needs assessment, treatment, procedures offered in ER and she is suffering at home.   Ivana will take her to ER now.

## 2020-05-29 NOTE — TELEPHONE ENCOUNTER
JAMIE/gina    Pt's daughter Yashira calling to report torsemide causing pt issues.  Water retention is not being helped with this med.  Swelling in feet not going down at all and med is affecting her breathing, very labored breathing, pt has COPD, needs RS advice asap.      Pt mentioned she reached out to Dr Krueger but he is unavailable today.    Please call Yashira .

## 2020-05-29 NOTE — ED NOTES
ERP at bedside. Pt agrees with plan of care discussed by ERP. AIDET acknowledged with patient. IV established. Blood sent to lab. BC X2. Nasal swab to lab. Monique in low position, side rail up for pt safety. Call light within reach. Will continue to monitor.

## 2020-05-29 NOTE — DISCHARGE INSTRUCTIONS
Increase her torsemide to 20 mg daily for the next 4 days, then return back to 10 mg daily.  Start new medicine metolazone 2.5 mg 2 times a week.  Call cardiology to follow-up as an outpatient.  They are aware that you were here and were seen, these are the recommendations.

## 2020-05-29 NOTE — ED NOTES
Discharge instructions provided.  Pt verbalized the understanding of discharge instructions to follow up with PCP and to return to ER if condition worsens.  Pt wheeled out of ER without difficulty. RX 2.  Discharge intstructions provided to pt's daughter.

## 2020-05-31 LAB
BACTERIA UR CULT: NORMAL
SIGNIFICANT IND 70042: NORMAL
SITE SITE: NORMAL
SOURCE SOURCE: NORMAL

## 2020-06-01 ENCOUNTER — PATIENT OUTREACH (OUTPATIENT)
Dept: HEALTH INFORMATION MANAGEMENT | Facility: OTHER | Age: 85
End: 2020-06-01

## 2020-06-01 ENCOUNTER — APPOINTMENT (OUTPATIENT)
Dept: MEDICAL GROUP | Facility: MEDICAL CENTER | Age: 85
End: 2020-06-01
Payer: MEDICARE

## 2020-06-01 NOTE — PROGRESS NOTES
Called and spoke with members daughter regarding her recent visit to the ED. The member has been scheduled to follow up with her vascular specialist and has also been in contact with PCP as well. The members daughter had questions about billing and finical assistance. I advised that one a bill or statement has been received to call billing and they will help to set up payment plans or help apply for programs. The members daughter understood and had no other questions or concerns at this time. If the member calls back, please transfer to [id number].    Attempt # 1

## 2020-06-03 DIAGNOSIS — I50.9 CONGESTIVE HEART FAILURE, UNSPECIFIED HF CHRONICITY, UNSPECIFIED HEART FAILURE TYPE (HCC): ICD-10-CM

## 2020-06-03 DIAGNOSIS — R79.0 LOW MAGNESIUM LEVEL: ICD-10-CM

## 2020-06-03 LAB
BACTERIA BLD CULT: NORMAL
BACTERIA BLD CULT: NORMAL
SIGNIFICANT IND 70042: NORMAL
SIGNIFICANT IND 70042: NORMAL
SITE SITE: NORMAL
SITE SITE: NORMAL
SOURCE SOURCE: NORMAL
SOURCE SOURCE: NORMAL

## 2020-06-04 ENCOUNTER — PATIENT OUTREACH (OUTPATIENT)
Dept: MEDICAL GROUP | Facility: MEDICAL CENTER | Age: 85
End: 2020-06-04

## 2020-06-04 NOTE — PROGRESS NOTES
TC to kin who reports pt approved for [facility]. Kin is expecting the medication to arrive this week. Agreeable to f/u TC from this SW.     Plan:  TC to pt at later time/date

## 2020-06-08 ENCOUNTER — OFFICE VISIT (OUTPATIENT)
Dept: CARDIOLOGY | Facility: MEDICAL CENTER | Age: 85
End: 2020-06-08
Payer: MEDICARE

## 2020-06-08 VITALS
BODY MASS INDEX: 25.52 KG/M2 | HEART RATE: 76 BPM | HEIGHT: 60 IN | WEIGHT: 130 LBS | DIASTOLIC BLOOD PRESSURE: 80 MMHG | SYSTOLIC BLOOD PRESSURE: 138 MMHG | OXYGEN SATURATION: 95 %

## 2020-06-08 DIAGNOSIS — I48.0 PAF (PAROXYSMAL ATRIAL FIBRILLATION) (HCC): Chronic | ICD-10-CM

## 2020-06-08 DIAGNOSIS — I10 ESSENTIAL HYPERTENSION: Chronic | ICD-10-CM

## 2020-06-08 DIAGNOSIS — E78.5 DYSLIPIDEMIA: Chronic | ICD-10-CM

## 2020-06-08 DIAGNOSIS — R60.0 LEG EDEMA: ICD-10-CM

## 2020-06-08 PROCEDURE — 99214 OFFICE O/P EST MOD 30 MIN: CPT | Performed by: INTERNAL MEDICINE

## 2020-06-08 ASSESSMENT — ENCOUNTER SYMPTOMS
BACK PAIN: 1
SPEECH CHANGE: 0
COUGH: 0
DEPRESSION: 1
BLOOD IN STOOL: 0
MEMORY LOSS: 1
LOSS OF CONSCIOUSNESS: 0
PALPITATIONS: 0
SHORTNESS OF BREATH: 0
NAUSEA: 0
WEAKNESS: 1
BRUISES/BLEEDS EASILY: 0
DIZZINESS: 0
VOMITING: 0

## 2020-06-08 ASSESSMENT — FIBROSIS 4 INDEX: FIB4 SCORE: 2.7

## 2020-06-08 NOTE — PROGRESS NOTES
No chief complaint on file.      Subjective:   Venus Church is a 89 y.o. female who presents today for follow-up of edema, PAF, and anticoagulation.  The patient was seen in the ER at the end of May with shortness of breath and worsening edema.  Her Demadex was increased to 10 mg a day and she is feeling better.  Daughter states she is has minimal edema.  She was given a prescription for metolazone 2.5 mg in the ER but has not used it.  Past Medical History:   Diagnosis Date   • Anesthesia     n/v   • Aortic regurgitation 4/25/2012   • Arrhythmia 7/10      A. Fib.   • Arthritis     general   • Atypical chest pain 4/25/2012   • CATARACT 2007,08   • Chronic anticoagulation 4/25/2012   • Constipation 4/25/2012   • COPD (chronic obstructive pulmonary disease) ([facility])    • DDD (degenerative disc disease)    • Dental disorder     partials   • Dyspnea 4/25/2012   • Generalized osteoarthritis 4/25/2012   • Headache(784.0) 4/25/2012   • Heart valve insufficiency     minimal, watched by cardio   • Hypercholesteremia    • Hyperlipidemia 4/25/2012   • Hypertension    • MEDICAL HOME    • On home oxygen therapy     at [facility]   • Osteoarthritis of knee 4/25/2012   • Osteoporosis    • Other accident     C-Spine FX  2006   • Pain     back pain   • Pneumonia 6/4   • TB lung, latent 5/4/2016   • Valvular heart disease 4/25/2012   • Vertebral fracture    • Vertigo 4/25/2012     Past Surgical History:   Procedure Laterality Date   • RECOVERY  7/28/2010    Performed by SURGERY, IR-RECOVERY at SURGERY SAME DAY [facility]   • OTHER ORTHOPEDIC SURGERY  may, 2010      T11 kypho.   • CATARACT PHACO WITH IOL  1/5/2009    Performed by ROSE MARIE MANN at SURGERY SAME DAY [facility]   • CARPAL TUNNEL RELEASE  @30 yrs ago    rt hand     Family History   Problem Relation Age of Onset   • Heart Disease Father    • Diabetes Brother      Social History     Socioeconomic History   • Marital status:      Spouse name: Not on file   • Number of  children: Not on file   • Years of education: Not on file   • Highest education level: Not on file   Occupational History   • Not on file   Social Needs   • Financial resource strain: Not on file   • Food insecurity     Worry: Not on file     Inability: Not on file   • Transportation needs     Medical: Not on file     Non-medical: Not on file   Tobacco Use   • Smoking status: Former Smoker     Packs/day: 1.50     Years: 25.00     Pack years: 37.50     Types: Cigarettes     Last attempt to quit: 1984     Years since quittin.1   • Smokeless tobacco: Never Used   • Tobacco comment: Continued abstinence advised.   Substance and Sexual Activity   • Alcohol use: No     Alcohol/week: 0.0 oz   • Drug use: No   • Sexual activity: Not Currently   Lifestyle   • Physical activity     Days per week: Not on file     Minutes per session: Not on file   • Stress: Not on file   Relationships   • Social connections     Talks on phone: Not on file     Gets together: Not on file     Attends [employer] service: Not on file     Active member of club or organization: Not on file     Attends meetings of clubs or organizations: Not on file     Relationship status: Not on file   • Intimate partner violence     Fear of current or ex partner: Not on file     Emotionally abused: Not on file     Physically abused: Not on file     Forced sexual activity: Not on file   Other Topics Concern   • Not on file   Social History Narrative   • Not on file     Allergies   Allergen Reactions   • Codeine Vomiting   • Irbesartan Cough     Strong cough, pt.s daughter reports the ER  Advised she stop taking    • Lisinopril Shortness of Breath and Cough     Outpatient Encounter Medications as of 2020   Medication Sig Dispense Refill   • Magnesium 100 MG Tab Take 1 Tab by mouth every bedtime.     • Green Tea, Olya sinensis, (GREEN TEA PO) Take 1 Tab by mouth ONE-HALF HOUR AFTER LUNCH.     • torsemide (DEMADEX) 10 MG tablet Take 1 Tab by mouth  every day. 30 Tab 3   • albuterol (PROVENTIL) 2.5mg/3ml Nebu Soln solution for nebulization USE 3 ML IN NEBULIZER  EVERY 4 HOURS AS NEEDED FOR SHORTNESS OF BREATH 300 mL 5   • [START ON 6/17/2020] HYDROcodone-acetaminophen (NORCO) 7.5-325 MG per tablet Take 1-2 Tabs by mouth 2 times a day as needed for Moderate Pain for up to 30 days. 60 Tab 0   • Fluticasone-Umeclidin-Vilant (TRELEGY ELLIPTA) 100-62.5-25 MCG/INH AEROSOL POWDER, BREATH ACTIVATED Inhale 1 Inhalation by mouth every day. Indications: Chronic Obstructive Lung Disease 1 Each 11   • apixaban (ELIQUIS) 2.5mg Tab Take 1 Tab by mouth 2 Times a Day. 60 Tab 0   • celecoxib (CELEBREX) 100 MG Cap Take 1 Cap by mouth 2 times a day as needed for Moderate Pain. Indications: Back pain 180 Cap 1   • carvedilol (COREG) 3.125 MG Tab Take 1 Tab by mouth 2 times a day, with meals. 180 Tab 3   • Multiple Vitamins-Minerals (CENTRUM SILVER PO) Take 1 Tab by mouth ONE-HALF HOUR AFTER LUNCH.     • gabapentin (NEURONTIN) 100 MG Cap Take 100 mg by mouth every evening.     • Coenzyme Q10 (COQ10 PO) Take 1 Tab by mouth every evening.     • Probiotic Product (PROBIOTIC PO) Take 1 Cap by mouth every day.     • Alpha-Lipoic Acid (LIPOIC ACID PO) Take 1 Tab by mouth every morning.     • Ginkgo Biloba 120 MG Cap Take 120 mg by mouth every morning.     • metOLazone (ZAROXOLYN) 2.5 MG Tab 2.5 mg two times a week (Patient not taking: Reported on 6/8/2020) 15 Tab 0   • torsemide (DEMADEX) 5 MG Tab Take 1 Tab by mouth every day. Indications: Leg swelling 90 Tab 3     No facility-administered encounter medications on file as of 6/8/2020.      Review of Systems   Constitutional: Negative for malaise/fatigue.   HENT: Negative for nosebleeds.    Respiratory: Negative for cough and shortness of breath.    Cardiovascular: Positive for leg swelling. Negative for chest pain and palpitations.   Gastrointestinal: Negative for blood in stool, melena, nausea and vomiting.   Genitourinary: Negative for  hematuria.   Musculoskeletal: Positive for back pain.   Neurological: Positive for weakness. Negative for dizziness, speech change and loss of consciousness.   Endo/Heme/Allergies: Does not bruise/bleed easily.   Psychiatric/Behavioral: Positive for depression and memory loss.        Objective:   /80 (BP Location: Left arm, Patient Position: Sitting)   Pulse 76   Ht 1.524 m (5')   Wt 59 kg (130 lb)   SpO2 95%   BMI 25.39 kg/m²     Physical Exam   Constitutional: She is oriented to person, place, and time. She appears well-developed and well-nourished. No distress.   Frail.  Uses supplemental oxygen   HENT:   Head: Atraumatic.   Eyes: Pupils are equal, round, and reactive to light. Conjunctivae and EOM are normal.   Neck: Neck supple. No JVD present.   Cardiovascular: Normal rate. An irregular rhythm present.   Murmur heard.   Systolic murmur is present with a grade of 1/6.  Pulmonary/Chest: Effort normal and breath sounds normal. No respiratory distress. She has no wheezes. She has no rales.   Musculoskeletal:         General: Edema present.      Comments: Trace edema bilaterally   Neurological: She is alert and oriented to person, place, and time.   Skin: Skin is warm and dry. She is not diaphoretic.   Psychiatric: Her mood appears not anxious. Her affect is blunt. She is not agitated and not slowed.       Assessment:     1. Essential hypertension  Basic Metabolic Panel   2. Dyslipidemia  Basic Metabolic Panel   3. PAF (paroxysmal atrial fibrillation) ([facility])  Basic Metabolic Panel   4. Leg edema         Medical Decision Making:  Today's Assessment / Status / Plan:   Edema: Likely multifactorial due in part to diastolic dysfunction, cor pulmonale, inactivity and venous insufficiency.  She only has trace edema today.  Dry weight is 132.  Parenthetically she was 145 in the ER.  Continue daily Demadex 10 mg a day daughter will give the patient extra 10 mg should edema worsen will hold metolazone in reserve.   [facility]  proBNP was 3811 in the ER.  All in all, she looks good today.    PAF: Rhythm is regular today    Chronic anticoagulation: No bleeding problems    Patient is quite elderly and has multiple core morbidities.  This was discussed with daughter today.  Consider palliative care.  Reevaluate in 1 month.

## 2020-06-11 ENCOUNTER — HOSPITAL ENCOUNTER (OUTPATIENT)
Facility: MEDICAL CENTER | Age: 85
End: 2020-06-11
Attending: INTERNAL MEDICINE
Payer: MEDICARE

## 2020-06-11 DIAGNOSIS — I48.0 PAF (PAROXYSMAL ATRIAL FIBRILLATION) (HCC): Chronic | ICD-10-CM

## 2020-06-11 DIAGNOSIS — R79.0 LOW MAGNESIUM LEVEL: ICD-10-CM

## 2020-06-11 DIAGNOSIS — I50.9 CONGESTIVE HEART FAILURE, UNSPECIFIED HF CHRONICITY, UNSPECIFIED HEART FAILURE TYPE (HCC): ICD-10-CM

## 2020-06-11 DIAGNOSIS — E78.5 DYSLIPIDEMIA: Chronic | ICD-10-CM

## 2020-06-11 DIAGNOSIS — I10 ESSENTIAL HYPERTENSION: Chronic | ICD-10-CM

## 2020-06-11 LAB
ANION GAP SERPL CALC-SCNC: 10 MMOL/L (ref 7–16)
BUN SERPL-MCNC: 24 MG/DL (ref 8–22)
CALCIUM SERPL-MCNC: 9.5 MG/DL (ref 8.5–10.5)
CHLORIDE SERPL-SCNC: 102 MMOL/L (ref 96–112)
CO2 SERPL-SCNC: 27 MMOL/L (ref 20–33)
CREAT SERPL-MCNC: 0.93 MG/DL (ref 0.5–1.4)
FASTING STATUS PATIENT QL REPORTED: NORMAL
GLUCOSE SERPL-MCNC: 119 MG/DL (ref 65–99)
MAGNESIUM SERPL-MCNC: 2.3 MG/DL (ref 1.5–2.5)
NT-PROBNP SERPL IA-MCNC: 3066 PG/ML (ref 0–125)
POTASSIUM SERPL-SCNC: 5 MMOL/L (ref 3.6–5.5)
SODIUM SERPL-SCNC: 139 MMOL/L (ref 135–145)

## 2020-06-11 PROCEDURE — 83735 ASSAY OF MAGNESIUM: CPT

## 2020-06-11 PROCEDURE — 80048 BASIC METABOLIC PNL TOTAL CA: CPT

## 2020-06-11 PROCEDURE — 83880 ASSAY OF NATRIURETIC PEPTIDE: CPT

## 2020-06-12 ENCOUNTER — PATIENT MESSAGE (OUTPATIENT)
Dept: CARDIOLOGY | Facility: MEDICAL CENTER | Age: 85
End: 2020-06-12

## 2020-06-13 NOTE — TELEPHONE ENCOUNTER
----- Message from Alexy Smyth M.D. sent at 6/12/2020  3:42 PM PDT -----  Laboratory from yesterday reviewed.  Potassium is normal at 5 and kidney function is stable.  Recommend staying on the same medications

## 2020-06-14 ENCOUNTER — TELEPHONE (OUTPATIENT)
Dept: MEDICAL GROUP | Facility: MEDICAL CENTER | Age: 85
End: 2020-06-14

## 2020-06-18 ENCOUNTER — PATIENT OUTREACH (OUTPATIENT)
Dept: MEDICAL GROUP | Facility: MEDICAL CENTER | Age: 85
End: 2020-06-18

## 2020-06-18 NOTE — PROGRESS NOTES
TC to kin. Unable to leave VM(1) as voicemail box is full.    Plan:  TC2 to kin at later time/date

## 2020-06-22 ENCOUNTER — TELEPHONE (OUTPATIENT)
Dept: MEDICAL GROUP | Facility: MEDICAL CENTER | Age: 85
End: 2020-06-22

## 2020-06-22 RX ORDER — TRAZODONE HYDROCHLORIDE 50 MG/1
25 TABLET ORAL NIGHTLY PRN
Qty: 90 TAB | Refills: 3 | Status: SHIPPED | OUTPATIENT
Start: 2020-06-22 | End: 2021-04-19

## 2020-06-22 NOTE — TELEPHONE ENCOUNTER
Please call [facility] and provide a verbal order [facility] physical therapy twice a week for 6 weeks

## 2020-06-25 ENCOUNTER — PATIENT OUTREACH (OUTPATIENT)
Dept: MEDICAL GROUP | Facility: MEDICAL CENTER | Age: 85
End: 2020-06-25

## 2020-06-25 NOTE — PROGRESS NOTES
TC2 to kin. Unable to leave VM(2) as VM box is full.     Plan:  TC3 to kin at later time/date  Tentative graduation from Care Coordination in Primary Care

## 2020-06-30 ENCOUNTER — PATIENT OUTREACH (OUTPATIENT)
Dept: MEDICAL GROUP | Facility: MEDICAL CENTER | Age: 85
End: 2020-06-30

## 2020-06-30 ENCOUNTER — TELEPHONE (OUTPATIENT)
Dept: MEDICAL GROUP | Facility: MEDICAL CENTER | Age: 85
End: 2020-06-30

## 2020-06-30 NOTE — TELEPHONE ENCOUNTER
Left a message for patient to call back at [phone].     Claudia Rojas  [facility] Assistant

## 2020-06-30 NOTE — TELEPHONE ENCOUNTER
Janessa ([phone]) called to advise that Venus has a white coating on her tounge (possibly from her inhaler). Butning on tongue when she consumes acids. Please advise.

## 2020-06-30 NOTE — TELEPHONE ENCOUNTER
Please notify the daughter that this could be a yeast infection, I will send a prescription for nystatin liquid to use 4 times a day, the prescription was sent electronically to [facility].

## 2020-06-30 NOTE — PROGRESS NOTES
TC to kin who confirms that Trelegy and Eliquis PAP program are active and pt is receiving free medications through 12/2020. Discussed referring pt back to this  if kin would zahida to assistance in reapplying next year. Kin agreeable to graduation from Care Coordination in Primary Care  Services.    Care Plan Complete  Episode closed    Plan:  Pt graduated from Care Coordination in Primary Care  Services  Educated on ability to self refer back to this  if social needs continue/arise in the future

## 2020-07-13 ENCOUNTER — TELEPHONE (OUTPATIENT)
Dept: MEDICAL GROUP | Facility: MEDICAL CENTER | Age: 85
End: 2020-07-13

## 2020-07-13 NOTE — TELEPHONE ENCOUNTER
Venus Ranjit's dtr Ivana   [phone]    Ivana called to say that pt has cough (productive w/yellow sputum). Has SOB all the time and she wheezes at night. Is using the nebulized 2 x day, can go up to 3 per Rx. Wants to know if she needs to be seen. Please advise.

## 2020-07-14 ENCOUNTER — OFFICE VISIT (OUTPATIENT)
Dept: CARDIOLOGY | Facility: MEDICAL CENTER | Age: 85
End: 2020-07-14
Payer: MEDICARE

## 2020-07-14 VITALS
HEART RATE: 78 BPM | BODY MASS INDEX: 25.32 KG/M2 | OXYGEN SATURATION: 95 % | SYSTOLIC BLOOD PRESSURE: 132 MMHG | HEIGHT: 60 IN | WEIGHT: 129 LBS | DIASTOLIC BLOOD PRESSURE: 65 MMHG

## 2020-07-14 DIAGNOSIS — J41.1 MUCOPURULENT CHRONIC BRONCHITIS (HCC): Chronic | ICD-10-CM

## 2020-07-14 DIAGNOSIS — I10 ESSENTIAL HYPERTENSION: Chronic | ICD-10-CM

## 2020-07-14 DIAGNOSIS — E78.5 DYSLIPIDEMIA: Chronic | ICD-10-CM

## 2020-07-14 DIAGNOSIS — I48.0 PAF (PAROXYSMAL ATRIAL FIBRILLATION) (HCC): ICD-10-CM

## 2020-07-14 DIAGNOSIS — Z79.01 ANTICOAGULATED: ICD-10-CM

## 2020-07-14 DIAGNOSIS — J96.11 CHRONIC RESPIRATORY FAILURE WITH HYPOXIA (HCC): ICD-10-CM

## 2020-07-14 PROCEDURE — 99214 OFFICE O/P EST MOD 30 MIN: CPT | Performed by: NURSE PRACTITIONER

## 2020-07-14 ASSESSMENT — ENCOUNTER SYMPTOMS
WEAKNESS: 1
DIZZINESS: 0
CHILLS: 0
BACK PAIN: 1
LOSS OF CONSCIOUSNESS: 0
COUGH: 0
MEMORY LOSS: 1
SHORTNESS OF BREATH: 1
NAUSEA: 0
MYALGIAS: 0
BRUISES/BLEEDS EASILY: 0
ORTHOPNEA: 0
ABDOMINAL PAIN: 0
PND: 0
PALPITATIONS: 0
INSOMNIA: 0
FEVER: 0
HEADACHES: 0

## 2020-07-14 ASSESSMENT — FIBROSIS 4 INDEX: FIB4 SCORE: 2.7

## 2020-07-14 NOTE — PROGRESS NOTES
Chief Complaint   Patient presents with   • Follow-Up   • Edema   • Shortness of Breath   • Atrial Fibrillation   • Anticoagulation   • COPD   • HTN (Controlled)       Subjective:   Venus Church is a 89 y.o. female who presents today for one month follow-up of fluid overload.    Venus is a 89 year old female with history of atrial fibrillation, anticoagulation, COPD, hypertension and hyperlipidemia, normally followed by Dr. Smyth.    In late May 2020, she was seen in the ER for fluid overload; Torsemide was increased from 5mg to 10mg. At follow-up with Dr. Smyth last month, he kept her on this same regimen.     She is here today for follow-up. LE edema is pretty much gone; breathing appears to be stable. She does continue to use oxygen throughout the day. No chest pain, pressure, tightness or heaviness. She very rarely uses her nebulizer these days. No orthopnea or PND; some mild lightheadedness, but no syncope or presyncope.    Past Medical History:   Diagnosis Date   • Anesthesia     N/V   • Arthritis         • Atrial fibrillation (HCC) 02/2019    Echocardiogram with normal LV size, moderate concentric LVH, LVEF 55%. Normal RA, LA and RV. Mild MR, moderate AI, moderate TR. RVSP 45mmHg.   • Atypical chest pain    • CATARACT 2007,08   • Chronic anticoagulation    • Constipation    • COPD (chronic obstructive pulmonary disease) (HCC)    • DDD (degenerative disc disease)    • Dental disorder     Partials   • Dyspnea    • Headache(784.0)    • Hypercholesteremia    • Hypertension    • MEDICAL HOME    • On home oxygen therapy     Continuous at night and during the day   • Osteoporosis    • Other accident 2006    C-Spine FX   • Pain     Backpain   • Pneumonia    • TB lung, latent     • Valvular heart disease    • Vertebral fracture    • Vertigo      Past Surgical History:   Procedure Laterality Date   • RECOVERY  7/28/2010    Performed by SURGERY, IR-RECOVERY at SURGERY SAME DAY [facility] ORS   • OTHER ORTHOPEDIC SURGERY  may,  2010      T11 kypho.   • CATARACT PHACO WITH IOL  2009    Performed by ROSE MARIE MANN at SURGERY SAME DAY [facility] ORS   • CARPAL TUNNEL RELEASE  @30 yrs ago    rt hand     Family History   Problem Relation Age of Onset   • Heart Disease Father    • Diabetes Brother      Social History     Socioeconomic History   • Marital status:      Spouse name: Not on file   • Number of children: Not on file   • Years of education: Not on file   • Highest education level: Not on file   Occupational History   • Not on file   Social Needs   • Financial resource strain: Not on file   • Food insecurity     Worry: Not on file     Inability: Not on file   • Transportation needs     Medical: Not on file     Non-medical: Not on file   Tobacco Use   • Smoking status: Former Smoker     Packs/day: 1.50     Years: 25.00     Pack years: 37.50     Types: Cigarettes     Last attempt to quit: 1984     Years since quittin.2   • Smokeless tobacco: Never Used   • Tobacco comment: Continued abstinence advised.   Substance and Sexual Activity   • Alcohol use: No     Alcohol/week: 0.0 oz   • Drug use: No   • Sexual activity: Not Currently   Lifestyle   • Physical activity     Days per week: Not on file     Minutes per session: Not on file   • Stress: Not on file   Relationships   • Social connections     Talks on phone: Not on file     Gets together: Not on file     Attends [employer] service: Not on file     Active member of club or organization: Not on file     Attends meetings of clubs or organizations: Not on file     Relationship status: Not on file   • Intimate partner violence     Fear of current or ex partner: Not on file     Emotionally abused: Not on file     Physically abused: Not on file     Forced sexual activity: Not on file   Other Topics Concern   • Not on file   Social History Narrative   • Not on file     Allergies   Allergen Reactions   • Codeine Vomiting   • Irbesartan Cough     Strong cough, pt.s daughter  reports the ER  Advised she stop taking    • Lisinopril Shortness of Breath and Cough     Outpatient Encounter Medications as of 7/14/2020   Medication Sig Dispense Refill   • traZODone (DESYREL) 50 MG Tab Take 0.5 Tabs by mouth at bedtime as needed for Sleep. 90 Tab 3   • Magnesium 100 MG Tab Take 1 Tab by mouth every bedtime.     • Green Tea, Olya sinensis, (GREEN TEA PO) Take 1 Tab by mouth ONE-HALF HOUR AFTER LUNCH.     • torsemide (DEMADEX) 10 MG tablet Take 1 Tab by mouth every day. 30 Tab 3   • albuterol (PROVENTIL) 2.5mg/3ml Nebu Soln solution for nebulization USE 3 ML IN NEBULIZER  EVERY 4 HOURS AS NEEDED FOR SHORTNESS OF BREATH 300 mL 5   • HYDROcodone-acetaminophen (NORCO) 7.5-325 MG per tablet Take 1-2 Tabs by mouth 2 times a day as needed for Moderate Pain for up to 30 days. 60 Tab 0   • Fluticasone-Umeclidin-Vilant (TRELEGY ELLIPTA) 100-62.5-25 MCG/INH AEROSOL POWDER, BREATH ACTIVATED Inhale 1 Inhalation by mouth every day. Indications: Chronic Obstructive Lung Disease 1 Each 11   • apixaban (ELIQUIS) 2.5mg Tab Take 1 Tab by mouth 2 Times a Day. 60 Tab 0   • celecoxib (CELEBREX) 100 MG Cap Take 1 Cap by mouth 2 times a day as needed for Moderate Pain. Indications: Back pain 180 Cap 1   • carvedilol (COREG) 3.125 MG Tab Take 1 Tab by mouth 2 times a day, with meals. 180 Tab 3   • Multiple Vitamins-Minerals (CENTRUM SILVER PO) Take 1 Tab by mouth ONE-HALF HOUR AFTER LUNCH.     • gabapentin (NEURONTIN) 100 MG Cap Take 100 mg by mouth every evening.     • Coenzyme Q10 (COQ10 PO) Take 1 Tab by mouth every evening.     • Probiotic Product (PROBIOTIC PO) Take 1 Cap by mouth every day.     • Alpha-Lipoic Acid (LIPOIC ACID PO) Take 1 Tab by mouth every morning.     • Ginkgo Biloba 120 MG Cap Take 120 mg by mouth every morning.     • [DISCONTINUED] metOLazone (ZAROXOLYN) 2.5 MG Tab 2.5 mg two times a week (Patient not taking: Reported on 6/8/2020) 15 Tab 0   • [DISCONTINUED] torsemide (DEMADEX) 5 MG Tab  Take 1 Tab by mouth every day. Indications: Leg swelling 90 Tab 3     No facility-administered encounter medications on file as of 7/14/2020.      Review of Systems   Constitutional: Negative for chills and fever.   HENT: Negative for congestion.    Respiratory: Positive for shortness of breath. Negative for cough.         Related to COPD, uses oxygen.   Cardiovascular: Negative for chest pain, palpitations, orthopnea, leg swelling and PND.   Gastrointestinal: Negative for abdominal pain and nausea.   Musculoskeletal: Positive for back pain and joint pain. Negative for myalgias.   Skin: Negative for rash.   Neurological: Positive for weakness. Negative for dizziness, loss of consciousness and headaches.   Endo/Heme/Allergies: Does not bruise/bleed easily.   Psychiatric/Behavioral: Positive for memory loss. The patient does not have insomnia.         Objective:   /65 (BP Location: Right arm, Patient Position: Sitting, BP Cuff Size: Adult)   Pulse 78   Ht 1.524 m (5')   Wt 58.5 kg (129 lb)   SpO2 95%   BMI 25.19 kg/m²     Physical Exam   Constitutional: She is oriented to person, place, and time. She appears well-developed and well-nourished.   In a wheelchair, using oxygen.  Petite, frail   HENT:   Head: Normocephalic.   Eyes: EOM are normal.   Neck: Normal range of motion. Neck supple. No JVD present.   Cardiovascular: Normal rate. An irregularly irregular rhythm present.   Murmur heard.   Systolic murmur is present with a grade of 1/6.  Pulmonary/Chest: Effort normal and breath sounds normal. No respiratory distress. She has no wheezes. She has no rales.   Abdominal: Soft. Bowel sounds are normal. She exhibits no distension. There is no abdominal tenderness.   Musculoskeletal: Normal range of motion.         General: No edema.   Neurological: She is alert and oriented to person, place, and time.   Skin: Skin is warm and dry. No rash noted.   Psychiatric: She has a normal mood and affect.     Lab Results    Component Value Date/Time    SODIUM 139 06/11/2020 01:03 PM    POTASSIUM 5.0 06/11/2020 01:03 PM    CHLORIDE 102 06/11/2020 01:03 PM    CO2 27 06/11/2020 01:03 PM    GLUCOSE 119 (H) 06/11/2020 01:03 PM    BUN 24 (H) 06/11/2020 01:03 PM    CREATININE 0.93 06/11/2020 01:03 PM     CONCLUSIONS OF ECHOCARDIOGRAM OF 2/16/2019:  Normal left ventricular systolic function.  Left ventricular ejection fraction is visually estimated to be 55%.  Grade II diastolic dysfunction.  Normal right ventricular systolic function.  Mild mitral regurgitation.  Moderate aortic insufficiency.  Moderate tricuspid regurgitation.  Right ventricular systolic pressure is estimated to be 45  mmHg.  Compared to the images of the study done 1/24/2013 - there has been an   increase in the estimate of the right ventricular systolic pressure,   previously normal.     Assessment:     1. PAF (paroxysmal atrial fibrillation) ([facility])  Basic Metabolic Panel    proBrain Natriuretic Peptide, NT   2. Anticoagulated     3. Chronic respiratory failure with hypoxia ([facility])     4. Mucopurulent chronic bronchitis ([facility])     5. Essential hypertension     6. Dyslipidemia         Medical Decision Making:  Today's Assessment / Status / Plan:     1. Recent ER visit for fluid overload, now improve/stable on Torsemide 10mg once daily. Can use additional 5mg PRN LE edema and/or weight gain. To check BMP and proBNP.    2. Paroxysmal atrial fibrillation, rate controlled.    3. Anticoagulation with Eliquis, no bleeding problems.    4. COPD, on inhalers, oxygen and nebulizer treatment.    5. Hypertension, treated with Coreg, stable.    6. Hyperlipidemia, treated with diet only.    Same medications for now. Keep FU in September 2020 with Dr. Smyth; FU sooner if clinical condition changes.    Collaborating MD: To

## 2020-07-14 NOTE — TELEPHONE ENCOUNTER
Noted, if the cardiologist feels that this is not heart related we could always do a Z-Damian and Medrol Dosepak as this may be a COPD exacerbation but I would wait until she sees cardiology.

## 2020-07-14 NOTE — TELEPHONE ENCOUNTER
No fever, O2 87 to 92 on 1 LPM    Pulse on 4LPM 90.    Has appt with Dr Haskins @ 12:30  Today, did you need them to tell him anything.

## 2020-07-16 ENCOUNTER — HOSPITAL ENCOUNTER (OUTPATIENT)
Facility: MEDICAL CENTER | Age: 85
End: 2020-07-16
Attending: NURSE PRACTITIONER
Payer: MEDICARE

## 2020-07-16 PROCEDURE — 83880 ASSAY OF NATRIURETIC PEPTIDE: CPT

## 2020-07-16 PROCEDURE — 80048 BASIC METABOLIC PNL TOTAL CA: CPT

## 2020-07-16 NOTE — TELEPHONE ENCOUNTER
Per Ivana (dtr)  Pt saw Dr Haskins and they decided that she was fine and did not need any Rx for her cough.

## 2020-07-17 ENCOUNTER — TELEPHONE (OUTPATIENT)
Dept: CARDIOLOGY | Facility: MEDICAL CENTER | Age: 85
End: 2020-07-17

## 2020-07-17 DIAGNOSIS — I48.0 PAF (PAROXYSMAL ATRIAL FIBRILLATION) (HCC): ICD-10-CM

## 2020-07-17 LAB
ANION GAP SERPL CALC-SCNC: 9 MMOL/L (ref 7–16)
BUN SERPL-MCNC: 21 MG/DL (ref 8–22)
CALCIUM SERPL-MCNC: 9 MG/DL (ref 8.5–10.5)
CHLORIDE SERPL-SCNC: 98 MMOL/L (ref 96–112)
CO2 SERPL-SCNC: 33 MMOL/L (ref 20–33)
CREAT SERPL-MCNC: 0.96 MG/DL (ref 0.5–1.4)
GLUCOSE SERPL-MCNC: 102 MG/DL (ref 65–99)
POTASSIUM SERPL-SCNC: 5.2 MMOL/L (ref 3.6–5.5)
SODIUM SERPL-SCNC: 140 MMOL/L (ref 135–145)

## 2020-07-17 NOTE — TELEPHONE ENCOUNTER
Janessa Braun from admin at [employer] asking for you to fax lab order to her, [phone], BMP & proBrain.    Pt has been drawn and needs to submit specimen with actual order.  She had a photo of the lab order provided by pt, but needs actual order today.\    If questions call #[phone]

## 2020-07-21 ENCOUNTER — TELEPHONE (OUTPATIENT)
Dept: MEDICAL GROUP | Facility: MEDICAL CENTER | Age: 85
End: 2020-07-21

## 2020-07-21 NOTE — TELEPHONE ENCOUNTER
Abbey Wilson called and LM.    Needs orders for hospital bed/full electric with 1/2 rails.    Also, O2 levels are normal, Venus is on 1 LPM daytime and 2 LPM at night. Wants to know if wou want to increase O2 during the day to 2LPM due to SOB. Wanted you to be aware that Venus has not seen PULM for 2 yrs. Please advise.

## 2020-07-21 NOTE — TELEPHONE ENCOUNTER
Please call them back.  I do not mind writing the order for the hospital bed but I do not have documentation in a face-to-face encounter that she needs this.  For Medicare purposes today need a face-to-face visit documenting the necessity of the hospital bed electric with half rails?    She saw a pulmonary on December 13, 2019.  They would need to document that she has decreased oxygen saturation on her current oxygen level before we can order an increase in oxygen levels.

## 2020-07-23 DIAGNOSIS — I48.0 PAF (PAROXYSMAL ATRIAL FIBRILLATION) (HCC): ICD-10-CM

## 2020-07-23 LAB — NT-PROBNP SERPL IA-MCNC: 1557 PG/ML (ref 0–125)

## 2020-08-14 ENCOUNTER — TELEPHONE (OUTPATIENT)
Dept: MEDICAL GROUP | Facility: MEDICAL CENTER | Age: 85
End: 2020-08-14

## 2020-08-14 DIAGNOSIS — I50.9 CONGESTIVE HEART FAILURE, UNSPECIFIED HF CHRONICITY, UNSPECIFIED HEART FAILURE TYPE (HCC): ICD-10-CM

## 2020-08-14 DIAGNOSIS — I10 ESSENTIAL HYPERTENSION: ICD-10-CM

## 2020-08-14 NOTE — TELEPHONE ENCOUNTER
Please print out the bmp and magnesium labs ordered 8/14/20 and fax that to [facility].

## 2020-08-14 NOTE — TELEPHONE ENCOUNTER
----- Message from Claudia Rojas, Med Ass't sent at 8/13/2020  1:24 PM PDT -----  Regarding: FW: Non-Urgent Medical Question  Contact: [phone]    ----- Message -----  From: Venus Church  Sent: 8/13/2020  12:26 PM PDT  To: Emily Logan Carias  Subject: Non-Urgent Medical Question                      Dear Doctor Evans,  I hope you are well.    Mom seems a bit more foggy in the last 5 days.   I don't know if it has to do with dehydration from taking 10mg Torsemide per day? We try to keep her drinking through the day.  Her weight is 129 lbs.  Has been between 129 lbs. & 131 lbs in the last 4 weeks  Her Oxygen levels vary between 87 and 96 during the day. The setting is 2 at night and 1 daytime  She is starting a productive cough and a bit of a stuffy nose so she is taking the oxygen off at night .  We try to be alert and replace it, but...The home-health nurse was here and finds mom in good standing but said if you wanted to order labs to please email them the directions and they can come to the house to do them.  Thank you Doctor!  Ivana

## 2020-08-17 ENCOUNTER — HOSPITAL ENCOUNTER (OUTPATIENT)
Facility: MEDICAL CENTER | Age: 85
End: 2020-08-17
Attending: INTERNAL MEDICINE
Payer: MEDICARE

## 2020-08-17 LAB
ALBUMIN SERPL BCP-MCNC: 4.3 G/DL (ref 3.2–4.9)
ALBUMIN/GLOB SERPL: 1.6 G/DL
ALP SERPL-CCNC: 69 U/L (ref 30–99)
ALT SERPL-CCNC: 21 U/L (ref 2–50)
ANION GAP SERPL CALC-SCNC: 15 MMOL/L (ref 7–16)
AST SERPL-CCNC: 28 U/L (ref 12–45)
BASOPHILS # BLD AUTO: 0.5 % (ref 0–1.8)
BASOPHILS # BLD: 0.04 K/UL (ref 0–0.12)
BILIRUB SERPL-MCNC: 0.7 MG/DL (ref 0.1–1.5)
BUN SERPL-MCNC: 18 MG/DL (ref 8–22)
CALCIUM SERPL-MCNC: 9.3 MG/DL (ref 8.5–10.5)
CHLORIDE SERPL-SCNC: 100 MMOL/L (ref 96–112)
CO2 SERPL-SCNC: 27 MMOL/L (ref 20–33)
CREAT SERPL-MCNC: 0.83 MG/DL (ref 0.5–1.4)
EOSINOPHIL # BLD AUTO: 0.54 K/UL (ref 0–0.51)
EOSINOPHIL NFR BLD: 6.7 % (ref 0–6.9)
ERYTHROCYTE [DISTWIDTH] IN BLOOD BY AUTOMATED COUNT: 52.1 FL (ref 35.9–50)
GLOBULIN SER CALC-MCNC: 2.7 G/DL (ref 1.9–3.5)
GLUCOSE SERPL-MCNC: 73 MG/DL (ref 65–99)
HCT VFR BLD AUTO: 43 % (ref 37–47)
HGB BLD-MCNC: 14.1 G/DL (ref 12–16)
IMM GRANULOCYTES # BLD AUTO: 0.02 K/UL (ref 0–0.11)
IMM GRANULOCYTES NFR BLD AUTO: 0.2 % (ref 0–0.9)
LYMPHOCYTES # BLD AUTO: 1.67 K/UL (ref 1–4.8)
LYMPHOCYTES NFR BLD: 20.8 % (ref 22–41)
MAGNESIUM SERPL-MCNC: 2.4 MG/DL (ref 1.5–2.5)
MCH RBC QN AUTO: 32.2 PG (ref 27–33)
MCHC RBC AUTO-ENTMCNC: 32.8 G/DL (ref 33.6–35)
MCV RBC AUTO: 98.2 FL (ref 81.4–97.8)
MONOCYTES # BLD AUTO: 0.56 K/UL (ref 0–0.85)
MONOCYTES NFR BLD AUTO: 7 % (ref 0–13.4)
NEUTROPHILS # BLD AUTO: 5.19 K/UL (ref 2–7.15)
NEUTROPHILS NFR BLD: 64.8 % (ref 44–72)
NRBC # BLD AUTO: 0 K/UL
NRBC BLD-RTO: 0 /100 WBC
PHOSPHATE SERPL-MCNC: 2.9 MG/DL (ref 2.5–4.5)
PLATELET # BLD AUTO: 203 K/UL (ref 164–446)
PMV BLD AUTO: 11.5 FL (ref 9–12.9)
POTASSIUM SERPL-SCNC: 4.2 MMOL/L (ref 3.6–5.5)
PROT SERPL-MCNC: 7 G/DL (ref 6–8.2)
RBC # BLD AUTO: 4.38 M/UL (ref 4.2–5.4)
SODIUM SERPL-SCNC: 142 MMOL/L (ref 135–145)
WBC # BLD AUTO: 8 K/UL (ref 4.8–10.8)

## 2020-08-17 PROCEDURE — 83735 ASSAY OF MAGNESIUM: CPT

## 2020-08-17 PROCEDURE — 84100 ASSAY OF PHOSPHORUS: CPT

## 2020-08-17 PROCEDURE — 85025 COMPLETE CBC W/AUTO DIFF WBC: CPT

## 2020-08-17 PROCEDURE — 80053 COMPREHEN METABOLIC PANEL: CPT

## 2020-08-19 ENCOUNTER — OFFICE VISIT (OUTPATIENT)
Dept: CARDIOLOGY | Facility: MEDICAL CENTER | Age: 85
End: 2020-08-19
Payer: MEDICARE

## 2020-08-19 VITALS
OXYGEN SATURATION: 97 % | HEART RATE: 75 BPM | BODY MASS INDEX: 23.74 KG/M2 | HEIGHT: 62 IN | WEIGHT: 129 LBS | DIASTOLIC BLOOD PRESSURE: 82 MMHG | SYSTOLIC BLOOD PRESSURE: 120 MMHG

## 2020-08-19 DIAGNOSIS — R60.0 LEG EDEMA: ICD-10-CM

## 2020-08-19 DIAGNOSIS — I48.0 PAF (PAROXYSMAL ATRIAL FIBRILLATION) (HCC): Chronic | ICD-10-CM

## 2020-08-19 DIAGNOSIS — I10 ESSENTIAL HYPERTENSION: Chronic | ICD-10-CM

## 2020-08-19 DIAGNOSIS — Z79.01 ANTICOAGULATED: ICD-10-CM

## 2020-08-19 PROCEDURE — 99213 OFFICE O/P EST LOW 20 MIN: CPT | Performed by: INTERNAL MEDICINE

## 2020-08-19 ASSESSMENT — ENCOUNTER SYMPTOMS
SHORTNESS OF BREATH: 0
NAUSEA: 0
PALPITATIONS: 0
BACK PAIN: 1
VOMITING: 0
COUGH: 0
BRUISES/BLEEDS EASILY: 0
DEPRESSION: 1
BLOOD IN STOOL: 0
WEAKNESS: 0
MEMORY LOSS: 1
SPEECH CHANGE: 0
LOSS OF CONSCIOUSNESS: 0
DIZZINESS: 0

## 2020-08-19 ASSESSMENT — FIBROSIS 4 INDEX: FIB4 SCORE: 2.68

## 2020-08-19 NOTE — PROGRESS NOTES
Chief Complaint   Patient presents with   • Atrial Fibrillation     PAROXYSMAL   • Hypertension     ESSENTIAL       Subjective:   Venus Church is a 89 y.o. female who presents today primarily for follow-up of edema.  She has history of PAF, chronic edema and anticoagulation.  She also has a history of COPD and is on chronic oxygen.  The patient was placed on Demadex for her chronic edema and the dose was recently increased from 5 mg to 10 mg.  On that dose her edema has resolved.  Patient is feeling better and breathing better.  She splits her time between her son's house in [place] and her daughter's house in the [facility].  [place]    Past Medical History:   Diagnosis Date   • Anesthesia     N/V   • Arthritis         • Atrial fibrillation (HCC) 02/2019    Echocardiogram with normal LV size, moderate concentric LVH, LVEF 55%. Normal RA, LA and RV. Mild MR, moderate AI, moderate TR. RVSP 45mmHg.   • Atypical chest pain    • CATARACT 2007,08   • Chronic anticoagulation    • Constipation    • COPD (chronic obstructive pulmonary disease) (HCC)    • DDD (degenerative disc disease)    • Dental disorder     Partials   • Dyspnea    • Headache(784.0)    • Hypercholesteremia    • Hypertension    • MEDICAL HOME    • On home oxygen therapy     Continuous at night and during the day   • Osteoporosis    • Other accident 2006    C-Spine FX   • Pain     Backpain   • Pneumonia    • TB lung, latent     • Valvular heart disease    • Vertebral fracture    • Vertigo      Past Surgical History:   Procedure Laterality Date   • RECOVERY  7/28/2010    Performed by SURGERY, IR-RECOVERY at SURGERY SAME DAY [facility] ORS   • OTHER ORTHOPEDIC SURGERY  may, 2010      T11 kypho.   • CATARACT PHACO WITH IOL  1/5/2009    Performed by ROSE MARIE MANN at SURGERY SAME DAY [facility] ORS   • CARPAL TUNNEL RELEASE  @30 yrs ago    rt hand     Family History   Problem Relation Age of Onset   • Heart Disease  Father    • Diabetes Brother      Social History     Socioeconomic History   • Marital status:      Spouse name: Not on file   • Number of children: Not on file   • Years of education: Not on file   • Highest education level: Not on file   Occupational History   • Not on file   Social Needs   • Financial resource strain: Not on file   • Food insecurity     Worry: Not on file     Inability: Not on file   • Transportation needs     Medical: Not on file     Non-medical: Not on file   Tobacco Use   • Smoking status: Former Smoker     Packs/day: 1.50     Years: 25.00     Pack years: 37.50     Types: Cigarettes     Quit date: 1984     Years since quittin.3   • Smokeless tobacco: Never Used   • Tobacco comment: Continued abstinence advised.   Substance and Sexual Activity   • Alcohol use: No     Alcohol/week: 0.0 oz   • Drug use: No   • Sexual activity: Not Currently   Lifestyle   • Physical activity     Days per week: Not on file     Minutes per session: Not on file   • Stress: Not on file   Relationships   • Social connections     Talks on phone: Not on file     Gets together: Not on file     Attends Pentecostalism service: Not on file     Active member of club or organization: Not on file     Attends meetings of clubs or organizations: Not on file     Relationship status: Not on file   • Intimate partner violence     Fear of current or ex partner: Not on file     Emotionally abused: Not on file     Physically abused: Not on file     Forced sexual activity: Not on file   Other Topics Concern   • Not on file   Social History Narrative   • Not on file     Allergies   Allergen Reactions   • Codeine Vomiting   • Irbesartan Cough     Strong cough, pt.s daughter reports the ER  Advised she stop taking    • Lisinopril Shortness of Breath and Cough     Outpatient Encounter Medications as of 2020   Medication Sig Dispense Refill   • traZODone (DESYREL) 50 MG Tab Take 0.5 Tabs by mouth at bedtime as needed for  Sleep. 90 Tab 3   • Magnesium 100 MG Tab Take 1 Tab by mouth every bedtime.     • Green Tea, Olya sinensis, (GREEN TEA PO) Take 1 Tab by mouth ONE-HALF HOUR AFTER LUNCH.     • torsemide (DEMADEX) 10 MG tablet Take 1 Tab by mouth every day. 30 Tab 3   • albuterol (PROVENTIL) 2.5mg/3ml Nebu Soln solution for nebulization USE 3 ML IN NEBULIZER  EVERY 4 HOURS AS NEEDED FOR SHORTNESS OF BREATH 300 mL 5   • Fluticasone-Umeclidin-Vilant (TRELEGY ELLIPTA) 100-62.5-25 MCG/INH AEROSOL POWDER, BREATH ACTIVATED Inhale 1 Inhalation by mouth every day. Indications: Chronic Obstructive Lung Disease 1 Each 11   • apixaban (ELIQUIS) 2.5mg Tab Take 1 Tab by mouth 2 Times a Day. 60 Tab 0   • celecoxib (CELEBREX) 100 MG Cap Take 1 Cap by mouth 2 times a day as needed for Moderate Pain. Indications: Back pain 180 Cap 1   • Multiple Vitamins-Minerals (CENTRUM SILVER PO) Take 1 Tab by mouth ONE-HALF HOUR AFTER LUNCH.     • gabapentin (NEURONTIN) 100 MG Cap Take 100 mg by mouth every evening.     • Coenzyme Q10 (COQ10 PO) Take 1 Tab by mouth every evening.     • Probiotic Product (PROBIOTIC PO) Take 1 Cap by mouth every day.     • Alpha-Lipoic Acid (LIPOIC ACID PO) Take 1 Tab by mouth every morning.     • Ginkgo Biloba 120 MG Cap Take 120 mg by mouth every morning.     • carvedilol (COREG) 3.125 MG Tab Take 1 Tab by mouth 2 times a day, with meals. 180 Tab 3     No facility-administered encounter medications on file as of 8/19/2020.      Review of Systems   Constitutional: Negative for malaise/fatigue.   HENT: Negative for nosebleeds.    Respiratory: Negative for cough and shortness of breath.    Cardiovascular: Negative for chest pain, palpitations and leg swelling.   Gastrointestinal: Negative for blood in stool, melena, nausea and vomiting.   Genitourinary: Negative for hematuria.   Musculoskeletal: Positive for back pain.   Neurological: Negative for dizziness, speech change, loss of consciousness and weakness.  "  Endo/Heme/Allergies: Does not bruise/bleed easily.   Psychiatric/Behavioral: Positive for depression and memory loss.        Objective:   /82 (BP Location: Left arm, Patient Position: Sitting, BP Cuff Size: Adult)   Pulse 75   Ht 1.575 m (5' 2\")   Wt 58.5 kg (129 lb)   SpO2 97%   BMI 23.59 kg/m²     Physical Exam   Constitutional: She is oriented to person, place, and time. She appears well-developed and well-nourished. No distress.   Frail.  Uses supplemental oxygen   HENT:   Head: Atraumatic.   Eyes: Pupils are equal, round, and reactive to light. Conjunctivae and EOM are normal.   Neck: Neck supple. No JVD present.   Cardiovascular: Normal rate. An irregular rhythm present.   Murmur heard.   Systolic murmur is present with a grade of 1/6.  Pulmonary/Chest: Effort normal and breath sounds normal. No respiratory distress. She has no wheezes. She has no rales.   Musculoskeletal:         General: No edema.      Comments: Trace edema bilaterally   Neurological: She is alert and oriented to person, place, and time.   Skin: Skin is warm and dry. She is not diaphoretic.   Psychiatric: She has a normal mood and affect. Her mood appears not anxious. She is not agitated and not slowed.       Assessment:     1. Anticoagulated  Basic Metabolic Panel    proBrain Natriuretic Peptide, NT   2. Essential hypertension  proBrain Natriuretic Peptide, NT   3. PAF (paroxysmal atrial fibrillation) ([facility])     4. Leg edema  Basic Metabolic Panel    proBrain Natriuretic Peptide, NT       Medical Decision Making:  Today's Assessment / Status / Plan:   Dependent edema: Likely multifactorial.  I suspect there is a component of diastolic dysfunction and heart failure preserved ejection fraction.  She is clinically much better on this current dose of Demadex.  Her breathing is better and her edema has resolved.  She also looks brighter today.  We will check BMP in about 4 weeks and follow-up in the office in about 6 weeks with Kayla.  " In the interim note family will call if any troubles.

## 2020-09-23 DIAGNOSIS — R60.0 LEG EDEMA: ICD-10-CM

## 2020-09-23 RX ORDER — TORSEMIDE 10 MG/1
10 TABLET ORAL DAILY
Qty: 100 TAB | Refills: 0 | Status: CANCELLED | OUTPATIENT
Start: 2020-09-23

## 2020-09-24 ENCOUNTER — HOSPITAL ENCOUNTER (OUTPATIENT)
Facility: MEDICAL CENTER | Age: 85
End: 2020-09-24
Attending: INTERNAL MEDICINE
Payer: MEDICARE

## 2020-09-24 ENCOUNTER — OFFICE VISIT (OUTPATIENT)
Dept: MEDICAL GROUP | Facility: MEDICAL CENTER | Age: 85
End: 2020-09-24
Payer: MEDICARE

## 2020-09-24 VITALS
HEIGHT: 60 IN | BODY MASS INDEX: 24.74 KG/M2 | HEART RATE: 59 BPM | SYSTOLIC BLOOD PRESSURE: 106 MMHG | WEIGHT: 126 LBS | DIASTOLIC BLOOD PRESSURE: 58 MMHG | TEMPERATURE: 98.5 F | OXYGEN SATURATION: 96 %

## 2020-09-24 DIAGNOSIS — F11.20 UNCOMPLICATED OPIOID DEPENDENCE (HCC): ICD-10-CM

## 2020-09-24 DIAGNOSIS — J41.1 MUCOPURULENT CHRONIC BRONCHITIS (HCC): Chronic | ICD-10-CM

## 2020-09-24 DIAGNOSIS — J96.11 CHRONIC RESPIRATORY FAILURE WITH HYPOXIA (HCC): ICD-10-CM

## 2020-09-24 DIAGNOSIS — I48.0 PAF (PAROXYSMAL ATRIAL FIBRILLATION) (HCC): Chronic | ICD-10-CM

## 2020-09-24 LAB
ANION GAP SERPL CALC-SCNC: 11 MMOL/L (ref 7–16)
BUN SERPL-MCNC: 21 MG/DL (ref 8–22)
CALCIUM SERPL-MCNC: 8.8 MG/DL (ref 8.4–10.2)
CHLORIDE SERPL-SCNC: 98 MMOL/L (ref 96–112)
CO2 SERPL-SCNC: 29 MMOL/L (ref 20–33)
CREAT SERPL-MCNC: 0.95 MG/DL (ref 0.5–1.4)
GLUCOSE SERPL-MCNC: 108 MG/DL (ref 65–99)
NT-PROBNP SERPL IA-MCNC: 4776 PG/ML (ref 0–125)
POTASSIUM SERPL-SCNC: 4.6 MMOL/L (ref 3.6–5.5)
SODIUM SERPL-SCNC: 138 MMOL/L (ref 135–145)

## 2020-09-24 PROCEDURE — 83880 ASSAY OF NATRIURETIC PEPTIDE: CPT

## 2020-09-24 PROCEDURE — 99213 OFFICE O/P EST LOW 20 MIN: CPT | Performed by: INTERNAL MEDICINE

## 2020-09-24 PROCEDURE — 80048 BASIC METABOLIC PNL TOTAL CA: CPT

## 2020-09-24 RX ORDER — TORSEMIDE 10 MG/1
10 TABLET ORAL DAILY
Qty: 90 TAB | Refills: 0 | Status: SHIPPED | OUTPATIENT
Start: 2020-09-24 | End: 2020-11-18

## 2020-09-24 RX ORDER — DOXYCYCLINE HYCLATE 100 MG
100 TABLET ORAL 2 TIMES DAILY
Qty: 20 TAB | Refills: 0 | Status: SHIPPED | OUTPATIENT
Start: 2020-09-24 | End: 2020-10-12

## 2020-09-24 RX ORDER — PREDNISONE 20 MG/1
TABLET ORAL
Qty: 18 TAB | Refills: 0 | Status: SHIPPED | OUTPATIENT
Start: 2020-09-24 | End: 2020-10-12

## 2020-09-24 ASSESSMENT — FIBROSIS 4 INDEX: FIB4 SCORE: 2.68

## 2020-09-24 NOTE — PROGRESS NOTES
Subjective:      Venus Church is a 89 y.o. female who presents with cough         HPI     Here with son patient has had more of a cough at night and sputum production, no fever, some sob at night, no swelling of legs, runny nose, no sinus headache, no ear pain or sore throat, oxygen continuous 1 liter during the day and 2 liter at night.  Patient with COPD followed by pulmonary on Trelegy daily.  Has been more tired, son states that patient has been less active.  Son provides patient supervision at all times. Patient lives with son and has sitter 7 hours per day.  He make sure that she needs her medications daily.  Paroxysmal atrial fibrillation, followed by cardiology, remains carvedilol and Eliquis.  No NSAIDs, although she does take Celebrex for chronic back pain.  No chest pain, no palpitations, lightheadedness.  Ambulation no falls.  Remains on Demadex for chronic lower extremity edema, weight has been stable, no excess sodium intake.  Patient stays at home, son does all shopping, no COVID-19 exposures  Memory loss, has seen neurology, likely Alzheimer's, son and daughter have power of     Current Outpatient Medications   Medication Sig Dispense Refill   • torsemide (DEMADEX) 10 MG tablet Take 1 Tab by mouth every day. 90 Tab 0   • gabapentin (NEURONTIN) 100 MG Cap TAKE 1 CAPSULE BY MOUTH ONCE DAILY IN THE EVENING 30 Cap 3   • celecoxib (CELEBREX) 100 MG Cap TAKE 1 CAPSULE BY MOUTH TWICE DAILY AS NEEDED FOR MODERATE PAIN FOR BACK PAIN 180 Cap 3   • traZODone (DESYREL) 50 MG Tab Take 0.5 Tabs by mouth at bedtime as needed for Sleep. 90 Tab 3   • Magnesium 100 MG Tab Take 1 Tab by mouth every bedtime.     • Green Tea, Olya sinensis, (GREEN TEA PO) Take 1 Tab by mouth ONE-HALF HOUR AFTER LUNCH.     • albuterol (PROVENTIL) 2.5mg/3ml Nebu Soln solution for nebulization USE 3 ML IN NEBULIZER  EVERY 4 HOURS AS NEEDED FOR SHORTNESS OF BREATH 300 mL 5   • Fluticasone-Umeclidin-Vilant (TRELEGY ELLIPTA)  100-62.5-25 MCG/INH AEROSOL POWDER, BREATH ACTIVATED Inhale 1 Inhalation by mouth every day. Indications: Chronic Obstructive Lung Disease 1 Each 11   • apixaban (ELIQUIS) 2.5mg Tab Take 1 Tab by mouth 2 Times a Day. 60 Tab 0   • carvedilol (COREG) 3.125 MG Tab Take 1 Tab by mouth 2 times a day, with meals. 180 Tab 3   • Multiple Vitamins-Minerals (CENTRUM SILVER PO) Take 1 Tab by mouth ONE-HALF HOUR AFTER LUNCH.     • Coenzyme Q10 (COQ10 PO) Take 1 Tab by mouth every evening.     • Probiotic Product (PROBIOTIC PO) Take 1 Cap by mouth every day.     • Alpha-Lipoic Acid (LIPOIC ACID PO) Take 1 Tab by mouth every morning.     • Ginkgo Biloba 120 MG Cap Take 120 mg by mouth every morning.       No current facility-administered medications for this visit.        Atherosclerosis aorta  1/6/20 chest x-ray aortic atherosclerosis     AAA  10/19/15 CT chest high-resolution thorax atherosclerosis aorta  2/13/19 CT-CTA chest pulmonary artery with reconstruction 4.1 cm ascending aortic aneurysm     Cervical pain  4/08 MRI cervical spine C3-C4 moderate left-sided foraminal stenosis, C4-C5 moderate foraminal stenosis right, C5-C6 moderate right-sided foraminal stenosis, C6-C7 moderate bilateral foraminal stenosis  5/08 CT cervical spine C3-C4 uncovertebral joint arthropathy and significant left-sided neural foraminal narrowing, C4-C5 uncovertebral joint arthropathy right significant neuroforaminal narrowing, C5-C6 uncovertebral joint arthropathy right moderate neural foraminal narrowing, C6-C7 uncovertebral joint arthropathy moderate to severe right neural foraminal narrowing  7/08 C3-C5 cervical facet injections   1/09 medial branch nerve block diagnostic   2/09 C4-C5 cervical epidural under fluoroscopy   4/13 Daughter called [facility] suggest taper pain med, she has sched to taper the oxycontin  4/15/13 resumed oxycontin 10 bid  6/11/13 off oxcontin  7/24/13 increase oxycontin from qday to 10 mg bid    9/14/17 [facility] PT discharge summary patient did not improve walking tolerance or standing tolerance, medicare g-code status 60-79% impairment, thoracolumbar flexion decreased from 50-25%, extension 10%, sidebending 10-25%  6/28/17  pain note left L3-L5 medial branch block  9/14/17 custom PT discharge summary patient did not improve walking tolerance or standing tolerance, medicare g-code status 60-79% impairment, thoracolumbar flexion decreased from 50-25%, extension 10%, sidebending 10-25%  1/17/18  pain note left cervical paraspinal muscle trigger point injections  1/31/18  pain note  2/2/18  pain note, reviewed cervical spine x-ray multilevel DJD, recommend consider intrathecal pain pump trial  4/11/19 dr.zollinger pain note offered trigger point injections and lumbar radiofrequency ablation, she declines for now, consider tylenol in place of celebrex, follow-up as needed  1/14/20 resume norco 7.5 mg bid prn pain, continue neurontin 100 mg qpm and celebrex (hold while on prednisone)   Has seen neurosurgery, pain management locally and at [place], has tried NSAIDs, celebrex, cymbalta, pregabalin, gabapentin, lidoderm, voltaren gel      Chronic respiratory failure  12/13/19 pulmonary note, on trelegy and nocturnal oxygen, room air saturation at rest 87%, 2 L oxygen at rest saturation 94%, 2 L oxygen saturation with exertion 92% documenting need for oxygen 2 L, will order overnight oximetry, follow-up as scheduled  1/14/20 room air saturation at rest 91%, saturation with exercise room air 86 to 87%     COPD  7/11 CT spiral thorax extensive emphysematous change of lung, small left pleural effusion left lung base with atelectasis unchanged from prior CT  5/10 CT spriral thorax emphysematous change and dependent atelectasis left lung base  7/11 PFT FEV/FVC 59%, FEV1 1.1 L (75% predicted), significant post bronchodilator response noted (FEV1 improved 16%). Mixed restrictive and  obstructive pattern,COPD with GOLD stage II with sig bronchodilator response  7/11 trial of symbicort 80/4.5 mcg bid discussed with daughter plus albuterol neb prn, [employer] home education ordered  5/12 off symbicort   5/22/14 cxr negative  6/23/14 on symbicort  7/20/15 change to advair 250/50 mcg from symbicort (not covered on insurance)  9/3/15 cxr negative  10/4/15 add spiriva and change symbicort to advair 250/50 mcg bid  10/19/15 CT high resolution thorax, no evidence of interstitial lung disease, minimal scattered opacification could indicate minimal areas of fibrosis periphery of each lung, similar to prior exam, emphysema most prominent upper lobes bilateral  10/19/15 PFT FEV1 1.1 (61% predicted),FVC 1.9,FEV/FVC 58%, no bronchodilator response,DLCO 34%; on advair 250/50 mcg bid and spiriva  11/2/15 change from advair discus to advair 250 inhaler and continue spiriva   12/11/15 not taking advair, will start advair and cont spiriva  12/11/15 cxr negative  3/14/16 [facility] note complaining doxycycline and taper steroids, continue nocturnal oxygen, continue rotating antibiotics for bronchiectasis, follow-up laboratory testing ordered  3/14/16  (normal), quantiferon gold positive, allergen panel negative, IgE 357 (less than 214), IgA 116 (),, IgM 27 (),IgG 947 ([phone])  4/13/16 cxr mild cardiomegaly  5/4/16 [facility] note continue advair 115/21 bid with spacer given doxycycline and prednisone, continue oxygen 3 L at night, AFB samples ×3, follow-up 3 months  7/25/16 pulmonary note on prednisone taper one week out of the month and doxycycline one per day for one week per month, on advair bid and spiriva and oxygen 3 liters at night  11/8/16 pulmonary note continue advair 115/21 ucg bid,spiriva, xopenex as needed, oxygen 3 L at night  10/4/17 pulmonary note continue advair 115/21 two inhalations bid, spiriva daily, xoponex as needed, oxygen 3 L at night, history of positive quantiferon gold, will order  sputum sample follow-up 4 months  3/17/18 pulmonary note, continue nocturnal oxygen 2 L, start trelegy one puff daily  9/10/18 pulmonary note intolerant to CPAP, resume nocturnal oxygen, latent TB, repeat chest x-ray, stable on bronchodilators, as needed prednisone and antibiotic prescription to pharmacy follow-up 3-6 months  2/13/19 CT-CTA chest pulmonary artery with reconstruction no pulmonary embolism, emphysema and chronic bronchitis, cardiomegaly with right heart dysfunction, 4.1 cm ascending aortic aneurysm  3/19/19 on trelegy ellipta and nocturnal oxygen 2 L  10/2/19 pulmonary note cough and bronchitis continue prednisone and cefdinir until complete then resume celebrex, chest x-ray ordered, continue mucinex and nebulizer  12/13/19 pulmonary note, on trelegy and nocturnal oxygen, room air saturation at rest 87%, 2 L oxygen at rest saturation 94%, 2 L oxygen saturation with exertion 92% documenting need for oxygen 2 L, will order overnight oximetry, follow-up as scheduled  1/6/20 chest x-ray with no acute abnormality identified, hypoinflation consistent with chronic obstructive pulmonary disease  1/14/20 room air saturation at rest 91%, saturation with exercise room air 86 to 87%     Dyslipidemia   5/10 chol 163,trig 68,hdl 69,ldl 80  6/10 chol 163,trig 60,hdl 69,ldl 80  7/11 chol 118,trig 45,hdl 65,ldl 44 on crestor 10 mg  10/11 chol 165,trig 47,hdl 78,ldl 74 on crestor 10 mg done at [place]  2/12 off crestor  6/12 chol 211,trig 104,hdl 64,ldl 126 off crestor  3/4/14 chol 222,trig 124,hdl 52,ldl 145 off meds     History of compression fracture  5/10 MRI thoracic spine subacute T12 compression fracture  5/27/10 kyphoplasty T11  6/10 MRI lumbar spine T12 compression fracture mild to moderate central canal narrowing, interval T11 kyphoplasty, L2-L3 moderate foraminal stenosis, L3-L4 severe left foraminal stenosis, moderate right foraminal stenosis, L4-L5 moderate central canal stenosis  7/10 dexa LS +1.0,hip  -1.6  7/28/10 T12 vertebral plasty  8/11 [place] pain evaluation  pain management, recommend continue oxycontin 10 bid  10/11 [facility]  T11-L1 injection  2/13  pain note, T10-T11 epidural injection  6/13 dexa LS +1.8, forearm -2.7  9/23/13 reclast injection  9/4/14 reclast IV infusion  9/14/17 custom PT discharge summary patient did not improve walking tolerance or standing tolerance, medicare g-code status 60-79% impairment, thoracolumbar flexion decreased from 50-25%, extension 10%, sidebending 10-25%  7/1/18 x-ray lumbar spine complete or near collapse L1 vertebral body anteriorly which is likely chronic, extensive degenerative changes, prior percutaneous vertebral augmentation T12-L1  2/27/19 off oxycontin after hospital discharge, would like to avoid narcotics given side effects, will try celebrex 100 mg daily as had that previously  1/6/20 x-ray thoracic spine old compression fracture T11 and T12 post augmentation, kyphosis and levoconvex scoliosis no acute fracture  1/6/20 x-ray lumbar spine old T11 and T12 compression fracture, levoconvex scoliosis thoracolumbar junction, old T11 and T12 compression fracture     History H. pylori  9/11 serum IgG positive s/p prevpack  9/19/12 cbc,cmp,lipase neg; u/s abd gallbladder sludge without biliary dilatation or stone  12/12/12 [facility] eval rec EGD/colon pt did not follow up   2/9/20 start prilosec 20 mg on celebrex and eliquis  5/21/20 on prilosec 10 mg, vit b12 1132, vit d 33, on celebrex and eliquis     history opiate  2/26/15 narcotic contract  6/12/15 opiate risk score 0  10/4/15   7/25/16   9/20/16  pain note recommended left L3-L5 MBBB  10/31/16   2/6/17   5/9/17 narcotic contract  5/9/17 opiate risk score 0  5/9/17   5/9/17 depression screening score 0  6/28/17 trial cymbalta 30 mg  8/7/17 did not start cymbalta, will start  8/7/17   8/7/17 UDT  8/21/17 change cymbalta to qod  9/18/17 off  cymbalta  9/14/17 custom PT discharge summary patient did not improve walking tolerance or standing tolerance, medicare g-code status 60-79% impairment, thoracolumbar flexion decreased from 50-25%, extension 10%, sidebending 10-25%  11/3/17   2/6/18   4/10/18   4/10/18 controlled substances contract, decrease oxycontin to 10 mg am and 5 mg pm  5/17/18 decrease to oxycontin 10 mg am and 5 mg pm  5/17/18   7/10/18 increase oxycontin back to 10 mg bid after fall  7/10/18    9/18/18   2/27/19 off oxycontin after hospital discharge, would like to avoid narcotics given side effects, will try celebrex 100 mg daily as had that previously  3/19/19 refer to dr.zollinger pain  3/19/19 increase to celebrex 100 mg bid, add neurontin 100 mg qpm, continue lidoderm patch  4/11/19 dr.zollinger pain note offered trigger point injections and lumbar radiofrequency ablation, she declines for now, consider tylenol in place of celebrex, follow-up as needed  1/14/20   1/14/20 controlled substance contract  1/14/20 resume norco 7.5 mg bid prn pain, continue neurontin 100 mg qpm and celebrex (hold while on prednisone)   Has seen neurosurgery, pain management locally and at [place], has tried NSAIDs, celebrex, cymbalta, pregabalin, gabapentin, lidoderm, voltaren gel     History shingles     Hypertension  1/05 carotid ultrasound negative  1/07 echo LV EF 60% ,mild LVH  1/09 renal ultrasound 6 mm right cortical cyst  9/09 MRI brain with and without moderate nonspecific microvascular ischemic change  9/09 MRA next mild plaque right internal carotid  5/24/10 echo normal LV size and function, EF 60%, mild to moderate AR, RVSP 35-40 consistent with mild pulmonary hypertension  5/10 persantine thallium no ischemia, EF 72%  9/10 change avalide 150/12.5 to avapro 150 qday, had sodium 125 in ER  3/11 on avapro 300 mg  7/8/11 echo normal LV function, EF 55%  7/8/11 Persantine thallium possible small area mild ischemia in the  lateral wall, no evidence of infarction  7/11   3/12 increase metoprolol to 50 bid, cont avapro 300 mg, start norvasc 2.5 mg  5/1/12 increase norvasc to 5 mg, change avapro to avalide 300/12.5 mg, cont metoprolol 50 bid  10/2/12 on avalide 300/12.5 mg and metoprolol 50 bid and norvasc 5 mg  10/12 persantine thallium diaphragmatic uptake possible attenuation, fixed inferolateral defect which may be secondary to attenuation, EF 76%, no wall motion abnormalities, no evidence of significant ischemia.  1/13 echo normal LV function, grade 2 diastolic dysfunction, EF 70% moderate aortic insufficiency  1/13   1/23/13 cxr cardiomegaly without pulmonary edema  6/11/13 on avalide 300/12.5 mg,norvasc 5 mg, metoprolol 50 bid  4/7/14 cardiology note atrial fibrillation, start pradaxa 75 mg bid, stop asa, stop norvasc, decreased avalide 300/12.5 mg to 1/2 per day, increase metoprolol to 50 mg 1 1/2 bid  10/20/14 metoprolol 50 mg decreased to 25 mg bid by [place] doctor due to low heart rate, continues on avalide 300/12.5 mg   12/29/14  cardiology note continue pradaxa, metoprolol 25 mg 1/2 bid, avalide 300/12.5 mg  7/25/16 avalide 150/12.5 mg decrease to 1/2 tab per day and continue metoprolol 25 mg bid  8/24/16 cardiology note sinus rhythm on exam,[id number] score=2 continue anticoagulation, low-dose metoprolol,avalide 150/12.5 mg 1/2 per day, follow-up one year  12/14/17 cardiology note remains in sinus rhythm, follow-up one year on avalide 150/12.5 mg,metoprolol  6/21/18  cardiology note paroxysmal atrial fibrillation sinus rhythm on exam stable continue anticoagulation, follow-up 1 year  2/27/19 cardiology note paroxysmal atrial fibrillation, chronic anticoagulation  8/29/19 now on lisinopril 10 mg qam and metoprolol 25 mg qpm  10/7/19 CT-CTA complete thoracoabdominal atherosclerosis greater than 50% stenosis bilateral renal arteries   10/10/19 cardiology note reduce metoprolol to 12.5 mg bid as  blood pressure normal in the office but may be lower at home contributing to tiredness and fatigue, torsemide as needed for shortness of breath  10/21/19 on metoprolol 25 mg on 12.5 mg bid  12/2/19 cardiology note unable to tolerate irbesartan and irbesartan secondary to cough and desaturation, discontinue irbesartan and metoprolol, start low-dose carvedilol 6.25 mg bid and continue eliquis 2.5 mg bid and continue oxygen at night, room air saturation 90%  1/13/20 cardiology note blood pressure stable on coreg 6.25 mg bid, information given regarding DNR by cardiology  5/18/20 on torsemide 5 mg take 2 tablets daily x3 days as needed for leg swelling per cardiology, maintains on carvedilol 6.25 mg bid  5/21/20 bun 21,creat 1.07,GFR 48,Na 138,K+ 4.5 on torsemide 5 mg qday as needed x 3 days for leg swelling per cardiology   5/22/20 on torsemide 5 mg once a day, normal BUN, creatinine, sodium, potassium, has been on torsemide 5 mg once a day for 5 days, take an extra 2 days then discontinue, check daily weights, monitor leg edema and notify me with daily weights next week  5/26/20 137.6 lb on torsemide for leg swelling, try to go to 3 days with turosemide, if this continues we will need to recheck BMP  5/27/20 weight 140.8 lb so resume torosemide 5 mg daily  5/29/20 emergency room note, presented with shortness of breath, saturation 96%, weight 140 pounds, discussed with cardiology  5/29/20 proBNP NT 3811 increase torsemide to 10 mg daily, and add metolazone 2.5 mg   5/29/20 bun 23,creat 0.8,Na 138,K+ 3.8  5/29/20 cxr cardiomegaly, pulmonary interstitial markings consistent with mild edema  6/3/20 weight 128 lb tapering torosemide to 10 mg daily repeat labs one week, repeat labs ordered one week, did not start metolazone 2.5 mg yet  6/8/20 cardiology note stable on torsemide 10 mg, should edema increase can take an extra 10 mg/day, hold off on metolazone for now  6/11/20 bun 24,creat 0.93,GFR>60,K+ 5.0,magnesium 2.3 on  torsemide 10 mg, can take an extra 10 mg/day  6/11/20 BNP 3066     leg edema  5/18/20 on torsemide 5 mg take 2 tablets daily x3 days as needed for leg swelling per cardiology, maintains on carvedilol 6.25 mg bid  5/21/20 bun 21,creat 1.07,GFR 48,Na 138,K+ 4.5 on torsemide 5 mg qday as needed x 3 days for leg swelling per cardiology   5/22/20 on torsemide 5 mg once a day, normal BUN, creatinine, sodium, potassium, has been on torsemide 5 mg once a day for 5 days, take an extra 2 days then discontinue, check daily weights, monitor leg edema and notify me with daily weights next week  5/26/20 137.6 lb on torsemide for leg swelling, try to go to 3 days with turosemide, if this continues we will need to recheck BMP  5/27/20 weight 140.8 lb so resume torosemide 5 mg daily  5/29/20 emergency room note, presented with shortness of breath, saturation 96%, weight 140 pounds, discussed with cardiology  5/29/20 proBNP NT 3811 increase torsemide to 10 mg daily, and add metolazone 2.5 mg   5/29/20 bun 23,creat 0.8,Na 138,K+ 3.8  5/29/20 cxr cardiomegaly, pulmonary interstitial markings consistent with mild edema  6/3/20 weight 128 lb tapering torosemide to 10 mg daily repeat labs one week, repeat labs ordered one week, did not start metolazone 2.5 mg yet  6/8/20 cardiology note stable on torsemide 10 mg, should edema increase can take an extra 10 mg/day, hold off on metolazone for now  6/11/20 bun 24,creat 0.93,GFR>60,K+ 5.0,magnesium 2.3 on torsemide 10 mg, can take an extra 10 mg/day  6/11/20 BNP 3066  7/12/29 weight 128 lb  8/17/20 bun 18,creat 0.83,GFR>60,Na 142,K+ 4.2, magnesium 2.4 on torsemide 10 mg qday  8/19/20 cardiology note dependent edema, multifactorial component of diastolic dysfunction, stable on demadex, breathing and edema have improved, recheck bmp 4 weeks, follow-up 6 weeks     Low back pain  6/06 epidural L4-L5   12/08 x-ray LS moderate to severe DJD  6/10 MRI lumbar spine T12 compression fracture  with mild-to-moderate central spinal canal narrowing, interval T11 kyphoplasty, L2-L3 moderate frontal stenosis, L3-L4 severe left foraminal stenosis, moderate right foraminal stenosis, L4-L5 moderate central spinal canal  7/10 interventional rad consult epidural T11 to L1 region  7/28/10 T12 vertebroplasty  7/30/10 norco taper, and lyrica 50 mg bid  8/5/10 reaction to lyrica, stop lyrica  8/19/10 lumbar steroid epidural   9/10  note rec decompression if pain persist  10/10 bilateral lumbar facet joint injections L3-S1   12/10 xray LS old T11 and T12 fracture status post kyphoplasty  1/11 [facility]  3/11 trial of spinal stimulator  8/11 [facility] note evaluation  pain management cont oxycontin 10 bid  11/22/11 [facility] note dr. hodge; trial of baclofen, T11 plus T12 left-sided nerve root block pending  7/12  pain note;Left-sided T11-T12 transforaminal epidural   4/13 daughter called [facility] suggest taper pain med, she has sched to taper the oxycontin  4/15/13 resumed oxycontin 10 bid  6/11/13 off oxycontin  7/24/13 increase oxycontin from qday to 10 mg bid   2/28/14 on celebrex 200 mg as needed per  and oxycontin 10 mg bid  4/25/14 trial of cymbalta 30 mg, continue oxycontin 10 mg twice a day, recommend not use tramadol (side effects since on oxycontin already) or celebrex (on pradaxa)  5/29/14  pain management [facility]; recommend T11-T12 left-sided transforaminal nerve block  7/2/14 [facility] left T11-T12 epidural injection  7/21/14  [place] pain note; increase oxycontin to 10 mg tid x 3 weeks and then taper down  10/27/14 [facility]; samples zorvolex  2/26/15 xray lumbar spine mild compression fracture of L4 of indeterminate age but new compared to 3/25/13 vertebral augmentation and T11 and T12 with severe compression fracture of T12 and focal kyphosis at this level  4/15/16  outpatient physical therapy phone call indicating patient unable to make appointments, recommending home health physical therapy  9/20/16  pain note recommended left L3-L5 MBBB  3/22/17  pain procedure note left L3-L5 MBBB #1  6/28/17 trial cymbalta 30 mg  6/29/17 [facility] physical therapy note  8/3/17 [facility] PT note  9/18/17 off cymbalta  9/14/17 custom PT discharge summary patient did not improve walking tolerance or standing tolerance, medicare g-code status 60-79% impairment, thoracolumbar flexion decreased from 50-25%, extension 10%, sidebending 10-25%  11/14/17 custom PT discharge summary no improvement  11/29/17 MRI lumbar spine no acute fracture, lumbar levoscoliosis T12-L1, kyphotic deformity he talked L1, previous compression fractures T11, T12, L1, previous vertebral augmentation procedures T11 and L1, moderate central canal stenosis T12-L3, severe canal stenosis L3-L4, no significant change  1/31/18  pain note  2/2/18  pain note, reviewed cervical spine x-ray multilevel DJD, recommend consider intrathecal pain pump trial  5/17/18 decrease to oxycontin 10 mg am and 5 mg pm  7/1/18 x-ray lumbar spine complete or near collapse L1 vertebral body anteriorly which is likely chronic, extensive degenerative changes, prior percutaneous vertebral augmentation T12-L1  7/10/18 increase oxycontin back to 10 mg bid after fall  2/27/19 off oxycontin after hospital discharge, would like to avoid narcotics given side effects, will try celebrex 100 mg daily as had that previously  3/19/19 refer to dr.zollinger pain  3/19/19 increase to celebrex 100 mg bid, add neurontin 100 mg qpm, continue lidoderm patch  4/11/19 dr.[facility]linger pain note offered trigger point injections and lumbar radiofrequency ablation, she declines for now, consider tylenol in place of celebrex, follow-up as needed  5/15/19 dr.zollinger pain management note left lumbar paraspinal and quadratus lumborum areas  1/6/20  x-ray thoracic spine old compression fracture T11 and T12 post augmentation, kyphosis and levoconvex scoliosis no acute fracture  1/6/20 x-ray lumbar spine old T11 and T12 compression fracture, levoconvex scoliosis thoracolumbar junction, old T11 and T12 compression fracture  1/14/20 resume norco 7.5 mg bid prn pain, continue neurontin 100 mg qpm and celebrex (hold while on prednisone)   Tried NSAIDs, celebrex, pregabalin, gabapentin, cymbalta, physical therapy,TENS,lidoderm, voltaren gel, epidural, facet injections, kyphoplasty, pain management locally and at [place]     Macular degeneration  7/1/13 dr.mills sparrow note, start PreserVision AREDS II vitamin followup 6 months     Mild cognitive impairment  1/11/16 MMSE 24/30  7/14/17 MMSE 23/30 offered aricept  4/10/18 MMSE 24/30 5/2/18 start aricept 5 mg daily  9/18/18 off aricept not tolerated  10/31/18 trial namenda XR started pack  11/8/18 namenda titration pack not available, will start namenda XR 7 mg daily  5/16/19 neurology note, memory loss likely alzheimer's, will obtain MRI brain, b12, folate, tsh, would not tolerate neuropsychiatric evaluation secondary to poor focus and attention     Nocturnal hypoxemia  3/17/18 pulmonary note, continue nocturnal oxygen 2 L, start trelegy one puff daily  9/10/18 pulmonary note intolerant to CPAP, resume nocturnal oxygen, latent TB, repeat chest x-ray, stable on bronchodilators, as needed prednisone and antibiotic prescription to pharmacy follow-up 3-6 months  12/13/19 pulmonary note room air saturation at rest 87%, 2 L oxygen at rest saturation 94%, 2 L oxygen saturation with exertion 92% documenting need for oxygen 2 L, will order overnight oximetry, follow-up as scheduled  1/14/20 room air saturation at rest 91%, saturation with exercise room air 86 to 87%  2/24/20 93% saturation on 2 liters     opioid dependance  2/26/15 narcotic contract  6/12/15 opiate risk score 0  10/4/15   7/25/16   9/20/16   pain note recommended left L3-L5 MBBB  10/31/16   2/6/17   5/9/17 narcotic contract  5/9/17 opiate risk score 0  5/9/17   5/9/17 depression screening score 0  6/28/17 trial cymbalta 30 mg  8/7/17 did not start cymbalta, will start  8/7/17   8/7/17 UDT  8/21/17 change cymbalta to qod  9/18/17 off cymbalta  9/14/17 custom PT discharge summary patient did not improve walking tolerance or standing tolerance, medicare g-code status 60-79% impairment, thoracolumbar flexion decreased from 50-25%, extension 10%, sidebending 10-25%  11/3/17   2/6/18   4/10/18   4/10/18 controlled substances contract, decrease oxycontin to 10 mg am and 5 mg pm  5/17/18 decrease to oxycontin 10 mg am and 5 mg pm  5/17/18   7/10/18 increase oxycontin back to 10 mg bid after fall  7/10/18    9/18/18   2/27/19 off oxycontin after hospital discharge, would like to avoid narcotics given side effects, will try celebrex 100 mg daily as had that previously  3/19/19 refer to dr.zollinger pain  3/19/19 increase to celebrex 100 mg bid, add neurontin 100 mg qpm, continue lidoderm patch  4/11/19 dr.zollinger pain note offered trigger point injections and lumbar radiofrequency ablation, she declines for now, consider tylenol in place of celebrex, follow-up as needed  1/14/20   1/14/20 controlled substance contract  1/14/20 resume norco 7.5 mg bid prn pain, continue neurontin 100 mg qpm and celebrex (hold while on prednisone)   2/24/20    5/18/20  on norco 7.5 mg bid taking 1-2 per day, neurontin, celebrex  NSAIDs, celebrex, pregabalin, gabapentin, cymbalta, ultram, physical therapy,TENS, lidoderm patches, voltaren gel, epidural, facet injections, kyphoplasty, spinal stimulator, pain management locally and at [place], neurosurgery      Osteoporosis  3/08 dexa LS +0.3,hip -1.4  5/10 MRI thoracic spine subacute T12 compression fracture  5/27/10 T12 kyphoplasty  6/10 on actonel  7/10 dexa LS +1.0,hip -1.6  8/10 vit d  56  3/11 reclast referral made  6/12 vit d 42 off actonel  6/21/13 dexa LS +1.8, forearm -2.7, I rec reclast, she would like to think it over  9/23/13 reclast injection  3/4/14 vit d 26  9/4/14 reclast IV infusion  2/26/15 xray rib series left; mild deformity of the lateral 10th rib on the left may be related to a fracture  2/26/15 xray lumbar spine mild compression fracture of L4 of indeterminate age but new compared to /25/13, vertebral augmentation and T11 and T12 with severe compression fracture of T12 and focal kyphosis at this level  12/7/15 reclast IV infusion  5/21/20 vit d 33     Paroxysmal atrial fibrillation  1/11 [facility] note cont multaq  7/11 EKG atrial fibrillation hospitalization  7/8/11 echo normal LV function, EF 55%  7/8/11 Persantine thallium possible small area mild ischemia in the lateral wall, no evidence of infarction  7/11 [facility] during hospitalization changed to amiodarone 200 mg plus pradaxa restarted  7/11   9/11 [facility] note stop amiodarone, cont pradaxa and metoprolol 25 bid  3/12 cont pradaxa and increase metoprolol to 50 bid due to higher bp  5/12 pradaxa decreased from 150 bid to 75 bid per [facility] due to nosebleeds  6/12 EKG NSR  1/13 echo normal LV function, grade 2 diastolic dysfunction, EF 70% moderate aortic insufficiency  6/11/13 on pradaxa and metoprolol 50 bid for rate control, NSR today  9/13 cardiology note stop pradaxa and start asa 81 mg  4/7/14 cardiology note atrial fibrillation, start pradaxa 75 mg bid, stop asa, stop norvasc, decreased avalide 300/12.5 mg to 1/2 per day, increase metoprolol to 50 mg 1 1/2 bid  4/14/14 cardiology note, NSR on exam, continue pradaxa 75 mg bid, metoprolol 75 mg bid, avalide 300/12.5 mg 1/2 tab per day  10/20/14 metoprolol 50 mg decreased to 25 mg bid by [place] doctor due to low heart rate, continues on pradaxa and avalide 300/12.5 mg   12/29/14  cardiology note continue pradaxa, metoprolol 25 mg 1/2 bid, avalide 300/12.5 mg  6/12/15 NSR  on exam  8/24/16 cardiology note sinus rhythm on exam,[id number] score=2 continue anticoagulation, low-dose metoprolol follow-up one year  12/14/17 cardiology note remains in sinus rhythm, follow-up one year  6/21/18  cardiology note paroxysmal atrial fibrillation sinus rhythm on exam stable continue anticoagulation, follow-up 1 year  12/11/18 on metoprolol and pradaxa  2/27/19 [id number] (age, gender, aortic atherosclerosis) = 5 (7.8% risk of stroke per year) and HAS-BLED score 3 points (age, hypertension, con commitment antiplatelet agents)  = 3.74 bleeds per 100 patient years) will change pradaxa not covered to eliquis 2.5 mg bid adjust for age and weight  9/6/19  cardiology note, cough since starting lisinopril was discontinued changed to irbesartan 75 mg, continue metoprolol and eliquis, hyponatremia related to hydrochlorothiazide, changed to demadex 5 mg monday, wednesday, friday  10/10/19 cardiology note reduce metoprolol to 12.5 mg bid as blood pressure normal in the office but may be lower at home contributing to tiredness and fatigue, torsemide as needed for shortness of breath  12/2/19 cardiology note unable to tolerate irbesartan and irbesartan secondary to cough and desaturation, discontinue irbesartan and metoprolol, start low-dose carvedilol 6.25 mg bid and continue eliquiw 2.5 mg bid and continue oxygen at night, room air saturation 90%  12/9/19 cardiology mychart note, decrease carvedilol to 6.25 mg 1/2 bid and continue eliquis 2.5 mg  1/13/20 cardiology note blood pressure stable on coreg 6.25 mg bid and eliquis 2.5 mg bid, information given regarding DNR by cardiology   5/18/20 on torsemide 5 mg take 2 tablets daily x3 days as needed for leg swelling per cardiology, maintains on carvedilol 6.25 mg bid  5/21/20 bun 21,creat 1.07,GFR 48,Na 138,K+ 4.5 on torsemide 5 mg qday as needed x 3 days for leg swelling per cardiology   5/22/20 on torsemide 5 mg once a day, normal BUN,  creatinine, sodium, potassium, has been on torsemide 5 mg once a day for 5 days, take an extra 2 days then discontinue, check daily weights, monitor leg edema and notify me with daily weights next week  5/26/20 137.6 lb on torsemide for leg swelling, try to go to 3 days with turosemide, if this continues we will need to recheck BMP  5/27/20 weight 140.8 lb so resume torosemide 5 mg daily  5/29/20 emergency room note, presented with shortness of breath, saturation 96%, weight 140 pounds, discussed with cardiology  5/29/20 proBNP NT 3811 increase torsemide to 10 mg daily, and add metolazone 2.5 mg   5/29/20 bun 23,creat 0.8,Na 138,K+ 3.8  5/29/20 cxr cardiomegaly, pulmonary interstitial markings consistent with mild edema  6/3/20 weight 128 lb tapering torosemide to 10 mg daily repeat labs one week, repeat labs ordered one week, did not start metolazone 2.5 mg yet  6/8/20 cardiology note stable on torsemide 10 mg, should edema increase can take an extra 10 mg/day, hold off on metolazone for now     Preventative health  5/05 colon per [facility] polyp no path report, f/u rec 5 yrs (12/12/12 [facility] note)  4/09 stool occult blood negative  12/09 mammogram  10/1/11 pneumovax  10/2/12 zoster vaccine  6/11/13 declines mammogram, tdap  6/13 dexa LS +1.8, forearm -2.7  3/4/14 vit d 26  10/12/15 prevnar  12/11/18 tdap  1/14/20 POLST completed already has advanced directive with poa son el and daughter dominga     renal mass  10/7/19 CT-CTA complete thoracoabdominal, left renal low-density lesion 1.4 cm, 31 units, mychart correspondence with daughter, given age and comorbidities recommend no further evaluation     Right hip pain  3/11 MRI right hip DJD and tear of the anterior/superior acetabular labrum  4/11  ortho note not believe hip is primary problem, referred to  or reji     Sensorineural hearing loss  12/7/15  audiology evaluation mild sensorineural hearing loss     Sleep apnea  8/11 [facility] note sleep  study apnea-hypopnea index 86, low sat 74%, start CPAP  8-18 cm    2013 off cpap not comfortable  3/14/16  (normal), quantiferon gold positive, allergen panel negative, IgE 357 (less than 214), IgA 116 (),, IgM 27 (),IgG 947 ([phone])  4/13/16 cxr mild cardiomegaly  5/4/16 [facility] note continue oxygen 3 L at night  11/8/16 pulmonary note continue oxygen 3 L at night  10/4/17 pulmonary note continue oxygen 3 L at night  3/17/18 pulmonary note, continue nocturnal oxygen 2 L, start trelegy one puff daily  9/10/18 pulmonary note intolerant to CPAP, resume nocturnal oxygen, latent TB, repeat chest x-ray, stable on bronchodilators, as needed prednisone and antibiotic prescription to pharmacy follow-up 3-6 months     tb latent  3/14/16 positive quantiferon gold test  3/14/16  (normal), quantiferon gold positive, allergen panel negative, IgE 357 (less than 214), IgA 116 (),, IgM 27 (),IgG 947 ([phone])  4/13/16 cxr mild cardiomegaly  5/4/16 [facility] note continue advair 115/21 bid with spacer given doxycycline and prednisone, continue oxygen 3 L at night, AFB samples ×3, follow-up 3 months  7/25/16 pulmonary note on prednisone taper one week out of the month and doxycycline one per day for one week per month, on advair bid and spiriva and oxygen 3 liters at night  7/27/17 PPD negative  10/4/17 pulmonary note continue advair 115/21 two inhalations bid, spiriva daily, xoponex as needed, oxygen 3 L at night, history of positive quantiferon gold, will order sputum sample follow-up 4 months  9/10/18 pulmonary note intolerant to CPAP, resume nocturnal oxygen, latent TB, repeat chest x-ray, stable on bronchodilators, as needed prednisone and antibiotic prescription to pharmacy follow-up 3-6 months     Tension headache  6/11/13 on fioricet bid  2/28/14 taper fioricet to once a day  4/25/14 now on fioricet prn     ventral hernia  10/4/19 CT abdomen pelvis with; small epigastric midline abdominal wall  fat-containing ventral hernia       Patient Active Problem List   Diagnosis   • Hypertension   • Dyslipidemia   • Preventative health care   • Low back pain   • Cervical pain   • Osteoporosis, idiopathic   • History of compression fracture of spine   • PAF (paroxysmal atrial fibrillation) ([facility])   • Right hip pain   • COPD (chronic obstructive pulmonary disease) ([facility])   • History positive h.pylori titer   • History of shingles   • Tension headache   • Macular degeneration   • Anticoagulated   • Opioid dependence ([facility])   • Sensorineural hearing loss   • Mild cognitive impairment   • TB lung, latent   • Nocturnal and exertional hypoxemia   • Chronic respiratory failure with hypoxia ([facility])   • AAA (abdominal aortic aneurysm) ([facility])   • Renal mass   • Ventral hernia   • Atherosclerosis of aorta ([facility])   • Leg edema          Health Maintenance Summary                IMM ZOSTER VACCINES Overdue 11/27/2012      Done 10/2/2012 Imm Admin: Zoster Vaccine Live (ZVL) (Zostavax) - HISTORICAL DATA    Annual Pulmonary Function Test / Spirometry Overdue 10/21/2016      Done 10/21/2015 PFT DICTATED RESULTS     Patient has more history with this topic...    BONE DENSITY Overdue 6/21/2018      Done 6/21/2013 DS-BONE DENSITY STUDY (DEXA)     Patient has more history with this topic...    IMM HEP B VACCINE Overdue 11/18/2019      Done 10/21/2019 Imm Admin: Hepatitis B Vaccine (Adol/Adult)    IMM INFLUENZA Postponed 6/30/2021 Originally 9/1/2020. System: vaccine not available, other system reasons     Done 10/23/2019 Imm Admin: Influenza Vaccine Adult HD     Patient has more history with this topic...    Annual Wellness Visit Next Due 10/21/2020      Done 10/21/2019 Visit Dx: Medicare annual wellness visit, subsequent     Patient has more history with this topic...    IMM DTaP/Tdap/Td Vaccine Next Due 12/11/2028      Done 12/11/2018 Imm Admin: Tdap Vaccine          Patient Care Team:  Denis Krueger M.D. as PCP - General  Nathan Fajardo,  M.D. as Consulting Physician (Anesthesia)  Esdras Thornton M.D. as Consulting Physician (Pulmonary Medicine)  SARAH De La Garza as Consulting Physician (Pulmonary Medicine)  [facility] as Home Health Provider  Alexy Smyth M.D. as Consulting Physician (Interventional Cardiology)  Cecile Valenzuela as [facility]   Zaida Merchant P.A.-C. as Mid Level Provider (Physician Assistant)  [facility] as Respiratory (DME Supplier)      ROS       Objective:          Physical Exam  Vitals signs and nursing note reviewed.   Constitutional:       Appearance: Normal appearance.   HENT:      Head: Normocephalic and atraumatic.      Right Ear: External ear normal.      Left Ear: External ear normal.      Nose: Congestion present.      Mouth/Throat:      Mouth: Mucous membranes are moist.   Eyes:      Conjunctiva/sclera: Conjunctivae normal.   Neck:      Musculoskeletal: Neck supple.   Cardiovascular:      Rate and Rhythm: Regular rhythm.      Heart sounds: No murmur.   Pulmonary:      Effort: No respiratory distress.      Breath sounds: Normal breath sounds.   Abdominal:      General: There is no distension.   Musculoskeletal:         General: No swelling.   Skin:     General: Skin is warm.   Neurological:      General: No focal deficit present.      Mental Status: She is alert.   Psychiatric:         Behavior: Behavior normal.     No lower extremity edema            Assessment/Plan:     Assessment  #!  COPD exacerbation in a patient with underlying COPD, on Trelegy inhaler as well as oxygen continuously, lungs are clear, and 96% saturation on baseline oxygen 1 to 2 L, no evidence of CHF exacerbation    #2 chronic respiratory failure on oxygen 1 L during the day and 2 L at night continuous    #3 paroxysmal atrial fibrillation on Coreg and Eliquis, rate controlled, sinus on exam    #4 memory loss likely underlying Alzheimer's as seen neurology    #5 chronic low back pain on Celebrex  off narcotics      Plan  #! Will get high dose flu vaccine at pharmacy    #2 doxycycline 100 mg twice daily x10 days as had before can cause nausea, vomiting, diarrhea, rash    #3 prednisone taper has had before can cause GI upset, take with food, start 40 mg x 5 days and 20 mg x 5 days and 10 mg x 6 days.    #4 continue Trelegy inhaler    #5 continue oxygen 1 L to 2 L during the day, 2 L at night    #6 nebulizer as needed    #7 continue other medications no changes    #8 no need for imaging at this time    #9 follow-up 3 months    #10 call for worsening symptoms or to ER

## 2020-09-25 ENCOUNTER — TELEPHONE (OUTPATIENT)
Dept: MEDICAL GROUP | Facility: MEDICAL CENTER | Age: 85
End: 2020-09-25

## 2020-09-25 NOTE — TELEPHONE ENCOUNTER
Loni @ [facility] called and LM. States that she is still waiting for you to send a recertification on Venus. Tried to call to find out recert on what but had to leave a message.

## 2020-09-28 NOTE — TELEPHONE ENCOUNTER
Divya just needed a verbal to re-certify [facility] for Home Health. GIVEN.

## 2020-10-05 ENCOUNTER — TELEPHONE (OUTPATIENT)
Dept: MEDICAL GROUP | Facility: MEDICAL CENTER | Age: 85
End: 2020-10-05

## 2020-10-05 NOTE — TELEPHONE ENCOUNTER
Yashira called re:   Venus Church  [phone]    Venus needs a flu shot but Ivana wants to know should she wait until she is done with her steroid meds and should she wait and get it at Dr Haskins's office? Please advise.

## 2020-10-06 ENCOUNTER — NON-PROVIDER VISIT (OUTPATIENT)
Dept: MEDICAL GROUP | Facility: MEDICAL CENTER | Age: 85
End: 2020-10-06
Payer: MEDICARE

## 2020-10-06 DIAGNOSIS — Z23 NEED FOR VACCINATION: ICD-10-CM

## 2020-10-06 PROCEDURE — G0008 ADMIN INFLUENZA VIRUS VAC: HCPCS | Performed by: INTERNAL MEDICINE

## 2020-10-06 PROCEDURE — 90662 IIV NO PRSV INCREASED AG IM: CPT | Performed by: INTERNAL MEDICINE

## 2020-10-06 NOTE — PROGRESS NOTES
"Venus Church is a 89 y.o. female here for a non-provider visit for:   FLU    Reason for immunization: Annual Flu Vaccine  Immunization records indicate need for vaccine: Yes, confirmed with Epic  Minimum interval has been met for this vaccine: Yes  ABN completed: Not Indicated    Order and dose verified by: VIJAY  VIS Dated  8/15/19 was given to patient: Yes  All IAC Questionnaire questions were answered \"No.\"    Patient tolerated injection and no adverse effects were observed or reported: Yes    Pt scheduled for next dose in series: No    "

## 2020-10-06 NOTE — TELEPHONE ENCOUNTER
She can have the flu shot done anytime she does not have to wait.  She can get that at the pharmacy, since we are out, I am not sure if cardiology has that in their office.

## 2020-10-12 ENCOUNTER — OFFICE VISIT (OUTPATIENT)
Dept: CARDIOLOGY | Facility: MEDICAL CENTER | Age: 85
End: 2020-10-12
Payer: MEDICARE

## 2020-10-12 ENCOUNTER — TELEPHONE (OUTPATIENT)
Dept: MEDICAL GROUP | Facility: MEDICAL CENTER | Age: 85
End: 2020-10-12

## 2020-10-12 VITALS
HEIGHT: 64 IN | DIASTOLIC BLOOD PRESSURE: 68 MMHG | OXYGEN SATURATION: 96 % | HEART RATE: 76 BPM | WEIGHT: 130 LBS | SYSTOLIC BLOOD PRESSURE: 140 MMHG | BODY MASS INDEX: 22.2 KG/M2

## 2020-10-12 DIAGNOSIS — J96.11 CHRONIC RESPIRATORY FAILURE WITH HYPOXIA (HCC): ICD-10-CM

## 2020-10-12 DIAGNOSIS — E78.5 DYSLIPIDEMIA: Chronic | ICD-10-CM

## 2020-10-12 DIAGNOSIS — I10 ESSENTIAL HYPERTENSION: Chronic | ICD-10-CM

## 2020-10-12 DIAGNOSIS — R60.0 LEG EDEMA: ICD-10-CM

## 2020-10-12 DIAGNOSIS — Z79.01 ANTICOAGULATED: ICD-10-CM

## 2020-10-12 DIAGNOSIS — I48.0 PAF (PAROXYSMAL ATRIAL FIBRILLATION) (HCC): Chronic | ICD-10-CM

## 2020-10-12 DIAGNOSIS — J41.1 MUCOPURULENT CHRONIC BRONCHITIS (HCC): Chronic | ICD-10-CM

## 2020-10-12 PROCEDURE — 99214 OFFICE O/P EST MOD 30 MIN: CPT | Performed by: NURSE PRACTITIONER

## 2020-10-12 RX ORDER — HYDROCODONE BITARTRATE AND ACETAMINOPHEN 7.5; 325 MG/1; MG/1
TABLET ORAL
COMMUNITY
Start: 2020-08-30 | End: 2021-05-02

## 2020-10-12 ASSESSMENT — ENCOUNTER SYMPTOMS
PALPITATIONS: 0
ORTHOPNEA: 0
SHORTNESS OF BREATH: 1
PND: 0
INSOMNIA: 0
ABDOMINAL PAIN: 0
BRUISES/BLEEDS EASILY: 0
MEMORY LOSS: 1
FEVER: 0
HEADACHES: 0
MYALGIAS: 0
NAUSEA: 0
COUGH: 0
BACK PAIN: 1
CHILLS: 0
DIZZINESS: 0
LOSS OF CONSCIOUSNESS: 0

## 2020-10-12 ASSESSMENT — FIBROSIS 4 INDEX: FIB4 SCORE: 2.68

## 2020-10-12 NOTE — TELEPHONE ENCOUNTER
Venus Church's daughter Ivana called and LM. Wants to know if Venus would benefit from a pneumonia shot. Looks to me like she has had them all but I told her that I would ask you. Please advise.

## 2020-10-12 NOTE — PROGRESS NOTES
Chief Complaint   Patient presents with   • Follow-Up   • Edema   • COPD   • HTN (Controlled)   • Hyperlipidemia       Subjective:   Venus Church is a 89 y.o. female who presents today for two month follow-up of LE edema.    Venus is a 89 year old female with history of atrial fibrillation, anticoagulation, COPD, hypertension and hyperlipidemia, normally followed by Dr. Smyth, and last seen by him in late August 2020.    Her last hospitalized was May 2020, and Torsemide was increased from 5mg to 10mg; she has remained stable on this.    She is here today for two month follow-up. She did have an episode of bronchitis, and was treated with antibiotics and steroids; she is finally feeling better. She does continue to use oxygen throughout the day PRN, and always at nighttime. Breathing is mostly stable; no cough or congestion; no fever or chills. No chest pain, pressure, tightness or heaviness. No orthopnea or PND; some mild lightheadedness, but no syncope or presyncope.    Past Medical History:   Diagnosis Date   • Anesthesia     N/V   • Arthritis         • Atrial fibrillation (HCC) 02/2019    Echocardiogram with normal LV size, moderate concentric LVH, LVEF 55%. Normal RA, LA and RV. Mild MR, moderate AI, moderate TR. RVSP 45mmHg.   • Atypical chest pain    • CATARACT 2007,08   • Chronic anticoagulation    • Constipation    • COPD (chronic obstructive pulmonary disease) ([facility])    • DDD (degenerative disc disease)    • Dental disorder     Partials   • Dyspnea    • Headache(784.0)    • Hypercholesteremia    • Hypertension    • MEDICAL HOME    • On home oxygen therapy     Continuous at night and during the day   • Osteoporosis    • Other accident 2006    C-Spine FX   • Pain     Backpain   • Pneumonia    • TB lung, latent     • Valvular heart disease    • Vertebral fracture    • Vertigo      Past Surgical History:   Procedure Laterality Date   • RECOVERY  7/28/2010    Performed by SURGERY, IR-RECOVERY at [facility]   • OTHER ORTHOPEDIC SURGERY  may, 2010      T11 kypho.   • CATARACT PHACO WITH IOL  2009    Performed by ROSE MARIE MANN at [facility]   • CARPAL TUNNEL RELEASE  @30 yrs ago    rt hand     Family History   Problem Relation Age of Onset   • Heart Disease Father    • Diabetes Brother      Social History     Socioeconomic History   • Marital status:      Spouse name: Not on file   • Number of children: Not on file   • Years of education: Not on file   • Highest education level: Not on file   Occupational History   • Not on file   Social Needs   • Financial resource strain: Not on file   • Food insecurity     Worry: Not on file     Inability: Not on file   • Transportation needs     Medical: Not on file     Non-medical: Not on file   Tobacco Use   • Smoking status: Former Smoker     Packs/day: 1.50     Years: 25.00     Pack years: 37.50     Types: Cigarettes     Quit date: 1984     Years since quittin.5   • Smokeless tobacco: Never Used   • Tobacco comment: Continued abstinence advised.   Substance and Sexual Activity   • Alcohol use: No     Alcohol/week: 0.0 oz   • Drug use: No   • Sexual activity: Not Currently   Lifestyle   • Physical activity     Days per week: Not on file     Minutes per session: Not on file   • Stress: Not on file   Relationships   • Social connections     Talks on phone: Not on file     Gets together: Not on file     Attends Restorationism service: Not on file     Active member of club or organization: Not on file     Attends meetings of clubs or organizations: Not on file     Relationship status: Not on file   • Intimate partner violence     Fear of current or ex partner: Not on file     Emotionally abused: Not on file     Physically abused: Not on file     Forced sexual activity: Not on file   Other Topics Concern   • Not on file   Social History Narrative   • Not on file     Allergies   Allergen Reactions   • Codeine Vomiting   • Irbesartan  Cough     Strong cough, pt.s daughter reports the ER  Advised she stop taking    • Lisinopril Shortness of Breath and Cough     Outpatient Encounter Medications as of 10/12/2020   Medication Sig Dispense Refill   • HYDROcodone-acetaminophen (NORCO) 7.5-325 MG per tablet TAKE 1 TO 2 TABLETS BY MOUTH TWICE DAILY AS NEEDED FOR MODERATE PAIN FOR UP TO 30 DAYS     • torsemide (DEMADEX) 10 MG tablet Take 1 Tab by mouth every day. 90 Tab 0   • gabapentin (NEURONTIN) 100 MG Cap TAKE 1 CAPSULE BY MOUTH ONCE DAILY IN THE EVENING 30 Cap 3   • celecoxib (CELEBREX) 100 MG Cap TAKE 1 CAPSULE BY MOUTH TWICE DAILY AS NEEDED FOR MODERATE PAIN FOR BACK PAIN 180 Cap 3   • traZODone (DESYREL) 50 MG Tab Take 0.5 Tabs by mouth at bedtime as needed for Sleep. 90 Tab 3   • Magnesium 100 MG Tab Take 1 Tab by mouth every bedtime.     • Green Tea, Olya sinensis, (GREEN TEA PO) Take 1 Tab by mouth ONE-HALF HOUR AFTER LUNCH.     • albuterol (PROVENTIL) 2.5mg/3ml Nebu Soln solution for nebulization USE 3 ML IN NEBULIZER  EVERY 4 HOURS AS NEEDED FOR SHORTNESS OF BREATH 300 mL 5   • Fluticasone-Umeclidin-Vilant (TRELEGY ELLIPTA) 100-62.5-25 MCG/INH AEROSOL POWDER, BREATH ACTIVATED Inhale 1 Inhalation by mouth every day. Indications: Chronic Obstructive Lung Disease 1 Each 11   • apixaban (ELIQUIS) 2.5mg Tab Take 1 Tab by mouth 2 Times a Day. 60 Tab 0   • carvedilol (COREG) 3.125 MG Tab Take 1 Tab by mouth 2 times a day, with meals. 180 Tab 3   • Multiple Vitamins-Minerals (CENTRUM SILVER PO) Take 1 Tab by mouth ONE-HALF HOUR AFTER LUNCH.     • Coenzyme Q10 (COQ10 PO) Take 1 Tab by mouth every evening.     • Probiotic Product (PROBIOTIC PO) Take 1 Cap by mouth every day.     • Alpha-Lipoic Acid (LIPOIC ACID PO) Take 1 Tab by mouth every morning.     • Ginkgo Biloba 120 MG Cap Take 120 mg by mouth every morning.     • [DISCONTINUED] doxycycline (VIBRAMYCIN) 100 MG Tab Take 1 Tab by mouth 2 times a day. 20 Tab 0   • [DISCONTINUED] predniSONE  "(DELTASONE) 20 MG Tab Take 2 po qday x 5 days, then 1 po qday x 5 days, then 1/2 po qday x 6 days then stop 18 Tab 0     No facility-administered encounter medications on file as of 10/12/2020.      Review of Systems   Constitutional: Negative for chills and fever.   HENT: Negative for congestion.    Respiratory: Positive for shortness of breath. Negative for cough.         Related to COPD, uses oxygen.   Cardiovascular: Negative for chest pain, palpitations, orthopnea, leg swelling and PND.   Gastrointestinal: Negative for abdominal pain and nausea.   Musculoskeletal: Positive for back pain and joint pain. Negative for myalgias.   Skin: Negative for rash.   Neurological: Negative for dizziness, loss of consciousness and headaches.   Endo/Heme/Allergies: Does not bruise/bleed easily.   Psychiatric/Behavioral: Positive for memory loss. The patient does not have insomnia.         Objective:   /68 (BP Location: Right arm, Patient Position: Sitting)   Pulse 76   Ht 1.626 m (5' 4\")   Wt 59 kg (130 lb)   SpO2 96%   BMI 22.31 kg/m²     Physical Exam   Constitutional: She is oriented to person, place, and time. She appears well-developed and well-nourished.   Ambulates with a walker, using oxygen.  Petite, color looks better today.   HENT:   Head: Normocephalic.   Eyes: EOM are normal.   Neck: Normal range of motion. Neck supple. No JVD present.   Cardiovascular: Normal rate. An irregularly irregular rhythm present.   Murmur heard.   Systolic murmur is present with a grade of 1/6.  Pulmonary/Chest: Effort normal and breath sounds normal. No respiratory distress. She has no wheezes. She has no rales.   Abdominal: Soft. Bowel sounds are normal. She exhibits no distension. There is no abdominal tenderness.   Musculoskeletal: Normal range of motion.         General: No edema.   Neurological: She is alert and oriented to person, place, and time.   Skin: Skin is warm and dry. No rash noted.   Psychiatric: She has a " normal mood and affect.     Component      Latest Ref Rng & Units 5/29/2020 6/11/2020 7/16/2020 9/24/2020          11:20 AM  1:03 PM  4:10 PM 12:45 PM   NT-proBNP      0 - 125 pg/mL 3811 (H) 3066 (H) 1557 (H) 4776 (H)     Lab Results   Component Value Date/Time    SODIUM 138 09/24/2020 12:45 PM    POTASSIUM 4.6 09/24/2020 12:45 PM    CHLORIDE 98 09/24/2020 12:45 PM    CO2 29 09/24/2020 12:45 PM    GLUCOSE 108 (H) 09/24/2020 12:45 PM    BUN 21 09/24/2020 12:45 PM    CREATININE 0.95 09/24/2020 12:45 PM    CREATININE 1.03 (H) 03/21/2011 12:00 AM    BUNCREATRAT 22 03/21/2011 12:00 AM    GLOMRATE 52 (L) 03/21/2011 12:00 AM       CONCLUSIONS OF ECHOCARDIOGRAM OF 2/16/2019:  Normal left ventricular systolic function.  Left ventricular ejection fraction is visually estimated to be 55%.  Grade II diastolic dysfunction.  Normal right ventricular systolic function.  Mild mitral regurgitation.  Moderate aortic insufficiency.  Moderate tricuspid regurgitation.  Right ventricular systolic pressure is estimated to be 45  mmHg.  Compared to the images of the study done 1/24/2013 - there has been an   increase in the estimate of the right ventricular systolic pressure,   previously normal.     Assessment:     1. Leg edema  proBrain Natriuretic Peptide, NT    Basic Metabolic Panel   2. PAF (paroxysmal atrial fibrillation) (HCC)     3. Anticoagulated     4. Essential hypertension     5. Chronic respiratory failure with hypoxia ([facility])     6. Mucopurulent chronic bronchitis ([facility])     7. Dyslipidemia         Medical Decision Making:  Today's Assessment / Status / Plan:     1. LE edema, better controlled on Torsemide 10mg once daily.  Renal function is stable. ProBNP is up slightly; discussed this with patient and her daughter. Weight and edema is stable, and they should continue to watch this daily.    2. Paroxysmal atrial fibrillation, rate controlled.    3. Chronic anticoagulation with Eliquis, no bleeding problems.    4. Hypertension,  treated with low dose Coreg. BP is good.    5. Recent bronchitis, with chronic respiratory failure, improved. She does use inhalers and nebulizer treatments.    6. Hyperlipidemia, not currently on any therapy.    Same medications for now. To FU with Dr. Smyth in January 2021; FU sooner if clinical condition changes.

## 2020-10-13 NOTE — TELEPHONE ENCOUNTER
Please notify her daughter that the patient is up-to-date on the 2 different pneumonia vaccines.  She does not need those again.

## 2020-10-14 ENCOUNTER — PATIENT OUTREACH (OUTPATIENT)
Dept: HEALTH INFORMATION MANAGEMENT | Facility: OTHER | Age: 85
End: 2020-10-14

## 2020-10-14 NOTE — PROGRESS NOTES
Called member back regarding medication questions. Verified HIPPA. I called and spoke with her daughter and advised of each Tier the medication her mother was taking was on. She then asked if the plan they enroll in for next year would effect their eligibility for LIS. I advised that it would not. The members daughter understood and had no other questions at this time.

## 2020-10-21 NOTE — TELEPHONE ENCOUNTER
Notified daughter with results, stable BUN, creatinine, sodium, potassium, magnesium on torsemide 10 mg twice daily, weight remaining stable about 132 pounds, BNP has decreased, still gets short of breath with exertion but she has oxygen with underlying COPD as well as CHF.  No changes for now she will follow-up with cardiology.   2/2 dementia. Per family, patient lives with son/[place], ambulatory with walker, confused, requires assistance with showers/bathroom/eating, +periods of incontinence, A&O x 1-2, variable PO intake, +anxiety. PT recommending PT. 2/2 dementia. Per family, patient lives with son/[place], ambulatory with walker, confused, requires assistance with showers/bathroom/eating, +periods of incontinence, A&O x 1-2, variable PO intake, +anxiety. Family aware PT recommending PT.

## 2020-10-28 ENCOUNTER — TELEPHONE (OUTPATIENT)
Dept: MEDICAL GROUP | Facility: MEDICAL CENTER | Age: 85
End: 2020-10-28

## 2020-10-28 ENCOUNTER — HOSPITAL ENCOUNTER (OUTPATIENT)
Facility: MEDICAL CENTER | Age: 85
End: 2020-10-28
Attending: INTERNAL MEDICINE
Payer: MEDICARE

## 2020-10-28 LAB
ANION GAP SERPL CALC-SCNC: 13 MMOL/L (ref 7–16)
BUN SERPL-MCNC: 27 MG/DL (ref 8–22)
CALCIUM SERPL-MCNC: 9.3 MG/DL (ref 8.4–10.2)
CHLORIDE SERPL-SCNC: 103 MMOL/L (ref 96–112)
CO2 SERPL-SCNC: 25 MMOL/L (ref 20–33)
CREAT SERPL-MCNC: 0.89 MG/DL (ref 0.5–1.4)
GLUCOSE SERPL-MCNC: 185 MG/DL (ref 65–99)
NT-PROBNP SERPL IA-MCNC: 1425 PG/ML (ref 0–125)
POTASSIUM SERPL-SCNC: 3.8 MMOL/L (ref 3.6–5.5)
SODIUM SERPL-SCNC: 141 MMOL/L (ref 135–145)

## 2020-10-28 PROCEDURE — 80048 BASIC METABOLIC PNL TOTAL CA: CPT

## 2020-10-28 PROCEDURE — 83880 ASSAY OF NATRIURETIC PEPTIDE: CPT

## 2020-10-28 NOTE — TELEPHONE ENCOUNTER
----- Message from Denis Krueger M.D. sent at 10/28/2020  4:17 PM PDT -----  Please notify the patient's daughter Yashira [phone] that her mother's blood test shows normal kidney function, normal sodium and potassium levels.

## 2020-10-28 NOTE — TELEPHONE ENCOUNTER
Please notify the patient's daughter Yashira [phone] that her mother's blood test shows normal kidney function, normal sodium and potassium levels.

## 2020-11-02 ENCOUNTER — TELEPHONE (OUTPATIENT)
Dept: MEDICAL GROUP | Facility: MEDICAL CENTER | Age: 85
End: 2020-11-02

## 2020-11-02 RX ORDER — BENZONATATE 100 MG/1
100 CAPSULE ORAL 3 TIMES DAILY PRN
Qty: 90 CAP | Refills: 1 | Status: SHIPPED | OUTPATIENT
Start: 2020-11-02 | End: 2020-12-03

## 2020-11-02 NOTE — TELEPHONE ENCOUNTER
Ivana called and LM. She is taking Venus home with her ([place] for a couple of months and needs you to RF her Benzonatate cough pills. I have located the pharmacy and inserted it into chart. Please advise. Ivana states that Venus is not coughing now but she would like to have them available to her if Venus needs them.

## 2020-11-02 NOTE — TELEPHONE ENCOUNTER
I have sent the refill for the benzonatate cough medication prescription to the [facility] in [place].

## 2020-11-06 ENCOUNTER — TELEPHONE (OUTPATIENT)
Dept: MEDICAL GROUP | Facility: MEDICAL CENTER | Age: 85
End: 2020-11-06

## 2020-11-07 NOTE — TELEPHONE ENCOUNTER
Venus Church's daughter Ivana  [phone]      Ivana called and reports that since she brought Venus home to the [place] for a visit, she has developed a cough, congestion, runny nose. Her temperature has been low @ 98.0. Ivana says that her temp was 94, I asked her to check the batteries in her thermometer. Ivana wants to know if there is anything she should be doing? Please advise.

## 2020-11-09 NOTE — TELEPHONE ENCOUNTER
Does she have a pulse oximeter?  It would be nice to check her oxygen level.  I would recommend that she monitor her cough, if it becomes productive or she develops a fever, or has more shortness of breath to let us know or let her pulmonologist know.

## 2020-11-11 ENCOUNTER — TELEPHONE (OUTPATIENT)
Dept: MEDICAL GROUP | Facility: MEDICAL CENTER | Age: 85
End: 2020-11-11

## 2020-11-11 RX ORDER — DOXYCYCLINE HYCLATE 100 MG
100 TABLET ORAL 2 TIMES DAILY
Qty: 20 TAB | Refills: 0 | Status: SHIPPED | OUTPATIENT
Start: 2020-11-11 | End: 2020-12-29

## 2020-11-12 NOTE — TELEPHONE ENCOUNTER
----- Message from Claudia Rojas, Med Ass't sent at 11/11/2020  7:37 AM PST -----  Regarding: FW: Procedure Question  Contact: [phone]    ----- Message -----  From: Venus Church  Sent: 11/10/2020   7:22 PM PST  To: Emily Carias  Subject: RE: Procedure Question                           Hello Doctor,  Her cough is becoming  a bit more productive today.  Still has an itchy rash around her waist  Her oxygen level with concentrator on 1,  is 90-93  Temerature 97    [facility] info:  [facility] at [address] [place] [id number]  Contact number [phone] Store [address][id number]  If she does not improve in the next couple of days or gets worse, I will take her to drop in or emergency here.  Thank you, Doctor!  Ivana

## 2020-11-17 DIAGNOSIS — R60.0 LEG EDEMA: ICD-10-CM

## 2020-11-18 ENCOUNTER — TELEPHONE (OUTPATIENT)
Dept: MEDICAL GROUP | Facility: MEDICAL CENTER | Age: 85
End: 2020-11-18

## 2020-11-18 DIAGNOSIS — R53.83 OTHER FATIGUE: ICD-10-CM

## 2020-11-18 RX ORDER — TORSEMIDE 10 MG/1
TABLET ORAL
Qty: 90 TAB | Refills: 1 | Status: SHIPPED | OUTPATIENT
Start: 2020-11-18 | End: 2021-03-15

## 2020-11-19 NOTE — TELEPHONE ENCOUNTER
I will order labs, but if she does not improve she really needs to take her in to get seen by someone there.  Orders were placed in the computer.

## 2020-11-19 NOTE — TELEPHONE ENCOUNTER
Spoke with Ivana  this am, she is really worried about Venus and is going to take her to ER there. Will have the send us everything.

## 2020-11-19 NOTE — TELEPHONE ENCOUNTER
Venus Church's dtr Ivana called to say that Venus was very lethargic and she is requesting that you order labs. States that the last time this happened, something was off in her blood work. Please advise.

## 2020-11-20 ENCOUNTER — TELEPHONE (OUTPATIENT)
Dept: MEDICAL GROUP | Facility: MEDICAL CENTER | Age: 85
End: 2020-11-20

## 2020-11-20 NOTE — TELEPHONE ENCOUNTER
Please notify patient's daughter that the blood test shows her kidney function has not worsened, and the blood count showed no significant inflammation.  The bicarbonate level is high indicating that she may be retaining more carbon dioxide and that may be more of a lung issue.  I think the daughter did mention in yesterday's note that she was taking the patient to the emergency room for evaluation and I agree if the patient is having more breathing issues.

## 2020-11-23 ENCOUNTER — TELEPHONE (OUTPATIENT)
Dept: MEDICAL GROUP | Facility: MEDICAL CENTER | Age: 85
End: 2020-11-23

## 2020-11-23 NOTE — TELEPHONE ENCOUNTER
----- Message from Denis Krueger M.D. sent at 11/20/2020  3:39 PM PST -----  Please notify patient's daughter that the blood test shows her kidney function has not worsened, and the blood count showed no significant inflammation.  The bicarbonate level is high indicating that she may be retaining more carbon dioxide and that may be more of a lung issue.  I think the daughter did mention in yesterday's note that she was taking the patient to the emergency room for evaluation and I agree if the patient is having more breathing issues.

## 2020-11-23 NOTE — TELEPHONE ENCOUNTER
Pt daughter informed-pt did not go to ER they just had labs done. Pt has been between 88 and 94 with o2. Pt averaging 90s. Daughter states she is more tired and finishing abx. Not as much wheezing.  Do you still want her to go to ER or come into office?  Pt daughter was asking if pt needed to do PBN peptide lab?

## 2020-11-25 ENCOUNTER — TELEPHONE (OUTPATIENT)
Dept: MEDICAL GROUP | Facility: MEDICAL CENTER | Age: 85
End: 2020-11-25

## 2020-11-25 DIAGNOSIS — I48.0 PAF (PAROXYSMAL ATRIAL FIBRILLATION) (HCC): Chronic | ICD-10-CM

## 2020-11-25 DIAGNOSIS — J96.11 CHRONIC RESPIRATORY FAILURE WITH HYPOXIA (HCC): ICD-10-CM

## 2020-11-25 DIAGNOSIS — M54.50 LOW BACK PAIN, UNSPECIFIED BACK PAIN LATERALITY, UNSPECIFIED CHRONICITY, UNSPECIFIED WHETHER SCIATICA PRESENT: Chronic | ICD-10-CM

## 2020-11-25 DIAGNOSIS — J41.1 MUCOPURULENT CHRONIC BRONCHITIS (HCC): Chronic | ICD-10-CM

## 2020-11-25 DIAGNOSIS — I50.30 DIASTOLIC CONGESTIVE HEART FAILURE, UNSPECIFIED HF CHRONICITY (HCC): ICD-10-CM

## 2020-11-25 DIAGNOSIS — G31.84 MILD COGNITIVE IMPAIRMENT: Chronic | ICD-10-CM

## 2020-11-25 NOTE — TELEPHONE ENCOUNTER
Marcie with [facility] called. [facility] needs you to send orders to [facility] as their contract has  and they need to transfer care.

## 2020-11-30 ENCOUNTER — HOME HEALTH ADMISSION (OUTPATIENT)
Dept: HOME HEALTH SERVICES | Facility: HOME HEALTHCARE | Age: 85
End: 2020-11-30
Payer: MEDICARE

## 2020-12-03 ENCOUNTER — TELEPHONE (OUTPATIENT)
Dept: MEDICAL GROUP | Facility: MEDICAL CENTER | Age: 85
End: 2020-12-03

## 2020-12-03 DIAGNOSIS — J41.1 MUCOPURULENT CHRONIC BRONCHITIS (HCC): Chronic | ICD-10-CM

## 2020-12-03 RX ORDER — PREDNISONE 20 MG/1
TABLET ORAL
Qty: 18 TAB | Refills: 0 | Status: SHIPPED | OUTPATIENT
Start: 2020-12-03 | End: 2020-12-29

## 2020-12-03 NOTE — TELEPHONE ENCOUNTER
Venus Church's daughter (Ivana)  [phone]    Ivana called today. Venus has a productive cough. Ivana does not want to take Venus anywhere near an ER. She makes her do her nebulizer treatments everyday x3. She is wondering if you would consider putting her back on Prednisone like she was before. Ivana has Venus down in the [place] with her. Staying in, all visits are through [facility]. Please advise.

## 2020-12-03 NOTE — TELEPHONE ENCOUNTER
Please notify her daughter that I will send a prescription for the prednisone to the [facility] in [place].

## 2020-12-07 ENCOUNTER — PATIENT OUTREACH (OUTPATIENT)
Dept: MEDICAL GROUP | Facility: MEDICAL CENTER | Age: 85
End: 2020-12-07

## 2020-12-07 NOTE — PROGRESS NOTES
LAQUITA received form kin regarding PAP renewal for 2021.   Return TC to Kin, Ivana. VM(1) left requesting return TC. Return TC from Kin. Isaiah has [employer] PAP applications and has not completed/reviewed applications to understand what information is needed from providers. She anticipates reviewing/completing applications and reaching after to discuss what is needed from providers.    Plan:  Pt added to  caseload  Care Plan deferred

## 2020-12-11 ENCOUNTER — PATIENT OUTREACH (OUTPATIENT)
Dept: MEDICAL GROUP | Facility: MEDICAL CENTER | Age: 85
End: 2020-12-11

## 2020-12-11 NOTE — PROGRESS NOTES
TC to kin. VM(1) left requesting return TC and informing kin will call in the new year to check on PAP programs acceptance/need for additional documentation.    Plan:  TC to kin on around 1/8/2020

## 2020-12-17 ENCOUNTER — TELEPHONE (OUTPATIENT)
Dept: SLEEP MEDICINE | Facility: MEDICAL CENTER | Age: 85
End: 2020-12-17

## 2020-12-17 NOTE — TELEPHONE ENCOUNTER
Saw patient in December of last year. Patient/family have been in touch with Dr. Krueger who prescribed a prednisone course 12/4/20. Would defer to his judgement pending follow up appointment.    Message text

## 2020-12-17 NOTE — TELEPHONE ENCOUNTER
Patient's son is requesting antibiotics and prednisone for his mother / productive cough. She is currently in [place] and has appt scheduled for January.

## 2020-12-28 ENCOUNTER — APPOINTMENT (OUTPATIENT)
Dept: MEDICAL GROUP | Facility: MEDICAL CENTER | Age: 85
End: 2020-12-28
Payer: MEDICARE

## 2020-12-29 ENCOUNTER — TELEMEDICINE (OUTPATIENT)
Dept: MEDICAL GROUP | Facility: MEDICAL CENTER | Age: 85
End: 2020-12-29
Payer: MEDICARE

## 2020-12-29 VITALS
DIASTOLIC BLOOD PRESSURE: 78 MMHG | BODY MASS INDEX: 26.31 KG/M2 | OXYGEN SATURATION: 94 % | WEIGHT: 134 LBS | TEMPERATURE: 96.3 F | SYSTOLIC BLOOD PRESSURE: 158 MMHG | HEIGHT: 60 IN

## 2020-12-29 DIAGNOSIS — I10 ESSENTIAL HYPERTENSION: Chronic | ICD-10-CM

## 2020-12-29 DIAGNOSIS — I50.30 DIASTOLIC CONGESTIVE HEART FAILURE, UNSPECIFIED HF CHRONICITY (HCC): ICD-10-CM

## 2020-12-29 DIAGNOSIS — J96.11 CHRONIC RESPIRATORY FAILURE WITH HYPOXIA (HCC): ICD-10-CM

## 2020-12-29 DIAGNOSIS — J41.1 MUCOPURULENT CHRONIC BRONCHITIS (HCC): Chronic | ICD-10-CM

## 2020-12-29 DIAGNOSIS — I48.0 PAF (PAROXYSMAL ATRIAL FIBRILLATION) (HCC): Chronic | ICD-10-CM

## 2020-12-29 PROCEDURE — 99214 OFFICE O/P EST MOD 30 MIN: CPT | Mod: 95,CR | Performed by: INTERNAL MEDICINE

## 2020-12-29 RX ORDER — PREDNISONE 10 MG/1
5 TABLET ORAL DAILY
Qty: 30 TAB | Refills: 0 | Status: SHIPPED | OUTPATIENT
Start: 2020-12-29 | End: 2021-01-28

## 2020-12-29 ASSESSMENT — FIBROSIS 4 INDEX: FIB4 SCORE: 2.68

## 2020-12-29 NOTE — PROGRESS NOTES
Subjective:      Virtual Visit: Established Patient   This visit was conducted via zoom using secure and encrypted videoconferencing technology. The patient was in a private location in the state of [place]  The patient's identity was confirmed and verbal consent was obtained for this virtual visit.    Subjective:   CC: copd  Chief Complaint   Patient presents with   • Follow-Up       Venus Church is a 89 y.o. female presenting for evaluation and management of:copd  Zoom meeting with patient and daughter.  History is obtained from the daughter since the patient has some memory loss.  Daughter monitors the patient.  Patient living with daughter in [facility] currently with her daughter.  Patient normally lives here locally and her son is here, but she is temporarily relocating to the [facility] living with her daughter because the Covid pandemic.  Patient seems to do better at sea level compared to the altitude here.  Patient has COPD followed by pulmonary, currently on Trelegy inhaler, she uses nocturnal oxygen at 1 L, daughter does have a pulse oximeter checks her oxygenation periodically throughout the day.  Normally runs 91% to 96% on room air.  Patient with history of sleep apnea could not tolerate CPAP.  Patient also has diastolic CHF and paroxysmal atrial fibrillation followed by cardiology maintained on torsemide 10 mg daily, carvedilol 3.125 mg, Eliquis per cardiology.  Fluid status stable, daughter weighs patient daily, currently 133 pounds.  Typical dry weight 128 to 130 pounds.  Low-sodium diet.  Chronic back pain is seen surgery, pain management, physical therapy, neurosurgery, not candidate for further intervention related to low back pain, previous compression fracture.  Has tried NSAIDs, gabapentin, pregabalin, Cymbalta, tramadol, Lidoderm patches, Voltaren gel, has had injections as well as spinal stimulator.  Currently is on Celebrex while on Eliquis.  Celebrex does offer some pain relief and patient has had no  symptoms GI bleeding related to Celebrex and Eliquis.  Recent COPD flareups, patient has been in the [place] area so we have treated this presumptively with doxycycline and prednisone Medrol Dosepak, last refill was earlier this month with Medrol Dosepak, and she received doxycycline in November.  Daughter states that the Medrol Dosepak did help with the back pain and her breathing.  Chronic cough, no significant sputum.  No fevers.  Daughter checks temperature and monitors saturation on a regular basis.  Copd using trelegy and rescue nebulizer once per day, had recent flare up, cough has improved after finishing prednisone about four days ago as well as doxycycline, not using oxygen during the day and using 1 liter at night, daughter has pulse oximeter and runs 91% to 96%.   Patient is using a walker for ambulation.  No falls.  Practicing social distancing during COVID-19 pandemic.       Allergies   Allergen Reactions   • Codeine Vomiting   • Irbesartan Cough     Strong cough, pt.s daughter reports the ER DrEn Advised she stop taking    • Lisinopril Shortness of Breath and Cough       Current medicines (including changes today)  Current Outpatient Medications   Medication Sig Dispense Refill   • predniSONE (DELTASONE) 20 MG Tab Take 2 po qday x 5 days, then 1 po qday x 5 days, then 1/2 po qday x 6 days then stop 18 Tab 0   • torsemide (DEMADEX) 10 MG tablet Take 1 tablet by mouth once daily 90 Tab 1   • doxycycline (VIBRAMYCIN) 100 MG Tab Take 1 Tab by mouth 2 times a day. 20 Tab 0   • HYDROcodone-acetaminophen (NORCO) 7.5-325 MG per tablet TAKE 1 TO 2 TABLETS BY MOUTH TWICE DAILY AS NEEDED FOR MODERATE PAIN FOR UP TO 30 DAYS     • gabapentin (NEURONTIN) 100 MG Cap TAKE 1 CAPSULE BY MOUTH ONCE DAILY IN THE EVENING 30 Cap 3   • celecoxib (CELEBREX) 100 MG Cap TAKE 1 CAPSULE BY MOUTH TWICE DAILY AS NEEDED FOR MODERATE PAIN FOR BACK PAIN 180 Cap 3   • traZODone (DESYREL) 50 MG Tab Take 0.5 Tabs by mouth at bedtime as  needed for Sleep. 90 Tab 3   • Magnesium 100 MG Tab Take 1 Tab by mouth every bedtime.     • Green Tea, Olya sinensis, (GREEN TEA PO) Take 1 Tab by mouth ONE-HALF HOUR AFTER LUNCH.     • albuterol (PROVENTIL) 2.5mg/3ml Nebu Soln solution for nebulization USE 3 ML IN NEBULIZER  EVERY 4 HOURS AS NEEDED FOR SHORTNESS OF BREATH 300 mL 5   • Fluticasone-Umeclidin-Vilant (TRELEGY ELLIPTA) 100-62.5-25 MCG/INH AEROSOL POWDER, BREATH ACTIVATED Inhale 1 Inhalation by mouth every day. Indications: Chronic Obstructive Lung Disease 1 Each 11   • apixaban (ELIQUIS) 2.5mg Tab Take 1 Tab by mouth 2 Times a Day. 60 Tab 0   • carvedilol (COREG) 3.125 MG Tab Take 1 Tab by mouth 2 times a day, with meals. 180 Tab 3   • Multiple Vitamins-Minerals (CENTRUM SILVER PO) Take 1 Tab by mouth ONE-HALF HOUR AFTER LUNCH.     • Coenzyme Q10 (COQ10 PO) Take 1 Tab by mouth every evening.     • Probiotic Product (PROBIOTIC PO) Take 1 Cap by mouth every day.     • Alpha-Lipoic Acid (LIPOIC ACID PO) Take 1 Tab by mouth every morning.     • Ginkgo Biloba 120 MG Cap Take 120 mg by mouth every morning.       No current facility-administered medications for this visit.        Patient Active Problem List    Diagnosis Date Noted   • Leg edema 05/18/2020   • Atherosclerosis of aorta ([facility]) 01/06/2020   • Renal mass 10/07/2019   • Ventral hernia 10/07/2019   • AAA (abdominal aortic aneurysm) ([facility]) 02/27/2019   • Chronic respiratory failure with hypoxia ([facility]) 02/13/2019   • Nocturnal and exertional hypoxemia 03/20/2018   • TB lung, latent 05/04/2016   • Mild cognitive impairment 01/11/2016   • Sensorineural hearing loss 12/16/2015   • History opioid dependence  04/23/2015   • Anticoagulated 12/29/2014   • Macular degeneration 07/08/2013   • Tension headache 06/11/2013   • History of shingles 06/07/2012   • History positive h.pylori titer 11/01/2011   • COPD (chronic obstructive pulmonary disease) ([facility]) 07/20/2011   • Right hip pain 03/28/2011   • PAF  (paroxysmal atrial fibrillation) (HCC) 06/10/2010   • History of compression fracture of spine 05/26/2010   • Low back pain 05/22/2010   • Cervical pain 05/22/2010   • Osteoporosis, idiopathic 05/22/2010   • Hypertension 05/20/2010   • Dyslipidemia 05/20/2010   • Preventative health care 05/20/2010       Family History   Problem Relation Age of Onset   • Heart Disease Father    • Diabetes Brother        She  has a past medical history of Anesthesia, Arthritis, Atrial fibrillation (HCC) (02/2019), Atypical chest pain, CATARACT (2007,08), Chronic anticoagulation, Constipation, COPD (chronic obstructive pulmonary disease) (HCC), DDD (degenerative disc disease), Dental disorder, Dyspnea, Headache(784.0), Hypercholesteremia, Hypertension, MEDICAL HOME, On home oxygen therapy, Osteoporosis, Other accident (2006), Pain, Pneumonia, TB lung, latent ( ), Valvular heart disease, Vertebral fracture, and Vertigo.  She  has a past surgical history that includes cataract phaco with iol (1/5/2009); other orthopedic surgery (may, 2010); carpal tunnel release (@30 yrs ago); and recovery (7/28/2010).        Health Maintenance Summary                IMM ZOSTER VACCINES Overdue 11/27/2012      Done 10/2/2012 Imm Admin: Zoster Vaccine Live (ZVL) (Zostavax) - HISTORICAL DATA    Annual Pulmonary Function Test / Spirometry Overdue 10/21/2016      Done 10/21/2015 PFT DICTATED RESULTS     Patient has more history with this topic...    BONE DENSITY Overdue 6/21/2018      Done 6/21/2013 DS-BONE DENSITY STUDY (DEXA)     Patient has more history with this topic...    IMM HEP B VACCINE Overdue 11/18/2019      Done 10/21/2019 Imm Admin: Hepatitis B Vaccine (Adol/Adult)    Annual Wellness Visit Overdue 10/21/2020      Done 10/21/2019 Visit Dx: Medicare annual wellness visit, subsequent     Patient has more history with this topic...    IMM DTaP/Tdap/Td Vaccine Next Due 12/11/2028      Done 12/11/2018 Imm Admin: Tdap Vaccine          Patient Care  Team:  Denis Krueger M.D. as PCP - General  Nathan Fajardo M.D. as Consulting Physician (Anesthesia)  Esdras Thornton M.D. as Consulting Physician (Pulmonary Medicine)  SARAH De La Garza as Consulting Physician (Pulmonary Medicine)  [facility] as Home Health Provider  Alexy Smyth M.D. as Consulting Physician (Interventional Cardiology)  Cecile Valenzuela as [facility]   Zaida Merchant P.A.-C. as Mid Level Provider (Physician Assistant)  [facility] as Respiratory Therapist (DME Supplier)  Nieves Torres, LSW, MSW as Community Care Manager (Social Work)         Objective:   /78 (BP Location: Right arm, Patient Position: Sitting, BP Cuff Size: Adult)   Temp (!) 35.7 °C (96.3 °F)   Ht 1.524 m (5')   Wt 60.8 kg (134 lb)   SpO2 94%   BMI 26.17 kg/m²     Physical Exam:   Constitutional: Alert, no distress, well-groomed.  Skin: No rashes in visible areas.  Eye: Round. Conjunctiva clear, lids normal. No icterus.   ENMT: Lips pink without lesions, good dentition, moist mucous membranes. Phonation normal.  Neck: No masses, no thyromegaly. Moves freely without pain.  Respiratory: Unlabored respiratory effort, no cough or audible wheeze  Psych: Alert and oriented x3, normal affect and mood.       Assessment and Plan:   The following treatment plan was discussed:   Assessment  #!  COPD, oxygen dependent at night, does not use oxygen during the day, does have history of sleep apnea but could not tolerate CPAP.  Using Trelegy inhaler question recent COPD flareup, completed course of Medrol Dosepak.  Daughter states patient's breathing was improved with the Medrol Dosepak.    #2 paroxysmal atrial fibrillation on carvedilol and Eliquis per cardiology no NSAIDs other than Celebrex    #3 diastolic dysfunction and diastolic CHF, fluid status stable, on torsemide per cardiology weight has remained stable.    #4 chronic low back pain status post vertebral compression  fracture, as seen pain management, surgery, physical therapy, is off narcotics and maintained on Celebrex and gabapentin.  Cannot tolerate narcotics, causes more memory loss.  She did notice improve pain control with the prednisone according to the daughter.    #5 mild dementia has seen neurology likely Alzheimer's, daughter has power of , narcotics and tramadol exacerbate memory loss so pain control is difficult especially given the fact that she is on Eliquis    #6 sleep apnea intolerant of CPAP     Plan  #!  Continue Trelegy inhaler, follow-up pulmonary, continue nocturnal oxygen 1 L since she cannot tolerate CPAP and use oxygen as needed during the day    #2 continue torsemide per cardiology monitoring fluid status and daily weights, continue low-sodium diet    #3 continue Eliquis, continue no over-the-counter NSAIDs    #4 discontinue Celebrex    #5 start prednisone 5 mg daily understanding the increased risks of GI bleed on Eliquis, long-term may worsen osteoporosis, daughter states she did notice improvement with her pain control and breathing with low-dose prednisone, we do not have good options for currently for chronic back pain, as hydrocodone, tramadol caused worsening memory issues, and mood changes, Celebrex seemed to be somewhat effective without any issues of GI bleeding in conjunction with Eliquis, but perhaps she received better pain control with low-dose prednisone compared to the Celebrex.  I would like to maintain a semblance of quality of life with regards to pain control and minimizing the side effects of pain medication for her while trying to keep her semi-independent, daughter understands that prednisone may increase risk of GI bleeding.  I think it is reasonable to discontinue the Celebrex and try low-dose prednisone which may additionally benefit her pulmonary status.  We will try 5 mg of prednisone short-term once a day and discontinue the Celebrex monitoring for GI bleeding  symptoms.  Continue no other NSAIDs.    #6 continue gabapentin previously increasing the dose of gabapentin caused memory issues    #7 continue check blood pressure and heart rate on a regular basis    #8 fall precautions, emphasized continue use of walker    #9 should she develop any worsening respiratory symptoms, recommend to urgent care for evaluation while she is in the [place] area, it is difficult to evaluate her respiratory via telemedicine as previously she has had respiratory issues, but making a determination as to etiology between an exacerbation of diastolic CHF versus COPD can sometimes be difficult to ascertain via telemedicine, her daughter will continue to check her temperature and daily pulse oximetry    #10 she has an upcoming appointment with cardiology and pulmonary, and she has an appointment for follow-up with myself in 2 weeks.

## 2020-12-30 PROBLEM — I50.30 DIASTOLIC CHF (HCC): Status: ACTIVE | Noted: 2020-05-18

## 2021-01-05 ENCOUNTER — TELEMEDICINE (OUTPATIENT)
Dept: CARDIOLOGY | Facility: MEDICAL CENTER | Age: 86
End: 2021-01-05
Payer: MEDICARE

## 2021-01-05 VITALS
HEIGHT: 60 IN | BODY MASS INDEX: 25.72 KG/M2 | HEART RATE: 88 BPM | WEIGHT: 131 LBS | SYSTOLIC BLOOD PRESSURE: 109 MMHG | DIASTOLIC BLOOD PRESSURE: 62 MMHG | OXYGEN SATURATION: 95 %

## 2021-01-05 DIAGNOSIS — N18.2 STAGE 2 CHRONIC KIDNEY DISEASE: ICD-10-CM

## 2021-01-05 DIAGNOSIS — Z79.01 ANTICOAGULATED: ICD-10-CM

## 2021-01-05 DIAGNOSIS — J96.11 CHRONIC RESPIRATORY FAILURE WITH HYPOXIA (HCC): ICD-10-CM

## 2021-01-05 DIAGNOSIS — R06.02 SHORTNESS OF BREATH: ICD-10-CM

## 2021-01-05 DIAGNOSIS — E78.5 DYSLIPIDEMIA: Chronic | ICD-10-CM

## 2021-01-05 DIAGNOSIS — J41.1 MUCOPURULENT CHRONIC BRONCHITIS (HCC): Chronic | ICD-10-CM

## 2021-01-05 DIAGNOSIS — I50.30 DIASTOLIC CONGESTIVE HEART FAILURE, UNSPECIFIED HF CHRONICITY (HCC): ICD-10-CM

## 2021-01-05 DIAGNOSIS — I10 ESSENTIAL HYPERTENSION: Chronic | ICD-10-CM

## 2021-01-05 DIAGNOSIS — I48.0 PAF (PAROXYSMAL ATRIAL FIBRILLATION) (HCC): ICD-10-CM

## 2021-01-05 PROCEDURE — 99214 OFFICE O/P EST MOD 30 MIN: CPT | Mod: 95,CR | Performed by: NURSE PRACTITIONER

## 2021-01-05 ASSESSMENT — ENCOUNTER SYMPTOMS
CHILLS: 0
NAUSEA: 0
PALPITATIONS: 0
HEADACHES: 0
INSOMNIA: 0
SHORTNESS OF BREATH: 0
MYALGIAS: 0
BACK PAIN: 1
BRUISES/BLEEDS EASILY: 0
ABDOMINAL PAIN: 0
PND: 0
ORTHOPNEA: 0
FEVER: 0
DIZZINESS: 0
COUGH: 1
LOSS OF CONSCIOUSNESS: 0

## 2021-01-05 ASSESSMENT — FIBROSIS 4 INDEX: FIB4 SCORE: 2.68

## 2021-01-06 ENCOUNTER — TELEMEDICINE (OUTPATIENT)
Dept: SLEEP MEDICINE | Facility: MEDICAL CENTER | Age: 86
End: 2021-01-06
Payer: MEDICARE

## 2021-01-06 VITALS — WEIGHT: 131 LBS | BODY MASS INDEX: 25.72 KG/M2 | HEIGHT: 60 IN

## 2021-01-06 DIAGNOSIS — R06.02 SOB (SHORTNESS OF BREATH): ICD-10-CM

## 2021-01-06 DIAGNOSIS — F03.90 DEMENTIA WITHOUT BEHAVIORAL DISTURBANCE, UNSPECIFIED DEMENTIA TYPE: ICD-10-CM

## 2021-01-06 DIAGNOSIS — J44.9 CHRONIC OBSTRUCTIVE PULMONARY DISEASE, UNSPECIFIED COPD TYPE (HCC): ICD-10-CM

## 2021-01-06 DIAGNOSIS — R09.02 HYPOXEMIA: ICD-10-CM

## 2021-01-06 PROCEDURE — 99213 OFFICE O/P EST LOW 20 MIN: CPT | Mod: 95,CR | Performed by: INTERNAL MEDICINE

## 2021-01-06 RX ORDER — DOXYCYCLINE HYCLATE 100 MG/1
100 CAPSULE ORAL 2 TIMES DAILY
Qty: 20 CAP | Refills: 3 | Status: SHIPPED | OUTPATIENT
Start: 2021-01-06 | End: 2021-04-19

## 2021-01-06 RX ORDER — PREDNISONE 5 MG/1
TABLET ORAL
Qty: 60 TAB | Refills: 3 | Status: SHIPPED | OUTPATIENT
Start: 2021-01-06 | End: 2021-02-10

## 2021-01-06 ASSESSMENT — FIBROSIS 4 INDEX: FIB4 SCORE: 2.68

## 2021-01-06 NOTE — PROGRESS NOTES
Telemedicine: Established Patient   This evaluation was conducted via Zoom using secure and encrypted videoconferencing technology. The patient was in a private location in the [facility].    The patient's identity was confirmed and verbal consent was obtained for this virtual visit.    Subjective:   CC:   Chief Complaint   Patient presents with   • Follow-Up   • Atrial Fibrillation   • Anticoagulation   • HTN (Controlled)   • CHF (Diastolic)     Venus is a 89 year old female with history of atrial fibrillation, anticoagulation, COPD, hypertension and hyperlipidemia, normally followed by Dr. Smyth, and last seen by me in October 2020.      She is here today for three month follow-up via Zoom, with her daughter. She is currently in [place] ([facility]); her PCP did giver her Prednisone to take for ongoing bronchitis/wheezing, which is slowly helping. She is using nebulizer treatment, as well as inhalers. She does continue to use oxygen throughout the day PRN, and always at nighttime. No fever or chills. No chest pain, pressure, tightness or heaviness. No orthopnea or PND; some mild lightheadedness, but no syncope or presyncope. LE is very stable on Torsemide. She does try to walk periodically, and generally tolerates OK.    Review of Systems   Constitutional: Positive for malaise/fatigue. Negative for chills and fever.   HENT: Negative for congestion.    Respiratory: Positive for cough. Negative for shortness of breath.    Cardiovascular: Negative for chest pain, palpitations, orthopnea, leg swelling and PND.   Gastrointestinal: Negative for abdominal pain and nausea.   Musculoskeletal: Positive for back pain and joint pain. Negative for myalgias.   Skin: Negative for rash.   Neurological: Negative for dizziness, loss of consciousness and headaches.   Endo/Heme/Allergies: Does not bruise/bleed easily.   Psychiatric/Behavioral: The patient does not have insomnia.          Allergies   Allergen Reactions   • Codeine  Vomiting   • Irbesartan Cough     Strong cough, pt.s daughter reports the ER  Advised she stop taking    • Lisinopril Shortness of Breath and Cough       Current medicines (including changes today)  Current Outpatient Medications   Medication Sig Dispense Refill   • predniSONE (DELTASONE) 10 MG Tab Take 0.5 Tabs by mouth every day for 30 days. 30 Tab 0   • torsemide (DEMADEX) 10 MG tablet Take 1 tablet by mouth once daily 90 Tab 1   • HYDROcodone-acetaminophen (NORCO) 7.5-325 MG per tablet TAKE 1 TO 2 TABLETS BY MOUTH TWICE DAILY AS NEEDED FOR MODERATE PAIN FOR UP TO 30 DAYS     • gabapentin (NEURONTIN) 100 MG Cap TAKE 1 CAPSULE BY MOUTH ONCE DAILY IN THE EVENING 30 Cap 3   • traZODone (DESYREL) 50 MG Tab Take 0.5 Tabs by mouth at bedtime as needed for Sleep. 90 Tab 3   • Magnesium 100 MG Tab Take 1 Tab by mouth every bedtime.     • Green Tea, Olya sinensis, (GREEN TEA PO) Take 1 Tab by mouth ONE-HALF HOUR AFTER LUNCH.     • albuterol (PROVENTIL) 2.5mg/3ml Nebu Soln solution for nebulization USE 3 ML IN NEBULIZER  EVERY 4 HOURS AS NEEDED FOR SHORTNESS OF BREATH 300 mL 5   • Fluticasone-Umeclidin-Vilant (TRELEGY ELLIPTA) 100-62.5-25 MCG/INH AEROSOL POWDER, BREATH ACTIVATED Inhale 1 Inhalation by mouth every day. Indications: Chronic Obstructive Lung Disease 1 Each 11   • apixaban (ELIQUIS) 2.5mg Tab Take 1 Tab by mouth 2 Times a Day. 60 Tab 0   • carvedilol (COREG) 3.125 MG Tab Take 1 Tab by mouth 2 times a day, with meals. 180 Tab 3   • Multiple Vitamins-Minerals (CENTRUM SILVER PO) Take 1 Tab by mouth ONE-HALF HOUR AFTER LUNCH.     • Coenzyme Q10 (COQ10 PO) Take 1 Tab by mouth every evening.     • Probiotic Product (PROBIOTIC PO) Take 1 Cap by mouth every day.     • Alpha-Lipoic Acid (LIPOIC ACID PO) Take 1 Tab by mouth every morning.     • Ginkgo Biloba 120 MG Cap Take 120 mg by mouth every morning.       No current facility-administered medications for this visit.        Patient Active Problem List     Diagnosis Date Noted   • Diastolic CHF ([facility]) 05/18/2020   • Atherosclerosis of aorta ([facility]) 01/06/2020   • Renal mass 10/07/2019   • Ventral hernia 10/07/2019   • AAA (abdominal aortic aneurysm) ([facility]) 02/27/2019   • Chronic respiratory failure with hypoxia ([facility]) 02/13/2019   • Nocturnal and exertional hypoxemia 03/20/2018   • TB lung, latent 05/04/2016   • Dementia ([facility]) 01/11/2016   • Sensorineural hearing loss 12/16/2015   • History of opioid dependence  04/23/2015   • Anticoagulated 12/29/2014   • Macular degeneration 07/08/2013   • Tension headache 06/11/2013   • History of shingles 06/07/2012   • History of positive h.pylori titer 11/01/2011   • Sleep apnea 08/10/2011   • COPD (chronic obstructive pulmonary disease) ([facility]) 07/20/2011   • Right hip pain 03/28/2011   • PAF (paroxysmal atrial fibrillation) ([facility]) 06/10/2010   • History of compression fracture of spine 05/26/2010   • Low back pain 05/22/2010   • Cervical pain 05/22/2010   • Osteoporosis, idiopathic 05/22/2010   • Hypertension 05/20/2010   • Dyslipidemia 05/20/2010   • Preventative health care 05/20/2010       Family History   Problem Relation Age of Onset   • Heart Disease Father    • Diabetes Brother        She  has a past medical history of Anesthesia, Arthritis, Atrial fibrillation ([facility]) (02/2019), Atypical chest pain, CATARACT (2007,08), Chronic anticoagulation, Constipation, COPD (chronic obstructive pulmonary disease) ([facility]), DDD (degenerative disc disease), Dental disorder, Dyspnea, Headache(784.0), Hypercholesteremia, Hypertension, MEDICAL HOME, On home oxygen therapy, Osteoporosis, Other accident (2006), Pain, Pneumonia, TB lung, latent ( ), Valvular heart disease, Vertebral fracture, and Vertigo.  She  has a past surgical history that includes cataract phaco with iol (1/5/2009); other orthopedic surgery (may, 2010); carpal tunnel release (@30 yrs ago); and recovery (7/28/2010).       Objective:   /62 (BP Location: Left arm, Patient  Position: Sitting, BP Cuff Size: Adult)   Pulse 88   Ht 1.524 m (5')   Wt 59.4 kg (131 lb)   SpO2 95%   BMI 25.58 kg/m²     Physical Exam   Constitutional: She is oriented to person, place, and time and well-developed, well-nourished, and in no distress.   HENT:   Head: Normocephalic.   Neck: Normal range of motion.   Pulmonary/Chest: Effort normal.   Neurological: She is alert and oriented to person, place, and time.   Psychiatric: Mood, memory, affect and judgment normal.     RESULTS OF LABS OF 11/19/2020:  Glucose 108  BUN 24  Creatinine 1.00  GFR 50  Potassium 4.2  AST 17  ALT 10  TSH 0.98  CBC stable    CONCLUSIONS OF ECHOCARDIOGRAM OF 2/16/2019:  Normal left ventricular systolic function.  Left ventricular ejection fraction is visually estimated to be 55%.  Grade II diastolic dysfunction.  Normal right ventricular systolic function.  Mild mitral regurgitation.  Moderate aortic insufficiency.  Moderate tricuspid regurgitation.  Right ventricular systolic pressure is estimated to be 45  mmHg.  Compared to the images of the study done 1/24/2013 - there has been an   increase in the estimate of the right ventricular systolic pressure,   previously normal.        Assessment and Plan:   The following treatment plan was discussed:     1. PAF (paroxysmal atrial fibrillation) (HCC)  -     Basic Metabolic Panel; Future  -     proBrain Natriuretic Peptide, NT; Future  She is rate controlled with Coreg. Consider repeat echo at next in-person follow-up. To check BMP and proBNP.    2. Anticoagulated  Using Eliquis, no bleeding problems. Will need refill soon.    3. Diastolic congestive heart failure, unspecified HF chronicity (HCC)  On Torsemide, stable. As above, to consider repeat echo at next follow-up. Labs as above.    4. Shortness of breath  -     Basic Metabolic Panel; Future  -     proBrain Natriuretic Peptide, NT; Future    5. Stage 2 chronic kidney disease  -     Basic Metabolic Panel; Future  -     proBrain  Natriuretic Peptide, NT; Future  To check BMP    6. Essential hypertension  Treated, stable. Continue Coreg    7. Dyslipidemia  Treated with diet only.    8. Mucopurulent chronic bronchitis (HCC)  On inhalers, and currently Prednisone.    Chronic respiratory failure with hypoxia (HCC)    Labs above. Same medications for now. Suggest FU in 3-6 months, sooner if clinical condition changes.      Follow-up: No follow-ups on file.

## 2021-01-07 ENCOUNTER — TELEPHONE (OUTPATIENT)
Dept: MEDICAL GROUP | Facility: MEDICAL CENTER | Age: 86
End: 2021-01-07

## 2021-01-07 NOTE — TELEPHONE ENCOUNTER
ESTABLISHED PATIENT PRE-VISIT PLANNING     Patient was NOT contacted to complete PVP.     Note: Patient will not be contacted if there is no indication to call.     1.  Reviewed notes from the last few office visits within the medical group: Yes    2.  If any orders were placed at last visit or intended to be done for this visit (i.e. 6 mos follow-up), do we have Results/Consult Notes?         •  Labs - Labs were not ordered at last office visit.  Note: If patient appointment is for lab review and patient did not complete labs, check with provider if OK to reschedule patient until labs completed.       •  Imaging - Imaging was not ordered at last office visit.       •  Referrals - No referrals were ordered at last office visit.    3. Is this appointment scheduled as a Hospital Follow-Up? No    4.  Immunizations were updated in Epic using Reconcile Outside Information activity? Yes    5.  Patient is due for the following Health Maintenance Topics:   Health Maintenance Due   Topic Date Due   • IMM ZOSTER VACCINES (2 of 3) 11/27/2012   • Annual Pulmonary Function Test / Spirometry  10/21/2016   • BONE DENSITY  06/21/2018   • IMM HEP B VACCINE (2 of 3 - Risk 3-dose series) 11/18/2019   • Annual Wellness Visit  10/21/2020         6.  AHA (Pulse[employer]) form printed for Provider? Email sent to [facility] requesting form

## 2021-01-07 NOTE — PROGRESS NOTES
Telemedicine: Established Patient   This evaluation was conducted via Zoom using secure and encrypted videoconferencing technology. The patient was in a private location in the [facility].    The patient's identity was confirmed and verbal consent was obtained for this virtual visit.    Subjective:   CC:   Chief Complaint   Patient presents with   • Follow-Up     COPD       Venus Church is a 89 y.o. female presenting for evaluation and management of: Shortness of breath    The patient is an 89-year-old woman who has chronic obstructive pulmonary disease with hypoxemia.  She also has a history of atrial fibrillation and diastolic dysfunction.  Her estimated RVSP is 45 mmHg.  She has dementia.  She is not feeling well today and we saw her while she was lying in bed barely awake.  The patient also has a history of a positive QuantiFERON gold.  She has never received INH chemoprophylaxis.  The history was taken from her daughter.  The patient was not able to be examined.  The patient is now living with her daughter in [facility] at least temporarily during the Covid epidemic.  She continues to use Xopenex via nebulizer on a as needed basis.  She uses Trelegy daily.  At sea level she has been able to discontinue her daytime supplemental oxygen with adequate oxygen saturations.  She has reduced her nocturnal oxygen from 2 L to 1 L/min.    She does have chronic pain issues which in the past have been treated with narcotic medications.  Those medications have been reduced and she has been started on prednisone at 5 mg a day for her arthritis and pain.      ROS   Respiratory issues as mentioned above.  The patient was somewhat groggy and a full review of systems was not able to be obtained.      Allergies   Allergen Reactions   • Codeine Vomiting   • Irbesartan Cough     Strong cough, pt.s daughter reports the ER  Advised she stop taking    • Lisinopril Shortness of Breath and Cough       Current  medicines (including changes today)  Current Outpatient Medications   Medication Sig Dispense Refill   • predniSONE (DELTASONE) 5 MG Tab Take 20mg x 3 days, then 15 mg x 3 days, then 10 mg x 3 days then 5 mg qd 60 Tab 3   • doxycycline (VIBRAMYCIN) 100 MG Cap Take 1 Cap by mouth 2 times a day. Take as prescribed until gone. 20 Cap 3   • predniSONE (DELTASONE) 10 MG Tab Take 0.5 Tabs by mouth every day for 30 days. 30 Tab 0   • torsemide (DEMADEX) 10 MG tablet Take 1 tablet by mouth once daily 90 Tab 1   • gabapentin (NEURONTIN) 100 MG Cap TAKE 1 CAPSULE BY MOUTH ONCE DAILY IN THE EVENING 30 Cap 3   • traZODone (DESYREL) 50 MG Tab Take 0.5 Tabs by mouth at bedtime as needed for Sleep. 90 Tab 3   • Magnesium 100 MG Tab Take 1 Tab by mouth every bedtime.     • albuterol (PROVENTIL) 2.5mg/3ml Nebu Soln solution for nebulization USE 3 ML IN NEBULIZER  EVERY 4 HOURS AS NEEDED FOR SHORTNESS OF BREATH 300 mL 5   • Fluticasone-Umeclidin-Vilant (TRELEGY ELLIPTA) 100-62.5-25 MCG/INH AEROSOL POWDER, BREATH ACTIVATED Inhale 1 Inhalation by mouth every day. Indications: Chronic Obstructive Lung Disease 1 Each 11   • apixaban (ELIQUIS) 2.5mg Tab Take 1 Tab by mouth 2 Times a Day. 60 Tab 0   • carvedilol (COREG) 3.125 MG Tab Take 1 Tab by mouth 2 times a day, with meals. 180 Tab 3   • Multiple Vitamins-Minerals (CENTRUM SILVER PO) Take 1 Tab by mouth ONE-HALF HOUR AFTER LUNCH.     • Coenzyme Q10 (COQ10 PO) Take 1 Tab by mouth every evening.     • Probiotic Product (PROBIOTIC PO) Take 1 Cap by mouth every day.     • Ginkgo Biloba 120 MG Cap Take 120 mg by mouth every morning.     • HYDROcodone-acetaminophen (NORCO) 7.5-325 MG per tablet TAKE 1 TO 2 TABLETS BY MOUTH TWICE DAILY AS NEEDED FOR MODERATE PAIN FOR UP TO 30 DAYS     • Green Tea, Olya sinensis, (GREEN TEA PO) Take 1 Tab by mouth ONE-HALF HOUR AFTER LUNCH.     • Alpha-Lipoic Acid (LIPOIC ACID PO) Take 1 Tab by mouth every morning.       No current facility-administered  medications for this visit.        Patient Active Problem List    Diagnosis Date Noted   • Diastolic CHF ([facility]) 05/18/2020   • Atherosclerosis of aorta ([facility]) 01/06/2020   • Renal mass 10/07/2019   • Ventral hernia 10/07/2019   • AAA (abdominal aortic aneurysm) ([facility]) 02/27/2019   • Chronic respiratory failure with hypoxia ([facility]) 02/13/2019   • Nocturnal and exertional hypoxemia 03/20/2018   • TB lung, latent 05/04/2016   • Dementia ([facility]) 01/11/2016   • Sensorineural hearing loss 12/16/2015   • History of opioid dependence  04/23/2015   • Anticoagulated 12/29/2014   • Macular degeneration 07/08/2013   • Tension headache 06/11/2013   • History of shingles 06/07/2012   • History of positive h.pylori titer 11/01/2011   • Sleep apnea 08/10/2011   • COPD (chronic obstructive pulmonary disease) ([facility]) 07/20/2011   • Right hip pain 03/28/2011   • PAF (paroxysmal atrial fibrillation) ([facility]) 06/10/2010   • History of compression fracture of spine 05/26/2010   • Low back pain 05/22/2010   • Cervical pain 05/22/2010   • Osteoporosis, idiopathic 05/22/2010   • Hypertension 05/20/2010   • Dyslipidemia 05/20/2010   • Preventative health care 05/20/2010       Family History   Problem Relation Age of Onset   • Heart Disease Father    • Diabetes Brother        She  has a past medical history of Anesthesia, Arthritis, Atrial fibrillation ([facility]) (02/2019), Atypical chest pain, CATARACT (2007,08), Chronic anticoagulation, Constipation, COPD (chronic obstructive pulmonary disease) ([facility]), DDD (degenerative disc disease), Dental disorder, Dyspnea, Headache(784.0), Hypercholesteremia, Hypertension, MEDICAL HOME, On home oxygen therapy, Osteoporosis, Other accident (2006), Pain, Pneumonia, TB lung, latent ( ), Valvular heart disease, Vertebral fracture, and Vertigo.  She  has a past surgical history that includes cataract phaco with iol (1/5/2009); other orthopedic surgery (may, 2010); carpal tunnel release (@30 yrs ago); and recovery  (7/28/2010).       Objective:   Ht 1.524 m (5')   Wt 59.4 kg (131 lb)   Breastfeeding No   BMI 25.58 kg/m²     Physical Exam   Not able to be done    Assessment and Plan:   The following treatment plan was discussed:     1. SOB (shortness of breath)    2. Hypoxemia    3. Chronic obstructive pulmonary disease, unspecified COPD type (HCC)    4. Dementia without behavioral disturbance, unspecified dementia type (HCC)    Other orders  - predniSONE (DELTASONE) 5 MG Tab; Take 20mg x 3 days, then 15 mg x 3 days, then 10 mg x 3 days then 5 mg qd  Dispense: 60 Tab; Refill: 3  - doxycycline (VIBRAMYCIN) 100 MG Cap; Take 1 Cap by mouth 2 times a day. Take as prescribed until gone.  Dispense: 20 Cap; Refill: 3        According to her daughter the patient is now at baseline.  In the past during COPD exacerbation she has been treated with a prednisone taper and doxycycline.  We will provide prescriptions for those medications now that she is living in [place].  We will make no other changes in her medications.  We will see her via telemedicine in 6 months or sooner if she has issues.

## 2021-01-08 ENCOUNTER — PATIENT OUTREACH (OUTPATIENT)
Dept: MEDICAL GROUP | Facility: MEDICAL CENTER | Age: 86
End: 2021-01-08

## 2021-01-08 NOTE — PROGRESS NOTES
TC2 to kin. VM(2) left inquiring whether assistance in needed in regards to patient assistance program applications.    Plan:  TC3 to kin on or around 1/22/2021

## 2021-01-11 DIAGNOSIS — Z23 NEED FOR VACCINATION: ICD-10-CM

## 2021-01-19 ENCOUNTER — TELEPHONE (OUTPATIENT)
Dept: CARDIOLOGY | Facility: MEDICAL CENTER | Age: 86
End: 2021-01-19

## 2021-01-19 NOTE — TELEPHONE ENCOUNTER
AB    NM: Addison   HOSP: [facility]   PH: [phone]   PT NM: Venus Church   : 1931   RE: Needs to know what test code to  use, as patient is there now.      --------------------------------------  Message History  Account: [id number]  Taken:  2021 12:23p   Serial#: [id number]

## 2021-01-22 ENCOUNTER — PATIENT OUTREACH (OUTPATIENT)
Dept: MEDICAL GROUP | Facility: MEDICAL CENTER | Age: 86
End: 2021-01-22

## 2021-01-22 DIAGNOSIS — I48.0 PAF (PAROXYSMAL ATRIAL FIBRILLATION) (HCC): Chronic | ICD-10-CM

## 2021-01-22 NOTE — PROGRESS NOTES
TC3 to kin. Kin states pt is almost out of Eliquis. PT does not qualify for PAP at this time, pt si still working ot meet min out of pocket requirement.  Pt is currently residing in [place] and kin is requesting order for Eliquis be sent to [facility] in [place].    SW will not actively follow pat at this time. Kin encouraged to reach out to SW as needed regarding PAP application and/or pt's transition to [place].    Plan:  Message routed to PCP  SW will not actively follow pt at this time

## 2021-01-25 DIAGNOSIS — R60.0 LEG EDEMA: ICD-10-CM

## 2021-02-10 RX ORDER — CELECOXIB 100 MG/1
100 CAPSULE ORAL 2 TIMES DAILY
Qty: 60 CAPSULE | Refills: 1
Start: 2021-02-10 | End: 2021-04-26 | Stop reason: SDUPTHER

## 2021-02-11 ENCOUNTER — TELEPHONE (OUTPATIENT)
Dept: MEDICAL GROUP | Facility: MEDICAL CENTER | Age: 86
End: 2021-02-11

## 2021-02-12 DIAGNOSIS — I48.0 PAF (PAROXYSMAL ATRIAL FIBRILLATION) (HCC): Chronic | ICD-10-CM

## 2021-02-12 NOTE — TELEPHONE ENCOUNTER
VOICEMAIL  1. Caller Name: Nelson                      Call Back Number:     2. Message: Wants a call back and to know if he can be a thomas pt    3. Patient approves office to leave a detailed voicemail/MyChart message: N\A

## 2021-02-16 NOTE — TELEPHONE ENCOUNTER
Pt does not remember what question he has about his mom (pt) but I let him know Dr. Krueger is not currently accepting new pts.

## 2021-02-23 ENCOUNTER — TELEPHONE (OUTPATIENT)
Dept: MEDICAL GROUP | Facility: MEDICAL CENTER | Age: 86
End: 2021-02-23

## 2021-02-23 DIAGNOSIS — M54.9 BACK PAIN, UNSPECIFIED BACK LOCATION, UNSPECIFIED BACK PAIN LATERALITY, UNSPECIFIED CHRONICITY: ICD-10-CM

## 2021-02-23 DIAGNOSIS — M54.50 LOW BACK PAIN, UNSPECIFIED BACK PAIN LATERALITY, UNSPECIFIED CHRONICITY, UNSPECIFIED WHETHER SCIATICA PRESENT: Chronic | ICD-10-CM

## 2021-02-23 DIAGNOSIS — F11.20 UNCOMPLICATED OPIOID DEPENDENCE (HCC): ICD-10-CM

## 2021-02-23 NOTE — TELEPHONE ENCOUNTER
The OxyContin is a very strong narcotic and pain medication.  Does she have any hydrocodone?  It would be best to start with the lower strength pain medication.    If she does not have any hydrocodone, we would need to send a prescription to her pharmacy in [place] although I am not sure if I can send that to [place] since I do not have the [place] pharmacy license, only have a [place] pharmacy license.

## 2021-02-23 NOTE — TELEPHONE ENCOUNTER
Oxycontin ER 10 mg, Yashira said she can hear the joints popping. Also of note, O2 is down to 87 in am upon rising, on 1 LPM noct. Daytime she is 96% average without O2.

## 2021-02-23 NOTE — TELEPHONE ENCOUNTER
Pt has both but the Hydrocone makes her dizzy. States that they believe the Oxycontin was well tolerated by pt and it worked very well for her controlling her pain.

## 2021-02-23 NOTE — TELEPHONE ENCOUNTER
Venus Church's son Dileep  [phone] (Dileep)       Nelson called to advise that Venus is in a lot of pain. She has tried Prednisone and Gabapentin. Currently using Celebrex. They feel thase are not controlling her pain well. Has some left over Oxycontin and wants to know if he can give it to her to see if it works better for her. If it does, he would like to have her on it continously. Please advise.

## 2021-02-23 NOTE — TELEPHONE ENCOUNTER
Could you please clarify with the daughter the prescription name, is it OxyContin or oxycodone or hydrocodone?  And the dose as well?

## 2021-02-24 RX ORDER — OXYCODONE HCL 10 MG/1
10 TABLET, FILM COATED, EXTENDED RELEASE ORAL
Qty: 30 TABLET | Refills: 0 | Status: SHIPPED | OUTPATIENT
Start: 2021-02-24 | End: 2021-03-02 | Stop reason: SDUPTHER

## 2021-02-24 NOTE — TELEPHONE ENCOUNTER
Yashira notified, appt scheduled. Yashira wanted you to know that she believes she has 10 or 11 of these pills and if you cant Rx to [place]. Then her brother can pick them up here and bring them to Venus.

## 2021-02-24 NOTE — TELEPHONE ENCOUNTER
Please let her daughter know, she can try the OxyContin half a tablet in the morning, but the medication can cause worsening confusion, memory loss, sedation, drowsiness, and increase her risk for falls.    Also please have her schedule a follow-up telemedicine appointment.

## 2021-02-25 NOTE — TELEPHONE ENCOUNTER
LM for Yashira on her VM, she has been instructed to call if she has an issue with picking this Rx up at her local pharmacy in [place].

## 2021-02-25 NOTE — TELEPHONE ENCOUNTER
Please notify her daughter Yashira that I have sent the prescription to [facility] in [place].  Have her call the pharmacy first to see if they received the order and if it is ready to be picked up.

## 2021-03-01 ENCOUNTER — TELEPHONE (OUTPATIENT)
Dept: CARDIOLOGY | Facility: MEDICAL CENTER | Age: 86
End: 2021-03-01

## 2021-03-01 NOTE — TELEPHONE ENCOUNTER
Yashira was not able to  in [place], requesting that you send to [facility] [place] and also would like it to be for 5 mg tabs BID instead of having to cut the 10 mg in half.

## 2021-03-02 RX ORDER — OXYCODONE HCL 10 MG/1
10 TABLET, FILM COATED, EXTENDED RELEASE ORAL
Qty: 30 TABLET | Refills: 0 | Status: SHIPPED | OUTPATIENT
Start: 2021-03-02 | End: 2021-03-08 | Stop reason: SDUPTHER

## 2021-03-02 NOTE — TELEPHONE ENCOUNTER
Yashira just wants  to know that Oxycodone doesn't help Venus with her pain. She states she will try to just give her one once every other day. Please do RF to [facility] in [place] and Dileep can bring it to her.

## 2021-03-02 NOTE — TELEPHONE ENCOUNTER
She can HOLD Coreg if BP<100 systolic, and she's symptomatic (dizziness, fatigue).  Otherwise, your instructions were great.  Thanks, AB

## 2021-03-02 NOTE — TELEPHONE ENCOUNTER
Please notify the patient's daughter that the lowest strength of the OxyContin is 10 mg, OxyContin.    We could try oxycodone 5 mg which is different from the OxyContin and not as strong.

## 2021-03-02 NOTE — TELEPHONE ENCOUNTER
Thank you for the notification, I will send the [facility]Contin prescription to [facility].

## 2021-03-02 NOTE — TELEPHONE ENCOUNTER
Pt says OxyContin 10 mg slow release she doesn't understand if cutting it makes a difference so she is concerned for that otherwise she doesn't mind cutting it

## 2021-03-08 ENCOUNTER — TELEPHONE (OUTPATIENT)
Dept: MEDICAL GROUP | Facility: MEDICAL CENTER | Age: 86
End: 2021-03-08

## 2021-03-08 DIAGNOSIS — M54.9 BACK PAIN, UNSPECIFIED BACK LOCATION, UNSPECIFIED BACK PAIN LATERALITY, UNSPECIFIED CHRONICITY: ICD-10-CM

## 2021-03-08 RX ORDER — OXYCODONE HCL 10 MG/1
10 TABLET, FILM COATED, EXTENDED RELEASE ORAL
Qty: 30 TABLET | Refills: 0 | Status: SHIPPED | OUTPATIENT
Start: 2021-03-08 | End: 2021-03-16

## 2021-03-08 NOTE — TELEPHONE ENCOUNTER
Oxycontin  Sent to [place] pharmacy last week and they would not fill. Please send to [facility] and her son will deliver to her.         Received request via: Patient    Was the patient seen in the last year in this department? Yes    Does the patient have an active prescription (recently filled or refills available) for medication(s) requested? No

## 2021-03-08 NOTE — TELEPHONE ENCOUNTER
Please notify the patient or daughter that I will send the prescription locally to [facility] for the OxyContin.

## 2021-03-09 ENCOUNTER — TELEPHONE (OUTPATIENT)
Dept: MEDICAL GROUP | Facility: MEDICAL CENTER | Age: 86
End: 2021-03-09

## 2021-03-09 NOTE — TELEPHONE ENCOUNTER
ESTABLISHED PATIENT PRE-VISIT PLANNING     Patient was NOT contacted to complete PVP.     Note: Patient will not be contacted if there is no indication to call.     1.  Reviewed notes from the last few office visits within the medical group: Yes    2.  If any orders were placed at last visit or intended to be done for this visit (i.e. 6 mos follow-up), do we have Results/Consult Notes?         •  Labs - Labs were not ordered at last office visit.  Note: If patient appointment is for lab review and patient did not complete labs, check with provider if OK to reschedule patient until labs completed.       •  Imaging - Imaging was not ordered at last office visit.       •  Referrals - Referral ordered, patient was seen and consult notes are in chart. Care Teams updated  YES.    3. Is this appointment scheduled as a Hospital Follow-Up? No    4.  Immunizations were updated in Epic using Reconcile Outside Information activity? Yes    5.  Patient is due for the following Health Maintenance Topics:   Health Maintenance Due   Topic Date Due   • COVID-19 Vaccine (1 of 2) Never done   • IMM ZOSTER VACCINES (2 of 3) 11/27/2012   • Annual Pulmonary Function Test / Spirometry  10/21/2016   • BONE DENSITY  06/21/2018   • IMM HEP B VACCINE (2 of 3 - Risk 3-dose series) 11/18/2019   • Annual Wellness Visit  10/21/2020     6.  AHA (Pulse8) form printed for Provider? Email sent to [facility] requesting form       Outside information NOT reconciled using the Accent feature. Per Rosalind Kim, the Accent feature is down as of 02/09/2021 at 2:00pm. Will reconcile outside information at a later date.

## 2021-03-12 DIAGNOSIS — R60.0 LEG EDEMA: ICD-10-CM

## 2021-03-15 RX ORDER — TORSEMIDE 10 MG/1
TABLET ORAL
Qty: 100 TABLET | Refills: 3 | Status: SHIPPED | OUTPATIENT
Start: 2021-03-15

## 2021-03-16 ENCOUNTER — TELEPHONE (OUTPATIENT)
Dept: MEDICAL GROUP | Facility: MEDICAL CENTER | Age: 86
End: 2021-03-16

## 2021-03-16 ENCOUNTER — TELEMEDICINE (OUTPATIENT)
Dept: MEDICAL GROUP | Facility: MEDICAL CENTER | Age: 86
End: 2021-03-16
Payer: MEDICARE

## 2021-03-16 VITALS
WEIGHT: 135 LBS | SYSTOLIC BLOOD PRESSURE: 134 MMHG | DIASTOLIC BLOOD PRESSURE: 66 MMHG | HEART RATE: 88 BPM | HEIGHT: 60 IN | BODY MASS INDEX: 26.5 KG/M2

## 2021-03-16 DIAGNOSIS — I70.0 ATHEROSCLEROSIS OF AORTA (HCC): ICD-10-CM

## 2021-03-16 DIAGNOSIS — F11.20 UNCOMPLICATED OPIOID DEPENDENCE (HCC): ICD-10-CM

## 2021-03-16 DIAGNOSIS — J41.1 MUCOPURULENT CHRONIC BRONCHITIS (HCC): Chronic | ICD-10-CM

## 2021-03-16 DIAGNOSIS — Z91.81 AT HIGH RISK FOR INJURY RELATED TO FALL: ICD-10-CM

## 2021-03-16 DIAGNOSIS — M54.50 LOW BACK PAIN, UNSPECIFIED BACK PAIN LATERALITY, UNSPECIFIED CHRONICITY, UNSPECIFIED WHETHER SCIATICA PRESENT: Chronic | ICD-10-CM

## 2021-03-16 DIAGNOSIS — M54.2 CERVICAL PAIN: Chronic | ICD-10-CM

## 2021-03-16 DIAGNOSIS — M54.9 BACK PAIN, UNSPECIFIED BACK LOCATION, UNSPECIFIED BACK PAIN LATERALITY, UNSPECIFIED CHRONICITY: ICD-10-CM

## 2021-03-16 PROCEDURE — 99213 OFFICE O/P EST LOW 20 MIN: CPT | Mod: 95,CR | Performed by: INTERNAL MEDICINE

## 2021-03-16 RX ORDER — HYDROCODONE BITARTRATE AND ACETAMINOPHEN 10; 325 MG/1; MG/1
TABLET ORAL
Qty: 45 TABLET | Refills: 0 | Status: SHIPPED | OUTPATIENT
Start: 2021-03-16 | End: 2021-03-16 | Stop reason: SDUPTHER

## 2021-03-16 RX ORDER — HYDROCODONE BITARTRATE AND ACETAMINOPHEN 10; 325 MG/1; MG/1
TABLET ORAL
Qty: 45 TABLET | Refills: 0 | Status: SHIPPED | OUTPATIENT
Start: 2021-03-16 | End: 2021-04-15

## 2021-03-16 ASSESSMENT — FIBROSIS 4 INDEX: FIB4 SCORE: 2.68

## 2021-03-16 ASSESSMENT — PATIENT HEALTH QUESTIONNAIRE - PHQ9: CLINICAL INTERPRETATION OF PHQ2 SCORE: 0

## 2021-03-16 NOTE — TELEPHONE ENCOUNTER
Please call the patient's pharmacy [facility] [address], I just sent a prescription electronically for hydrocodone 10 mg tablets, 1 tablet in the morning, half a tablet at night.   (1) can they please cancel the OxyContin prescription on file?  (2) does the hydrocodone 10 mg new prescription need a prior authorization?  (3) please call the patient's daughter Yashira who is power of  to schedule follow-up for video telemedicine appointment in about 2 months

## 2021-03-16 NOTE — PROGRESS NOTES
Subjective:     Venus Church is a 89 y.o. female here today for  Follow up meds      Virtual Visit: Established Patient   This visit was conducted via Zoom using secure and encrypted videoconferencing technology. The patient was in a private location in the state [facility].    The patient's identity was confirmed and verbal consent was obtained for this virtual visit.    Subjective:   CC: Medication review back pain  Chief Complaint   Patient presents with   • Follow-Up     Med mgmt       Venus Church is a 89 y.o. female presenting for evaluation and management of:med review  Video appointment with daughter patient is currently living with her daughter in [place] because of the recent Covid pandemic.  Patient has chronic back pain dating back many years, has seen pain management locally and at [place], has had a T12 vertebroplasty 2010, has had multiple injections with steroids, trigger point injections, facet point injections, nerve block attempts by pain management.  She has been to physical therapy.  She has seen neurosurgery and is not a surgical candidate.  Patient has had two covid vaccines moderna without any side effects.  Multiple imaging studies have shown old compression fracture T11-T12.  She has been tried on numerous pain medications including NSAIDs, Celebrex, topical Voltaren, Lidoderm patches, Lyrica, Neurontin, Cymbalta, tramadol, prednisone, has had physical therapy, TENS, spinal stimulator trial, has seen pain management at [facility] and locally.  Chronic back pain lives with daughter, now taking oxycontin 10 mg at noon, daughter states that pain is worse at night, tried cutting the tab in half but that made her worse with more confusion, currently taking the 10 mg once a day at noon. Still takes neurontin 100 mg at night, and celebrex 100 mg bid. Seems to have less pain during the day. The oxycontin 10 mg once a day does cause the patient to feel somewhat drowsy in the morning according to the  daughter.  The oxycontin seems to work better than the norco 7.5 mg.  Daughter administers all pain medications for the patient.  Multiple medical comorbidities including paroxysmal atrial fibrillation, diastolic CHF followed by cardiology, COPD on oxygen 1 to 2 L during the day, on Trelegy Ellipta, followed by pulmonary, also with dementia has seen neurology previously.  Good social support with daughter locally in [place], currently living with her daughter, son also lives here in [place].  Currently off prednisone which actually made the patient feel more awake and energetic sometimes to the detriment of not being able to sleep.  Patient will use a walker for ambulation, no falls, constant supervision with daughter          Allergies   Allergen Reactions   • Codeine Vomiting   • Irbesartan Cough     Strong cough, pt.s daughter reports the ER DrEn Advised she stop taking    • Lisinopril Shortness of Breath and Cough       Current medicines (including changes today)  Current Outpatient Medications   Medication Sig Dispense Refill   • torsemide (DEMADEX) 10 MG tablet TAKE 1 TABLET BY MOUTH EVERY  tablet 3   • oxyCODONE CR (OXYCONTIN) 10 MG Tablet Extended Release 12 hour Abuse-Deterrent Take 1 tablet by mouth 1 time a day as needed (pain) for up to 30 days. 30 tablet 0   • apixaban (ELIQUIS) 2.5mg Tab Take 1 tablet by mouth 2 Times a Day. 60 tablet 3   • celecoxib (CELEBREX) 100 MG Cap Take 1 capsule by mouth 2 times a day. 60 capsule 1   • gabapentin (NEURONTIN) 100 MG Cap TAKE 1 CAPSULE BY MOUTH ONCE DAILY IN THE EVENING 90 Cap 3   • doxycycline (VIBRAMYCIN) 100 MG Cap Take 1 Cap by mouth 2 times a day. Take as prescribed until gone. 20 Cap 3   • HYDROcodone-acetaminophen (NORCO) 7.5-325 MG per tablet TAKE 1 TO 2 TABLETS BY MOUTH TWICE DAILY AS NEEDED FOR MODERATE PAIN FOR UP TO 30 DAYS     • traZODone (DESYREL) 50 MG Tab Take 0.5 Tabs by mouth at bedtime as needed for Sleep. 90 Tab 3   • Magnesium  100 MG Tab Take 1 Tab by mouth every bedtime.     • Green Tea, Olya sinensis, (GREEN TEA PO) Take 1 Tab by mouth ONE-HALF HOUR AFTER LUNCH.     • albuterol (PROVENTIL) 2.5mg/3ml Nebu Soln solution for nebulization USE 3 ML IN NEBULIZER  EVERY 4 HOURS AS NEEDED FOR SHORTNESS OF BREATH 300 mL 5   • Fluticasone-Umeclidin-Vilant (TRELEGY ELLIPTA) 100-62.5-25 MCG/INH AEROSOL POWDER, BREATH ACTIVATED Inhale 1 Inhalation by mouth every day. Indications: Chronic Obstructive Lung Disease 1 Each 11   • carvedilol (COREG) 3.125 MG Tab Take 1 Tab by mouth 2 times a day, with meals. 180 Tab 3   • Multiple Vitamins-Minerals (CENTRUM SILVER PO) Take 1 Tab by mouth ONE-HALF HOUR AFTER LUNCH.     • Coenzyme Q10 (COQ10 PO) Take 1 Tab by mouth every evening.     • Probiotic Product (PROBIOTIC PO) Take 1 Cap by mouth every day.     • Alpha-Lipoic Acid (LIPOIC ACID PO) Take 1 Tab by mouth every morning.     • Ginkgo Biloba 120 MG Cap Take 120 mg by mouth every morning.       No current facility-administered medications for this visit.       Patient Active Problem List    Diagnosis Date Noted   • Diastolic CHF ([facility]) 05/18/2020   • Atherosclerosis of aorta ([facility]) 01/06/2020   • Renal mass 10/07/2019   • Ventral hernia 10/07/2019   • AAA (abdominal aortic aneurysm) ([facility]) 02/27/2019   • Chronic respiratory failure with hypoxia ([facility]) 02/13/2019   • Nocturnal and exertional hypoxemia 03/20/2018   • TB lung, latent 05/04/2016   • Dementia ([facility]) 01/11/2016   • Sensorineural hearing loss 12/16/2015   • History of opioid dependence  04/23/2015   • Macular degeneration 07/08/2013   • Tension headache 06/11/2013   • History of shingles 06/07/2012   • History of positive h.pylori titer 11/01/2011   • Sleep apnea 08/10/2011   • COPD (chronic obstructive pulmonary disease) ([facility]) 07/20/2011   • Right hip pain 03/28/2011   • PAF (paroxysmal atrial fibrillation) ([facility]) 06/10/2010   • History of compression fracture of spine 05/26/2010   • Low back pain  05/22/2010   • Cervical pain 05/22/2010   • Osteoporosis, idiopathic 05/22/2010   • Hypertension 05/20/2010   • Dyslipidemia 05/20/2010   • Preventative health care 05/20/2010       Family History   Problem Relation Age of Onset   • Heart Disease Father    • Diabetes Brother        Atherosclerosis aorta  1/6/20 chest x-ray aortic atherosclerosis     AAA  10/19/15 CT chest high-resolution thorax atherosclerosis aorta  2/13/19 CT-CTA chest pulmonary artery with reconstruction 4.1 cm ascending aortic aneurysm     Cervical pain  4/08 MRI cervical spine C3-C4 moderate left-sided foraminal stenosis, C4-C5 moderate foraminal stenosis right, C5-C6 moderate right-sided foraminal stenosis, C6-C7 moderate bilateral foraminal stenosis  5/08 CT cervical spine C3-C4 uncovertebral joint arthropathy and significant left-sided neural foraminal narrowing, C4-C5 uncovertebral joint arthropathy right significant neuroforaminal narrowing, C5-C6 uncovertebral joint arthropathy right moderate neural foraminal narrowing, C6-C7 uncovertebral joint arthropathy moderate to severe right neural foraminal narrowing  7/08 C3-C5 cervical facet injections   1/09 medial branch nerve block diagnostic   2/09 C4-C5 cervical epidural under fluoroscopy   4/13 Daughter called [place] pain suggest taper pain med, she has sched to taper the oxycontin  4/15/13 resumed oxycontin 10 bid  6/11/13 off oxcontin  7/24/13 increase oxycontin from qday to 10 mg bid   9/14/17 custom PT discharge summary patient did not improve walking tolerance or standing tolerance, medicare g-code status 60-79% impairment, thoracolumbar flexion decreased from 50-25%, extension 10%, sidebending 10-25%  6/28/17  pain note left L3-L5 medial branch block  9/14/17 custom PT discharge summary patient did not improve walking tolerance or standing tolerance, medicare g-code status 60-79% impairment, thoracolumbar flexion decreased from 50-25%, extension  10%, sidebending 10-25%  1/17/18  pain note left cervical paraspinal muscle trigger point injections  1/31/18  pain note  2/2/18  pain note, reviewed cervical spine x-ray multilevel DJD, recommend consider intrathecal pain pump trial  4/11/19 dr.zollinger pain note offered trigger point injections and lumbar radiofrequency ablation, she declines for now, consider tylenol in place of celebrex, follow-up as needed  1/14/20 resume norco 7.5 mg bid prn pain, continue neurontin 100 mg qpm and celebrex (hold while on prednisone)   9/22/20 off norco  2/3/21 increase neurontin to 100 mg bid and prednisone 5 mg daily (hold celebrex)  2/10/11 cannot tolerate daytime neurontin, stay at neurontin 100 mg at night, stop prednisone, trial celebrex 100 mg bid again  2/23/21 trial of oxycontin ER 10 mg 1/2 tab in am  Has seen neurosurgery, pain management locally and at [place], has tried NSAIDs, celebrex, cymbalta, pregabalin, gabapentin, lidoderm, voltaren gel      Chronic respiratory failure  12/13/19 pulmonary note, on trelegy and nocturnal oxygen, room air saturation at rest 87%, 2 L oxygen at rest saturation 94%, 2 L oxygen saturation with exertion 92% documenting need for oxygen 2 L, will order overnight oximetry, follow-up as scheduled  1/14/20 room air saturation at rest 91%, saturation with exercise room air 86 to 87%  12/29/20 on oxygen 1 liter at night     COPD  7/11 CT spiral thorax extensive emphysematous change of lung, small left pleural effusion left lung base with atelectasis unchanged from prior CT  5/10 CT spriral thorax emphysematous change and dependent atelectasis left lung base  7/11 PFT FEV/FVC 59%, FEV1 1.1 L (75% predicted), significant post bronchodilator response noted (FEV1 improved 16%). Mixed restrictive and obstructive pattern,COPD with GOLD stage II with sig bronchodilator response  7/11 trial of symbicort 80/4.5 mcg bid discussed with daughter plus albuterol neb prn,  [employer] home education ordered  5/12 off symbicort   5/22/14 cxr negative  6/23/14 on symbicort  7/20/15 change to advair 250/50 mcg from symbicort (not covered on insurance)  9/3/15 cxr negative  10/4/15 add spiriva and change symbicort to advair 250/50 mcg bid  10/19/15 CT high resolution thorax, no evidence of interstitial lung disease, minimal scattered opacification could indicate minimal areas of fibrosis periphery of each lung, similar to prior exam, emphysema most prominent upper lobes bilateral  10/19/15 PFT FEV1 1.1 (61% predicted),FVC 1.9,FEV/FVC 58%, no bronchodilator response,DLCO 34%; on advair 250/50 mcg bid and spiriva  11/2/15 change from advair discus to advair 250 inhaler and continue spiriva   12/11/15 not taking advair, will start advair and cont spiriva  12/11/15 cxr negative  3/14/16 [facility] note complaining doxycycline and taper steroids, continue nocturnal oxygen, continue rotating antibiotics for bronchiectasis, follow-up laboratory testing ordered  3/14/16  (normal), quantiferon gold positive, allergen panel negative, IgE 357 (less than 214), IgA 116 (),, IgM 27 (),IgG 947 ([id number])  4/13/16 cxr mild cardiomegaly  5/4/16 [facility] note continue advair 115/21 bid with spacer given doxycycline and prednisone, continue oxygen 3 L at night, AFB samples ×3, follow-up 3 months  7/25/16 pulmonary note on prednisone taper one week out of the month and doxycycline one per day for one week per month, on advair bid and spiriva and oxygen 3 liters at night  11/8/16 pulmonary note continue advair 115/21 ucg bid,spiriva, xopenex as needed, oxygen 3 L at night  10/4/17 pulmonary note continue advair 115/21 two inhalations bid, spiriva daily, xoponex as needed, oxygen 3 L at night, history of positive quantiferon gold, will order sputum sample follow-up 4 months  3/17/18 pulmonary note, continue nocturnal oxygen 2 L, start trelegy one puff daily  9/10/18 pulmonary note intolerant to CPAP, resume  nocturnal oxygen, latent TB, repeat chest x-ray, stable on bronchodilators, as needed prednisone and antibiotic prescription to pharmacy follow-up 3-6 months  2/13/19 CT-CTA chest pulmonary artery with reconstruction no pulmonary embolism, emphysema and chronic bronchitis, cardiomegaly with right heart dysfunction, 4.1 cm ascending aortic aneurysm  3/19/19 on trelegy ellipta and nocturnal oxygen 2 L  10/2/19 pulmonary note cough and bronchitis continue prednisone and cefdinir until complete then resume celebrex, chest x-ray ordered, continue mucinex and nebulizer  12/13/19 pulmonary note, on trelegy and nocturnal oxygen, room air saturation at rest 87%, 2 L oxygen at rest saturation 94%, 2 L oxygen saturation with exertion 92% documenting need for oxygen 2 L, will order overnight oximetry, follow-up as scheduled  1/6/20 chest x-ray with no acute abnormality identified, hypoinflation consistent with chronic obstructive pulmonary disease  1/14/20 room air saturation at rest 91%, saturation with exercise room air 86 to 87%  9/24/20 on oxygen 1 L during the day, 2 L at night, trelegy daily, COPD exacerbation prednisone) plus doxycycline provided  11/19/20 bicarb 39 done [facility]  12/29/20 recent trial medrol dosepack improved back pain and breathing, will continue low-dose prednisone 5 mg, discontinue celebrex, continue eliquis monitor for GI bleed  1/6/21 pulmonary note provide prescription for prednisone taper and doxycycline as needed COPD exacerbation follow-up 6 months  2/3/21 on prednisone 5 mg daily, trelegy ellipta, oxygen 1-2 liters during the day    dementia  1/11/16 MMSE 24/30  7/14/17 MMSE 23/30 offered aricept  4/10/18 MMSE 24/30  5/2/18 start aricept 5 mg daily  9/18/18 off aricept not tolerated  10/31/18 trial namenda XR started pack  11/8/18 namenda titration pack not available, will start namenda XR 7 mg daily  5/16/19 neurology note, memory loss likely alzheimers, will obtain  MRI brain, b12, folate, tsh, would not tolerate neuropsychiatric evaluation secondary to poor focus and attention    diastolic chf  2/16/19 echo normal LV function, EF 55%, grade 2 diastolic dysfunction, moderate aortic insufficiency, moderate tricuspid regurgitation, RVSP 45  5/18/20 on torsemide 5 mg take 2 tablets daily x3 days as needed for leg swelling per cardiology, maintains on carvedilol 6.25 mg bid  5/21/20 bun 21,creat 1.07,GFR 48,Na 138,K+ 4.5 on torsemide 5 mg qday as needed x 3 days for leg swelling per cardiology   5/22/20 on torsemide 5 mg once a day, normal BUN, creatinine, sodium, potassium, has been on torsemide 5 mg once a day for 5 days, take an extra 2 days then discontinue, check daily weights, monitor leg edema and notify me with daily weights next week  5/26/20 137.6 lb on torsemide for leg swelling, try to go to 3 days with turosemide, if this continues we will need to recheck BMP  5/27/20 weight 140.8 lb so resume torosemide 5 mg daily  5/29/20 emergency room note, presented with shortness of breath, saturation 96%, weight 140 pounds, discussed with cardiology  5/29/20 proBNP NT 3811 increase torsemide to 10 mg daily, and add metolazone 2.5 mg   5/29/20 bun 23,creat 0.8,Na 138,K+ 3.8  5/29/20 cxr cardiomegaly, pulmonary interstitial markings consistent with mild edema  6/3/20 weight 128 lb tapering torosemide to 10 mg daily repeat labs one week, repeat labs ordered one week, did not start metolazone 2.5 mg yet  6/8/20 cardiology note stable on torsemide 10 mg, should edema increase can take an extra 10 mg/day, hold off on metolazone for now  6/11/20 bun 24,creat 0.93,GFR>60,K+ 5.0,magnesium 2.3 on torsemide 10 mg, can take an extra 10 mg/day  6/11/20 BNP 3066  7/12/29 weight 128 lb  8/17/20 bun 18,creat 0.83,GFR>60,Na 142,K+ 4.2, magnesium 2.4 on torsemide 10 mg qday  8/19/20 cardiology note dependent edema, multifactorial component of diastolic dysfunction, stable on demadex, breathing  and edema have improved, recheck bmp 4 weeks, follow-up 6 weeks   10/28/20 bun 27,creat 0.89,GFR 60,proBNP NT 1425  11/19/20 bun 24,creat 1.0,GFR 50,K+4.2,bicarb 39 done at [facility] in [facility]  12/29/20 weight 1333 lbs on torsemide 10 mg, coreg 3.125 mg bid  1/5/21 cardiology note stable follow-up 3 to 6 months     Dyslipidemia   5/10 chol 163,trig 68,hdl 69,ldl 80  6/10 chol 163,trig 60,hdl 69,ldl 80  7/11 chol 118,trig 45,hdl 65,ldl 44 on crestor 10 mg  10/11 chol 165,trig 47,hdl 78,ldl 74 on crestor 10 mg done at [place]  2/12 off crestor  6/12 chol 211,trig 104,hdl 64,ldl 126 off crestor  3/4/14 chol 222,trig 124,hdl 52,ldl 145 off meds     History of compression fracture  5/10 MRI thoracic spine subacute T12 compression fracture  5/27/10 kyphoplasty T11  6/10 MRI lumbar spine T12 compression fracture mild to moderate central canal narrowing, interval T11 kyphoplasty, L2-L3 moderate foraminal stenosis, L3-L4 severe left foraminal stenosis, moderate right foraminal stenosis, L4-L5 moderate central canal stenosis  7/10 dexa LS +1.0,hip -1.6  7/28/10 T12 vertebral plasty  8/11 [place] pain evaluation  pain management, recommend continue oxycontin 10 bid  10/11 [facility] pain  T11-L1 injection  2/13  pain note, T10-T11 epidural injection  6/13 dexa LS +1.8, forearm -2.7  9/23/13 reclast injection  9/4/14 reclast IV infusion  9/14/17 custom PT discharge summary patient did not improve walking tolerance or standing tolerance, medicare g-code status 60-79% impairment, thoracolumbar flexion decreased from 50-25%, extension 10%, sidebending 10-25%  7/1/18 x-ray lumbar spine complete or near collapse L1 vertebral body anteriorly which is likely chronic, extensive degenerative changes, prior percutaneous vertebral augmentation T12-L1  2/27/19 off oxycontin after hospital discharge, would like to avoid narcotics given side effects, will try celebrex 100 mg daily as had that  previously  1/6/20 x-ray thoracic spine old compression fracture T11 and T12 post augmentation, kyphosis and levoconvex scoliosis no acute fracture  1/6/20 x-ray lumbar spine old T11 and T12 compression fracture, levoconvex scoliosis thoracolumbar junction, old T11 and T12 compression fracture     History H. pylori  9/11 serum IgG positive s/p prevpack  9/19/12 cbc,cmp,lipase neg; u/s abd gallbladder sludge without biliary dilatation or stone  12/12/12 [facility] eval rec EGD/colon pt did not follow up   2/9/20 start prilosec 20 mg on celebrex and eliquis  5/21/20 on prilosec 10 mg, vit b12 1132, vit d 33, on celebrex and eliquis     history opiate  2/26/15 narcotic contract  6/12/15 opiate risk score 0  10/4/15   7/25/16   9/20/16  pain note recommended left L3-L5 MBBB  10/31/16   2/6/17   5/9/17 narcotic contract  5/9/17 opiate risk score 0  5/9/17   5/9/17 depression screening score 0  6/28/17 trial cymbalta 30 mg  8/7/17 did not start cymbalta, will start  8/7/17   8/7/17 UDT  8/21/17 change cymbalta to qod  9/18/17 off cymbalta  9/14/17 custom PT discharge summary patient did not improve walking tolerance or standing tolerance, medicare g-code status 60-79% impairment, thoracolumbar flexion decreased from 50-25%, extension 10%, sidebending 10-25%  11/3/17   2/6/18   4/10/18   4/10/18 controlled substances contract, decrease oxycontin to 10 mg am and 5 mg pm  5/17/18 decrease to oxycontin 10 mg am and 5 mg pm  5/17/18   7/10/18 increase oxycontin back to 10 mg bid after fall  7/10/18    9/18/18   2/27/19 off oxycontin after hospital discharge, would like to avoid narcotics given side effects, will try celebrex 100 mg daily as had that previously  3/19/19 refer to dr.zollinger pain  3/19/19 increase to celebrex 100 mg bid, add neurontin 100 mg qpm, continue lidoderm patch  4/11/19 dr.zollinger pain note offered trigger point injections and lumbar radiofrequency ablation, she  declines for now, consider tylenol in place of celebrex, follow-up as needed  1/14/20   1/14/20 controlled substance contract  1/14/20 resume norco 7.5 mg bid prn pain, continue neurontin 100 mg qpm and celebrex (hold while on prednisone)   2/24/20    5/18/20  on norco 7.5 mg bid taking 1-2 per day, neurontin, celebrex  9/22/20 off norco  12/29/20 recent trial medrol dosepack improved back pain and breathing, will continue low-dose prednisone 5 mg, discontinue celebrex, continue eliquis monitor for GI bleed  2/3/21 increase neurontin to 100 mg bid and prednisone 5 mg daily (hold celebrex)  2/10/11 cannot tolerate daytime neurontin, stay at neurontin 100 mg at night, stop prednisone, trial celebrex 100 mg bid again  3/2/21 start oxycontin 10 mg 1/2 tab daily   Has seen neurosurgery, pain management locally and at [place], has tried NSAIDs, celebrex, cymbalta, pregabalin, gabapentin, lidoderm, voltaren gel     History shingles     Hypertension  1/05 carotid ultrasound negative  1/07 echo LV EF 60% ,mild LVH  1/09 renal ultrasound 6 mm right cortical cyst  9/09 MRI brain with and without moderate nonspecific microvascular ischemic change  9/09 MRA next mild plaque right internal carotid  5/24/10 echo normal LV size and function, EF 60%, mild to moderate AR, RVSP 35-40 consistent with mild pulmonary hypertension  5/10 persantine thallium no ischemia, EF 72%  9/10 change avalide 150/12.5 to avapro 150 qday, had sodium 125 in ER  3/11 on avapro 300 mg  7/8/11 echo normal LV function, EF 55%  7/8/11 Persantine thallium possible small area mild ischemia in the lateral wall, no evidence of infarction  7/11   3/12 increase metoprolol to 50 bid, cont avapro 300 mg, start norvasc 2.5 mg  5/1/12 increase norvasc to 5 mg, change avapro to avalide 300/12.5 mg, cont metoprolol 50 bid  10/2/12 on avalide 300/12.5 mg and metoprolol 50 bid and norvasc 5 mg  10/12 persantine thallium diaphragmatic uptake possible  attenuation, fixed inferolateral defect which may be secondary to attenuation, EF 76%, no wall motion abnormalities, no evidence of significant ischemia.  1/13 echo normal LV function, grade 2 diastolic dysfunction, EF 70% moderate aortic insufficiency  1/13   1/23/13 cxr cardiomegaly without pulmonary edema  6/11/13 on avalide 300/12.5 mg,norvasc 5 mg, metoprolol 50 bid  4/7/14 cardiology note atrial fibrillation, start pradaxa 75 mg bid, stop asa, stop norvasc, decreased avalide 300/12.5 mg to 1/2 per day, increase metoprolol to 50 mg 1 1/2 bid  10/20/14 metoprolol 50 mg decreased to 25 mg bid by [place] doctor due to low heart rate, continues on avalide 300/12.5 mg   12/29/14  cardiology note continue pradaxa, metoprolol 25 mg 1/2 bid, avalide 300/12.5 mg  7/25/16 avalide 150/12.5 mg decrease to 1/2 tab per day and continue metoprolol 25 mg bid  8/24/16 cardiology note sinus rhythm on exam,[id number] score=2 continue anticoagulation, low-dose metoprolol,avalide 150/12.5 mg 1/2 per day, follow-up one year  12/14/17 cardiology note remains in sinus rhythm, follow-up one year on avalide 150/12.5 mg,metoprolol  6/21/18  cardiology note paroxysmal atrial fibrillation sinus rhythm on exam stable continue anticoagulation, follow-up 1 year  2/27/19 cardiology note paroxysmal atrial fibrillation, chronic anticoagulation  8/29/19 now on lisinopril 10 mg qam and metoprolol 25 mg qpm  10/7/19 CT-CTA complete thoracoabdominal atherosclerosis greater than 50% stenosis bilateral renal arteries   10/10/19 cardiology note reduce metoprolol to 12.5 mg bid as blood pressure normal in the office but may be lower at home contributing to tiredness and fatigue, torsemide as needed for shortness of breath  10/21/19 on metoprolol 25 mg on 12.5 mg bid  12/2/19 cardiology note unable to tolerate irbesartan and irbesartan secondary to cough and desaturation, discontinue irbesartan and metoprolol, start low-dose  carvedilol 6.25 mg bid and continue eliquis 2.5 mg bid and continue oxygen at night, room air saturation 90%  1/13/20 cardiology note blood pressure stable on coreg 6.25 mg bid, information given regarding DNR by cardiology  5/18/20 on torsemide 5 mg take 2 tablets daily x3 days as needed for leg swelling per cardiology, maintains on carvedilol 6.25 mg bid  5/21/20 bun 21,creat 1.07,GFR 48,Na 138,K+ 4.5 on torsemide 5 mg qday as needed x 3 days for leg swelling per cardiology   5/22/20 on torsemide 5 mg once a day, normal BUN, creatinine, sodium, potassium, has been on torsemide 5 mg once a day for 5 days, take an extra 2 days then discontinue, check daily weights, monitor leg edema and notify me with daily weights next week  5/26/20 137.6 lb on torsemide for leg swelling, try to go to 3 days with turosemide, if this continues we will need to recheck BMP  5/27/20 weight 140.8 lb so resume torosemide 5 mg daily  5/29/20 emergency room note, presented with shortness of breath, saturation 96%, weight 140 pounds, discussed with cardiology  5/29/20 proBNP NT 3811 increase torsemide to 10 mg daily, and add metolazone 2.5 mg   5/29/20 bun 23,creat 0.8,Na 138,K+ 3.8  5/29/20 cxr cardiomegaly, pulmonary interstitial markings consistent with mild edema  6/3/20 weight 128 lb tapering torosemide to 10 mg daily repeat labs one week, repeat labs ordered one week, did not start metolazone 2.5 mg yet  6/8/20 cardiology note stable on torsemide 10 mg, should edema increase can take an extra 10 mg/day, hold off on metolazone for now  6/11/20 bun 24,creat 0.93,GFR>60,K+ 5.0,magnesium 2.3 on torsemide 10 mg, can take an extra 10 mg/day  6/11/20 BNP 3066     leg edema  5/18/20 on torsemide 5 mg take 2 tablets daily x3 days as needed for leg swelling per cardiology, maintains on carvedilol 6.25 mg bid  5/21/20 bun 21,creat 1.07,GFR 48,Na 138,K+ 4.5 on torsemide 5 mg qday as needed x 3 days for leg swelling per cardiology   5/22/20 on  torsemide 5 mg once a day, normal BUN, creatinine, sodium, potassium, has been on torsemide 5 mg once a day for 5 days, take an extra 2 days then discontinue, check daily weights, monitor leg edema and notify me with daily weights next week  5/26/20 137.6 lb on torsemide for leg swelling, try to go to 3 days with turosemide, if this continues we will need to recheck BMP  5/27/20 weight 140.8 lb so resume torosemide 5 mg daily  5/29/20 emergency room note, presented with shortness of breath, saturation 96%, weight 140 pounds, discussed with cardiology  5/29/20 proBNP NT 3811 increase torsemide to 10 mg daily, and add metolazone 2.5 mg   5/29/20 bun 23,creat 0.8,Na 138,K+ 3.8  5/29/20 cxr cardiomegaly, pulmonary interstitial markings consistent with mild edema  6/3/20 weight 128 lb tapering torosemide to 10 mg daily repeat labs one week, repeat labs ordered one week, did not start metolazone 2.5 mg yet  6/8/20 cardiology note stable on torsemide 10 mg, should edema increase can take an extra 10 mg/day, hold off on metolazone for now  6/11/20 bun 24,creat 0.93,GFR>60,K+ 5.0,magnesium 2.3 on torsemide 10 mg, can take an extra 10 mg/day  6/11/20 BNP 3066  7/12/29 weight 128 lb  8/17/20 bun 18,creat 0.83,GFR>60,Na 142,K+ 4.2, magnesium 2.4 on torsemide 10 mg qday  8/19/20 cardiology note dependent edema, multifactorial component of diastolic dysfunction, stable on demadex, breathing and edema have improved, recheck bmp 4 weeks, follow-up 6 weeks   10/28/20 bun 27,creat 0.89,GFR 60,proBNP NT 1425  1/5/21 cardiology note stable follow-up 3 to 6 months     Low back pain  6/06 epidural L4-L5   12/08 x-ray LS moderate to severe DJD  6/10 MRI lumbar spine T12 compression fracture with mild-to-moderate central spinal canal narrowing, interval T11 kyphoplasty, L2-L3 moderate frontal stenosis, L3-L4 severe left foraminal stenosis, moderate right foraminal stenosis, L4-L5 moderate central spinal canal  7/10 interventional  rad consult epidural T11 to L1 region  7/28/10 T12 vertebroplasty  7/30/10 norco taper, and lyrica 50 mg bid  8/5/10 reaction to lyrica, stop lyrica  8/19/10 lumbar steroid epidural   9/10  note rec decompression if pain persist  10/10 bilateral lumbar facet joint injections L3-S1   12/10 xray LS old T11 and T12 fracture status post kyphoplasty  1/11 [facility]  3/11 trial of spinal stimulator  8/11 [facility] evaluation  pain management cont oxycontin 10 bid  11/22/11 [facility] note dr. hodge; trial of baclofen, T11 plus T12 left-sided nerve root block pending  7/12  pain note;Left-sided T11-T12 transforaminal epidural   4/13 daughter called [place] [facility] suggest taper pain med, she has sched to taper the oxycontin  4/15/13 resumed oxycontin 10 bid  6/11/13 off oxycontin  7/24/13 increase oxycontin from qday to 10 mg bid   2/28/14 on celebrex 200 mg as needed per  and oxycontin 10 mg bid  4/25/14 trial of cymbalta 30 mg, continue oxycontin 10 mg twice a day, recommend not use tramadol (side effects since on oxycontin already) or celebrex (on pradaxa)  5/29/14  pain management [facility]; recommend T11-T12 left-sided transforaminal nerve block  7/2/14 [facility] left T11-T12 epidural injection  7/21/14  [place] [facility] note; increase oxycontin to 10 mg tid x 3 weeks and then taper down  10/27/14 [facility]; samples zorvolex  2/26/15 xray lumbar spine mild compression fracture of L4 of indeterminate age but new compared to 3/25/13 vertebral augmentation and T11 and T12 with severe compression fracture of T12 and focal kyphosis at this level  4/15/16 outpatient physical therapy phone call indicating patient unable to make appointments, recommending home health physical therapy  9/20/16  pain note recommended left L3-L5 MBBB  3/22/17  pain procedure note left L3-L5 MBBB  #1  6/28/17 trial cymbalta 30 mg  6/29/17 [facility] note  8/3/17 [facility] note  9/18/17 off cymbalta  9/14/17 [facility] PT discharge summary patient did not improve walking tolerance or standing tolerance, medicare g-code status 60-79% impairment, thoracolumbar flexion decreased from 50-25%, extension 10%, sidebending 10-25%  11/14/17 custom PT discharge summary no improvement  11/29/17 MRI lumbar spine no acute fracture, lumbar levoscoliosis T12-L1, kyphotic deformity he talked L1, previous compression fractures T11, T12, L1, previous vertebral augmentation procedures T11 and L1, moderate central canal stenosis T12-L3, severe canal stenosis L3-L4, no significant change  1/31/18  pain note  2/2/18  pain note, reviewed cervical spine x-ray multilevel DJD, recommend consider intrathecal pain pump trial  5/17/18 decrease to oxycontin 10 mg am and 5 mg pm  7/1/18 x-ray lumbar spine complete or near collapse L1 vertebral body anteriorly which is likely chronic, extensive degenerative changes, prior percutaneous vertebral augmentation T12-L1  7/10/18 increase oxycontin back to 10 mg bid after fall  2/27/19 off oxycontin after hospital discharge, would like to avoid narcotics given side effects, will try celebrex 100 mg daily as had that previously  3/19/19 refer to dr.[facility] pain  3/19/19 increase to celebrex 100 mg bid, add neurontin 100 mg qpm, continue lidoderm patch  4/11/19 dr.zollinger pain note offered trigger point injections and lumbar radiofrequency ablation, she declines for now, consider tylenol in place of celebrex, follow-up as needed  5/15/19 dr.zollinger pain management note left lumbar paraspinal and quadratus lumborum areas  1/6/20 x-ray thoracic spine old compression fracture T11 and T12 post augmentation, kyphosis and levoconvex scoliosis no acute fracture  1/6/20 x-ray lumbar spine old T11 and T12 compression fracture, levoconvex scoliosis thoracolumbar junction, old  T11 and T12 compression fracture  1/14/20 resume norco 7.5 mg bid prn pain, continue neurontin 100 mg qpm and celebrex (hold while on prednisone)   2/24/20    5/18/20  on norco 7.5 mg bid taking 1-2 per day, neurontin, celebrex  9/22/20 off norco  12/29/20 recent trial medrol dosepack improved back pain and breathing, will continue low-dose prednisone 5 mg, discontinue celebrex, continue eliquis monitor for GI bleed  2/3/21 increase neurontin to 100 mg bid and prednisone 5 mg daily (hold celebrex)  2/10/11 cannot tolerate daytime neurontin, stay at neurontin 100 mg at night, stop prednisone, trial celebrex 100 mg bid again  3/2/21 start oxycontin 10 mg 1/2 tab daily  Tried NSAIDs, celebrex, pregabalin, gabapentin, cymbalta, physical therapy,TENS,lidoderm, voltaren gel, epidural, facet injections, kyphoplasty, pain management locally and at [place]     Macular degeneration  [facility] start PreserVision AREDS II vitamin followup 6 months     Nocturnal hypoxemia  3/17/18 pulmonary note, continue nocturnal oxygen 2 L, start trelegy one puff daily  9/10/18 pulmonary note intolerant to CPAP, resume nocturnal oxygen, latent TB, repeat chest x-ray, stable on bronchodilators, as needed prednisone and antibiotic prescription to pharmacy follow-up 3-6 months  12/13/19 pulmonary note room air saturation at rest 87%, 2 L oxygen at rest saturation 94%, 2 L oxygen saturation with exertion 92% documenting need for oxygen 2 L, will order overnight oximetry, follow-up as scheduled  1/14/20 room air saturation at rest 91%, saturation with exercise room air 86 to 87%  2/24/20 93% saturation on 2 liters     Osteoporosis  3/08 dexa LS +0.3,hip -1.4  5/10 MRI thoracic spine subacute T12 compression fracture  5/27/10 T12 kyphoplasty  6/10 on actonel  7/10 dexa LS +1.0,hip -1.6  8/10 vit d 56  3/11 reclast referral made  6/12 vit d 42 off actonel  6/21/13 dexa LS +1.8, forearm -2.7, I rec reclast, she would like to  think it over  9/23/13 reclast injection  3/4/14 vit d 26  9/4/14 reclast IV infusion  2/26/15 xray rib series left; mild deformity of the lateral 10th rib on the left may be related to a fracture  2/26/15 xray lumbar spine mild compression fracture of L4 of indeterminate age but new compared to /25/13, vertebral augmentation and T11 and T12 with severe compression fracture of T12 and focal kyphosis at this level  12/7/15 reclast IV infusion  5/21/20 vit d 33     Paroxysmal atrial fibrillation  1/11 [facility] note cont multaq  7/11 EKG atrial fibrillation hospitalization  7/8/11 echo normal LV function, EF 55%  7/8/11 Persantine thallium possible small area mild ischemia in the lateral wall, no evidence of infarction  7/11 [facility] during hospitalization changed to amiodarone 200 mg plus pradaxa restarted  7/11   9/11 [facility] note stop amiodarone, cont pradaxa and metoprolol 25 bid  3/12 cont pradaxa and increase metoprolol to 50 bid due to higher bp  5/12 pradaxa decreased from 150 bid to 75 bid per [facility] due to nosebleeds  6/12 EKG NSR  1/13 echo normal LV function, grade 2 diastolic dysfunction, EF 70% moderate aortic insufficiency  6/11/13 on pradaxa and metoprolol 50 bid for rate control, NSR today  9/13 cardiology note stop pradaxa and start asa 81 mg  4/7/14 cardiology note atrial fibrillation, start pradaxa 75 mg bid, stop asa, stop norvasc, decreased avalide 300/12.5 mg to 1/2 per day, increase metoprolol to 50 mg 1 1/2 bid  4/14/14 cardiology note, NSR on exam, continue pradaxa 75 mg bid, metoprolol 75 mg bid, avalide 300/12.5 mg 1/2 tab per day  10/20/14 metoprolol 50 mg decreased to 25 mg bid by [place] doctor due to low heart rate, continues on pradaxa and avalide 300/12.5 mg   12/29/14  cardiology note continue pradaxa, metoprolol 25 mg 1/2 bid, avalide 300/12.5 mg  6/12/15 NSR on exam  8/24/16 cardiology note sinus rhythm on exam,[id number] score=2 continue anticoagulation, low-dose metoprolol  follow-up one year  12/14/17 cardiology note remains in sinus rhythm, follow-up one year  6/21/18  cardiology note paroxysmal atrial fibrillation sinus rhythm on exam stable continue anticoagulation, follow-up 1 year  12/11/18 on metoprolol and pradaxa  2/27/19 [id number] (age, gender, aortic atherosclerosis) = 5 (7.8% risk of stroke per year) and HAS-BLED score 3 points (age, hypertension, con commitment antiplatelet agents)  = 3.74 bleeds per 100 patient years) will change pradaxa not covered to eliquis 2.5 mg bid adjust for age and weight  9/6/19  cardiology note, cough since starting lisinopril was discontinued changed to irbesartan 75 mg, continue metoprolol and eliquis, hyponatremia related to hydrochlorothiazide, changed to demadex 5 mg monday, wednesday, friday  10/10/19 cardiology note reduce metoprolol to 12.5 mg bid as blood pressure normal in the office but may be lower at home contributing to tiredness and fatigue, torsemide as needed for shortness of breath  12/2/19 cardiology note unable to tolerate irbesartan and irbesartan secondary to cough and desaturation, discontinue irbesartan and metoprolol, start low-dose carvedilol 6.25 mg bid and continue eliquiw 2.5 mg bid and continue oxygen at night, room air saturation 90%  12/9/19 cardiology mychart note, decrease carvedilol to 6.25 mg 1/2 bid and continue eliquis 2.5 mg  1/13/20 cardiology note blood pressure stable on coreg 6.25 mg bid and eliquis 2.5 mg bid, information given regarding DNR by cardiology   5/18/20 on torsemide 5 mg take 2 tablets daily x3 days as needed for leg swelling per cardiology, maintains on carvedilol 6.25 mg bid  5/21/20 bun 21,creat 1.07,GFR 48,Na 138,K+ 4.5 on torsemide 5 mg qday as needed x 3 days for leg swelling per cardiology   5/22/20 on torsemide 5 mg once a day, normal BUN, creatinine, sodium, potassium, has been on torsemide 5 mg once a day for 5 days, take an extra 2 days then discontinue, check  daily weights, monitor leg edema and notify me with daily weights next week  5/26/20 137.6 lb on torsemide for leg swelling, try to go to 3 days with turosemide, if this continues we will need to recheck BMP  5/27/20 weight 140.8 lb so resume torosemide 5 mg daily  5/29/20 emergency room note, presented with shortness of breath, saturation 96%, weight 140 pounds, discussed with cardiology  5/29/20 proBNP NT 3811 increase torsemide to 10 mg daily, and add metolazone 2.5 mg   5/29/20 bun 23,creat 0.8,Na 138,K+ 3.8  5/29/20 cxr cardiomegaly, pulmonary interstitial markings consistent with mild edema  6/3/20 weight 128 lb tapering torosemide to 10 mg daily repeat labs one week, repeat labs ordered one week, did not start metolazone 2.5 mg yet  6/8/20 cardiology note stable on torsemide 10 mg, should edema increase can take an extra 10 mg/day, hold off on metolazone for now  9/24/20 on coreg and eliquis   1/5/21 cardiology note stable follow-up 3 to 6 months     Preventative health  5/05 colon per [facility] polyp no path report, f/u rec 5 yrs (12/12/12 [facility] note)  4/09 stool occult blood negative  12/09 mammogram  10/1/11 pneumovax  10/2/12 zoster vaccine  6/11/13 declines mammogram, tdap  6/13 dexa LS +1.8, forearm -2.7  3/4/14 vit d 26  10/12/15 prevnar  12/11/18 tdap  1/14/20 POLST completed already has advanced directive with poa son el and daughter dominga     renal mass  10/7/19 CT-CTA complete thoracoabdominal, left renal low-density lesion 1.4 cm, 31 units, mychart correspondence with daughter, given age and comorbidities recommend no further evaluation     Right hip pain  3/11 MRI right hip DJD and tear of the anterior/superior acetabular labrum  4/11  ortho note not believe hip is primary problem, referred to  or reji     Sensorineural hearing loss  12/7/15  audiology evaluation mild sensorineural hearing loss     Sleep apnea  8/11 [facility] note sleep study apnea-hypopnea index 86, low sat 74%,  start CPAP  8-18 cm    2013 off cpap not comfortable  3/14/16  (normal), quantiferon gold positive, allergen panel negative, IgE 357 (less than 214), IgA 116 (),, IgM 27 (),IgG 947 ([phone])  4/13/16 cxr mild cardiomegaly  5/4/16 [facility] note continue oxygen 3 L at night  11/8/16 pulmonary note continue oxygen 3 L at night  10/4/17 pulmonary note continue oxygen 3 L at night  3/17/18 pulmonary note, continue nocturnal oxygen 2 L, start trelegy one puff daily  9/10/18 pulmonary note intolerant to CPAP, resume nocturnal oxygen, latent TB, repeat chest x-ray, stable on bronchodilators, as needed prednisone and antibiotic prescription to pharmacy follow-up 3-6 months     tb latent  3/14/16 positive quantiferon gold test  3/14/16  (normal), quantiferon gold positive, allergen panel negative, IgE 357 (less than 214), IgA 116 (),, IgM 27 (),IgG 947 ([phone])  4/13/16 cxr mild cardiomegaly  5/4/16 [facility] note continue advair 115/21 bid with spacer given doxycycline and prednisone, continue oxygen 3 L at night, AFB samples ×3, follow-up 3 months  7/25/16 pulmonary note on prednisone taper one week out of the month and doxycycline one per day for one week per month, on advair bid and spiriva and oxygen 3 liters at night  7/27/17 PPD negative  10/4/17 pulmonary note continue advair 115/21 two inhalations bid, spiriva daily, xoponex as needed, oxygen 3 L at night, history of positive quantiferon gold, will order sputum sample follow-up 4 months  9/10/18 pulmonary note intolerant to CPAP, resume nocturnal oxygen, latent TB, repeat chest x-ray, stable on bronchodilators, as needed prednisone and antibiotic prescription to pharmacy follow-up 3-6 months     Tension headache  6/11/13 on fioricet bid  2/28/14 taper fioricet to once a day  4/25/14 now on fioricet prn     ventral hernia  10/4/19 CT abdomen pelvis with; small epigastric midline abdominal wall fat-containing ventral hernia     Depression  Screening    Little interest or pleasure in doing things?  0 - not at all  Feeling down, depressed , or hopeless? 0 - not at all  Patient Health Questionnaire Score: 0            Health Maintenance Summary                IMM ZOSTER VACCINES Overdue 11/27/2012      Done 10/2/2012 Imm Admin: Zoster Vaccine Live (ZVL) (Zostavax) - HISTORICAL DATA    Annual Pulmonary Function Test / Spirometry Overdue 10/21/2016      Done 10/21/2015 PFT DICTATED RESULTS     Patient has more history with this topic...    BONE DENSITY Overdue 6/21/2018      Done 6/21/2013 DS-BONE DENSITY STUDY (DEXA)     Patient has more history with this topic...    IMM HEP B VACCINE Overdue 11/18/2019      Done 10/21/2019 Imm Admin: Hepatitis B Vaccine (Adol/Adult)    Annual Wellness Visit Overdue 10/21/2020      Done 10/21/2019 Visit Dx: Medicare annual wellness visit, subsequent     Patient has more history with this topic...    IMM DTaP/Tdap/Td Vaccine Next Due 12/11/2028      Done 12/11/2018 Imm Admin: Tdap Vaccine          Patient Care Team:  Denis Krueger M.D. as PCP - General  Nathan Fajardo M.D. as Consulting Physician (Anesthesia)  Esdras Thornton M.D. as Consulting Physician (Pulmonary Medicine)  SARAH De La Garza as Consulting Physician (Pulmonary Medicine)  [facility] as Home Health Provider  Alexy Smyth M.D. as Consulting Physician (Interventional Cardiology)  Cecile Valenzuela as [facility]   Zaida Merchant P.A.-C. as Mid Level Provider (Physician Assistant)  [facility] as Respiratory Therapist (DME Supplier)       Objective:   /66 (BP Location: Left arm, Patient Position: Sitting, BP Cuff Size: Adult)   Pulse 88   Ht 1.524 m (5')   Wt 61.2 kg (135 lb)   BMI 26.37 kg/m²     Physical Exam:   Constitutional: Alert, no distress, well-groomed.  Skin: No rashes in visible areas.  Eye: Round. Conjunctiva clear, lids normal. No icterus.   ENMT: Lips pink without lesions, good  dentition, moist mucous membranes. Phonation normal.  Neck: No masses, no thyromegaly. Moves freely without pain.  Respiratory: Unlabored respiratory effort, no cough or audible wheeze  Psych: Alert and oriented x3, normal affect and mood.       Assessment and Plan:   The following treatment plan was discussed    Assessment  #1  Chronic low back pain, for a number of years, we have had more difficulty adjusting her pain medication regimen.  She has been seen by pain management locally and at [place].  Has tried and failed multiple steroid injections, radiofrequency ablation, nerve blocks, spinal stimulator.  Was able to wean off narcotics for a period of time but because of worsening pain affecting adversely for ADLs, hydrocodone was resumed last year 2020 at 7.5 mg twice daily, and she was maintained on gabapentin and Celebrex, then because we tried to limit the amount of narcotics with her underlying dementia we try to taper again off narcotics last year but she was unable to tolerate the pain, and we started OxyContin 10 mg initially cutting that in half but because of the immediate absorption because more significant confusion, and the daughter changed that to 10 mg at noon.  The patient has had OxyContin previously.  This seems to somewhat control her pain during the day with some slight drowsiness, but more pain flareup during the night.  She is still on gabapentin 100 mg at night, Celebrex twice a day.  Daughter provides all medications and supervision.  Daughter does state that the OxyContin 10 mg controls the pain more than the hydrocodone 7.5 mg.  Patient does not take NSAIDs but does take Celebrex.    #2 paroxysmal atrial fibrillation on Eliquis per cardiology, she also is on Celebrex, has been on prednisone previously, no GI upset with this combination the past, no GI bleed.    #3 COPD oxygen dependent 1 to 2 L/day followed by pulmonary on Trelegy Ellipta off prednisone    #4 diastolic CHF followed by  cardiology    #5 dementia daughter Yashira is caring for the patient and has power of  along with her brother Dileep    #6 aortic atherosclerosis    #7 at risk for falls      Plan   #[phone] about the ox[facility]contin which was not refilled, cancel the oxycontin and change to norco 10 mg am/noon and 5 mg pm, patient has had this before daughter will monitor for worsening drowsiness, confusion, daytime sedation, understand increased risk for fall risk, she will continue to monitor and administer the medications, discontinue gabapentin 1 starting hydrocodone 10 mg at noon and 5 mg in the evening, continue Celebrex monitor for GI upset    #2 referral [facility]     #3 stop neurontin when change to norco 10 mg day and 5 mg at night    #4 add tylenol 1000 mg in pm     #5 check with insurance about hospice in [place] to see if the insurance would cover that or perhaps [facility] could evaluate and order hospice therapy    #6 continue Celebrex    #7 discussed with the daughter the difficult situation of adequate pain control to maintain her comfort and the patient's reasonable quality of life, understanding that chronic narcotic therapy can contribute to worsening memory loss, confusion, falls, depression, respiratory suppression, with to balance her underlying comorbidities of CHF, COPD and dementia.  We would like to minimize her pain, understanding that we would not be able to make the patient pain-free but at least decrease her pain so that she can spend some time with her daughter and enjoy her company.  All the while attempting to minimize the chronic side effects of opiate therapy.  I believe the potential benefits of opiate therapy outweighs the significant risks opiate therapy attempting to balance pain control, not relief, with quality of life, and the adverse side effects of opiate therapy in a fragile patient.  Her daughter is doing an amazing job caring  for her mother, and we will try to provide whatever assistance we can for both of them.  The daughter has also gotten her Covid vaccine.    #8 follow up two months video appt                    Annual Health Assessment Questions:      1.  Are you currently engaging in any exercise or physical activity? No    2.  How would you describe your mood or emotional well-being today? Mood stable    3.  Have you had any falls in the last year? YES    4.  Have you noticed any problems with your balance or had difficulty walking? Yes    5.  In the last six months have you experienced any leakage of urine? Yes    Advanced directive   .ng job caring for her mother and we will try to optimize the care she is providing

## 2021-03-17 DIAGNOSIS — I48.0 PAF (PAROXYSMAL ATRIAL FIBRILLATION) (HCC): Chronic | ICD-10-CM

## 2021-03-17 RX ORDER — CARVEDILOL 3.12 MG/1
3.12 TABLET ORAL 2 TIMES DAILY WITH MEALS
Qty: 180 TABLET | Refills: 3 | Status: SHIPPED | OUTPATIENT
Start: 2021-03-17

## 2021-03-17 NOTE — TELEPHONE ENCOUNTER
Oxycontin prescription cancelled. Hydrocodone was approved and pharmacy was able to dispense 30 day supply, no prior authorization needed.    Patient scheduled.

## 2021-03-23 ENCOUNTER — TELEPHONE (OUTPATIENT)
Dept: MEDICAL GROUP | Facility: MEDICAL CENTER | Age: 86
End: 2021-03-23

## 2021-03-23 DIAGNOSIS — G89.29 CHRONIC BACK PAIN, UNSPECIFIED BACK LOCATION, UNSPECIFIED BACK PAIN LATERALITY: ICD-10-CM

## 2021-03-23 DIAGNOSIS — M54.9 CHRONIC BACK PAIN, UNSPECIFIED BACK LOCATION, UNSPECIFIED BACK PAIN LATERALITY: ICD-10-CM

## 2021-03-23 NOTE — TELEPHONE ENCOUNTER
[facility]'s dtr Ivana, called and LM. Wants to know if the REF to [place] had been done. I did not see it. States that you discussed it with her at [facility]'s las visit. Would like you to cesar it URGENT.

## 2021-03-23 NOTE — TELEPHONE ENCOUNTER
Thank you for the notification, please let Yashira know I apologize, I had forgotten to place the [place] referral.     I have just placed that in the system as an urgent referral.  Since she has already been seen at the [place] [facility] clinic, she could call their office let them know that the referral has been placed and our referral department will try to expedite authorization.

## 2021-03-31 ENCOUNTER — TELEPHONE (OUTPATIENT)
Dept: MEDICAL GROUP | Facility: MEDICAL CENTER | Age: 86
End: 2021-03-31

## 2021-03-31 DIAGNOSIS — H91.90 HEARING LOSS, UNSPECIFIED HEARING LOSS TYPE, UNSPECIFIED LATERALITY: ICD-10-CM

## 2021-03-31 DIAGNOSIS — J41.1 MUCOPURULENT CHRONIC BRONCHITIS (HCC): ICD-10-CM

## 2021-03-31 NOTE — TELEPHONE ENCOUNTER
Please contact the patient's daughter to set up a virtual follow-up appointment from her [place] hospital stay.

## 2021-04-02 ENCOUNTER — PATIENT MESSAGE (OUTPATIENT)
Dept: HEALTH INFORMATION MANAGEMENT | Facility: OTHER | Age: 86
End: 2021-04-02

## 2021-04-02 ENCOUNTER — TELEPHONE (OUTPATIENT)
Dept: MEDICAL GROUP | Facility: MEDICAL CENTER | Age: 86
End: 2021-04-02

## 2021-04-02 DIAGNOSIS — J96.11 CHRONIC RESPIRATORY FAILURE WITH HYPOXIA (HCC): ICD-10-CM

## 2021-04-02 DIAGNOSIS — J41.1 MUCOPURULENT CHRONIC BRONCHITIS (HCC): ICD-10-CM

## 2021-04-02 DIAGNOSIS — G47.33 OBSTRUCTIVE SLEEP APNEA SYNDROME: ICD-10-CM

## 2021-04-12 ENCOUNTER — TELEMEDICINE (OUTPATIENT)
Dept: CARDIOLOGY | Facility: MEDICAL CENTER | Age: 86
End: 2021-04-12
Payer: MEDICARE

## 2021-04-12 ENCOUNTER — TELEPHONE (OUTPATIENT)
Dept: MEDICAL GROUP | Facility: MEDICAL CENTER | Age: 86
End: 2021-04-12

## 2021-04-12 VITALS
WEIGHT: 135 LBS | BODY MASS INDEX: 26.5 KG/M2 | HEART RATE: 75 BPM | DIASTOLIC BLOOD PRESSURE: 94 MMHG | HEIGHT: 60 IN | OXYGEN SATURATION: 91 % | SYSTOLIC BLOOD PRESSURE: 177 MMHG

## 2021-04-12 DIAGNOSIS — I48.0 PAF (PAROXYSMAL ATRIAL FIBRILLATION) (HCC): Chronic | ICD-10-CM

## 2021-04-12 DIAGNOSIS — I71.40 ABDOMINAL AORTIC ANEURYSM (AAA) WITHOUT RUPTURE (HCC): ICD-10-CM

## 2021-04-12 DIAGNOSIS — J41.1 MUCOPURULENT CHRONIC BRONCHITIS (HCC): Chronic | ICD-10-CM

## 2021-04-12 DIAGNOSIS — I50.30 DIASTOLIC CONGESTIVE HEART FAILURE, UNSPECIFIED HF CHRONICITY (HCC): ICD-10-CM

## 2021-04-12 DIAGNOSIS — F03.90 DEMENTIA WITHOUT BEHAVIORAL DISTURBANCE, UNSPECIFIED DEMENTIA TYPE: ICD-10-CM

## 2021-04-12 DIAGNOSIS — J96.11 CHRONIC RESPIRATORY FAILURE WITH HYPOXIA (HCC): ICD-10-CM

## 2021-04-12 DIAGNOSIS — E78.5 DYSLIPIDEMIA: Chronic | ICD-10-CM

## 2021-04-12 DIAGNOSIS — I10 ESSENTIAL HYPERTENSION: Chronic | ICD-10-CM

## 2021-04-12 PROCEDURE — 99214 OFFICE O/P EST MOD 30 MIN: CPT | Mod: 95,CR | Performed by: NURSE PRACTITIONER

## 2021-04-12 ASSESSMENT — ENCOUNTER SYMPTOMS
BRUISES/BLEEDS EASILY: 0
BACK PAIN: 1
INSOMNIA: 0
LOSS OF CONSCIOUSNESS: 0
HEADACHES: 0
SHORTNESS OF BREATH: 0
ABDOMINAL PAIN: 0
MYALGIAS: 0
PALPITATIONS: 0
PND: 0
COUGH: 0
ORTHOPNEA: 0
DIZZINESS: 0
NAUSEA: 0
WEAKNESS: 1
CHILLS: 0
FEVER: 0

## 2021-04-12 ASSESSMENT — FIBROSIS 4 INDEX: FIB4 SCORE: 2.68

## 2021-04-12 NOTE — TELEPHONE ENCOUNTER
Please notify the patient that the referral to [place] for the hearing evaluation has been denied by her insurance, I believe the insurance is saying that there are local options she can look at in [place] for a hearing test.

## 2021-04-12 NOTE — PROGRESS NOTES
Telemedicine: Established Patient   This evaluation was conducted via Zoom using secure and encrypted videoconferencing technology. The patient was in a private location in the [facility].    The patient's identity was confirmed and verbal consent was obtained for this virtual visit.    Subjective:   CC:   Chief Complaint   Patient presents with   • Follow-Up   • CHF (Diastolic)   • Atrial Fibrillation   • Anticoagulation   • HTN (Controlled)   • Hyperlipidemia   • COPD     Venus is a 89 year old female with history of diastolic HF, atrial fibrillation, anticoagulation, COPD, hypertension and hyperlipidemia, normally followed by Dr. Smyth, and last seen by me in January 2021, via TeleMedicine.      She is here today for three month follow-up via Zoom, with her daughter Yashira. She is currently in [place] staying with her daughter.    Since the last visit, she was in the ER twice: first for COPD exacerbation, treated with antibiotics, Prednisone and nebulizer treatment; second, two weeks later, for dizziness and hearing loss, thought to be due to dehydration.      Yashira states her mom still has some hearing loss; the dizziness seems a little better, and they are trying to push fluids. She does continue to use oxygen throughout the day PRN, and always at nighttime. No fever or chills. No chest pain, pressure, tightness or heaviness. No orthopnea or PND; some mild lightheadedness, but no syncope or presyncope. LE is very stable on Torsemide. Venus is more sedentary/less active, and is becoming more and more weak. BP is generally well controlled, but is high today, as Yashira just woke her up.    Review of Systems   Constitutional: Positive for malaise/fatigue. Negative for chills and fever.   HENT: Positive for hearing loss. Negative for congestion.    Respiratory: Negative for cough and shortness of breath.    Cardiovascular: Negative for chest pain, palpitations, orthopnea, leg swelling and PND.    Gastrointestinal: Negative for abdominal pain and nausea.   Musculoskeletal: Positive for back pain and joint pain. Negative for myalgias.   Skin: Negative for rash.   Neurological: Positive for weakness. Negative for dizziness, loss of consciousness and headaches.   Endo/Heme/Allergies: Does not bruise/bleed easily.   Psychiatric/Behavioral: The patient does not have insomnia.          Allergies   Allergen Reactions   • Codeine Vomiting   • Irbesartan Cough     Strong cough, pt.s daughter reports the ER  Advised she stop taking    • Lisinopril Shortness of Breath and Cough       Current medicines (including changes today)  Current Outpatient Medications   Medication Sig Dispense Refill   • carvedilol (COREG) 3.125 MG Tab Take 1 tablet by mouth 2 times a day, with meals. 180 tablet 3   • apixaban (ELIQUIS) 2.5mg Tab Take 1 tablet by mouth 2 Times a Day. 180 tablet 3   • HYDROcodone/acetaminophen (NORCO)  MG Tab One tab (10 mg) po in am, and half tab (5 mg) po qpm 45 tablet 0   • torsemide (DEMADEX) 10 MG tablet TAKE 1 TABLET BY MOUTH EVERY  tablet 3   • celecoxib (CELEBREX) 100 MG Cap Take 1 capsule by mouth 2 times a day. 60 capsule 1   • gabapentin (NEURONTIN) 100 MG Cap TAKE 1 CAPSULE BY MOUTH ONCE DAILY IN THE EVENING 90 Cap 3   • doxycycline (VIBRAMYCIN) 100 MG Cap Take 1 Cap by mouth 2 times a day. Take as prescribed until gone. 20 Cap 3   • HYDROcodone-acetaminophen (NORCO) 7.5-325 MG per tablet TAKE 1 TO 2 TABLETS BY MOUTH TWICE DAILY AS NEEDED FOR MODERATE PAIN FOR UP TO 30 DAYS     • traZODone (DESYREL) 50 MG Tab Take 0.5 Tabs by mouth at bedtime as needed for Sleep. 90 Tab 3   • Magnesium 100 MG Tab Take 1 Tab by mouth every bedtime.     • Green Tea, Olya sinensis, (GREEN TEA PO) Take 1 Tab by mouth ONE-HALF HOUR AFTER LUNCH.     • albuterol (PROVENTIL) 2.5mg/3ml Nebu Soln solution for nebulization USE 3 ML IN NEBULIZER  EVERY 4 HOURS AS NEEDED FOR SHORTNESS OF BREATH 300 mL 5   •  Fluticasone-Umeclidin-Vilant (TRELEGY ELLIPTA) 100-62.5-25 MCG/INH AEROSOL POWDER, BREATH ACTIVATED Inhale 1 Inhalation by mouth every day. Indications: Chronic Obstructive Lung Disease 1 Each 11   • Multiple Vitamins-Minerals (CENTRUM SILVER PO) Take 1 Tab by mouth ONE-HALF HOUR AFTER LUNCH.     • Coenzyme Q10 (COQ10 PO) Take 1 Tab by mouth every evening.     • Probiotic Product (PROBIOTIC PO) Take 1 Cap by mouth every day.     • Alpha-Lipoic Acid (LIPOIC ACID PO) Take 1 Tab by mouth every morning.     • Ginkgo Biloba 120 MG Cap Take 120 mg by mouth every morning.       No current facility-administered medications for this visit.       Patient Active Problem List    Diagnosis Date Noted   • Diastolic CHF ([facility]) 05/18/2020   • Atherosclerosis of aorta ([facility]) 01/06/2020   • Renal mass 10/07/2019   • Ventral hernia 10/07/2019   • AAA (abdominal aortic aneurysm) ([facility]) 02/27/2019   • Chronic respiratory failure with hypoxia ([facility]) 02/13/2019   • Nocturnal and exertional hypoxemia 03/20/2018   • TB lung, latent 05/04/2016   • Dementia ([facility]) 01/11/2016   • Sensorineural hearing loss 12/16/2015   • History of opioid dependence  04/23/2015   • Macular degeneration 07/08/2013   • Tension headache 06/11/2013   • History of shingles 06/07/2012   • History of positive h.pylori titer 11/01/2011   • Sleep apnea 08/10/2011   • COPD (chronic obstructive pulmonary disease) ([facility]) 07/20/2011   • Right hip pain 03/28/2011   • PAF (paroxysmal atrial fibrillation) ([facility]) 06/10/2010   • History of compression fracture of spine 05/26/2010   • Low back pain 05/22/2010   • Cervical pain 05/22/2010   • Osteoporosis, idiopathic 05/22/2010   • Hypertension 05/20/2010   • Dyslipidemia 05/20/2010   • Preventative health care 05/20/2010       Family History   Problem Relation Age of Onset   • Heart Disease Father    • Diabetes Brother        She  has a past medical history of Anesthesia, Arthritis, Atrial fibrillation ([facility]) (02/2019), Atypical chest  pain, CATARACT (2007,08), Chronic anticoagulation, Constipation, COPD (chronic obstructive pulmonary disease) (HCC), DDD (degenerative disc disease), Dental disorder, Dyspnea, Headache(784.0), Hypercholesteremia, Hypertension, MEDICAL HOME, On home oxygen therapy, Osteoporosis, Other accident (2006), Pain, Pneumonia, TB lung, latent ( ), Valvular heart disease, Vertebral fracture, and Vertigo.  She  has a past surgical history that includes cataract phaco with iol (1/5/2009); other orthopedic surgery (may, 2010); carpal tunnel release (@30 yrs ago); and recovery (7/28/2010).       Objective:   BP (!) 177/94 (BP Location: Left arm, Patient Position: Lying right side, BP Cuff Size: Adult)   Pulse 75   Ht 1.524 m (5')   Wt 61.2 kg (135 lb)   SpO2 91%   BMI 26.37 kg/m²     Physical Exam   Constitutional: She is well-developed, well-nourished, and in no distress.   HENT:   Head: Normocephalic.   Pulmonary/Chest: Effort normal.   Neurological: She is alert.   Psychiatric: Mood and affect normal. She exhibits abnormal recent memory and abnormal remote memory.     CONCLUSIONS OF ECHOCARDIOGRAM OF 2/16/2019:  Normal left ventricular systolic function.  Left ventricular ejection fraction is visually estimated to be 55%.  Grade II diastolic dysfunction.  Normal right ventricular systolic function.  Mild mitral regurgitation.  Moderate aortic insufficiency.  Moderate tricuspid regurgitation.  Right ventricular systolic pressure is estimated to be 45  mmHg.  Compared to the images of the study done 1/24/2013 - there has been an   increase in the estimate of the right ventricular systolic pressure,   previously normal.     LABS AS OF 3/25/2021:  Sodium, Ser/Plas 135 - 145 mmol/L 137    Potassium, Ser/Plas 3.5 - 5.5 mmol/L 3.7    Chloride, Ser/Plas 98 - 107 mmol/L 100    CO2, Ser/Plas 22 - 29 mmol/L 28    Anion Gap 5 - 15 mmol/L 9    Fasting      Comment: Patient Fasting status not provided.   Glucose, Ser/Plas 70 - 140 mg/dL  151High     Comment: Patient Fasting status not provided. Reference range listed above is for Non-fasting patients.   Creatinine, Ser/Plas 0.51 - 0.95 mg/dL 0.97High     Comment: Measured  by isotope dilution mass spectrometry traceable method.   Result can be falsely decreased in patients with elevated levels of N-Acetylcysteine (NAC) and Metamizole.   eGFR >60 mL/min/1.73 m2 52Low       WBC 4.0 - 11.0 K/uL 11.9High     RBC 3.80 - 5.20 MIL/uL 3.82    Hemoglobin 11.7 - 15.7 g/dL 12.0    Hematocrit 35.0 - 47.0 % 38.0    MCV 82.0 - 98.0 fL 99.5High     MCH 27.0 - 34.0 pg 31.4    MCHC 32.0 - 36.0 g/dL 31.6Low     RDW 11.5 - 14.5 % 13.8    Platelet count 150 - 400 K/uL 222      LABS AS OF 4/6/2021:  PH (v), ISTAT 7.32 - 7.42  7.36    PCO2 (v), ISTAT 40.0 - 50.0 mmHg 62.7High     PO2 (v), ISTAT 35 - 45 mmHg 18Low     Base Excess, ISTAT  mmol/L 10    HCO3 (v), ISTAT 22 - 26 mmol/L 35.6High     TCO2 (v), ISTAT 23 - 27 mmol/L 37High     O2 Saturation, ISTAT ([place]) 60 - 85 % 23Low     Lactate, ISTAT <2 mmol/L 1.06          Assessment and Plan:   The following treatment plan was discussed:     1. Diastolic congestive heart failure, unspecified HF chronicity (HCC)  No recent HF exacerbations. She remains on Coreg and Demadex, and remains stable.    2. PAF (paroxysmal atrial fibrillation) ([facility])  Remains stable on Coreg. She is anticoagulated with low dose Eliquis; no bleeding problems.    3. Chronic respiratory failure with hypoxia ([facility])  4. Mucopurulent chronic bronchitis ([facility])  Recent ER visit for COPD exacerbation, treated with antibiotics and Prednisone; she does use oxygen during the day and always at nighttime. She has become more sedentary since last visit. PFTs have been ordered, and were initially approved by [facility], but recently approval was cancelled.    5. Ascending aortic aneurysm (AAA) without rupture ([facility])  CT in February 2019 stable at 4.1cm.    6. Essential hypertension  Elevated today, but she was  just woken up. Daughter claims that BP is well controlled generally speaking.    7. Dyslipidemia  Not currently on any therapy, OK for age.    8. Dementia without behavioral disturbance, unspecified dementia type (HCC)  Progressive. She is having some hearing loss, and suggested FU at an Urgent Care to check if her ears are blocked and/or she has any fluid buildup. Did also discuss bringing in a caregiver versus checking out memory assisted living facilities. She will discuss with other family members, as caring for her mother is getting more complicated.    Same cardiac medications for now. I did also encourage Yahsira to call [facility] to make sure that they will cover Venus for services rendered in [place]; they might need to consider a different Medicare supplemental insurance if not. She states understanding.    FU with me in 3 months for next TeleMedicine visit. FU sooner if clinical condition changes.      Follow-up: No follow-ups on file.

## 2021-04-12 NOTE — TELEPHONE ENCOUNTER
ESTABLISHED PATIENT PRE-VISIT PLANNING     Patient was NOT contacted to complete PVP.  ----------------------------------------------------------------------------------------------------    1.  Reviewed notes from the last few office visits within the medical group: Yes    2.  If any orders were placed at last visit or intended to be done for this visit (i.e. 6 mos follow-up), do we have Results/Consult Notes?   · Labs - Labs were not ordered at last office visit.  · Imaging - Imaging was not ordered at last office visit.  · Referrals - Referral ordered, patient has NOT been seen. Referral to Pulmonary and Sleep is PENDING. Referral to ENT is COMPELETE    3. Is this appointment scheduled as a Hospital Follow-Up? Yes, visit was at [facility]. Hospital records have been requested.    4.  Immunizations were updated in Epic using Reconcile Outside Information activity? Yes. Immunizations reconciled through Web-IZ and outside sources. Immunization records are up to date.    5.  Patient is due for the following Health Maintenance Topics:   Health Maintenance Due   Topic Date Due   • IMM ZOSTER VACCINES (2 of 3) 11/27/2012   • Annual Pulmonary Function Test / Spirometry  10/21/2016   • BONE DENSITY  06/21/2018   • IMM HEP B VACCINE (2 of 3 - Risk 3-dose series) 11/18/2019   • Annual Wellness Visit  10/21/2020         ----------------------------------------------------------------------------------------------------  This pre-Visit Planning note has been created for the Provider and Medical Assistant to review prior to the patient's office appointment. Patient is NOT REQUIRED to follow-up on this note.   Outside information NOT reconciled using the Mayomi feature. Per Rosalind Kim, the Mayomi feature is down as of 02/09/2021 at 2:00pm. Will reconcile outside information at a later date.

## 2021-04-12 NOTE — TELEPHONE ENCOUNTER
[facility] @ [facility] called to say that the 2 REF's that were submitted for this pt have been denied. Pt has no out-of-network benefits.

## 2021-04-19 ENCOUNTER — TELEMEDICINE (OUTPATIENT)
Dept: MEDICAL GROUP | Facility: MEDICAL CENTER | Age: 86
End: 2021-04-19
Payer: MEDICARE

## 2021-04-19 VITALS
BODY MASS INDEX: 26.5 KG/M2 | DIASTOLIC BLOOD PRESSURE: 76 MMHG | WEIGHT: 135 LBS | HEART RATE: 76 BPM | OXYGEN SATURATION: 95 % | SYSTOLIC BLOOD PRESSURE: 126 MMHG | HEIGHT: 60 IN

## 2021-04-19 DIAGNOSIS — F11.20 UNCOMPLICATED OPIOID DEPENDENCE (HCC): ICD-10-CM

## 2021-04-19 DIAGNOSIS — I48.0 PAF (PAROXYSMAL ATRIAL FIBRILLATION) (HCC): ICD-10-CM

## 2021-04-19 DIAGNOSIS — J96.11 CHRONIC RESPIRATORY FAILURE WITH HYPOXIA (HCC): ICD-10-CM

## 2021-04-19 DIAGNOSIS — J41.1 MUCOPURULENT CHRONIC BRONCHITIS (HCC): ICD-10-CM

## 2021-04-19 DIAGNOSIS — F03.90 DEMENTIA WITHOUT BEHAVIORAL DISTURBANCE, UNSPECIFIED DEMENTIA TYPE: ICD-10-CM

## 2021-04-19 DIAGNOSIS — I71.40 ABDOMINAL AORTIC ANEURYSM (AAA) WITHOUT RUPTURE (HCC): ICD-10-CM

## 2021-04-19 DIAGNOSIS — M54.2 CERVICAL PAIN: Chronic | ICD-10-CM

## 2021-04-19 DIAGNOSIS — M54.50 LOW BACK PAIN, UNSPECIFIED BACK PAIN LATERALITY, UNSPECIFIED CHRONICITY, UNSPECIFIED WHETHER SCIATICA PRESENT: Chronic | ICD-10-CM

## 2021-04-19 DIAGNOSIS — G47.33 OBSTRUCTIVE SLEEP APNEA SYNDROME: ICD-10-CM

## 2021-04-19 DIAGNOSIS — I50.30 DIASTOLIC CONGESTIVE HEART FAILURE, UNSPECIFIED HF CHRONICITY (HCC): ICD-10-CM

## 2021-04-19 PROCEDURE — 99213 OFFICE O/P EST LOW 20 MIN: CPT | Mod: 95,CR | Performed by: INTERNAL MEDICINE

## 2021-04-19 ASSESSMENT — FIBROSIS 4 INDEX: FIB4 SCORE: 2.68

## 2021-04-19 NOTE — PROGRESS NOTES
Subjective:        Telemedicine: Established Patient   This evaluation was conducted via Zoom using secure and encrypted videoconferencing technology. The patient was in a private location in the state of [place].    The patient's identity was confirmed and verbal consent was obtained for this virtual visit.    Subjective:   CC: Follow-up chronic pain  Chief Complaint   Patient presents with   • Follow-Up     Telemedicine appointment with patient and daughter  Medications, allergies, medical history, surgical history, social history, family history  reviewed and updated  Patient and daughter saw the [facility] and was started on namenda for her memory   Patient still has dizziness at times starting march 24, unclear if this is dizziness or lightheadedness, has also decreased hearing at the same time. Remains on neurontin 100 mg at night, celebrex 100 mg, and is not on norco regularly only twice last week. Since starting the namenda a few days ago, her pain has been improved and she slept well last night. Daughter is caring for patient 24/7 and is looking at getting at least some live-in support to help her.  Patient had been living in [place] independently with her son checking in on her daily but with the Covid pandemic patient moved to the [place] area to be closer to her daughter who can provide 24-hour supervision and care.  Patient's mood is stable.  Does have more difficult at the at night unclear whether this is pain or dementia related.  Patient still able to walk with walker and hands on support but the dizziness does affect her ability to walk.  She does have an ENT evaluation at [facility] upcoming  Using oxygen as needed at night only for now and her breathing seems to have improved in the [place] area compared to in  [place] at altitude.  Patient does have COPD followed by pulmonary.  Remains on Trelegy Ellipta and albuterol as needed.  Most recent chest x-ray done at [facility] ER atelectasis March 24,  completed course of doxycycline and prednisone for bronchitis flareup last month.  Also followed by cardiology for paroxysmal atrial fibrillation, remains on carvedilol and Eliquis, has no issues with bruising or bleeding on Eliquis plus Celebrex.  Still needs care with all ADLs, daughter will assist with dressing, bathing, toileting, appetite is still fine, mood stable and will have more agitation at night, no hallucinations  Trying to drink adequate fluids      Allergies   Allergen Reactions   • Codeine Vomiting   • Irbesartan Cough     Strong cough, pt.s daughter reports the ER DrEn Advised she stop taking    • Lisinopril Shortness of Breath and Cough       Current medicines (including changes today)  Current Outpatient Medications   Medication Sig Dispense Refill   • carvedilol (COREG) 3.125 MG Tab Take 1 tablet by mouth 2 times a day, with meals. 180 tablet 3   • apixaban (ELIQUIS) 2.5mg Tab Take 1 tablet by mouth 2 Times a Day. 180 tablet 3   • torsemide (DEMADEX) 10 MG tablet TAKE 1 TABLET BY MOUTH EVERY  tablet 3   • celecoxib (CELEBREX) 100 MG Cap Take 1 capsule by mouth 2 times a day. 60 capsule 1   • gabapentin (NEURONTIN) 100 MG Cap TAKE 1 CAPSULE BY MOUTH ONCE DAILY IN THE EVENING 90 Cap 3   • doxycycline (VIBRAMYCIN) 100 MG Cap Take 1 Cap by mouth 2 times a day. Take as prescribed until gone. 20 Cap 3   • HYDROcodone-acetaminophen (NORCO) 7.5-325 MG per tablet TAKE 1 TO 2 TABLETS BY MOUTH TWICE DAILY AS NEEDED FOR MODERATE PAIN FOR UP TO 30 DAYS     • traZODone (DESYREL) 50 MG Tab Take 0.5 Tabs by mouth at bedtime as needed for Sleep. 90 Tab 3   • Magnesium 100 MG Tab Take 1 Tab by mouth every bedtime.     • Green Tea, Loya sinensis, (GREEN TEA PO) Take 1 Tab by mouth ONE-HALF HOUR AFTER LUNCH.     • albuterol (PROVENTIL) 2.5mg/3ml Nebu Soln solution for nebulization USE 3 ML IN NEBULIZER  EVERY 4 HOURS AS NEEDED FOR SHORTNESS OF BREATH 300 mL 5   • Fluticasone-Umeclidin-Vilant (TRELEGY  ELLIPTA) 100-62.5-25 MCG/INH AEROSOL POWDER, BREATH ACTIVATED Inhale 1 Inhalation by mouth every day. Indications: Chronic Obstructive Lung Disease 1 Each 11   • Multiple Vitamins-Minerals (CENTRUM SILVER PO) Take 1 Tab by mouth ONE-HALF HOUR AFTER LUNCH.     • Coenzyme Q10 (COQ10 PO) Take 1 Tab by mouth every evening.     • Probiotic Product (PROBIOTIC PO) Take 1 Cap by mouth every day.     • Alpha-Lipoic Acid (LIPOIC ACID PO) Take 1 Tab by mouth every morning.     • Ginkgo Biloba 120 MG Cap Take 120 mg by mouth every morning.       No current facility-administered medications for this visit.        Atherosclerosis aorta  1/6/20 chest x-ray aortic atherosclerosis     AAA  10/19/15 CT chest high-resolution thorax atherosclerosis aorta  2/13/19 CT-CTA chest pulmonary artery with reconstruction 4.1 cm ascending aortic aneurysm     Cervical pain  4/08 MRI cervical spine C3-C4 moderate left-sided foraminal stenosis, C4-C5 moderate foraminal stenosis right, C5-C6 moderate right-sided foraminal stenosis, C6-C7 moderate bilateral foraminal stenosis  5/08 CT cervical spine C3-C4 uncovertebral joint arthropathy and significant left-sided neural foraminal narrowing, C4-C5 uncovertebral joint arthropathy right significant neuroforaminal narrowing, C5-C6 uncovertebral joint arthropathy right moderate neural foraminal narrowing, C6-C7 uncovertebral joint arthropathy moderate to severe right neural foraminal narrowing  7/08 C3-C5 cervical facet injections   1/09 medial branch nerve block diagnostic   2/09 C4-C5 cervical epidural under fluoroscopy   4/13 Daughter called [facility] suggest taper pain med, she has sched to taper the oxycontin  4/15/13 resumed oxycontin 10 bid  6/11/13 off oxcontin  7/24/13 increase oxycontin from qday to 10 mg bid   9/14/17 custom PT discharge summary patient did not improve walking tolerance or standing tolerance, medicare g-code status 60-79% impairment, thoracolumbar  flexion decreased from 50-25%, extension 10%, sidebending 10-25%  6/28/17  pain note left L3-L5 medial branch block  9/14/17 custom PT discharge summary patient did not improve walking tolerance or standing tolerance, medicare g-code status 60-79% impairment, thoracolumbar flexion decreased from 50-25%, extension 10%, sidebending 10-25%  1/17/18  pain note left cervical paraspinal muscle trigger point injections  1/31/18  pain note  2/2/18  pain note, reviewed cervical spine x-ray multilevel DJD, recommend consider intrathecal pain pump trial  4/11/19 dr.zollinger pain note offered trigger point injections and lumbar radiofrequency ablation, she declines for now, consider tylenol in place of celebrex, follow-up as needed  1/14/20 resume norco 7.5 mg bid prn pain, continue neurontin 100 mg qpm and celebrex (hold while on prednisone)   9/22/20 off norco  2/3/21 increase neurontin to 100 mg bid and prednisone 5 mg daily (hold celebrex)  2/10/11 cannot tolerate daytime neurontin, stay at neurontin 100 mg at night, stop prednisone, trial celebrex 100 mg bid again  2/23/21 trial of oxycontin ER 10 mg 1/2 tab in am  4/15/21 dr.[facility] pain management referral physical therapy, consider buprenorphine patch in the future, start namenda 10 mg half a tablet at bedtime titrate up to 1 tablet after 2 weeks, referral audiology  4/19/21 on namenda 10 mg, neurontin 100 mg qpm, celebrex 100 mg bid, has used norco 7.5 mg twice in the past week   Has seen neurosurgery, pain management locally and at [place], has tried NSAIDs, celebrex, cymbalta, pregabalin, gabapentin, lidoderm, voltaren gel      Chronic respiratory failure  12/13/19 pulmonary note, on trelegy and nocturnal oxygen, room air saturation at rest 87%, 2 L oxygen at rest saturation 94%, 2 L oxygen saturation with exertion 92% documenting need for oxygen 2 L, will order overnight oximetry, follow-up as scheduled  1/14/20  room air saturation at rest 91%, saturation with exercise room air 86 to 87%  12/29/20 on oxygen 1 liter at night     COPD  7/11 CT spiral thorax extensive emphysematous change of lung, small left pleural effusion left lung base with atelectasis unchanged from prior CT  5/10 CT spriral thorax emphysematous change and dependent atelectasis left lung base  7/11 PFT FEV/FVC 59%, FEV1 1.1 L (75% predicted), significant post bronchodilator response noted (FEV1 improved 16%). Mixed restrictive and obstructive pattern,COPD with GOLD stage II with sig bronchodilator response  7/11 trial of symbicort 80/4.5 mcg bid discussed with daughter plus albuterol neb prn, [employer] home education ordered  5/12 off symbicort   5/22/14 cxr negative  6/23/14 on symbicort  7/20/15 change to advair 250/50 mcg from symbicort (not covered on insurance)  9/3/15 cxr negative  10/4/15 add spiriva and change symbicort to advair 250/50 mcg bid  10/19/15 CT high resolution thorax, no evidence of interstitial lung disease, minimal scattered opacification could indicate minimal areas of fibrosis periphery of each lung, similar to prior exam, emphysema most prominent upper lobes bilateral  10/19/15 PFT FEV1 1.1 (61% predicted),FVC 1.9,FEV/FVC 58%, no bronchodilator response,DLCO 34%; on advair 250/50 mcg bid and spiriva  11/2/15 change from advair discus to advair 250 inhaler and continue spiriva   12/11/15 not taking advair, will start advair and cont spiriva  12/11/15 cxr negative  3/14/16 [facility] note complaining doxycycline and taper steroids, continue nocturnal oxygen, continue rotating antibiotics for bronchiectasis, follow-up laboratory testing ordered  3/14/16  (normal), quantiferon gold positive, allergen panel negative, IgE 357 (less than 214), IgA 116 (),, IgM 27 (),IgG 947 ([id number])  4/13/16 cxr mild cardiomegaly  5/4/16 [facility] note continue advair 115/21 bid with spacer given doxycycline and prednisone, continue oxygen 3 L at  night, AFB samples ×3, follow-up 3 months  7/25/16 pulmonary note on prednisone taper one week out of the month and doxycycline one per day for one week per month, on advair bid and spiriva and oxygen 3 liters at night  11/8/16 pulmonary note continue advair 115/21 ucg bid,spiriva, xopenex as needed, oxygen 3 L at night  10/4/17 pulmonary note continue advair 115/21 two inhalations bid, spiriva daily, xoponex as needed, oxygen 3 L at night, history of positive quantiferon gold, will order sputum sample follow-up 4 months  3/17/18 pulmonary note, continue nocturnal oxygen 2 L, start trelegy one puff daily  9/10/18 pulmonary note intolerant to CPAP, resume nocturnal oxygen, latent TB, repeat chest x-ray, stable on bronchodilators, as needed prednisone and antibiotic prescription to pharmacy follow-up 3-6 months  2/13/19 CT-CTA chest pulmonary artery with reconstruction no pulmonary embolism, emphysema and chronic bronchitis, cardiomegaly with right heart dysfunction, 4.1 cm ascending aortic aneurysm  3/19/19 on trelegy ellipta and nocturnal oxygen 2 L  10/2/19 pulmonary note cough and bronchitis continue prednisone and cefdinir until complete then resume celebrex, chest x-ray ordered, continue mucinex and nebulizer  12/13/19 pulmonary note, on trelegy and nocturnal oxygen, room air saturation at rest 87%, 2 L oxygen at rest saturation 94%, 2 L oxygen saturation with exertion 92% documenting need for oxygen 2 L, will order overnight oximetry, follow-up as scheduled  1/6/20 chest x-ray with no acute abnormality identified, hypoinflation consistent with chronic obstructive pulmonary disease  1/14/20 room air saturation at rest 91%, saturation with exercise room air 86 to 87%  9/24/20 on oxygen 1 L during the day, 2 L at night, trelegy daily, COPD exacerbation prednisone) plus doxycycline provided  11/19/20 bicarb 39 done at [facility] in [facility]  12/29/20 recent trial medrol dosepack improved back pain and  breathing, will continue low-dose prednisone 5 mg, discontinue celebrex, continue eliquis monitor for GI bleed  1/6/21 pulmonary note provide prescription for prednisone taper and doxycycline as needed COPD exacerbation follow-up 6 months  2/3/21 on prednisone 5 mg daily, trelegy ellipta, oxygen 1-2 liters during the day  2/10/21 stop prednisone   3/24/21 cxr at [facility] bilateral mid and lower lung opacities, likely atelectasis with superimposed infection or aspiration  3/25/21 [facility] hospital discharge summary acute hypercapnic respiratory failure COPD exacerbation and community-acquired pneumonia, initially placed on BiPAP, titrated to room air, started on doxycycline and ceftriaxone, transition to a azithromycin on discharge, physical therapy evaluation suggest deconditioning, recommend home health service, discharged home with daughter, discharged on doxycycline 100 mg bid x 10 days, prednisone 40 mg x 5 days, follow-up PCP and pulmonary     dementia  1/11/16 MMSE 24/30  7/14/17 MMSE 23/30 offered aricept  4/10/18 MMSE 24/30 5/2/18 start aricept 5 mg daily  9/18/18 off aricept not tolerated  10/31/18 trial namenda XR started pack  11/8/18 namenda titration pack not available, will start namenda XR 7 mg daily  5/16/19 neurology note, memory loss likely alzheimers, will obtain MRI brain, b12, folate, tsh, would not tolerate neuropsychiatric evaluation secondary to poor focus and attention  4/15/21  [facility] pain management referral physical therapy, consider buprenorphine patch in the future, start namenda 10 mg half a tablet at bedtime titrate up to 1 tablet after 2 weeks, referral audiology     diastolic chf  2/16/19 echo normal LV function, EF 55%, grade 2 diastolic dysfunction, moderate aortic insufficiency, moderate tricuspid regurgitation, RVSP 45  5/18/20 on torsemide 5 mg take 2 tablets daily x3 days as needed for leg swelling per cardiology, maintains on carvedilol 6.25 mg bid  5/21/20 bun  21,creat 1.07,GFR 48,Na 138,K+ 4.5 on torsemide 5 mg qday as needed x 3 days for leg swelling per cardiology   5/22/20 on torsemide 5 mg once a day, normal BUN, creatinine, sodium, potassium, has been on torsemide 5 mg once a day for 5 days, take an extra 2 days then discontinue, check daily weights, monitor leg edema and notify me with daily weights next week  5/26/20 137.6 lb on torsemide for leg swelling, try to go to 3 days with turosemide, if this continues we will need to recheck BMP  5/27/20 weight 140.8 lb so resume torosemide 5 mg daily  5/29/20 emergency room note, presented with shortness of breath, saturation 96%, weight 140 pounds, discussed with cardiology  5/29/20 proBNP NT 3811 increase torsemide to 10 mg daily, and add metolazone 2.5 mg   5/29/20 bun 23,creat 0.8,Na 138,K+ 3.8  5/29/20 cxr cardiomegaly, pulmonary interstitial markings consistent with mild edema  6/3/20 weight 128 lb tapering torosemide to 10 mg daily repeat labs one week, repeat labs ordered one week, did not start metolazone 2.5 mg yet  6/8/20 cardiology note stable on torsemide 10 mg, should edema increase can take an extra 10 mg/day, hold off on metolazone for now  6/11/20 bun 24,creat 0.93,GFR>60,K+ 5.0,magnesium 2.3 on torsemide 10 mg, can take an extra 10 mg/day  6/11/20 BNP 3066  7/12/29 weight 128 lb  8/17/20 bun 18,creat 0.83,GFR>60,Na 142,K+ 4.2, magnesium 2.4 on torsemide 10 mg qday  8/19/20 cardiology note dependent edema, multifactorial component of diastolic dysfunction, stable on demadex, breathing and edema have improved, recheck bmp 4 weeks, follow-up 6 weeks   10/28/20 bun 27,creat 0.89,GFR 60,proBNP NT 1425  11/19/20 bun 24,creat 1.0,GFR 50,K+4.2,bicarb 39 done at [facility] in [facility]  12/29/20 weight 1333 lbs on torsemide 10 mg, coreg 3.125 mg bid  1/5/21 cardiology note stable follow-up 3 to 6 months  3/25/21 bun 25,creat 1.17,GFR 41, Na 140,K+ 3.4 done at [place] during hospitalization for  pneumonia     Dyslipidemia   5/10 chol 163,trig 68,hdl 69,ldl 80  6/10 chol 163,trig 60,hdl 69,ldl 80  7/11 chol 118,trig 45,hdl 65,ldl 44 on crestor 10 mg  10/11 chol 165,trig 47,hdl 78,ldl 74 on crestor 10 mg done at [facility]  2/12 off crestor  6/12 chol 211,trig 104,hdl 64,ldl 126 off crestor  3/4/14 chol 222,trig 124,hdl 52,ldl 145 off meds     History of compression fracture  5/10 MRI thoracic spine subacute T12 compression fracture  5/27/10 kyphoplasty T11  6/10 MRI lumbar spine T12 compression fracture mild to moderate central canal narrowing, interval T11 kyphoplasty, L2-L3 moderate foraminal stenosis, L3-L4 severe left foraminal stenosis, moderate right foraminal stenosis, L4-L5 moderate central canal stenosis  7/10 dexa LS +1.0,hip -1.6  7/28/10 T12 vertebral plasty  8/11 [facility] pain evaluation  pain management, recommend continue oxycontin 10 bid  10/11 [facility] pain  T11-L1 injection  2/13  pain note, T10-T11 epidural injection  6/13 dexa LS +1.8, forearm -2.7  9/23/13 reclast injection  9/4/14 reclast IV infusion  9/14/17 custom PT discharge summary patient did not improve walking tolerance or standing tolerance, medicare g-code status 60-79% impairment, thoracolumbar flexion decreased from 50-25%, extension 10%, sidebending 10-25%  7/1/18 x-ray lumbar spine complete or near collapse L1 vertebral body anteriorly which is likely chronic, extensive degenerative changes, prior percutaneous vertebral augmentation T12-L1  2/27/19 off oxycontin after hospital discharge, would like to avoid narcotics given side effects, will try celebrex 100 mg daily as had that previously  1/6/20 x-ray thoracic spine old compression fracture T11 and T12 post augmentation, kyphosis and levoconvex scoliosis no acute fracture  1/6/20 x-ray lumbar spine old T11 and T12 compression fracture, levoconvex scoliosis thoracolumbar junction, old T11 and T12 compression fracture     History H. pylori  9/11  serum IgG positive s/p prevpack  9/19/12 cbc,cmp,lipase neg; u/s abd gallbladder sludge without biliary dilatation or stone  12/12/12 [facility] eval rec EGD/colon pt did not follow up   2/9/20 start prilosec 20 mg on celebrex and eliquis  5/21/20 on prilosec 10 mg, vit b12 1132, vit d 33, on celebrex and eliquis     history opiate  2/26/15 narcotic contract  6/12/15 opiate risk score 0  10/4/15   7/25/16   9/20/16  pain note recommended left L3-L5 MBBB  10/31/16   2/6/17   5/9/17 narcotic contract  5/9/17 opiate risk score 0  5/9/17   5/9/17 depression screening score 0  6/28/17 trial cymbalta 30 mg  8/7/17 did not start cymbalta, will start  8/7/17   8/7/17 UDT  8/21/17 change cymbalta to qod  9/18/17 off cymbalta  9/14/17 custom PT discharge summary patient did not improve walking tolerance or standing tolerance, medicare g-code status 60-79% impairment, thoracolumbar flexion decreased from 50-25%, extension 10%, sidebending 10-25%  11/3/17   2/6/18   4/10/18   4/10/18 controlled substances contract, decrease oxycontin to 10 mg am and 5 mg pm  5/17/18 decrease to oxycontin 10 mg am and 5 mg pm  5/17/18   7/10/18 increase oxycontin back to 10 mg bid after fall  7/10/18    9/18/18   2/27/19 off oxycontin after hospital discharge, would like to avoid narcotics given side effects, will try celebrex 100 mg daily as had that previously  3/19/19 refer to dr.zollinger pain  3/19/19 increase to celebrex 100 mg bid, add neurontin 100 mg qpm, continue lidoderm patch  4/11/19 dr.zollinger pain note offered trigger point injections and lumbar radiofrequency ablation, she declines for now, consider tylenol in place of celebrex, follow-up as needed  1/14/20   1/14/20 controlled substance contract  1/14/20 resume norco 7.5 mg bid prn pain, continue neurontin 100 mg qpm and celebrex (hold while on prednisone)   2/24/20    5/18/20  on norco 7.5 mg bid taking 1-2 per day, neurontin,  celebrex  9/22/20 off norco  12/29/20 recent trial medrol dosepack improved back pain and breathing, will continue low-dose prednisone 5 mg, discontinue celebrex, continue eliquis monitor for GI bleed  2/3/21 increase neurontin to 100 mg bid and prednisone 5 mg daily (hold celebrex)  2/10/11 cannot tolerate daytime neurontin, stay at neurontin 100 mg at night, stop prednisone, trial celebrex 100 mg bid again  3/2/21 start oxycontin 10 mg 1/2 tab daily   3/16/21   4/15/21  [facility] pain management referral physical therapy, consider buprenorphine patch in the future, start namenda 10 mg half a tablet at bedtime titrate up to 1 tablet after 2 weeks, referral audiology  4/19/21 on namenda 10 mg, neurontin 100 mg qpm, celebrex 100 mg bid, has used norco 7.5 mg twice in the past week   NSAIDs, celebrex, pregabalin, gabapentin, cymbalta, ultram, physical therapy,TENS, lidoderm patches, voltaren gel, epidural, facet injections, kyphoplasty, spinal stimulator, pain management locally and at [facility], neurosurgery      History shingles     Hypertension  1/05 carotid ultrasound negative  1/07 echo LV EF 60% ,mild LVH  1/09 renal ultrasound 6 mm right cortical cyst  9/09 MRI brain with and without moderate nonspecific microvascular ischemic change  9/09 MRA next mild plaque right internal carotid  5/24/10 echo normal LV size and function, EF 60%, mild to moderate AR, RVSP 35-40 consistent with mild pulmonary hypertension  5/10 persantine thallium no ischemia, EF 72%  9/10 change avalide 150/12.5 to avapro 150 qday, had sodium 125 in ER  3/11 on avapro 300 mg  7/8/11 echo normal LV function, EF 55%  7/8/11 Persantine thallium possible small area mild ischemia in the lateral wall, no evidence of infarction  7/11   3/12 increase metoprolol to 50 bid, cont avapro 300 mg, start norvasc 2.5 mg  5/1/12 increase norvasc to 5 mg, change avapro to avalide 300/12.5 mg, cont metoprolol 50 bid  10/2/12 on avalide  300/12.5 mg and metoprolol 50 bid and norvasc 5 mg  10/12 persantine thallium diaphragmatic uptake possible attenuation, fixed inferolateral defect which may be secondary to attenuation, EF 76%, no wall motion abnormalities, no evidence of significant ischemia.  1/13 echo normal LV function, grade 2 diastolic dysfunction, EF 70% moderate aortic insufficiency  1/13   1/23/13 cxr cardiomegaly without pulmonary edema  6/11/13 on avalide 300/12.5 mg,norvasc 5 mg, metoprolol 50 bid  4/7/14 cardiology note atrial fibrillation, start pradaxa 75 mg bid, stop asa, stop norvasc, decreased avalide 300/12.5 mg to 1/2 per day, increase metoprolol to 50 mg 1 1/2 bid  10/20/14 metoprolol 50 mg decreased to 25 mg bid by [place] doctor due to low heart rate, continues on avalide 300/12.5 mg   12/29/14  cardiology note continue pradaxa, metoprolol 25 mg 1/2 bid, avalide 300/12.5 mg  7/25/16 avalide 150/12.5 mg decrease to 1/2 tab per day and continue metoprolol 25 mg bid  8/24/16 cardiology note sinus rhythm on exam,[id number] score=2 continue anticoagulation, low-dose metoprolol,avalide 150/12.5 mg 1/2 per day, follow-up one year  12/14/17 cardiology note remains in sinus rhythm, follow-up one year on avalide 150/12.5 mg,metoprolol  6/21/18  cardiology note paroxysmal atrial fibrillation sinus rhythm on exam stable continue anticoagulation, follow-up 1 year  2/27/19 cardiology note paroxysmal atrial fibrillation, chronic anticoagulation  8/29/19 now on lisinopril 10 mg qam and metoprolol 25 mg qpm  10/7/19 CT-CTA complete thoracoabdominal atherosclerosis greater than 50% stenosis bilateral renal arteries   10/10/19 cardiology note reduce metoprolol to 12.5 mg bid as blood pressure normal in the office but may be lower at home contributing to tiredness and fatigue, torsemide as needed for shortness of breath  10/21/19 on metoprolol 25 mg on 12.5 mg bid  12/2/19 cardiology note unable to tolerate irbesartan and  irbesartan secondary to cough and desaturation, discontinue irbesartan and metoprolol, start low-dose carvedilol 6.25 mg bid and continue eliquis 2.5 mg bid and continue oxygen at night, room air saturation 90%  1/13/20 cardiology note blood pressure stable on coreg 6.25 mg bid, information given regarding DNR by cardiology  5/18/20 on torsemide 5 mg take 2 tablets daily x3 days as needed for leg swelling per cardiology, maintains on carvedilol 6.25 mg bid  5/21/20 bun 21,creat 1.07,GFR 48,Na 138,K+ 4.5 on torsemide 5 mg qday as needed x 3 days for leg swelling per cardiology   5/22/20 on torsemide 5 mg once a day, normal BUN, creatinine, sodium, potassium, has been on torsemide 5 mg once a day for 5 days, take an extra 2 days then discontinue, check daily weights, monitor leg edema and notify me with daily weights next week  5/26/20 137.6 lb on torsemide for leg swelling, try to go to 3 days with turosemide, if this continues we will need to recheck BMP  5/27/20 weight 140.8 lb so resume torosemide 5 mg daily  5/29/20 emergency room note, presented with shortness of breath, saturation 96%, weight 140 pounds, discussed with cardiology  5/29/20 proBNP NT 3811 increase torsemide to 10 mg daily, and add metolazone 2.5 mg   5/29/20 bun 23,creat 0.8,Na 138,K+ 3.8  5/29/20 cxr cardiomegaly, pulmonary interstitial markings consistent with mild edema  6/3/20 weight 128 lb tapering torosemide to 10 mg daily repeat labs one week, repeat labs ordered one week, did not start metolazone 2.5 mg yet  6/8/20 cardiology note stable on torsemide 10 mg, should edema increase can take an extra 10 mg/day, hold off on metolazone for now  6/11/20 bun 24,creat 0.93,GFR>60,K+ 5.0,magnesium 2.3 on torsemide 10 mg, can take an extra 10 mg/day  6/11/20 BNP 3066     leg edema  5/18/20 on torsemide 5 mg take 2 tablets daily x3 days as needed for leg swelling per cardiology, maintains on carvedilol 6.25 mg bid  5/21/20 bun 21,creat 1.07,GFR 48,Na  138,K+ 4.5 on torsemide 5 mg qday as needed x 3 days for leg swelling per cardiology   5/22/20 on torsemide 5 mg once a day, normal BUN, creatinine, sodium, potassium, has been on torsemide 5 mg once a day for 5 days, take an extra 2 days then discontinue, check daily weights, monitor leg edema and notify me with daily weights next week  5/26/20 137.6 lb on torsemide for leg swelling, try to go to 3 days with turosemide, if this continues we will need to recheck BMP  5/27/20 weight 140.8 lb so resume torosemide 5 mg daily  5/29/20 emergency room note, presented with shortness of breath, saturation 96%, weight 140 pounds, discussed with cardiology  5/29/20 proBNP NT 3811 increase torsemide to 10 mg daily, and add metolazone 2.5 mg   5/29/20 bun 23,creat 0.8,Na 138,K+ 3.8  5/29/20 cxr cardiomegaly, pulmonary interstitial markings consistent with mild edema  6/3/20 weight 128 lb tapering torosemide to 10 mg daily repeat labs one week, repeat labs ordered one week, did not start metolazone 2.5 mg yet  6/8/20 cardiology note stable on torsemide 10 mg, should edema increase can take an extra 10 mg/day, hold off on metolazone for now  6/11/20 bun 24,creat 0.93,GFR>60,K+ 5.0,magnesium 2.3 on torsemide 10 mg, can take an extra 10 mg/day  6/11/20 BNP 3066  7/12/29 weight 128 lb  8/17/20 bun 18,creat 0.83,GFR>60,Na 142,K+ 4.2, magnesium 2.4 on torsemide 10 mg qday  8/19/20 cardiology note dependent edema, multifactorial component of diastolic dysfunction, stable on demadex, breathing and edema have improved, recheck bmp 4 weeks, follow-up 6 weeks   10/28/20 bun 27,creat 0.89,GFR 60,proBNP NT 1425  1/5/21 cardiology note stable follow-up 3 to 6 months     Low back pain  6/06 epidural L4-L5   12/08 x-ray LS moderate to severe DJD  6/10 MRI lumbar spine T12 compression fracture with mild-to-moderate central spinal canal narrowing, interval T11 kyphoplasty, L2-L3 moderate frontal stenosis, L3-L4 severe left foraminal  stenosis, moderate right foraminal stenosis, L4-L5 moderate central spinal canal  7/10 interventional rad consult epidural T11 to L1 region  7/28/10 T12 vertebroplasty  7/30/10 norco taper, and lyrica 50 mg bid  8/5/10 reaction to lyrica, stop lyrica  8/19/10 lumbar steroid epidural   9/10  note rec decompression if pain persist  10/10 bilateral lumbar facet joint injections L3-S1   12/10 xray LS old T11 and T12 fracture status post kyphoplasty  1/11 [facility]  3/11 trial of spinal stimulator  8/11 [place] [facility] note evaluation  pain management cont oxycontin 10 bid  11/22/11 [place] [facility] note dr. hodge; trial of baclofen, T11 plus T12 left-sided nerve root block pending  7/12  pain note;Left-sided T11-T12 transforaminal epidural   4/13 daughter called [place] [facility] suggest taper pain med, she has sched to taper the oxycontin  4/15/13 resumed oxycontin 10 bid  6/11/13 off oxycontin  7/24/13 increase oxycontin from qday to 10 mg bid   2/28/14 on celebrex 200 mg as needed per  and oxycontin 10 mg bid  4/25/14 trial of cymbalta 30 mg, continue oxycontin 10 mg twice a day, recommend not use tramadol (side effects since on oxycontin already) or celebrex (on pradaxa)  5/29/14  [facility]; recommend T11-T12 left-sided transforaminal nerve block  7/2/14 [facility] left T11-T12 epidural injection  7/21/14  [place] [facility] note; increase oxycontin to 10 mg tid x 3 weeks and then taper down  10/27/14 [facility]; samples zorvolex  2/26/15 xray lumbar spine mild compression fracture of L4 of indeterminate age but new compared to 3/25/13 vertebral augmentation and T11 and T12 with severe compression fracture of T12 and focal kyphosis at this level  4/15/16 outpatient physical therapy phone call indicating patient unable to make appointments, recommending home health physical therapy  9/20/16   pain note recommended left L3-L5 MBBB  3/22/17  pain procedure note left L3-L5 MBBB #1  6/28/17 trial cymbalta 30 mg  6/29/17 [facility] note  8/3/17 [facility] note  9/18/17 off cymbalta  9/14/17 [facility] PT discharge summary patient did not improve walking tolerance or standing tolerance, medicare g-code status 60-79% impairment, thoracolumbar flexion decreased from 50-25%, extension 10%, sidebending 10-25%  11/14/17 [facility] PT discharge summary no improvement  11/29/17 MRI lumbar spine no acute fracture, lumbar levoscoliosis T12-L1, kyphotic deformity he talked L1, previous compression fractures T11, T12, L1, previous vertebral augmentation procedures T11 and L1, moderate central canal stenosis T12-L3, severe canal stenosis L3-L4, no significant change  1/31/18  pain note  2/2/18  pain note, reviewed cervical spine x-ray multilevel DJD, recommend consider intrathecal pain pump trial  5/17/18 decrease to oxycontin 10 mg am and 5 mg pm  7/1/18 x-ray lumbar spine complete or near collapse L1 vertebral body anteriorly which is likely chronic, extensive degenerative changes, prior percutaneous vertebral augmentation T12-L1  7/10/18 increase oxycontin back to 10 mg bid after fall  2/27/19 off oxycontin after hospital discharge, would like to avoid narcotics given side effects, will try celebrex 100 mg daily as had that previously  3/19/19 refer to dr.zollinger pain  3/19/19 increase to celebrex 100 mg bid, add neurontin 100 mg qpm, continue lidoderm patch  4/11/19 dr.zollinger pain note offered trigger point injections and lumbar radiofrequency ablation, she declines for now, consider tylenol in place of celebrex, follow-up as needed  5/15/19 dr.zollinger pain management note left lumbar paraspinal and quadratus lumborum areas  1/6/20 x-ray thoracic spine old compression fracture T11 and T12 post augmentation, kyphosis and levoconvex scoliosis no acute fracture  1/6/20  x-ray lumbar spine old T11 and T12 compression fracture, levoconvex scoliosis thoracolumbar junction, old T11 and T12 compression fracture  1/14/20 resume norco 7.5 mg bid prn pain, continue neurontin 100 mg qpm and celebrex (hold while on prednisone)   2/24/20    5/18/20  on norco 7.5 mg bid taking 1-2 per day, neurontin, celebrex  9/22/20 off norco  12/29/20 recent trial medrol dosepack improved back pain and breathing, will continue low-dose prednisone 5 mg, discontinue celebrex, continue eliquis monitor for GI bleed  2/3/21 increase neurontin to 100 mg bid and prednisone 5 mg daily (hold celebrex)  2/10/11 cannot tolerate daytime neurontin, stay at neurontin 100 mg at night, stop prednisone, trial celebrex 100 mg bid again  3/2/21 start oxycontin 10 mg 1/2 tab daily   3/16/21 unable to tolerate oxycontin cut in half, taking oxycontin 10 mg at noon but breakthrough back pain in the middle of the night waking her up, and drowsiness in the morning, change to norco 10 mg at noon and 5 mg in pm, stop neurontin, continue celebrex 100 mg bid  3/16/21   4/15/21  [place] pain management referral physical therapy, consider buprenorphine patch in the future, start namenda 10 mg half a tablet at bedtime titrate up to 1 tablet after 2 weeks, referral audiology  4/19/21 on namenda 10 mg, neurontin 100 mg qpm, celebrex 100 mg bid, has used norco 7.5 mg twice in the past week   Tried NSAIDs, celebrex, pregabalin, gabapentin, cymbalta, ultram, physical therapy,TENS,lidoderm, voltaren gel, epidural, facet injections, kyphoplasty, spinal stimulator, pain management locally and at [place]     Macular degeneration  7/1/13  ophtho note, start PreserVision AREDS II vitamin followup 6 months     Nocturnal hypoxemia  3/17/18 pulmonary note, continue nocturnal oxygen 2 L, start trelegy one puff daily  9/10/18 pulmonary note intolerant to CPAP, resume nocturnal oxygen, latent TB, repeat chest x-ray, stable on  bronchodilators, as needed prednisone and antibiotic prescription to pharmacy follow-up 3-6 months  12/13/19 pulmonary note room air saturation at rest 87%, 2 L oxygen at rest saturation 94%, 2 L oxygen saturation with exertion 92% documenting need for oxygen 2 L, will order overnight oximetry, follow-up as scheduled  1/14/20 room air saturation at rest 91%, saturation with exercise room air 86 to 87%  2/24/20 93% saturation on 2 liters     Osteoporosis  3/08 dexa LS +0.3,hip -1.4  5/10 MRI thoracic spine subacute T12 compression fracture  5/27/10 T12 kyphoplasty  6/10 on actonel  7/10 dexa LS +1.0,hip -1.6  8/10 vit d 56  3/11 reclast referral made  6/12 vit d 42 off actonel  6/21/13 dexa LS +1.8, forearm -2.7, I rec reclast, she would like to think it over  9/23/13 reclast injection  3/4/14 vit d 26  9/4/14 reclast IV infusion  2/26/15 xray rib series left; mild deformity of the lateral 10th rib on the left may be related to a fracture  2/26/15 xray lumbar spine mild compression fracture of L4 of indeterminate age but new compared to /25/13, vertebral augmentation and T11 and T12 with severe compression fracture of T12 and focal kyphosis at this level  12/7/15 reclast IV infusion  5/21/20 vit d 33     Paroxysmal atrial fibrillation  1/11 [facility] note cont multaq  7/11 EKG atrial fibrillation hospitalization  7/8/11 echo normal LV function, EF 55%  7/8/11 Persantine thallium possible small area mild ischemia in the lateral wall, no evidence of infarction  7/11 [facility] during hospitalization changed to amiodarone 200 mg plus pradaxa restarted  7/11   9/11 [facility] note stop amiodarone, cont pradaxa and metoprolol 25 bid  3/12 cont pradaxa and increase metoprolol to 50 bid due to higher bp  5/12 pradaxa decreased from 150 bid to 75 bid per [facility] due to nosebleeds  6/12 EKG NSR  1/13 echo normal LV function, grade 2 diastolic dysfunction, EF 70% moderate aortic insufficiency  6/11/13 on pradaxa and metoprolol 50 bid for rate  control, NSR today  9/13 cardiology note stop pradaxa and start asa 81 mg  4/7/14 cardiology note atrial fibrillation, start pradaxa 75 mg bid, stop asa, stop norvasc, decreased avalide 300/12.5 mg to 1/2 per day, increase metoprolol to 50 mg 1 1/2 bid  4/14/14 cardiology note, NSR on exam, continue pradaxa 75 mg bid, metoprolol 75 mg bid, avalide 300/12.5 mg 1/2 tab per day  10/20/14 metoprolol 50 mg decreased to 25 mg bid by [place] doctor due to low heart rate, continues on pradaxa and avalide 300/12.5 mg   12/29/14  cardiology note continue pradaxa, metoprolol 25 mg 1/2 bid, avalide 300/12.5 mg  6/12/15 NSR on exam  8/24/16 cardiology note sinus rhythm on exam,[id number] score=2 continue anticoagulation, low-dose metoprolol follow-up one year  12/14/17 cardiology note remains in sinus rhythm, follow-up one year  6/21/18  cardiology note paroxysmal atrial fibrillation sinus rhythm on exam stable continue anticoagulation, follow-up 1 year  12/11/18 on metoprolol and pradaxa  2/27/19 [id number] (age, gender, aortic atherosclerosis) = 5 (7.8% risk of stroke per year) and HAS-BLED score 3 points (age, hypertension, con commitment antiplatelet agents)  = 3.74 bleeds per 100 patient years) will change pradaxa not covered to eliquis 2.5 mg bid adjust for age and weight  9/6/19  cardiology note, cough since starting lisinopril was discontinued changed to irbesartan 75 mg, continue metoprolol and eliquis, hyponatremia related to hydrochlorothiazide, changed to demadex 5 mg monday, wednesday, friday  10/10/19 cardiology note reduce metoprolol to 12.5 mg bid as blood pressure normal in the office but may be lower at home contributing to tiredness and fatigue, torsemide as needed for shortness of breath  12/2/19 cardiology note unable to tolerate irbesartan and irbesartan secondary to cough and desaturation, discontinue irbesartan and metoprolol, start low-dose carvedilol 6.25 mg bid and continue  eliquiw 2.5 mg bid and continue oxygen at night, room air saturation 90%  12/9/19 cardiology mychart note, decrease carvedilol to 6.25 mg 1/2 bid and continue eliquis 2.5 mg  1/13/20 cardiology note blood pressure stable on coreg 6.25 mg bid and eliquis 2.5 mg bid, information given regarding DNR by cardiology   5/18/20 on torsemide 5 mg take 2 tablets daily x3 days as needed for leg swelling per cardiology, maintains on carvedilol 6.25 mg bid  5/21/20 bun 21,creat 1.07,GFR 48,Na 138,K+ 4.5 on torsemide 5 mg qday as needed x 3 days for leg swelling per cardiology   5/22/20 on torsemide 5 mg once a day, normal BUN, creatinine, sodium, potassium, has been on torsemide 5 mg once a day for 5 days, take an extra 2 days then discontinue, check daily weights, monitor leg edema and notify me with daily weights next week  5/26/20 137.6 lb on torsemide for leg swelling, try to go to 3 days with turosemide, if this continues we will need to recheck BMP  5/27/20 weight 140.8 lb so resume torosemide 5 mg daily  5/29/20 emergency room note, presented with shortness of breath, saturation 96%, weight 140 pounds, discussed with cardiology  5/29/20 proBNP NT 3811 increase torsemide to 10 mg daily, and add metolazone 2.5 mg   5/29/20 bun 23,creat 0.8,Na 138,K+ 3.8  5/29/20 cxr cardiomegaly, pulmonary interstitial markings consistent with mild edema  6/3/20 weight 128 lb tapering torosemide to 10 mg daily repeat labs one week, repeat labs ordered one week, did not start metolazone 2.5 mg yet  6/8/20 cardiology note stable on torsemide 10 mg, should edema increase can take an extra 10 mg/day, hold off on metolazone for now  9/24/20 on coreg and eliquis   1/5/21 cardiology note stable follow-up 3 to 6 months     Preventative health  5/05 colon per [facility] polyp no path report, f/u rec 5 yrs (12/12/12 [facility] note)  4/09 stool occult blood negative  12/09 mammogram  10/1/11 pneumovax  10/2/12 zoster vaccine  6/11/13 declines mammogram, tdap  6/13  dexa LS +1.8, forearm -2.7  3/4/14 vit d 26  10/12/15 prevnar  12/11/18 tdap  1/14/20 POLST completed already has advanced directive with poa son el and daughter dominga     renal mass  10/7/19 CT-CTA complete thoracoabdominal, left renal low-density lesion 1.4 cm, 31 units, mychart correspondence with daughter, given age and comorbidities recommend no further evaluation     Right hip pain  3/11 MRI right hip DJD and tear of the anterior/superior acetabular labrum  4/11  ortho note not believe hip is primary problem, referred to  or reji     Sensorineural hearing loss  12/7/15  audiology evaluation mild sensorineural hearing loss     Sleep apnea  8/11 [facility] note sleep study apnea-hypopnea index 86, low sat 74%, start CPAP  8-18 cm    2013 off cpap not comfortable  3/14/16  (normal), quantiferon gold positive, allergen panel negative, IgE 357 (less than 214), IgA 116 (),, IgM 27 (),IgG 947 ([phone])  4/13/16 cxr mild cardiomegaly  5/4/16 [facility] note continue oxygen 3 L at night  11/8/16 pulmonary note continue oxygen 3 L at night  10/4/17 pulmonary note continue oxygen 3 L at night  3/17/18 pulmonary note, continue nocturnal oxygen 2 L, start trelegy one puff daily  9/10/18 pulmonary note intolerant to CPAP, resume nocturnal oxygen, latent TB, repeat chest x-ray, stable on bronchodilators, as needed prednisone and antibiotic prescription to pharmacy follow-up 3-6 months     tb latent  3/14/16 positive quantiferon gold test  3/14/16  (normal), quantiferon gold positive, allergen panel negative, IgE 357 (less than 214), IgA 116 (),, IgM 27 (),IgG 947 ([phone])  4/13/16 cxr mild cardiomegaly  5/4/16 [facility] note continue advair 115/21 bid with spacer given doxycycline and prednisone, continue oxygen 3 L at night, AFB samples ×3, follow-up 3 months  7/25/16 pulmonary note on prednisone taper one week out of the month and doxycycline one per day for one week per  month, on advair bid and spiriva and oxygen 3 liters at night  7/27/17 PPD negative  10/4/17 pulmonary note continue advair 115/21 two inhalations bid, spiriva daily, xoponex as needed, oxygen 3 L at night, history of positive quantiferon gold, will order sputum sample follow-up 4 months  9/10/18 pulmonary note intolerant to CPAP, resume nocturnal oxygen, latent TB, repeat chest x-ray, stable on bronchodilators, as needed prednisone and antibiotic prescription to pharmacy follow-up 3-6 months     Tension headache  6/11/13 on fioricet bid  2/28/14 taper fioricet to once a day  4/25/14 now on fioricet prn     ventral hernia  10/4/19 CT abdomen pelvis with; small epigastric midline abdominal wall fat-containing ventral hernia    Patient Active Problem List    Diagnosis Date Noted   • Diastolic CHF ([facility]) 05/18/2020   • Atherosclerosis of aorta ([facility]) 01/06/2020   • Renal mass 10/07/2019   • Ventral hernia 10/07/2019   • AAA (abdominal aortic aneurysm) ([facility]) 02/27/2019   • Chronic respiratory failure with hypoxia ([facility]) 02/13/2019   • Nocturnal and exertional hypoxemia 03/20/2018   • TB lung, latent 05/04/2016   • Dementia ([facility]) 01/11/2016   • Sensorineural hearing loss 12/16/2015   • History of opioid dependence  04/23/2015   • Macular degeneration 07/08/2013   • Tension headache 06/11/2013   • History of shingles 06/07/2012   • History of positive h.pylori titer 11/01/2011   • Sleep apnea 08/10/2011   • COPD (chronic obstructive pulmonary disease) ([facility]) 07/20/2011   • Right hip pain 03/28/2011   • PAF (paroxysmal atrial fibrillation) ([facility]) 06/10/2010   • History of compression fracture of spine 05/26/2010   • Low back pain 05/22/2010   • Cervical pain 05/22/2010   • Osteoporosis, idiopathic 05/22/2010   • Hypertension 05/20/2010   • Dyslipidemia 05/20/2010   • Preventative health care 05/20/2010       Family History   Problem Relation Age of Onset   • Heart Disease Father    • Diabetes Brother        She  has a past  medical history of Anesthesia, Arthritis, Atrial fibrillation (HCC) (02/2019), Atypical chest pain, CATARACT (2007,08), Chronic anticoagulation, Constipation, COPD (chronic obstructive pulmonary disease) (HCC), DDD (degenerative disc disease), Dental disorder, Dyspnea, Headache(784.0), Hypercholesteremia, Hypertension, MEDICAL HOME, On home oxygen therapy, Osteoporosis, Other accident (2006), Pain, Pneumonia, TB lung, latent ( ), Valvular heart disease, Vertebral fracture, and Vertigo.  She  has a past surgical history that includes cataract phaco with iol (1/5/2009); other orthopedic surgery (may, 2010); carpal tunnel release (@30 yrs ago); and recovery (7/28/2010).       Objective:   /76 (BP Location: Right arm, Patient Position: Sitting, BP Cuff Size: Adult)   Pulse 76   Ht 1.524 m (5')   Wt 61.2 kg (135 lb)   SpO2 95%   BMI 26.37 kg/m²     Physical Exam     Constitutional: Alert, no distress, well-groomed.  Skin: No rashes in visible areas.  Eye: Round. Conjunctiva clear, lids normal. No icterus.   ENMT: Lips pink without lesions, good dentition, moist mucous membranes. Phonation normal.  Neck: No masses, no thyromegaly. Moves freely without pain.  Respiratory: Unlabored respiratory effort, no cough or audible wheeze  Psych: Alert and oriented x3, normal affect and mood.       Assessment and Plan:   The following treatment plan was discussed:   Assessment  #1  Chronic back pain chronic back pain recently seen by [place] pain management, recommended physical therapy, started Namenda given her history of dementia, also consider buprenorphine patch at some point, daughter monitors her pain levels, remains on gabapentin at night, Celebrex twice a day, has not used hydrocodone regularly only twice this past week, she does believe the Namenda has actually helped with the back pain, uses walker for ambulation, has seen physical therapy previously, patient has fallen    #2  Dizziness for the past month, seen  [facility] has appointment upcoming with ENT for hearing loss and dizziness difficult to identify how much of this may be positional type vertigo versus orthostatic hypotension as the dizziness can be worse with head movements as well as going from sitting to standing, remains on carvedilol    #3  Dementia started on Namenda, short-term memory loss, has seen neurology previously, no significant change in behavior, although she does get somewhat anxious at night according to the daughter but no hallucinations, functional decline as well as daughter needs to help with all ADLs    #5 COPD stable using supplemental oxygen at night only which is improved from needing oxygen continuously when she was here in [place]    #6 paroxysmal atrial fibrillation, remains on Eliquis and carvedilol per cardiology, remains on Celebrex twice a day as needed for pain, no symptoms of GI bleed, we have tried to minimize NSAID or Henderson 2 inhibitor usage however given chronic pain, we have been attempting to balance her pain control medication such as opiates with the risk of bleeding on Eliquis and Henderson 2 inhibitor with Celebrex    #7 diastolic CHF again followed by cardiology weight is remained stable, on carvedilol, torsemide, with stable renal function and sodium and potassium based on last labs March 25 BUN/creatinine 25/1.1, GFR 41, sodium 140, potassium 3.4    #8 aortic atherosclerosis and AAA 4.1 cm by CTA      Plan  #1 has been referred to [place] ent    #2  Daughter will be working on at least some temporary home aids so that she can have a break as well from caring for her mother 24/7, she has been doing an amazing job caring for her mother all the time, taking care of her medications, supervising ADLs, cooking for her, assisting with ADLs    #3 daughter will look at perhaps changing insurance plans to something in [place] as I believe the patient has benefited from being with her daughter with continuous care and supervision,  also being [place] compared to altitude given her underlying COPD and heart disease    #4 continue falling precautions, follow-up [place] pain management    #5 hospice would be a consideration when she establishes insurance in [place]    #6 follow-up telemedicine 3 to 6 months or sooner if necessary, daughter can always send me a [employer] message as well    #7 Hospital records from [place] reviewed    #8 falling precautions    #9 daughter is doing a wonderful job minimizing opiate therapy patient is not taking opiate therapy daily, minimizing side effects, chronic opiate use can cause drowsiness, sedation, memory loss worsening, increased risk for falls, she is not currently on continuous opiate therapy    #10 long-term memory loss will progress, this may impact function, behavior, daughter is looking at long-term options    #11 at some point could consider stopping gabapentin at night to see if this makes any difference with the pain or dizziness

## 2021-04-21 NOTE — PROGRESS NOTES
Member's daughter was transferred over from [facility] Customer Service after HIPPA was verified. She stated that the pulmonary referral has been approved. She wanted to know why [facility] is reaching out to confirm permeant residence. I advised that they may be asking since she is receiving all care in [place], and [facility] is a plan for [facility]. She understood and had no other questions. Used Epic and PQ

## 2021-05-02 ENCOUNTER — TELEPHONE (OUTPATIENT)
Dept: MEDICAL GROUP | Facility: MEDICAL CENTER | Age: 86
End: 2021-05-02

## 2021-05-02 DIAGNOSIS — I48.0 PAF (PAROXYSMAL ATRIAL FIBRILLATION) (HCC): ICD-10-CM

## 2021-05-02 DIAGNOSIS — F03.90 DEMENTIA WITHOUT BEHAVIORAL DISTURBANCE, UNSPECIFIED DEMENTIA TYPE: ICD-10-CM

## 2021-05-02 DIAGNOSIS — J96.11 CHRONIC RESPIRATORY FAILURE WITH HYPOXIA (HCC): ICD-10-CM

## 2021-05-02 DIAGNOSIS — I50.30 DIASTOLIC CONGESTIVE HEART FAILURE, UNSPECIFIED HF CHRONICITY (HCC): ICD-10-CM

## 2021-05-02 DIAGNOSIS — J41.1 MUCOPURULENT CHRONIC BRONCHITIS (HCC): ICD-10-CM

## 2021-05-03 NOTE — TELEPHONE ENCOUNTER
----- Message from Hawa Barlow Med Ass't sent at 4/30/2021 11:24 AM PDT -----  Regarding: FW: Non-Urgent Medical Question  Contact: [phone]    ----- Message -----  From: Venus Church  Sent: 4/30/2021  11:03 AM PDT  To: Emily Carias  Subject: RE: Non-Urgent Medical Question                  Hi Doctor Evans,  I just spoke to the  at [facility] and she would like us to set up [facility] for mom.  They do need a referral from you please, as this would be the fastest way to get it started.  If you could send a referral to the [facility] referral center at [phone] I would greatly appreciate it.  This would start the services faster than waiting for the Video referral mid May.  Thank you and hope not to bother much more. :)  Thank you for the provider names you shared for Dileep.  I have passed them on to him.  I hope you have a great weekend!  Ivana

## 2021-05-05 NOTE — PROGRESS NOTES
Warmed transferred from [facility]. Member fully verified by daughter Ivana HIPAA approved. Ivana informing member has moved to [place] and wanting to now if we continue to pay for her DME services until they have established with local company. I educated that as of 4/30/2021, member no longer has SCP. Ivana daughter instructed to reach directly out to [employer] company and inform them of move and how to precede with them until coverage in [place] has been established. Ivana agreed with this plan and will reach out today to DME.

## 2021-05-17 ENCOUNTER — APPOINTMENT (OUTPATIENT)
Dept: MEDICAL GROUP | Facility: MEDICAL CENTER | Age: 86
End: 2021-05-17

## 2021-06-07 ENCOUNTER — PATIENT OUTREACH (OUTPATIENT)
Dept: HEALTH INFORMATION MANAGEMENT | Facility: OTHER | Age: 86
End: 2021-06-07

## 2021-06-07 NOTE — PROGRESS NOTES
Called pt to schedule YUDI. Pt's daughter answered the call and advised that they have moved her care to [place].     Attempt # 2

## 2021-07-13 ENCOUNTER — APPOINTMENT (OUTPATIENT)
Dept: CARDIOLOGY | Facility: MEDICAL CENTER | Age: 86
End: 2021-07-13

## 2022-01-14 NOTE — TELEPHONE ENCOUNTER
ESTABLISHED PATIENT PRE-VISIT PLANNING     Patient was contacted to complete PVP. Spoke with pt daughter.     Note: Patient will not be contacted if there is no indication to call.     1.  Reviewed notes from the last few office visits within the medical group: Yes    2.  If any orders were placed at last visit or intended to be done for this visit (i.e. 6 mos follow-up), do we have Results/Consult Notes?        •  Labs - Labs were not ordered at last office visit.   Note: If patient appointment is for lab review and patient did not complete labs, check with provider if OK to reschedule patient until labs completed.       •  Imaging - Imaging was not ordered at last office visit.       •  Referrals - Referral ordered, patient has NOT been seen.    3. Is this appointment scheduled as a Hospital Follow-Up? No    4.  Immunizations were updated in Epic using WebIZ?: Epic matches WebIZ       •  Web Iz Recommendations: TD, VARICELLA (Chicken Pox)  and SHINGRIX (Shingles)    5.  Patient is due for the following Health Maintenance Topics:   Health Maintenance Due   Topic Date Due   • IMM HEP B VACCINE (1 of 3 - Risk 3-dose series) 04/22/1950   • IMM ZOSTER VACCINES (2 of 3) 11/27/2012   • PFT SCREENING-FEV1 AND FEV/FVC RATIO / SPIROMETRY SHOULD BE PERFORMED ANNUALLY  10/21/2016   • BONE DENSITY  06/21/2018   • URINE DRUG SCREEN  08/02/2018     - Last PFT unknown  - Patient declines Dexa Scan and Immunizations: HEPATITIS B and SHINGRIX (Shingles).    6. Orders for overdue Health Maintenance topics pended in Pre-Charting? NO    7.  AHA (MDX) form printed for Provider? YES    8.  Patient was informed to arrive 15 min prior to their scheduled appointment and bring in their medication bottles.   Time-based billing (NON-critical care) Time-based billing (NON-critical care) Time-based billing (NON-critical care) Time-based billing (NON-critical care) Time-based billing (NON-critical care) Time-based billing (NON-critical care) Time-based billing (NON-critical care) Time-based billing (NON-critical care) Time-based billing (NON-critical care) Time-based billing (NON-critical care) Time-based billing (NON-critical care) Time-based billing (NON-critical care) Time-based billing (NON-critical care)

## 2023-04-14 NOTE — THERAPY
"Pt exhibiting low endurance and poor motor control. Pt demonstrtaed slow gait pattern w/decreased heel-toe off and anterior lean on walker, requiring verbal cuing. Pt presented w/orthostaic hypotension throughout session, therefore vitals taken: Vitals: supine 135/85 HR 84; /78 HR 88; post ambulation #1 116/70 HR 85; post ambulation #2 109/69 HR 85, indicating an inappropriate cardiac response to exertion. Pt demnstrtaed 2 slight LOB and alteral sway during ambulation, requiring physical assist. Pt would benefit from further acute PT txs to progress towards goals and independence. Recommend post acute placement at an inpatient transitional care facility to address deficits.    Physical Therapy Treatment completed.   Bed Mobility:  Supine to Sit: Stand by Assist  Transfers: Sit to Stand: Contact Guard Assist  Gait: Level Of Assist: Minimal Assist with Front-Wheel Walker       Plan of Care: Will benefit from Physical Therapy 3 times per week    See \"Rehab Therapy-Acute\" Patient Summary Report for complete documentation.       " Consent: The risks of atrophy were reviewed with the patient. Include Z78.9 (Other Specified Conditions Influencing Health Status) As An Associated Diagnosis?: No Medical Necessity Clause: This procedure was medically necessary because the lesions that were treated were: Treatment Number (Optional): 1 Total Volume (Ccs): 0.4 How Many Mls Were Removed From The 10 Mg/Ml (5ml) Vial When Preparing The Injectable Solution?: 0 Kenalog Preparation: Kenalog Validate Note Data When Using Inventory: Yes Administered By (Optional): JOSE DE JESUS CROWE Detail Level: Detailed Concentration Of Kenalog Solution Injected (Mg/Ml): 20.0

## 2023-05-22 NOTE — TELEPHONE ENCOUNTER
If she is improved she does not need to go to the emergency room, as long as she can maintain her saturations above 88%.  Encouraged her to use the oxygen all the time.  There is no need to repeat the BNP level at this time.    Writer calls [facility] per request from provider Dr. Brown. Writer leaves voicemail with nurse's station to confirm that patient referral was received. Per provider, patient needs to be seen as soon as possible for severe anxiety and depression. Behavioral Health returns our call and leaves voicemail stating that they have additional questions in regards to patient referral. Writer returns call to Behavioral Health and leaves voicemail for Behavioral Health to contact our office with any additional questions.

## 2023-09-07 NOTE — TELEPHONE ENCOUNTER
Regarding: Non-Urgent Medical Question  Contact: [phone]  ----- Message from Kevon Gomez Ass't sent at 11/5/2018  8:20 AM PST -----       ----- Message from Venus Church to Denis Krueger M.D. sent at 11/4/2018 11:59 AM -----   Hello Doctor Evans,    Regarding Venus Church's condition.  Dileep took her to drop in on Friday and they prescribed Tylenol and Valium.  She is not looking very good today.  Per Dileep, she seems week and still in a great deal of pain. She is using heat,  her current medications, plus valium, and  tylenol.   Dileep is with her for most of the day,  everyday, and I will be there mid day Tuesday.    Do you think it may be a good idea to see if  she has a new fracture in her neck?  I seem to recall about a year ago when something was noted, but was not bothering her much at the time.   If you see it fit, is it okay to request an xray of the neck area?  I can perhaps take her on Tuesday after 2:00 pm.    Thank you,  Ivana  [phone]                   
Pt seen bedside in ED for OT evaluation [id number]

## 2023-10-20 NOTE — PROGRESS NOTES
Chief Complaint   Patient presents with   • Follow-Up     4 month follow up         HPI:  This is a 86 y.o. female with a history of chronic obstructive pulmonary disease and obstructive sleep apnea. Pulmonary function tests indicate FEV1 1.1 L, 61% predicted, FEV1/FVC 58%, FEF 25-75% 31% predicted, and DLCO 34% predicted. The patient is compliant with Advair 115/21 µg 2 inhalations twice daily with spacer and thorough rinsing of the mouth, Spiriva daily, Xopenex and albuterol nebulized treatments as needed. 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 Chest x-ray shows no focal consolidation or pleural effusion. There is mild cardiomegaly. No indication of TB. Patient reports no fevers, chills, sweats, or hemoptysis. She has very little shortness of breath. She has an intermittent cough which is occasionally productive. We will try and obtain a sputum sample due to positive QuantiFERON Gold.    Past Medical History:   Diagnosis Date   • TB lung, latent 5/4/2016   • Aortic regurgitation 4/25/2012   • Atypical chest pain 4/25/2012   • Chronic anticoagulation 4/25/2012   • Constipation 4/25/2012   • Dyspnea 4/25/2012   • Generalized osteoarthritis 4/25/2012   • Headache(784.0) 4/25/2012   • Hyperlipidemia 4/25/2012   • Osteoarthritis of knee 4/25/2012   • Valvular heart disease 4/25/2012   • Vertigo 4/25/2012   • Arrhythmia 7/10      A. Fib.   • CATARACT 2007,08   • Pneumonia 6/4   • Anesthesia     n/v   • Arthritis     general   • COPD (chronic obstructive pulmonary disease) (CMS-[facility], [facility])    • DDD (degenerative disc disease)    • Dental disorder     partials   • Heart valve insufficiency     minimal, watched by cardio   • Hypercholesteremia    • Hypertension    • MEDICAL HOME    • Osteoporosis    • Other accident     C-Spine FX  2006   • Pain     back pain   • Vertebral fracture        Past Surgical History:   Procedure Laterality Date   • RECOVERY  7/28/2010  "   Performed by SURGERY, IR-RECOVERY at SURGERY SAME DAY [facility] ORS   • OTHER ORTHOPEDIC SURGERY  may, 2010      T11 kypho.   • CATARACT PHACO WITH IOL  1/5/2009    Performed by ROSE MARIE MANN at SURGERY SAME DAY [facility] ORS   • CARPAL TUNNEL RELEASE  @30 yrs ago    rt hand       Social History   Substance Use Topics   • Smoking status: Former Smoker     Years: 25.00     Quit date: 4/14/1984   • Smokeless tobacco: Never Used      Comment: smoked 25 yrs, quit 1984   • Alcohol use No      Comment: none       ROS:   Constitutional: Denies fevers, chills, sweats, fatigue, and weight loss.  Eyes: Denies glasses.  Ears/nose/mouth/throat: Denies injury.Positive for heart appearing.  Cardiovascular: Denies chest pain, tightness.  Respiratory: See history of present illness.  GI: Denies heartburn, difficulty swallowing, nausea, and vomiting.  Neurological: Denies frequent headaches, dizziness, weakness.    Vitals:  Vitals:    10/04/17 0903   Height: 1.549 m (5' 1\")   Weight: 53.3 kg (117 lb 6.4 oz)   Weight % change since last entry.: 0 %   BP: 118/72   Pulse: (!) 59   BMI (Calculated): 22.18   Resp: 16   Temp: 36.4 °C (97.5 °F)   O2 sat % room air: 97 %       Allergies:  Codeine    Medications:  Current Outpatient Prescriptions   Medication Sig Dispense Refill   • levalbuterol (XOPENEX HFA) 45 MCG/ACT inhaler Inhale 1-2 Puffs by mouth every four hours as needed for Shortness of Breath. 1 Inhaler 5   • oxyCODONE CR (OXYCONTIN) 10 MG Tablet Extended Release 12 hour Abuse-Deterrent Take 1 Tab by mouth every 12 hours. Ok to fill oxycodone 56-60 days after the oxycodone written on 8/7/17 is filled 60 Tab 0   • Diclofenac Sodium 1 % Gel Apply 1 Application to skin as directed 2 times a day as needed (hand pain). Apply to affected area 2 gm bid hand joint 100 g 3   • tiotropium (SPIRIVA HANDIHALER) 18 MCG Cap Inhale 1 Cap by mouth every day. 30 Cap 11   • ADVAIR -21 MCG/ACT inhaler INHALE TWO PUFFS BY MOUTH TWICE " DAILY.  USE  WITH  SPACER  AND  RINSE  MOUTH  AFTER  EACH  USE 1 Inhaler 5   • metoprolol (LOPRESSOR) 25 MG Tab Take 1 Tab by mouth 2 times a day. 180 Tab 3   • dabigatran (PRADAXA) 75 MG Cap capsule Take 1 Cap by mouth 2 Times a Day. 60 Cap 11   • albuterol (PROVENTIL) 2.5mg/3ml Nebu Soln solution for nebulization 3 mL by Nebulization route every four hours as needed for Shortness of Breath. 150 mL 11   • irbesartan-hydrochlorothiazide (AVALIDE) 150-12.5 MG per tablet Take 0.5 Tabs by mouth every day. 90 Tab 3   • Omega-3 300 MG Cap Take  by mouth.     • albuterol (PROVENTIL) 2.5mg/3ml Nebu Soln solution for nebulization 2.5 mg by Nebulization route every four hours as needed for Shortness of Breath.     • Cyanocobalamin (VITAMIN B-12 PO) Take  by mouth.     • lidocaine (LIDODERM) 5 % Patch Apply one patch to affected area on during the day for 12 hours, off at night 30 Patch 6   • calcium-vitamin D (OSCAL-250) 250-125 MG-UNIT TABS Take 1 Tab by mouth every day.     • VITAMIN D, CHOLECALCIFEROL, PO Take 1 Cap by mouth 2 Times a Day.     • duloxetine (CYMBALTA) 30 MG Cap DR Particles Take 1 Cap by mouth every day. 30 Cap 5     No current facility-administered medications for this visit.        PHYSICAL EXAM:  Appearance: Well-developed, well-nourished, no acute distress.  Eyes. PERRL.  Hearing: Grossly intact.  Oropharynx: Tongue normal, posterior pharynx without erythema or exudate.  Respiratory effort: No intercostal retractions or use of accessory muscles.  Lung auscultation: No crackles, wheezing.  Heart auscultation: No murmur, gallop, or rub. Regular rate and rhythm.  Extremities: No cyanosis or edema.  Gait and Station: Normal  Orientation: Oriented to time, place, and person.    Assessment:  1. Chronic bronchitis, unspecified chronic bronchitis type (CMS-HCC)  INFLUENZA VACCINE, HIGH DOSE (65+ ONLY)    AFB CULTURE    CULTURE RESPIRATORY W/ GRM STN         Plan:  1. Continue Advair 115/21 µg 2 inhalations  twice daily, Spiriva 18 µg daily, Xopenex HFA as needed, and albuterol nebulized treatments as needed.  2. Continue oxygen at 3 L/m nocturnally.  3. Influenza vaccine today.  4. Up-to-date on pneumococcal 23 and Prevnar 13.  5. 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 Will order sputum sample for further evaluation.    Return in about 4 months (around 2/4/2018) for With MARILYN Russell.       DISPLAY PLAN FREE TEXT